# Patient Record
Sex: MALE | Race: BLACK OR AFRICAN AMERICAN | NOT HISPANIC OR LATINO | Employment: OTHER | URBAN - METROPOLITAN AREA
[De-identification: names, ages, dates, MRNs, and addresses within clinical notes are randomized per-mention and may not be internally consistent; named-entity substitution may affect disease eponyms.]

---

## 2017-01-03 ENCOUNTER — OFFICE VISIT (OUTPATIENT)
Dept: UROLOGY | Facility: CLINIC | Age: 57
End: 2017-01-03
Payer: MEDICARE

## 2017-01-03 VITALS
HEART RATE: 81 BPM | DIASTOLIC BLOOD PRESSURE: 86 MMHG | WEIGHT: 165.38 LBS | SYSTOLIC BLOOD PRESSURE: 129 MMHG | BODY MASS INDEX: 24.5 KG/M2 | HEIGHT: 69 IN

## 2017-01-03 DIAGNOSIS — N31.8 FREQUENCY-URGENCY SYNDROME: ICD-10-CM

## 2017-01-03 DIAGNOSIS — R97.20 ELEVATED PSA: ICD-10-CM

## 2017-01-03 DIAGNOSIS — C61 PROSTATE CANCER: Primary | ICD-10-CM

## 2017-01-03 DIAGNOSIS — N13.8 BPH (BENIGN PROSTATIC HYPERTROPHY) WITH URINARY OBSTRUCTION: ICD-10-CM

## 2017-01-03 DIAGNOSIS — R39.12 WEAK URINE STREAM: ICD-10-CM

## 2017-01-03 DIAGNOSIS — N40.1 BPH (BENIGN PROSTATIC HYPERTROPHY) WITH URINARY OBSTRUCTION: ICD-10-CM

## 2017-01-03 PROCEDURE — 99213 OFFICE O/P EST LOW 20 MIN: CPT | Mod: PBBFAC | Performed by: UROLOGY

## 2017-01-03 PROCEDURE — 99999 PR PBB SHADOW E&M-EST. PATIENT-LVL III: CPT | Mod: PBBFAC,,, | Performed by: UROLOGY

## 2017-01-03 PROCEDURE — 99215 OFFICE O/P EST HI 40 MIN: CPT | Mod: S$PBB,,, | Performed by: UROLOGY

## 2017-01-03 RX ORDER — TAMSULOSIN HYDROCHLORIDE 0.4 MG/1
0.4 CAPSULE ORAL NIGHTLY
Qty: 90 CAPSULE | Refills: 3 | Status: SHIPPED | OUTPATIENT
Start: 2017-01-03 | End: 2017-05-26 | Stop reason: SDUPTHER

## 2017-01-03 RX ORDER — FINASTERIDE 5 MG/1
5 TABLET, FILM COATED ORAL DAILY
Qty: 90 TABLET | Refills: 3 | Status: SHIPPED | OUTPATIENT
Start: 2017-01-03 | End: 2017-02-02

## 2017-01-03 RX ORDER — FINASTERIDE 5 MG/1
5 TABLET, FILM COATED ORAL DAILY
COMMUNITY
End: 2017-05-26 | Stop reason: SDUPTHER

## 2017-01-03 NOTE — PROGRESS NOTES
Jesus Christy is a 56 y.o. man who is here for the evaluation of Prostate Cancer (PSA 5.4)  hx of newly diagnosed prostate cancer back in 9/15/16 for elevated PSA.  Based on his low grade, low volume prostate cancer, we decided to follow up with active surveillance.  His overall voiding symptoms including weak urine flow, frequency and urgency got better after he was started on Flomax and Finasteride after his prostate bx.    Denies dysuria or hematuria.  No family hx of prostate cancer.  Had right knee surgery and had chronic pain from the right thigh to the knee ( on cymbalta).  Denies flank pain, dysuira, hematuria .     TRUS bx of prostate on 9/15/16 showed  Volume 58 grams in size.    FINAL PATHOLOGIC DIAGNOSIS  1. Prostate, left apex, biopsy:  Benign prostatic tissue.  2. Prostate, left middle, biopsy:  Benign prostatic tissue.  3. Prostate, left base, biopsy:  Benign prostatic tissue.  4. Prostate, right apex, biopsy:  Benign prostatic tissue.  5. Prostate, right middle, biopsy:  Benign prostatic tissue.  6. Prostate, right base, biopsy:  Prostate adenocarcinoma, shaun pattern (3+3), score 6, involving 1 of 2 biopsies, less than 5%. No evidence  of perineural invasion.     Past Medical History   Diagnosis Date    GERD (gastroesophageal reflux disease)     HTN (hypertension)      Past Surgical History   Procedure Laterality Date    Knee arthroscopy w/ acl reconstruction  2014     right     Social History   Substance Use Topics    Smoking status: Never Smoker    Smokeless tobacco: None    Alcohol use 0.5 oz/week     1 Standard drinks or equivalent per week     Family History   Problem Relation Age of Onset    Hypertension Mother     Diabetes Father      Allergy:  Review of patient's allergies indicates:   Allergen Reactions    Morphine Palpitations     Outpatient Encounter Prescriptions as of 1/3/2017   Medication Sig Dispense Refill    butalbital-acetaminophen-caffeine -40 mg (FIORICET,  ESGIC) -40 mg per tablet TAKE ONE TABLET BY MOUTH EVERY 6 HOURS AS NEEDED FOR HEADACHES 30 tablet 0    finasteride (PROSCAR) 5 mg tablet Take 5 mg by mouth once daily.      tamsulosin (FLOMAX) 0.4 mg Cp24 Take 1 capsule (0.4 mg total) by mouth every evening. 90 capsule 3    [DISCONTINUED] tamsulosin (FLOMAX) 0.4 mg Cp24 Take 1 capsule (0.4 mg total) by mouth every evening. 30 capsule 11    finasteride (PROSCAR) 5 mg tablet Take 1 tablet (5 mg total) by mouth once daily. 90 tablet 3    sumatriptan (IMITREX) 25 MG Tab Take 1 tablet (25 mg total) by mouth every 2 (two) hours as needed (can repeat once after 2 hours in a 24 hour period). 12 tablet 2    tapentadol 100 mg Tb12 Take 200 mg by mouth 2 (two) times daily. rx by Dr. Esteban  0    UNABLE TO FIND Take 1 packet by mouth 6 (six) times daily. medication name: Goodies Headache Powder      [DISCONTINUED] finasteride (PROSCAR) 5 mg tablet Take 1 tablet (5 mg total) by mouth once daily. 30 tablet 12     Facility-Administered Encounter Medications as of 1/3/2017   Medication Dose Route Frequency Provider Last Rate Last Dose    lidocaine HCL 10 mg/ml (1%) injection 10 mL  10 mL Infiltration Once Varun H. MD Martín   10 mL at 09/15/16 1030    lidocaine HCl 2% urojet   Rectal Once Varun H. MD Martín         Review of Systems   General ROS: GENERAL:  No weight gain or loss  SKIN:  No rashes or lacerations  HEAD:  No headaches  EYES:  No exophthalmos, jaundice or blindness  EARS:  No dizziness, tinnitus or hearing loss  NOSE:  No changes in smell  MOUTH & THROAT:  No dyskinetic movements or obvious goiter  CHEST:  No shortness of breath, hyperventilation or cough  CARDIOVASCULAR:  No tachycardia or chest pain  ABDOMEN:  No nausea, vomiting, pain, constipation or diarrhea  URINARY:  No frequency, dysuria or sexual dysfunction  ENDOCRINE:  No polydipsia, polyuria  MUSCULOSKELETAL:  No pain or stiffness of the joints  NEUROLOGIC:  No weakness, sensory changes,  seizures, confusion, memory loss, tremor or other abnormal movements  Physical Exam     Vitals:    01/03/17 1009   BP: 129/86   Pulse: 81     General Appearance:  Alert, cooperative, no distress, appears stated age   Head:  Normocephalic, without obvious abnormality, atraumatic   Eyes:  PERRL, conjunctiva/corneas clear, EOM's intact, fundi benign, both eyes   Ears:  Normal TM's and external ear canals, both ears   Nose: Nares normal, septum midline, mucosa normal, no drainage or sinus tenderness   Throat: Lips, mucosa, and tongue normal; teeth and gums normal   Neck: Supple, symmetrical, trachea midline, no adenopathy, thyroid: not enlarged, symmetric, no tenderness/mass/nodules, no carotid bruit or JVD   Back:   Symmetric, no curvature, ROM normal, no CVA tenderness   Lungs:   Clear to auscultation bilaterally, respirations unlabored   Chest Wall:  No tenderness or deformity   Heart:  Regular rate and rhythm, S1, S2 normal, no murmur, rub or gallop   Abdomen:   Soft, non-tender, bowel sounds active all four quadrants,  no masses, no organomegaly of liver and spleen  No hernia noted   Genitalia:  Scrotum: no rash or lesion  Normal symmetric epididymis without masses  Normal vas palpated  Normal size, symmetric testicles with no masses   Normal urethral meatus with no discharge  Normal circumcised penis with no lesion   Rectal:  Normal perineum and anus upon inspection.  Normal tone, no masses or tenderness;   Extremities: Extremities normal, atraumatic, no cyanosis or edema   Pulses: 2+ and symmetric   Skin: Skin color, texture, turgor normal, no rashes or lesions   Lymph nodes: Cervical, supraclavicular, and axillary nodes normal   Neurologic: Normal     Prostate 50 grams smooth with no nodule or tenderness.    LABS:  Lab Results   Component Value Date    PSA 6.9 (H) 09/01/2016    PSA 6.2 (H) 08/22/2016    PSA 3.1 03/22/2012    PSADIAG 5.4 (H) 12/29/2016     Results for orders placed or performed in visit on  12/29/16   Prostate Specific Antigen, Diagnostic   Result Value Ref Range    PSA DIAGNOSTIC 5.4 (H) 0.00 - 4.00 ng/mL     Lab Results   Component Value Date    CREATININE 1.1 08/22/2016    CREATININE 1.3 02/04/2013    CREATININE 1.0 03/13/2012     No results found for this or any previous visit.  Urine Culture, Routine   Date Value Ref Range Status   03/19/2012 NO GROWTH AFTER 48 HOURS.  Final     UA clear    Assessment and Plan:  Jesus was seen today for prostate cancer.    Diagnoses and all orders for this visit:    Prostate cancer  -     Prostate Specific Antigen, Diagnostic; Future    Elevated PSA  -     Prostate Specific Antigen, Diagnostic; Future    Weak urine stream  -     tamsulosin (FLOMAX) 0.4 mg Cp24; Take 1 capsule (0.4 mg total) by mouth every evening.    Frequency-urgency syndrome  -     tamsulosin (FLOMAX) 0.4 mg Cp24; Take 1 capsule (0.4 mg total) by mouth every evening.    BPH (benign prostatic hypertrophy) with urinary obstruction  -     finasteride (PROSCAR) 5 mg tablet; Take 1 tablet (5 mg total) by mouth once daily.    I reviewed and explained his pathology again in detail.  Options of surgery or radiation therapy briefly discussed.  He agrees with active surveillance for his prostate cancer.  Will continue to monitor his PSA.  90 days refills given.  I spent 40 minutes with the patient of which more than half was spent in direct consultation with the patient in regards to our treatment and plan.      Follow-up:  Return in about 3 months (around 4/3/2017) for PSA.

## 2017-01-03 NOTE — MR AVS SNAPSHOT
[facility] - Urology 4th Floor  [address] [address]  Phone: [phone]                  Jesus Christy   1/3/2017 10:30 AM   Office Visit    Description:  Male : 1960   Provider:  Varun Resendiz MD   Department:  [facility] - Urology 4th Floor           Reason for Visit     Prostate Cancer           Diagnoses this Visit        Comments    Prostate cancer    -  Primary     Elevated PSA         Weak urine stream         Frequency-urgency syndrome         BPH (benign prostatic hypertrophy) with urinary obstruction                To Do List           Goals (5 Years of Data)     None      Follow-Up and Disposition     Return in about 3 months (around 4/3/2017) for PSA.       These Medications        Disp Refills Start End    tamsulosin (FLOMAX) 0.4 mg Cp24 90 capsule 3 1/3/2017     Take 1 capsule (0.4 mg total) by mouth every evening. - Oral    Ph[facility] [address] [facility] [address] Ph #: [phone]       finasteride (PROSCAR) 5 mg tablet 90 tablet 3 1/3/2017 2017    Take 1 tablet (5 mg total) by mouth once daily. - Oral    [facility] [address] [facility] Ph #: [phone]         [facility] On Call     [facility] On Call Nurse Care Line -  Assistance  Registered nurses in the [facility] provide clinical advisement, health education, appointment booking, and other advisory services.  Call for this free service at [phone].             Medications           Message regarding Medications     Verify the changes and/or additions to your medication regime listed below are the same as discussed with your clinician today.  If any of these changes or additions are incorrect, please notify your healthcare provider.             Verify that the below list of medications is an accurate representation of the medications you are currently taking.  If none reported, the list may be blank. If  "incorrect, please contact your healthcare provider. Carry this list with you in case of emergency.           Current Medications     butalbital-acetaminophen-caffeine -40 mg (FIORICET, ESGIC) -40 mg per tablet TAKE ONE TABLET BY MOUTH EVERY 6 HOURS AS NEEDED FOR HEADACHES    finasteride (PROSCAR) 5 mg tablet Take 5 mg by mouth once daily.    tamsulosin (FLOMAX) 0.4 mg Cp24 Take 1 capsule (0.4 mg total) by mouth every evening.    finasteride (PROSCAR) 5 mg tablet Take 1 tablet (5 mg total) by mouth once daily.    sumatriptan (IMITREX) 25 MG Tab Take 1 tablet (25 mg total) by mouth every 2 (two) hours as needed (can repeat once after 2 hours in a 24 hour period).    tapentadol 100 mg Tb12 Take 200 mg by mouth 2 (two) times daily. rx by Dr. Esteban    UNABLE TO FIND Take 1 packet by mouth 6 (six) times daily. medication name: Goodies Headache Powder           Clinical Reference Information           Vital Signs - Last Recorded  Most recent update: 1/3/2017 10:11 AM by Rose Loredo RN    BP Pulse Ht Wt BMI    129/86 81 5' 9" (1.753 m) 75 kg (165 lb 5.5 oz) 24.42 kg/m2      Blood Pressure          Most Recent Value    BP  129/86      Allergies as of 1/3/2017     Morphine      Immunizations Administered on Date of Encounter - 1/3/2017     None      Orders Placed During Today's Visit     Future Labs/Procedures Expected by Expires    Prostate Specific Antigen, Diagnostic  1/3/2017 1/3/2018      [facility] Sign-Up     Activating your [facility] account is as easy as 1-2-3!     1) Visit [email or web address], select Sign Up Now, enter this activation code and your date of birth, then select Next.  [email or web address]  Expires: 1/5/2017  9:49 AM      2) Create a username and password to use when you visit [facility] in the future and select a security question in case you lose your password and select Next.    3) Enter your e-mail address and click Sign Up!    Additional Information  If you have questions, please e-mail " [email or web address] or call [phone] to talk to our [facility] staff. Remember, [facility] is NOT to be used for urgent needs. For medical emergencies, dial 911.         Instructions    Lab Results   Component Value Date    PSA 6.9 (H) 09/01/2016    PSA 6.2 (H) 08/22/2016    PSA 3.1 03/22/2012    PSADIAG 5.4 (H) 12/29/2016

## 2017-01-03 NOTE — PATIENT INSTRUCTIONS
Lab Results   Component Value Date    PSA 6.9 (H) 09/01/2016    PSA 6.2 (H) 08/22/2016    PSA 3.1 03/22/2012    PSADIAG 5.4 (H) 12/29/2016

## 2017-05-19 ENCOUNTER — LAB VISIT (OUTPATIENT)
Dept: LAB | Facility: HOSPITAL | Age: 57
End: 2017-05-19
Attending: UROLOGY
Payer: MEDICARE

## 2017-05-19 DIAGNOSIS — C61 PROSTATE CANCER: ICD-10-CM

## 2017-05-19 DIAGNOSIS — R97.20 ELEVATED PSA: ICD-10-CM

## 2017-05-19 LAB — COMPLEXED PSA SERPL-MCNC: 5.8 NG/ML

## 2017-05-19 PROCEDURE — 36415 COLL VENOUS BLD VENIPUNCTURE: CPT

## 2017-05-19 PROCEDURE — 84153 ASSAY OF PSA TOTAL: CPT

## 2017-05-22 ENCOUNTER — OFFICE VISIT (OUTPATIENT)
Dept: INTERNAL MEDICINE | Facility: CLINIC | Age: 57
End: 2017-05-22
Payer: MEDICARE

## 2017-05-22 VITALS
BODY MASS INDEX: 24.54 KG/M2 | SYSTOLIC BLOOD PRESSURE: 120 MMHG | HEART RATE: 78 BPM | DIASTOLIC BLOOD PRESSURE: 88 MMHG | HEIGHT: 69 IN | WEIGHT: 165.69 LBS

## 2017-05-22 DIAGNOSIS — G43.709 CHRONIC MIGRAINE WITHOUT AURA WITHOUT STATUS MIGRAINOSUS, NOT INTRACTABLE: ICD-10-CM

## 2017-05-22 DIAGNOSIS — G90.521 COMPLEX REGIONAL PAIN SYNDROME TYPE 1 OF RIGHT LOWER EXTREMITY: Primary | ICD-10-CM

## 2017-05-22 DIAGNOSIS — F32.A DEPRESSION, UNSPECIFIED DEPRESSION TYPE: ICD-10-CM

## 2017-05-22 DIAGNOSIS — R09.89 CHEST CONGESTION: ICD-10-CM

## 2017-05-22 DIAGNOSIS — F41.9 ANXIETY: ICD-10-CM

## 2017-05-22 PROCEDURE — 99214 OFFICE O/P EST MOD 30 MIN: CPT | Mod: S$PBB,,, | Performed by: INTERNAL MEDICINE

## 2017-05-22 PROCEDURE — 99999 PR PBB SHADOW E&M-EST. PATIENT-LVL III: CPT | Mod: PBBFAC,,, | Performed by: INTERNAL MEDICINE

## 2017-05-22 PROCEDURE — 99213 OFFICE O/P EST LOW 20 MIN: CPT | Mod: PBBFAC | Performed by: INTERNAL MEDICINE

## 2017-05-22 RX ORDER — BUTALBITAL, ACETAMINOPHEN AND CAFFEINE 50; 325; 40 MG/1; MG/1; MG/1
TABLET ORAL
Qty: 30 TABLET | Refills: 1 | Status: SHIPPED | OUTPATIENT
Start: 2017-05-22 | End: 2018-08-28

## 2017-05-22 RX ORDER — HYDROXYZINE HYDROCHLORIDE 50 MG/1
50 TABLET, FILM COATED ORAL 2 TIMES DAILY PRN
COMMUNITY
Start: 2017-05-22 | End: 2018-08-28

## 2017-05-22 RX ORDER — AMITRIPTYLINE HYDROCHLORIDE 100 MG/1
100 TABLET ORAL NIGHTLY
Qty: 30 TABLET | Refills: 11 | COMMUNITY
Start: 2017-05-22 | End: 2018-03-01 | Stop reason: SDUPTHER

## 2017-05-22 NOTE — PROGRESS NOTES
"Subjective:       Patient ID: Jesus Christy is a 56 y.o. male.    Chief Complaint: Follow-up    HPI   55 yo M here for 6 mo follow up of headaches and HTN. Saw neurology on 9/26. They suspected CRPS in RLE and migraines. Migraine journal, magnesium, gabapentin qhs. Abortive sumatriptan, stop fioricet due to rebound.     On cymbalta, nucynta also.   Followed by Dr. Resendiz for BPH, last 1/317.    Reports headaches are relatively rare.   He reports the fioricet were more affordable. The imitrex was too expensive.     Some numbness in arms if holding above head. Returns to normal quickly with repositioning.     He continues to see Dr. Esteban for CRPS in RLE.     He is seeing psychiatrist Dr. Holder (sp?)  for depression. On amitriptyline 100mg and hydroxyzine prn anxiety.     Recently chest congestion and fullness. Occasional wheezing at night. Going on for a few weeks. Taking dayquil which seems to be helping.     Review of Systems   Constitutional: Negative for fever.   HENT: Positive for congestion.    Respiratory: Negative for shortness of breath.    Cardiovascular: Negative for chest pain.   Musculoskeletal: Negative.    Skin: Negative.    Neurological: Positive for headaches.       Objective:   /88   Pulse 78   Ht 5' 9" (1.753 m)   Wt 75.2 kg (165 lb 11.2 oz)   BMI 24.47 kg/m²      Physical Exam   Constitutional: He is oriented to person, place, and time. He appears well-developed and well-nourished. No distress.   HENT:   Head: Normocephalic and atraumatic.   Cardiovascular: Normal rate and regular rhythm.    No murmur heard.  Pulmonary/Chest: Effort normal. No respiratory distress. He has no wheezes. He has no rales.   Neurological: He is alert and oriented to person, place, and time.   Skin: Skin is warm and dry. He is not diaphoretic.   Psychiatric: He has a normal mood and affect. His behavior is normal.       Assessment:       1. Complex regional pain syndrome type 1 of right lower extremity    2. " Chronic migraine without aura without status migrainosus, not intractable    3. Anxiety    4. Depression, unspecified depression type    5. Chest congestion        Plan:       Jesus was seen today for follow-up.    Diagnoses and all orders for this visit:    Complex regional pain syndrome type 1 of right lower extremity  Managed by Dr. Esteban    Chronic migraine without aura without status migrainosus, not intractable  -     butalbital-acetaminophen-caffeine -40 mg (FIORICET, ESGIC) -40 mg per tablet; TAKE ONE TABLET BY MOUTH EVERY 12 hours as needed for headaches. Attempt to minimize to no more than 2/month.    Anxiety  Depression, unspecified depression type  Seeing psychiatrist, on tca and hydroxyzine prn    Chest congestion  Continue dayquil, benign exam and sx improving  If fails to improve completely or develop fever, purulent sputum contact clinic          Patient given written rx to receive tdap vaccine at pharmacy    Discussed risk of rebound and will use fioricet very sparingly.

## 2017-05-26 ENCOUNTER — OFFICE VISIT (OUTPATIENT)
Dept: UROLOGY | Facility: CLINIC | Age: 57
End: 2017-05-26
Payer: MEDICARE

## 2017-05-26 VITALS
HEART RATE: 106 BPM | SYSTOLIC BLOOD PRESSURE: 139 MMHG | DIASTOLIC BLOOD PRESSURE: 85 MMHG | BODY MASS INDEX: 24.72 KG/M2 | WEIGHT: 166.88 LBS | HEIGHT: 69 IN

## 2017-05-26 DIAGNOSIS — C61 PROSTATE CANCER: Primary | ICD-10-CM

## 2017-05-26 DIAGNOSIS — R39.12 WEAK URINE STREAM: ICD-10-CM

## 2017-05-26 DIAGNOSIS — N31.8 FREQUENCY-URGENCY SYNDROME: ICD-10-CM

## 2017-05-26 DIAGNOSIS — R97.20 ELEVATED PSA: ICD-10-CM

## 2017-05-26 PROCEDURE — 99999 PR PBB SHADOW E&M-EST. PATIENT-LVL III: CPT | Mod: PBBFAC,,, | Performed by: UROLOGY

## 2017-05-26 PROCEDURE — 99213 OFFICE O/P EST LOW 20 MIN: CPT | Mod: PBBFAC | Performed by: UROLOGY

## 2017-05-26 PROCEDURE — 99215 OFFICE O/P EST HI 40 MIN: CPT | Mod: S$PBB,,, | Performed by: UROLOGY

## 2017-05-26 RX ORDER — FINASTERIDE 5 MG/1
5 TABLET, FILM COATED ORAL DAILY
Qty: 90 TABLET | Refills: 3 | Status: SHIPPED | OUTPATIENT
Start: 2017-05-26 | End: 2017-08-29 | Stop reason: SDUPTHER

## 2017-05-26 RX ORDER — LORAZEPAM 1 MG/1
2 TABLET ORAL
Qty: 2 TABLET | Refills: 0 | Status: SHIPPED | OUTPATIENT
Start: 2017-05-26 | End: 2018-08-28

## 2017-05-26 RX ORDER — TAMSULOSIN HYDROCHLORIDE 0.4 MG/1
0.4 CAPSULE ORAL NIGHTLY
Qty: 90 CAPSULE | Refills: 3 | Status: SHIPPED | OUTPATIENT
Start: 2017-05-26 | End: 2017-08-29 | Stop reason: SDUPTHER

## 2017-05-26 NOTE — PROGRESS NOTES
Jesus Christy is a 56 y.o. man who is here for the evaluation of No chief complaint on file.  hx of newly diagnosed prostate cancer back in 9/15/16 for elevated PSA.  Based on his low grade, low volume prostate cancer, we decided to follow up with active surveillance.  His overall voiding symptoms including weak urine flow, frequency and urgency got better after he was started on Flomax and Finasteride after his prostate bx.    Denies dysuria or hematuria.  No family hx of prostate cancer.  Had right knee surgery and had chronic pain from the right thigh to the knee ( on cymbalta).  Denies flank pain, dysuira, hematuria .     TRUS bx of prostate on 9/15/16 showed  Volume 58 grams in size.    FINAL PATHOLOGIC DIAGNOSIS  1. Prostate, left apex, biopsy:  Benign prostatic tissue.  2. Prostate, left middle, biopsy:  Benign prostatic tissue.  3. Prostate, left base, biopsy:  Benign prostatic tissue.  4. Prostate, right apex, biopsy:  Benign prostatic tissue.  5. Prostate, right middle, biopsy:  Benign prostatic tissue.  6. Prostate, right base, biopsy:  Prostate adenocarcinoma, shaun pattern (3+3), score 6, involving 1 of 2 biopsies, less than 5%. No evidence  of perineural invasion.     Past Medical History:   Diagnosis Date    GERD (gastroesophageal reflux disease)     HTN (hypertension)      Past Surgical History:   Procedure Laterality Date    KNEE ARTHROSCOPY W/ ACL RECONSTRUCTION  2014    right     Social History   Substance Use Topics    Smoking status: Never Smoker    Smokeless tobacco: Not on file    Alcohol use 0.5 oz/week     1 Standard drinks or equivalent per week     Family History   Problem Relation Age of Onset    Hypertension Mother     Diabetes Father      Allergy:  Review of patient's allergies indicates:   Allergen Reactions    Morphine Palpitations     Outpatient Encounter Prescriptions as of 5/26/2017   Medication Sig Dispense Refill    amitriptyline (ELAVIL) 100 MG tablet Take 1  tablet (100 mg total) by mouth every evening. 30 tablet 11    butalbital-acetaminophen-caffeine -40 mg (FIORICET, ESGIC) -40 mg per tablet TAKE ONE TABLET BY MOUTH EVERY 12 hours as needed for headaches. Attempt to minimize to no more than 2/month. 30 tablet 1    finasteride (PROSCAR) 5 mg tablet Take 1 tablet (5 mg total) by mouth once daily. 90 tablet 3    hydrOXYzine (ATARAX) 50 MG tablet Take 1 tablet (50 mg total) by mouth 2 (two) times daily as needed for Anxiety.      tamsulosin (FLOMAX) 0.4 mg Cp24 Take 1 capsule (0.4 mg total) by mouth every evening. 90 capsule 3    tapentadol 100 mg Tb12 Take 100 mg by mouth 2 (two) times daily. rx by Dr. Esteban  0    UNABLE TO FIND Take 1 packet by mouth 6 (six) times daily. medication name: Goodies Headache Powder      [DISCONTINUED] finasteride (PROSCAR) 5 mg tablet Take 5 mg by mouth once daily.      [DISCONTINUED] tamsulosin (FLOMAX) 0.4 mg Cp24 Take 1 capsule (0.4 mg total) by mouth every evening. 90 capsule 3    lorazepam (ATIVAN) 1 MG tablet Take 2 tablets (2 mg total) by mouth On call Procedure for Anxiety. 2 tablet 0     Facility-Administered Encounter Medications as of 5/26/2017   Medication Dose Route Frequency Provider Last Rate Last Dose    lidocaine HCL 10 mg/ml (1%) injection 10 mL  10 mL Infiltration Once Varun H. MD Martín        lidocaine HCl 2% urojet   Rectal Once Varun H. MD Martín         Review of Systems   General ROS: GENERAL:  No weight gain or loss  SKIN:  No rashes or lacerations  HEAD:  No headaches  EYES:  No exophthalmos, jaundice or blindness  EARS:  No dizziness, tinnitus or hearing loss  NOSE:  No changes in smell  MOUTH & THROAT:  No dyskinetic movements or obvious goiter  CHEST:  No shortness of breath, hyperventilation or cough  CARDIOVASCULAR:  No tachycardia or chest pain  ABDOMEN:  No nausea, vomiting, pain, constipation or diarrhea  URINARY:  No frequency, dysuria or sexual dysfunction  ENDOCRINE:  No  polydipsia, polyuria  MUSCULOSKELETAL:  No pain or stiffness of the joints  NEUROLOGIC:  No weakness, sensory changes, seizures, confusion, memory loss, tremor or other abnormal movements  Physical Exam     Vitals:    05/26/17 0815   BP: 139/85   Pulse: 106     General Appearance:  Alert, cooperative, no distress, appears stated age   Head:  Normocephalic, without obvious abnormality, atraumatic   Eyes:  PERRL, conjunctiva/corneas clear, EOM's intact, fundi benign, both eyes   Ears:  Normal TM's and external ear canals, both ears   Nose: Nares normal, septum midline, mucosa normal, no drainage or sinus tenderness   Throat: Lips, mucosa, and tongue normal; teeth and gums normal   Neck: Supple, symmetrical, trachea midline, no adenopathy, thyroid: not enlarged, symmetric, no tenderness/mass/nodules, no carotid bruit or JVD   Back:   Symmetric, no curvature, ROM normal, no CVA tenderness   Lungs:   Clear to auscultation bilaterally, respirations unlabored   Chest Wall:  No tenderness or deformity   Heart:  Regular rate and rhythm, S1, S2 normal, no murmur, rub or gallop   Abdomen:   Soft, non-tender, bowel sounds active all four quadrants,  no masses, no organomegaly of liver and spleen  No hernia noted   Genitalia:  Scrotum: no rash or lesion  Normal symmetric epididymis without masses  Normal vas palpated  Normal size, symmetric testicles with no masses   Normal urethral meatus with no discharge  Normal circumcised penis with no lesion   Rectal:  Normal perineum and anus upon inspection.  Normal tone, no masses or tenderness;   Extremities: Extremities normal, atraumatic, no cyanosis or edema   Pulses: 2+ and symmetric   Skin: Skin color, texture, turgor normal, no rashes or lesions   Lymph nodes: Cervical, supraclavicular, and axillary nodes normal   Neurologic: Normal     Prostate 40 grams smooth with no nodule or tenderness.    LABS:  Lab Results   Component Value Date    PSA 6.9 (H) 09/01/2016    PSA 6.2 (H)  08/22/2016    PSA 3.1 03/22/2012    PSADIAG 5.8 (H) 05/19/2017    PSADIAG 5.4 (H) 12/29/2016     Results for orders placed or performed in visit on 05/19/17   Prostate Specific Antigen, Diagnostic   Result Value Ref Range    PSA DIAGNOSTIC 5.8 (H) 0.00 - 4.00 ng/mL   Results for orders placed or performed in visit on 12/29/16   Prostate Specific Antigen, Diagnostic   Result Value Ref Range    PSA DIAGNOSTIC 5.4 (H) 0.00 - 4.00 ng/mL     Lab Results   Component Value Date    CREATININE 1.1 08/22/2016    CREATININE 1.3 02/04/2013    CREATININE 1.0 03/13/2012     No results found for this or any previous visit.  Urine Culture, Routine   Date Value Ref Range Status   03/19/2012 NO GROWTH AFTER 48 HOURS.  Final       Assessment and Plan:  Diagnoses and all orders for this visit:    Prostate cancer  -     Prostate Specific Antigen, Diagnostic; Future  -     MRI Pelvis W WO Contrast; Future  -     lorazepam (ATIVAN) 1 MG tablet; Take 2 tablets (2 mg total) by mouth On call Procedure for Anxiety.  -     Cancel: MRI Pelvis W WO Contrast; Future    Elevated PSA  -     Prostate Specific Antigen, Diagnostic; Future  -     MRI Pelvis W WO Contrast; Future    Weak urine stream  -     finasteride (PROSCAR) 5 mg tablet; Take 1 tablet (5 mg total) by mouth once daily.  -     tamsulosin (FLOMAX) 0.4 mg Cp24; Take 1 capsule (0.4 mg total) by mouth every evening.    Frequency-urgency syndrome  -     tamsulosin (FLOMAX) 0.4 mg Cp24; Take 1 capsule (0.4 mg total) by mouth every evening.    I reviewed and explained his pathology again in detail.  Options of surgery or radiation therapy discussed.  He agrees with active surveillance for his prostate cancer for now.  Will re-evaluate him with MRI of pelvis.  Plan TRUS bx of prostate on the next visit.    Will continue to monitor his PSA.  90 days refills given.  I spent 40 minutes with the patient of which more than half was spent in direct consultation with the patient in regards to our  treatment and plan.      Follow-up:  Return in about 3 months (around 8/26/2017) for MRI of pelvis, PSA.

## 2017-05-26 NOTE — PATIENT INSTRUCTIONS
Lab Results   Component Value Date    PSA 6.9 (H) 09/01/2016    PSA 6.2 (H) 08/22/2016    PSA 3.1 03/22/2012    PSADIAG 5.8 (H) 05/19/2017    PSADIAG 5.4 (H) 12/29/2016

## 2017-08-22 ENCOUNTER — LAB VISIT (OUTPATIENT)
Dept: LAB | Facility: HOSPITAL | Age: 57
End: 2017-08-22
Attending: UROLOGY
Payer: MEDICARE

## 2017-08-22 DIAGNOSIS — R97.20 ELEVATED PSA: ICD-10-CM

## 2017-08-22 LAB — COMPLEXED PSA SERPL-MCNC: 5.2 NG/ML

## 2017-08-22 PROCEDURE — 84153 ASSAY OF PSA TOTAL: CPT

## 2017-08-22 PROCEDURE — 36415 COLL VENOUS BLD VENIPUNCTURE: CPT

## 2017-08-24 ENCOUNTER — LAB VISIT (OUTPATIENT)
Dept: LAB | Facility: HOSPITAL | Age: 57
End: 2017-08-24
Attending: INTERNAL MEDICINE
Payer: MEDICARE

## 2017-08-24 ENCOUNTER — TELEPHONE (OUTPATIENT)
Dept: INTERNAL MEDICINE | Facility: CLINIC | Age: 57
End: 2017-08-24

## 2017-08-24 ENCOUNTER — OFFICE VISIT (OUTPATIENT)
Dept: INTERNAL MEDICINE | Facility: CLINIC | Age: 57
End: 2017-08-24
Payer: MEDICARE

## 2017-08-24 VITALS
HEIGHT: 69 IN | DIASTOLIC BLOOD PRESSURE: 80 MMHG | HEART RATE: 88 BPM | OXYGEN SATURATION: 97 % | BODY MASS INDEX: 24.75 KG/M2 | SYSTOLIC BLOOD PRESSURE: 110 MMHG | WEIGHT: 167.13 LBS

## 2017-08-24 DIAGNOSIS — R09.81 NASAL CONGESTION: ICD-10-CM

## 2017-08-24 DIAGNOSIS — F41.9 ANXIETY: ICD-10-CM

## 2017-08-24 DIAGNOSIS — I10 ESSENTIAL HYPERTENSION: ICD-10-CM

## 2017-08-24 DIAGNOSIS — G90.521 COMPLEX REGIONAL PAIN SYNDROME TYPE 1 OF RIGHT LOWER EXTREMITY: Primary | ICD-10-CM

## 2017-08-24 DIAGNOSIS — C61 PROSTATE CANCER: ICD-10-CM

## 2017-08-24 DIAGNOSIS — F32.A DEPRESSION, UNSPECIFIED DEPRESSION TYPE: ICD-10-CM

## 2017-08-24 DIAGNOSIS — H61.23 BILATERAL IMPACTED CERUMEN: ICD-10-CM

## 2017-08-24 LAB
ALBUMIN SERPL BCP-MCNC: 4 G/DL
ALP SERPL-CCNC: 74 U/L
ALT SERPL W/O P-5'-P-CCNC: 30 U/L
ANION GAP SERPL CALC-SCNC: 9 MMOL/L
AST SERPL-CCNC: 18 U/L
BASOPHILS # BLD AUTO: 0.03 K/UL
BASOPHILS NFR BLD: 0.6 %
BILIRUB SERPL-MCNC: 0.6 MG/DL
BUN SERPL-MCNC: 12 MG/DL
CALCIUM SERPL-MCNC: 9.9 MG/DL
CHLORIDE SERPL-SCNC: 107 MMOL/L
CO2 SERPL-SCNC: 26 MMOL/L
CREAT SERPL-MCNC: 1 MG/DL
DIFFERENTIAL METHOD: ABNORMAL
EOSINOPHIL # BLD AUTO: 0.3 K/UL
EOSINOPHIL NFR BLD: 6 %
ERYTHROCYTE [DISTWIDTH] IN BLOOD BY AUTOMATED COUNT: 12.6 %
EST. GFR  (AFRICAN AMERICAN): >60 ML/MIN/1.73 M^2
EST. GFR  (NON AFRICAN AMERICAN): >60 ML/MIN/1.73 M^2
GLUCOSE SERPL-MCNC: 97 MG/DL
HCT VFR BLD AUTO: 41.8 %
HGB BLD-MCNC: 14.1 G/DL
LYMPHOCYTES # BLD AUTO: 1.8 K/UL
LYMPHOCYTES NFR BLD: 35.2 %
MCH RBC QN AUTO: 31 PG
MCHC RBC AUTO-ENTMCNC: 33.7 G/DL
MCV RBC AUTO: 92 FL
MONOCYTES # BLD AUTO: 0.5 K/UL
MONOCYTES NFR BLD: 9.7 %
NEUTROPHILS # BLD AUTO: 2.5 K/UL
NEUTROPHILS NFR BLD: 48.3 %
PLATELET # BLD AUTO: 316 K/UL
PMV BLD AUTO: 8.7 FL
POTASSIUM SERPL-SCNC: 4 MMOL/L
PROT SERPL-MCNC: 7.5 G/DL
RBC # BLD AUTO: 4.55 M/UL
SODIUM SERPL-SCNC: 142 MMOL/L
WBC # BLD AUTO: 5.17 K/UL

## 2017-08-24 PROCEDURE — 80053 COMPREHEN METABOLIC PANEL: CPT

## 2017-08-24 PROCEDURE — 3074F SYST BP LT 130 MM HG: CPT | Mod: ,,, | Performed by: INTERNAL MEDICINE

## 2017-08-24 PROCEDURE — 85025 COMPLETE CBC W/AUTO DIFF WBC: CPT

## 2017-08-24 PROCEDURE — 36415 COLL VENOUS BLD VENIPUNCTURE: CPT

## 2017-08-24 PROCEDURE — 99999 PR PBB SHADOW E&M-EST. PATIENT-LVL IV: CPT | Mod: PBBFAC,,, | Performed by: INTERNAL MEDICINE

## 2017-08-24 PROCEDURE — 99214 OFFICE O/P EST MOD 30 MIN: CPT | Mod: S$PBB,,, | Performed by: INTERNAL MEDICINE

## 2017-08-24 PROCEDURE — 3079F DIAST BP 80-89 MM HG: CPT | Mod: ,,, | Performed by: INTERNAL MEDICINE

## 2017-08-24 RX ORDER — FLUTICASONE PROPIONATE 50 MCG
2 SPRAY, SUSPENSION (ML) NASAL DAILY
Qty: 16 G | Refills: 12 | Status: SHIPPED | OUTPATIENT
Start: 2017-08-24 | End: 2017-09-23

## 2017-08-24 NOTE — PROGRESS NOTES
"Subjective:       Patient ID: Jesus Christy is a 57 y.o. male.    Chief Complaint: Follow-up (pt complains of leg and hip pain. pt states he has not taking any medications to relieve pain.)    HPI   56 yo M here for follow up of leg and hip pain. Previously suspected to be complex regional pain syndrome for which he has previously seen Dr. Esteban. Was rx tapentadol 100mg bid but made him very nauseated so not taking.   Previously on:   Clonidine 0.1mg - 1-2 tabs q6hprn pan: for nerve pain rx by Dr. Esteban.   Doxazosin 2mg nightly  Not taking duloxetine 60mg bid regularly.     He is seeing psychiatrist Dr. Holder (sp?)  for depression. On amitriptyline 100mg and hydroxyzine prn anxiety.    Prostate cancer diagnosed 9/15/16 followed by Dr. Resendiz. Plan is survellience with MRI and TRUS bx in future.     HTN not on meds    Lots of congestion. Using sinunex nasal spray a few times per day. Going on for a month or two. Not prone to allergies. Also ear bothering him. Mucus is white. No fever.   Review of Systems   Constitutional: Negative for fever.   HENT: Negative.    Eyes: Negative.    Respiratory: Negative for shortness of breath.    Cardiovascular: Negative for chest pain and leg swelling.   Gastrointestinal: Negative for abdominal pain, diarrhea, nausea and vomiting.   Genitourinary: Negative.    Musculoskeletal: Negative for arthralgias.   Skin: Negative for rash.   Psychiatric/Behavioral: Negative.        Objective:   /80   Pulse 88   Ht 5' 9" (1.753 m)   Wt 75.8 kg (167 lb 1.7 oz)   SpO2 97% Comment: ra  BMI 24.68 kg/m²      Physical Exam   Constitutional: He is oriented to person, place, and time. He appears well-developed and well-nourished. No distress.   HENT:   Head: Normocephalic and atraumatic.   Cardiovascular: Normal rate and regular rhythm.    No murmur heard.  Pulmonary/Chest: Effort normal. No respiratory distress. He has no wheezes. He has no rales.   Neurological: He is alert and " oriented to person, place, and time.   Skin: Skin is warm and dry. He is not diaphoretic.   Psychiatric: He has a normal mood and affect. His behavior is normal.       Assessment:       1. Complex regional pain syndrome type 1 of right lower extremity    2. Prostate cancer    3. Essential hypertension    4. Depression, unspecified depression type    5. Anxiety    6. Bilateral impacted cerumen    7. Nasal congestion        Plan:       Jesus was seen today for follow-up.    Diagnoses and all orders for this visit:    Complex regional pain syndrome type 1 of right lower extremity  Followed by Dr. Esteban  Not currently on medications as he was unable to tolerate    Prostate cancer  Followed by Dr. Resendiz  Scheduled for MRI, plan for TRUS future    Essential hypertension - looks good on recheck, monitor  -     CBC auto differential; Future  -     Comprehensive metabolic panel; Future    Depression, unspecified depression type  Anxiety  seeing psychiatrist Dr. Holder (sp?)  for depression. On amitriptyline 100mg and hydroxyzine prn anxiety.    Bilateral impacted cerumen  -     Ambulatory consult to ENT    Nasal congestion  -     fluticasone (FLONASE) 50 mcg/actuation nasal spray; 2 sprays by Each Nare route once daily.   Stop OTC spray   Can use nasal saline prn

## 2017-08-24 NOTE — TELEPHONE ENCOUNTER
Left v/m for a call back , Spoke to pt wife advised on pt results states she will let pt know and was advised to have pt call with questions or concerns

## 2017-08-24 NOTE — PATIENT INSTRUCTIONS
Flonase nasal spray daily in the morning. You can use nasal saline as often as you need throughout the day for nasal symptoms.

## 2017-08-28 ENCOUNTER — HOSPITAL ENCOUNTER (OUTPATIENT)
Dept: RADIOLOGY | Facility: HOSPITAL | Age: 57
Discharge: HOME | End: 2017-08-28
Attending: UROLOGY
Payer: MEDICARE

## 2017-08-28 DIAGNOSIS — C61 PROSTATE CANCER: ICD-10-CM

## 2017-08-28 DIAGNOSIS — R97.20 ELEVATED PSA: ICD-10-CM

## 2017-08-28 PROCEDURE — 25500020 PHARM REV CODE 255: Performed by: UROLOGY

## 2017-08-28 PROCEDURE — 72197 MRI PELVIS W/O & W/DYE: CPT | Mod: TC

## 2017-08-28 PROCEDURE — A9585 GADOBUTROL INJECTION: HCPCS | Performed by: UROLOGY

## 2017-08-28 PROCEDURE — 72197 MRI PELVIS W/O & W/DYE: CPT | Mod: 26,GC,, | Performed by: RADIOLOGY

## 2017-08-28 RX ORDER — GADOBUTROL 604.72 MG/ML
10 INJECTION INTRAVENOUS
Status: COMPLETED | OUTPATIENT
Start: 2017-08-28 | End: 2017-08-28

## 2017-08-28 RX ADMIN — GADOBUTROL 10 ML: 604.72 INJECTION INTRAVENOUS at 11:08

## 2017-08-29 ENCOUNTER — OFFICE VISIT (OUTPATIENT)
Dept: UROLOGY | Facility: CLINIC | Age: 57
End: 2017-08-29
Payer: MEDICARE

## 2017-08-29 VITALS
BODY MASS INDEX: 24.62 KG/M2 | DIASTOLIC BLOOD PRESSURE: 76 MMHG | WEIGHT: 166.25 LBS | SYSTOLIC BLOOD PRESSURE: 126 MMHG | HEART RATE: 86 BPM | HEIGHT: 69 IN

## 2017-08-29 DIAGNOSIS — K62.9 ABNORMALITY OF RECTUM: ICD-10-CM

## 2017-08-29 DIAGNOSIS — N31.8 FREQUENCY-URGENCY SYNDROME: ICD-10-CM

## 2017-08-29 DIAGNOSIS — R39.12 WEAK URINE STREAM: ICD-10-CM

## 2017-08-29 DIAGNOSIS — R97.20 ELEVATED PSA: Primary | ICD-10-CM

## 2017-08-29 DIAGNOSIS — C61 PROSTATE CANCER: ICD-10-CM

## 2017-08-29 PROCEDURE — 3078F DIAST BP <80 MM HG: CPT | Mod: ,,, | Performed by: UROLOGY

## 2017-08-29 PROCEDURE — 99999 PR PBB SHADOW E&M-EST. PATIENT-LVL IV: CPT | Mod: PBBFAC,,, | Performed by: UROLOGY

## 2017-08-29 PROCEDURE — 3074F SYST BP LT 130 MM HG: CPT | Mod: ,,, | Performed by: UROLOGY

## 2017-08-29 PROCEDURE — 99214 OFFICE O/P EST MOD 30 MIN: CPT | Mod: PBBFAC | Performed by: UROLOGY

## 2017-08-29 PROCEDURE — 99214 OFFICE O/P EST MOD 30 MIN: CPT | Mod: S$PBB,,, | Performed by: UROLOGY

## 2017-08-29 RX ORDER — FINASTERIDE 5 MG/1
5 TABLET, FILM COATED ORAL DAILY
Qty: 90 TABLET | Refills: 3 | Status: SHIPPED | OUTPATIENT
Start: 2017-08-29 | End: 2018-10-05 | Stop reason: SDUPTHER

## 2017-08-29 RX ORDER — TAMSULOSIN HYDROCHLORIDE 0.4 MG/1
0.4 CAPSULE ORAL NIGHTLY
Qty: 90 CAPSULE | Refills: 3 | Status: SHIPPED | OUTPATIENT
Start: 2017-08-29 | End: 2018-10-12 | Stop reason: SDUPTHER

## 2017-08-29 NOTE — PATIENT INSTRUCTIONS
Lab Results   Component Value Date    PSA 6.9 (H) 09/01/2016    PSA 6.2 (H) 08/22/2016    PSA 3.1 03/22/2012    PSADIAG 5.2 (H) 08/22/2017    PSADIAG 5.8 (H) 05/19/2017    PSADIAG 5.4 (H) 12/29/2016

## 2017-08-29 NOTE — PROGRESS NOTES
Jesus Christy is a 57 y.o. man who is here for the evaluation of prostate cancer.  hx of newly diagnosed prostate cancer back in 9/15/16 for elevated PSA.  Based on his low grade, low volume prostate cancer, we decided to follow up with active surveillance.  His overall voiding symptoms including weak urine flow, frequency and urgency got better after he was started on Flomax and Finasteride after his prostate bx.    Denies dysuria or hematuria.  No family hx of prostate cancer.  Had right knee surgery and had chronic pain from the right thigh to the knee ( on cymbalta).  Denies flank pain, dysuira, hematuria .     TRUS bx of prostate on 9/15/16 showed  Volume 58 grams in size.    FINAL PATHOLOGIC DIAGNOSIS  1. Prostate, left apex, biopsy:  Benign prostatic tissue.  2. Prostate, left middle, biopsy:  Benign prostatic tissue.  3. Prostate, left base, biopsy:  Benign prostatic tissue.  4. Prostate, right apex, biopsy:  Benign prostatic tissue.  5. Prostate, right middle, biopsy:  Benign prostatic tissue.  6. Prostate, right base, biopsy:  Prostate adenocarcinoma, shaun pattern (3+3), score 6, involving 1 of 2 biopsies, less than 5%. No evidence  of perineural invasion.     Past Medical History:   Diagnosis Date    GERD (gastroesophageal reflux disease)     HTN (hypertension)      Past Surgical History:   Procedure Laterality Date    KNEE ARTHROSCOPY W/ ACL RECONSTRUCTION  2014    right     Social History   Substance Use Topics    Smoking status: Never Smoker    Smokeless tobacco: Never Used    Alcohol use 0.5 oz/week     1 Standard drinks or equivalent per week     Family History   Problem Relation Age of Onset    Hypertension Mother     Diabetes Father      Allergy:  Review of patient's allergies indicates:   Allergen Reactions    Morphine Palpitations     Outpatient Encounter Prescriptions as of 8/29/2017   Medication Sig Dispense Refill    amitriptyline (ELAVIL) 100 MG tablet Take 1 tablet (100 mg  total) by mouth every evening. 30 tablet 11    butalbital-acetaminophen-caffeine -40 mg (FIORICET, ESGIC) -40 mg per tablet TAKE ONE TABLET BY MOUTH EVERY 12 hours as needed for headaches. Attempt to minimize to no more than 2/month. 30 tablet 1    finasteride (PROSCAR) 5 mg tablet Take 1 tablet (5 mg total) by mouth once daily. 90 tablet 3    fluticasone (FLONASE) 50 mcg/actuation nasal spray 2 sprays by Each Nare route once daily. 16 g 12    hydrOXYzine (ATARAX) 50 MG tablet Take 1 tablet (50 mg total) by mouth 2 (two) times daily as needed for Anxiety.      lorazepam (ATIVAN) 1 MG tablet Take 2 tablets (2 mg total) by mouth On call Procedure for Anxiety. 2 tablet 0    tamsulosin (FLOMAX) 0.4 mg Cp24 Take 1 capsule (0.4 mg total) by mouth every evening. 90 capsule 3    tapentadol 100 mg Tb12 Take 100 mg by mouth 2 (two) times daily. rx by Dr. Esteban  0    UNABLE TO FIND Take 1 packet by mouth 6 (six) times daily. medication name: Goodies Headache Powder      [DISCONTINUED] finasteride (PROSCAR) 5 mg tablet Take 1 tablet (5 mg total) by mouth once daily. 90 tablet 3    [DISCONTINUED] tamsulosin (FLOMAX) 0.4 mg Cp24 Take 1 capsule (0.4 mg total) by mouth every evening. 90 capsule 3     Facility-Administered Encounter Medications as of 8/29/2017   Medication Dose Route Frequency Provider Last Rate Last Dose    [COMPLETED] gadobutrol 10 mL  10 mL Intravenous ONCE PRN Varun Resendiz MD   10 mL at 08/28/17 1140    lidocaine HCL 10 mg/ml (1%) injection 10 mL  10 mL Infiltration Once Varun Resendiz MD        lidocaine HCl 2% urojet   Rectal Once Varun Resendiz MD         Review of Systems   General ROS: GENERAL:  No weight gain or loss  SKIN:  No rashes or lacerations  HEAD:  No headaches  EYES:  No exophthalmos, jaundice or blindness  EARS:  No dizziness, tinnitus or hearing loss  NOSE:  No changes in smell  MOUTH & THROAT:  No dyskinetic movements or obvious goiter  CHEST:  No shortness of  breath, hyperventilation or cough  CARDIOVASCULAR:  No tachycardia or chest pain  ABDOMEN:  No nausea, vomiting, pain, constipation or diarrhea  URINARY:  No frequency, dysuria or sexual dysfunction  ENDOCRINE:  No polydipsia, polyuria  MUSCULOSKELETAL:  No pain or stiffness of the joints  NEUROLOGIC:  No weakness, sensory changes, seizures, confusion, memory loss, tremor or other abnormal movements  Physical Exam     Vitals:    08/29/17 0805   BP: 126/76   Pulse: 86     General Appearance:  Alert, cooperative, no distress, appears stated age   Head:  Normocephalic, without obvious abnormality, atraumatic   Eyes:  PERRL, conjunctiva/corneas clear, EOM's intact, fundi benign, both eyes   Ears:  Normal TM's and external ear canals, both ears   Nose: Nares normal, septum midline, mucosa normal, no drainage or sinus tenderness   Throat: Lips, mucosa, and tongue normal; teeth and gums normal   Neck: Supple, symmetrical, trachea midline, no adenopathy, thyroid: not enlarged, symmetric, no tenderness/mass/nodules, no carotid bruit or JVD   Back:   Symmetric, no curvature, ROM normal, no CVA tenderness   Lungs:   Clear to auscultation bilaterally, respirations unlabored   Chest Wall:  No tenderness or deformity   Heart:  Regular rate and rhythm, S1, S2 normal, no murmur, rub or gallop   Abdomen:   Soft, non-tender, bowel sounds active all four quadrants,  no masses, no organomegaly of liver and spleen  No hernia noted   Genitalia:  Scrotum: no rash or lesion  Normal symmetric epididymis without masses  Normal vas palpated  Normal size, symmetric testicles with no masses   Normal urethral meatus with no discharge  Normal circumcised penis with no lesion   Rectal:  Normal perineum and anus upon inspection.  Normal tone, no masses or tenderness;   Extremities: Extremities normal, atraumatic, no cyanosis or edema   Pulses: 2+ and symmetric   Skin: Skin color, texture, turgor normal, no rashes or lesions   Lymph nodes: Cervical,  supraclavicular, and axillary nodes normal   Neurologic: Normal     Prostate 40 grams smooth with no nodule or tenderness.    LABS:  Lab Results   Component Value Date    PSA 6.9 (H) 09/01/2016    PSA 6.2 (H) 08/22/2016    PSA 3.1 03/22/2012    PSADIAG 5.2 (H) 08/22/2017    PSADIAG 5.8 (H) 05/19/2017    PSADIAG 5.4 (H) 12/29/2016     Results for orders placed or performed in visit on 08/22/17   Prostate Specific Antigen, Diagnostic   Result Value Ref Range    PSA DIAGNOSTIC 5.2 (H) 0.00 - 4.00 ng/mL   Results for orders placed or performed in visit on 05/19/17   Prostate Specific Antigen, Diagnostic   Result Value Ref Range    PSA DIAGNOSTIC 5.8 (H) 0.00 - 4.00 ng/mL   Results for orders placed or performed in visit on 12/29/16   Prostate Specific Antigen, Diagnostic   Result Value Ref Range    PSA DIAGNOSTIC 5.4 (H) 0.00 - 4.00 ng/mL     Lab Results   Component Value Date    CREATININE 1.0 08/24/2017    CREATININE 1.1 08/22/2016    CREATININE 1.3 02/04/2013     No results found for this or any previous visit.  Urine Culture, Routine   Date Value Ref Range Status   03/19/2012 NO GROWTH AFTER 48 HOURS.  Final     MRI of pelvis 8/28/17  1.  No focal concerning prostate abnormality.  PIRADS 1.  BPH findings noted.  2.  Circumferential lower rectal wall thickening accentuated by nondistention.  Correlate with colonoscopy.    Assessment and Plan:  Jesus was seen today for prostate cancer.    Diagnoses and all orders for this visit:    Elevated PSA    Prostate cancer  -     Prostate Specific Antigen, Diagnostic; Future    Abnormality of rectum  -     Ambulatory referral to Colorectal Surgery    Weak urine stream  -     finasteride (PROSCAR) 5 mg tablet; Take 1 tablet (5 mg total) by mouth once daily.  -     tamsulosin (FLOMAX) 0.4 mg Cp24; Take 1 capsule (0.4 mg total) by mouth every evening.    Frequency-urgency syndrome  -     tamsulosin (FLOMAX) 0.4 mg Cp24; Take 1 capsule (0.4 mg total) by mouth every evening.    I  reviewed and explained his pathology again in detail.  His urinary symptoms improved since he has been on flomax and finasteride.  His PSA got better on finasteride but not remarkably.    MRI showed BPH findings.  So will continue active surveillance for his prostate cancer for now.  Will consider a repeat TRUS bx of prostate based on his PSA response in the future.    Will continue to monitor his PSA.  90 days refills given.  I spent 40 minutes with the patient of which more than half was spent in direct consultation with the patient in regards to our treatment and plan.      Follow-up:  Return in about 6 months (around 2/28/2018) for PSA.

## 2017-09-01 ENCOUNTER — OFFICE VISIT (OUTPATIENT)
Dept: OTOLARYNGOLOGY | Facility: CLINIC | Age: 57
End: 2017-09-01
Payer: MEDICARE

## 2017-09-01 VITALS
DIASTOLIC BLOOD PRESSURE: 80 MMHG | WEIGHT: 165.38 LBS | BODY MASS INDEX: 24.42 KG/M2 | SYSTOLIC BLOOD PRESSURE: 134 MMHG

## 2017-09-01 DIAGNOSIS — H93.8X3 SENSATION OF FULLNESS IN BOTH EARS: Primary | ICD-10-CM

## 2017-09-01 DIAGNOSIS — H61.23 BILATERAL IMPACTED CERUMEN: ICD-10-CM

## 2017-09-01 PROCEDURE — 69210 REMOVE IMPACTED EAR WAX UNI: CPT | Mod: S$PBB,,, | Performed by: NURSE PRACTITIONER

## 2017-09-01 PROCEDURE — 3075F SYST BP GE 130 - 139MM HG: CPT | Mod: ,,, | Performed by: NURSE PRACTITIONER

## 2017-09-01 PROCEDURE — 99213 OFFICE O/P EST LOW 20 MIN: CPT | Mod: PBBFAC,25 | Performed by: NURSE PRACTITIONER

## 2017-09-01 PROCEDURE — 99999 PR PBB SHADOW E&M-EST. PATIENT-LVL III: CPT | Mod: PBBFAC,,, | Performed by: NURSE PRACTITIONER

## 2017-09-01 PROCEDURE — 69210 REMOVE IMPACTED EAR WAX UNI: CPT | Mod: 50,PBBFAC | Performed by: NURSE PRACTITIONER

## 2017-09-01 PROCEDURE — 99203 OFFICE O/P NEW LOW 30 MIN: CPT | Mod: 25,S$PBB,, | Performed by: NURSE PRACTITIONER

## 2017-09-01 PROCEDURE — 3079F DIAST BP 80-89 MM HG: CPT | Mod: ,,, | Performed by: NURSE PRACTITIONER

## 2017-09-01 NOTE — LETTER
September 1, 2017      Tara Jolly MD  [address]  [place] [address]           [facility] - Otorhinolaryngology  [address]  [place] [address]  Phone: [phone]  Fax: [phone]          Patient: Jesus Christy   MR Number: [id number]   YOB: 1960   Date of Visit: 9/1/2017       Dear Dr. Tara Jolly:    Thank you for referring Jesus Christy to me for evaluation. Attached you will find relevant portions of my assessment and plan of care.    If you have questions, please do not hesitate to call me. I look forward to following Jesus Christy along with you.    Sincerely,    Emiliano Carlisle, NP    Enclosure  CC:  No Recipients    If you would like to receive this communication electronically, please contact [email or web address] or [phone] to request more information on [employer] access.    For providers and/or their staff who would like to refer a patient to [facility], please contact us through our one-stop-shop provider referral line, [facility], at [phone].    If you feel you have received this communication in error or would no longer like to receive these types of communications, please e-mail [email or web address]

## 2017-09-01 NOTE — PROGRESS NOTES
Subjective:       Patient ID: Jesus Christy is a 57 y.o. male.    Chief Complaint: Cerumen Impaction and Ear Fullness    Ear Fullness    There is pain in both ears. This is a chronic problem. The current episode started more than 1 month ago. The problem occurs constantly. The problem has been unchanged. There has been no fever. The patient is experiencing no pain. Associated symptoms include hearing loss. Pertinent negatives include no abdominal pain, coughing, diarrhea, ear discharge, headaches, neck pain, rash, rhinorrhea, sore throat or vomiting. He has tried nothing for the symptoms. The treatment provided no relief. There is no history of a chronic ear infection, hearing loss or a tympanostomy tube.      Past Medical History: Patient has a past medical history of GERD (gastroesophageal reflux disease) and HTN (hypertension).    Past Surgical History: Patient has a past surgical history that includes Knee arthroscopy w/ ACL reconstruction (2014).    Social History: Patient reports that he has never smoked. He has never used smokeless tobacco. He reports that he drinks about 0.5 oz of alcohol per week . He reports that he does not use drugs.    Family History: family history includes Diabetes in his father; Hypertension in his mother.    Medications:   Current Outpatient Prescriptions   Medication Sig    amitriptyline (ELAVIL) 100 MG tablet Take 1 tablet (100 mg total) by mouth every evening.    butalbital-acetaminophen-caffeine -40 mg (FIORICET, ESGIC) -40 mg per tablet TAKE ONE TABLET BY MOUTH EVERY 12 hours as needed for headaches. Attempt to minimize to no more than 2/month.    finasteride (PROSCAR) 5 mg tablet Take 1 tablet (5 mg total) by mouth once daily.    fluticasone (FLONASE) 50 mcg/actuation nasal spray 2 sprays by Each Nare route once daily.    hydrOXYzine (ATARAX) 50 MG tablet Take 1 tablet (50 mg total) by mouth 2 (two) times daily as needed for Anxiety.    lorazepam (ATIVAN)  1 MG tablet Take 2 tablets (2 mg total) by mouth On call Procedure for Anxiety.    tamsulosin (FLOMAX) 0.4 mg Cp24 Take 1 capsule (0.4 mg total) by mouth every evening.    tapentadol 100 mg Tb12 Take 100 mg by mouth 2 (two) times daily. rx by Dr. Esteban    UNABLE TO FIND Take 1 packet by mouth 6 (six) times daily. medication name: Goodies Headache Powder     Current Facility-Administered Medications   Medication    lidocaine HCL 10 mg/ml (1%) injection 10 mL    lidocaine HCl 2% urojet       Allergies: Patient is allergic to morphine.    Review of Systems   Constitutional: Negative for activity change, appetite change, chills, diaphoresis, fatigue, fever and unexpected weight change.   HENT: Positive for hearing loss. Negative for congestion, dental problem, ear discharge, ear pain, facial swelling, mouth sores, nosebleeds, postnasal drip, rhinorrhea, sinus pressure, sneezing, sore throat, tinnitus, trouble swallowing and voice change.    Eyes: Negative for pain and visual disturbance.   Respiratory: Negative for cough, choking, chest tightness, shortness of breath, wheezing and stridor.    Cardiovascular: Negative for chest pain.   Gastrointestinal: Negative for abdominal pain, diarrhea, nausea and vomiting.   Musculoskeletal: Negative for gait problem, neck pain and neck stiffness.   Skin: Negative for color change, rash and wound.   Allergic/Immunologic: Negative for environmental allergies.   Neurological: Negative for dizziness, seizures, syncope, facial asymmetry, speech difficulty, weakness, light-headedness, numbness and headaches.   Psychiatric/Behavioral: Negative for agitation, confusion and decreased concentration. The patient is not nervous/anxious.        Objective:       /80 (BP Location: Right arm, Patient Position: Sitting, BP Method: Medium (Automatic))   Wt 75 kg (165 lb 5.5 oz)   BMI 24.42 kg/m²     Physical Exam   Constitutional: He is oriented to person, place, and time. He appears  well-developed and well-nourished.   HENT:   Head: Normocephalic and atraumatic. Not macrocephalic and not microcephalic. Head is without raccoon's eyes, without Azar's sign, without abrasion, without contusion, without laceration, without right periorbital erythema and without left periorbital erythema. Hair is normal.   Right Ear: Tympanic membrane, external ear and ear canal normal. No lacerations. No drainage, swelling or tenderness. No foreign bodies. No mastoid tenderness. Tympanic membrane is not injected, not scarred, not perforated, not erythematous, not retracted and not bulging. Tympanic membrane mobility is normal. No middle ear effusion. No hemotympanum. Decreased hearing is noted.   Left Ear: Tympanic membrane, external ear and ear canal normal. No lacerations. No drainage, swelling or tenderness. No foreign bodies. No mastoid tenderness. Tympanic membrane is not injected, not scarred, not perforated, not erythematous, not retracted and not bulging. Tympanic membrane mobility is normal.  No middle ear effusion. No hemotympanum. Decreased hearing is noted.   Nose: Nose normal. No mucosal edema, rhinorrhea, nose lacerations, sinus tenderness or nasal deformity.   Mouth/Throat: Uvula is midline.   PROCEDURE NOTE:  Ceruminosis is noted in both EACs.  Wax was removed by manual debridement and suctioning utilizing the assistance of binocular microscopy revealing EACs and TMs WNL. Moderate amount of wax remaining posteriorly and adhering to R TM. The patient tolerated this procedure without difficulty. The subjective decrease noted in hearing pre-cleaning was resolved post-cleaning.       Eyes: Conjunctivae, EOM and lids are normal. Pupils are equal, round, and reactive to light.   Neck: Trachea normal and normal range of motion. Neck supple. No spinous process tenderness and no muscular tenderness present. No neck rigidity. No edema, no erythema and normal range of motion present. No thyroid mass and no  thyromegaly present.   Pulmonary/Chest: Effort normal.   Abdominal: Soft.   Musculoskeletal: Normal range of motion.   Lymphadenopathy:        Head (right side): No submental, no submandibular, no tonsillar, no preauricular and no posterior auricular adenopathy present.        Head (left side): No submental, no submandibular, no tonsillar, no preauricular, no posterior auricular and no occipital adenopathy present.     He has no cervical adenopathy.   Neurological: He is alert and oriented to person, place, and time. No cranial nerve deficit or sensory deficit.   Skin: Skin is warm and dry.   Psychiatric: He has a normal mood and affect. His behavior is normal. Judgment and thought content normal.   Nursing note and vitals reviewed.      Assessment:       1. Sensation of fullness in both ears    2. Bilateral impacted cerumen        Plan:       Suggested 4-5 drops of baby oil to R ear twice per day to prevent cerumen build-up and alleviate itching(dropper provided).  Encouraged yearly ear cleanings.  RTC in 1 week.

## 2017-09-01 NOTE — PATIENT INSTRUCTIONS
Suggested 4-5 drops of baby oil to R ear twice per day to prevent cerumen build-up and alleviate itching(dropper provided).  Encouraged yearly ear cleanings.  RTC in 1 week.

## 2017-09-08 ENCOUNTER — TELEPHONE (OUTPATIENT)
Dept: ENDOSCOPY | Facility: HOSPITAL | Age: 57
End: 2017-09-08

## 2017-09-08 ENCOUNTER — OFFICE VISIT (OUTPATIENT)
Dept: SURGERY | Facility: CLINIC | Age: 57
End: 2017-09-08
Payer: MEDICARE

## 2017-09-08 VITALS
DIASTOLIC BLOOD PRESSURE: 83 MMHG | BODY MASS INDEX: 24.48 KG/M2 | HEART RATE: 80 BPM | WEIGHT: 165.81 LBS | SYSTOLIC BLOOD PRESSURE: 137 MMHG

## 2017-09-08 DIAGNOSIS — Z12.11 SPECIAL SCREENING FOR MALIGNANT NEOPLASMS, COLON: Primary | ICD-10-CM

## 2017-09-08 DIAGNOSIS — C61 PROSTATE CANCER: Primary | ICD-10-CM

## 2017-09-08 DIAGNOSIS — K62.9 RECTAL ABNORMALITY: ICD-10-CM

## 2017-09-08 PROCEDURE — 3079F DIAST BP 80-89 MM HG: CPT | Mod: ,,, | Performed by: NURSE PRACTITIONER

## 2017-09-08 PROCEDURE — 99999 PR PBB SHADOW E&M-EST. PATIENT-LVL III: CPT | Mod: PBBFAC,,, | Performed by: NURSE PRACTITIONER

## 2017-09-08 PROCEDURE — 99213 OFFICE O/P EST LOW 20 MIN: CPT | Mod: PBBFAC | Performed by: NURSE PRACTITIONER

## 2017-09-08 PROCEDURE — 3075F SYST BP GE 130 - 139MM HG: CPT | Mod: ,,, | Performed by: NURSE PRACTITIONER

## 2017-09-08 PROCEDURE — 99213 OFFICE O/P EST LOW 20 MIN: CPT | Mod: S$PBB,,, | Performed by: NURSE PRACTITIONER

## 2017-09-08 RX ORDER — POLYETHYLENE GLYCOL 3350, SODIUM SULFATE ANHYDROUS, SODIUM BICARBONATE, SODIUM CHLORIDE, POTASSIUM CHLORIDE 236; 22.74; 6.74; 5.86; 2.97 G/4L; G/4L; G/4L; G/4L; G/4L
4 POWDER, FOR SOLUTION ORAL ONCE
Qty: 4000 ML | Refills: 0 | Status: SHIPPED | OUTPATIENT
Start: 2017-09-08 | End: 2017-09-08

## 2017-09-08 NOTE — PROGRESS NOTES
Subjective:       Patient ID: Jesus Christy is a 57 y.o. male.    Chief Complaint: No chief complaint on file.    HPI   57 M currently being seen by Dr. Resendiz for prostate CA. MRI completed 8/28 for prostate CA surveillance which revealed circumferential lower rectal wall thickening accentuated by non distention, recommended correlation with a colonoscopy. Patient denies any rectal pain, blood in stool, change in bowel habits, abdominal pain, family history of CRC, Last colonoscopy 2013, three tubular adenomas removed.     Review of Systems   Constitutional: Negative for appetite change, chills, fatigue, fever and unexpected weight change.   Respiratory: Negative for cough and shortness of breath.    Cardiovascular: Negative for chest pain and leg swelling.   Gastrointestinal: Negative for abdominal distention, abdominal pain, anal bleeding, blood in stool, constipation, diarrhea, nausea, rectal pain and vomiting.       Objective:      Physical Exam   Constitutional: He is oriented to person, place, and time. He appears well-developed and well-nourished. No distress.   Eyes: Conjunctivae and EOM are normal.   Pulmonary/Chest: Effort normal. No respiratory distress.   Abdominal: Soft. He exhibits no distension. There is no tenderness.   Musculoskeletal: Normal range of motion.   Neurological: He is alert and oriented to person, place, and time.   Skin: Skin is warm and dry.   Psychiatric: He has a normal mood and affect. His behavior is normal.       Assessment:       1. Prostate cancer        Plan:       Schedule Cscope   RTC pending results

## 2017-09-08 NOTE — LETTER
September 8, 2017      Varun Resendiz MD  [address]  [facility] [address]           [facility]  [address]  [facility] [address]  Phone: [phone]          Patient: Jesus Christy   MR Number: [id number]   YOB: 1960   Date of Visit: 9/8/2017       Dear Dr. Varun Resendiz:    Thank you for referring Jesus Christy to me for evaluation. Attached you will find relevant portions of my assessment and plan of care.    If you have questions, please do not hesitate to call me. I look forward to following Jesus Christy along with you.    Sincerely,    Sonja Stanley, NP    Enclosure  CC:  No Recipients    If you would like to receive this communication electronically, please contact [email or web address] or [phone] to request more information on [employer] access.    For providers and/or their staff who would like to refer a patient to [facility], please contact us through our one-stop-shop provider referral line, [facility], at [phone].    If you feel you have received this communication in error or would no longer like to receive these types of communications, please e-mail [email or web address]

## 2017-09-25 DIAGNOSIS — Z12.11 SPECIAL SCREENING FOR MALIGNANT NEOPLASMS, COLON: Primary | ICD-10-CM

## 2017-09-25 RX ORDER — POLYETHYLENE GLYCOL 3350, SODIUM SULFATE ANHYDROUS, SODIUM BICARBONATE, SODIUM CHLORIDE, POTASSIUM CHLORIDE 236; 22.74; 6.74; 5.86; 2.97 G/4L; G/4L; G/4L; G/4L; G/4L
4 POWDER, FOR SOLUTION ORAL ONCE
Qty: 4000 ML | Refills: 0 | Status: SHIPPED | OUTPATIENT
Start: 2017-09-25 | End: 2017-09-25

## 2017-09-29 ENCOUNTER — ANESTHESIA (OUTPATIENT)
Dept: ENDOSCOPY | Facility: HOSPITAL | Age: 57
End: 2017-09-29
Payer: MEDICARE

## 2017-09-29 ENCOUNTER — HOSPITAL ENCOUNTER (OUTPATIENT)
Facility: HOSPITAL | Age: 57
Discharge: HOME | End: 2017-09-29
Attending: COLON & RECTAL SURGERY | Admitting: COLON & RECTAL SURGERY
Payer: MEDICARE

## 2017-09-29 ENCOUNTER — SURGERY (OUTPATIENT)
Age: 57
End: 2017-09-29

## 2017-09-29 ENCOUNTER — ANESTHESIA EVENT (OUTPATIENT)
Dept: ENDOSCOPY | Facility: HOSPITAL | Age: 57
End: 2017-09-29
Payer: MEDICARE

## 2017-09-29 VITALS
OXYGEN SATURATION: 97 % | WEIGHT: 165 LBS | RESPIRATION RATE: 18 BRPM | BODY MASS INDEX: 24.44 KG/M2 | TEMPERATURE: 98 F | SYSTOLIC BLOOD PRESSURE: 124 MMHG | HEIGHT: 69 IN | HEART RATE: 73 BPM | DIASTOLIC BLOOD PRESSURE: 78 MMHG

## 2017-09-29 DIAGNOSIS — Z12.11 SCREENING FOR COLON CANCER: ICD-10-CM

## 2017-09-29 PROCEDURE — 25000003 PHARM REV CODE 250: Performed by: NURSE PRACTITIONER

## 2017-09-29 PROCEDURE — 27201012 HC FORCEPS, HOT/COLD, DISP: Performed by: COLON & RECTAL SURGERY

## 2017-09-29 PROCEDURE — 63600175 PHARM REV CODE 636 W HCPCS: Performed by: NURSE ANESTHETIST, CERTIFIED REGISTERED

## 2017-09-29 PROCEDURE — 37000009 HC ANESTHESIA EA ADD 15 MINS: Performed by: COLON & RECTAL SURGERY

## 2017-09-29 PROCEDURE — 45380 COLONOSCOPY AND BIOPSY: CPT | Performed by: COLON & RECTAL SURGERY

## 2017-09-29 PROCEDURE — D9220A PRA ANESTHESIA: Mod: ANES,,, | Performed by: ANESTHESIOLOGY

## 2017-09-29 PROCEDURE — 88305 TISSUE EXAM BY PATHOLOGIST: CPT | Mod: 26,,, | Performed by: PATHOLOGY

## 2017-09-29 PROCEDURE — 37000008 HC ANESTHESIA 1ST 15 MINUTES: Performed by: COLON & RECTAL SURGERY

## 2017-09-29 PROCEDURE — D9220A PRA ANESTHESIA: Mod: CRNA,,, | Performed by: NURSE ANESTHETIST, CERTIFIED REGISTERED

## 2017-09-29 PROCEDURE — 45380 COLONOSCOPY AND BIOPSY: CPT | Mod: ,,, | Performed by: COLON & RECTAL SURGERY

## 2017-09-29 PROCEDURE — 88305 TISSUE EXAM BY PATHOLOGIST: CPT | Performed by: PATHOLOGY

## 2017-09-29 RX ORDER — PROPOFOL 10 MG/ML
VIAL (ML) INTRAVENOUS
Status: DISCONTINUED | OUTPATIENT
Start: 2017-09-29 | End: 2017-09-29

## 2017-09-29 RX ORDER — SODIUM CHLORIDE 9 MG/ML
INJECTION, SOLUTION INTRAVENOUS CONTINUOUS
Status: DISCONTINUED | OUTPATIENT
Start: 2017-09-29 | End: 2017-09-29 | Stop reason: HOSPADM

## 2017-09-29 RX ORDER — PROPOFOL 10 MG/ML
VIAL (ML) INTRAVENOUS CONTINUOUS PRN
Status: DISCONTINUED | OUTPATIENT
Start: 2017-09-29 | End: 2017-09-29

## 2017-09-29 RX ADMIN — SODIUM CHLORIDE: 900 INJECTION, SOLUTION INTRAVENOUS at 10:09

## 2017-09-29 RX ADMIN — PROPOFOL 50 MG: 10 INJECTION, EMULSION INTRAVENOUS at 10:09

## 2017-09-29 RX ADMIN — SODIUM CHLORIDE: 900 INJECTION, SOLUTION INTRAVENOUS at 09:09

## 2017-09-29 RX ADMIN — PROPOFOL 125 MCG/KG/MIN: 10 INJECTION, EMULSION INTRAVENOUS at 10:09

## 2017-09-29 NOTE — ANESTHESIA RELEASE NOTE
"Anesthesia Release from PACU Note    Patient: Jesus Christy    Procedure(s) Performed: Procedure(s) (LRB):  COLONOSCOPY (N/A)    Anesthesia type: general    Post pain: Adequate analgesia    Post assessment: no apparent anesthetic complications    Last Vitals:   Visit Vitals  /78 (BP Location: Left arm, Patient Position: Lying)   Pulse 73   Temp 36.8 °C (98.2 °F) (Temporal)   Resp 18   Ht 5' 9" (1.753 m)   Wt 74.8 kg (165 lb)   SpO2 97%   BMI 24.37 kg/m²       Post vital signs: stable    Level of consciousness: awake    Nausea/Vomiting: no nausea/no vomiting    Complications: none    Airway Patency: patent    Respiratory: unassisted    Cardiovascular: stable and blood pressure at baseline    Hydration: euvolemic  "

## 2017-09-29 NOTE — H&P
Procedure : Colonoscopy    Indication(s):  circumferential lower rectal wall thickening accentuated by nondistention on MRI,  personal history of colon polyps and most recent endoscopic exam 4 years ago      Review of patient's allergies indicates:   Allergen Reactions    Morphine Palpitations       Past Medical History:   Diagnosis Date    GERD (gastroesophageal reflux disease)     HTN (hypertension)        Prior to Admission medications    Medication Sig Start Date End Date Taking? Authorizing Provider   amitriptyline (ELAVIL) 100 MG tablet Take 1 tablet (100 mg total) by mouth every evening. 5/22/17  Yes Tara Jolly MD   hydrOXYzine (ATARAX) 50 MG tablet Take 1 tablet (50 mg total) by mouth 2 (two) times daily as needed for Anxiety. 5/22/17  Yes Tara Jolly MD   lorazepam (ATIVAN) 1 MG tablet Take 2 tablets (2 mg total) by mouth On call Procedure for Anxiety. 5/26/17  Yes Varun Resendiz MD   tamsulosin (FLOMAX) 0.4 mg Cp24 Take 1 capsule (0.4 mg total) by mouth every evening. 8/29/17  Yes Varun Resendiz MD   UNABLE TO FIND Take 1 packet by mouth 6 (six) times daily. medication name: Goodies Headache Powder   Yes Historical Provider, MD   butalbital-acetaminophen-caffeine -40 mg (FIORICET, ESGIC) -40 mg per tablet TAKE ONE TABLET BY MOUTH EVERY 12 hours as needed for headaches. Attempt to minimize to no more than 2/month. 5/22/17   Tara Jolly MD   finasteride (PROSCAR) 5 mg tablet Take 1 tablet (5 mg total) by mouth once daily. 8/29/17 8/29/18  Varun Resendiz MD   tapentadol 100 mg Tb12 Take 100 mg by mouth 2 (two) times daily. rx by Dr. Esteban 11/21/16   Tara Jolly MD       Sedation Problems: NO    Family History   Problem Relation Age of Onset    Hypertension Mother     Diabetes Father        Fam Hx of Sedation Problems: NO    Social History     Social History    Marital status:      Spouse name: N/A    Number of children: N/A    Years of  education: N/A     Occupational History    Not on file.     Social History Main Topics    Smoking status: Never Smoker    Smokeless tobacco: Never Used    Alcohol use 0.5 oz/week     1 Standard drinks or equivalent per week    Drug use: No    Sexual activity: Not on file     Other Topics Concern    Not on file     Social History Narrative    No narrative on file       Review of Systems -     Respiratory ROS: no cough, shortness of breath, or wheezing  Cardiovascular ROS: no chest pain or dyspnea on exertion  Gastrointestinal ROS: no abdominal pain, change in bowel habits, or black or bloody stools  Musculoskeletal ROS: negative  Neurological ROS: negative      Physical Exam:  General: well developed, no distress  Head: normocephalic, atraumatic  Airway:  normal oropharynx, airway normal  Neck: supple, symmetrical, trachea midline  Lungs:  normal respiratory effort  Heart: regular rate  Abdomen: soft, non-tender non-distented; bowel sounds normal; no masses,  no organomegaly  Extremities: no cyanosis or edema, or clubbing       Deep Sedation: Mallampati Score per anesthesia     SedationPlan :general     ASA : II    Patient is medically cleared for anesthesia.    Anesthesia/Surgery risks, benefits and alternative options discussed and understood by patient/family.    Myles Meyer MD  Colorectal Surgery, PGY-1  [facility]

## 2017-09-29 NOTE — DISCHARGE INSTRUCTIONS

## 2017-09-29 NOTE — PATIENT INSTRUCTIONS
Discharge Summary/Instructions after an Endoscopic Procedure  Patient Name: Jesus Christy  Patient MRN: [id number]  Patient YOB: 1960 Friday, September 29, 2017  Jamar Edwards MD  RESTRICTIONS:  During your procedure today, you received medications for sedation.  These   medications may affect your judgment, balance and coordination.  Therefore,   for 24 hours, you have the following restrictions:   - DO NOT drive a car, operate machinery, make legal/financial decisions,   sign important papers or drink alcohol.    ACTIVITY:  The following day: return to full activity including work, except no heavy   lifting, straining or running for 3 days if polyps were removed.  DIET:  Eat and drink normally unless instructed otherwise.  TREATMENT FOR COMMON SIDE EFFECTS:  - Mild abdominal pain, belching, bloating or excessive gas: rest, eat   lightly and use a heating pad.  - Sore Throat: treat with throat lozenges and/or gargle with warm salt   water.  SYMPTOMS TO WATCH FOR AND REPORT TO YOUR PHYSICIAN:  1. Abdominal pain or bloating, other than gas cramps.  2. Chest pain.  3. Back pain.  4. Chills or fever occurring within 24 hours after the procedure.  5. Rectal bleeding, which would show as bright red, maroon, or black stools.   (A tablespoon of blood from the rectum is not serious, especially if   hemorrhoids are present.)  6. Vomiting.  7. Weakness or dizziness.  8. Because air was used during the procedure, expelling large amounts of air   from your rectum or belching is normal.  9. If a bowel prep was taken, you may not have a bowel movement for 1-3   days.  This is normal.  GO DIRECTLY TO THE EMERGENCY ROOM IF YOU HAVE ANY OF THE FOLLOWING:   Difficulty breathing   Chills and/or fever over 101 F   Persistent vomiting and/or vomiting blood   Severe abdominal pain   Severe chest pain   Black, tarry stools   Bleeding- more than one tablespoon  Your doctor recommends these additional instructions:  If any  biopsies were taken, your doctors clinic will call you in 1 to 2   weeks with any results.  You are being discharged to home.   Eat a high fiber diet.   Continue your present medications.   We are waiting for your pathology results.   Your physician has recommended a repeat colonoscopy (date to be determined   after pending pathology results are reviewed) for surveillance based on   pathology results.   You have a contact number available for emergencies.  The signs and symptoms   of potential delayed complications were discussed with you.  You may return   to normal activities tomorrow.  Written discharge instructions were   provided to you.  For questions, problems or results please call your physician - Jamar Edwards MD at Work:  [phone], Work:  [phone].  [facility], EMERGENCY ROOM PHONE NUMBER: [phone]  IF A COMPLICATION OR EMERGENCY SITUATION ARISES AND YOU ARE UNABLE TO REACH   YOUR PHYSICIAN - GO DIRECTLY TO THE EMERGENCY ROOM.  Jamar Edwards MD  9/29/2017 10:53:52 AM  This report has been verified and signed electronically.

## 2017-09-29 NOTE — ANESTHESIA PREPROCEDURE EVALUATION
09/29/2017  Jesus Christy is a 57 y.o., male.    Anesthesia Evaluation    I have reviewed the Patient Summary Reports.    I have reviewed the Nursing Notes.   I have reviewed the Medications.     Review of Systems  Anesthesia Hx:  No problems with previous Anesthesia    Pulmonary:   Denies Sleep Apnea.    Hepatic/GI:   GERD Denies Liver Disease.    Neurological:   Neuromuscular Disease, Right foot neuropathy   Endocrine:   Denies Diabetes. Denies Hypothyroidism. Denies Hyperthyroidism.    Psych:   depression          Physical Exam  General:  Well nourished    Airway/Jaw/Neck:  Airway Findings: Mouth Opening: Normal General Airway Assessment: Adult  Mallampati: I  TM Distance: Normal, at least 6 cm  Jaw/Neck Findings:  Neck ROM: Normal ROM      Dental:  Dental Findings: In tact        Mental Status:  Mental Status Findings:  Cooperative         Anesthesia Plan  Type of Anesthesia, risks & benefits discussed:  Anesthesia Type:  general  Patient's Preference: GA  Intra-op Monitoring Plan:   Intra-op Monitoring Plan Comments:   Post Op Pain Control Plan:   Post Op Pain Control Plan Comments:   Induction:   IV  Beta Blocker:  Patient is not currently on a Beta-Blocker (No further documentation required).       Informed Consent: Patient understands risks and agrees with Anesthesia plan.  Questions answered. Anesthesia consent signed with patient.  ASA Score: 2     Day of Surgery Review of History & Physical:    H&P update referred to the surgeon.         Ready For Surgery From Anesthesia Perspective.

## 2017-09-29 NOTE — PLAN OF CARE
Discharge instructions given to pt and pts wife. Both verbalize understanding and have no questions at this time

## 2017-09-29 NOTE — TRANSFER OF CARE
"Anesthesia Transfer of Care Note    Patient: Jesus Christy    Procedure(s) Performed: Procedure(s) (LRB):  COLONOSCOPY (N/A)    Patient location: PACU    Anesthesia Type: general    Transport from OR: Transported from OR on room air with adequate spontaneous ventilation    Post pain: adequate analgesia    Post assessment: no apparent anesthetic complications and tolerated procedure well    Post vital signs: stable    Level of consciousness: awake and alert    Nausea/Vomiting: no nausea/vomiting    Complications: none    Transfer of care protocol was followed      Last vitals:   Visit Vitals  /66 (BP Location: Left arm, Patient Position: Lying)   Pulse 65   Temp 36.8 °C (98.2 °F) (Temporal)   Resp 18   Ht 5' 9" (1.753 m)   Wt 74.8 kg (165 lb)   SpO2 95%   BMI 24.37 kg/m²     "

## 2017-09-29 NOTE — ANESTHESIA POSTPROCEDURE EVALUATION
"Anesthesia Post Evaluation    Patient: Jesus Christy    Procedure(s) Performed: Procedure(s) (LRB):  COLONOSCOPY (N/A)    Final Anesthesia Type: general  Patient location during evaluation: GI PACU  Patient participation: Yes- Able to Participate  Level of consciousness: awake and alert, awake and oriented  Post-procedure vital signs: reviewed and stable  Pain management: adequate  Airway patency: patent  PONV status at discharge: No PONV  Anesthetic complications: no      Cardiovascular status: stable  Respiratory status: unassisted, spontaneous ventilation and room air  Hydration status: euvolemic  Follow-up not needed.        Visit Vitals  /78 (BP Location: Left arm, Patient Position: Lying)   Pulse 73   Temp 36.8 °C (98.2 °F) (Temporal)   Resp 18   Ht 5' 9" (1.753 m)   Wt 74.8 kg (165 lb)   SpO2 97%   BMI 24.37 kg/m²       Pain/Anna Score: Pain Assessment Performed: Yes (9/29/2017 11:11 AM)  Presence of Pain: denies (9/29/2017 11:11 AM)  Pain Rating Prior to Med Admin: 7 (9/29/2017  9:37 AM)  Anna Score: 10 (9/29/2017 11:10 AM)      "

## 2017-10-06 ENCOUNTER — TELEPHONE (OUTPATIENT)
Dept: ENDOSCOPY | Facility: HOSPITAL | Age: 57
End: 2017-10-06

## 2017-10-10 ENCOUNTER — IMMUNIZATION (OUTPATIENT)
Dept: INTERNAL MEDICINE | Facility: CLINIC | Age: 57
End: 2017-10-10
Payer: MEDICARE

## 2017-10-10 PROCEDURE — G0008 ADMIN INFLUENZA VIRUS VAC: HCPCS | Mod: PBBFAC

## 2017-12-24 ENCOUNTER — HOSPITAL ENCOUNTER (EMERGENCY)
Facility: HOSPITAL | Age: 57
Discharge: HOME | End: 2017-12-25
Attending: EMERGENCY MEDICINE
Payer: MEDICARE

## 2017-12-24 DIAGNOSIS — R07.9 CHEST PAIN: Primary | ICD-10-CM

## 2017-12-24 LAB
BASOPHILS # BLD AUTO: 0.06 K/UL
BASOPHILS NFR BLD: 0.9 %
BNP SERPL-MCNC: <10 PG/ML
DIFFERENTIAL METHOD: ABNORMAL
EOSINOPHIL # BLD AUTO: 0.3 K/UL
EOSINOPHIL NFR BLD: 4.3 %
ERYTHROCYTE [DISTWIDTH] IN BLOOD BY AUTOMATED COUNT: 12.9 %
HCT VFR BLD AUTO: 41.1 %
HGB BLD-MCNC: 14.1 G/DL
IMM GRANULOCYTES # BLD AUTO: 0.01 K/UL
IMM GRANULOCYTES NFR BLD AUTO: 0.1 %
INR PPP: 1
LYMPHOCYTES # BLD AUTO: 2.6 K/UL
LYMPHOCYTES NFR BLD: 38.3 %
MCH RBC QN AUTO: 31.8 PG
MCHC RBC AUTO-ENTMCNC: 34.3 G/DL
MCV RBC AUTO: 93 FL
MONOCYTES # BLD AUTO: 0.5 K/UL
MONOCYTES NFR BLD: 7.3 %
NEUTROPHILS # BLD AUTO: 3.3 K/UL
NEUTROPHILS NFR BLD: 49.1 %
NRBC BLD-RTO: 0 /100 WBC
PLATELET # BLD AUTO: 342 K/UL
PMV BLD AUTO: 8.8 FL
PROTHROMBIN TIME: 10.5 SEC
RBC # BLD AUTO: 4.44 M/UL
TROPONIN I SERPL DL<=0.01 NG/ML-MCNC: <0.006 NG/ML
WBC # BLD AUTO: 6.68 K/UL

## 2017-12-24 PROCEDURE — 25000003 PHARM REV CODE 250: Performed by: PHYSICIAN ASSISTANT

## 2017-12-24 PROCEDURE — 80053 COMPREHEN METABOLIC PANEL: CPT

## 2017-12-24 PROCEDURE — 99284 EMERGENCY DEPT VISIT MOD MDM: CPT | Mod: 25

## 2017-12-24 PROCEDURE — 93010 ELECTROCARDIOGRAM REPORT: CPT | Mod: ,,, | Performed by: INTERNAL MEDICINE

## 2017-12-24 PROCEDURE — 85610 PROTHROMBIN TIME: CPT

## 2017-12-24 PROCEDURE — 96374 THER/PROPH/DIAG INJ IV PUSH: CPT

## 2017-12-24 PROCEDURE — 93005 ELECTROCARDIOGRAM TRACING: CPT

## 2017-12-24 PROCEDURE — 84484 ASSAY OF TROPONIN QUANT: CPT

## 2017-12-24 PROCEDURE — 83880 ASSAY OF NATRIURETIC PEPTIDE: CPT

## 2017-12-24 PROCEDURE — 99285 EMERGENCY DEPT VISIT HI MDM: CPT | Mod: ,,, | Performed by: EMERGENCY MEDICINE

## 2017-12-24 PROCEDURE — 85025 COMPLETE CBC W/AUTO DIFF WBC: CPT

## 2017-12-24 RX ORDER — ASPIRIN 325 MG
325 TABLET ORAL
Status: COMPLETED | OUTPATIENT
Start: 2017-12-24 | End: 2017-12-24

## 2017-12-24 RX ADMIN — ASPIRIN 325 MG ORAL TABLET 325 MG: 325 PILL ORAL at 11:12

## 2017-12-25 VITALS
BODY MASS INDEX: 23.7 KG/M2 | TEMPERATURE: 97 F | HEIGHT: 69 IN | RESPIRATION RATE: 20 BRPM | HEART RATE: 68 BPM | SYSTOLIC BLOOD PRESSURE: 123 MMHG | WEIGHT: 160 LBS | OXYGEN SATURATION: 97 % | DIASTOLIC BLOOD PRESSURE: 83 MMHG

## 2017-12-25 LAB
ALBUMIN SERPL BCP-MCNC: 3.9 G/DL
ALP SERPL-CCNC: 83 U/L
ALT SERPL W/O P-5'-P-CCNC: 37 U/L
ANION GAP SERPL CALC-SCNC: 11 MMOL/L
AST SERPL-CCNC: 43 U/L
BILIRUB SERPL-MCNC: 0.4 MG/DL
BUN SERPL-MCNC: 13 MG/DL
CALCIUM SERPL-MCNC: 10.1 MG/DL
CHLORIDE SERPL-SCNC: 107 MMOL/L
CO2 SERPL-SCNC: 21 MMOL/L
CREAT SERPL-MCNC: 1 MG/DL
EST. GFR  (AFRICAN AMERICAN): >60 ML/MIN/1.73 M^2
EST. GFR  (NON AFRICAN AMERICAN): >60 ML/MIN/1.73 M^2
GLUCOSE SERPL-MCNC: 86 MG/DL
POTASSIUM SERPL-SCNC: 4.8 MMOL/L
PROT SERPL-MCNC: 8.3 G/DL
SODIUM SERPL-SCNC: 139 MMOL/L
TROPONIN I SERPL DL<=0.01 NG/ML-MCNC: <0.006 NG/ML

## 2017-12-25 PROCEDURE — 84484 ASSAY OF TROPONIN QUANT: CPT

## 2017-12-25 PROCEDURE — 25000003 PHARM REV CODE 250: Performed by: EMERGENCY MEDICINE

## 2017-12-25 PROCEDURE — 25500020 PHARM REV CODE 255: Performed by: EMERGENCY MEDICINE

## 2017-12-25 PROCEDURE — 93010 ELECTROCARDIOGRAM REPORT: CPT | Mod: ,,, | Performed by: INTERNAL MEDICINE

## 2017-12-25 PROCEDURE — 63600175 PHARM REV CODE 636 W HCPCS: Performed by: EMERGENCY MEDICINE

## 2017-12-25 RX ORDER — KETOROLAC TROMETHAMINE 30 MG/ML
15 INJECTION, SOLUTION INTRAMUSCULAR; INTRAVENOUS
Status: COMPLETED | OUTPATIENT
Start: 2017-12-25 | End: 2017-12-25

## 2017-12-25 RX ORDER — DICLOFENAC SODIUM 75 MG/1
75 TABLET, DELAYED RELEASE ORAL 2 TIMES DAILY PRN
Qty: 30 TABLET | Refills: 0 | Status: SHIPPED | OUTPATIENT
Start: 2017-12-25 | End: 2018-08-28

## 2017-12-25 RX ORDER — NITROGLYCERIN 0.4 MG/1
0.4 TABLET SUBLINGUAL
Status: COMPLETED | OUTPATIENT
Start: 2017-12-25 | End: 2017-12-25

## 2017-12-25 RX ORDER — IBUPROFEN 600 MG/1
600 TABLET ORAL
Status: COMPLETED | OUTPATIENT
Start: 2017-12-25 | End: 2017-12-25

## 2017-12-25 RX ADMIN — IOHEXOL 75 ML: 350 INJECTION, SOLUTION INTRAVENOUS at 12:12

## 2017-12-25 RX ADMIN — IBUPROFEN 600 MG: 600 TABLET, FILM COATED ORAL at 03:12

## 2017-12-25 RX ADMIN — NITROGLYCERIN 0.4 MG: 0.4 TABLET SUBLINGUAL at 12:12

## 2017-12-25 RX ADMIN — KETOROLAC TROMETHAMINE 15 MG: 30 INJECTION, SOLUTION INTRAMUSCULAR at 12:12

## 2017-12-25 NOTE — ED NOTES
Jesus Christy, an 57 y.o. male presents to the ED left sided cp  With associated left arm numbness and tingling  And sob. Reports cp began 1 hour ago and describes it as tightness and squeezing - - cardiac hx.       Chief Complaint   Patient presents with    Chest Pain     Pt states that he has been having chest pain, SOB and left arm pain for the past hour. Pt describes chest pain as tightness.      Review of patient's allergies indicates:   Allergen Reactions    Morphine Palpitations     Past Medical History:   Diagnosis Date    GERD (gastroesophageal reflux disease)     HTN (hypertension)

## 2017-12-25 NOTE — ED NOTES
Assumed care of patient. Pt remains on cardiac monitor, continuous pulse ox, and cycling blood pressures. Bed placed in low locked position, side rails up x2, call bell is within reach. Will continue to monitor.

## 2017-12-25 NOTE — ED PROVIDER NOTES
"Encounter Date: 12/24/2017    SCRIBE #1 NOTE: I, Hillary Short, am scribing for, and in the presence of,  Dr. Sosa. I have scribed the following portions of the note - the APC attestation and the EKG reading.       History     Chief Complaint   Patient presents with    Chest Pain     Pt states that he has been having chest pain, SOB and left arm pain for the past hour. Pt describes chest pain as tightness.      Patient is a 57-year-old past medical history of HTN and prostate cancer who presents the ED with chest pain.  Patient states that his chest pain started at 8 PM, 4 hours ago.  He states that his pain was acute onset while he was resting.  His pain is located to his left chest and is "sharp/stabbing" in nature and is 8/10.  He also endorses onset of left upper extremity numbness but denies any weakness.  He endorses SOB and states that it is hard for him to breath.  He denies any fever, chills, cough, tobacco use, recent travel, recent surgery, blood thinner use, lower extremity pain different from his chronic pain.  Patient states that he had this chest pain a couple of days ago that went away within a few hours.          Review of patient's allergies indicates:   Allergen Reactions    Morphine Palpitations     Past Medical History:   Diagnosis Date    GERD (gastroesophageal reflux disease)     HTN (hypertension)      Past Surgical History:   Procedure Laterality Date    COLONOSCOPY N/A 9/29/2017    Procedure: COLONOSCOPY;  Surgeon: Jamar Edwards MD;  Location: [address];  Service: Endoscopy;  Laterality: N/A;    JOINT REPLACEMENT Right     knee    KNEE ARTHROSCOPY W/ ACL RECONSTRUCTION  2014    right     Family History   Problem Relation Age of Onset    Hypertension Mother     Diabetes Father      Social History   Substance Use Topics    Smoking status: Never Smoker    Smokeless tobacco: Never Used    Alcohol use 0.5 oz/week     1 Standard drinks or equivalent per week "     Review of Systems   Constitutional: Negative for chills and fever.   HENT: Negative for sinus pain and sore throat.    Eyes: Negative for visual disturbance.   Respiratory: Positive for shortness of breath. Negative for cough.    Cardiovascular: Positive for chest pain. Negative for leg swelling.   Gastrointestinal: Negative for abdominal pain and nausea.   Genitourinary: Negative for dysuria.   Musculoskeletal: Negative for back pain.   Skin: Negative for rash.   Neurological: Positive for numbness. Negative for dizziness, weakness and headaches.   Hematological: Does not bruise/bleed easily.       Physical Exam     Initial Vitals [12/24/17 2213]   BP Pulse Resp Temp SpO2   137/79 81 20 97.4 °F (36.3 °C) 100 %      MAP       98.33         Physical Exam    Nursing note and vitals reviewed.  Constitutional: He appears well-developed and well-nourished. He is not diaphoretic. No distress.   HENT:   Head: Normocephalic and atraumatic.   Nose: Nose normal.   Eyes: Conjunctivae and EOM are normal.   Neck: Normal range of motion.   Cardiovascular: Normal rate, regular rhythm and normal heart sounds. Exam reveals no friction rub.    No murmur heard.  Pulmonary/Chest: Breath sounds normal. No respiratory distress. He has no wheezes. He has no rales.   Abdominal: Soft. Bowel sounds are normal. He exhibits no distension. There is no tenderness.   Musculoskeletal: Normal range of motion.   Neurological: He is alert and oriented to person, place, and time. He has normal strength. No sensory deficit.   Skin: Skin is warm and dry. No erythema.   Psychiatric: Thought content normal. His mood appears anxious.         ED Course   Procedures  Labs Reviewed   CBC W/ AUTO DIFFERENTIAL - Abnormal; Notable for the following:        Result Value    RBC 4.44 (*)     MCH 31.8 (*)     MPV 8.8 (*)     All other components within normal limits   COMPREHENSIVE METABOLIC PANEL - Abnormal; Notable for the following:     CO2 21 (*)     AST 43  (*)     All other components within normal limits   B-TYPE NATRIURETIC PEPTIDE   TROPONIN I   PROTIME-INR   TROPONIN I     EKG Readings: (Independently Interpreted)   NSR at 78 bpm with no sign of ischemia          Medical Decision Making:   History:   Old Medical Records: I decided to obtain old medical records.  Independently Interpreted Test(s):   I have ordered and independently interpreted EKG Reading(s) - see prior notes  Clinical Tests:   Lab Tests: Ordered and Reviewed  Radiological Study: Ordered and Reviewed  Medical Tests: Ordered and Reviewed    Imaging Results          CTA Chest Non-Coronary (PE Study) (Final result)     Abnormal  Result time 12/25/17 01:31:00    Final result by Juan Perea MD (12/25/17 01:31:00)                 Impression:        1. No evidence of pulmonary thromboembolism.    2. 2.0 cm oval-shaped well-defined hypodense right upper lobe lesion, partially seen on prior thoracic radiograph of 3/2012, however may have slightly increased in size. This lesion is favored to represent a bronchogenic cyst and malignant etiology is felt less likely. Further evaluation with nonemergent MRI chest is recommended for further characterization.    3. 1.0 cm hypodense right thyroid lobe nodule. Nonemergent ultrasound thyroid is recommended for further evaluation.    Report has been flagged in the EPIC medical record system.  ______________________________________     Electronically signed by resident: RYAN GARCIA MD  Date:     12/25/17  Time:    01:26            As the supervising and teaching physician, I personally reviewed the images and resident's interpretation and I agree with the findings.          Electronically signed by: JUAN PEREA MD  Date:     12/25/17  Time:    01:31              Narrative:    Time of Procedure: 12/25/17 00:57:39  Accession # [id number]    Technique: The chest was surveyed from the costophrenic angles through the lung apices at 3-mm increments after the  administration of 75 cc of Omnipaque 350 intravenous contrast material according to the PE protocol which is optimized for vascular contrast resolution.  Axial, sagittal and coronal maximum intensity projection images were reviewed.    Comparison: Chest radiograph same date. Thoracic spine radiograph 3/2012.    Findings:    There is a 1.0 cm hypodense right thyroid lobe nodule. Examination of the soft tissue and vascular structures at the base of the neck is otherwise unremarkable.    There is a left-sided aortic arch with 3 branch vessels.  The thoracic aorta maintains normal caliber, contour, and course without significant atherosclerotic calcification.  There is no evidence of aneurysmal dilation or dissection.    The pulmonary arteries distribute normally without evidence of filling defect to indicate pulmonary thromboembolism.  There are four pulmonary veins.  Systemic and pulmonary venoatrial connections are concordant.    The heart is not enlarged and there is no evidence of pericardial effusion.    There is no axillary, mediastinal, or hilar lymph node enlargement.      The esophagus maintains a normal course and caliber.    Limited images of the upper abdomen obtained during the course of this dedicated thoracic CT demonstrate nothing unusual.    The osseous structures are unremarkable.    The trachea and proximal airways are patent.    The lungs are symmetrically expanded and without evidence of consolidation, pneumothorax, or significant pleural thickening. There are dependent atelectatic changes in the lung bases. There is a 2.0 cm oval well-defined hypodense lesion in the posterior segment of the right upper lobe demonstrating fluid density by Hounsfield units. This is also partially seen on the prior thoracic spine radiograph of 3/2012 and may have slightly increased in size since. This lesion is favored to represent a bronchogenic cyst and malignant etiology is felt less likely. Further evaluation  with nonemergent MRI chest is recommended. There is no evidence of pleural fluid.                             X-Ray Chest PA And Lateral (Final result)     Abnormal  Result time 12/25/17 00:05:02    Final result by Juan Perea MD (12/25/17 00:05:02)                 Impression:         No acute findings in the chest.      2 cm solitary nodule right upper lobe.  Recommend followup CT chest examination for further evaluation.      Report has been flagged in the EPIC medical record system.        Electronically signed by: JUAN PEREA MD  Date:     12/25/17  Time:    00:05              Narrative:    Chest PA and Lateral    Indication:Chest Pain.    Comparison:None.    Findings:     The cardiomediastinal silhouette is within normal limits.  There is no pleural effusion.  The trachea is midline.  The lungs are symmetrically expanded bilaterally without evidence of acute parenchymal process.  2 cm solitary nodule right upper lobe. There is no pneumothorax.  The osseous structures are unremarkable.                                 APC / Resident Notes:   Patient is a 57-year-old male that presents the ED with chest pain.      On exam, vital signs stable.  Regular rate and rhythm without murmurs.  Lungs clear to auscultations bilaterally without wheezes.  No peripheral edema or calf tenderness.  Will order cardiac workup, give aspirin, and continue to monitor.    ECG with NSR at 61 bpm.  First troponin negative.  BNP negative.  Chest pain with no acute findings in the chest.  2 cm solitary nodule in right upper lobe.  Will obtain CTA since patient is having SOB and history of prostate cancer    CTA with no evidence of pulmonary urinary thromboembolism.  2 cm oval shape likely represents a bronchiogenic cyst and malignant etiology is felt less likely.    Second troponin negative.    Patient updated with results.  Negative cardiac workup today.  ACS and PE less likely.  His symptoms are most likely due to  musculoskeletal pain versus anxiety.  Will prescribe all tearing.  He is to follow-up with cardiology and PCP.  Return to ED precaution given.  All questions answered.  Patient is comfortable with plan and stable for discharge.  I reviewed patient's chart and discussed this case with my supervising TOR Ayoub Attestation:   Collinibe #1: I performed the above scribed service and the documentation accurately describes the services I performed. I attest to the accuracy of the note.    Attending Attestation:     Physician Attestation Statement for NP/PA:   I have conducted a face to face encounter with this patient in addition to the NP/PA, due to Medical Complexity    Other NP/PA Attestation Additions:    History of Present Illness: 57 year old male with hx of HTN presents with sudden onset chest tightness. No known hx of CAD and initial EKG with no ischemia. ACS work up is negative. CTA done, given pleuritic nature and is also unremarkable. Possible muscular vs anxiety source. Stable for discharge with outpatient stress test.                    ED Course      Clinical Impression:   The encounter diagnosis was Chest pain.    Disposition:   Disposition: Discharged  Condition: Stable                        Laine Kenney PA-C  12/26/17 [id number]

## 2017-12-25 NOTE — DISCHARGE INSTRUCTIONS
Future Appointments  Date Time Provider Department Center   1/2/2018 3:00 PM Kaykay Aguayo NP [facility] UROLOG [address]   2/20/2018 8:00 AM LAB, APPOINTMENT [facility] LAB [facility]   2/27/2018 1:20 PM Varun [facility]endiz MD [facility]       Our goal in the emergency department is to always give you outstanding care and exceptional service. You may receive a survey by mail or e-mail in the next week regarding your experience in our ED. We would greatly appreciate your completing and returning the survey. Your feedback provides us with a way to recognize our staff who give very good care and it helps us learn how to improve when your experience was below our aspiration of excellence.

## 2017-12-25 NOTE — ED NOTES
Pt c/o sharp L side chest pain 10/10 that came on suddenly. /81, HR 71, O2 100% RA. After couple minutes pain improved but still 8/10. Notified KRISTEN Salvador

## 2017-12-25 NOTE — PROVIDER PROGRESS NOTES - EMERGENCY DEPT.
Encounter Date: 12/24/2017    ED Physician Progress Notes         EKG - STEMI Decision  Initial Reading: No STEMI present.  Response: No Action Needed.

## 2018-01-09 ENCOUNTER — HOSPITAL ENCOUNTER (OUTPATIENT)
Dept: CARDIOLOGY | Facility: CLINIC | Age: 58
Discharge: HOME | End: 2018-01-09
Payer: MEDICARE

## 2018-01-09 ENCOUNTER — OFFICE VISIT (OUTPATIENT)
Dept: CARDIOLOGY | Facility: CLINIC | Age: 58
End: 2018-01-09
Payer: MEDICARE

## 2018-01-09 ENCOUNTER — TELEPHONE (OUTPATIENT)
Dept: CARDIOLOGY | Facility: CLINIC | Age: 58
End: 2018-01-09

## 2018-01-09 VITALS
WEIGHT: 161.38 LBS | BODY MASS INDEX: 23.1 KG/M2 | HEART RATE: 74 BPM | HEIGHT: 70 IN | DIASTOLIC BLOOD PRESSURE: 78 MMHG | SYSTOLIC BLOOD PRESSURE: 118 MMHG

## 2018-01-09 DIAGNOSIS — R00.2 PALPITATION: Primary | ICD-10-CM

## 2018-01-09 DIAGNOSIS — R91.1 INCIDENTAL LUNG NODULE: ICD-10-CM

## 2018-01-09 DIAGNOSIS — R00.2 PALPITATION: ICD-10-CM

## 2018-01-09 DIAGNOSIS — M54.12 CERVICAL RADICULOPATHY AT C6: ICD-10-CM

## 2018-01-09 DIAGNOSIS — R07.89 CHEST PAIN, NON-CARDIAC: Primary | ICD-10-CM

## 2018-01-09 PROCEDURE — 93000 ELECTROCARDIOGRAM COMPLETE: CPT | Mod: S$GLB,,, | Performed by: INTERNAL MEDICINE

## 2018-01-09 PROCEDURE — 99204 OFFICE O/P NEW MOD 45 MIN: CPT | Mod: S$GLB,,, | Performed by: INTERNAL MEDICINE

## 2018-01-09 PROCEDURE — 99999 PR PBB SHADOW E&M-EST. PATIENT-LVL IV: CPT | Mod: PBBFAC,,, | Performed by: INTERNAL MEDICINE

## 2018-01-09 PROCEDURE — 93005 ELECTROCARDIOGRAM TRACING: CPT | Mod: PBBFAC | Performed by: INTERNAL MEDICINE

## 2018-01-09 PROCEDURE — 99214 OFFICE O/P EST MOD 30 MIN: CPT | Mod: PBBFAC | Performed by: INTERNAL MEDICINE

## 2018-01-09 NOTE — PROGRESS NOTES
"Subjective:    Patient ID:  Jesus Christy is a 57 y.o. male who presents for evaluation of Chest Pain      HPI     Patient is a 57-year-old past medical history of HTN and prostate cancer who presented to  the ED 12/14/17 with chest pain.  Patient states that his chest pain started 4 hours prior to presentation.  He states that his pain was acute onset while he was resting.  His pain is located to his left chest and is "sharp/stabbing" in nature and is 8/10. The EKG was normal and Troponin 0.006 X 2. EKG today is normal and unchanged from 12/24.  CTA revealed no coronary calcifications . A rigth upper lobe lesion was noted but was present in 2012. It is to be further evaluated by his Primary.   He is a non smoker, and has no history of CAD, hypertension, diabetes and family history.  He continues with occaional non exertional sharp chest associated with numbness in his left arm. He has a work related right leg injury and uses a cane and does not walk very much.  Lab Results   Component Value Date     12/24/2017    K 4.8 12/24/2017     12/24/2017    CO2 21 (L) 12/24/2017    BUN 13 12/24/2017    CREATININE 1.0 12/24/2017    GLU 86 12/24/2017    AST 43 (H) 12/24/2017    ALT 37 12/24/2017    ALBUMIN 3.9 12/24/2017    PROT 8.3 12/24/2017    BILITOT 0.4 12/24/2017    WBC 6.68 12/24/2017    HGB 14.1 12/24/2017    HCT 41.1 12/24/2017    MCV 93 12/24/2017     12/24/2017    INR 1.0 12/24/2017    PSA 6.9 (H) 09/01/2016         Lab Results   Component Value Date    CHOL 148 08/22/2016    HDL 37 (L) 08/22/2016    TRIG 157 (H) 08/22/2016       Lab Results   Component Value Date    LDLCALC 79.6 08/22/2016       Past Medical History:   Diagnosis Date    GERD (gastroesophageal reflux disease)     HTN (hypertension)        Current Outpatient Prescriptions:     amitriptyline (ELAVIL) 100 MG tablet, Take 1 tablet (100 mg total) by mouth every evening., Disp: 30 tablet, Rfl: 11    " butalbital-acetaminophen-caffeine -40 mg (FIORICET, ESGIC) -40 mg per tablet, TAKE ONE TABLET BY MOUTH EVERY 12 hours as needed for headaches. Attempt to minimize to no more than 2/month., Disp: 30 tablet, Rfl: 1    diclofenac (VOLTAREN) 75 MG EC tablet, Take 1 tablet (75 mg total) by mouth 2 (two) times daily as needed., Disp: 30 tablet, Rfl: 0    finasteride (PROSCAR) 5 mg tablet, Take 1 tablet (5 mg total) by mouth once daily., Disp: 90 tablet, Rfl: 3    hydrOXYzine (ATARAX) 50 MG tablet, Take 1 tablet (50 mg total) by mouth 2 (two) times daily as needed for Anxiety., Disp: , Rfl:     lorazepam (ATIVAN) 1 MG tablet, Take 2 tablets (2 mg total) by mouth On call Procedure for Anxiety., Disp: 2 tablet, Rfl: 0    tamsulosin (FLOMAX) 0.4 mg Cp24, Take 1 capsule (0.4 mg total) by mouth every evening., Disp: 90 capsule, Rfl: 3    tapentadol 100 mg Tb12, Take 100 mg by mouth 2 (two) times daily. rx by Dr. Esteban, Disp: , Rfl: 0    UNABLE TO FIND, Take 1 packet by mouth 6 (six) times daily. medication name: Goodies Headache Powder, Disp: , Rfl:     Current Facility-Administered Medications:     lidocaine HCL 10 mg/ml (1%) injection 10 mL, 10 mL, Infiltration, Once, Varun Resendiz MD    lidocaine HCl 2% urojet, , Rectal, Once, Varun Resendiz MD        Review of Systems   Constitution: Negative for decreased appetite, diaphoresis, fever, weakness, malaise/fatigue, weight gain and weight loss.   HENT: Negative for congestion, ear discharge, ear pain and nosebleeds.    Eyes: Negative for blurred vision, double vision and visual disturbance.   Cardiovascular: Positive for chest pain. Negative for claudication, cyanosis, dyspnea on exertion, irregular heartbeat, leg swelling, near-syncope, orthopnea, palpitations, paroxysmal nocturnal dyspnea and syncope.   Respiratory: Negative for cough, hemoptysis, shortness of breath, sleep disturbances due to breathing, snoring, sputum production and wheezing.   "  Endocrine: Negative for polydipsia, polyphagia and polyuria.   Hematologic/Lymphatic: Negative for adenopathy and bleeding problem. Does not bruise/bleed easily.   Skin: Negative for color change, nail changes, poor wound healing and rash.   Musculoskeletal: Negative for muscle cramps and muscle weakness.        Right leg injury   Gastrointestinal: Negative for abdominal pain, anorexia, change in bowel habit, hematochezia, nausea and vomiting.   Genitourinary: Negative for dysuria, frequency and hematuria.   Neurological: Positive for paresthesias (left arm). Negative for brief paralysis, difficulty with concentration, excessive daytime sleepiness, dizziness, focal weakness, headaches, light-headedness, seizures and vertigo.   Psychiatric/Behavioral: Negative for altered mental status and depression.   Allergic/Immunologic: Negative for persistent infections.        Objective:/78   Pulse 74   Ht 5' 10" (1.778 m)   Wt 73.2 kg (161 lb 6 oz)   BMI 23.16 kg/m²           Physical Exam   Constitutional: He is oriented to person, place, and time. He appears well-developed and well-nourished.   HENT:   Head: Normocephalic.   Right Ear: External ear normal.   Left Ear: External ear normal.   Nose: Nose normal.   Inspection of lips, teeth and gums normal   Eyes: EOM are normal. Pupils are equal, round, and reactive to light. No scleral icterus.   Neck: Normal range of motion. Neck supple. No JVD present. No tracheal deviation present. No thyromegaly present.   Cardiovascular: Normal rate, regular rhythm and intact distal pulses.  Exam reveals no gallop and no friction rub.    No murmur heard.  Pulses:       Dorsalis pedis pulses are 2+ on the right side, and 2+ on the left side.        Posterior tibial pulses are 2+ on the right side, and 2+ on the left side.   Pulmonary/Chest: Effort normal and breath sounds normal.   Abdominal: Bowel sounds are normal. He exhibits no distension. There is no hepatosplenomegaly. " There is no tenderness. There is no guarding.   Musculoskeletal: Normal range of motion. He exhibits no edema or tenderness.   Lymphadenopathy:   Palpation of neck and groin lymph nodes normal   Neurological: He is alert and oriented to person, place, and time. No cranial nerve deficit. He exhibits normal muscle tone. Coordination normal.   Skin: Skin is dry.   Palpation of skin normal   Psychiatric: His behavior is normal. Judgment and thought content normal.         Assessment:       1. Chest pain, non-cardiac    2. Cervical radiculopathy at C6    3. Incidental lung nodule         Plan:       Jesus was seen today for chest pain.    Diagnoses and all orders for this visit:    Chest pain, non-cardiac    Cervical radiculopathy at C6    Incidental lung nodule

## 2018-01-09 NOTE — TELEPHONE ENCOUNTER
----- Message from Pauline Astudillo sent at 1/9/2018 10:07 AM CST -----  Regarding: EKG ORDER  Please put in EKG order, thanks. Pauline [phone]

## 2018-02-01 ENCOUNTER — HOSPITAL ENCOUNTER (OUTPATIENT)
Dept: RADIOLOGY | Facility: HOSPITAL | Age: 58
Discharge: HOME | End: 2018-02-01
Attending: INTERNAL MEDICINE
Payer: MEDICARE

## 2018-02-01 ENCOUNTER — OFFICE VISIT (OUTPATIENT)
Dept: INTERNAL MEDICINE | Facility: CLINIC | Age: 58
End: 2018-02-01
Payer: MEDICARE

## 2018-02-01 VITALS
BODY MASS INDEX: 23.85 KG/M2 | SYSTOLIC BLOOD PRESSURE: 108 MMHG | OXYGEN SATURATION: 96 % | HEIGHT: 69 IN | DIASTOLIC BLOOD PRESSURE: 79 MMHG | HEART RATE: 83 BPM | WEIGHT: 161 LBS

## 2018-02-01 DIAGNOSIS — R91.1 LUNG NODULE: ICD-10-CM

## 2018-02-01 DIAGNOSIS — C61 ADENOCARCINOMA OF PROSTATE: ICD-10-CM

## 2018-02-01 DIAGNOSIS — M79.2 NEURITIS OF UPPER EXTREMITY: ICD-10-CM

## 2018-02-01 DIAGNOSIS — G90.521 COMPLEX REGIONAL PAIN SYNDROME TYPE 1 OF RIGHT LOWER EXTREMITY: ICD-10-CM

## 2018-02-01 DIAGNOSIS — R93.89 ABNORMAL CT OF THE CHEST: Primary | ICD-10-CM

## 2018-02-01 DIAGNOSIS — R29.898 DECREASED STRENGTH OF UPPER EXTREMITY: ICD-10-CM

## 2018-02-01 PROCEDURE — 99214 OFFICE O/P EST MOD 30 MIN: CPT | Mod: S$PBB,,, | Performed by: INTERNAL MEDICINE

## 2018-02-01 PROCEDURE — 72050 X-RAY EXAM NECK SPINE 4/5VWS: CPT | Mod: TC

## 2018-02-01 PROCEDURE — 72050 X-RAY EXAM NECK SPINE 4/5VWS: CPT | Mod: 26,,, | Performed by: RADIOLOGY

## 2018-02-01 PROCEDURE — 99215 OFFICE O/P EST HI 40 MIN: CPT | Mod: PBBFAC,25 | Performed by: INTERNAL MEDICINE

## 2018-02-01 PROCEDURE — 99999 PR PBB SHADOW E&M-EST. PATIENT-LVL V: CPT | Mod: PBBFAC,,, | Performed by: INTERNAL MEDICINE

## 2018-02-01 RX ORDER — GABAPENTIN 100 MG/1
100 CAPSULE ORAL NIGHTLY
Qty: 30 CAPSULE | Refills: 3 | Status: SHIPPED | OUTPATIENT
Start: 2018-02-01 | End: 2018-03-01

## 2018-02-01 NOTE — PROGRESS NOTES
Subjective:       Patient ID: Jesus Christy is a 57 y.o. male.    Chief Complaint: Hospital Follow Up (pt was seen in emergency room for chest pains. pt still complains of having them yet no heart palpatations.)    HPI   56 yo M here for ED follow up. Seen 12/24 for chest pains. Ekg, troponin unremarkable. CTA negative for PE but showed right upper lobe lesion previously seen in 2012. Nonsmoker.   Saw Dr. Zamora on 1/9.     CT:  1. No evidence of pulmonary thromboembolism.    2. 2.0 cm oval-shaped well-defined hypodense right upper lobe lesion, partially seen on prior thoracic radiograph of 3/2012, however may have slightly increased in size. This lesion is favored to represent a bronchogenic cyst and malignant etiology is felt less likely. Further evaluation with nonemergent MRI chest is recommended for further characterization.    3. 1.0 cm hypodense right thyroid lobe nodule. Nonemergent ultrasound thyroid is recommended for further evaluation.    Prostate cancer diagnosed in 9/2016 - low grade, low volume - active surveillance.      Xray cspine 2012 showed disc narrowing C4-5 and C6-7 with spurring.     C/o worsening left arm numbness and pain. Pain in left chest with radiation of numbness and pain in left arm. Neck not generally painful. No neck injury.     He had side effects with gabapentin previously when tried for RSD.   Review of Systems   Constitutional: Negative for fever.   HENT: Negative.    Eyes: Negative.    Respiratory: Negative for shortness of breath.    Cardiovascular: Negative for chest pain and leg swelling.   Gastrointestinal: Negative for abdominal pain, diarrhea, nausea and vomiting.   Genitourinary: Negative.    Musculoskeletal: Negative for arthralgias.        Left upper ext weak   Skin: Negative for rash.   Neurological:        Pain in left chest and radiation down left arm with numbness   Psychiatric/Behavioral: Negative.        Objective:   /79 (BP Location: Right arm, Patient  "Position: Sitting, BP Method: Medium (Manual))   Pulse 83   Ht 5' 9" (1.753 m)   Wt 73 kg (161 lb)   SpO2 96%   BMI 23.78 kg/m²      Physical Exam   Constitutional: He is oriented to person, place, and time. He appears well-developed and well-nourished. No distress.   HENT:   Head: Normocephalic and atraumatic.   Neck: No thyromegaly present.   Cardiovascular: Normal rate and regular rhythm.    No murmur heard.  Pulmonary/Chest: Effort normal. No respiratory distress. He has no wheezes. He has no rales.   Musculoskeletal:   4+/5 left upper ext strength, 5/5 right upper extremity  No ttp over cspine  Tight paraspinal musculature  Full ROM left upper ext   Neurological: He is alert and oriented to person, place, and time.   Skin: Skin is warm and dry. He is not diaphoretic.   Psychiatric: He has a normal mood and affect. His behavior is normal.       Assessment:       1. Abnormal CT of the chest    2. Lung nodule    3. Adenocarcinoma of prostate    4. Neuritis of upper extremity    5. Complex regional pain syndrome type 1 of right lower extremity    6. Decreased strength of upper extremity        Plan:       Jesus was seen today for hospital follow up.    Diagnoses and all orders for this visit:    Abnormal CT of the chest  -     MRI Chest W WO Contrast; Future  -     US Soft Tissue Head Neck Thyroid; Future    Lung nodule  -     MRI Chest W WO Contrast; Future    Adenocarcinoma of prostate  Active surveillance per urology    Neuritis of upper extremity  -     X-Ray Cervical Spine Complete 5 view; Future  -     Ambulatory referral to Neurology  -     gabapentin (NEURONTIN) 100 MG capsule; Take 1 capsule (100 mg total) by mouth every evening. May increase to 200mg after 4 days then to 300mg after additional 4 days as tolerated.    Complex regional pain syndrome type 1 of right lower extremity  -     Ambulatory referral to Neurology    Decreased strength of upper extremity  -     Ambulatory referral to " Neurology    Other orders  -     Case Request Operating Room: IMAGING-(MRI)

## 2018-02-02 ENCOUNTER — TELEPHONE (OUTPATIENT)
Dept: INTERNAL MEDICINE | Facility: CLINIC | Age: 58
End: 2018-02-02

## 2018-02-02 NOTE — TELEPHONE ENCOUNTER
Spoke with pt. Pt is informed about x ray of the neck & advised to continue Gabapentin medication. Pt expresses understanding of results. Pt was advised to give our office a call back if symptoms worsen.

## 2018-02-02 NOTE — TELEPHONE ENCOUNTER
Please call pt. His xray showed arthritis in the neck but does not show an obvious area where the nerve is being compressed. Please continue gabapentin as discussed. Let us know if pain is not improving on this.

## 2018-02-05 ENCOUNTER — HOSPITAL ENCOUNTER (OUTPATIENT)
Dept: RADIOLOGY | Facility: HOSPITAL | Age: 58
Discharge: HOME | End: 2018-02-05
Attending: INTERNAL MEDICINE
Payer: MEDICARE

## 2018-02-05 DIAGNOSIS — R93.89 ABNORMAL CT OF THE CHEST: ICD-10-CM

## 2018-02-05 PROCEDURE — 76536 US EXAM OF HEAD AND NECK: CPT | Mod: TC

## 2018-02-05 PROCEDURE — 76536 US EXAM OF HEAD AND NECK: CPT | Mod: 26,,, | Performed by: RADIOLOGY

## 2018-02-06 ENCOUNTER — TELEPHONE (OUTPATIENT)
Dept: INTERNAL MEDICINE | Facility: CLINIC | Age: 58
End: 2018-02-06

## 2018-02-06 NOTE — TELEPHONE ENCOUNTER
Please call patient.  His thyroid ultrasound shows 2 very small nodules that do not look worrisome and do not require biopsy.  Nothing further needs to be done about these nodules.

## 2018-02-07 NOTE — TELEPHONE ENCOUNTER
Spoke with pt. Informed pt about thyroid ultrasound & nodules that do not look worrisome. Pt expresses understanding of results.

## 2018-02-20 ENCOUNTER — LAB VISIT (OUTPATIENT)
Dept: LAB | Facility: HOSPITAL | Age: 58
End: 2018-02-20
Attending: UROLOGY
Payer: MEDICARE

## 2018-02-20 DIAGNOSIS — C61 PROSTATE CANCER: ICD-10-CM

## 2018-02-20 LAB — COMPLEXED PSA SERPL-MCNC: 5.7 NG/ML

## 2018-02-20 PROCEDURE — 84153 ASSAY OF PSA TOTAL: CPT

## 2018-02-20 PROCEDURE — 36415 COLL VENOUS BLD VENIPUNCTURE: CPT

## 2018-02-28 ENCOUNTER — TELEPHONE (OUTPATIENT)
Dept: UROLOGY | Facility: CLINIC | Age: 58
End: 2018-02-28

## 2018-02-28 DIAGNOSIS — C61 ADENOCARCINOMA OF PROSTATE: Primary | ICD-10-CM

## 2018-02-28 DIAGNOSIS — R97.20 ELEVATED PSA: ICD-10-CM

## 2018-02-28 RX ORDER — CIPROFLOXACIN 500 MG/1
500 TABLET ORAL 2 TIMES DAILY
Qty: 6 TABLET | Refills: 0 | Status: SHIPPED | OUTPATIENT
Start: 2018-02-28 | End: 2018-03-03

## 2018-02-28 NOTE — TELEPHONE ENCOUNTER
----- Message from Shama Rachel LPN sent at 2/28/2018 10:41 AM CST -----  Contact: Pt  Patient missed his genie't yesterday, he thought it was today. pca with psa stable at 5.7. Do you want me to work him in now, follow up in 6m or repeat bx?  ----- Message -----  From: Joe Arevalo MA  Sent: 2/28/2018   9:55 AM  To: Martín VALLADARES Staff    Pt called and would like a call back regarding  his test results.    Pt can be reached at [phone].    Thanks

## 2018-02-28 NOTE — TELEPHONE ENCOUNTER
Lab Results   Component Value Date    PSA 6.9 (H) 09/01/2016    PSA 6.2 (H) 08/22/2016    PSA 3.1 03/22/2012    PSADIAG 5.7 (H) 02/20/2018    PSADIAG 5.2 (H) 08/22/2017    PSADIAG 5.8 (H) 05/19/2017       Last prostate biopsy on 10/25/16    FINAL PATHOLOGIC DIAGNOSIS  1. Prostate, left apex, biopsy:  Benign prostatic tissue.  2. Prostate, left middle, biopsy:  Benign prostatic tissue.  3. Prostate, left base, biopsy:  Benign prostatic tissue.  4. Prostate, right apex, biopsy:  Benign prostatic tissue.  5. Prostate, right middle, biopsy:  Benign prostatic tissue.  6. Prostate, right base, biopsy:  Prostate adenocarcinoma, shaun pattern (3+3), score 6, involving 1 of 2 biopsies, less than 5%. No evidenceof perineural invasion.    Diagnoses and all orders for this visit:    Adenocarcinoma of prostate  -     ciprofloxacin HCl (CIPRO) 500 MG tablet; Take 1 tablet (500 mg total) by mouth 2 (two) times daily.  -     sodium phosphates (FLEET ENEMA) 19-7 gram/118 mL Enem; Place 1 enema rectally once.  -     Tissue Specimen To Pathology, Urology; Future  -     Transrectal Ultrasound w/ Biopsy    Elevated PSA  -     Transrectal Ultrasound w/ Biopsy    please schedule him for another TRUS bx of prostate.

## 2018-03-01 ENCOUNTER — OFFICE VISIT (OUTPATIENT)
Dept: INTERNAL MEDICINE | Facility: CLINIC | Age: 58
End: 2018-03-01
Payer: MEDICARE

## 2018-03-01 ENCOUNTER — TELEPHONE (OUTPATIENT)
Dept: INTERNAL MEDICINE | Facility: CLINIC | Age: 58
End: 2018-03-01

## 2018-03-01 VITALS
HEIGHT: 69 IN | BODY MASS INDEX: 23.99 KG/M2 | DIASTOLIC BLOOD PRESSURE: 78 MMHG | HEART RATE: 78 BPM | OXYGEN SATURATION: 93 % | WEIGHT: 162 LBS | SYSTOLIC BLOOD PRESSURE: 121 MMHG

## 2018-03-01 DIAGNOSIS — R93.89 ABNORMAL CHEST CT: ICD-10-CM

## 2018-03-01 DIAGNOSIS — G90.521 COMPLEX REGIONAL PAIN SYNDROME TYPE 1 OF RIGHT LOWER EXTREMITY: ICD-10-CM

## 2018-03-01 DIAGNOSIS — M54.12 CERVICAL NEURITIS: Primary | ICD-10-CM

## 2018-03-01 PROCEDURE — 99213 OFFICE O/P EST LOW 20 MIN: CPT | Mod: PBBFAC | Performed by: INTERNAL MEDICINE

## 2018-03-01 PROCEDURE — 99214 OFFICE O/P EST MOD 30 MIN: CPT | Mod: S$PBB,,, | Performed by: INTERNAL MEDICINE

## 2018-03-01 PROCEDURE — 99999 PR PBB SHADOW E&M-EST. PATIENT-LVL III: CPT | Mod: PBBFAC,,, | Performed by: INTERNAL MEDICINE

## 2018-03-01 RX ORDER — AMITRIPTYLINE HYDROCHLORIDE 150 MG/1
150 TABLET ORAL NIGHTLY
Qty: 90 TABLET | Refills: 3 | Status: SHIPPED | OUTPATIENT
Start: 2018-03-01 | End: 2018-08-28

## 2018-03-01 NOTE — TELEPHONE ENCOUNTER
I previously ordered MRI chest with anesthesia. This still needs to be set up. Yuliya had set up for another patient - it required booking the patient on the anesthesia schedule by calling a coordinator. Can you investigate how to get this scheduled? Orders are already in.

## 2018-03-01 NOTE — PROGRESS NOTES
"Subjective:       Patient ID: Jesus Christy is a 57 y.o. male.    Chief Complaint: Leg Pain (pt complains of having constant pain in the R leg. even harder to walk at times.); Hip Pain (pt complains of having constant pain in the R hip.); and Follow-up (4 wk follow up.)    HPI   Neuritis of upper extremity evaluated at last visit 2/1/18.  Xray, neurology, gabapentin 100mg - 200mg qhs.   Still with pain down left shoulder.     MRI chest ordered due to right upper lobe lesion seen on CT. Not done yet.     US thyroid ordered due to thyroid nodule on CT. Us with subcentimeter nodules. Do not meet criteria for FNA.     -----------  CT:  1. No evidence of pulmonary thromboembolism.    2. 2.0 cm oval-shaped well-defined hypodense right upper lobe lesion, partially seen on prior thoracic radiograph of 3/2012, however may have slightly increased in size. This lesion is favored to represent a bronchogenic cyst and malignant etiology is felt less likely. Further evaluation with nonemergent MRI chest is recommended for further characterization.    3. 1.0 cm hypodense right thyroid lobe nodule. Nonemergent ultrasound thyroid is recommended for further evaluation.     Prostate cancer diagnosed in 9/2016 - low grade, low volume - active surveillance.       Xray cspine 2012 showed disc narrowing C4-5 and C6-7 with spurring.       Review of Systems   Constitutional: Negative for fever.   Respiratory: Negative for shortness of breath.    Cardiovascular: Negative for chest pain.   Musculoskeletal: Positive for arthralgias.   Skin: Negative.    Neurological:        Chronic left shoulder pain       Objective:   /78 (BP Location: Left arm, Patient Position: Sitting, BP Method: Medium (Manual))   Pulse 78   Ht 5' 9" (1.753 m)   Wt 73.5 kg (162 lb)   SpO2 (!) 93%   BMI 23.92 kg/m²      Physical Exam   Constitutional: He is oriented to person, place, and time. He appears well-developed and well-nourished. No distress.   Walks " with cane   HENT:   Head: Normocephalic and atraumatic.   Cardiovascular: Normal rate and regular rhythm.    No murmur heard.  Pulmonary/Chest: Effort normal. No respiratory distress. He has no wheezes. He has no rales.   Neurological: He is alert and oriented to person, place, and time.   Skin: Skin is warm and dry. He is not diaphoretic.   Psychiatric: He has a normal mood and affect. His behavior is normal.       Assessment:       1. Cervical neuritis    2. Abnormal chest CT    3. Complex regional pain syndrome type 1 of right lower extremity        Plan:       Jesus was seen today for leg pain, hip pain and follow-up.    Diagnoses and all orders for this visit:    Cervical neuritis  Complex regional pain syndrome type 1 of right lower extremity  -     amitriptyline (ELAVIL) 150 MG Tab; Take 1 tablet (150 mg total) by mouth every evening.      Abnormal chest CT    previously ordered MRI, needs to be done with anethesia. Will work on setting this up.     tdap was too expensive - over $90

## 2018-03-14 ENCOUNTER — TELEPHONE (OUTPATIENT)
Dept: INTERNAL MEDICINE | Facility: CLINIC | Age: 58
End: 2018-03-14

## 2018-03-14 DIAGNOSIS — R91.8 ABNORMAL FINDINGS ON DIAGNOSTIC IMAGING OF LUNG: ICD-10-CM

## 2018-03-14 DIAGNOSIS — R91.8 ABNORMAL CT SCAN, LUNG: Primary | ICD-10-CM

## 2018-03-14 NOTE — TELEPHONE ENCOUNTER
Spoke with Dr Moses at [facility] in Radiology Dept regarding pt. Pt states that she would like to speak with you concerning MRI for pt. She can be reached at ext [phone]. Please advise.

## 2018-03-14 NOTE — TELEPHONE ENCOUNTER
Spoke with Radiologist, Dr Moses. Informed her that it is okay to cancel the MRI. She verbalized that she recommends the CT without contrast. Informed her also that the CTA was done at the end of Dec last year which was with contrast, she says.

## 2018-03-14 NOTE — TELEPHONE ENCOUNTER
Please call radiologist. Ok to cancel MRI. Which alternative imaging do they recommend? The CTA was done on 12/25/17.

## 2018-03-14 NOTE — TELEPHONE ENCOUNTER
Spoke with pt. Pt is scheduled to have CT scan chest without contrast on Thurs 03/15 at 8:45 am at imaging center. Verbalized understanding.

## 2018-03-15 ENCOUNTER — HOSPITAL ENCOUNTER (OUTPATIENT)
Dept: RADIOLOGY | Facility: HOSPITAL | Age: 58
Discharge: HOME | End: 2018-03-15
Attending: INTERNAL MEDICINE
Payer: MEDICARE

## 2018-03-15 DIAGNOSIS — R91.8 ABNORMAL FINDINGS ON DIAGNOSTIC IMAGING OF LUNG: ICD-10-CM

## 2018-03-15 DIAGNOSIS — R91.8 ABNORMAL CT SCAN, LUNG: ICD-10-CM

## 2018-03-15 PROCEDURE — 71250 CT THORAX DX C-: CPT | Mod: TC

## 2018-03-15 PROCEDURE — 71250 CT THORAX DX C-: CPT | Mod: 26,,, | Performed by: RADIOLOGY

## 2018-03-19 ENCOUNTER — TELEPHONE (OUTPATIENT)
Dept: INTERNAL MEDICINE | Facility: CLINIC | Age: 58
End: 2018-03-19

## 2018-03-19 DIAGNOSIS — R91.1 LUNG NODULE: ICD-10-CM

## 2018-03-19 DIAGNOSIS — R91.1 INCIDENTAL LUNG NODULE: Primary | ICD-10-CM

## 2018-03-19 NOTE — TELEPHONE ENCOUNTER
Please call patient.  His CT shows a small nodule in his lung.  It does not look changed from previous checks and does not look worrisome. I would like to repeat CT scan in 6 months.

## 2018-03-20 NOTE — TELEPHONE ENCOUNTER
Spoke with pt. Pt is informed of CT scan of chest, pt expresses understanding of results. Pt is scheduled to have CT in 6 months, Tues 09/04 at 8:30 am. Sent out appointment letter in the mail for reminder.

## 2018-03-27 ENCOUNTER — HOSPITAL ENCOUNTER (OUTPATIENT)
Dept: UROLOGY | Facility: HOSPITAL | Age: 58
Discharge: HOME | End: 2018-03-27
Attending: UROLOGY
Payer: MEDICARE

## 2018-03-27 VITALS
WEIGHT: 161.63 LBS | BODY MASS INDEX: 23.94 KG/M2 | DIASTOLIC BLOOD PRESSURE: 72 MMHG | SYSTOLIC BLOOD PRESSURE: 135 MMHG | HEART RATE: 72 BPM | TEMPERATURE: 99 F | HEIGHT: 69 IN

## 2018-03-27 DIAGNOSIS — C61 ADENOCARCINOMA OF PROSTATE: ICD-10-CM

## 2018-03-27 DIAGNOSIS — C61 PROSTATE CANCER: Primary | ICD-10-CM

## 2018-03-27 DIAGNOSIS — R97.20 ELEVATED PSA: ICD-10-CM

## 2018-03-27 PROCEDURE — 55700 PR BIOPSY OF PROSTATE,NEEDLE/PUNCH: CPT | Mod: ,,, | Performed by: UROLOGY

## 2018-03-27 PROCEDURE — 55700 HC BIOPSY OF PROSTATE - NEEDLE OR PUNCH: CPT

## 2018-03-27 PROCEDURE — 76942 ECHO GUIDE FOR BIOPSY: CPT | Mod: 26,59,, | Performed by: UROLOGY

## 2018-03-27 PROCEDURE — 76872 US TRANSRECTAL: CPT | Mod: 26,,, | Performed by: UROLOGY

## 2018-03-27 PROCEDURE — 88305 TISSUE EXAM BY PATHOLOGIST: CPT | Performed by: PATHOLOGY

## 2018-03-27 PROCEDURE — 76942 ECHO GUIDE FOR BIOPSY: CPT

## 2018-03-27 PROCEDURE — 88305 TISSUE EXAM BY PATHOLOGIST: CPT | Mod: 26,,, | Performed by: PATHOLOGY

## 2018-03-27 PROCEDURE — 76872 US TRANSRECTAL: CPT

## 2018-03-27 RX ORDER — LIDOCAINE HYDROCHLORIDE 10 MG/ML
1 INJECTION INFILTRATION; PERINEURAL
Status: COMPLETED | OUTPATIENT
Start: 2018-03-27 | End: 2018-03-27

## 2018-03-27 RX ORDER — LIDOCAINE HYDROCHLORIDE 20 MG/ML
JELLY TOPICAL
Status: COMPLETED | OUTPATIENT
Start: 2018-03-27 | End: 2018-03-27

## 2018-03-27 RX ADMIN — LIDOCAINE HYDROCHLORIDE 20 ML: 20 JELLY TOPICAL at 08:03

## 2018-03-27 RX ADMIN — LIDOCAINE HYDROCHLORIDE 1 ML: 10 INJECTION INFILTRATION; PERINEURAL at 08:03

## 2018-03-27 NOTE — PATIENT INSTRUCTIONS

## 2018-03-27 NOTE — H&P
CC:  Active surveillance for prostate cancer    Jesus Christy is a 57 y.o. man who is here for the evaluation of prostate cancer.  hx of newly diagnosed prostate cancer back in 9/15/16 for elevated PSA.  Based on his low grade, low volume prostate cancer, we decided to follow up with active surveillance.  His overall voiding symptoms including weak urine flow, frequency and urgency got better after he was started on Flomax and Finasteride after his prostate bx.     Denies dysuria or hematuria.  No family hx of prostate cancer.  Had right knee surgery and had chronic pain from the right thigh to the knee ( on cymbalta).  Denies flank pain, dysuira, hematuria .      TRUS bx of prostate on 9/15/16 showed  Volume 58 grams in size.     FINAL PATHOLOGIC DIAGNOSIS  1. Prostate, left apex, biopsy:  Benign prostatic tissue.  2. Prostate, left middle, biopsy:  Benign prostatic tissue.  3. Prostate, left base, biopsy:  Benign prostatic tissue.  4. Prostate, right apex, biopsy:  Benign prostatic tissue.  5. Prostate, right middle, biopsy:  Benign prostatic tissue.  6. Prostate, right base, biopsy:  Prostate adenocarcinoma, shaun pattern (3+3), score 6, involving 1 of 2 biopsies, less than 5%. No evidence of perineural invasion.     Lab Results   Component Value Date    PSA 6.9 (H) 09/01/2016    PSA 6.2 (H) 08/22/2016    PSA 3.1 03/22/2012    PSADIAG 5.7 (H) 02/20/2018    PSADIAG 5.2 (H) 08/22/2017    PSADIAG 5.8 (H) 05/19/2017             Past Medical History:   Diagnosis Date    GERD (gastroesophageal reflux disease)      HTN (hypertension)              Past Surgical History:   Procedure Laterality Date    KNEE ARTHROSCOPY W/ ACL RECONSTRUCTION   2014     right             Social History    Substance Use Topics     Smoking status: Never Smoker     Smokeless tobacco: Never Used     Alcohol use 0.5 oz/week       1 Standard drinks or equivalent per week            Family History   Problem Relation Age of Onset     Hypertension Mother      Diabetes Father        Allergy:       Review of patient's allergies indicates:   Allergen Reactions    Morphine Palpitations      Encounter Medications   Outpatient Encounter Prescriptions as of 8/29/2017   Medication Sig Dispense Refill    amitriptyline (ELAVIL) 100 MG tablet Take 1 tablet (100 mg total) by mouth every evening. 30 tablet 11    butalbital-acetaminophen-caffeine -40 mg (FIORICET, ESGIC) -40 mg per tablet TAKE ONE TABLET BY MOUTH EVERY 12 hours as needed for headaches. Attempt to minimize to no more than 2/month. 30 tablet 1    finasteride (PROSCAR) 5 mg tablet Take 1 tablet (5 mg total) by mouth once daily. 90 tablet 3    fluticasone (FLONASE) 50 mcg/actuation nasal spray 2 sprays by Each Nare route once daily. 16 g 12    hydrOXYzine (ATARAX) 50 MG tablet Take 1 tablet (50 mg total) by mouth 2 (two) times daily as needed for Anxiety.        lorazepam (ATIVAN) 1 MG tablet Take 2 tablets (2 mg total) by mouth On call Procedure for Anxiety. 2 tablet 0    tamsulosin (FLOMAX) 0.4 mg Cp24 Take 1 capsule (0.4 mg total) by mouth every evening. 90 capsule 3    tapentadol 100 mg Tb12 Take 100 mg by mouth 2 (two) times daily. rx by Dr. Esteban   0    UNABLE TO FIND Take 1 packet by mouth 6 (six) times daily. medication name: Goodies Headache Powder        [DISCONTINUED] finasteride (PROSCAR) 5 mg tablet Take 1 tablet (5 mg total) by mouth once daily. 90 tablet 3    [DISCONTINUED] tamsulosin (FLOMAX) 0.4 mg Cp24 Take 1 capsule (0.4 mg total) by mouth every evening. 90 capsule 3                Facility-Administered Encounter Medications as of 8/29/2017   Medication Dose Route Frequency Provider Last Rate Last Dose    [COMPLETED] gadobutrol 10 mL  10 mL Intravenous ONCE PRN Varun Resendiz MD   10 mL at 08/28/17 1140    lidocaine HCL 10 mg/ml (1%) injection 10 mL  10 mL Infiltration Once Varun Resendiz MD        lidocaine HCl 2% urojet   Rectal Once Varun BARRIOS  MD Martín             Review of Systems   General ROS: GENERAL:  No weight gain or loss  SKIN:  No rashes or lacerations  HEAD:  No headaches  EYES:  No exophthalmos, jaundice or blindness  EARS:  No dizziness, tinnitus or hearing loss  NOSE:  No changes in smell  MOUTH & THROAT:  No dyskinetic movements or obvious goiter  CHEST:  No shortness of breath, hyperventilation or cough  CARDIOVASCULAR:  No tachycardia or chest pain  ABDOMEN:  No nausea, vomiting, pain, constipation or diarrhea  URINARY:  No frequency, dysuria or sexual dysfunction  ENDOCRINE:  No polydipsia, polyuria  MUSCULOSKELETAL:  No pain or stiffness of the joints  NEUROLOGIC:  No weakness, sensory changes, seizures, confusion, memory loss, tremor or other abnormal movements  Physical Exam         Vitals:     08/29/17 0805   BP: 126/76   Pulse: 86      General Appearance:  Alert, cooperative, no distress, appears stated age   Head:  Normocephalic, without obvious abnormality, atraumatic   Eyes:  PERRL, conjunctiva/corneas clear, EOM's intact, fundi benign, both eyes   Ears:  Normal TM's and external ear canals, both ears   Nose: Nares normal, septum midline, mucosa normal, no drainage or sinus tenderness   Throat: Lips, mucosa, and tongue normal; teeth and gums normal   Neck: Supple, symmetrical, trachea midline, no adenopathy, thyroid: not enlarged, symmetric, no tenderness/mass/nodules, no carotid bruit or JVD   Back:   Symmetric, no curvature, ROM normal, no CVA tenderness   Lungs:   Clear to auscultation bilaterally, respirations unlabored   Chest Wall:  No tenderness or deformity   Heart:  Regular rate and rhythm, S1, S2 normal, no murmur, rub or gallop   Abdomen:   Soft, non-tender, bowel sounds active all four quadrants,  no masses, no organomegaly of liver and spleen  No hernia noted   Genitalia:  Scrotum: no rash or lesion  Normal symmetric epididymis without masses  Normal vas palpated  Normal size, symmetric testicles with no masses    Normal urethral meatus with no discharge  Normal circumcised penis with no lesion   Rectal:  Normal perineum and anus upon inspection.  Normal tone, no masses or tenderness;   Extremities: Extremities normal, atraumatic, no cyanosis or edema   Pulses: 2+ and symmetric   Skin: Skin color, texture, turgor normal, no rashes or lesions   Lymph nodes: Cervical, supraclavicular, and axillary nodes normal   Neurologic: Normal      Prostate 40 grams smooth with no nodule or tenderness.    Lab Results   Component Value Date     CREATININE 1.0 08/24/2017     CREATININE 1.1 08/22/2016     CREATININE 1.3 02/04/2013      No results found for this or any previous visit.        Urine Culture, Routine   Date Value Ref Range Status   03/19/2012 NO GROWTH AFTER 48 HOURS.   Final      MRI of pelvis 8/28/17  1.  No focal concerning prostate abnormality.  PIRADS 1.  BPH findings noted.  2.  Circumferential lower rectal wall thickening accentuated by nondistention.  Correlate with colonoscopy.     Assessment and Plan:  Jesus was seen today for prostate cancer.     Diagnoses and all orders for this visit:     Elevated PSA     Prostate cancer  -     Prostate Specific Antigen, Diagnostic; Future     Abnormality of rectum  -     Ambulatory referral to Colorectal Surgery     Weak urine stream  -     finasteride (PROSCAR) 5 mg tablet; Take 1 tablet (5 mg total) by mouth once daily.  -     tamsulosin (FLOMAX) 0.4 mg Cp24; Take 1 capsule (0.4 mg total) by mouth every evening.     Frequency-urgency syndrome  -     tamsulosin (FLOMAX) 0.4 mg Cp24; Take 1 capsule (0.4 mg total) by mouth every evening.     I reviewed and explained his pathology again in detail.  His urinary symptoms improved since he has been on flomax and finasteride.  His PSA got better on finasteride but not remarkably.     MRI showed BPH findings.  So will continue active surveillance for his prostate cancer for now.  Will consider a repeat TRUS bx of prostate based on his  PSA response in the future.     Will continue to monitor his PSA.  90 days refills given.    Plan:   TRUS bx of prostate

## 2018-03-27 NOTE — PROCEDURES
Procedure Date:  03/27/2018    Procedure:                                                                        Transrectal Ultrasound of the Prostate                                       Transrectal Ultrasound Guided Prostate Biopsy                                - With Pudendal Nerve Block                                                                                      Pre-OP Diagnosis:                                                                 Elevated PSA, prostate cancer on active surveillance                                             Post-OP Diagnosis:                                                                Awaiting final pathology                                                Anesthesia:                                                                       Anesthesia Administered:                                                     Intrarectal instillation of 10 mL 2% lidocaine (Xylocaine) jelly.       Findings:                                                                         --- Transrectal Ultrasound of the Prostate ---                               Prostate measurements.                                                                                               PSA:   Lab Results   Component Value Date    PSA 6.9 (H) 09/01/2016    PSADIAG 5.7 (H) 02/20/2018    on finasteride         - Volume:37 gm.           PSA Density: 0.15                                                         yes calcification in the prostate   Some noted on the left side.                       Description of Procedure:                                                         Informed Consent:                                                            - Risks, benefits and alternatives of procedure discussed with               patient and informed consent obtained.                                       Patient Position:                                                            - Left lateral.                                                               --- Transrectal Ultrasound of the Prostate ---                               Instruments:                                                                 - Vincent.                                                           --- Transrectal U/S Biopsy ---                                               Instruments:                                                                 - Spring-loaded gun used for biopsy.                                         Procedure Details:                                                           Biopsy Core Details:                                                         - Twelve core(s) in total.                                                   - Cores Taken From: Right apex x 4, right mid prostate x 3, right            base x 2, left apex x 2, left mid prostate x 2 and left base x 2.            Estimated Blood Loss:                                                        - Minimal.                                                                   Pathology Specimen:                                                          - The specimen sent.                                                    Complications:                                                                    No immediate complications.                                             Post-OP Plan:                                                                                            Patient was discharged home in a stable condition.         Medications: finish off cipro given.         Will call him with the bx result.          If fever or unable to void, RTC or emergency room immediately.                                           RTC 6 months with PSA.

## 2018-03-29 ENCOUNTER — TELEPHONE (OUTPATIENT)
Dept: UROLOGY | Facility: CLINIC | Age: 58
End: 2018-03-29

## 2018-03-29 ENCOUNTER — LAB VISIT (OUTPATIENT)
Dept: LAB | Facility: HOSPITAL | Age: 58
End: 2018-03-29
Attending: UROLOGY
Payer: MEDICARE

## 2018-03-29 DIAGNOSIS — N30.00 ACUTE CYSTITIS WITHOUT HEMATURIA: ICD-10-CM

## 2018-03-29 DIAGNOSIS — C61 PROSTATE CANCER: Primary | ICD-10-CM

## 2018-03-29 DIAGNOSIS — R30.0 DYSURIA: ICD-10-CM

## 2018-03-29 PROCEDURE — 87086 URINE CULTURE/COLONY COUNT: CPT

## 2018-03-29 RX ORDER — AMOXICILLIN AND CLAVULANATE POTASSIUM 875; 125 MG/1; MG/1
1 TABLET, FILM COATED ORAL EVERY 12 HOURS
Qty: 14 TABLET | Refills: 0 | Status: SHIPPED | OUTPATIENT
Start: 2018-03-29 | End: 2018-08-28

## 2018-03-29 NOTE — TELEPHONE ENCOUNTER
Diagnoses and all orders for this visit:    Dysuria  -     Urine culture; Future    Acute cystitis without hematuria  -     Urine culture; Future    Other orders  -     amoxicillin-clavulanate 875-125mg (AUGMENTIN) 875-125 mg per tablet; Take 1 tablet by mouth every 12 (twelve) hours.    please ask him to drop off urine for culture today.  He can take augmentin 2 x a day.  If not better, please call us or go to ER.    OK to take Azo for dysuria.

## 2018-03-29 NOTE — TELEPHONE ENCOUNTER
----- Message from Neha Aguayo sent at 3/29/2018  9:57 AM CDT -----  Contact: pt [phone]  Pt states he had a biopsy on 03/27/18 and would like more cipro sent to his rx. Pt is asking to speak with the nurse.     [email or web address], [facility] - [place], [place] -   [id number]  [address]   [phone] (Phone)  [phone] (Fax)

## 2018-03-29 NOTE — TELEPHONE ENCOUNTER
Requesting more cipro after prostate biopsy. C/o dysuria. No other symptoms. No fever, some hematuria, but not gross

## 2018-03-29 NOTE — TELEPHONE ENCOUNTER
TRUS bx of prostate on 3/27/18    Lab Results   Component Value Date    PSA 6.9 (H) 09/01/2016    PSA 6.2 (H) 08/22/2016    PSA 3.1 03/22/2012    PSADIAG 5.7 (H) 02/20/2018    PSADIAG 5.2 (H) 08/22/2017    PSADIAG 5.8 (H) 05/19/2017       FINAL PATHOLOGIC DIAGNOSIS  1. Prostate, left apex, biopsy:  Benign prostatic tissue.  2. Prostate, left middle, biopsy:  Benign prostatic tissue.  3. Prostate, left base, biopsy:  Benign prostatic tissue.  4. Prostate, right apex, biopsy:  Benign prostatic tissue.  5. Prostate, right middle, biopsy:  Benign prostatic tissue.  6. Prostate, right base, biopsy:  Benign prostatic tissue.    Diagnoses and all orders for this visit:    Prostate cancer  -     Prostate Specific Antigen, Diagnostic; Future      I informed him of the result.  Will see him in 6 months with PSA.  Will continue active surveillance.

## 2018-03-30 LAB — BACTERIA UR CULT: NO GROWTH

## 2018-04-01 ENCOUNTER — EXTERNAL CHRONIC CARE MANAGEMENT (OUTPATIENT)
Dept: PRIMARY CARE CLINIC | Facility: CLINIC | Age: 58
End: 2018-04-01
Payer: MEDICARE

## 2018-04-30 PROCEDURE — 99490 CHRNC CARE MGMT STAFF 1ST 20: CPT | Mod: S$PBB,,, | Performed by: INTERNAL MEDICINE

## 2018-04-30 PROCEDURE — 99490 CHRNC CARE MGMT STAFF 1ST 20: CPT | Mod: PBBFAC | Performed by: INTERNAL MEDICINE

## 2018-05-01 ENCOUNTER — EXTERNAL CHRONIC CARE MANAGEMENT (OUTPATIENT)
Dept: PRIMARY CARE CLINIC | Facility: CLINIC | Age: 58
End: 2018-05-01
Payer: MEDICARE

## 2018-05-31 PROCEDURE — 99490 CHRNC CARE MGMT STAFF 1ST 20: CPT | Mod: PBBFAC | Performed by: INTERNAL MEDICINE

## 2018-05-31 PROCEDURE — 99490 CHRNC CARE MGMT STAFF 1ST 20: CPT | Mod: S$PBB,,, | Performed by: INTERNAL MEDICINE

## 2018-06-30 ENCOUNTER — EXTERNAL CHRONIC CARE MANAGEMENT (OUTPATIENT)
Dept: PRIMARY CARE CLINIC | Facility: CLINIC | Age: 58
End: 2018-06-30
Payer: MEDICARE

## 2018-06-30 PROCEDURE — 99490 CHRNC CARE MGMT STAFF 1ST 20: CPT | Mod: S$PBB,,, | Performed by: INTERNAL MEDICINE

## 2018-06-30 PROCEDURE — 99490 CHRNC CARE MGMT STAFF 1ST 20: CPT | Mod: PBBFAC | Performed by: INTERNAL MEDICINE

## 2018-08-28 ENCOUNTER — OFFICE VISIT (OUTPATIENT)
Dept: INTERNAL MEDICINE | Facility: CLINIC | Age: 58
End: 2018-08-28
Payer: MEDICARE

## 2018-08-28 VITALS
BODY MASS INDEX: 24.29 KG/M2 | HEART RATE: 87 BPM | HEIGHT: 69 IN | SYSTOLIC BLOOD PRESSURE: 128 MMHG | DIASTOLIC BLOOD PRESSURE: 76 MMHG | OXYGEN SATURATION: 94 % | WEIGHT: 164 LBS

## 2018-08-28 DIAGNOSIS — F33.1 MODERATE EPISODE OF RECURRENT MAJOR DEPRESSIVE DISORDER: ICD-10-CM

## 2018-08-28 DIAGNOSIS — G90.521 COMPLEX REGIONAL PAIN SYNDROME TYPE 1 OF RIGHT LOWER EXTREMITY: ICD-10-CM

## 2018-08-28 DIAGNOSIS — H61.23 IMPACTED CERUMEN, BILATERAL: ICD-10-CM

## 2018-08-28 DIAGNOSIS — I10 ESSENTIAL HYPERTENSION: Primary | ICD-10-CM

## 2018-08-28 PROCEDURE — 99214 OFFICE O/P EST MOD 30 MIN: CPT | Mod: PBBFAC | Performed by: INTERNAL MEDICINE

## 2018-08-28 PROCEDURE — 99999 PR PBB SHADOW E&M-EST. PATIENT-LVL IV: CPT | Mod: PBBFAC,,, | Performed by: INTERNAL MEDICINE

## 2018-08-28 PROCEDURE — 99214 OFFICE O/P EST MOD 30 MIN: CPT | Mod: S$PBB,,, | Performed by: INTERNAL MEDICINE

## 2018-08-28 RX ORDER — SERTRALINE HYDROCHLORIDE 100 MG/1
100 TABLET, FILM COATED ORAL DAILY
Qty: 30 TABLET | Refills: 11 | COMMUNITY
Start: 2018-08-28 | End: 2022-12-13

## 2018-08-28 RX ORDER — DIAZEPAM 10 MG/1
10 TABLET ORAL 2 TIMES DAILY PRN
COMMUNITY
Start: 2018-08-28 | End: 2019-10-11 | Stop reason: SDUPTHER

## 2018-08-28 RX ORDER — MIRTAZAPINE 30 MG/1
45 TABLET, FILM COATED ORAL NIGHTLY
Qty: 30 TABLET | Refills: 11 | Status: ON HOLD | COMMUNITY
Start: 2018-08-28 | End: 2024-01-27 | Stop reason: HOSPADM

## 2018-08-28 NOTE — PROGRESS NOTES
"Subjective:       Patient ID: Jesus Christy is a 58 y.o. male.    Chief Complaint: Follow-up (6 month.) and Leg Pain (pt complains of chronic pain in the R leg. pt currently takes Goodies to help relieve.)    HPI   59 yo M here for follow up of pulmonary nodule, depression, CRPS.     CRPS of right leg pain managed by Dr. Esteban.   3-4 falls per month.  Right upper lobe pulmonary nodule 2cm seen on ct 3/2018.   CT surveillance.   Embeda ER 30-1.2mg last rx 7/5/18. Not taking anything currently.     adenoca of prostate dx 9/2016.     Depression  New regimen  Sertraline  mirtazepine  rexulti  Diazepam prn    Review of Systems   Constitutional: Negative for fever.   Respiratory: Negative for shortness of breath.    Cardiovascular: Negative for chest pain.   Musculoskeletal: Positive for arthralgias and myalgias.   Skin: Negative.    Psychiatric/Behavioral: Positive for dysphoric mood.       Objective:   /76 (BP Location: Left arm, Patient Position: Sitting, BP Method: Large (Manual))   Pulse 87   Ht 5' 9" (1.753 m)   Wt 74.4 kg (164 lb)   SpO2 (!) 94%   BMI 24.22 kg/m²      Physical Exam   Constitutional: He is oriented to person, place, and time. He appears well-developed and well-nourished. No distress.   HENT:   Head: Normocephalic and atraumatic.   Bilateral cerumen impaction   Cardiovascular: Normal rate and regular rhythm.   No murmur heard.  Pulmonary/Chest: Effort normal. No respiratory distress. He has no wheezes. He has no rales.   Neurological: He is alert and oriented to person, place, and time.   Skin: Skin is warm and dry. He is not diaphoretic.   Psychiatric: He has a normal mood and affect. His behavior is normal.       Assessment:       1. Essential hypertension    2. Complex regional pain syndrome type 1 of right lower extremity    3. Moderate episode of recurrent major depressive disorder    4. Impacted cerumen, bilateral        Plan:       Jesus was seen today for follow-up and leg " pain.    Diagnoses and all orders for this visit:    Essential hypertension  -   bp at goal    CBC auto differential; Future  -     Comprehensive metabolic panel; Future  -     Lipid panel; Future  -     TSH; Future    Complex regional pain syndrome type 1 of right lower extremity  -    Managed by Dr. Esteban    Moderate episode of recurrent major depressive disorder  -     CBC auto differential; Future  -     Comprehensive metabolic panel; Future  -     Lipid panel; Future  -     TSH; Future  Continue current meds per psychiatrist    Impacted cerumen, bilateral  -     Ambulatory consult to ENT

## 2018-08-30 ENCOUNTER — TELEPHONE (OUTPATIENT)
Dept: INTERNAL MEDICINE | Facility: CLINIC | Age: 58
End: 2018-08-30

## 2018-08-30 ENCOUNTER — LAB VISIT (OUTPATIENT)
Dept: LAB | Facility: HOSPITAL | Age: 58
End: 2018-08-30
Attending: INTERNAL MEDICINE
Payer: MEDICARE

## 2018-08-30 DIAGNOSIS — F33.1 MODERATE EPISODE OF RECURRENT MAJOR DEPRESSIVE DISORDER: ICD-10-CM

## 2018-08-30 DIAGNOSIS — G90.521 COMPLEX REGIONAL PAIN SYNDROME TYPE 1 OF RIGHT LOWER EXTREMITY: ICD-10-CM

## 2018-08-30 DIAGNOSIS — I10 ESSENTIAL HYPERTENSION: ICD-10-CM

## 2018-08-30 LAB
ALBUMIN SERPL BCP-MCNC: 4.2 G/DL
ALP SERPL-CCNC: 82 U/L
ALT SERPL W/O P-5'-P-CCNC: 33 U/L
ANION GAP SERPL CALC-SCNC: 6 MMOL/L
AST SERPL-CCNC: 19 U/L
BASOPHILS # BLD AUTO: 0.03 K/UL
BASOPHILS NFR BLD: 0.5 %
BILIRUB SERPL-MCNC: 0.6 MG/DL
BUN SERPL-MCNC: 13 MG/DL
CALCIUM SERPL-MCNC: 10.4 MG/DL
CHLORIDE SERPL-SCNC: 107 MMOL/L
CHOLEST SERPL-MCNC: 141 MG/DL
CHOLEST/HDLC SERPL: 3.9 {RATIO}
CO2 SERPL-SCNC: 27 MMOL/L
CREAT SERPL-MCNC: 1.1 MG/DL
DIFFERENTIAL METHOD: ABNORMAL
EOSINOPHIL # BLD AUTO: 0.4 K/UL
EOSINOPHIL NFR BLD: 6.4 %
ERYTHROCYTE [DISTWIDTH] IN BLOOD BY AUTOMATED COUNT: 12.8 %
EST. GFR  (AFRICAN AMERICAN): >60 ML/MIN/1.73 M^2
EST. GFR  (NON AFRICAN AMERICAN): >60 ML/MIN/1.73 M^2
GLUCOSE SERPL-MCNC: 94 MG/DL
HCT VFR BLD AUTO: 42.9 %
HDLC SERPL-MCNC: 36 MG/DL
HDLC SERPL: 25.5 %
HGB BLD-MCNC: 14.4 G/DL
LDLC SERPL CALC-MCNC: 76.4 MG/DL
LYMPHOCYTES # BLD AUTO: 2 K/UL
LYMPHOCYTES NFR BLD: 34.1 %
MCH RBC QN AUTO: 31.5 PG
MCHC RBC AUTO-ENTMCNC: 33.6 G/DL
MCV RBC AUTO: 94 FL
MONOCYTES # BLD AUTO: 0.6 K/UL
MONOCYTES NFR BLD: 9.5 %
NEUTROPHILS # BLD AUTO: 2.9 K/UL
NEUTROPHILS NFR BLD: 49.5 %
NONHDLC SERPL-MCNC: 105 MG/DL
PLATELET # BLD AUTO: 326 K/UL
PMV BLD AUTO: 8.6 FL
POTASSIUM SERPL-SCNC: 4.3 MMOL/L
PROT SERPL-MCNC: 7.6 G/DL
RBC # BLD AUTO: 4.57 M/UL
SODIUM SERPL-SCNC: 140 MMOL/L
TRIGL SERPL-MCNC: 143 MG/DL
TSH SERPL DL<=0.005 MIU/L-ACNC: 0.87 UIU/ML
WBC # BLD AUTO: 5.77 K/UL

## 2018-08-30 PROCEDURE — 84443 ASSAY THYROID STIM HORMONE: CPT

## 2018-08-30 PROCEDURE — 80061 LIPID PANEL: CPT

## 2018-08-30 PROCEDURE — 85025 COMPLETE CBC W/AUTO DIFF WBC: CPT

## 2018-08-30 PROCEDURE — 36415 COLL VENOUS BLD VENIPUNCTURE: CPT

## 2018-08-30 PROCEDURE — 80053 COMPREHEN METABOLIC PANEL: CPT

## 2018-08-30 NOTE — TELEPHONE ENCOUNTER
Please call pt. Your liver and kidney function, blood counts, thyroid function,  and cholesterol were normal.

## 2018-09-04 ENCOUNTER — TELEPHONE (OUTPATIENT)
Dept: INTERNAL MEDICINE | Facility: CLINIC | Age: 58
End: 2018-09-04

## 2018-09-04 ENCOUNTER — HOSPITAL ENCOUNTER (OUTPATIENT)
Dept: RADIOLOGY | Facility: HOSPITAL | Age: 58
Discharge: HOME | End: 2018-09-04
Attending: INTERNAL MEDICINE
Payer: MEDICARE

## 2018-09-04 DIAGNOSIS — R91.1 LUNG NODULE: ICD-10-CM

## 2018-09-04 PROCEDURE — 71250 CT THORAX DX C-: CPT | Mod: 26,,, | Performed by: RADIOLOGY

## 2018-09-04 PROCEDURE — 71250 CT THORAX DX C-: CPT | Mod: TC

## 2018-09-25 ENCOUNTER — OFFICE VISIT (OUTPATIENT)
Dept: OTOLARYNGOLOGY | Facility: CLINIC | Age: 58
End: 2018-09-25
Payer: MEDICARE

## 2018-09-25 VITALS — HEART RATE: 62 BPM | SYSTOLIC BLOOD PRESSURE: 128 MMHG | DIASTOLIC BLOOD PRESSURE: 61 MMHG

## 2018-09-25 DIAGNOSIS — H61.23 IMPACTED CERUMEN, BILATERAL: Primary | ICD-10-CM

## 2018-09-25 PROCEDURE — 69210 REMOVE IMPACTED EAR WAX UNI: CPT | Mod: 50,PBBFAC | Performed by: OTOLARYNGOLOGY

## 2018-09-25 PROCEDURE — 99212 OFFICE O/P EST SF 10 MIN: CPT | Mod: PBBFAC | Performed by: OTOLARYNGOLOGY

## 2018-09-25 PROCEDURE — 99499 UNLISTED E&M SERVICE: CPT | Mod: S$PBB,,, | Performed by: OTOLARYNGOLOGY

## 2018-09-25 PROCEDURE — 99999 PR PBB SHADOW E&M-EST. PATIENT-LVL II: CPT | Mod: PBBFAC,,, | Performed by: OTOLARYNGOLOGY

## 2018-09-25 NOTE — LETTER
September 25, 2018      Tara Jolly MD  [address]  [place] [facility] [address]           [facility] - Otorhinolaryngology  [address]  [facility] [address]  Phone: [phone]  Fax: [phone]          Patient: Jesus Christy   MR Number: [id number]   YOB: 1960   Date of Visit: 9/25/2018       Dear Dr. Tara Jolly:    Thank you for referring Jesus Christy to me for evaluation. Attached you will find relevant portions of my assessment and plan of care.    If you have questions, please do not hesitate to call me. I look forward to following Jesus Christy along with you.    Sincerely,    Clay Schultz MD    Enclosure  CC:  No Recipients    If you would like to receive this communication electronically, please contact [email or web address] or [phone] to request more information on [employer] access.    For providers and/or their staff who would like to refer a patient to [facility], please contact us through our one-stop-shop provider referral line, [facility] [place], at [phone].    If you feel you have received this communication in error or would no longer like to receive these types of communications, please e-mail [email or web address]

## 2018-09-25 NOTE — PROCEDURES
"Ear Cerumen Removal  Date/Time: 9/25/2018 3:03 PM  Performed by: Clay Schultz MD  Authorized by: Clay Schultz MD     Time out: Immediately prior to procedure a "time out" was called to verify the correct patient, procedure, equipment, support staff and site/side marked as required.    Consent Done?:  Yes (Verbal)    Local anesthetic:  None  Location details:  Both ears  Procedure type: curette    Cerumen  Removal Results:  Cerumen completely removed  Patient tolerance:  Patient tolerated the procedure well with no immediate complications     Binocular microscopy used to examine ear canal and tympanic membrane.  There was a cerumen impaction on the right side and an impaction of cerumen on the left.  This was removed using a curette and other microinstrumentation.  After removal TM and EAC were normal AU.      "

## 2018-10-05 ENCOUNTER — LAB VISIT (OUTPATIENT)
Dept: LAB | Facility: HOSPITAL | Age: 58
End: 2018-10-05
Attending: UROLOGY
Payer: MEDICARE

## 2018-10-05 DIAGNOSIS — R39.12 WEAK URINE STREAM: ICD-10-CM

## 2018-10-05 DIAGNOSIS — C61 PROSTATE CANCER: ICD-10-CM

## 2018-10-05 LAB — COMPLEXED PSA SERPL-MCNC: 3.8 NG/ML

## 2018-10-05 PROCEDURE — 84153 ASSAY OF PSA TOTAL: CPT

## 2018-10-05 PROCEDURE — 36415 COLL VENOUS BLD VENIPUNCTURE: CPT

## 2018-10-05 RX ORDER — FINASTERIDE 5 MG/1
TABLET, FILM COATED ORAL
Qty: 90 TABLET | Refills: 0 | Status: SHIPPED | OUTPATIENT
Start: 2018-10-05 | End: 2018-10-12 | Stop reason: SDUPTHER

## 2018-10-12 ENCOUNTER — OFFICE VISIT (OUTPATIENT)
Dept: UROLOGY | Facility: CLINIC | Age: 58
End: 2018-10-12
Payer: MEDICARE

## 2018-10-12 VITALS
HEIGHT: 69 IN | SYSTOLIC BLOOD PRESSURE: 125 MMHG | WEIGHT: 163.38 LBS | BODY MASS INDEX: 24.2 KG/M2 | DIASTOLIC BLOOD PRESSURE: 79 MMHG | HEART RATE: 75 BPM

## 2018-10-12 DIAGNOSIS — N31.8 FREQUENCY-URGENCY SYNDROME: ICD-10-CM

## 2018-10-12 DIAGNOSIS — R39.12 WEAK URINE STREAM: ICD-10-CM

## 2018-10-12 DIAGNOSIS — R97.20 ELEVATED PSA: Primary | ICD-10-CM

## 2018-10-12 DIAGNOSIS — C61 ADENOCARCINOMA OF PROSTATE: ICD-10-CM

## 2018-10-12 PROCEDURE — 99214 OFFICE O/P EST MOD 30 MIN: CPT | Mod: S$PBB,,, | Performed by: UROLOGY

## 2018-10-12 PROCEDURE — 99999 PR PBB SHADOW E&M-EST. PATIENT-LVL III: CPT | Mod: PBBFAC,,, | Performed by: UROLOGY

## 2018-10-12 PROCEDURE — 99213 OFFICE O/P EST LOW 20 MIN: CPT | Mod: PBBFAC | Performed by: UROLOGY

## 2018-10-12 RX ORDER — TAMSULOSIN HYDROCHLORIDE 0.4 MG/1
0.4 CAPSULE ORAL NIGHTLY
Qty: 90 CAPSULE | Refills: 3 | Status: SHIPPED | OUTPATIENT
Start: 2018-10-12 | End: 2019-04-16 | Stop reason: SDUPTHER

## 2018-10-12 RX ORDER — FINASTERIDE 5 MG/1
5 TABLET, FILM COATED ORAL DAILY
Qty: 90 TABLET | Refills: 3 | Status: SHIPPED | OUTPATIENT
Start: 2018-10-12 | End: 2019-04-16 | Stop reason: SDUPTHER

## 2018-10-12 NOTE — PROGRESS NOTES
CC: active surveillance for prostate cancer, LUTS    Jesus Christy is a 58 y.o. man who is here for the evaluation of prostate cancer.  hx of newly diagnosed prostate cancer back in 9/15/16 for elevated PSA.  Based on his low grade, low volume prostate cancer, we decided to follow up with active surveillance.  His overall voiding symptoms including weak urine flow, frequency and urgency got better after he was started on Flomax and Finasteride after his prostate bx.  Reports that he is voiding well as long as he is taking flomax and finasteride.  No side effects reported.    Denies dysuria or hematuria.  No family hx of prostate cancer.  Had right knee surgery and had chronic pain from the right thigh to the knee ( on cymbalta).  He walks with a cane.  Denies flank pain, dysuira, hematuria .     TRUS bx of prostate on 9/15/16 showed  Volume 58 grams in size.    FINAL PATHOLOGIC DIAGNOSIS  1. Prostate, left apex, biopsy:  Benign prostatic tissue.  2. Prostate, left middle, biopsy:  Benign prostatic tissue.  3. Prostate, left base, biopsy:  Benign prostatic tissue.  4. Prostate, right apex, biopsy:  Benign prostatic tissue.  5. Prostate, right middle, biopsy:  Benign prostatic tissue.  6. Prostate, right base, biopsy:  Prostate adenocarcinoma, shaun pattern (3+3), score 6, involving 1 of 2 biopsies, less than 5%. No evidence  of perineural invasion.     Past Medical History:   Diagnosis Date    GERD (gastroesophageal reflux disease)     HTN (hypertension)      Past Surgical History:   Procedure Laterality Date    COLONOSCOPY N/A 9/29/2017    Procedure: COLONOSCOPY;  Surgeon: Jamar Edwards MD;  Location: [facility] ([address] [place] [address]);  Service: Endoscopy;  Laterality: N/A;    COLONOSCOPY N/A 9/29/2017    Performed by Jamar Edwards MD at [facility] ([facility]    COLONOSCOPY N/A 3/15/2013    Performed by Martha Martin MD at [facility] ENDO    JOINT REPLACEMENT Right     knee    KNEE ARTHROSCOPY W/ ACL RECONSTRUCTION   2014    right     Social History     Tobacco Use    Smoking status: Never Smoker    Smokeless tobacco: Never Used   Substance Use Topics    Alcohol use: Yes     Alcohol/week: 0.5 oz     Types: 1 Standard drinks or equivalent per week    Drug use: No     Family History   Problem Relation Age of Onset    Hypertension Mother     Diabetes Father      Allergy:  Review of patient's allergies indicates:   Allergen Reactions    Morphine Palpitations     Outpatient Encounter Medications as of 10/12/2018   Medication Sig Dispense Refill    brexpiprazole (REXULTI) 2 mg Tab Take 2 mg by mouth every evening.      diazePAM (VALIUM) 10 MG Tab Take 1 tablet (10 mg total) by mouth 2 (two) times daily as needed.      finasteride (PROSCAR) 5 mg tablet Take 1 tablet (5 mg total) by mouth once daily. 90 tablet 3    mirtazapine (REMERON) 30 MG tablet Take 1 tablet (30 mg total) by mouth every evening. 30 tablet 11    sertraline (ZOLOFT) 100 MG tablet Take 1 tablet (100 mg total) by mouth once daily. 30 tablet 11    tamsulosin (FLOMAX) 0.4 mg Cap Take 1 capsule (0.4 mg total) by mouth every evening. 90 capsule 3    UNABLE TO FIND Take 1 packet by mouth 6 (six) times daily. medication name: Goodies Headache Powder      [DISCONTINUED] finasteride (PROSCAR) 5 mg tablet TAKE ONE TABLET BY MOUTH EVERY DAY 90 tablet 0    [DISCONTINUED] tamsulosin (FLOMAX) 0.4 mg Cp24 Take 1 capsule (0.4 mg total) by mouth every evening. 90 capsule 3     Facility-Administered Encounter Medications as of 10/12/2018   Medication Dose Route Frequency Provider Last Rate Last Dose    [DISCONTINUED] lidocaine HCL 10 mg/ml (1%) injection 10 mL  10 mL Infiltration Once Varun Resendiz MD        [DISCONTINUED] lidocaine HCl 2% urojet   Rectal Once Varun Resendiz MD         Review of Systems   General ROS: GENERAL:  No weight gain or loss  SKIN:  No rashes or lacerations  HEAD:  No headaches  EYES:  No exophthalmos, jaundice or blindness  EARS:  No  dizziness, tinnitus or hearing loss  NOSE:  No changes in smell  MOUTH & THROAT:  No dyskinetic movements or obvious goiter  CHEST:  No shortness of breath, hyperventilation or cough  CARDIOVASCULAR:  No tachycardia or chest pain  ABDOMEN:  No nausea, vomiting, pain, constipation or diarrhea  URINARY:  No frequency, dysuria or sexual dysfunction  ENDOCRINE:  No polydipsia, polyuria  MUSCULOSKELETAL:  No pain or stiffness of the joints  NEUROLOGIC:  No weakness, sensory changes, seizures, confusion, memory loss, tremor or other abnormal movements  Physical Exam     Vitals:    10/12/18 0934   BP: 125/79   Pulse: 75     General Appearance:  Alert, cooperative, no distress, appears stated age   Head:  Normocephalic, without obvious abnormality, atraumatic   Eyes:  PERRL, conjunctiva/corneas clear, EOM's intact, fundi benign, both eyes   Ears:  Normal TM's and external ear canals, both ears   Nose: Nares normal, septum midline, mucosa normal, no drainage or sinus tenderness   Throat: Lips, mucosa, and tongue normal; teeth and gums normal   Neck: Supple, symmetrical, trachea midline, no adenopathy, thyroid: not enlarged, symmetric, no tenderness/mass/nodules, no carotid bruit or JVD   Back:   Symmetric, no curvature, ROM normal, no CVA tenderness   Lungs:   Clear to auscultation bilaterally, respirations unlabored   Chest Wall:  No tenderness or deformity   Heart:  Regular rate and rhythm, S1, S2 normal, no murmur, rub or gallop   Abdomen:   Soft, non-tender, bowel sounds active all four quadrants,  no masses, no organomegaly of liver and spleen  No hernia noted   Genitalia:  Scrotum: no rash or lesion  Normal symmetric epididymis without masses  Normal vas palpated  Normal size, symmetric testicles with no masses   Normal urethral meatus with no discharge  Normal circumcised penis with no lesion   Rectal:  Normal perineum and anus upon inspection.  Normal tone, no masses or tenderness;   Extremities: Extremities normal,  atraumatic, no cyanosis or edema   Pulses: 2+ and symmetric   Skin: Skin color, texture, turgor normal, no rashes or lesions   Lymph nodes: Cervical, supraclavicular, and axillary nodes normal   Neurologic: Normal     Prostate 40 grams smooth with no nodule or tenderness.    LABS:  Lab Results   Component Value Date    PSA 6.9 (H) 09/01/2016    PSA 6.2 (H) 08/22/2016    PSA 3.1 03/22/2012    PSADIAG 3.8 10/05/2018    PSADIAG 5.7 (H) 02/20/2018    PSADIAG 5.2 (H) 08/22/2017    PSADIAG 5.8 (H) 05/19/2017    PSADIAG 5.4 (H) 12/29/2016     Results for orders placed or performed in visit on 10/05/18   Prostate Specific Antigen, Diagnostic   Result Value Ref Range    PSA DIAGNOSTIC 3.8 0.00 - 4.00 ng/mL   Results for orders placed or performed in visit on 02/20/18   Prostate Specific Antigen, Diagnostic   Result Value Ref Range    PSA DIAGNOSTIC 5.7 (H) 0.00 - 4.00 ng/mL   Results for orders placed or performed in visit on 08/22/17   Prostate Specific Antigen, Diagnostic   Result Value Ref Range    PSA DIAGNOSTIC 5.2 (H) 0.00 - 4.00 ng/mL     Lab Results   Component Value Date    CREATININE 1.1 08/30/2018    CREATININE 1.0 12/24/2017    CREATININE 1.0 08/24/2017     No results found for this or any previous visit.  Urine Culture, Routine   Date Value Ref Range Status   03/29/2018 No growth  Final     MRI of pelvis 8/28/17  1.  No focal concerning prostate abnormality.  PIRADS 1.  BPH findings noted.  2.  Circumferential lower rectal wall thickening accentuated by nondistention.  Correlate with colonoscopy.    Assessment and Plan:  Jesus was seen today for elevated psa.    Diagnoses and all orders for this visit:    Elevated PSA    Adenocarcinoma of prostate    Weak urine stream  -     finasteride (PROSCAR) 5 mg tablet; Take 1 tablet (5 mg total) by mouth once daily.  -     tamsulosin (FLOMAX) 0.4 mg Cap; Take 1 capsule (0.4 mg total) by mouth every evening.    Frequency-urgency syndrome  -     tamsulosin (FLOMAX) 0.4 mg  Cap; Take 1 capsule (0.4 mg total) by mouth every evening.    I reviewed and explained his pathology again in detail.  His urinary symptoms improved since he has been on flomax and finasteride.  His PSA got better on finasteride.  Will continue to follow him up with serial PSA.    MRI showed BPH findings.  So will continue active surveillance for his prostate cancer for now.  Will consider a repeat TRUS bx of prostate based on his PSA response in the future.    Continue flomax and finasteride.  90 days refills given.  I spent 25 minutes with the patient of which more than half was spent in direct consultation with the patient in regards to our treatment and plan.      Follow-up:  Follow-up in about 6 months (around 4/12/2019) for PSA.

## 2018-10-12 NOTE — PATIENT INSTRUCTIONS
Lab Results   Component Value Date    PSA 6.9 (H) 09/01/2016    PSA 6.2 (H) 08/22/2016    PSA 3.1 03/22/2012    PSADIAG 3.8 10/05/2018    PSADIAG 5.7 (H) 02/20/2018    PSADIAG 5.2 (H) 08/22/2017

## 2018-10-30 ENCOUNTER — IMMUNIZATION (OUTPATIENT)
Dept: INTERNAL MEDICINE | Facility: CLINIC | Age: 58
End: 2018-10-30
Payer: MEDICARE

## 2018-10-30 PROCEDURE — 90686 IIV4 VACC NO PRSV 0.5 ML IM: CPT | Mod: PBBFAC

## 2019-02-18 DIAGNOSIS — R97.20 ELEVATED PSA: Primary | ICD-10-CM

## 2019-03-27 ENCOUNTER — OFFICE VISIT (OUTPATIENT)
Dept: INTERNAL MEDICINE | Facility: CLINIC | Age: 59
End: 2019-03-27
Payer: MEDICARE

## 2019-03-27 VITALS
SYSTOLIC BLOOD PRESSURE: 122 MMHG | DIASTOLIC BLOOD PRESSURE: 68 MMHG | HEIGHT: 69 IN | OXYGEN SATURATION: 97 % | BODY MASS INDEX: 26.07 KG/M2 | WEIGHT: 176 LBS

## 2019-03-27 DIAGNOSIS — B35.3 TINEA PEDIS OF BOTH FEET: ICD-10-CM

## 2019-03-27 DIAGNOSIS — M21.611 BILATERAL BUNIONS: ICD-10-CM

## 2019-03-27 DIAGNOSIS — C61 ADENOCARCINOMA OF PROSTATE: ICD-10-CM

## 2019-03-27 DIAGNOSIS — F32.A DEPRESSION, UNSPECIFIED DEPRESSION TYPE: ICD-10-CM

## 2019-03-27 DIAGNOSIS — G90.521 COMPLEX REGIONAL PAIN SYNDROME TYPE 1 OF RIGHT LOWER EXTREMITY: Primary | ICD-10-CM

## 2019-03-27 DIAGNOSIS — M21.612 BILATERAL BUNIONS: ICD-10-CM

## 2019-03-27 DIAGNOSIS — I10 ESSENTIAL HYPERTENSION: ICD-10-CM

## 2019-03-27 DIAGNOSIS — R91.1 LUNG NODULE: ICD-10-CM

## 2019-03-27 DIAGNOSIS — F41.9 ANXIETY: ICD-10-CM

## 2019-03-27 PROCEDURE — 99999 PR PBB SHADOW E&M-EST. PATIENT-LVL IV: CPT | Mod: PBBFAC,,, | Performed by: INTERNAL MEDICINE

## 2019-03-27 PROCEDURE — 99214 PR OFFICE/OUTPT VISIT, EST, LEVL IV, 30-39 MIN: ICD-10-PCS | Mod: S$PBB,,, | Performed by: INTERNAL MEDICINE

## 2019-03-27 PROCEDURE — 99214 OFFICE O/P EST MOD 30 MIN: CPT | Mod: S$PBB,,, | Performed by: INTERNAL MEDICINE

## 2019-03-27 PROCEDURE — 99214 OFFICE O/P EST MOD 30 MIN: CPT | Mod: PBBFAC | Performed by: INTERNAL MEDICINE

## 2019-03-27 PROCEDURE — 99999 PR PBB SHADOW E&M-EST. PATIENT-LVL IV: ICD-10-PCS | Mod: PBBFAC,,, | Performed by: INTERNAL MEDICINE

## 2019-03-27 RX ORDER — CLOTRIMAZOLE 1 %
CREAM (GRAM) TOPICAL 2 TIMES DAILY
Qty: 45 G | Refills: 1 | Status: SHIPPED | OUTPATIENT
Start: 2019-03-27 | End: 2024-01-23 | Stop reason: ALTCHOICE

## 2019-03-27 NOTE — PROGRESS NOTES
"Subjective:       Patient ID: Jesus Christy is a 58 y.o. male.    Chief Complaint: Follow-up (routine follow up.) and Foot Pain (pain in ANUPAM feet, persists for a few weeks. patient currently takes Goodys to help relieve.)    HPI     Jesus Christy is a 58 y.o. male here for routine follow up of the following chronic issues:      CRPS of right leg pain managed by Dr. Esteban.     Right upper lobe pulmonary nodule 2cm seen on ct 3/2018. Repeat done in 9/2018, repeat in 1 year.   CT surveillance.   Embeda ER 30-1.2mg last rx 2017. Not taking anything currently except otc.     Bilateral feet hurting. No injury. Concerned about callus and athlete's foot.     adenoca of prostate dx 9/2016.    active surveillance followed by Dr. Resendiz.   Prostate bx of right base: Prostate adenocarcinoma, shaun pattern (3+3), score 6, involving 1 of 2 biopsies, less than 5%. No evidence of perineural invasion.  flomax and finasteride    Depression  Sertraline  mirtazepine  rexulti  Diazepam prn   Dr. Chan, sees tomorrow.  Depression has been doing poorly.     Review of Systems   Constitutional: Negative for fever.   Respiratory: Negative for shortness of breath.    Cardiovascular: Negative for chest pain.   Musculoskeletal: Positive for arthralgias.   Skin: Negative.         Athlete's foot       Objective:   /68 (BP Location: Right arm, Patient Position: Sitting, BP Method: Large (Manual))   Ht 5' 9" (1.753 m)   Wt 79.8 kg (176 lb)   SpO2 97%   BMI 25.99 kg/m²      Physical Exam   Constitutional: He is oriented to person, place, and time. He appears well-developed and well-nourished. No distress.   HENT:   Head: Normocephalic and atraumatic.   Cardiovascular: Normal rate and regular rhythm.   No murmur heard.  Pulmonary/Chest: Effort normal. No respiratory distress. He has no wheezes. He has no rales.   Musculoskeletal:   Walks with cane  Bunions at bilateral great toe bases     Neurological: He is alert and oriented to " person, place, and time.   Skin: Skin is warm and dry. He is not diaphoretic.   Peeling skin on soles of feet c/w athlete's foot   Psychiatric: He has a normal mood and affect. His behavior is normal.       Assessment:       1. Complex regional pain syndrome type 1 of right lower extremity    2. Anxiety    3. Depression, unspecified depression type    4. Essential hypertension    5. Adenocarcinoma of prostate    6. Lung nodule    7. Tinea pedis of both feet    8. Bilateral bunions        Plan:       Jesus was seen today for follow-up and foot pain.    Diagnoses and all orders for this visit:    Complex regional pain syndrome type 1 of right lower extremity  Managed by Dr. Esteban    Anxiety  Depression, unspecified depression type  Managed by Dr. Maida Hercules    Essential hypertension  At goal.    Adenocarcinoma of prostate  Managed by Dr. Resendiz    Lung nodule  -     CT Chest Without Contrast; Future in sept 2019      Tinea pedis of both feet  -     clotrimazole (LOTRIMIN) 1 % cream; Apply topically 2 (two) times daily.  -     Ambulatory referral to Podiatry    Bilateral bunions  -     Ambulatory referral to Podiatry          tdap too expensive

## 2019-04-04 ENCOUNTER — HOSPITAL ENCOUNTER (OUTPATIENT)
Dept: RADIOLOGY | Facility: HOSPITAL | Age: 59
Discharge: HOME | End: 2019-04-04
Attending: PODIATRIST
Payer: MEDICARE

## 2019-04-04 ENCOUNTER — OFFICE VISIT (OUTPATIENT)
Dept: PODIATRY | Facility: CLINIC | Age: 59
End: 2019-04-04
Payer: MEDICARE

## 2019-04-04 VITALS
BODY MASS INDEX: 24.64 KG/M2 | HEART RATE: 69 BPM | HEIGHT: 71 IN | SYSTOLIC BLOOD PRESSURE: 131 MMHG | DIASTOLIC BLOOD PRESSURE: 81 MMHG | WEIGHT: 176 LBS

## 2019-04-04 DIAGNOSIS — M21.619 BUNION: ICD-10-CM

## 2019-04-04 DIAGNOSIS — M20.42 HAMMER TOES OF BOTH FEET: ICD-10-CM

## 2019-04-04 DIAGNOSIS — M20.41 HAMMER TOES OF BOTH FEET: ICD-10-CM

## 2019-04-04 DIAGNOSIS — G90.521 COMPLEX REGIONAL PAIN SYNDROME TYPE 1 OF RIGHT LOWER EXTREMITY: ICD-10-CM

## 2019-04-04 DIAGNOSIS — M21.619 BUNION: Primary | ICD-10-CM

## 2019-04-04 PROCEDURE — 73630 X-RAY EXAM OF FOOT: CPT | Mod: 26,59,LT, | Performed by: RADIOLOGY

## 2019-04-04 PROCEDURE — 73630 X-RAY EXAM OF FOOT: CPT | Mod: 50,TC

## 2019-04-04 PROCEDURE — 99213 OFFICE O/P EST LOW 20 MIN: CPT | Mod: PBBFAC,25 | Performed by: PODIATRIST

## 2019-04-04 PROCEDURE — 73630 X-RAY EXAM OF FOOT: CPT | Mod: 26,RT,, | Performed by: RADIOLOGY

## 2019-04-04 PROCEDURE — 99203 OFFICE O/P NEW LOW 30 MIN: CPT | Mod: S$PBB,,, | Performed by: PODIATRIST

## 2019-04-04 PROCEDURE — 99999 PR PBB SHADOW E&M-EST. PATIENT-LVL III: ICD-10-PCS | Mod: PBBFAC,,, | Performed by: PODIATRIST

## 2019-04-04 PROCEDURE — 99203 PR OFFICE/OUTPT VISIT, NEW, LEVL III, 30-44 MIN: ICD-10-PCS | Mod: S$PBB,,, | Performed by: PODIATRIST

## 2019-04-04 PROCEDURE — 73630 XR FOOT COMPLETE 3 VIEW BILATERAL: ICD-10-PCS | Mod: 26,RT,, | Performed by: RADIOLOGY

## 2019-04-04 PROCEDURE — 99999 PR PBB SHADOW E&M-EST. PATIENT-LVL III: CPT | Mod: PBBFAC,,, | Performed by: PODIATRIST

## 2019-04-04 NOTE — LETTER
April 9, 2019      Tara Jolly MD  [address]  [place] [address]           [facility]  [address]  [facility] [address]  Phone: [phone]          Patient: Jesus Christy   MR Number: [id number]   YOB: 1960   Date of Visit: 4/4/2019       Dear Dr. Tara Jolly:    Thank you for referring Jesus Christy to me for evaluation. Attached you will find relevant portions of my assessment and plan of care.    If you have questions, please do not hesitate to call me. I look forward to following Jesus Christy along with you.    Sincerely,    Joan Lester, JOSHUA    Enclosure  CC:  No Recipients    If you would like to receive this communication electronically, please contact [email or web address] or [phone] to request more information on [facility] [employer] access.    For providers and/or their staff who would like to refer a patient to [facility], please contact us through our one-stop-shop provider referral line, [facility], at [phone].    If you feel you have received this communication in error or would no longer like to receive these types of communications, please e-mail [email or web address]

## 2019-04-09 ENCOUNTER — LAB VISIT (OUTPATIENT)
Dept: LAB | Facility: HOSPITAL | Age: 59
End: 2019-04-09
Attending: UROLOGY
Payer: MEDICARE

## 2019-04-09 DIAGNOSIS — R97.20 ELEVATED PSA: ICD-10-CM

## 2019-04-09 LAB — COMPLEXED PSA SERPL-MCNC: 4.2 NG/ML (ref 0–4)

## 2019-04-09 PROCEDURE — 36415 COLL VENOUS BLD VENIPUNCTURE: CPT

## 2019-04-09 PROCEDURE — 84153 ASSAY OF PSA TOTAL: CPT

## 2019-04-09 NOTE — PROGRESS NOTES
Subjective:      Patient ID: Jesus Christy is a 58 y.o. male.    Chief Complaint: Bunions    Pt presents today c/o pain in both of his bunions. Pt has CRPS in the right leg, and states that he is in extreme pain all of the time. Pt states he want surgery to fix both bunions, but having surgery could put him in more pain on the right side that has CRPS. Pt states the bunions are especially painful when he wears shoes.     Review of Systems   Constitution: Negative for chills, fever and malaise/fatigue.   HENT: Negative for hearing loss.    Cardiovascular: Negative for claudication.   Respiratory: Negative for shortness of breath.    Skin: Negative for flushing and rash.   Musculoskeletal: Positive for arthritis and joint pain. Negative for myalgias.   Neurological: Positive for sensory change. Negative for loss of balance, numbness and paresthesias.   Psychiatric/Behavioral: Negative for altered mental status.           Objective:      Physical Exam   Constitutional: He is oriented to person, place, and time. He appears well-developed and well-nourished.   Cardiovascular:   Pulses:       Dorsalis pedis pulses are 2+ on the right side, and 2+ on the left side.        Posterior tibial pulses are 2+ on the right side, and 2+ on the left side.   no edema noted to b/L LEs   Musculoskeletal:        Right knee: He exhibits no swelling and no ecchymosis.        Left knee: He exhibits no swelling and no ecchymosis.        Right ankle: He exhibits normal range of motion, no swelling, no ecchymosis and normal pulse. No lateral malleolus, no medial malleolus and no head of 5th metatarsal tenderness found. Achilles tendon exhibits no pain, no defect and normal Saab's test results.        Left ankle: He exhibits normal range of motion, no swelling, no ecchymosis and normal pulse. No lateral malleolus, no medial malleolus and no head of 5th metatarsal tenderness found. Achilles tendon exhibits no pain and normal Saab's  test results.        Right lower leg: He exhibits no tenderness, no bony tenderness, no swelling, no edema and no deformity.        Left lower leg: He exhibits no tenderness, no swelling and no edema.        Right foot: There is normal range of motion and no deformity.        Left foot: There is normal range of motion and no deformity.   Adequate joint ROM noted to all lower extremity muscle groups with no pain or crepitation noted. Muscle strength is 5/5 in all groups bilaterally.  Hammertoes noted, digits 2-5 b/L    with adductovarus rotation of b/L fifth digit.    Hallux valgus deformity noted to both feet, R>L with dorsal medial eminence. Moderate pain with ROM of 1st MPJ     Feet:   Right Foot:   Protective Sensation: 5 sites tested. 5 sites sensed.   Left Foot:   Protective Sensation: 5 sites tested. 5 sites sensed.   Neurological: He is alert and oriented to person, place, and time.   Gross sensation intact to b/L lower extremities   Skin: Skin is warm. Capillary refill takes more than 3 seconds. No abrasion, no bruising, no burn and no ecchymosis noted.   No open lesions noted to b/L lower extremities.      Psychiatric: He has a normal mood and affect. His speech is normal and behavior is normal. He is attentive.             Assessment:       Encounter Diagnoses   Name Primary?    Bunion Yes    Hammer toes of both feet     Complex regional pain syndrome type 1 of right lower extremity          Plan:       Jesus was seen today for bunions.    Diagnoses and all orders for this visit:    Bunion  -     X-Ray Foot Complete 3 view Bilateral; Future    Hammer toes of both feet  -     X-Ray Foot Complete 3 view Bilateral; Future    Complex regional pain syndrome type 1 of right lower extremity      I counseled the patient on his conditions, their implications and medical management.      Pt advised that Dr Aiden Esteban III will be contacted about any surgical options for his right foot.   X-rays ordered for pt,  to begin surgical planning for his left foot.   Shoe modification advised for the pt. Pt was advised to obtain shoes will accommodate foot deformities.     Pt advised on RICE and OTC NSAIDs for associated pain.   Call or return to clinic prn if these symptoms worsen or fail to improve as anticipated.    .

## 2019-04-16 ENCOUNTER — OFFICE VISIT (OUTPATIENT)
Dept: UROLOGY | Facility: CLINIC | Age: 59
End: 2019-04-16
Payer: MEDICARE

## 2019-04-16 VITALS
DIASTOLIC BLOOD PRESSURE: 69 MMHG | HEART RATE: 79 BPM | WEIGHT: 178.38 LBS | SYSTOLIC BLOOD PRESSURE: 120 MMHG | BODY MASS INDEX: 26.42 KG/M2 | HEIGHT: 69 IN

## 2019-04-16 DIAGNOSIS — C61 ADENOCARCINOMA OF PROSTATE: Primary | ICD-10-CM

## 2019-04-16 DIAGNOSIS — R39.12 WEAK URINE STREAM: ICD-10-CM

## 2019-04-16 DIAGNOSIS — N31.8 FREQUENCY-URGENCY SYNDROME: ICD-10-CM

## 2019-04-16 DIAGNOSIS — R97.20 ELEVATED PSA: ICD-10-CM

## 2019-04-16 PROCEDURE — 99213 OFFICE O/P EST LOW 20 MIN: CPT | Mod: PBBFAC | Performed by: UROLOGY

## 2019-04-16 PROCEDURE — 99999 PR PBB SHADOW E&M-EST. PATIENT-LVL III: CPT | Mod: PBBFAC,,, | Performed by: UROLOGY

## 2019-04-16 PROCEDURE — 99999 PR PBB SHADOW E&M-EST. PATIENT-LVL III: ICD-10-PCS | Mod: PBBFAC,,, | Performed by: UROLOGY

## 2019-04-16 PROCEDURE — 99214 OFFICE O/P EST MOD 30 MIN: CPT | Mod: S$PBB,,, | Performed by: UROLOGY

## 2019-04-16 PROCEDURE — 99214 PR OFFICE/OUTPT VISIT, EST, LEVL IV, 30-39 MIN: ICD-10-PCS | Mod: S$PBB,,, | Performed by: UROLOGY

## 2019-04-16 RX ORDER — FINASTERIDE 5 MG/1
5 TABLET, FILM COATED ORAL DAILY
Qty: 90 TABLET | Refills: 3 | Status: SHIPPED | OUTPATIENT
Start: 2019-04-16 | End: 2019-10-11 | Stop reason: SDUPTHER

## 2019-04-16 RX ORDER — TAMSULOSIN HYDROCHLORIDE 0.4 MG/1
0.4 CAPSULE ORAL NIGHTLY
Qty: 90 CAPSULE | Refills: 3 | Status: SHIPPED | OUTPATIENT
Start: 2019-04-16 | End: 2019-10-11 | Stop reason: SDUPTHER

## 2019-04-16 NOTE — PROGRESS NOTES
CC: active surveillance for prostate cancer, LUTS due to enlarged prostate    Jesus Christy is a 58 y.o. man who is here for the evaluation of prostate cancer.  hx of newly diagnosed prostate cancer back in 9/15/16 for elevated PSA.  Based on his low grade, low volume prostate cancer, we decided to follow up with active surveillance.  His overall voiding symptoms including weak urine flow, frequency and urgency got better after he was started on Flomax and Finasteride after his prostate bx.  Reports that he is voiding well as long as he is taking flomax and finasteride.  No side effects reported.    Denies dysuria or hematuria.  No family hx of prostate cancer.  Had right knee surgery and had chronic pain from the right thigh to the knee ( on cymbalta).  He walks with a cane.  Denies flank pain, dysuira, hematuria .  No bone pain.      TRUS bx of prostate on 9/15/16 showed  Volume 58 grams in size.    FINAL PATHOLOGIC DIAGNOSIS  1. Prostate, left apex, biopsy:  Benign prostatic tissue.  2. Prostate, left middle, biopsy:  Benign prostatic tissue.  3. Prostate, left base, biopsy:  Benign prostatic tissue.  4. Prostate, right apex, biopsy:  Benign prostatic tissue.  5. Prostate, right middle, biopsy:  Benign prostatic tissue.  6. Prostate, right base, biopsy:  Prostate adenocarcinoma, shaun pattern (3+3), score 6, involving 1 of 2 biopsies, less than 5%. No evidence  of perineural invasion.     Past Medical History:   Diagnosis Date    GERD (gastroesophageal reflux disease)     HTN (hypertension)      Past Surgical History:   Procedure Laterality Date    COLONOSCOPY N/A 9/29/2017    Performed by [facility]ar [facility] at [facility] (4TH F[facility])    COLONOSCOPY N/A 3/15/2013    Performed [facility] at [facility]    JOINT REPLACEMENT Right     knee    KNEE ARTHROSCOPY W/ ACL RECONSTRUCTION  2014    right     Social History     Tobacco Use    Smoking status: Never Smoker    Smokeless tobacco: Never Used    Substance Use Topics    Alcohol use: Yes     Alcohol/week: 0.5 oz     Types: 1 Standard drinks or equivalent per week    Drug use: No     Family History   Problem Relation Age of Onset    Hypertension Mother     Diabetes Father      Allergy:  Review of patient's allergies indicates:   Allergen Reactions    Morphine Palpitations     Outpatient Encounter Medications as of 4/16/2019   Medication Sig Dispense Refill    brexpiprazole (REXULTI) 2 mg Tab Take 2 mg by mouth every evening.      clotrimazole (LOTRIMIN) 1 % cream Apply topically 2 (two) times daily. 45 g 1    diazePAM (VALIUM) 10 MG Tab Take 1 tablet (10 mg total) by mouth 2 (two) times daily as needed.      finasteride (PROSCAR) 5 mg tablet Take 1 tablet (5 mg total) by mouth once daily. 90 tablet 3    mirtazapine (REMERON) 30 MG tablet Take 1 tablet (30 mg total) by mouth every evening. 30 tablet 11    sertraline (ZOLOFT) 100 MG tablet Take 1 tablet (100 mg total) by mouth once daily. 30 tablet 11    tamsulosin (FLOMAX) 0.4 mg Cap Take 1 capsule (0.4 mg total) by mouth every evening. 90 capsule 3    UNABLE TO FIND Take 1 packet by mouth 6 (six) times daily. medication name: Goodies Headache Powder      [DISCONTINUED] finasteride (PROSCAR) 5 mg tablet Take 1 tablet (5 mg total) by mouth once daily. 90 tablet 3    [DISCONTINUED] tamsulosin (FLOMAX) 0.4 mg Cap Take 1 capsule (0.4 mg total) by mouth every evening. 90 capsule 3     No facility-administered encounter medications on file as of 4/16/2019.      Review of Systems   General ROS: GENERAL:  No weight gain or loss  SKIN:  No rashes or lacerations  HEAD:  No headaches  EYES:  No exophthalmos, jaundice or blindness  EARS:  No dizziness, tinnitus or hearing loss  NOSE:  No changes in smell  MOUTH & THROAT:  No dyskinetic movements or obvious goiter  CHEST:  No shortness of breath, hyperventilation or cough  CARDIOVASCULAR:  No tachycardia or chest pain  ABDOMEN:  No nausea, vomiting, pain,  constipation or diarrhea  URINARY:  No frequency, dysuria or sexual dysfunction  ENDOCRINE:  No polydipsia, polyuria  MUSCULOSKELETAL:  No pain or stiffness of the joints  NEUROLOGIC:  No weakness, sensory changes, seizures, confusion, memory loss, tremor or other abnormal movements  Physical Exam     Vitals:    04/16/19 0905   BP: 120/69   Pulse: 79     General Appearance:  Alert, cooperative, no distress, appears stated age   Head:  Normocephalic, without obvious abnormality, atraumatic   Eyes:  PERRL, conjunctiva/corneas clear, EOM's intact, fundi benign, both eyes   Ears:  Normal TM's and external ear canals, both ears   Nose: Nares normal, septum midline, mucosa normal, no drainage or sinus tenderness   Throat: Lips, mucosa, and tongue normal; teeth and gums normal   Neck: Supple, symmetrical, trachea midline, no adenopathy, thyroid: not enlarged, symmetric, no tenderness/mass/nodules, no carotid bruit or JVD   Back:   Symmetric, no curvature, ROM normal, no CVA tenderness   Lungs:   Clear to auscultation bilaterally, respirations unlabored   Chest Wall:  No tenderness or deformity   Heart:  Regular rate and rhythm, S1, S2 normal, no murmur, rub or gallop   Abdomen:   Soft, non-tender, bowel sounds active all four quadrants,  no masses, no organomegaly of liver and spleen  No hernia noted   Genitalia:  Scrotum: no rash or lesion  Normal symmetric epididymis without masses  Normal vas palpated  Normal size, symmetric testicles with no masses   Normal urethral meatus with no discharge  Normal circumcised penis with no lesion   Rectal:  Normal perineum and anus upon inspection.  Normal tone, no masses or tenderness;   Extremities: Extremities normal, atraumatic, no cyanosis or edema   Pulses: 2+ and symmetric   Skin: Skin color, texture, turgor normal, no rashes or lesions   Lymph nodes: Cervical, supraclavicular, and axillary nodes normal   Neurologic: Normal     Prostate 40 grams smooth with no nodule or  tenderness.    LABS:  Lab Results   Component Value Date    PSA 6.9 (H) 09/01/2016    PSA 6.2 (H) 08/22/2016    PSA 3.1 03/22/2012    PSADIAG 4.2 (H) 04/09/2019    PSADIAG 3.8 10/05/2018    PSADIAG 5.7 (H) 02/20/2018    PSADIAG 5.2 (H) 08/22/2017    PSADIAG 5.8 (H) 05/19/2017    PSADIAG 5.4 (H) 12/29/2016     Results for orders placed or performed in visit on 04/09/19   PROSTATE SPECIFIC ANTIGEN, DIAGNOSTIC   Result Value Ref Range    PSA DIAGNOSTIC 4.2 (H) 0.00 - 4.00 ng/mL   Results for orders placed or performed in visit on 10/05/18   Prostate Specific Antigen, Diagnostic   Result Value Ref Range    PSA DIAGNOSTIC 3.8 0.00 - 4.00 ng/mL   Results for orders placed or performed in visit on 02/20/18   Prostate Specific Antigen, Diagnostic   Result Value Ref Range    PSA DIAGNOSTIC 5.7 (H) 0.00 - 4.00 ng/mL     Lab Results   Component Value Date    CREATININE 1.1 08/30/2018    CREATININE 1.0 12/24/2017    CREATININE 1.0 08/24/2017     No results found for this or any previous visit.  Urine Culture, Routine   Date Value Ref Range Status   03/29/2018 No growth  Final     MRI of pelvis 8/28/17  1.  No focal concerning prostate abnormality.  PIRADS 1.  BPH findings noted.  2.  Circumferential lower rectal wall thickening accentuated by nondistention.  Correlate with colonoscopy.    Assessment and Plan:  Jesus was seen today for prostate cancer.    Diagnoses and all orders for this visit:    Adenocarcinoma of prostate    Elevated PSA    Weak urine stream  -     finasteride (PROSCAR) 5 mg tablet; Take 1 tablet (5 mg total) by mouth once daily.  -     tamsulosin (FLOMAX) 0.4 mg Cap; Take 1 capsule (0.4 mg total) by mouth every evening.    Frequency-urgency syndrome  -     tamsulosin (FLOMAX) 0.4 mg Cap; Take 1 capsule (0.4 mg total) by mouth every evening.    I reviewed and explained his pathology again in detail.  His urinary symptoms improved since he has been on flomax and finasteride.  His PSA got better on  finasteride.  Will continue to follow him up with serial PSA.    MRI in 2017 showed BPH findings.  So will continue active surveillance for his prostate cancer for now.  Will consider a repeat TRUS bx of prostate based on his PSA response in the future.    Continue flomax and finasteride.  90 days refills given.  I spent 25 minutes with the patient of which more than half was spent in direct consultation with the patient in regards to our treatment and plan.      Follow-up:  Follow up in about 5 months (around 9/16/2019) for PSA.

## 2019-04-16 NOTE — PATIENT INSTRUCTIONS
Lab Results   Component Value Date    PSA 6.9 (H) 09/01/2016    PSA 6.2 (H) 08/22/2016    PSA 3.1 03/22/2012    PSADIAG 4.2 (H) 04/09/2019    PSADIAG 3.8 10/05/2018    PSADIAG 5.7 (H) 02/20/2018

## 2019-04-25 ENCOUNTER — PES CALL (OUTPATIENT)
Dept: ADMINISTRATIVE | Facility: CLINIC | Age: 59
End: 2019-04-25

## 2019-07-17 ENCOUNTER — TELEPHONE (OUTPATIENT)
Dept: INTERNAL MEDICINE | Facility: CLINIC | Age: 59
End: 2019-07-17

## 2019-08-08 DIAGNOSIS — C61 ADENOCARCINOMA OF PROSTATE: Primary | ICD-10-CM

## 2019-09-10 ENCOUNTER — TELEPHONE (OUTPATIENT)
Dept: INTERNAL MEDICINE | Facility: CLINIC | Age: 59
End: 2019-09-10

## 2019-09-10 ENCOUNTER — HOSPITAL ENCOUNTER (OUTPATIENT)
Dept: RADIOLOGY | Facility: HOSPITAL | Age: 59
Discharge: HOME | End: 2019-09-10
Attending: INTERNAL MEDICINE
Payer: MEDICARE

## 2019-09-10 DIAGNOSIS — R91.1 LUNG NODULE: ICD-10-CM

## 2019-09-10 PROCEDURE — 71250 CT CHEST WITHOUT CONTRAST: ICD-10-PCS | Mod: 26,,, | Performed by: RADIOLOGY

## 2019-09-10 PROCEDURE — 71250 CT THORAX DX C-: CPT | Mod: TC

## 2019-09-10 PROCEDURE — 71250 CT THORAX DX C-: CPT | Mod: 26,,, | Performed by: RADIOLOGY

## 2019-09-10 NOTE — TELEPHONE ENCOUNTER
Please call patient.  His chest CT shows a stable lung nodule.  It has not grown in size.  Will repeat CT December 2020.

## 2019-09-11 NOTE — TELEPHONE ENCOUNTER
Spoke to patient. Patient was informed about chest CT, and advised that you will do a repeat CT on Dec of next year. Verbalized understanding.

## 2019-09-11 NOTE — TELEPHONE ENCOUNTER
----- Message from Rosita Garcia sent at 9/11/2019 11:00 AM CDT -----  Contact: patient [phone]  Patient is returning a phone call.  Who left a message for the patient: nurse  Does patient know what this is regarding:  Test results  Comments:

## 2019-10-01 PROBLEM — R07.89 CHEST PAIN, NON-CARDIAC: Status: RESOLVED | Noted: 2018-01-09 | Resolved: 2019-10-01

## 2019-10-01 PROBLEM — N30.00 ACUTE CYSTITIS: Status: RESOLVED | Noted: 2018-03-29 | Resolved: 2019-10-01

## 2019-10-01 PROBLEM — Z12.11 SCREENING FOR COLON CANCER: Status: RESOLVED | Noted: 2017-09-29 | Resolved: 2019-10-01

## 2019-10-01 PROBLEM — R30.0 DYSURIA: Status: RESOLVED | Noted: 2018-03-29 | Resolved: 2019-10-01

## 2019-10-04 ENCOUNTER — LAB VISIT (OUTPATIENT)
Dept: LAB | Facility: HOSPITAL | Age: 59
End: 2019-10-04
Attending: UROLOGY
Payer: MEDICARE

## 2019-10-04 ENCOUNTER — IMMUNIZATION (OUTPATIENT)
Dept: INTERNAL MEDICINE | Facility: CLINIC | Age: 59
End: 2019-10-04
Payer: MEDICARE

## 2019-10-04 DIAGNOSIS — C61 ADENOCARCINOMA OF PROSTATE: ICD-10-CM

## 2019-10-04 LAB — COMPLEXED PSA SERPL-MCNC: 5.2 NG/ML (ref 0–4)

## 2019-10-04 PROCEDURE — 36415 COLL VENOUS BLD VENIPUNCTURE: CPT

## 2019-10-04 PROCEDURE — 84153 ASSAY OF PSA TOTAL: CPT

## 2019-10-04 PROCEDURE — 90686 IIV4 VACC NO PRSV 0.5 ML IM: CPT | Mod: PBBFAC

## 2019-10-11 ENCOUNTER — OFFICE VISIT (OUTPATIENT)
Dept: UROLOGY | Facility: CLINIC | Age: 59
End: 2019-10-11
Payer: MEDICARE

## 2019-10-11 VITALS
SYSTOLIC BLOOD PRESSURE: 122 MMHG | HEIGHT: 70 IN | DIASTOLIC BLOOD PRESSURE: 79 MMHG | BODY MASS INDEX: 25.19 KG/M2 | HEART RATE: 76 BPM | WEIGHT: 175.94 LBS

## 2019-10-11 DIAGNOSIS — C61 ADENOCARCINOMA OF PROSTATE: ICD-10-CM

## 2019-10-11 DIAGNOSIS — R97.20 ELEVATED PSA: Primary | ICD-10-CM

## 2019-10-11 DIAGNOSIS — F33.1 MODERATE EPISODE OF RECURRENT MAJOR DEPRESSIVE DISORDER: ICD-10-CM

## 2019-10-11 DIAGNOSIS — R39.12 WEAK URINE STREAM: ICD-10-CM

## 2019-10-11 DIAGNOSIS — D49.59 NEOPLASM OF PROSTATE: ICD-10-CM

## 2019-10-11 DIAGNOSIS — N31.8 FREQUENCY-URGENCY SYNDROME: ICD-10-CM

## 2019-10-11 PROCEDURE — 99213 OFFICE O/P EST LOW 20 MIN: CPT | Mod: PBBFAC | Performed by: UROLOGY

## 2019-10-11 PROCEDURE — 99999 PR PBB SHADOW E&M-EST. PATIENT-LVL III: ICD-10-PCS | Mod: PBBFAC,,, | Performed by: UROLOGY

## 2019-10-11 PROCEDURE — 99215 PR OFFICE/OUTPT VISIT, EST, LEVL V, 40-54 MIN: ICD-10-PCS | Mod: S$PBB,,, | Performed by: UROLOGY

## 2019-10-11 PROCEDURE — 99215 OFFICE O/P EST HI 40 MIN: CPT | Mod: S$PBB,,, | Performed by: UROLOGY

## 2019-10-11 PROCEDURE — 99999 PR PBB SHADOW E&M-EST. PATIENT-LVL III: CPT | Mod: PBBFAC,,, | Performed by: UROLOGY

## 2019-10-11 RX ORDER — LIDOCAINE HYDROCHLORIDE 10 MG/ML
10 INJECTION INFILTRATION; PERINEURAL ONCE
Status: CANCELLED | OUTPATIENT
Start: 2019-10-11 | End: 2019-10-11

## 2019-10-11 RX ORDER — DIAZEPAM 10 MG/1
10 TABLET ORAL ONCE
Qty: 1 TABLET | Refills: 0 | Status: SHIPPED | OUTPATIENT
Start: 2019-10-11 | End: 2023-06-26

## 2019-10-11 RX ORDER — TAMSULOSIN HYDROCHLORIDE 0.4 MG/1
0.4 CAPSULE ORAL NIGHTLY
Qty: 90 CAPSULE | Refills: 3 | Status: SHIPPED | OUTPATIENT
Start: 2019-10-11 | End: 2020-07-07 | Stop reason: SDUPTHER

## 2019-10-11 RX ORDER — CIPROFLOXACIN 500 MG/1
500 TABLET ORAL 2 TIMES DAILY
Qty: 6 TABLET | Refills: 0 | Status: SHIPPED | OUTPATIENT
Start: 2019-10-11 | End: 2019-10-14

## 2019-10-11 RX ORDER — LIDOCAINE HYDROCHLORIDE 20 MG/ML
JELLY TOPICAL ONCE
Status: CANCELLED | OUTPATIENT
Start: 2019-10-11 | End: 2019-10-11

## 2019-10-11 RX ORDER — FINASTERIDE 5 MG/1
5 TABLET, FILM COATED ORAL DAILY
Qty: 90 TABLET | Refills: 3 | Status: SHIPPED | OUTPATIENT
Start: 2019-10-11 | End: 2020-07-07 | Stop reason: SDUPTHER

## 2019-10-11 NOTE — PROGRESS NOTES
CC: active surveillance for prostate cancer initially diagnosed on 9/15/16 with PSA 6.9, LUTS due to enlarged prostate    Jesus Christy is a 59 y.o. man who is here for the evaluation of prostate cancer.  hx of newly diagnosed prostate cancer back in 9/15/16 for elevated PSA.  Based on his low grade, low volume prostate cancer, we decided to follow up with active surveillance.  He is on flomax and finasteride for his LUTS.  His overall voiding symptoms including weak urine flow, frequency and urgency got better after he was started on Flomax and Finasteride after his prostate bx.  Reports that he is voiding well as long as he is taking flomax and finasteride.  No side effects reported.    Denies dysuria or hematuria.  No family hx of prostate cancer.  Had right knee surgery and had chronic pain from the right thigh to the knee ( on cymbalta).  He walks with a cane.  Denies flank pain, dysuira, hematuria .  No bone pain.      TRUS bx of prostate on 9/15/16 showed  Volume 58 grams in size.    FINAL PATHOLOGIC DIAGNOSIS  1. Prostate, left apex, biopsy:  Benign prostatic tissue.  2. Prostate, left middle, biopsy:  Benign prostatic tissue.  3. Prostate, left base, biopsy:  Benign prostatic tissue.  4. Prostate, right apex, biopsy:  Benign prostatic tissue.  5. Prostate, right middle, biopsy:  Benign prostatic tissue.  6. Prostate, right base, biopsy:  Prostate adenocarcinoma, shaun pattern (3+3), score 6, involving 1 of 2 biopsies, less than 5%. No evidence  of perineural invasion.     Past Medical History:   Diagnosis Date    GERD (gastroesophageal reflux disease)     HTN (hypertension)      Past Surgical History:   Procedure Laterality Date    COLONOSCOPY N/A 9/29/2017    Procedure: COLONOSCOPY;  Surgeon: Jamar Edwards MD;  Location: [facility] ([address] [place] [address]);  Service: Endoscopy;  Laterality: N/A;    JOINT REPLACEMENT Right     knee    KNEE ARTHROSCOPY W/ ACL RECONSTRUCTION  2014    right     Social History      Tobacco Use    Smoking status: Never Smoker    Smokeless tobacco: Never Used   Substance Use Topics    Alcohol use: Yes     Alcohol/week: 0.8 standard drinks     Types: 1 Standard drinks or equivalent per week    Drug use: No     Family History   Problem Relation Age of Onset    Hypertension Mother     Diabetes Father      Allergy:  Review of patient's allergies indicates:   Allergen Reactions    Morphine Palpitations     Outpatient Encounter Medications as of 10/11/2019   Medication Sig Dispense Refill    brexpiprazole (REXULTI) 2 mg Tab Take 2 mg by mouth every evening.      clotrimazole (LOTRIMIN) 1 % cream Apply topically 2 (two) times daily. 45 g 1    diazePAM (VALIUM) 10 MG Tab Take 1 tablet (10 mg total) by mouth once. Take 2 tablets by mouth once prior to MRI for 1 dose 1 tablet 0    finasteride (PROSCAR) 5 mg tablet Take 1 tablet (5 mg total) by mouth once daily. 90 tablet 3    mirtazapine (REMERON) 30 MG tablet Take 1 tablet (30 mg total) by mouth every evening. 30 tablet 11    sertraline (ZOLOFT) 100 MG tablet Take 1 tablet (100 mg total) by mouth once daily. 30 tablet 11    tamsulosin (FLOMAX) 0.4 mg Cap Take 1 capsule (0.4 mg total) by mouth every evening. 90 capsule 3    UNABLE TO FIND Take 1 packet by mouth 6 (six) times daily. medication name: Goodies Headache Powder      [DISCONTINUED] diazePAM (VALIUM) 10 MG Tab Take 1 tablet (10 mg total) by mouth 2 (two) times daily as needed.      [DISCONTINUED] finasteride (PROSCAR) 5 mg tablet Take 1 tablet (5 mg total) by mouth once daily. 90 tablet 3    [DISCONTINUED] tamsulosin (FLOMAX) 0.4 mg Cap Take 1 capsule (0.4 mg total) by mouth every evening. 90 capsule 3    ciprofloxacin HCl (CIPRO) 500 MG tablet Take 1 tablet (500 mg total) by mouth 2 (two) times daily. for 6 doses 6 tablet 0    sodium phosphates (FLEET ENEMA) 19-7 gram/118 mL Enem Place 1 enema rectally once. for 1 dose 1 enema 1     No facility-administered encounter  medications on file as of 10/11/2019.      Review of Systems   General ROS: GENERAL:  No weight gain or loss  SKIN:  No rashes or lacerations  HEAD:  No headaches  EYES:  No exophthalmos, jaundice or blindness  EARS:  No dizziness, tinnitus or hearing loss  NOSE:  No changes in smell  MOUTH & THROAT:  No dyskinetic movements or obvious goiter  CHEST:  No shortness of breath, hyperventilation or cough  CARDIOVASCULAR:  No tachycardia or chest pain  ABDOMEN:  No nausea, vomiting, pain, constipation or diarrhea  URINARY:  No frequency, dysuria or sexual dysfunction  ENDOCRINE:  No polydipsia, polyuria  MUSCULOSKELETAL:  No pain or stiffness of the joints  NEUROLOGIC:  No weakness, sensory changes, seizures, confusion, memory loss, tremor or other abnormal movements  Physical Exam     Vitals:    10/11/19 0813   BP: 122/79   Pulse: 76     General Appearance:  Alert, cooperative, no distress, appears stated age   Head:  Normocephalic, without obvious abnormality, atraumatic   Eyes:  PERRL, conjunctiva/corneas clear, EOM's intact, fundi benign, both eyes   Ears:  Normal TM's and external ear canals, both ears   Nose: Nares normal, septum midline, mucosa normal, no drainage or sinus tenderness   Throat: Lips, mucosa, and tongue normal; teeth and gums normal   Neck: Supple, symmetrical, trachea midline, no adenopathy, thyroid: not enlarged, symmetric, no tenderness/mass/nodules, no carotid bruit or JVD   Back:   Symmetric, no curvature, ROM normal, no CVA tenderness   Lungs:   Clear to auscultation bilaterally, respirations unlabored   Chest Wall:  No tenderness or deformity   Heart:  Regular rate and rhythm, S1, S2 normal, no murmur, rub or gallop   Abdomen:   Soft, non-tender, bowel sounds active all four quadrants,  no masses, no organomegaly of liver and spleen  No hernia noted   Genitalia:  Scrotum: no rash or lesion  Normal symmetric epididymis without masses  Normal vas palpated  Normal size, symmetric testicles with  no masses   Normal urethral meatus with no discharge  Normal circumcised penis with no lesion   Rectal:  Normal perineum and anus upon inspection.  Normal tone, no masses or tenderness;   Extremities: Extremities normal, atraumatic, no cyanosis or edema   Pulses: 2+ and symmetric   Skin: Skin color, texture, turgor normal, no rashes or lesions   Lymph nodes: Cervical, supraclavicular, and axillary nodes normal   Neurologic: Normal     Prostate 40 grams smooth with no nodule or tenderness.    LABS:  Lab Results   Component Value Date    PSA 6.9 (H) 09/01/2016    PSA 6.2 (H) 08/22/2016    PSA 3.1 03/22/2012    PSADIAG 5.2 (H) 10/04/2019    PSADIAG 4.2 (H) 04/09/2019    PSADIAG 3.8 10/05/2018    PSADIAG 5.7 (H) 02/20/2018    PSADIAG 5.2 (H) 08/22/2017    PSADIAG 5.8 (H) 05/19/2017    PSADIAG 5.4 (H) 12/29/2016     Results for orders placed or performed in visit on 10/04/19   PROSTATE SPECIFIC ANTIGEN, DIAGNOSTIC   Result Value Ref Range    PSA DIAGNOSTIC 5.2 (H) 0.00 - 4.00 ng/mL   Results for orders placed or performed in visit on 04/09/19   PROSTATE SPECIFIC ANTIGEN, DIAGNOSTIC   Result Value Ref Range    PSA DIAGNOSTIC 4.2 (H) 0.00 - 4.00 ng/mL   Results for orders placed or performed in visit on 10/05/18   Prostate Specific Antigen, Diagnostic   Result Value Ref Range    PSA DIAGNOSTIC 3.8 0.00 - 4.00 ng/mL     Lab Results   Component Value Date    CREATININE 1.1 08/30/2018    CREATININE 1.0 12/24/2017    CREATININE 1.0 08/24/2017     No results found for this or any previous visit.  Urine Culture, Routine   Date Value Ref Range Status   03/29/2018 No growth  Final     MRI of pelvis 8/28/17  1.  No focal concerning prostate abnormality.  PIRADS 1.  BPH findings noted.  2.  Circumferential lower rectal wall thickening accentuated by nondistention.  Correlate with colonoscopy.    Assessment and Plan:  Jesus was seen today for prostate cancer.    Diagnoses and all orders for this visit:    Elevated PSA  -      ciprofloxacin HCl (CIPRO) 500 MG tablet; Take 1 tablet (500 mg total) by mouth 2 (two) times daily. for 6 doses  -     sodium phosphates (FLEET ENEMA) 19-7 gram/118 mL Enem; Place 1 enema rectally once. for 1 dose  -     Transrectal Ultrasound w/ Biopsy; Future  -     Tissue Specimen To Pathology, Urology; Standing    Frequency-urgency syndrome  -     tamsulosin (FLOMAX) 0.4 mg Cap; Take 1 capsule (0.4 mg total) by mouth every evening.    Weak urine stream  -     tamsulosin (FLOMAX) 0.4 mg Cap; Take 1 capsule (0.4 mg total) by mouth every evening.  -     finasteride (PROSCAR) 5 mg tablet; Take 1 tablet (5 mg total) by mouth once daily.    Adenocarcinoma of prostate  -     MRI Prostate W W/O Contrast; Future  -     ciprofloxacin HCl (CIPRO) 500 MG tablet; Take 1 tablet (500 mg total) by mouth 2 (two) times daily. for 6 doses  -     sodium phosphates (FLEET ENEMA) 19-7 gram/118 mL Enem; Place 1 enema rectally once. for 1 dose  -     Transrectal Ultrasound w/ Biopsy; Future  -     Tissue Specimen To Pathology, Urology; Standing    Neoplasm of prostate  -     MRI Prostate W W/O Contrast; Future    Moderate episode of recurrent major depressive disorder  -     diazePAM (VALIUM) 10 MG Tab; Take 1 tablet (10 mg total) by mouth once. Take 2 tablets by mouth once prior to MRI for 1 dose    Other orders  -     lidocaine HCL 2% jelly  -     lidocaine HCL 10 mg/ml (1%) injection 10 mL    I reviewed and explained his pathology again in detail.  His urinary symptoms improved since he has been on flomax and finasteride.  MRI in 2017 showed BPH findings.  So far we has been doing active surveillance for his prostate cancer for now.  His PSA is rising steadily.  Thus we decided to do another MRI of prostate, followed by UroNav fusion bx of prostate.  Will consider a repeat TRUS bx of prostate based on his PSA response even if MRI does not show any target lesions.    Continue flomax and finasteride.  90 days refills given.  I spent  40 minutes with the patient of which more than half was spent in direct consultation with the patient in regards to our treatment and plan.      Follow-up:  Follow up MRI and , for UroNav bx of prostate.

## 2019-10-11 NOTE — LETTER
October 11, 2019      Tara Jolly MD  [address]  [facility] [place] [address]           [facility] - Urology 4th Floor  [address]  [facility] [address]  Phone: [phone]          Patient: Jesus Christy   MR Number: [id number]   YOB: 1960   Date of Visit: 10/11/2019       Dear Dr. Tara Jolly:    Thank you for referring Jesus Christy to me for evaluation. Attached you will find relevant portions of my assessment and plan of care.    If you have questions, please do not hesitate to call me. I look forward to following Jesus Christy along with you.    Sincerely,    Varun Resendiz MD    Enclosure  CC:  No Recipients    If you would like to receive this communication electronically, please contact [email or web address] or [phone] to request more information on [employer] access.    For providers and/or their staff who would like to refer a patient to [facility], please contact us through our one-stop-shop provider referral line, [facility] [place], at [phone].    If you feel you have received this communication in error or would no longer like to receive these types of communications, please e-mail [email or web address]

## 2019-11-06 ENCOUNTER — HOSPITAL ENCOUNTER (OUTPATIENT)
Dept: RADIOLOGY | Facility: HOSPITAL | Age: 59
Discharge: HOME | End: 2019-11-06
Attending: UROLOGY
Payer: MEDICARE

## 2019-11-06 DIAGNOSIS — D49.59 NEOPLASM OF PROSTATE: ICD-10-CM

## 2019-11-06 DIAGNOSIS — C61 ADENOCARCINOMA OF PROSTATE: ICD-10-CM

## 2019-11-06 PROCEDURE — 72197 MRI PELVIS W/O & W/DYE: CPT | Mod: 26,,, | Performed by: RADIOLOGY

## 2019-11-06 PROCEDURE — 72197 MRI PROSTATE W W/O CONTRAST: ICD-10-PCS | Mod: 26,,, | Performed by: RADIOLOGY

## 2019-11-06 PROCEDURE — A9585 GADOBUTROL INJECTION: HCPCS | Performed by: UROLOGY

## 2019-11-06 PROCEDURE — 72197 MRI PELVIS W/O & W/DYE: CPT | Mod: TC

## 2019-11-06 PROCEDURE — 25500020 PHARM REV CODE 255: Performed by: UROLOGY

## 2019-11-06 RX ORDER — GADOBUTROL 604.72 MG/ML
10 INJECTION INTRAVENOUS
Status: COMPLETED | OUTPATIENT
Start: 2019-11-06 | End: 2019-11-06

## 2019-11-06 RX ADMIN — GADOBUTROL 10 ML: 604.72 INJECTION INTRAVENOUS at 11:11

## 2019-11-07 ENCOUNTER — TELEPHONE (OUTPATIENT)
Dept: UROLOGY | Facility: CLINIC | Age: 59
End: 2019-11-07

## 2019-11-07 NOTE — TELEPHONE ENCOUNTER
Lab Results   Component Value Date    PSA 6.9 (H) 09/01/2016    PSA 6.2 (H) 08/22/2016    PSA 3.1 03/22/2012    PSADIAG 5.2 (H) 10/04/2019    PSADIAG 4.2 (H) 04/09/2019    PSADIAG 3.8 10/05/2018     MRI of prostate    FINDINGS:  Previous biopsy: Nelson 6 adenocarcinoma of the right Base (09/15/2016).    PSA: 5.2 (10/04/2019), 4.2 (04/09/2019), 3.8 (10/05/2018).    Prior therapy: None.    Prostate: The prostate measures 4.8 x 4.0 x 4.5cm corresponding to a computed volume of 42.4cc.    Peripheral zone:    Lesion (SHIVA) #1.  Location: Side: left Region: apex Zone: posterior peripheral zone laterally  Greatest dimension: 0.7cm  T2-WI: Lenticular or non-circumscribed, homogeneous, moderately hypointense - score 4  DWI/ADC: Focal markedly hypointense on ADC and markedly hyperintense on high b-value DWI - score 4  DCE: Positive  Extraprostatic extension: Absent  PI-RADS assessment category: 4  This lesion is unchanged from prior.    Transition zone: No focal abnormalities with imaging features concerning for prostate cancer  Neurovascular bundle: Unremarkable.  Seminal vesicles:  SV invasion:Unremarkable  Adjacent Organ Involvement: There is no focal bladder wall thickening. There is no rectal involvement.  Lymphadenopathy: none    Other Findings: There is colonic diverticulosis.  There is a fat containing left inguinal hernia.  Visualized osseous structures demonstrate heterogeneous marrow signal intensity without definite focal lesions.  Mild degenerative changes of the lower lumbar spine noted.    I informed him of the findings.  Will proceed with UroNav bx of prostate as scheduled.

## 2019-11-21 ENCOUNTER — PROCEDURE VISIT (OUTPATIENT)
Dept: UROLOGY | Facility: CLINIC | Age: 59
End: 2019-11-21
Payer: MEDICARE

## 2019-11-21 VITALS
BODY MASS INDEX: 25.66 KG/M2 | TEMPERATURE: 98 F | RESPIRATION RATE: 17 BRPM | DIASTOLIC BLOOD PRESSURE: 71 MMHG | WEIGHT: 179.25 LBS | SYSTOLIC BLOOD PRESSURE: 124 MMHG | HEIGHT: 70 IN | HEART RATE: 76 BPM

## 2019-11-21 DIAGNOSIS — R97.20 ELEVATED PSA: ICD-10-CM

## 2019-11-21 DIAGNOSIS — C61 ADENOCARCINOMA OF PROSTATE: Primary | ICD-10-CM

## 2019-11-21 PROCEDURE — 76872 TRANSRECTAL ULTRASOUND W/ BIOPSY: ICD-10-PCS | Mod: 26,S$PBB,, | Performed by: UROLOGY

## 2019-11-21 PROCEDURE — 88305 TISSUE EXAM BY PATHOLOGIST: CPT | Performed by: PATHOLOGY

## 2019-11-21 PROCEDURE — 88305 TISSUE EXAM BY PATHOLOGIST: CPT | Mod: 26,,, | Performed by: PATHOLOGY

## 2019-11-21 PROCEDURE — 88305 TISSUE EXAM BY PATHOLOGIST: ICD-10-PCS | Mod: 26,,, | Performed by: PATHOLOGY

## 2019-11-21 PROCEDURE — 55700 TRANSRECTAL ULTRASOUND W/ BIOPSY: ICD-10-PCS | Mod: S$PBB,,, | Performed by: UROLOGY

## 2019-11-21 PROCEDURE — 55700 TRANSRECTAL ULTRASOUND W/ BIOPSY: CPT | Mod: PBBFAC | Performed by: UROLOGY

## 2019-11-21 PROCEDURE — 76942 TRANSRECTAL ULTRASOUND W/ BIOPSY: ICD-10-PCS | Mod: 26,59,S$PBB, | Performed by: UROLOGY

## 2019-11-21 PROCEDURE — 55700 PR BIOPSY OF PROSTATE,NEEDLE/PUNCH: CPT | Mod: PBBFAC | Performed by: UROLOGY

## 2019-11-21 PROCEDURE — 76942 ECHO GUIDE FOR BIOPSY: CPT | Mod: PBBFAC,59 | Performed by: UROLOGY

## 2019-11-21 PROCEDURE — 96372 THER/PROPH/DIAG INJ SC/IM: CPT | Mod: PBBFAC,59

## 2019-11-21 PROCEDURE — 76942 ECHO GUIDE FOR BIOPSY: CPT | Mod: 26,59,S$PBB, | Performed by: UROLOGY

## 2019-11-21 PROCEDURE — 76872 US TRANSRECTAL: CPT | Mod: 26,S$PBB,, | Performed by: UROLOGY

## 2019-11-21 PROCEDURE — 76872 US TRANSRECTAL: CPT | Mod: PBBFAC,59 | Performed by: UROLOGY

## 2019-11-21 RX ORDER — GENTAMICIN SULFATE 40 MG/ML
160 INJECTION, SOLUTION INTRAMUSCULAR; INTRAVENOUS ONCE
Status: COMPLETED | OUTPATIENT
Start: 2019-11-21 | End: 2019-11-21

## 2019-11-21 RX ORDER — LIDOCAINE HYDROCHLORIDE 10 MG/ML
10 INJECTION INFILTRATION; PERINEURAL ONCE
Status: COMPLETED | OUTPATIENT
Start: 2019-11-21 | End: 2019-11-21

## 2019-11-21 RX ORDER — LIDOCAINE HYDROCHLORIDE 20 MG/ML
JELLY TOPICAL ONCE
Status: COMPLETED | OUTPATIENT
Start: 2019-11-21 | End: 2019-11-21

## 2019-11-21 RX ADMIN — LIDOCAINE HYDROCHLORIDE 10 ML: 10 INJECTION, SOLUTION INFILTRATION; PERINEURAL at 10:11

## 2019-11-21 RX ADMIN — GENTAMICIN SULFATE 160 MG: 40 INJECTION, SOLUTION INTRAMUSCULAR; INTRAVENOUS at 10:11

## 2019-11-21 RX ADMIN — LIDOCAINE HYDROCHLORIDE: 20 JELLY TOPICAL at 10:11

## 2019-11-21 NOTE — PROCEDURES
Transrectal Ultrasound w/ Biopsy  Date/Time: 11/21/2019 10:15 AM  Performed by: Varun Resendiz MD  Authorized by: Varun Resendiz MD     Total Biopsies:  0     Procedure Date:  11/21/2019    Procedure:   UroNav MRI/US fusion biopsy of the prostate                                                                     Transrectal Ultrasound of the Prostate                                       Transrectal Ultrasound Guided Prostate Biopsy                                - With Pudendal Nerve Block                                                                                      Pre-OP Diagnosis:                                                                 Elevated PSA, prostate lesions                                              Post-OP Diagnosis:                                                                Awaiting final pathology                                                Anesthesia:                                                                       Anesthesia Administered:                                                     Intrarectal instillation of 10 mL 2% lidocaine (Xylocaine) jelly.       Findings:                                                                         --- Transrectal Ultrasound of the Prostate ---                               Prostate measurements.                                                                                               PSA: Lab Results       Component                Value               Date                       PSA                      6.9 (H)             09/01/2016                 PSADIAG                  5.2 (H)             10/04/2019                   - Volume:44 gm.           PSA Density: 0.12                                                         no calcification in the prostate    MRI of prostate     FINDINGS:  Previous biopsy: [place] 6 adenocarcinoma of the right Base (09/15/2016).  PSA: 5.2 (10/04/2019), 4.2 (04/09/2019), 3.8 (10/05/2018).  Prior  therapy: None.  Prostate: The prostate measures 4.8 x 4.0 x 4.5cm corresponding to a computed volume of 42.4cc.  Peripheral zone:  Lesion (SHIVA) #1.  Location: Side: left Region: apex Zone: posterior peripheral zone laterally  Greatest dimension: 0.7cm  T2-WI: Lenticular or non-circumscribed, homogeneous, moderately hypointense - score 4  DWI/ADC: Focal markedly hypointense on ADC and markedly hyperintense on high b-value DWI - score 4  DCE: Positive  Extraprostatic extension: Absent  PI-RADS assessment category: 4  This lesion is unchanged from prior.    Transition zone: No focal abnormalities with imaging features concerning for prostate cancer  Neurovascular bundle: Unremarkable.  Seminal vesicles:  SV invasion:Unremarkable  Adjacent Organ Involvement: There is no focal bladder wall thickening. There is no rectal involvement.  Lymphadenopathy: none    Other Findings: There is colonic diverticulosis.  There is a fat containing left inguinal hernia.  Visualized osseous structures demonstrate heterogeneous marrow signal intensity without definite focal lesions.  Mild degenerative changes of the lower lumbar spine noted.                          Description of Procedure:                                                         Informed Consent:                                                            - Risks, benefits and alternatives of procedure discussed with               patient and informed consent obtained.                                       Patient Position:                                                            - Left lateral.                                                              --- Transrectal Ultrasound of the Prostate ---                               Instruments:                                                                 - Bruel and Doc.                                                           --- Transrectal U/S Biopsy ---                                               Instruments:                                                                  - Spring-loaded gun used for biopsy.                                         Procedure Details:                                                           MRI imaging of the prostate was preloaded to the UroNav machine.         US of prostate was performed and its image was super-imposed with that of MRI.  The target lesions identified and the biopsies were obtained using US guided UroNav guidance.    Biopsy Core Details:                                                         - Twelve core(s) in total.                                                   - Cores Taken From: Right apex x 2, right mid prostate x 2, right            base x 2, left apex x 2, left mid prostate x 2 and left base x 2.  Lesion; 4 cores obtained from each lesion             Estimated Blood Loss:                                                        - Minimal.                                                                   Pathology Specimen:                                                          - The specimen sent.                                                    Complications:                                                                    No immediate complications.                                             Post-OP Plan:                                                                                            Patient was discharged home in a stable condition.         Medications: finish off cipro given.         Will call him with the bx result.          If fever or unable to void, RTC or emergency room immediately.                                           RTC 6 months with PSA.

## 2019-11-21 NOTE — PATIENT INSTRUCTIONS

## 2019-12-05 ENCOUNTER — TELEPHONE (OUTPATIENT)
Dept: UROLOGY | Facility: CLINIC | Age: 59
End: 2019-12-05

## 2019-12-05 DIAGNOSIS — R97.20 ELEVATED PSA: Primary | ICD-10-CM

## 2019-12-05 LAB
COMMENT: NORMAL
FINAL PATHOLOGIC DIAGNOSIS: NORMAL
GROSS: NORMAL

## 2019-12-05 NOTE — TELEPHONE ENCOUNTER
Procedure Date:  11/21/2019     Procedure:   UroNav MRI/US fusion biopsy of the prostate                                                                     Transrectal Ultrasound of the Prostate                                       Transrectal Ultrasound Guided Prostate Biopsy                                - With Pudendal Nerve Block                                                                                      Pre-OP Diagnosis:                                                                 Elevated PSA, prostate lesions                                              Post-OP Diagnosis:                                                                Awaiting final pathology                                                Anesthesia:                                                                       Anesthesia Administered:                                                     Intrarectal instillation of 10 mL 2% lidocaine (Xylocaine) jelly.       Findings:                                                                         --- Transrectal Ultrasound of the Prostate ---                               Prostate measurements.                                                                                                          PSA: Lab Results       Component                Value               Date                       PSA                      6.9 (H)             09/01/2016                 PSADIAG                  5.2 (H)             10/04/2019                   - Volume:44 gm.           PSA Density: 0.12                                                         no calcification in the prostate     MRI of prostate     FINDINGS:  Previous biopsy: [place] 6 adenocarcinoma of the right Base (09/15/2016).  PSA: 5.2 (10/04/2019), 4.2 (04/09/2019), 3.8 (10/05/2018).  Prior therapy: None.  Prostate: The prostate measures 4.8 x 4.0 x 4.5cm corresponding to a computed volume of 42.4cc.  Peripheral zone:  Lesion (SHIVA)  #1.  Location: Side: left Region: apex Zone: posterior peripheral zone laterally  Greatest dimension: 0.7cm  T2-WI: Lenticular or non-circumscribed, homogeneous, moderately hypointense - score 4  DWI/ADC: Focal markedly hypointense on ADC and markedly hyperintense on high b-value DWI - score 4  DCE: Positive  Extraprostatic extension: Absent  PI-RADS assessment category: 4  This lesion is unchanged from prior.     Transition zone: No focal abnormalities with imaging features concerning for prostate cancer  Neurovascular bundle: Unremarkable.  Seminal vesicles:  SV invasion:Unremarkable  Adjacent Organ Involvement: There is no focal bladder wall thickening. There is no rectal involvement.  Lymphadenopathy: none     Other Findings: There is colonic diverticulosis.  There is a fat containing left inguinal hernia.  Visualized osseous structures demonstrate heterogeneous marrow signal intensity without definite focal lesions.  Mild degenerative changes of the lower lumbar spine noted.    Pathology  No cancer found    Elevated PSA  -     Prostate Specific Antigen, Diagnostic; Future; Expected date: 12/05/2019    I informed him of the negative prostate biopsy.  Will continue to follow up with serial PSA in 6 months.

## 2020-02-03 ENCOUNTER — TELEPHONE (OUTPATIENT)
Dept: INTERNAL MEDICINE | Facility: CLINIC | Age: 60
End: 2020-02-03

## 2020-02-03 DIAGNOSIS — F32.A DEPRESSION, UNSPECIFIED DEPRESSION TYPE: ICD-10-CM

## 2020-02-03 DIAGNOSIS — I10 HYPERTENSION, UNSPECIFIED TYPE: Primary | ICD-10-CM

## 2020-02-03 DIAGNOSIS — I10 ESSENTIAL HYPERTENSION: ICD-10-CM

## 2020-02-03 NOTE — TELEPHONE ENCOUNTER
----- Message from Reginaldo Gerber sent at 2/3/2020  1:26 PM CST -----  Contact: self   Doctor appointment and lab have been scheduled.  Please link lab orders to the lab appointment.  Date of doctor appointment:  04/03/20  Date of lab appointment:  03/27/20  Physical or EP: physical  Comments:

## 2020-02-03 NOTE — TELEPHONE ENCOUNTER
Spoke to patient. Patient stated that someone from ph staff had scheduled him for a lab appointment but no lab orders were placed. Patient has a scheduled appointment some time in April to see you.   I can link them.     Please advise.

## 2020-03-30 ENCOUNTER — TELEPHONE (OUTPATIENT)
Dept: INTERNAL MEDICINE | Facility: CLINIC | Age: 60
End: 2020-03-30

## 2020-03-30 DIAGNOSIS — F32.A DEPRESSION, UNSPECIFIED DEPRESSION TYPE: ICD-10-CM

## 2020-03-30 DIAGNOSIS — I10 ESSENTIAL HYPERTENSION: Primary | ICD-10-CM

## 2020-03-30 DIAGNOSIS — R97.20 ELEVATED PSA: ICD-10-CM

## 2020-03-30 NOTE — TELEPHONE ENCOUNTER
Spoke to pt on early this am. Pt stated that he had missed his lab appointment initially scheduled for Fri 03/27. I tried rescheduling pt for labs but I had a hard time. Are you able to re put in lab orders so that I can reschedule pt on some time this week?     Please advise.

## 2020-03-30 NOTE — TELEPHONE ENCOUNTER
Called and spoke to pt. Pt is scheduled for labs-fasting on Wed 06/03 here in primary care at 830 am.

## 2020-06-03 ENCOUNTER — LAB VISIT (OUTPATIENT)
Dept: LAB | Facility: HOSPITAL | Age: 60
End: 2020-06-03
Attending: INTERNAL MEDICINE
Payer: MEDICARE

## 2020-06-03 ENCOUNTER — TELEPHONE (OUTPATIENT)
Dept: INTERNAL MEDICINE | Facility: CLINIC | Age: 60
End: 2020-06-03

## 2020-06-03 DIAGNOSIS — F32.A DEPRESSION, UNSPECIFIED DEPRESSION TYPE: ICD-10-CM

## 2020-06-03 DIAGNOSIS — I10 ESSENTIAL HYPERTENSION: ICD-10-CM

## 2020-06-03 DIAGNOSIS — R97.20 ELEVATED PSA: ICD-10-CM

## 2020-06-03 LAB
ALBUMIN SERPL BCP-MCNC: 4 G/DL (ref 3.5–5.2)
ALP SERPL-CCNC: 80 U/L (ref 55–135)
ALT SERPL W/O P-5'-P-CCNC: 39 U/L (ref 10–44)
ANION GAP SERPL CALC-SCNC: 9 MMOL/L (ref 8–16)
AST SERPL-CCNC: 29 U/L (ref 10–40)
BASOPHILS # BLD AUTO: 0.04 K/UL (ref 0–0.2)
BASOPHILS NFR BLD: 0.7 % (ref 0–1.9)
BILIRUB SERPL-MCNC: 0.6 MG/DL (ref 0.1–1)
BUN SERPL-MCNC: 14 MG/DL (ref 6–20)
CALCIUM SERPL-MCNC: 10 MG/DL (ref 8.7–10.5)
CHLORIDE SERPL-SCNC: 109 MMOL/L (ref 95–110)
CHOLEST SERPL-MCNC: 140 MG/DL (ref 120–199)
CHOLEST/HDLC SERPL: 4.4 {RATIO} (ref 2–5)
CO2 SERPL-SCNC: 23 MMOL/L (ref 23–29)
COMPLEXED PSA SERPL-MCNC: 5 NG/ML (ref 0–4)
CREAT SERPL-MCNC: 1.2 MG/DL (ref 0.5–1.4)
DIFFERENTIAL METHOD: ABNORMAL
EOSINOPHIL # BLD AUTO: 0.4 K/UL (ref 0–0.5)
EOSINOPHIL NFR BLD: 7.6 % (ref 0–8)
ERYTHROCYTE [DISTWIDTH] IN BLOOD BY AUTOMATED COUNT: 13 % (ref 11.5–14.5)
EST. GFR  (AFRICAN AMERICAN): >60 ML/MIN/1.73 M^2
EST. GFR  (NON AFRICAN AMERICAN): >60 ML/MIN/1.73 M^2
GLUCOSE SERPL-MCNC: 87 MG/DL (ref 70–110)
HCT VFR BLD AUTO: 43.9 % (ref 40–54)
HDLC SERPL-MCNC: 32 MG/DL (ref 40–75)
HDLC SERPL: 22.9 % (ref 20–50)
HGB BLD-MCNC: 13.9 G/DL (ref 14–18)
IMM GRANULOCYTES # BLD AUTO: 0.02 K/UL (ref 0–0.04)
IMM GRANULOCYTES NFR BLD AUTO: 0.4 % (ref 0–0.5)
LDLC SERPL CALC-MCNC: 60.4 MG/DL (ref 63–159)
LYMPHOCYTES # BLD AUTO: 2.3 K/UL (ref 1–4.8)
LYMPHOCYTES NFR BLD: 41.3 % (ref 18–48)
MCH RBC QN AUTO: 31.3 PG (ref 27–31)
MCHC RBC AUTO-ENTMCNC: 31.7 G/DL (ref 32–36)
MCV RBC AUTO: 99 FL (ref 82–98)
MONOCYTES # BLD AUTO: 0.5 K/UL (ref 0.3–1)
MONOCYTES NFR BLD: 9.4 % (ref 4–15)
NEUTROPHILS # BLD AUTO: 2.3 K/UL (ref 1.8–7.7)
NEUTROPHILS NFR BLD: 40.6 % (ref 38–73)
NONHDLC SERPL-MCNC: 108 MG/DL
NRBC BLD-RTO: 0 /100 WBC
PLATELET # BLD AUTO: 327 K/UL (ref 150–350)
PMV BLD AUTO: 9.1 FL (ref 9.2–12.9)
POTASSIUM SERPL-SCNC: 4.1 MMOL/L (ref 3.5–5.1)
PROT SERPL-MCNC: 7.3 G/DL (ref 6–8.4)
RBC # BLD AUTO: 4.44 M/UL (ref 4.6–6.2)
SODIUM SERPL-SCNC: 141 MMOL/L (ref 136–145)
TRIGL SERPL-MCNC: 238 MG/DL (ref 30–150)
TSH SERPL DL<=0.005 MIU/L-ACNC: 1.63 UIU/ML (ref 0.4–4)
WBC # BLD AUTO: 5.55 K/UL (ref 3.9–12.7)

## 2020-06-03 PROCEDURE — 85025 COMPLETE CBC W/AUTO DIFF WBC: CPT

## 2020-06-03 PROCEDURE — 84153 ASSAY OF PSA TOTAL: CPT

## 2020-06-03 PROCEDURE — 84443 ASSAY THYROID STIM HORMONE: CPT

## 2020-06-03 PROCEDURE — 80053 COMPREHEN METABOLIC PANEL: CPT

## 2020-06-03 PROCEDURE — 36415 COLL VENOUS BLD VENIPUNCTURE: CPT

## 2020-06-03 PROCEDURE — 80061 LIPID PANEL: CPT

## 2020-06-03 NOTE — TELEPHONE ENCOUNTER
Please call.  His PSA remains elevated 5 0 which is stable from 5.2 last check.  Please keep your upcoming appointment with urology.  The rest of your labs were all acceptable range

## 2020-06-10 ENCOUNTER — OFFICE VISIT (OUTPATIENT)
Dept: INTERNAL MEDICINE | Facility: CLINIC | Age: 60
End: 2020-06-10
Payer: MEDICARE

## 2020-06-10 DIAGNOSIS — G90.521 COMPLEX REGIONAL PAIN SYNDROME TYPE 1 OF RIGHT LOWER EXTREMITY: Primary | ICD-10-CM

## 2020-06-10 DIAGNOSIS — I10 ESSENTIAL HYPERTENSION: ICD-10-CM

## 2020-06-10 DIAGNOSIS — R91.1 INCIDENTAL LUNG NODULE: ICD-10-CM

## 2020-06-10 DIAGNOSIS — C61 ADENOCARCINOMA OF PROSTATE: ICD-10-CM

## 2020-06-10 PROCEDURE — 99499 UNLISTED E&M SERVICE: CPT | Mod: 95,,, | Performed by: INTERNAL MEDICINE

## 2020-06-10 PROCEDURE — 99499 NO LOS: ICD-10-PCS | Mod: 95,,, | Performed by: INTERNAL MEDICINE

## 2020-06-10 NOTE — PROGRESS NOTES
Attempted to call pt at appointment time, no answer, left VM with callback information.     -----------------     CRPS of right leg - pain managed by Dr. Esteban.      Right upper lobe pulmonary nodule 2cm seen on ct 3/2018. Repeat done in 9/2018, 9/2019.  CT surveillance. repeat due 12/2020.       adenoca of prostate dx 9/2016.    active surveillance followed by Dr. Resendiz.   Prostate bx of right base: Prostate adenocarcinoma, shaun pattern (3+3), score 6, involving 1 of 2 biopsies, less than 5%. No evidence of perineural invasion.  flomax and finasteride     Depression  Sertraline  mirtazepine  rexulti  Diazepam prn   Dr. Chan    HTN  Not on medications                         This service was not originating from a related E/M service provided within the previous 7 days nor will  to an E/M service or procedure within the next 24 hours or my soonest available appointment.  Prevailing standard of care was able to be met in this audio-only visit.

## 2020-07-06 ENCOUNTER — PATIENT OUTREACH (OUTPATIENT)
Dept: ADMINISTRATIVE | Facility: OTHER | Age: 60
End: 2020-07-06

## 2020-07-07 ENCOUNTER — OFFICE VISIT (OUTPATIENT)
Dept: UROLOGY | Facility: CLINIC | Age: 60
End: 2020-07-07
Payer: MEDICARE

## 2020-07-07 VITALS
WEIGHT: 174.81 LBS | DIASTOLIC BLOOD PRESSURE: 80 MMHG | HEIGHT: 69 IN | BODY MASS INDEX: 25.89 KG/M2 | HEART RATE: 83 BPM | SYSTOLIC BLOOD PRESSURE: 131 MMHG

## 2020-07-07 DIAGNOSIS — N31.8 FREQUENCY-URGENCY SYNDROME: ICD-10-CM

## 2020-07-07 DIAGNOSIS — C61 ADENOCARCINOMA OF PROSTATE: ICD-10-CM

## 2020-07-07 DIAGNOSIS — R39.12 WEAK URINE STREAM: Primary | ICD-10-CM

## 2020-07-07 PROCEDURE — 99999 PR PBB SHADOW E&M-EST. PATIENT-LVL III: CPT | Mod: PBBFAC,,, | Performed by: UROLOGY

## 2020-07-07 PROCEDURE — 99213 OFFICE O/P EST LOW 20 MIN: CPT | Mod: PBBFAC | Performed by: UROLOGY

## 2020-07-07 PROCEDURE — 99214 OFFICE O/P EST MOD 30 MIN: CPT | Mod: S$PBB,,, | Performed by: UROLOGY

## 2020-07-07 PROCEDURE — 99214 PR OFFICE/OUTPT VISIT, EST, LEVL IV, 30-39 MIN: ICD-10-PCS | Mod: S$PBB,,, | Performed by: UROLOGY

## 2020-07-07 PROCEDURE — 99999 PR PBB SHADOW E&M-EST. PATIENT-LVL III: ICD-10-PCS | Mod: PBBFAC,,, | Performed by: UROLOGY

## 2020-07-07 RX ORDER — DULOXETIN HYDROCHLORIDE 60 MG/1
CAPSULE, DELAYED RELEASE ORAL
Status: ON HOLD | COMMUNITY
Start: 2020-06-19 | End: 2024-01-03 | Stop reason: HOSPADM

## 2020-07-07 RX ORDER — TAMSULOSIN HYDROCHLORIDE 0.4 MG/1
0.4 CAPSULE ORAL NIGHTLY
Qty: 90 CAPSULE | Refills: 3 | Status: SHIPPED | OUTPATIENT
Start: 2020-07-07 | End: 2020-12-07 | Stop reason: SDUPTHER

## 2020-07-07 RX ORDER — FINASTERIDE 5 MG/1
5 TABLET, FILM COATED ORAL DAILY
Qty: 90 TABLET | Refills: 3 | Status: SHIPPED | OUTPATIENT
Start: 2020-07-07 | End: 2020-12-07 | Stop reason: SDUPTHER

## 2020-07-07 NOTE — PATIENT INSTRUCTIONS
Lab Results   Component Value Date    PSA 6.9 (H) 09/01/2016    PSA 6.2 (H) 08/22/2016    PSA 3.1 03/22/2012    PSADIAG 5.0 (H) 06/03/2020    PSADIAG 5.2 (H) 10/04/2019    PSADIAG 4.2 (H) 04/09/2019

## 2020-07-07 NOTE — PROGRESS NOTES
CC: active surveillance for prostate cancer initially diagnosed on 9/15/16 with PSA 6.9, LUTS due to enlarged prostate    Jesus Christy is a 59 y.o. man who is here for the evaluation of prostate cancer.  hx of newly diagnosed prostate cancer back in 9/15/16 for elevated PSA.  He had a negative UroNav bx of prostate on 11/21/19.  He is here for a follow up.    Based on his low grade, low volume prostate cancer, we decided to follow up with active surveillance.  He is on flomax and finasteride for his LUTS.  His overall voiding symptoms including weak urine flow, frequency and urgency got better after he was started on Flomax and Finasteride after his prostate bx.  Reports that he is voiding well as long as he is taking flomax and finasteride.  No side effects reported.    Denies dysuria or hematuria.  No family hx of prostate cancer.  Had right knee surgery and had chronic pain from the right thigh to the knee ( on cymbalta).  He walks with a cane.  Denies flank pain, dysuira, hematuria .  No bone pain.    Transrectal Ultrasound w/ Biopsy  11/21/19  Date/Time: 11/21/2019 10:15 AM  Performed by: Varun Resendiz MD   Procedure Date:  11/21/2019  Procedure:   UroNav MRI/US fusion biopsy of the prostate                                                                     Transrectal Ultrasound of the Prostate                                       Transrectal Ultrasound Guided Prostate Biopsy                                - With Pudendal Nerve Block                                                                                      Pre-OP Diagnosis:                                                                 Elevated PSA, prostate lesions                                              Post-OP Diagnosis:                                                                Awaiting final pathology                                                Anesthesia:                                                                        Anesthesia Administered:                                                     Intrarectal instillation of 10 mL 2% lidocaine (Xylocaine) jelly.       Findings:                                                                         --- Transrectal Ultrasound of the Prostate ---                               Prostate measurements.                                                                                                          PSA: Lab Results       Component                Value               Date                       PSA                      6.9 (H)             09/01/2016                 PSADIAG                  5.2 (H)             10/04/2019                   - Volume:44 gm.           PSA Density: 0.12                                                         no calcification in the prostate     MRI of prostate     FINDINGS:  Previous biopsy: [place] 6 adenocarcinoma of the right Base (09/15/2016).  PSA: 5.2 (10/04/2019), 4.2 (04/09/2019), 3.8 (10/05/2018).  Prior therapy: None.  Prostate: The prostate measures 4.8 x 4.0 x 4.5cm corresponding to a computed volume of 42.4cc.  Peripheral zone:  Lesion (SHIVA) #1.  Location: Side: left Region: apex Zone: posterior peripheral zone laterally  Greatest dimension: 0.7cm  T2-WI: Lenticular or non-circumscribed, homogeneous, moderately hypointense - score 4  DWI/ADC: Focal markedly hypointense on ADC and markedly hyperintense on high b-value DWI - score 4  DCE: Positive  Extraprostatic extension: Absent  PI-RADS assessment category: 4  This lesion is unchanged from prior.    Pathology 11/21/19  7. BIOPSY OF TARGET LESION:   NO CARCINOMA IDENTIFIED     PROSTATE BIOPSIES 1, 2, 3, 4, 5, AND 6:   NO CARCINOMA IDENTIFIED     Previous TRUS bx of prostate on 9/15/16 showed  Volume 58 grams in size.    FINAL PATHOLOGIC DIAGNOSIS  1. Prostate, left apex, biopsy:  Benign prostatic tissue.  2. Prostate, left middle, biopsy:  Benign prostatic tissue.  3. Prostate, left base,  biopsy:  Benign prostatic tissue.  4. Prostate, right apex, biopsy:  Benign prostatic tissue.  5. Prostate, right middle, biopsy:  Benign prostatic tissue.  6. Prostate, right base, biopsy:  Prostate adenocarcinoma, shaun pattern (3+3), score 6, involving 1 of 2 biopsies, less than 5%.   No evidence of perineural invasion.     Past Medical History:   Diagnosis Date    Complex regional pain syndrome i of right lower limb     GERD (gastroesophageal reflux disease)     HTN (hypertension)      Past Surgical History:   Procedure Laterality Date    COLONOSCOPY N/A 9/29/2017    Procedure: COLONOSCOPY;  Surgeon: Jamar Edwards MD;  Location: [facility] ([address] [place] [address]);  Service: Endoscopy;  Laterality: N/A;    JOINT REPLACEMENT Right     knee    KNEE ARTHROSCOPY W/ ACL RECONSTRUCTION  2014    right     Social History     Tobacco Use    Smoking status: Never Smoker    Smokeless tobacco: Never Used   Substance Use Topics    Alcohol use: Yes     Alcohol/week: 0.8 standard drinks     Types: 1 Standard drinks or equivalent per week    Drug use: No     Family History   Problem Relation Age of Onset    Hypertension Mother     Diabetes Father      Allergy:  Review of patient's allergies indicates:   Allergen Reactions    Morphine Palpitations     Outpatient Encounter Medications as of 7/7/2020   Medication Sig Dispense Refill    brexpiprazole (REXULTI) 2 mg Tab Take 2 mg by mouth every evening.      DULoxetine (CYMBALTA) 60 MG capsule       finasteride (PROSCAR) 5 mg tablet Take 1 tablet (5 mg total) by mouth once daily. 90 tablet 3    mirtazapine (REMERON) 30 MG tablet Take 1 tablet (30 mg total) by mouth every evening. 30 tablet 11    tamsulosin (FLOMAX) 0.4 mg Cap Take 1 capsule (0.4 mg total) by mouth every evening. 90 capsule 3    UNABLE TO FIND Take 1 packet by mouth 6 (six) times daily. medication name: Goodies Headache Powder      [DISCONTINUED] finasteride (PROSCAR) 5 mg tablet Take 1 tablet (5 mg total) by  mouth once daily. 90 tablet 3    [DISCONTINUED] tamsulosin (FLOMAX) 0.4 mg Cap Take 1 capsule (0.4 mg total) by mouth every evening. 90 capsule 3    clotrimazole (LOTRIMIN) 1 % cream Apply topically 2 (two) times daily. (Patient not taking: Reported on 7/7/2020) 45 g 1    diazePAM (VALIUM) 10 MG Tab Take 1 tablet (10 mg total) by mouth once. Take 2 tablets by mouth once prior to MRI for 1 dose 1 tablet 0    sertraline (ZOLOFT) 100 MG tablet Take 1 tablet (100 mg total) by mouth once daily. 30 tablet 11     No facility-administered encounter medications on file as of 7/7/2020.      Review of Systems   General ROS: GENERAL:  No weight gain or loss  SKIN:  No rashes or lacerations  HEAD:  No headaches  EYES:  No exophthalmos, jaundice or blindness  EARS:  No dizziness, tinnitus or hearing loss  NOSE:  No changes in smell  MOUTH & THROAT:  No dyskinetic movements or obvious goiter  CHEST:  No shortness of breath, hyperventilation or cough  CARDIOVASCULAR:  No tachycardia or chest pain  ABDOMEN:  No nausea, vomiting, pain, constipation or diarrhea  URINARY:  No frequency, dysuria or sexual dysfunction  ENDOCRINE:  No polydipsia, polyuria  MUSCULOSKELETAL:  No pain or stiffness of the joints  NEUROLOGIC:  No weakness, sensory changes, seizures, confusion, memory loss, tremor or other abnormal movements  Physical Exam     Vitals:    07/07/20 0826   BP: 131/80   Pulse: 83     General Appearance:  Alert, cooperative, no distress, appears stated age   Head:  Normocephalic, without obvious abnormality, atraumatic   Eyes:  PERRL, conjunctiva/corneas clear, EOM's intact, fundi benign, both eyes   Ears:  Normal TM's and external ear canals, both ears   Nose: Nares normal, septum midline, mucosa normal, no drainage or sinus tenderness   Throat: Lips, mucosa, and tongue normal; teeth and gums normal   Neck: Supple, symmetrical, trachea midline, no adenopathy, thyroid: not enlarged, symmetric, no tenderness/mass/nodules, no  carotid bruit or JVD   Back:   Symmetric, no curvature, ROM normal, no CVA tenderness   Lungs:   Clear to auscultation bilaterally, respirations unlabored   Chest Wall:  No tenderness or deformity   Heart:  Regular rate and rhythm, S1, S2 normal, no murmur, rub or gallop   Abdomen:   Soft, non-tender, bowel sounds active all four quadrants,  no masses, no organomegaly of liver and spleen  No hernia noted   Genitalia:  Scrotum: no rash or lesion  Normal symmetric epididymis without masses  Normal vas palpated  Normal size, symmetric testicles with no masses   Normal urethral meatus with no discharge  Normal circumcised penis with no lesion   Rectal:  Normal perineum and anus upon inspection.  Normal tone, no masses or tenderness;   Extremities: Extremities normal, atraumatic, no cyanosis or edema   Pulses: 2+ and symmetric   Skin: Skin color, texture, turgor normal, no rashes or lesions   Lymph nodes: Cervical, supraclavicular, and axillary nodes normal   Neurologic: Normal     Prostate 40 grams smooth with no nodule or tenderness.    LABS:  Lab Results   Component Value Date    PSA 6.9 (H) 09/01/2016    PSA 6.2 (H) 08/22/2016    PSA 3.1 03/22/2012    PSADIAG 5.0 (H) 06/03/2020    PSADIAG 5.2 (H) 10/04/2019    PSADIAG 4.2 (H) 04/09/2019    PSADIAG 3.8 10/05/2018    PSADIAG 5.7 (H) 02/20/2018    PSADIAG 5.2 (H) 08/22/2017    PSADIAG 5.8 (H) 05/19/2017    PSADIAG 5.4 (H) 12/29/2016     Results for orders placed or performed in visit on 06/03/20   PROSTATE SPECIFIC ANTIGEN, DIAGNOSTIC   Result Value Ref Range    PSA DIAGNOSTIC 5.0 (H) 0.00 - 4.00 ng/mL   Results for orders placed or performed in visit on 10/04/19   PROSTATE SPECIFIC ANTIGEN, DIAGNOSTIC   Result Value Ref Range    PSA DIAGNOSTIC 5.2 (H) 0.00 - 4.00 ng/mL   Results for orders placed or performed in visit on 04/09/19   PROSTATE SPECIFIC ANTIGEN, DIAGNOSTIC   Result Value Ref Range    PSA DIAGNOSTIC 4.2 (H) 0.00 - 4.00 ng/mL     Lab Results   Component  Value Date    CREATININE 1.2 06/03/2020    CREATININE 1.1 08/30/2018    CREATININE 1.0 12/24/2017     No results found for this or any previous visit.  Urine Culture, Routine   Date Value Ref Range Status   03/29/2018 No growth  Final     MRI of pelvis 8/28/17  1.  No focal concerning prostate abnormality.  PIRADS 1.  BPH findings noted.  2.  Circumferential lower rectal wall thickening accentuated by nondistention.  Correlate with colonoscopy.    Assessment and Plan:  Diagnoses and all orders for this visit:    Weak urine stream  -     finasteride (PROSCAR) 5 mg tablet; Take 1 tablet (5 mg total) by mouth once daily.  -     tamsulosin (FLOMAX) 0.4 mg Cap; Take 1 capsule (0.4 mg total) by mouth every evening.    Frequency-urgency syndrome  -     tamsulosin (FLOMAX) 0.4 mg Cap; Take 1 capsule (0.4 mg total) by mouth every evening.    Adenocarcinoma of prostate  -     Prostate Specific Antigen, Diagnostic; Future    I reviewed and explained his pathology again in detail.  His urinary symptoms improved since he has been on flomax and finasteride.  MRI in 2017 and 2019 showed BPH findings.  His recent UroNav bx of prostate showed no prostate cancer from the target lesion as well as the rest of the prostte.  Reassurance given.  So far we has been doing active surveillance for his prostate cancer for now.  His PSA is risen but is stable.  Will continue to follow up with serial PSA.    Continue flomax and finasteride.  90 days refills given.  I spent 25 minutes with the patient of which more than half was spent in direct consultation with the patient in regards to our treatment and plan.      Follow-up:  Follow up in about 6 months (around 1/7/2021) for PSA.

## 2020-07-17 DIAGNOSIS — Z71.89 COMPLEX CARE COORDINATION: ICD-10-CM

## 2020-12-07 DIAGNOSIS — R39.12 WEAK URINE STREAM: ICD-10-CM

## 2020-12-07 DIAGNOSIS — N31.8 FREQUENCY-URGENCY SYNDROME: ICD-10-CM

## 2020-12-07 RX ORDER — FINASTERIDE 5 MG/1
5 TABLET, FILM COATED ORAL DAILY
Qty: 90 TABLET | Refills: 3 | Status: SHIPPED | OUTPATIENT
Start: 2020-12-07 | End: 2022-01-27

## 2020-12-07 RX ORDER — TAMSULOSIN HYDROCHLORIDE 0.4 MG/1
0.4 CAPSULE ORAL NIGHTLY
Qty: 90 CAPSULE | Refills: 3 | Status: SHIPPED | OUTPATIENT
Start: 2020-12-07 | End: 2022-03-09

## 2020-12-07 NOTE — TELEPHONE ENCOUNTER
----- Message from Chicho Ayala sent at 12/7/2020 11:55 AM CST -----  Contact: pt @ [phone]  Pt is calling in regards to refilling prescriptions     tamsulosin (FLOMAX) 0.4 mg Cap   finasteride (PROSCAR) 5 mg tablet    [facility], [facility] - [place], [place] -   [address]    Pt @ [phone]

## 2022-06-14 ENCOUNTER — TELEPHONE (OUTPATIENT)
Dept: UROLOGY | Facility: CLINIC | Age: 62
End: 2022-06-14
Payer: MEDICARE

## 2022-06-14 NOTE — TELEPHONE ENCOUNTER
Left message and placed on recall. Pt needs a follow up visit for further refills. Last seen 7/2020

## 2022-06-14 NOTE — TELEPHONE ENCOUNTER
----- Message from Shama Rachel LPN sent at 6/10/2022 11:38 AM CDT -----  Contact: [phone]    ----- Message -----  From: Emiliano Durham  Sent: 6/10/2022  11:21 AM CDT  To: Martín VALLADARES Staff     Requesting an RX refill or new RX.  Is this a refill or new RX: refill    RX name and strength (copy/paste from chart):  tamsulosin (FLOMAX) 0.4 mg Cap    Is this a 30 day or 90 day RX: 90    Pharmacy name and phone # (copy/paste from chart):    [facility], [facility] - [place], [place] - [address] [address]  [address] [address] [facility] [address]  Phone: [phone] Fax: [phone]     The doctors have asked that we provide their patients with the following 2 reminders -- prescription refills can take up to 72 hours, and a friendly reminder that in the future you can use your [facility] account to request refills:

## 2022-08-01 DIAGNOSIS — R97.20 ELEVATED PSA: Primary | ICD-10-CM

## 2022-08-01 RX ORDER — FINASTERIDE 5 MG/1
TABLET, FILM COATED ORAL
Qty: 30 TABLET | Refills: 0 | Status: SHIPPED | OUTPATIENT
Start: 2022-08-01 | End: 2022-08-02

## 2022-08-01 NOTE — TELEPHONE ENCOUNTER
Lab Results   Component Value Date    PSA 6.9 (H) 09/01/2016    PSA 6.2 (H) 08/22/2016    PSA 3.1 03/22/2012    PSADIAG 5.0 (H) 06/03/2020    PSADIAG 5.2 (H) 10/04/2019    PSADIAG 4.2 (H) 04/09/2019     Elevated PSA  -     finasteride (PROSCAR) 5 mg tablet; TAKE ONE TABLET BY MOUTH EVERY DAY  Dispense: 30 tablet; Refill: 0  -     Prostate Specific Antigen, Diagnostic; Future; Expected date: 08/01/2022    he needs to see me with PSA within 1 month.  Has not seen by me since 7/7/20.

## 2022-08-24 DIAGNOSIS — Z00.00 ANNUAL PHYSICAL EXAM: Primary | ICD-10-CM

## 2022-08-24 DIAGNOSIS — I10 HYPERTENSION, ESSENTIAL: ICD-10-CM

## 2022-08-30 ENCOUNTER — TELEPHONE (OUTPATIENT)
Dept: UROLOGY | Facility: CLINIC | Age: 62
End: 2022-08-30
Payer: MEDICARE

## 2022-08-30 NOTE — TELEPHONE ENCOUNTER
----- Message from Shama Rachel LPN sent at 8/30/2022 11:50 AM CDT -----  Contact: [phone]  Pt has not seen  in 2years,  I have left him messages and recall sent. He needs office visit with dr. Resendiz or [facility]DI before any more refill are given. Thanks   ----- Message -----  From: Dolly Resendiz  Sent: 8/30/2022  11:03 AM CDT  To: Martín VALLADARES Staff    Type:  RX Refill Request    New or Refill: new    Who Called: pt    RX Name and Strength: finasteride (PROSCAR) 5 mg tablet (Discontinued) 90 tablet   tamsulosin (FLOMAX) 0.4 mg Cap (Discontinued) 90 capsule     Preferred Pharmacy with phone number:   [facility] [address] [address]  [address] [address] [facility] [address]  Phone: [phone] Fax: [phone]    [facility] cb after refilled Best Call Back Number: [phone]        Additional Information: Pt would also like a cb to get scheduled in to see Dr. Resendiz for a checkup.

## 2022-08-31 ENCOUNTER — OFFICE VISIT (OUTPATIENT)
Dept: UROLOGY | Facility: CLINIC | Age: 62
End: 2022-08-31
Payer: MEDICARE

## 2022-08-31 ENCOUNTER — LAB VISIT (OUTPATIENT)
Dept: LAB | Facility: HOSPITAL | Age: 62
End: 2022-08-31
Attending: NURSE PRACTITIONER
Payer: MEDICARE

## 2022-08-31 VITALS
HEART RATE: 74 BPM | SYSTOLIC BLOOD PRESSURE: 128 MMHG | BODY MASS INDEX: 27.57 KG/M2 | HEIGHT: 69 IN | WEIGHT: 186.13 LBS | DIASTOLIC BLOOD PRESSURE: 85 MMHG

## 2022-08-31 DIAGNOSIS — N40.1 BPH WITH OBSTRUCTION/LOWER URINARY TRACT SYMPTOMS: ICD-10-CM

## 2022-08-31 DIAGNOSIS — R97.20 ELEVATED PSA: ICD-10-CM

## 2022-08-31 DIAGNOSIS — N52.9 ERECTILE DYSFUNCTION, UNSPECIFIED ERECTILE DYSFUNCTION TYPE: Primary | ICD-10-CM

## 2022-08-31 DIAGNOSIS — N13.8 BPH WITH OBSTRUCTION/LOWER URINARY TRACT SYMPTOMS: ICD-10-CM

## 2022-08-31 LAB — COMPLEXED PSA SERPL-MCNC: 5.7 NG/ML (ref 0–4)

## 2022-08-31 PROCEDURE — 84153 ASSAY OF PSA TOTAL: CPT | Performed by: NURSE PRACTITIONER

## 2022-08-31 PROCEDURE — 99214 PR OFFICE/OUTPT VISIT, EST, LEVL IV, 30-39 MIN: ICD-10-PCS | Mod: S$PBB,,, | Performed by: NURSE PRACTITIONER

## 2022-08-31 PROCEDURE — 99999 PR PBB SHADOW E&M-EST. PATIENT-LVL III: CPT | Mod: PBBFAC,,, | Performed by: NURSE PRACTITIONER

## 2022-08-31 PROCEDURE — 36415 COLL VENOUS BLD VENIPUNCTURE: CPT | Performed by: NURSE PRACTITIONER

## 2022-08-31 PROCEDURE — 99999 PR PBB SHADOW E&M-EST. PATIENT-LVL III: ICD-10-PCS | Mod: PBBFAC,,, | Performed by: NURSE PRACTITIONER

## 2022-08-31 PROCEDURE — 99213 OFFICE O/P EST LOW 20 MIN: CPT | Mod: PBBFAC | Performed by: NURSE PRACTITIONER

## 2022-08-31 PROCEDURE — 99214 OFFICE O/P EST MOD 30 MIN: CPT | Mod: S$PBB,,, | Performed by: NURSE PRACTITIONER

## 2022-08-31 RX ORDER — ARIPIPRAZOLE 5 MG/1
5 TABLET ORAL DAILY
Status: ON HOLD | COMMUNITY
End: 2024-01-27 | Stop reason: HOSPADM

## 2022-08-31 RX ORDER — GABAPENTIN 300 MG/1
CAPSULE ORAL
COMMUNITY
Start: 2022-08-15 | End: 2024-01-22

## 2022-08-31 RX ORDER — FINASTERIDE 5 MG/1
5 TABLET, FILM COATED ORAL DAILY
COMMUNITY
Start: 2022-08-02 | End: 2022-08-31 | Stop reason: SDUPTHER

## 2022-08-31 RX ORDER — TAMSULOSIN HYDROCHLORIDE 0.4 MG/1
CAPSULE ORAL DAILY
COMMUNITY
End: 2022-08-31 | Stop reason: SDUPTHER

## 2022-08-31 RX ORDER — TAMSULOSIN HYDROCHLORIDE 0.4 MG/1
0.4 CAPSULE ORAL DAILY
Qty: 30 CAPSULE | Refills: 11 | Status: SHIPPED | OUTPATIENT
Start: 2022-08-31 | End: 2023-08-08

## 2022-08-31 RX ORDER — DULOXETIN HYDROCHLORIDE 30 MG/1
30 CAPSULE, DELAYED RELEASE ORAL DAILY
Status: ON HOLD | COMMUNITY
Start: 2022-08-15 | End: 2024-01-27 | Stop reason: HOSPADM

## 2022-08-31 RX ORDER — FINASTERIDE 5 MG/1
5 TABLET, FILM COATED ORAL DAILY
Qty: 30 TABLET | Refills: 11 | Status: SHIPPED | OUTPATIENT
Start: 2022-08-31 | End: 2023-08-09

## 2022-08-31 RX ORDER — TADALAFIL 20 MG/1
20 TABLET ORAL DAILY PRN
Qty: 30 TABLET | Refills: 11 | Status: SHIPPED | OUTPATIENT
Start: 2022-08-31 | End: 2023-10-20

## 2022-08-31 NOTE — PROGRESS NOTES
CHIEF COMPLAINT:    Mr. Christy is a 62 y.o. male presenting for   Chief Complaint   Patient presents with    Medication Refill       PRESENTING ILLNESS:    Jesus Christy is a 62 y.o. male with a PMH of migraine, anxiety/depression, htn, elevated PSA who presents for annual visit.    Established patient of urology department. Presents today for annual visit.  He reports a good urinary stream and complete bladder emptying.  Denies nocturia, dysuria, or hematuria.  Reports taking both finasteride and tamsulosin as prescribed.  Has no urinary complaints today.    No family history of prostate cancer.  He had a negative UroNav bx of prostate on 11/21/19.      Reports having erectile dysfunction for several years.  Has not taken any medication in the past. Interested in starting a medication.      REVIEW OF SYSTEMS:    Review of Systems   Constitutional:  Negative for chills and fever.   Respiratory:  Negative for shortness of breath.    Cardiovascular:  Negative for chest pain.   Gastrointestinal:  Negative for constipation and diarrhea.   Genitourinary:  Negative for dysuria, flank pain, frequency, hematuria and urgency.   Neurological:  Negative for dizziness and weakness.     PATIENT HISTORY:    Past Medical History:   Diagnosis Date    Complex regional pain syndrome i of right lower limb     GERD (gastroesophageal reflux disease)     HTN (hypertension)        Family History   Problem Relation Age of Onset    Hypertension Mother     Diabetes Father        Allergies:  Morphine    Medications:    Current Outpatient Medications:     ARIPiprazole (ABILIFY) 5 MG Tab, Take 5 mg by mouth once daily., Disp: , Rfl:     diazePAM (VALIUM) 10 MG Tab, Take 1 tablet (10 mg total) by mouth once. Take 2 tablets by mouth once prior to MRI for 1 dose, Disp: 1 tablet, Rfl: 0    DULoxetine (CYMBALTA) 30 MG capsule, Take 30 mg by mouth once daily., Disp: , Rfl:     DULoxetine (CYMBALTA) 60 MG capsule, , Disp: , Rfl:     gabapentin  (NEURONTIN) 300 MG capsule, Take by mouth., Disp: , Rfl:     mirtazapine (REMERON) 30 MG tablet, Take 45 mg by mouth every evening., Disp: 30 tablet, Rfl: 11    brexpiprazole (REXULTI) 2 mg Tab, Take 2 mg by mouth every evening., Disp: , Rfl:     clotrimazole (LOTRIMIN) 1 % cream, Apply topically 2 (two) times daily. (Patient not taking: No sig reported), Disp: 45 g, Rfl: 1    finasteride (PROSCAR) 5 mg tablet, Take 1 tablet (5 mg total) by mouth once daily., Disp: 30 tablet, Rfl: 11    sertraline (ZOLOFT) 100 MG tablet, Take 1 tablet (100 mg total) by mouth once daily., Disp: 30 tablet, Rfl: 11    tadalafiL (CIALIS) 20 MG Tab, Take 1 tablet (20 mg total) by mouth daily as needed (erectile dysfunction)., Disp: 30 tablet, Rfl: 11    tamsulosin (FLOMAX) 0.4 mg Cap, Take 1 capsule (0.4 mg total) by mouth once daily., Disp: 30 capsule, Rfl: 11    UNABLE TO FIND, Take 1 packet by mouth 6 (six) times daily. medication name: Goodies Headache Powder, Disp: , Rfl:     PHYSICAL EXAMINATION:    Physical Exam  Constitutional:       Appearance: Normal appearance.   HENT:      Head: Normocephalic and atraumatic.   Eyes:      Pupils: Pupils are equal, round, and reactive to light.   Pulmonary:      Effort: Pulmonary effort is normal.   Genitourinary:     Penis: Normal.       Testes: Normal.      Prostate: Enlarged. Not tender and no nodules present.      Rectum: Normal.   Musculoskeletal:      Cervical back: Normal range of motion.   Neurological:      Mental Status: He is alert.             LABS:    U/a performed in office today: yellow, ph 6, 1.020, otherwise negative  Bladder scan performed by Nurse Evelyn.  PVR 0ml      Lab Results   Component Value Date    PSA 6.9 (H) 09/01/2016    PSA 6.2 (H) 08/22/2016    PSA 3.1 03/22/2012    PSADIAG 5.7 (H) 08/31/2022    PSADIAG 5.0 (H) 06/03/2020    PSADIAG 5.2 (H) 10/04/2019    PSADIAG 4.2 (H) 04/09/2019    PSADIAG 3.8 10/05/2018    PSADIAG 5.7 (H) 02/20/2018    PSADIAG 5.2 (H) 08/22/2017     PSADIAG 5.8 (H) 05/19/2017    PSADIAG 5.4 (H) 12/29/2016       IMPRESSION:  Encounter Diagnoses   Name Primary?    Erectile dysfunction, unspecified erectile dysfunction type Yes    Elevated PSA     BPH with obstruction/lower urinary tract symptoms          PLAN:  Problem List Items Addressed This Visit       Elevated PSA    Relevant Orders    PROSTATE SPECIFIC ANTIGEN, DIAGNOSTIC (Completed)     Other Visit Diagnoses       Erectile dysfunction, unspecified erectile dysfunction type    -  Primary    BPH with obstruction/lower urinary tract symptoms                1. Bph with obstruction   - continue finasteride and tamsulosin   - obtain PSA today   - HONG complete  2. Elevated PSA   - obtain PSA today   - negative biopsy 2018 & 2019  3. Erectile dysfunction   - Trial of cialis.  Discussed side effects.   Script sent to pharmacy. He voiced understanding.   4. Rtc in 6 weeks    I spent 30 minutes with the patient. Over 50% of the visit was spent in counseling.        Bella Rubio NP

## 2022-10-05 ENCOUNTER — OFFICE VISIT (OUTPATIENT)
Dept: UROLOGY | Facility: CLINIC | Age: 62
End: 2022-10-05
Payer: MEDICARE

## 2022-10-05 DIAGNOSIS — N52.9 ERECTILE DYSFUNCTION, UNSPECIFIED ERECTILE DYSFUNCTION TYPE: ICD-10-CM

## 2022-10-05 DIAGNOSIS — R97.20 ELEVATED PSA: Primary | ICD-10-CM

## 2022-10-05 DIAGNOSIS — C61 ADENOCARCINOMA OF PROSTATE: ICD-10-CM

## 2022-10-05 DIAGNOSIS — N40.1 BPH WITH OBSTRUCTION/LOWER URINARY TRACT SYMPTOMS: ICD-10-CM

## 2022-10-05 DIAGNOSIS — N13.8 BPH WITH OBSTRUCTION/LOWER URINARY TRACT SYMPTOMS: ICD-10-CM

## 2022-10-05 PROCEDURE — 99999 PR PBB SHADOW E&M-EST. PATIENT-LVL III: CPT | Mod: PBBFAC,,, | Performed by: NURSE PRACTITIONER

## 2022-10-05 PROCEDURE — 99214 PR OFFICE/OUTPT VISIT, EST, LEVL IV, 30-39 MIN: ICD-10-PCS | Mod: S$PBB,,, | Performed by: NURSE PRACTITIONER

## 2022-10-05 PROCEDURE — 99214 OFFICE O/P EST MOD 30 MIN: CPT | Mod: S$PBB,,, | Performed by: NURSE PRACTITIONER

## 2022-10-05 PROCEDURE — 99999 PR PBB SHADOW E&M-EST. PATIENT-LVL III: ICD-10-PCS | Mod: PBBFAC,,, | Performed by: NURSE PRACTITIONER

## 2022-10-05 PROCEDURE — 99213 OFFICE O/P EST LOW 20 MIN: CPT | Mod: PBBFAC | Performed by: NURSE PRACTITIONER

## 2022-10-05 NOTE — PROGRESS NOTES
CHIEF COMPLAINT:    Mr. Christy is a 62 y.o. male presenting for   Chief Complaint   Patient presents with    Follow-up     Lab results       PRESENTING ILLNESS:    Jesus Christy is a 62 y.o. male with a PMH of htn, weak stream, elevated PSA who presents for follow up.    Established patient of urology department.  Presents today as follow up to discuss cialis and PSA results.  No family history of prostate cancer.  He had a negative UroNav bx of prostate on 11/21/19.  Of note patient with biopsy 10/25/16 with one care positive for shaun 3+3, and currently on active surveillance.  PSA remains elevated, but stable.  Will continue to monitor with every 6 month PSA.    He reports a good urinary stream and complete bladder emptying.  Has no urinary complaints today.  Taking both finasteride and tamsulosin as prescribed.    Last visit discussed erectile dysfunction.  Prescribed cialis which he reports working well for him.    REVIEW OF SYSTEMS:    Review of Systems   Constitutional:  Negative for chills and fever.   Respiratory:  Negative for shortness of breath.    Cardiovascular:  Negative for chest pain.   Gastrointestinal:  Negative for constipation and diarrhea.   Genitourinary:  Negative for dysuria, flank pain, frequency, hematuria and urgency.   Neurological:  Negative for dizziness and weakness.     PATIENT HISTORY:    Past Medical History:   Diagnosis Date    Complex regional pain syndrome i of right lower limb     GERD (gastroesophageal reflux disease)     HTN (hypertension)        Family History   Problem Relation Age of Onset    Hypertension Mother     Diabetes Father        Allergies:  Morphine    Medications:    Current Outpatient Medications:     ARIPiprazole (ABILIFY) 5 MG Tab, Take 5 mg by mouth once daily., Disp: , Rfl:     brexpiprazole (REXULTI) 2 mg Tab, Take 2 mg by mouth every evening., Disp: , Rfl:     clotrimazole (LOTRIMIN) 1 % cream, Apply topically 2 (two) times daily., Disp: 45 g, Rfl:  1    DULoxetine (CYMBALTA) 30 MG capsule, Take 30 mg by mouth once daily., Disp: , Rfl:     DULoxetine (CYMBALTA) 60 MG capsule, , Disp: , Rfl:     finasteride (PROSCAR) 5 mg tablet, Take 1 tablet (5 mg total) by mouth once daily., Disp: 30 tablet, Rfl: 11    gabapentin (NEURONTIN) 300 MG capsule, Take by mouth., Disp: , Rfl:     mirtazapine (REMERON) 30 MG tablet, Take 45 mg by mouth every evening., Disp: 30 tablet, Rfl: 11    sertraline (ZOLOFT) 100 MG tablet, Take 1 tablet (100 mg total) by mouth once daily., Disp: 30 tablet, Rfl: 11    tadalafiL (CIALIS) 20 MG Tab, Take 1 tablet (20 mg total) by mouth daily as needed (erectile dysfunction)., Disp: 30 tablet, Rfl: 11    UNABLE TO FIND, Take 1 packet by mouth 6 (six) times daily. medication name: Goodies Headache Powder, Disp: , Rfl:     diazePAM (VALIUM) 10 MG Tab, Take 1 tablet (10 mg total) by mouth once. Take 2 tablets by mouth once prior to MRI for 1 dose, Disp: 1 tablet, Rfl: 0    tamsulosin (FLOMAX) 0.4 mg Cap, Take 1 capsule (0.4 mg total) by mouth once daily., Disp: 30 capsule, Rfl: 11    PHYSICAL EXAMINATION:    Physical Exam  Vitals and nursing note reviewed.   Constitutional:       Appearance: Normal appearance. He is well-developed.   HENT:      Head: Normocephalic and atraumatic.   Eyes:      Pupils: Pupils are equal, round, and reactive to light.   Pulmonary:      Effort: Pulmonary effort is normal.   Musculoskeletal:         General: Normal range of motion.      Cervical back: Normal range of motion.   Skin:     General: Skin is warm and dry.   Neurological:      Mental Status: He is alert and oriented to person, place, and time.   Psychiatric:         Behavior: Behavior normal.         LABS:    U/a performed in office today: did not void  Lab Results   Component Value Date    PSA 6.9 (H) 09/01/2016    PSA 6.2 (H) 08/22/2016    PSA 3.1 03/22/2012    PSADIAG 5.7 (H) 08/31/2022    PSADIAG 5.0 (H) 06/03/2020    PSADIAG 5.2 (H) 10/04/2019    RUPALI  4.2 (H) 04/09/2019    PSADIAG 3.8 10/05/2018    PSADIAG 5.7 (H) 02/20/2018    PSADIAG 5.2 (H) 08/22/2017    PSADIAG 5.8 (H) 05/19/2017    PSADIAG 5.4 (H) 12/29/2016       IMPRESSION:  Encounter Diagnoses   Name Primary?    Elevated PSA Yes    BPH with obstruction/lower urinary tract symptoms     Erectile dysfunction, unspecified erectile dysfunction type          PLAN:  Problem List Items Addressed This Visit       Elevated PSA - Primary    Relevant Orders    PROSTATE SPECIFIC ANTIGEN, DIAGNOSTIC     Other Visit Diagnoses       BPH with obstruction/lower urinary tract symptoms        Erectile dysfunction, unspecified erectile dysfunction type                1. Elevated psa, prostate cancer   - continue active surveillance   - reviewed last PSA 5.7, repeat in 6 months  2. Bph with luts   Continue both tamsulosin and finasteride  3. Erectile dysfunction   Cialis working well, continue  4.  Have PSA drawn in 6 mths, rtc in 1 yr or sooner prn    I spent 30 minutes with the patient. Over 50% of the visit was spent in counseling.      Bella Rubio NP

## 2022-12-13 ENCOUNTER — OFFICE VISIT (OUTPATIENT)
Dept: INTERNAL MEDICINE | Facility: CLINIC | Age: 62
End: 2022-12-13
Payer: MEDICARE

## 2022-12-13 ENCOUNTER — TELEPHONE (OUTPATIENT)
Dept: INTERNAL MEDICINE | Facility: CLINIC | Age: 62
End: 2022-12-13

## 2022-12-13 ENCOUNTER — LAB VISIT (OUTPATIENT)
Dept: LAB | Facility: HOSPITAL | Age: 62
End: 2022-12-13
Attending: INTERNAL MEDICINE
Payer: MEDICARE

## 2022-12-13 ENCOUNTER — IMMUNIZATION (OUTPATIENT)
Dept: INTERNAL MEDICINE | Facility: CLINIC | Age: 62
End: 2022-12-13
Payer: MEDICARE

## 2022-12-13 VITALS
BODY MASS INDEX: 27.11 KG/M2 | OXYGEN SATURATION: 99 % | HEIGHT: 69 IN | SYSTOLIC BLOOD PRESSURE: 128 MMHG | DIASTOLIC BLOOD PRESSURE: 84 MMHG | HEART RATE: 97 BPM | WEIGHT: 183 LBS

## 2022-12-13 DIAGNOSIS — Z11.4 ENCOUNTER FOR SCREENING FOR HIV: ICD-10-CM

## 2022-12-13 DIAGNOSIS — H61.23 BILATERAL IMPACTED CERUMEN: ICD-10-CM

## 2022-12-13 DIAGNOSIS — J06.9 VIRAL URI WITH COUGH: ICD-10-CM

## 2022-12-13 DIAGNOSIS — G90.521 COMPLEX REGIONAL PAIN SYNDROME TYPE 1 OF RIGHT LOWER EXTREMITY: ICD-10-CM

## 2022-12-13 DIAGNOSIS — Z23 NEED FOR VACCINATION: Primary | ICD-10-CM

## 2022-12-13 DIAGNOSIS — E78.1 HYPERTRIGLYCERIDEMIA: ICD-10-CM

## 2022-12-13 DIAGNOSIS — F32.5 MAJOR DEPRESSIVE DISORDER IN FULL REMISSION, UNSPECIFIED WHETHER RECURRENT: Primary | ICD-10-CM

## 2022-12-13 DIAGNOSIS — Z00.00 ANNUAL PHYSICAL EXAM: ICD-10-CM

## 2022-12-13 LAB
ALBUMIN SERPL BCP-MCNC: 4.2 G/DL (ref 3.5–5.2)
ALP SERPL-CCNC: 95 U/L (ref 55–135)
ALT SERPL W/O P-5'-P-CCNC: 37 U/L (ref 10–44)
ANION GAP SERPL CALC-SCNC: 9 MMOL/L (ref 8–16)
AST SERPL-CCNC: 26 U/L (ref 10–40)
BILIRUB SERPL-MCNC: 0.7 MG/DL (ref 0.1–1)
BUN SERPL-MCNC: 11 MG/DL (ref 8–23)
CALCIUM SERPL-MCNC: 10.3 MG/DL (ref 8.7–10.5)
CHLORIDE SERPL-SCNC: 107 MMOL/L (ref 95–110)
CHOLEST SERPL-MCNC: 154 MG/DL (ref 120–199)
CHOLEST/HDLC SERPL: 4.3 {RATIO} (ref 2–5)
CO2 SERPL-SCNC: 25 MMOL/L (ref 23–29)
CREAT SERPL-MCNC: 1 MG/DL (ref 0.5–1.4)
ERYTHROCYTE [DISTWIDTH] IN BLOOD BY AUTOMATED COUNT: 12.7 % (ref 11.5–14.5)
EST. GFR  (NO RACE VARIABLE): >60 ML/MIN/1.73 M^2
GLUCOSE SERPL-MCNC: 88 MG/DL (ref 70–110)
HCT VFR BLD AUTO: 45.4 % (ref 40–54)
HDLC SERPL-MCNC: 36 MG/DL (ref 40–75)
HDLC SERPL: 23.4 % (ref 20–50)
HGB BLD-MCNC: 14.6 G/DL (ref 14–18)
HIV 1+2 AB+HIV1 P24 AG SERPL QL IA: NORMAL
LDLC SERPL CALC-MCNC: 91 MG/DL (ref 63–159)
MCH RBC QN AUTO: 31.5 PG (ref 27–31)
MCHC RBC AUTO-ENTMCNC: 32.2 G/DL (ref 32–36)
MCV RBC AUTO: 98 FL (ref 82–98)
NONHDLC SERPL-MCNC: 118 MG/DL
PLATELET # BLD AUTO: 346 K/UL (ref 150–450)
PMV BLD AUTO: 9.6 FL (ref 9.2–12.9)
POTASSIUM SERPL-SCNC: 4 MMOL/L (ref 3.5–5.1)
PROT SERPL-MCNC: 7.6 G/DL (ref 6–8.4)
RBC # BLD AUTO: 4.64 M/UL (ref 4.6–6.2)
SODIUM SERPL-SCNC: 141 MMOL/L (ref 136–145)
TRIGL SERPL-MCNC: 135 MG/DL (ref 30–150)
WBC # BLD AUTO: 5.92 K/UL (ref 3.9–12.7)

## 2022-12-13 PROCEDURE — 99214 OFFICE O/P EST MOD 30 MIN: CPT | Mod: PBBFAC,25 | Performed by: INTERNAL MEDICINE

## 2022-12-13 PROCEDURE — 99396 PREV VISIT EST AGE 40-64: CPT | Mod: S$PBB,GZ,, | Performed by: INTERNAL MEDICINE

## 2022-12-13 PROCEDURE — G0008 ADMIN INFLUENZA VIRUS VAC: HCPCS | Mod: PBBFAC

## 2022-12-13 PROCEDURE — 85027 COMPLETE CBC AUTOMATED: CPT | Performed by: INTERNAL MEDICINE

## 2022-12-13 PROCEDURE — 99396 PR PREVENTIVE VISIT,EST,40-64: ICD-10-PCS | Mod: S$PBB,GZ,, | Performed by: INTERNAL MEDICINE

## 2022-12-13 PROCEDURE — 80053 COMPREHEN METABOLIC PANEL: CPT | Performed by: INTERNAL MEDICINE

## 2022-12-13 PROCEDURE — 0124A COVID-19, MRNA, LNP-S, BIVALENT BOOSTER, PF, 30 MCG/0.3 ML DOSE: CPT | Mod: PBBFAC,CV19

## 2022-12-13 PROCEDURE — 91312 COVID-19, MRNA, LNP-S, BIVALENT BOOSTER, PF, 30 MCG/0.3 ML DOSE: CPT | Mod: PBBFAC

## 2022-12-13 PROCEDURE — 99999 PR PBB SHADOW E&M-EST. PATIENT-LVL IV: ICD-10-PCS | Mod: PBBFAC,,, | Performed by: INTERNAL MEDICINE

## 2022-12-13 PROCEDURE — 80061 LIPID PANEL: CPT | Performed by: INTERNAL MEDICINE

## 2022-12-13 PROCEDURE — 99999 PR PBB SHADOW E&M-EST. PATIENT-LVL IV: CPT | Mod: PBBFAC,,, | Performed by: INTERNAL MEDICINE

## 2022-12-13 PROCEDURE — 87389 HIV-1 AG W/HIV-1&-2 AB AG IA: CPT | Performed by: INTERNAL MEDICINE

## 2022-12-13 PROCEDURE — 36415 COLL VENOUS BLD VENIPUNCTURE: CPT | Performed by: INTERNAL MEDICINE

## 2022-12-13 RX ORDER — PROMETHAZINE HYDROCHLORIDE AND DEXTROMETHORPHAN HYDROBROMIDE 6.25; 15 MG/5ML; MG/5ML
5 SYRUP ORAL EVERY 4 HOURS PRN
Qty: 118 ML | Refills: 0 | Status: SHIPPED | OUTPATIENT
Start: 2022-12-13 | End: 2022-12-23

## 2022-12-13 NOTE — PROGRESS NOTES
Subjective:       Patient ID: Jesus Christy is a 62 y.o. male.    Chief Complaint: Annual Exam    HPI  Pt of Dr Jolly.    C/o L ear congestion.  Difficult to hear.    C/o cough assoc with minor URI.  Non productive.  Not sob.  Wheezes at night.  Clear nasal drainage.   Doesn't smoke.    CRPS R leg, managed by Carlito tx with gabapentin.    H/o prostate cancer, tx with medication only.  Chronic mildly elevated PSA.  Saw URology in October.    Depression is controlled with abilify, mirtazapine, cymbalta..  Sees an outside psychiatrist.     No longer taking sertraline or Rexulti.    Htn in past, off meds currently.    Gerd controlled.   Bmi 27    Review of Systems   Constitutional:  Negative for activity change and unexpected weight change.   HENT:  Negative for postnasal drip, rhinorrhea and sinus pressure/congestion.    Respiratory:  Negative for chest tightness and shortness of breath.    Cardiovascular:  Negative for chest pain and leg swelling.   Gastrointestinal:  Negative for abdominal pain, nausea and vomiting.   Genitourinary:  Negative for difficulty urinating, dysuria, hematuria and urgency.   Integumentary:  Negative for rash.   Neurological:  Negative for headaches.   Psychiatric/Behavioral:  Positive for sleep disturbance (cough keeps him up.). Negative for dysphoric mood. The patient is not nervous/anxious.        Objective:      Physical Exam  Constitutional:       General: He is not in acute distress.     Appearance: He is well-developed. He is not ill-appearing, toxic-appearing or diaphoretic.   HENT:      Head: Normocephalic and atraumatic.      Right Ear: There is impacted cerumen.      Left Ear: There is impacted cerumen.   Eyes:      General: No scleral icterus.        Left eye: No discharge.      Conjunctiva/sclera: Conjunctivae normal.   Neck:      Thyroid: No thyromegaly.      Vascular: No JVD.   Cardiovascular:      Rate and Rhythm: Normal rate and regular rhythm.      Heart sounds:  Normal heart sounds. No murmur heard.  Pulmonary:      Effort: Pulmonary effort is normal. No respiratory distress.      Breath sounds: Normal breath sounds. No stridor. No wheezing, rhonchi or rales.   Abdominal:      General: There is no distension.      Palpations: Abdomen is soft. There is no mass.      Tenderness: There is no abdominal tenderness. There is no guarding.   Musculoskeletal:      Cervical back: Normal range of motion. No rigidity.      Right lower leg: No edema.      Left lower leg: No edema.   Lymphadenopathy:      Cervical: No cervical adenopathy.   Skin:     General: Skin is dry.      Findings: No rash.   Neurological:      Mental Status: He is alert and oriented to person, place, and time.   Psychiatric:         Behavior: Behavior normal.         Thought Content: Thought content normal.         Judgment: Judgment normal.       Assessment:       Problem List Items Addressed This Visit       Depression - Primary    Complex regional pain syndrome type 1 of right lower extremity    Relevant Orders    Comprehensive Metabolic Panel    CBC Without Differential     Other Visit Diagnoses       Annual physical exam        Hypertriglyceridemia        Relevant Orders    Lipid Panel    Encounter for screening for HIV        Relevant Orders    HIV 1/2 Ag/Ab (4th Gen)    Viral URI with cough        Relevant Medications    promethazine-dextromethorphan (PROMETHAZINE-DM) 6.25-15 mg/5 mL Syrp    Bilateral impacted cerumen        Relevant Orders    Ambulatory referral/consult to ENT            Plan:       Jesus was seen today for annual exam.    Diagnoses and all orders for this visit:    Major depressive disorder in full remission, unspecified whether recurrent    Annual physical exam    Complex regional pain syndrome type 1 of right lower extremity  -     Comprehensive Metabolic Panel; Future  -     CBC Without Differential; Future    Hypertriglyceridemia  -     Lipid Panel; Future    Encounter for screening  for HIV  -     HIV 1/2 Ag/Ab (4th Gen); Future    Viral URI with cough  -     promethazine-dextromethorphan (PROMETHAZINE-DM) 6.25-15 mg/5 mL Syrp; Take 5 mLs by mouth every 4 (four) hours as needed (cough).    Bilateral impacted cerumen  -     Ambulatory referral/consult to ENT; Future     Dr Jolly 1 year      Wt loss.   Get more active.

## 2022-12-15 ENCOUNTER — TELEPHONE (OUTPATIENT)
Dept: INTERNAL MEDICINE | Facility: CLINIC | Age: 62
End: 2022-12-15
Payer: MEDICARE

## 2022-12-15 NOTE — TELEPHONE ENCOUNTER
----- Message from Elidia Baker MD sent at 12/14/2022  5:57 PM CST -----  Pls lena - labs all normal  NE

## 2022-12-15 NOTE — TELEPHONE ENCOUNTER
Called and discussed test results and recommendations with the pt. The pt expressed understanding.

## 2022-12-20 ENCOUNTER — OFFICE VISIT (OUTPATIENT)
Dept: OTOLARYNGOLOGY | Facility: CLINIC | Age: 62
End: 2022-12-20
Payer: MEDICARE

## 2022-12-20 VITALS — DIASTOLIC BLOOD PRESSURE: 88 MMHG | HEART RATE: 89 BPM | SYSTOLIC BLOOD PRESSURE: 137 MMHG

## 2022-12-20 DIAGNOSIS — H61.23 BILATERAL IMPACTED CERUMEN: ICD-10-CM

## 2022-12-20 PROCEDURE — 69210 EAR CERUMEN REMOVAL: ICD-10-PCS | Mod: S$PBB,,, | Performed by: NURSE PRACTITIONER

## 2022-12-20 PROCEDURE — 99499 UNLISTED E&M SERVICE: CPT | Mod: S$PBB,,, | Performed by: NURSE PRACTITIONER

## 2022-12-20 PROCEDURE — 99499 NO LOS: ICD-10-PCS | Mod: S$PBB,,, | Performed by: NURSE PRACTITIONER

## 2022-12-20 PROCEDURE — 99999 PR PBB SHADOW E&M-EST. PATIENT-LVL III: ICD-10-PCS | Mod: PBBFAC,,, | Performed by: NURSE PRACTITIONER

## 2022-12-20 PROCEDURE — 69210 REMOVE IMPACTED EAR WAX UNI: CPT | Mod: PBBFAC | Performed by: NURSE PRACTITIONER

## 2022-12-20 PROCEDURE — 99213 OFFICE O/P EST LOW 20 MIN: CPT | Mod: PBBFAC | Performed by: NURSE PRACTITIONER

## 2022-12-20 PROCEDURE — 69210 REMOVE IMPACTED EAR WAX UNI: CPT | Mod: S$PBB,,, | Performed by: NURSE PRACTITIONER

## 2022-12-20 PROCEDURE — 99999 PR PBB SHADOW E&M-EST. PATIENT-LVL III: CPT | Mod: PBBFAC,,, | Performed by: NURSE PRACTITIONER

## 2022-12-20 NOTE — PROCEDURES
Ear Cerumen Removal    Date/Time: 12/20/2022 3:00 PM  Performed by: Sari Jackson NP  Authorized by: Sari Jackson NP       Local anesthetic:  None  Location details:  Both ears  Procedure type: curette    Cerumen  Removal Results:  Cerumen completely removed  Patient tolerance:  Patient tolerated the procedure well with no immediate complications     Procedure Note:    The patient was brought to the minor procedure room and placed under the operating microscope of the right ear canal which was cleaned of ceruminous debris. Using a combination of suction, curettes and cup forceps the patient's cerumen impaction was removed. The patient tolerated the procedure well. There were no complications.    Procedure Note:    The patient was brought to the minor procedure room and placed under the operating microscope of the left ear canal which was cleaned of ceruminous debris. Using a combination of suction, curettes and cup forceps the patient's cerumen impaction was removed.  The patient tolerated the procedure well. There were no complications.      Last ear cleaning with Dr. Schultz in 2018.  Patient presents with bilateral blocked feeling and decreased hearing that has worsened over the past 2 weeks.  Otherwise denies any ear symptoms.     B CI removed under micro, patient reported immediate improvement in hearing and fullness after cleaning.     RTC in 1 year for cleaning and audio.  He has a history of loud noise exposure.

## 2023-04-05 ENCOUNTER — LAB VISIT (OUTPATIENT)
Dept: LAB | Facility: HOSPITAL | Age: 63
End: 2023-04-05
Payer: MEDICARE

## 2023-04-05 DIAGNOSIS — R97.20 ELEVATED PSA: ICD-10-CM

## 2023-04-05 LAB — COMPLEXED PSA SERPL-MCNC: 5.7 NG/ML (ref 0–4)

## 2023-04-05 PROCEDURE — 84153 ASSAY OF PSA TOTAL: CPT | Performed by: NURSE PRACTITIONER

## 2023-04-05 PROCEDURE — 36415 COLL VENOUS BLD VENIPUNCTURE: CPT | Performed by: NURSE PRACTITIONER

## 2023-04-14 ENCOUNTER — TELEPHONE (OUTPATIENT)
Dept: UROLOGY | Facility: CLINIC | Age: 63
End: 2023-04-14
Payer: MEDICARE

## 2023-04-14 NOTE — TELEPHONE ENCOUNTER
Left message for Mr. Christy to inform him of PSA level. PSA 5.7 which is table for him. Recommend monitoring PSA level yearly.

## 2023-06-22 ENCOUNTER — TELEPHONE (OUTPATIENT)
Dept: INTERNAL MEDICINE | Facility: CLINIC | Age: 63
End: 2023-06-22
Payer: MEDICARE

## 2023-06-22 NOTE — TELEPHONE ENCOUNTER
----- Message from Maritza Burton sent at 6/22/2023 12:11 PM CDT -----  Contact: [phone] Patient  Patient would like to get a referral.  Referral to what specialty:  Psychiatry  Does the patient want the referral with a specific physician:  Dr Hercules   e-mail                  [email or web address]  Is the [facility]:  [facility]  Reason (be specific):  to keep receiving his meds  Does the patient already have the specialty clinic appointment scheduled:  yes  If yes, what date is the appointment scheduled:   06/22/2023  Is the insurance listed in Epic correct? (this is important for a referral):  yes  Advised patient that once provider approves this either a nurse or  will return their call?: yes  Would the patient like a call back, or a response through their [facility] portal?:   Call Back Patient please. Thank you  Comments:

## 2023-06-22 NOTE — TELEPHONE ENCOUNTER
----- Message from Kaila Fabian sent at 6/22/2023  2:56 PM CDT -----  Contact: [phone]  Tenisha calling from Dr Maida Hercules's office calling to check status of referral sent over by fax on 6/16       Please reach out to office at [phone]  Email: [email or web address]

## 2023-06-26 ENCOUNTER — TELEPHONE (OUTPATIENT)
Dept: INTERNAL MEDICINE | Facility: CLINIC | Age: 63
End: 2023-06-26
Payer: MEDICARE

## 2023-06-26 DIAGNOSIS — F33.1 MODERATE EPISODE OF RECURRENT MAJOR DEPRESSIVE DISORDER: ICD-10-CM

## 2023-06-26 RX ORDER — DIAZEPAM 10 MG/1
10 TABLET ORAL 3 TIMES DAILY PRN
Qty: 1 TABLET | Refills: 0
Start: 2023-06-26 | End: 2024-01-23 | Stop reason: ALTCHOICE

## 2023-07-06 ENCOUNTER — TELEPHONE (OUTPATIENT)
Dept: INTERNAL MEDICINE | Facility: CLINIC | Age: 63
End: 2023-07-06
Payer: MEDICARE

## 2023-07-06 NOTE — TELEPHONE ENCOUNTER
----- Message from Terri Booker sent at 7/5/2023 12:53 PM CDT -----  Contact: [phone] Patient  Patient would like to get a referral.  Referral to what specialty:  Psychology  Does the patient want the referral with a specific physician:  Dr Maida Hercules  Is the specialist an [facility]:  non [facility]   Reason (be specific):  pt states the provider needs an authorization from Dr Jolly to fill the pt diazePAM (VALIUM) 10 MG Tab  Does the patient already have the specialty clinic appointment scheduled:  N/A  If yes, what date is the appointment scheduled:     Is the insurance listed in Epic correct? (this is important for a referral):  yes  Advised patient that once provider approves this either a nurse or  will return their call?:   Would the patient like a call back, or a response through their [facility] portal?:   call back  Comments:

## 2023-07-10 ENCOUNTER — TELEPHONE (OUTPATIENT)
Dept: INTERNAL MEDICINE | Facility: CLINIC | Age: 63
End: 2023-07-10
Payer: MEDICARE

## 2023-07-10 NOTE — TELEPHONE ENCOUNTER
----- Message from Suha Tyler sent at 7/10/2023  2:20 PM CDT -----  Contact: AVIS EASLEY [[id number]]@ [phone]  Patient would like to get a referral.  Referral to what specialty:  Psychological Specialist  Does the patient want the referral with a specific physician: Dr Maida Hercules   [facility] the [facility]    Reason (be specific):   Mental Health Treatment   Does the patient already have the specialty clinic appointment scheduled:  Yes  If yes, what date is the appointment scheduled:   06/11/2023  Is the insurance listed in Epic correct? (this is important for a referral):  Yes  Advised patient that once provider approves this either a nurse or  will return their call?:     Would the patient like a call back, or a response through their [facility] portal?: Call Back    Comments:       Send to : [facility]    [address] [place] [address]  [address] [place] [address]    Phone :  [phone]      He can not get his medications until you enter the referral to cover this date and medication. He will be out of his diazePAM (VALIUM) 10 MG Tab tomorrow.

## 2023-08-08 RX ORDER — TAMSULOSIN HYDROCHLORIDE 0.4 MG/1
1 CAPSULE ORAL NIGHTLY
Qty: 30 CAPSULE | Refills: 11 | Status: SHIPPED | OUTPATIENT
Start: 2023-08-08 | End: 2023-10-20

## 2023-08-09 RX ORDER — FINASTERIDE 5 MG/1
TABLET, FILM COATED ORAL
Qty: 30 TABLET | Refills: 11 | Status: SHIPPED | OUTPATIENT
Start: 2023-08-09 | End: 2023-10-20

## 2023-10-20 ENCOUNTER — OFFICE VISIT (OUTPATIENT)
Dept: UROLOGY | Facility: CLINIC | Age: 63
End: 2023-10-20
Payer: MEDICARE

## 2023-10-20 ENCOUNTER — LAB VISIT (OUTPATIENT)
Dept: LAB | Facility: HOSPITAL | Age: 63
End: 2023-10-20
Attending: UROLOGY
Payer: MEDICARE

## 2023-10-20 VITALS
HEIGHT: 69 IN | SYSTOLIC BLOOD PRESSURE: 121 MMHG | HEART RATE: 75 BPM | WEIGHT: 165 LBS | BODY MASS INDEX: 24.44 KG/M2 | DIASTOLIC BLOOD PRESSURE: 75 MMHG

## 2023-10-20 DIAGNOSIS — N13.8 ENLARGED PROSTATE WITH URINARY OBSTRUCTION: ICD-10-CM

## 2023-10-20 DIAGNOSIS — C61 ADENOCARCINOMA OF PROSTATE: Primary | ICD-10-CM

## 2023-10-20 DIAGNOSIS — C61 ADENOCARCINOMA OF PROSTATE: ICD-10-CM

## 2023-10-20 DIAGNOSIS — N52.9 ED (ERECTILE DYSFUNCTION) OF ORGANIC ORIGIN: ICD-10-CM

## 2023-10-20 DIAGNOSIS — N40.1 ENLARGED PROSTATE WITH URINARY OBSTRUCTION: ICD-10-CM

## 2023-10-20 LAB — COMPLEXED PSA SERPL-MCNC: 6.6 NG/ML (ref 0–4)

## 2023-10-20 PROCEDURE — 99215 OFFICE O/P EST HI 40 MIN: CPT | Mod: S$PBB,,, | Performed by: UROLOGY

## 2023-10-20 PROCEDURE — 99999 PR PBB SHADOW E&M-EST. PATIENT-LVL III: ICD-10-PCS | Mod: PBBFAC,,, | Performed by: UROLOGY

## 2023-10-20 PROCEDURE — 99215 PR OFFICE/OUTPT VISIT, EST, LEVL V, 40-54 MIN: ICD-10-PCS | Mod: S$PBB,,, | Performed by: UROLOGY

## 2023-10-20 PROCEDURE — 99999 PR PBB SHADOW E&M-EST. PATIENT-LVL III: CPT | Mod: PBBFAC,,, | Performed by: UROLOGY

## 2023-10-20 PROCEDURE — 99213 OFFICE O/P EST LOW 20 MIN: CPT | Mod: PBBFAC | Performed by: UROLOGY

## 2023-10-20 PROCEDURE — 36415 COLL VENOUS BLD VENIPUNCTURE: CPT | Performed by: UROLOGY

## 2023-10-20 PROCEDURE — 84153 ASSAY OF PSA TOTAL: CPT | Performed by: UROLOGY

## 2023-10-20 RX ORDER — TADALAFIL 20 MG/1
20 TABLET ORAL DAILY PRN
Qty: 30 TABLET | Refills: 5 | Status: SHIPPED | OUTPATIENT
Start: 2023-10-20 | End: 2024-03-21 | Stop reason: SDUPTHER

## 2023-10-20 RX ORDER — TAMSULOSIN HYDROCHLORIDE 0.4 MG/1
1 CAPSULE ORAL NIGHTLY
Qty: 90 CAPSULE | Refills: 3 | Status: SHIPPED | OUTPATIENT
Start: 2023-10-20 | End: 2024-01-22

## 2023-10-20 RX ORDER — FINASTERIDE 5 MG/1
5 TABLET, FILM COATED ORAL DAILY
Qty: 90 TABLET | Refills: 3 | Status: ON HOLD | OUTPATIENT
Start: 2023-10-20 | End: 2024-01-03 | Stop reason: HOSPADM

## 2023-10-20 NOTE — PATIENT INSTRUCTIONS
Lab Results   Component Value Date    PSA 6.9 (H) 09/01/2016    PSA 6.2 (H) 08/22/2016    PSA 3.1 03/22/2012    PSADIAG 5.7 (H) 04/05/2023    PSADIAG 5.7 (H) 08/31/2022    PSADIAG 5.0 (H) 06/03/2020

## 2023-10-20 NOTE — PROGRESS NOTES
CHIEF COMPLAINT:    Mr. Christy is a 63 y.o. male presenting for   ED, LUTS, and active surveillance on his prostate cancer.    PRESENTING ILLNESS:    Jesus Christy is a 63 y.o. male with a PMH of htn, weak stream, elevated PSA who presents for follow up.    Established patient of urology department.  Presents today as follow up to discuss cialis and PSA results.  No family history of prostate cancer.  He had a negative UroNav bx of prostate on 11/21/19.  Of note patient with biopsy 10/25/16 with one care positive for shaun 3+3, and currently on active surveillance.  PSA remains elevated, but stable.  Will continue to monitor with every 6 month PSA.    He reports a good urinary stream and complete bladder emptying.  Has no urinary complaints today.  Taking both finasteride and tamsulosin as prescribed.    Last visit discussed erectile dysfunction.  Prescribed cialis which he reports working well for him.    REVIEW OF SYSTEMS:    Review of Systems   Constitutional:  Negative for chills and fever.   Respiratory:  Negative for shortness of breath.    Cardiovascular:  Negative for chest pain.   Gastrointestinal:  Negative for constipation and diarrhea.   Genitourinary:  Negative for dysuria, flank pain, frequency, hematuria and urgency.   Neurological:  Negative for dizziness and weakness.       PATIENT HISTORY:    Past Medical History:   Diagnosis Date    Complex regional pain syndrome i of right lower limb     GERD (gastroesophageal reflux disease)     HTN (hypertension)        Family History   Problem Relation Age of Onset    Hypertension Mother     Heart disease Father     Diabetes Father     Cancer Brother         unknown type    No Known Problems Daughter     No Known Problems Daughter     No Known Problems Daughter     No Known Problems Daughter     No Known Problems Son     No Known Problems Son        Allergies:  Morphine    Medications:    Current Outpatient Medications:     ARIPiprazole (ABILIFY) 5 MG  Tab, Take 5 mg by mouth once daily., Disp: , Rfl:     clotrimazole (LOTRIMIN) 1 % cream, Apply topically 2 (two) times daily., Disp: 45 g, Rfl: 1    diazePAM (VALIUM) 10 MG Tab, Take 1 tablet (10 mg total) by mouth 3 (three) times daily as needed (anxiety)., Disp: 1 tablet, Rfl: 0    DULoxetine (CYMBALTA) 30 MG capsule, Take 30 mg by mouth once daily., Disp: , Rfl:     DULoxetine (CYMBALTA) 60 MG capsule, , Disp: , Rfl:     finasteride (PROSCAR) 5 mg tablet, Take 1 tablet (5 mg total) by mouth once daily., Disp: 90 tablet, Rfl: 3    gabapentin (NEURONTIN) 300 MG capsule, Take by mouth., Disp: , Rfl:     mirtazapine (REMERON) 30 MG tablet, Take 45 mg by mouth every evening., Disp: 30 tablet, Rfl: 11    tadalafiL (CIALIS) 20 MG Tab, Take 1 tablet (20 mg total) by mouth daily as needed (erectile dysfunction)., Disp: 30 tablet, Rfl: 5    tamsulosin (FLOMAX) 0.4 mg Cap, Take 1 capsule (0.4 mg total) by mouth every evening., Disp: 90 capsule, Rfl: 3    UNABLE TO FIND, Take 1 packet by mouth 6 (six) times daily. medication name: Sherice Headache Powder, Disp: , Rfl:     PHYSICAL EXAMINATION:    Physical Exam  Vitals and nursing note reviewed.   Constitutional:       Appearance: Normal appearance. He is well-developed.   HENT:      Head: Normocephalic and atraumatic.   Eyes:      Pupils: Pupils are equal, round, and reactive to light.   Pulmonary:      Effort: Pulmonary effort is normal.   Musculoskeletal:         General: Normal range of motion.      Cervical back: Normal range of motion.   Skin:     General: Skin is warm and dry.   Neurological:      Mental Status: He is alert and oriented to person, place, and time.   Psychiatric:         Behavior: Behavior normal.           LABS:    U/a performed in office today: did not void  Lab Results   Component Value Date    PSA 6.9 (H) 09/01/2016    PSA 6.2 (H) 08/22/2016    PSA 3.1 03/22/2012    PSADIAG 5.7 (H) 04/05/2023    PSADIAG 5.7 (H) 08/31/2022    PSADIAG 5.0 (H)  06/03/2020    PSADIAG 5.2 (H) 10/04/2019    PSADIAG 4.2 (H) 04/09/2019    PSADIAG 3.8 10/05/2018    PSADIAG 5.7 (H) 02/20/2018    PSADIAG 5.2 (H) 08/22/2017    PSADIAG 5.8 (H) 05/19/2017    PSADIAG 5.4 (H) 12/29/2016     MRI of prostate 11/6/19  Previous biopsy: Nelson 6 adenocarcinoma of the right Base (09/15/2016).   PSA: 5.2 (10/04/2019), 4.2 (04/09/2019), 3.8 (10/05/2018).  Prior therapy: None.  Prostate: The prostate measures 4.8 x 4.0 x 4.5cm corresponding to a computed volume of 42.4cc.  Peripheral zone:  Lesion (SHIVA) #1.  Location: Side: left Region: apex Zone: posterior peripheral zone laterally  Greatest dimension: 0.7cm  T2-WI: Lenticular or non-circumscribed, homogeneous, moderately hypointense - score 4  DWI/ADC: Focal markedly hypointense on ADC and markedly hyperintense on high b-value DWI - score 4  DCE: Positive  Extraprostatic extension: Absent  PI-RADS assessment category: 4  This lesion is unchanged from prior.  Transition zone: No focal abnormalities with imaging features concerning for prostate cancer  Neurovascular bundle: Unremarkable.  Seminal vesicles:  SV invasion:Unremarkable  Adjacent Organ Involvement: There is no focal bladder wall thickening. There is no rectal involvement.  Lymphadenopathy: none  Other Findings: There is colonic diverticulosis.  There is a fat containing left inguinal hernia.  Visualized osseous structures demonstrate heterogeneous marrow signal intensity without definite focal lesions.  Mild degenerative changes of the lower lumbar spine noted.  Impression:  PIRADS 4 lesion within the left apex posterior peripheral zone laterally, unchanged in appearance from prior.  Overall Assessment:  PIRADS 4 (clinically significant cancer is likely to be present)  Number of targets created for potential MR/US fusion biopsy  Peripheral zone: 0.  Transition zone: 0.    IMPRESSION:  Encounter Diagnoses   Name Primary?    Adenocarcinoma of prostate Yes    Enlarged prostate with  urinary obstruction     ED (erectile dysfunction) of organic origin          PLAN:  Problem List Items Addressed This Visit       Adenocarcinoma of prostate - Primary    Relevant Orders    Prostate Specific Antigen, Diagnostic    MRI Prostate W W/O Contrast    Prostate Specific Antigen, Diagnostic     Other Visit Diagnoses       Enlarged prostate with urinary obstruction        Relevant Medications    finasteride (PROSCAR) 5 mg tablet    tamsulosin (FLOMAX) 0.4 mg Cap    ED (erectile dysfunction) of organic origin        Relevant Medications    tadalafiL (CIALIS) 20 MG Tab            1. Nelson 3 + 3 prostate cancer was diagnosed in 2016.  Has been on active surveillance.  Has been on flomax and finasteride due to LUTS.  His repeat PSA in 2019 did not show any cancer including the target bx.  Will continue to follow up with serial PSA.  PSA today.  Will have him follow up in 3 months with PSA and MRI of prostate.    2. Enlarged prostate with luts   Continue both tamsulosin and finasteride  3. Erectile dysfunction   Cialis working well, continue  4.  Have PSA drawn in 6 mths, rtc in 1 yr or sooner prn    I spent 40 minutes with the patient. Over 50% of the visit was spent in counseling.    Follow up in about 3 months (around 1/20/2024) for PSA, MRI of prostate.     Varun Resendiz MD

## 2024-01-01 ENCOUNTER — HOSPITAL ENCOUNTER (INPATIENT)
Facility: HOSPITAL | Age: 64
LOS: 2 days | Discharge: HOME | DRG: 442 | End: 2024-01-03
Attending: EMERGENCY MEDICINE | Admitting: FAMILY MEDICINE
Payer: MEDICARE

## 2024-01-01 DIAGNOSIS — R07.9 CHEST PAIN: ICD-10-CM

## 2024-01-01 DIAGNOSIS — E80.6 HYPERBILIRUBINEMIA: ICD-10-CM

## 2024-01-01 DIAGNOSIS — I81 PORTAL VEIN THROMBOSIS: Primary | ICD-10-CM

## 2024-01-01 PROBLEM — Z79.899 CHRONIC PRESCRIPTION BENZODIAZEPINE USE: Chronic | Status: ACTIVE | Noted: 2024-01-01

## 2024-01-01 PROBLEM — R65.10 SIRS (SYSTEMIC INFLAMMATORY RESPONSE SYNDROME): Status: ACTIVE | Noted: 2024-01-01

## 2024-01-01 LAB
ALBUMIN SERPL BCP-MCNC: 3.1 G/DL (ref 3.5–5.2)
ALBUMIN SERPL BCP-MCNC: 3.1 G/DL (ref 3.5–5.2)
ALP SERPL-CCNC: 251 U/L (ref 55–135)
ALP SERPL-CCNC: 251 U/L (ref 55–135)
ALT SERPL W/O P-5'-P-CCNC: 149 U/L (ref 10–44)
ALT SERPL W/O P-5'-P-CCNC: 149 U/L (ref 10–44)
ANION GAP SERPL CALC-SCNC: 13 MMOL/L (ref 8–16)
APAP SERPL-MCNC: <3 UG/ML (ref 10–20)
AST SERPL-CCNC: 61 U/L (ref 10–40)
AST SERPL-CCNC: 61 U/L (ref 10–40)
BACTERIA #/AREA URNS AUTO: ABNORMAL /HPF
BASOPHILS # BLD AUTO: 0.03 K/UL (ref 0–0.2)
BASOPHILS NFR BLD: 0.3 % (ref 0–1.9)
BILIRUB DIRECT SERPL-MCNC: 3.6 MG/DL (ref 0.1–0.3)
BILIRUB SERPL-MCNC: 5.1 MG/DL (ref 0.1–1)
BILIRUB SERPL-MCNC: 5.1 MG/DL (ref 0.1–1)
BILIRUB UR QL STRIP: ABNORMAL
BUN SERPL-MCNC: 14 MG/DL (ref 8–23)
BUN SERPL-MCNC: 15 MG/DL (ref 6–30)
CALCIUM SERPL-MCNC: 11.1 MG/DL (ref 8.7–10.5)
CHLORIDE SERPL-SCNC: 102 MMOL/L (ref 95–110)
CHLORIDE SERPL-SCNC: 103 MMOL/L (ref 95–110)
CLARITY UR REFRACT.AUTO: CLEAR
CO2 SERPL-SCNC: 24 MMOL/L (ref 23–29)
COLOR UR AUTO: ABNORMAL
CREAT SERPL-MCNC: 1.1 MG/DL (ref 0.5–1.4)
CREAT SERPL-MCNC: 1.2 MG/DL (ref 0.5–1.4)
DIFFERENTIAL METHOD BLD: ABNORMAL
EOSINOPHIL # BLD AUTO: 0 K/UL (ref 0–0.5)
EOSINOPHIL NFR BLD: 0.1 % (ref 0–8)
ERYTHROCYTE [DISTWIDTH] IN BLOOD BY AUTOMATED COUNT: 12.8 % (ref 11.5–14.5)
EST. GFR  (NO RACE VARIABLE): >60 ML/MIN/1.73 M^2
GLUCOSE SERPL-MCNC: 103 MG/DL (ref 70–110)
GLUCOSE SERPL-MCNC: 96 MG/DL (ref 70–110)
GLUCOSE UR QL STRIP: NEGATIVE
HCT VFR BLD AUTO: 42.1 % (ref 40–54)
HCT VFR BLD CALC: 42 %PCV (ref 36–54)
HGB BLD-MCNC: 13.6 G/DL (ref 14–18)
HGB UR QL STRIP: NEGATIVE
HYALINE CASTS UR QL AUTO: 0 /LPF
IMM GRANULOCYTES # BLD AUTO: 0.04 K/UL (ref 0–0.04)
IMM GRANULOCYTES NFR BLD AUTO: 0.3 % (ref 0–0.5)
INFLUENZA A, MOLECULAR: NEGATIVE
INFLUENZA B, MOLECULAR: NEGATIVE
KETONES UR QL STRIP: NEGATIVE
LACTATE SERPL-SCNC: 0.9 MMOL/L (ref 0.5–2.2)
LEUKOCYTE ESTERASE UR QL STRIP: NEGATIVE
LIPASE SERPL-CCNC: 27 U/L (ref 4–60)
LYMPHOCYTES # BLD AUTO: 1.1 K/UL (ref 1–4.8)
LYMPHOCYTES NFR BLD: 9.7 % (ref 18–48)
MCH RBC QN AUTO: 30.7 PG (ref 27–31)
MCHC RBC AUTO-ENTMCNC: 32.3 G/DL (ref 32–36)
MCV RBC AUTO: 95 FL (ref 82–98)
MICROSCOPIC COMMENT: ABNORMAL
MONOCYTES # BLD AUTO: 1 K/UL (ref 0.3–1)
MONOCYTES NFR BLD: 8.4 % (ref 4–15)
NEUTROPHILS # BLD AUTO: 9.4 K/UL (ref 1.8–7.7)
NEUTROPHILS NFR BLD: 81.2 % (ref 38–73)
NITRITE UR QL STRIP: NEGATIVE
NRBC BLD-RTO: 0 /100 WBC
PH UR STRIP: 5 [PH] (ref 5–8)
PLATELET # BLD AUTO: 314 K/UL (ref 150–450)
PMV BLD AUTO: 9.3 FL (ref 9.2–12.9)
POC IONIZED CALCIUM: 1.15 MMOL/L (ref 1.06–1.42)
POC TCO2 (MEASURED): 26 MMOL/L (ref 23–29)
POTASSIUM BLD-SCNC: 3.7 MMOL/L (ref 3.5–5.1)
POTASSIUM SERPL-SCNC: 3.8 MMOL/L (ref 3.5–5.1)
PROT SERPL-MCNC: 8.1 G/DL (ref 6–8.4)
PROT SERPL-MCNC: 8.1 G/DL (ref 6–8.4)
PROT UR QL STRIP: ABNORMAL
RBC # BLD AUTO: 4.43 M/UL (ref 4.6–6.2)
RBC #/AREA URNS AUTO: 5 /HPF (ref 0–4)
SAMPLE: NORMAL
SARS-COV-2 RDRP RESP QL NAA+PROBE: NEGATIVE
SODIUM BLD-SCNC: 139 MMOL/L (ref 136–145)
SODIUM SERPL-SCNC: 139 MMOL/L (ref 136–145)
SP GR UR STRIP: >=1.03 (ref 1–1.03)
SPECIMEN SOURCE: NORMAL
SQUAMOUS #/AREA URNS AUTO: 0 /HPF
URN SPEC COLLECT METH UR: ABNORMAL
WBC # BLD AUTO: 11.61 K/UL (ref 3.9–12.7)
WBC #/AREA URNS AUTO: 4 /HPF (ref 0–5)

## 2024-01-01 PROCEDURE — 83605 ASSAY OF LACTIC ACID: CPT | Performed by: EMERGENCY MEDICINE

## 2024-01-01 PROCEDURE — 87502 INFLUENZA DNA AMP PROBE: CPT | Performed by: EMERGENCY MEDICINE

## 2024-01-01 PROCEDURE — 99285 EMERGENCY DEPT VISIT HI MDM: CPT | Mod: 25

## 2024-01-01 PROCEDURE — 81001 URINALYSIS AUTO W/SCOPE: CPT | Performed by: PHYSICIAN ASSISTANT

## 2024-01-01 PROCEDURE — 96374 THER/PROPH/DIAG INJ IV PUSH: CPT | Mod: 59

## 2024-01-01 PROCEDURE — 63600175 PHARM REV CODE 636 W HCPCS: Performed by: EMERGENCY MEDICINE

## 2024-01-01 PROCEDURE — 25000003 PHARM REV CODE 250: Performed by: STUDENT IN AN ORGANIZED HEALTH CARE EDUCATION/TRAINING PROGRAM

## 2024-01-01 PROCEDURE — 80143 DRUG ASSAY ACETAMINOPHEN: CPT | Performed by: EMERGENCY MEDICINE

## 2024-01-01 PROCEDURE — 96361 HYDRATE IV INFUSION ADD-ON: CPT

## 2024-01-01 PROCEDURE — 85025 COMPLETE CBC W/AUTO DIFF WBC: CPT | Performed by: PHYSICIAN ASSISTANT

## 2024-01-01 PROCEDURE — 25500020 PHARM REV CODE 255: Performed by: EMERGENCY MEDICINE

## 2024-01-01 PROCEDURE — 12000002 HC ACUTE/MED SURGE SEMI-PRIVATE ROOM

## 2024-01-01 PROCEDURE — 80053 COMPREHEN METABOLIC PANEL: CPT | Performed by: EMERGENCY MEDICINE

## 2024-01-01 PROCEDURE — 80047 BASIC METABLC PNL IONIZED CA: CPT

## 2024-01-01 PROCEDURE — U0002 COVID-19 LAB TEST NON-CDC: HCPCS | Performed by: EMERGENCY MEDICINE

## 2024-01-01 PROCEDURE — 83690 ASSAY OF LIPASE: CPT | Performed by: EMERGENCY MEDICINE

## 2024-01-01 PROCEDURE — 25000003 PHARM REV CODE 250: Performed by: EMERGENCY MEDICINE

## 2024-01-01 RX ORDER — SODIUM CHLORIDE 0.9 % (FLUSH) 0.9 %
1-10 SYRINGE (ML) INJECTION EVERY 12 HOURS PRN
Status: DISCONTINUED | OUTPATIENT
Start: 2024-01-01 | End: 2024-01-03 | Stop reason: HOSPADM

## 2024-01-01 RX ORDER — POLYETHYLENE GLYCOL 3350 17 G/17G
17 POWDER, FOR SOLUTION ORAL DAILY PRN
Status: DISCONTINUED | OUTPATIENT
Start: 2024-01-01 | End: 2024-01-03 | Stop reason: HOSPADM

## 2024-01-01 RX ORDER — FENTANYL CITRATE 50 UG/ML
75 INJECTION, SOLUTION INTRAMUSCULAR; INTRAVENOUS
Status: DISCONTINUED | OUTPATIENT
Start: 2024-01-01 | End: 2024-01-01

## 2024-01-01 RX ORDER — OXYCODONE HYDROCHLORIDE 5 MG/1
5 TABLET ORAL EVERY 6 HOURS PRN
Status: DISCONTINUED | OUTPATIENT
Start: 2024-01-02 | End: 2024-01-03 | Stop reason: HOSPADM

## 2024-01-01 RX ORDER — MAG HYDROX/ALUMINUM HYD/SIMETH 200-200-20
30 SUSPENSION, ORAL (FINAL DOSE FORM) ORAL 4 TIMES DAILY PRN
Status: DISCONTINUED | OUTPATIENT
Start: 2024-01-01 | End: 2024-01-03 | Stop reason: HOSPADM

## 2024-01-01 RX ORDER — SIMETHICONE 80 MG
1 TABLET,CHEWABLE ORAL 4 TIMES DAILY PRN
Status: DISCONTINUED | OUTPATIENT
Start: 2024-01-01 | End: 2024-01-03 | Stop reason: HOSPADM

## 2024-01-01 RX ORDER — HYDROMORPHONE HYDROCHLORIDE 1 MG/ML
0.5 INJECTION, SOLUTION INTRAMUSCULAR; INTRAVENOUS; SUBCUTANEOUS
Status: COMPLETED | OUTPATIENT
Start: 2024-01-01 | End: 2024-01-01

## 2024-01-01 RX ORDER — DULOXETIN HYDROCHLORIDE 30 MG/1
30 CAPSULE, DELAYED RELEASE ORAL DAILY
Status: DISCONTINUED | OUTPATIENT
Start: 2024-01-02 | End: 2024-01-03 | Stop reason: HOSPADM

## 2024-01-01 RX ORDER — FINASTERIDE 5 MG/1
5 TABLET, FILM COATED ORAL DAILY
Status: DISCONTINUED | OUTPATIENT
Start: 2024-01-02 | End: 2024-01-03 | Stop reason: HOSPADM

## 2024-01-01 RX ORDER — TAMSULOSIN HYDROCHLORIDE 0.4 MG/1
1 CAPSULE ORAL NIGHTLY
Status: DISCONTINUED | OUTPATIENT
Start: 2024-01-01 | End: 2024-01-03 | Stop reason: HOSPADM

## 2024-01-01 RX ORDER — ARIPIPRAZOLE 5 MG/1
5 TABLET ORAL DAILY
Status: DISCONTINUED | OUTPATIENT
Start: 2024-01-02 | End: 2024-01-03 | Stop reason: HOSPADM

## 2024-01-01 RX ORDER — ACETAMINOPHEN 325 MG/1
650 TABLET ORAL EVERY 4 HOURS PRN
Status: DISCONTINUED | OUTPATIENT
Start: 2024-01-01 | End: 2024-01-03 | Stop reason: HOSPADM

## 2024-01-01 RX ORDER — ACETAMINOPHEN 325 MG/1
650 TABLET ORAL EVERY 8 HOURS PRN
Status: DISCONTINUED | OUTPATIENT
Start: 2024-01-01 | End: 2024-01-03 | Stop reason: HOSPADM

## 2024-01-01 RX ORDER — ONDANSETRON 8 MG/1
8 TABLET, ORALLY DISINTEGRATING ORAL EVERY 8 HOURS PRN
Status: DISCONTINUED | OUTPATIENT
Start: 2024-01-01 | End: 2024-01-03 | Stop reason: HOSPADM

## 2024-01-01 RX ORDER — ENOXAPARIN SODIUM 100 MG/ML
40 INJECTION SUBCUTANEOUS EVERY 24 HOURS
Status: DISCONTINUED | OUTPATIENT
Start: 2024-01-02 | End: 2024-01-02

## 2024-01-01 RX ORDER — NALOXONE HCL 0.4 MG/ML
0.02 VIAL (ML) INJECTION
Status: DISCONTINUED | OUTPATIENT
Start: 2024-01-01 | End: 2024-01-03 | Stop reason: HOSPADM

## 2024-01-01 RX ORDER — KETOROLAC TROMETHAMINE 30 MG/ML
15 INJECTION, SOLUTION INTRAMUSCULAR; INTRAVENOUS
Status: COMPLETED | OUTPATIENT
Start: 2024-01-01 | End: 2024-01-01

## 2024-01-01 RX ORDER — TALC
6 POWDER (GRAM) TOPICAL NIGHTLY PRN
Status: DISCONTINUED | OUTPATIENT
Start: 2024-01-01 | End: 2024-01-03 | Stop reason: HOSPADM

## 2024-01-01 RX ORDER — DIAZEPAM 5 MG/1
10 TABLET ORAL 3 TIMES DAILY PRN
Status: DISCONTINUED | OUTPATIENT
Start: 2024-01-01 | End: 2024-01-03 | Stop reason: HOSPADM

## 2024-01-01 RX ADMIN — IOHEXOL 75 ML: 350 INJECTION, SOLUTION INTRAVENOUS at 07:01

## 2024-01-01 RX ADMIN — MIRTAZAPINE 45 MG: 30 TABLET, FILM COATED ORAL at 10:01

## 2024-01-01 RX ADMIN — SODIUM CHLORIDE, POTASSIUM CHLORIDE, SODIUM LACTATE AND CALCIUM CHLORIDE 2244 ML: 600; 310; 30; 20 INJECTION, SOLUTION INTRAVENOUS at 06:01

## 2024-01-01 RX ADMIN — TAMSULOSIN HYDROCHLORIDE 0.4 MG: 0.4 CAPSULE ORAL at 10:01

## 2024-01-01 RX ADMIN — KETOROLAC TROMETHAMINE 15 MG: 30 INJECTION, SOLUTION INTRAMUSCULAR; INTRAVENOUS at 05:01

## 2024-01-01 RX ADMIN — PIPERACILLIN SODIUM AND TAZOBACTAM SODIUM 4.5 G: 4; .5 INJECTION, POWDER, FOR SOLUTION INTRAVENOUS at 08:01

## 2024-01-01 RX ADMIN — HYDROMORPHONE HYDROCHLORIDE 0.5 MG: 1 INJECTION, SOLUTION INTRAMUSCULAR; INTRAVENOUS; SUBCUTANEOUS at 08:01

## 2024-01-01 NOTE — ED PROVIDER NOTES
Encounter Date: 1/1/2024       History     Chief Complaint   Patient presents with    Hematuria     Lower back pain, not on blood thinners     63-year-old past medical history complex regional pain syndrome of right lower limb, chronic back pain, GERD hypertension presenting with worsening back pain hematuria.  Patient mentions having back pain for months evaluated for pain initially plan for nerve ablation however patient deferred procedure.  Patient mentions for past 3 days having new onset hematuria.  Denies any flank pain, dysuria, polyuria, fevers chills nausea vomiting or diarrhea.  Patient denies any radiation back pain currently worsening to legs new onset numbness, tingling, weakness denies any additional further changes to bladder or bowel habits.      Review of patient's allergies indicates:   Allergen Reactions    Morphine Palpitations     Past Medical History:   Diagnosis Date    Complex regional pain syndrome i of right lower limb     GERD (gastroesophageal reflux disease)     HTN (hypertension)      Past Surgical History:   Procedure Laterality Date    COLONOSCOPY N/A 9/29/2017    Procedure: COLONOSCOPY;  Surgeon: Jamar Edwards MD;  Location: [address]);  Service: Endoscopy;  Laterality: N/A;    JOINT REPLACEMENT Right     knee    KNEE ARTHROSCOPY W/ ACL RECONSTRUCTION  2014    right     Family History   Problem Relation Age of Onset    Hypertension Mother     Heart disease Father     Diabetes Father     Cancer Brother         unknown type    No Known Problems Daughter     No Known Problems Daughter     No Known Problems Daughter     No Known Problems Daughter     No Known Problems Son     No Known Problems Son      Social History     Tobacco Use    Smoking status: Never    Smokeless tobacco: Never   Substance Use Topics    Alcohol use: Yes     Alcohol/week: 0.8 standard drinks of alcohol     Types: 1 Standard drinks or equivalent per week     Comment: once every few months    Drug use: No      Review of Systems    Physical Exam     Initial Vitals [01/01/24 1210]   BP Pulse Resp Temp SpO2   121/78 108 18 98.3 °F (36.8 °C) 97 %      MAP       --         Physical Exam    Nursing note and vitals reviewed.  Constitutional: He appears well-developed and well-nourished. He is not diaphoretic. No distress.   HENT:   Head: Normocephalic and atraumatic.   Nose: Nose normal.   Eyes: Conjunctivae and EOM are normal. Pupils are equal, round, and reactive to light. No scleral icterus.   Neck: Neck supple.   Normal range of motion.  Cardiovascular:  Normal rate and regular rhythm.     Exam reveals no gallop and no friction rub.       No murmur heard.  Pulmonary/Chest: Breath sounds normal. No respiratory distress. He has no wheezes. He has no rhonchi. He has no rales.   Abdominal: Abdomen is soft. Bowel sounds are normal. There is no abdominal tenderness. There is no rebound and no guarding.   Genitourinary:    Genitourinary Comments: No flank pain no midline spinal tenderness to palpation.     Musculoskeletal:         General: No tenderness or edema. Normal range of motion.      Cervical back: Normal range of motion and neck supple.     Neurological: He is alert and oriented to person, place, and time. He has normal strength. No sensory deficit. GCS score is 15. GCS eye subscore is 4. GCS verbal subscore is 5. GCS motor subscore is 6.   Bilateral lower extremity strength 5/5 sensation grossly intact to light touch.   Skin: Skin is warm and dry. No rash noted. No erythema. No pallor.   Psychiatric: He has a normal mood and affect. His behavior is normal. Judgment and thought content normal.         ED Course   Procedures  Labs Reviewed   CBC W/ AUTO DIFFERENTIAL - Abnormal; Notable for the following components:       Result Value    RBC 4.43 (*)     Hemoglobin 13.6 (*)     Gran # (ANC) 9.4 (*)     Gran % 81.2 (*)     Lymph % 9.7 (*)     All other components within normal limits   URINALYSIS, REFLEX TO URINE CULTURE  - Abnormal; Notable for the following components:    Specific Gravity, UA >=1.030 (*)     Protein, UA 2+ (*)     Bilirubin (UA) 2+ (*)     All other components within normal limits    Narrative:     Specimen Source->Urine   URINALYSIS MICROSCOPIC - Abnormal; Notable for the following components:    RBC, UA 5 (*)     All other components within normal limits    Narrative:     Specimen Source->Urine   COMPREHENSIVE METABOLIC PANEL - Abnormal; Notable for the following components:    Calcium 11.1 (*)     Albumin 3.1 (*)     Total Bilirubin 5.1 (*)     Alkaline Phosphatase 251 (*)     AST 61 (*)      (*)     All other components within normal limits    Narrative:     add on hepatic panel per Ellen Gonzales RN/Juliette Kimball Jr, MD   order# [id number] 01/01/2024 @  16:54    HEPATIC FUNCTION PANEL - Abnormal; Notable for the following components:    Albumin 3.1 (*)     Total Bilirubin 5.1 (*)     Bilirubin, Direct 3.6 (*)     AST 61 (*)      (*)     Alkaline Phosphatase 251 (*)     All other components within normal limits    Narrative:     add on hepatic panel per Ellen Gonzales RN/Juliette Kimball Jr, MD   order# [id number] 01/01/2024 @  16:54    ACETAMINOPHEN LEVEL - Abnormal; Notable for the following components:    Acetaminophen (Tylenol), Serum <3.0 (*)     All other components within normal limits    Narrative:     add on ACETM per Sam Connell MD to order number [id number] on    01/01/2024  18:51         add on hepatic panel per Ellen Gonzales RN/Juliette Kimball Jr, MD   order# [id number] 01/01/2024 @  16:54    INFLUENZA A & B BY MOLECULAR   HEPATIC FUNCTION PANEL   LACTIC ACID, PLASMA   LIPASE   SARS-COV-2 RNA AMPLIFICATION, QUAL   ACETAMINOPHEN LEVEL   HEPATITIS PANEL, ACUTE   BASIC METABOLIC PANEL   HEPATIC FUNCTION PANEL   PROTIME-INR   CBC W/ AUTO DIFFERENTIAL   ISTAT PROCEDURE   ISTAT CHEM8          Imaging Results               US Liver with Doppler (xpd) (Final result)  Result time 01/01/24  22:09:58   Procedure changed from US Abdomen Limited with Doppler (xpd)     Final result by Juan Perea MD (01/01/24 22:09:58)                   Impression:      Thrombosis of the posterior branch of the right portal vein.  Nonocclusive thrombus in the main portal vein.    Hepatomegaly.    Cholelithiasis.    This report was flagged in Epic as abnormal.    Electronically signed by resident: Zoe Andujar  Date:    01/01/2024  Time:    21:40    Electronically signed by: Juan Perea MD  Date:    01/01/2024  Time:    22:09               Narrative:    EXAMINATION:  US LIVER WITH DOPPLER    CLINICAL HISTORY:  RUQ abd pain, LFT abnormality;    TECHNIQUE:  Duplex scan of the liver was performed using B-mode/gray scale imaging and Doppler spectral analysis and color flow.    COMPARISON:  CT abdomen pelvis 01/01/2024    FINDINGS:  The liver measures 18.1 cm and demonstrates homogeneous echotexture. No focal hepatic parenchymal abnormality. No intra or extrahepatic bile duct dilatation. The common duct measures 4 mm.    The middle, right, and left hepatic veins are patent and unremarkable. The main portal vein is mildly dilated measuring up to 16 mm and contains a nonocclusive thrombus.  The posterior branch of the right portal vein is thrombosed.  The right anterior branch and left branch of the portal vein demonstrate hepatopetal flow and are unremarkable.    The pancreas and visualized aorta are unremarkable. The gallbladder contains sludge and small stones.  No gallbladder wall thickening.  The spleen measures 11.2 x 4.5 cm and is unremarkable.    The hepatic arterial system is unremarkable.    The IVC is unremarkable.  There is no ascites.                                        CT Abdomen Pelvis With IV Contrast NO Oral Contrast (Final result)  Result time 01/01/24 19:36:59      Final result by Jerson Lau MD (01/01/24 19:36:59)                   Impression:      This report was flagged in Epic as  abnormal.    1. Findings suggesting hepatic steatosis, correlation with LFTs recommended.  There is a somewhat heterogeneous appearance to the posterior aspect of the right hepatic lobe inferiorly.  This may be on the basis of contrast phase however this could be further evaluated with nonemergent MRI as warranted.  2. Multiple scattered colonic diverticula.  There is questionable slight indistinctness about a few diverticula at the level of the distal descending colon versus motion artifact.  Correlation is advised, early sequela of diverticulitis not excluded.  3. Prostatomegaly.  4. Pulmonary nodules as described.  5. Please see above for several additional findings.      Electronically signed by: Jerson Lau MD  Date:    01/01/2024  Time:    19:36               Narrative:    EXAMINATION:  CT ABDOMEN PELVIS WITH IV CONTRAST    CLINICAL HISTORY:  Flank pain, kidney stone suspected;hematuria with lower back pain;    TECHNIQUE:  Low dose axial images, sagittal and coronal reformations were obtained from the lung bases to the pubic symphysis following the IV administration of 75 mL of Omnipaque 350 .  Oral contrast was not given.    COMPARISON:  CT 02/08/2013    FINDINGS:  Images of the lower thorax are remarkable for a 3 mm pulmonary nodule within the anterior aspect of the right middle lobe, stable since 2019.  There is bilateral basilar dependent atelectasis/scarring.  There is a 2 mm pulmonary nodule along the periphery of the lingula laterally, also stable.    The liver is hypoattenuating suggesting steatosis, correlation with LFTs recommended.  The liver is enlarged.  There is a punctate focus of low attenuation within the left hepatic lobe, too small for characterization.  There is somewhat heterogeneous appearance of the inferior aspect of the right hepatic lobe.  Possibly related to contrast phase.  The spleen, pancreas and adrenal glands are grossly unremarkable.  The gallbladder is decompressed, no  secondary findings to suggest acute cholecystitis.  The portal vein, splenic vein, SMV, celiac axis and SMA all are patent.  There are a few scattered prominent leisa hepatic lymph nodes and a few scattered upper limit of normal caliber abdominal lymph nodes.    There is excretion of contrast by the kidneys, limiting evaluation for nephrolithiasis.  No hydronephrosis.  There is a subcentimeter low attenuating lesion within the interpolar region of the right kidney, too small for characterization.  The bilateral ureters are unremarkable noting contrast within the ureteral lumen is limits evaluation for calculi.  The urinary bladder is unremarkable.  There is contrast within the urinary bladder.  The prostate is enlarged.    There are multiple scattered colonic diverticula noting questionable slight indistinctness about a few diverticula involving the distal descending colon versus artifact.  The terminal ileum and appendix are unremarkable.  The small bowel is grossly unremarkable.  There are a few scattered shotty periaortic, pericaval, and mesenteric lymph nodes.  No focal organized pelvic fluid collection.  There is a fat containing left inguinal hernia.    There are degenerative changes of the spine.  No significant inguinal lymphadenopathy.                                       X-Ray Chest AP Portable (Final result)  Result time 01/01/24 18:07:36      Final result by Nahid Asher DO (01/01/24 18:07:36)                   Impression:      No acute abnormality.    Stable pulmonary nodule.      Electronically signed by: Nahid Asher  Date:    01/01/2024  Time:    18:07               Narrative:    EXAMINATION:  XR CHEST AP PORTABLE    CLINICAL HISTORY:  fever;    TECHNIQUE:  Single frontal view of the chest was performed.    COMPARISON:  12/24/2017.    FINDINGS:  Again seen is a 2.5 cm nodular opacity overlying the right mid to upper lung, stable.  The lungs are otherwise clear.  No new focal consolidation.   The pleural spaces are clear.  The cardiac silhouette is unremarkable.  Osseous structures are intact.                                       Medications   ARIPiprazole tablet 5 mg (has no administration in time range)   diazePAM tablet 10 mg (has no administration in time range)   DULoxetine DR capsule 30 mg (has no administration in time range)   finasteride tablet 5 mg (has no administration in time range)   mirtazapine tablet 45 mg (45 mg Oral Given 1/1/24 2250)   tamsulosin 24 hr capsule 0.4 mg (0.4 mg Oral Given 1/1/24 2250)   sodium chloride 0.9% flush 1-10 mL (has no administration in time range)   melatonin tablet 6 mg (has no administration in time range)   ondansetron disintegrating tablet 8 mg (has no administration in time range)   polyethylene glycol packet 17 g (has no administration in time range)   acetaminophen tablet 650 mg (650 mg Oral Given 1/2/24 0116)   simethicone chewable tablet 80 mg (has no administration in time range)   aluminum-magnesium hydroxide-simethicone 200-200-20 mg/5 mL suspension 30 mL (has no administration in time range)   acetaminophen tablet 650 mg (has no administration in time range)   naloxone 0.4 mg/mL injection 0.02 mg (has no administration in time range)   enoxaparin injection 40 mg (has no administration in time range)   piperacillin-tazobactam (ZOSYN) 4.5 g in dextrose 5 % in water (D5W) 100 mL IVPB (MB+) (0 g Intravenous Stopped 1/2/24 1021)   oxyCODONE immediate release tablet 5 mg (has no administration in time range)   ketorolac injection 15 mg (15 mg Intravenous Given 1/1/24 1738)   lactated ringers bolus 2,244 mL (0 mLs Intravenous Stopped 1/1/24 2048)   iohexoL (OMNIPAQUE 350) injection 75 mL (75 mLs Intravenous Given 1/1/24 1919)   HYDROmorphone injection 0.5 mg (0.5 mg Intravenous Given 1/1/24 2041)   piperacillin-tazobactam (ZOSYN) 4.5 g in dextrose 5 % in water (D5W) 100 mL IVPB (MB+) (0 g Intravenous Stopped 1/1/24 2211)     Medical Decision  Making  63-year-old presenting with back pain hematuria    Ddx includes but is not limited to:   Lumbar sacral strain, sprain, disc herniation, radiculopathy,sciatica, UTI, kidney stone, abdominal aortic pathology-  dissection, AAA Considered cauda equina, conus medullaris and epidural abscess/hematoma however lower likelihood at this time considering pt's history, PE and overall  presentation.     Plan:  Will obtain CBC, CMP, UA, CT scan reassess.        Amount and/or Complexity of Data Reviewed  Labs: ordered.  Radiology: ordered. Decision-making details documented in ED Course.    Risk  Prescription drug management.  Decision regarding hospitalization.               ED Course as of 01/02/24 1337   Mon Jan 01, 2024   1733 Pt signed out to Dr Vargas pendinglabs and CT    [DC]   1752 CT Abdomen Pelvis With IV Contrast NO Oral Contrast [AA]   1844 CT Abdomen Pelvis With IV Contrast NO Oral Contrast [AA]   1845 CT Abdomen Pelvis With IV Contrast NO Oral Contrast [AA]      ED Course User Index  [AA] Becki Vargas MD  [DC] Juliette Kimball Jr., MD                Patient signed out to Dr. Vargas pending CT scan.           Clinical Impression:  Final diagnoses:  [E80.6] Hyperbilirubinemia          ED Disposition Condition    Admit                 Juliette Kimball Jr., MD  01/02/24 0736

## 2024-01-01 NOTE — FIRST PROVIDER EVALUATION
Emergency Department TeleTriage Encounter Note      CHIEF COMPLAINT    Chief Complaint   Patient presents with    Hematuria     Lower back pain, not on blood thinners       VITAL SIGNS   Initial Vitals [01/01/24 1210]   BP Pulse Resp Temp SpO2   121/78 108 18 98.3 °F (36.8 °C) 97 %      MAP       --            ALLERGIES    Review of patient's allergies indicates:   Allergen Reactions    Morphine Palpitations       PROVIDER TRIAGE NOTE  Patient presents with low back pain with pain radiating to the RLE (history of same). Also reports hematuria. No dysuria.       ORDERS  Labs Reviewed - No data to display    ED Orders (720h ago, onward)      None              Virtual Visit Note: The provider triage portion of this emergency department evaluation and documentation was performed via [employer], a HIPAA-compliant telemedicine application, in concert with a tele-presenter in the room. A face to face patient evaluation with one of my colleagues will occur once the patient is placed in an emergency department room.      DISCLAIMER: This note was prepared with [email or web address] voice recognition transcription software. Garbled syntax, mangled pronouns, and other bizarre constructions may be attributed to that software system.

## 2024-01-01 NOTE — ED NOTES
I-STAT Chem-8+ Results:   Value Reference Range   Sodium 139 136-145 mmol/L   Potassium  3.7 3.5-5.1 mmol/L   Chloride 103  mmol/L   Ionized Calcium 1.15 1.06-1.42 mmol/L   CO2 (measured) 26 23-29 mmol/L   Glucose 103  mg/dL   BUN 15 6-30 mg/dL   Creatinine 1.2 0.5-1.4 mg/dL   Hematocrit 42 36-54%

## 2024-01-02 PROBLEM — I81 PORTAL VEIN THROMBOSIS: Status: ACTIVE | Noted: 2024-01-02

## 2024-01-02 LAB
APTT PPP: 31 SEC (ref 21–32)
APTT PPP: 37.3 SEC (ref 21–32)

## 2024-01-02 PROCEDURE — 63600175 PHARM REV CODE 636 W HCPCS: Performed by: HOSPITALIST

## 2024-01-02 PROCEDURE — 25000003 PHARM REV CODE 250: Performed by: STUDENT IN AN ORGANIZED HEALTH CARE EDUCATION/TRAINING PROGRAM

## 2024-01-02 PROCEDURE — 85730 THROMBOPLASTIN TIME PARTIAL: CPT | Mod: 91 | Performed by: HOSPITALIST

## 2024-01-02 PROCEDURE — 99232 SBSQ HOSP IP/OBS MODERATE 35: CPT | Mod: GC,,, | Performed by: INTERNAL MEDICINE

## 2024-01-02 PROCEDURE — 63600175 PHARM REV CODE 636 W HCPCS: Performed by: STUDENT IN AN ORGANIZED HEALTH CARE EDUCATION/TRAINING PROGRAM

## 2024-01-02 PROCEDURE — 20600001 HC STEP DOWN PRIVATE ROOM

## 2024-01-02 PROCEDURE — 99222 1ST HOSP IP/OBS MODERATE 55: CPT | Mod: GC,,, | Performed by: INTERNAL MEDICINE

## 2024-01-02 PROCEDURE — 36415 COLL VENOUS BLD VENIPUNCTURE: CPT | Mod: XB | Performed by: HOSPITALIST

## 2024-01-02 RX ORDER — HEPARIN SODIUM,PORCINE/D5W 25000/250
0-40 INTRAVENOUS SOLUTION INTRAVENOUS CONTINUOUS
Status: DISCONTINUED | OUTPATIENT
Start: 2024-01-02 | End: 2024-01-03

## 2024-01-02 RX ADMIN — HEPARIN SODIUM 18 UNITS/KG/HR: 10000 INJECTION, SOLUTION INTRAVENOUS at 04:01

## 2024-01-02 RX ADMIN — PIPERACILLIN SODIUM AND TAZOBACTAM SODIUM 4.5 G: 4; .5 INJECTION, POWDER, FOR SOLUTION INTRAVENOUS at 02:01

## 2024-01-02 RX ADMIN — ACETAMINOPHEN 650 MG: 325 TABLET ORAL at 01:01

## 2024-01-02 RX ADMIN — PIPERACILLIN SODIUM AND TAZOBACTAM SODIUM 4.5 G: 4; .5 INJECTION, POWDER, FOR SOLUTION INTRAVENOUS at 06:01

## 2024-01-02 RX ADMIN — TAMSULOSIN HYDROCHLORIDE 0.4 MG: 0.4 CAPSULE ORAL at 08:01

## 2024-01-02 RX ADMIN — FINASTERIDE 5 MG: 5 TABLET, FILM COATED ORAL at 02:01

## 2024-01-02 RX ADMIN — DULOXETINE HYDROCHLORIDE 30 MG: 30 CAPSULE, DELAYED RELEASE ORAL at 02:01

## 2024-01-02 RX ADMIN — ARIPIPRAZOLE 5 MG: 5 TABLET ORAL at 02:01

## 2024-01-02 RX ADMIN — MIRTAZAPINE 45 MG: 30 TABLET, FILM COATED ORAL at 08:01

## 2024-01-02 RX ADMIN — OXYCODONE HYDROCHLORIDE 5 MG: 5 TABLET ORAL at 02:01

## 2024-01-02 NOTE — H&P
[facility] - Emergency Dept  [facility] Medicine  History & Physical    Patient Name: Jesus Christy  MRN: [id number]  Patient Class: IP- Inpatient  Admission Date: 1/1/2024  Attending Physician: Jamar Reilly MD   Primary Care Provider: Tara Jolly MD         Patient information was obtained from patient, past medical records, and ER records.     Subjective:     Principal Problem:Direct hyperbilirubinemia    Chief Complaint:   Chief Complaint   Patient presents with    Hematuria     Lower back pain, not on blood thinners        HPI: Jesus Christy is a 63 y.o. male with history of prostate CA, complex regional pain syndrome, anxiety on chronic benzos, HTN who presents today for abdominal pain and dark urine.     He reports that 1-2 days ago, he noted dark urine which he felt was consistent with blood.  He then developed abdominal pain, described as a diffuse bubbling sensation without focal symptoms.  Yesterday, he had 2-3 loose bowel movements and 2-3 loose bowel movements today.  Denies any fever, nausea, or vomiting, however has had decreased appetite.  He has been taking Goody powder 3-4 times per day for the pain.  He also notes chronic lower extremity pain which is progressively increasing, along with headache this morning.      Past Medical History:   Diagnosis Date    Complex regional pain syndrome i of right lower limb     GERD (gastroesophageal reflux disease)     HTN (hypertension)        Past Surgical History:   Procedure Laterality Date    COLONOSCOPY N/A 9/29/2017    Procedure: COLONOSCOPY;  Surgeon: Jamar Edwards MD;  Location: [address];  Service: Endoscopy;  Laterality: N/A;    JOINT REPLACEMENT Right     knee    KNEE ARTHROSCOPY W/ ACL RECONSTRUCTION  2014    right       Review of patient's allergies indicates:   Allergen Reactions    Morphine Palpitations       No current facility-administered medications on file prior to encounter.     Current Outpatient Medications  on File Prior to Encounter   Medication Sig    finasteride (PROSCAR) 5 mg tablet Take 1 tablet (5 mg total) by mouth once daily.    tamsulosin (FLOMAX) 0.4 mg Cap Take 1 capsule (0.4 mg total) by mouth every evening.    ARIPiprazole (ABILIFY) 5 MG Tab Take 5 mg by mouth once daily.    clotrimazole (LOTRIMIN) 1 % cream Apply topically 2 (two) times daily.    diazePAM (VALIUM) 10 MG Tab Take 1 tablet (10 mg total) by mouth 3 (three) times daily as needed (anxiety).    DULoxetine (CYMBALTA) 30 MG capsule Take 30 mg by mouth once daily.    DULoxetine (CYMBALTA) 60 MG capsule     gabapentin (NEURONTIN) 300 MG capsule Take by mouth.    mirtazapine (REMERON) 30 MG tablet Take 45 mg by mouth every evening.    tadalafiL (CIALIS) 20 MG Tab Take 1 tablet (20 mg total) by mouth daily as needed (erectile dysfunction).    UNABLE TO FIND Take 1 packet by mouth 6 (six) times daily. medication name: Goodies Headache Powder     Family History       Problem Relation (Age of Onset)    Cancer Brother    Diabetes Father    Heart disease Father    Hypertension Mother    No Known Problems Daughter, Daughter, Daughter, Daughter, Son, Son          Tobacco Use    Smoking status: Never    Smokeless tobacco: Never   Substance and Sexual Activity    Alcohol use: Yes     Alcohol/week: 0.8 standard drinks of alcohol     Types: 1 Standard drinks or equivalent per week     Comment: once every few months    Drug use: No    Sexual activity: Not on file     Review of Systems   Constitutional:         All pertinent other ROS noted in HPI   All other systems reviewed and are negative.    Objective:     Vital Signs (Most Recent):  Temp: 98.8 °F (37.1 °C) (01/01/24 2005)  Pulse: (!) 112 (01/01/24 2230)  Resp: 19 (01/01/24 2230)  BP: 138/69 (01/01/24 2230)  SpO2: (!) 94 % (01/01/24 2230) Vital Signs (24h Range):  Temp:  [98.3 °F (36.8 °C)-100.6 °F (38.1 °C)] 98.8 °F (37.1 °C)  Pulse:  [104-112] 112  Resp:  [17-20] 19  SpO2:  [94 %-97 %] 94 %  BP:  (121-138)/(69-79) 138/69     Weight: 74.8 kg (165 lb)  Body mass index is 24.37 kg/m².     Physical Exam  Vitals and nursing note reviewed.   Constitutional:       General: He is not in acute distress.     Appearance: He is well-developed. He is not ill-appearing or diaphoretic.   HENT:      Head: Normocephalic and atraumatic.   Eyes:      General: No scleral icterus.     Conjunctiva/sclera: Conjunctivae normal.   Neck:      Vascular: No JVD.   Cardiovascular:      Rate and Rhythm: Regular rhythm. Tachycardia present.   Pulmonary:      Effort: Pulmonary effort is normal. No respiratory distress.      Breath sounds: Normal breath sounds. No wheezing or rales.   Abdominal:      General: There is no distension.      Palpations: Abdomen is soft.      Tenderness: There is no abdominal tenderness. There is no guarding.   Musculoskeletal:      Right lower leg: No edema.      Left lower leg: No edema.   Skin:     General: Skin is warm and dry.   Neurological:      Mental Status: He is alert and oriented to person, place, and time.      Motor: No abnormal muscle tone.   Psychiatric:         Mood and Affect: Mood normal.         Behavior: Behavior normal.                Significant Labs: All pertinent labs within the past 24 hours have been reviewed.  CBC:   Recent Labs   Lab 01/01/24  1348 01/01/24  1637   WBC 11.61  --    HGB 13.6*  --    HCT 42.1 42     --      CMP:   Recent Labs   Lab 01/01/24  1639      K 3.8      CO2 24   GLU 96   BUN 14   CREATININE 1.1   CALCIUM 11.1*   PROT 8.1  8.1   ALBUMIN 3.1*  3.1*   BILITOT 5.1*  5.1*   ALKPHOS 251*  251*   AST 61*  61*   *  149*   ANIONGAP 13       Significant Imaging: I have reviewed all pertinent imaging results/findings within the past 24 hours.  Assessment/Plan:     * Direct hyperbilirubinemia  Presents with abdominal pain and dark urine, and primarily direct hyperbilirubinemia noted on labs along with elevation of AST/ALT. Ct abdomen shows  no obvious pathology. Also meets SIRS criteria with fever and tachycardia. US pending. Will consult GI for further recommendations. Continue Zosyn for possible intra-abdominal infection.       Chronic prescription benzodiazepine use  Verified by PDMP. Will cautiously continue home dosing as to avoid withdrawal.       SIRS (systemic inflammatory response syndrome)  Meets SIRS criteria with tachycardia and fever, however without obvious signs of infection, leukocytosis, or lactic acidosis. UA not consistent with infection, and CXR without acute pathology. Given acutely elevated bili and LFTs, concern for hepatic/biliary pathology. CT abdomen shows some findings for questionable diverticulitis, however not obvious, and this would not correlate with his symptoms. Will continue Zosyn for now while monitoring cultures and pursuing further workup of hyperbili.       Complex regional pain syndrome type 1 of right lower extremity  Continue home Cymbalta.     Adenocarcinoma of prostate  Now on surveillance. Continue Flomax and Proscar.      Essential hypertension  Not on home antihypertensives. Monitor.       VTE Risk Mitigation (From admission, onward)           Ordered     enoxaparin injection 40 mg  Every 24 hours         01/01/24 2224     IP VTE HIGH RISK PATIENT  Once         01/01/24 2224     Place sequential compression device  Until discontinued         01/01/24 2224                   Advance Care Planning   Patient is Full Code on discussion with him.  Patient's surrogate decision maker is his daughters.                    Elijah Joy MD  Department of Hospital Medicine  [facility] - Emergency Dept

## 2024-01-02 NOTE — ASSESSMENT & PLAN NOTE
Presents with abdominal pain and dark urine, and primarily direct hyperbilirubinemia noted on labs along with elevation of AST/ALT. Ct abdomen shows no obvious pathology. Also meets SIRS criteria with fever and tachycardia. US pending. Will consult GI for further recommendations. Continue Zosyn for possible intra-abdominal infection.

## 2024-01-02 NOTE — SUBJECTIVE & OBJECTIVE
Past Medical History:   Diagnosis Date    Complex regional pain syndrome i of right lower limb     GERD (gastroesophageal reflux disease)     HTN (hypertension)        Past Surgical History:   Procedure Laterality Date    COLONOSCOPY N/A 9/29/2017    Procedure: COLONOSCOPY;  Surgeon: Jamar Edwards MD;  Location: [address]);  Service: Endoscopy;  Laterality: N/A;    JOINT REPLACEMENT Right     knee    KNEE ARTHROSCOPY W/ ACL RECONSTRUCTION  2014    right       Review of patient's allergies indicates:   Allergen Reactions    Morphine Palpitations       No current facility-administered medications on file prior to encounter.     Current Outpatient Medications on File Prior to Encounter   Medication Sig    finasteride (PROSCAR) 5 mg tablet Take 1 tablet (5 mg total) by mouth once daily.    tamsulosin (FLOMAX) 0.4 mg Cap Take 1 capsule (0.4 mg total) by mouth every evening.    ARIPiprazole (ABILIFY) 5 MG Tab Take 5 mg by mouth once daily.    clotrimazole (LOTRIMIN) 1 % cream Apply topically 2 (two) times daily.    diazePAM (VALIUM) 10 MG Tab Take 1 tablet (10 mg total) by mouth 3 (three) times daily as needed (anxiety).    DULoxetine (CYMBALTA) 30 MG capsule Take 30 mg by mouth once daily.    DULoxetine (CYMBALTA) 60 MG capsule     gabapentin (NEURONTIN) 300 MG capsule Take by mouth.    mirtazapine (REMERON) 30 MG tablet Take 45 mg by mouth every evening.    tadalafiL (CIALIS) 20 MG Tab Take 1 tablet (20 mg total) by mouth daily as needed (erectile dysfunction).    UNABLE TO FIND Take 1 packet by mouth 6 (six) times daily. medication name: Goodies Headache Powder     Family History       Problem Relation (Age of Onset)    Cancer Brother    Diabetes Father    Heart disease Father    Hypertension Mother    No Known Problems Daughter, Daughter, Daughter, Daughter, Son, Son          Tobacco Use    Smoking status: Never    Smokeless tobacco: Never   Substance and Sexual Activity    Alcohol use: Yes     Alcohol/week:  0.8 standard drinks of alcohol     Types: 1 Standard drinks or equivalent per week     Comment: once every few months    Drug use: No    Sexual activity: Not on file     Review of Systems   Constitutional:         All pertinent other ROS noted in HPI   All other systems reviewed and are negative.    Objective:     Vital Signs (Most Recent):  Temp: 98.8 °F (37.1 °C) (01/01/24 2005)  Pulse: (!) 112 (01/01/24 2230)  Resp: 19 (01/01/24 2230)  BP: 138/69 (01/01/24 2230)  SpO2: (!) 94 % (01/01/24 2230) Vital Signs (24h Range):  Temp:  [98.3 °F (36.8 °C)-100.6 °F (38.1 °C)] 98.8 °F (37.1 °C)  Pulse:  [104-112] 112  Resp:  [17-20] 19  SpO2:  [94 %-97 %] 94 %  BP: (121-138)/(69-79) 138/69     Weight: 74.8 kg (165 lb)  Body mass index is 24.37 kg/m².     Physical Exam  Vitals and nursing note reviewed.   Constitutional:       General: He is not in acute distress.     Appearance: He is well-developed. He is not ill-appearing or diaphoretic.   HENT:      Head: Normocephalic and atraumatic.   Eyes:      General: No scleral icterus.     Conjunctiva/sclera: Conjunctivae normal.   Neck:      Vascular: No JVD.   Cardiovascular:      Rate and Rhythm: Regular rhythm. Tachycardia present.   Pulmonary:      Effort: Pulmonary effort is normal. No respiratory distress.      Breath sounds: Normal breath sounds. No wheezing or rales.   Abdominal:      General: There is no distension.      Palpations: Abdomen is soft.      Tenderness: There is no abdominal tenderness. There is no guarding.   Musculoskeletal:      Right lower leg: No edema.      Left lower leg: No edema.   Skin:     General: Skin is warm and dry.   Neurological:      Mental Status: He is alert and oriented to person, place, and time.      Motor: No abnormal muscle tone.   Psychiatric:         Mood and Affect: Mood normal.         Behavior: Behavior normal.                Significant Labs: All pertinent labs within the past 24 hours have been reviewed.  CBC:   Recent Labs    Lab 01/01/24  1348 01/01/24  1637   WBC 11.61  --    HGB 13.6*  --    HCT 42.1 42     --      CMP:   Recent Labs   Lab 01/01/24  1639      K 3.8      CO2 24   GLU 96   BUN 14   CREATININE 1.1   CALCIUM 11.1*   PROT 8.1  8.1   ALBUMIN 3.1*  3.1*   BILITOT 5.1*  5.1*   ALKPHOS 251*  251*   AST 61*  61*   *  149*   ANIONGAP 13       Significant Imaging: I have reviewed all pertinent imaging results/findings within the past 24 hours.

## 2024-01-02 NOTE — ASSESSMENT & PLAN NOTE
Meets SIRS criteria with tachycardia and fever, however without obvious signs of infection, leukocytosis, or lactic acidosis. UA not consistent with infection, and CXR without acute pathology. Given acutely elevated bili and LFTs, concern for hepatic/biliary pathology. CT abdomen shows some findings for questionable diverticulitis, however not obvious, and this would not correlate with his symptoms. Will continue Zosyn for now while monitoring cultures and pursuing further workup of hyperbili.

## 2024-01-02 NOTE — PROVIDER PROGRESS NOTES - EMERGENCY DEPT.
Encounter Date: 1/1/2024    ED Physician Progress Notes          ED staff SIGN OUT NOTE    Patient s/o to me by Dr. Kimball pending CT a/p    Patient with evidence of new transaminitis, hyperbilirubinemia with possible diverticulitis on CT    Given lab abnormalities and persistent mild abdominal pain, right upper quadrant ultrasound was ordered and patient received antibiotics will plan for observation admission

## 2024-01-02 NOTE — ASSESSMENT & PLAN NOTE
Meets SIRS criteria with tachycardia and fever, however without obvious signs of infection, leukocytosis, or lactic acidosis. UA not consistent with infection, and CXR without acute pathology. Given acutely elevated bili and LFTs, concern for hepatic/biliary pathology. CT abdomen shows some findings for questionable diverticulitis, however not obvious, and this would not correlate with his symptoms.     Holding abx on 1/2 to monitor further.

## 2024-01-02 NOTE — HPI
Jesus Christy is a 63 y.o. male with history of prostate CA, complex regional pain syndrome, anxiety on chronic benzos, HTN who presents today for abdominal pain and dark urine.     He reports that 1-2 days ago, he noted dark urine which he felt was consistent with blood.  He then developed abdominal pain, described as a diffuse bubbling sensation without focal symptoms.  Yesterday, he had 2-3 loose bowel movements and 2-3 loose bowel movements today.  Denies any fever, nausea, or vomiting, however has had decreased appetite.  He has been taking Goody powder 3-4 times per day for the pain.  He also notes chronic lower extremity pain which is progressively increasing, along with headache this morning.

## 2024-01-02 NOTE — SUBJECTIVE & OBJECTIVE
Oncology Treatment Plan:   [No matching plan found]    Medications:  Continuous Infusions:   heparin (porcine) in D5W 18 Units/kg/hr (01/02/24 1613)     Scheduled Meds:   ARIPiprazole  5 mg Oral Daily    DULoxetine  30 mg Oral Daily    finasteride  5 mg Oral Daily    mirtazapine  45 mg Oral QHS    tamsulosin  1 capsule Oral QHS     PRN Meds:acetaminophen, acetaminophen, aluminum-magnesium hydroxide-simethicone, diazePAM, heparin (PORCINE), heparin (PORCINE), melatonin, naloxone, ondansetron, oxyCODONE, polyethylene glycol, simethicone, sodium chloride 0.9%     Review of patient's allergies indicates:   Allergen Reactions    Morphine Palpitations        Past Medical History:   Diagnosis Date    Complex regional pain syndrome i of right lower limb     GERD (gastroesophageal reflux disease)     HTN (hypertension)      Past Surgical History:   Procedure Laterality Date    COLONOSCOPY N/A 9/29/2017    Procedure: COLONOSCOPY;  Surgeon: Jamar Edwards MD;  Location: [address]);  Service: Endoscopy;  Laterality: N/A;    JOINT REPLACEMENT Right     knee    KNEE ARTHROSCOPY W/ ACL RECONSTRUCTION  2014    right     Family History       Problem Relation (Age of Onset)    Cancer Brother    Diabetes Father    Heart disease Father    Hypertension Mother    No Known Problems Daughter, Daughter, Daughter, Daughter, Son, Son          Tobacco Use    Smoking status: Never    Smokeless tobacco: Never   Substance and Sexual Activity    Alcohol use: Yes     Alcohol/week: 0.8 standard drinks of alcohol     Types: 1 Standard drinks or equivalent per week     Comment: once every few months    Drug use: No    Sexual activity: Not on file       Review of Systems   Constitutional:  Positive for activity change and fever. Negative for appetite change.   Respiratory:  Negative for cough, chest tightness, shortness of breath and wheezing.    Cardiovascular:  Negative for chest pain, palpitations and leg swelling.   Gastrointestinal:   Positive for abdominal pain and diarrhea. Negative for constipation, nausea and vomiting.   Genitourinary:  Negative for dysuria.   Musculoskeletal:  Negative for arthralgias, back pain and myalgias.   Skin:  Negative for color change and pallor.   Neurological:  Negative for dizziness, weakness and headaches.     Objective:     Vital Signs (Most Recent):  Temp: (!) 100.8 °F (38.2 °C) (01/02/24 1531)  Pulse: 106 (01/02/24 1531)  Resp: 19 (01/02/24 1531)  BP: 123/76 (01/02/24 1531)  SpO2: (!) 92 % (01/02/24 1531) Vital Signs (24h Range):  Temp:  [97.7 °F (36.5 °C)-101.1 °F (38.4 °C)] 100.8 °F (38.2 °C)  Pulse:  [] 106  Resp:  [16-20] 19  SpO2:  [92 %-96 %] 92 %  BP: (116-138)/(58-79) 123/76     Weight: 74.8 kg (165 lb)  Body mass index is 24.37 kg/m².  Body surface area is 1.91 meters squared.    No intake or output data in the 24 hours ending 01/02/24 1646     Physical Exam  Constitutional:       General: He is not in acute distress.     Appearance: He is well-developed.   HENT:      Head: Normocephalic and atraumatic.      Right Ear: External ear normal.      Left Ear: External ear normal.      Nose: Nose normal.      Mouth/Throat:      Mouth: Mucous membranes are moist.      Pharynx: Oropharynx is clear.   Eyes:      General: No scleral icterus.     Conjunctiva/sclera: Conjunctivae normal.   Cardiovascular:      Rate and Rhythm: Regular rhythm. Tachycardia present.   Pulmonary:      Effort: Pulmonary effort is normal.      Breath sounds: Normal breath sounds. No wheezing or rales.   Abdominal:      General: Bowel sounds are normal.      Palpations: Abdomen is soft.      Tenderness: There is abdominal tenderness.   Musculoskeletal:         General: Normal range of motion.      Cervical back: Normal range of motion and neck supple.      Right lower leg: No edema.      Left lower leg: No edema.   Lymphadenopathy:      Cervical: Cervical adenopathy (chronic) present.   Skin:     General: Skin is warm and dry.    Neurological:      Mental Status: He is alert and oriented to person, place, and time.   Psychiatric:         Behavior: Behavior normal.          Significant Labs:   All pertinent labs from the last 24 hours have been reviewed.    Diagnostic Results:  I have reviewed all pertinent imaging results/findings within the past 24 hours.

## 2024-01-02 NOTE — SUBJECTIVE & OBJECTIVE
Interval History:   1/2: lower back pain present. Patient with thrombosis of right portal vein, nonocclusive thrombosis of Main PV - unclear acuity.     Review of Systems   Constitutional:  Positive for fatigue. Negative for activity change, appetite change and chills.        All pertinent other ROS noted in HPI   HENT:  Negative for congestion and trouble swallowing.    Respiratory:  Negative for cough, chest tightness and shortness of breath.    Cardiovascular:  Negative for chest pain and leg swelling.   Gastrointestinal:  Positive for abdominal pain. Negative for abdominal distention, blood in stool and nausea.   Genitourinary:  Negative for decreased urine volume and urgency.   Musculoskeletal:  Positive for back pain. Negative for arthralgias.   Skin:  Positive for color change.     Objective:     Vital Signs (Most Recent):  Temp: 100.2 °F (37.9 °C) (01/02/24 1116)  Pulse: 102 (01/02/24 1116)  Resp: 16 (01/02/24 1413)  BP: 116/74 (01/02/24 1116)  SpO2: (!) 93 % (01/02/24 1116) Vital Signs (24h Range):  Temp:  [97.7 °F (36.5 °C)-101.1 °F (38.4 °C)] 100.2 °F (37.9 °C)  Pulse:  [] 102  Resp:  [16-20] 16  SpO2:  [92 %-96 %] 93 %  BP: (116-138)/(58-79) 116/74     Weight: 74.8 kg (165 lb)  Body mass index is 24.37 kg/m².  No intake or output data in the 24 hours ending 01/02/24 1445      Physical Exam  Vitals and nursing note reviewed.   Constitutional:       General: He is not in acute distress.     Appearance: He is well-developed. He is not ill-appearing or diaphoretic.   HENT:      Head: Normocephalic and atraumatic.   Eyes:      General: No scleral icterus.     Conjunctiva/sclera: Conjunctivae normal.   Neck:      Vascular: No JVD.   Cardiovascular:      Rate and Rhythm: Regular rhythm. Tachycardia present.   Pulmonary:      Effort: Pulmonary effort is normal. No respiratory distress.      Breath sounds: Normal breath sounds. No wheezing or rales.   Abdominal:      General: There is no distension.       Palpations: Abdomen is soft.      Tenderness: There is no abdominal tenderness. There is no guarding.   Musculoskeletal:      Right lower leg: No edema.      Left lower leg: No edema.   Skin:     General: Skin is warm and dry.   Neurological:      Mental Status: He is alert and oriented to person, place, and time.      Motor: No abnormal muscle tone.   Psychiatric:         Mood and Affect: Mood normal.         Behavior: Behavior normal.             Significant Labs: All pertinent labs within the past 24 hours have been reviewed.    Significant Imaging: I have reviewed all pertinent imaging results/findings within the past 24 hours.

## 2024-01-02 NOTE — ASSESSMENT & PLAN NOTE
Presents with abdominal pain and dark urine, and primarily direct hyperbilirubinemia noted on labs along with elevation of AST/ALT. Ct abdomen shows no obvious pathology.     MRCP ordered  PVT - right and nonocclusive main? Unclear acuity. Will discuss with hepatology and hematology for anticoagulation recs.   Prostate ca hx - perhaps hypercoagulable state?    [[email or web address]] : As per HPI

## 2024-01-02 NOTE — HPI
Patient is a 63 year old male with history of low grade prostate cancer on active surveillance who presents to hospital with symptoms of abdominal pain and dark urine of 1-2 days duration. During his ED work up, he underwent ultrasound abdomen with findings of Thrombosis of the posterior branch of the right portal vein.  Nonocclusive thrombus in the main portal vein. Hematology consulted regarding hypercoagulability work up.

## 2024-01-02 NOTE — CONSULTS
[facility] - Tele[facility] Stepdown  Hematology/Oncology  Consult Note    Patient Name: Jesus Christy  MRN: [id number]  Admission Date: 1/1/2024  Hospital Length of Stay: 1 days  Code Status: Full Code   Attending Provider: Kiera Morrow MD  Consulting Provider: Eber Philip MD  Primary Care Physician: Tara Jolly MD  Principal Problem:Direct hyperbilirubinemia    Inpatient consult to Hematology  Consult performed by: Eber Philip MD  Consult ordered by: Kiera Morrow MD        Subjective:     HPI:  Patient is a 63 year old male with history of low grade prostate cancer on active surveillance who presents to hospital with symptoms of abdominal pain and dark urine of 1-2 days duration. During his ED work up, he underwent ultrasound abdomen with findings of Thrombosis of the posterior branch of the right portal vein.  Nonocclusive thrombus in the main portal vein. Hematology consulted regarding hypercoagulability work up.       Oncology Treatment Plan:   [No matching plan found]    Medications:  Continuous Infusions:   heparin (porcine) in D5W 18 Units/kg/hr (01/02/24 1613)     Scheduled Meds:   ARIPiprazole  5 mg Oral Daily    DULoxetine  30 mg Oral Daily    finasteride  5 mg Oral Daily    mirtazapine  45 mg Oral QHS    tamsulosin  1 capsule Oral QHS     PRN Meds:acetaminophen, acetaminophen, aluminum-magnesium hydroxide-simethicone, diazePAM, heparin (PORCINE), heparin (PORCINE), melatonin, naloxone, ondansetron, oxyCODONE, polyethylene glycol, simethicone, sodium chloride 0.9%     Review of patient's allergies indicates:   Allergen Reactions    Morphine Palpitations        Past Medical History:   Diagnosis Date    Complex regional pain syndrome i of right lower limb     GERD (gastroesophageal reflux disease)     HTN (hypertension)      Past Surgical History:   Procedure Laterality Date    COLONOSCOPY N/A 9/29/2017    Procedure: COLONOSCOPY;  Surgeon: Jamar Edwards MD;  Location: [facility]  (4TH FLR);  Service: Endoscopy;  Laterality: N/A;    JOINT REPLACEMENT Right     knee    KNEE ARTHROSCOPY W/ ACL RECONSTRUCTION  2014    right     Family History       Problem Relation (Age of Onset)    Cancer Brother    Diabetes Father    Heart disease Father    Hypertension Mother    No Known Problems Daughter, Daughter, Daughter, Daughter, Son, Son          Tobacco Use    Smoking status: Never    Smokeless tobacco: Never   Substance and Sexual Activity    Alcohol use: Yes     Alcohol/week: 0.8 standard drinks of alcohol     Types: 1 Standard drinks or equivalent per week     Comment: once every few months    Drug use: No    Sexual activity: Not on file       Review of Systems   Constitutional:  Positive for activity change and fever. Negative for appetite change.   Respiratory:  Negative for cough, chest tightness, shortness of breath and wheezing.    Cardiovascular:  Negative for chest pain, palpitations and leg swelling.   Gastrointestinal:  Positive for abdominal pain and diarrhea. Negative for constipation, nausea and vomiting.   Genitourinary:  Negative for dysuria.   Musculoskeletal:  Negative for arthralgias, back pain and myalgias.   Skin:  Negative for color change and pallor.   Neurological:  Negative for dizziness, weakness and headaches.     Objective:     Vital Signs (Most Recent):  Temp: (!) 100.8 °F (38.2 °C) (01/02/24 1531)  Pulse: 106 (01/02/24 1531)  Resp: 19 (01/02/24 1531)  BP: 123/76 (01/02/24 1531)  SpO2: (!) 92 % (01/02/24 1531) Vital Signs (24h Range):  Temp:  [97.7 °F (36.5 °C)-101.1 °F (38.4 °C)] 100.8 °F (38.2 °C)  Pulse:  [] 106  Resp:  [16-20] 19  SpO2:  [92 %-96 %] 92 %  BP: (116-138)/(58-79) 123/76     Weight: 74.8 kg (165 lb)  Body mass index is 24.37 kg/m².  Body surface area is 1.91 meters squared.    No intake or output data in the 24 hours ending 01/02/24 1646     Physical Exam  Constitutional:       General: He is not in acute distress.     Appearance: He is  well-developed.   HENT:      Head: Normocephalic and atraumatic.      Right Ear: External ear normal.      Left Ear: External ear normal.      Nose: Nose normal.      Mouth/Throat:      Mouth: Mucous membranes are moist.      Pharynx: Oropharynx is clear.   Eyes:      General: No scleral icterus.     Conjunctiva/sclera: Conjunctivae normal.   Cardiovascular:      Rate and Rhythm: Regular rhythm. Tachycardia present.   Pulmonary:      Effort: Pulmonary effort is normal.      Breath sounds: Normal breath sounds. No wheezing or rales.   Abdominal:      General: Bowel sounds are normal.      Palpations: Abdomen is soft.      Tenderness: There is abdominal tenderness.   Musculoskeletal:         General: Normal range of motion.      Cervical back: Normal range of motion and neck supple.      Right lower leg: No edema.      Left lower leg: No edema.   Lymphadenopathy:      Cervical: Cervical adenopathy (chronic) present.   Skin:     General: Skin is warm and dry.   Neurological:      Mental Status: He is alert and oriented to person, place, and time.   Psychiatric:         Behavior: Behavior normal.          Significant Labs:   All pertinent labs from the last 24 hours have been reviewed.    Diagnostic Results:  I have reviewed all pertinent imaging results/findings within the past 24 hours.  Assessment/Plan:     Portal vein thrombosis  63 year old male with low grade prostate cancer on surveillance who presents to hospital with abdominal pain and dark urine found to have portal vein thrombosis. Patient is currently on heparin and with acute thrombosis. He denies any other personal history of VTE at young age or family history of clotting disorders. He denies smoking history. His last colonoscopy was in 2017 with findings of benign polyps and recommendation for repeat colonoscopy in 3 years. He had CT chest in 2019 with findings of stable 2.0 cm soft tissue nodule in right upper lobe, likely benign but with recommendation  for follow up in Dec of 2020.     - Given no known personal history or family history of VTE, defer inherited thrombophilia testing. This can be considered in outpatient setting.  - Recommend cardiolipin antibody and beta 2 glycoprotein antibodies. Can not accurately test lupus anticoagulant as he is current on heparin. He will need these tests repeated in 3 months as this can have treatment implications regarding need for warfarin.   - As he has thrombosis in unusual vascular bed, recommend mpn panel and pnh panel  - Once no more procedures are planned, can transition to oral DOAC for minimum of 3 months  - Place referral at discharge for outpatient hematology follow up  - Patient will need up to date age appropriate cancer screening, which can also be done in the outpatient setting         Thank you for your consult.  Hematology will continue to follow peripherally, please contact us with any further questions or concerns.     Eber Philip MD  Hematology/Oncology  [facility] - Tele[facility] Stepdown

## 2024-01-02 NOTE — ASSESSMENT & PLAN NOTE
63 year old male with low grade prostate cancer on surveillance who presents to hospital with abdominal pain and dark urine found to have portal vein thrombosis. Patient is currently on heparin and with acute thrombosis. He denies any other personal history of VTE at young age or family history of clotting disorders. He denies smoking history. His last colonoscopy was in 2017 with findings of benign polyps and recommendation for repeat colonoscopy in 3 years. He had CT chest in 2019 with findings of stable 2.0 cm soft tissue nodule in right upper lobe, likely benign but with recommendation for follow up in Dec of 2020.     - Given no known personal history or family history of VTE, defer inherited thrombophilia testing. This can be considered in outpatient setting.  - Recommend cardiolipin antibody and beta 2 glycoprotein antibodies. Can not accurately test lupus anticoagulant as he is current on heparin. He will need these tests repeated in 3 months as this can have treatment implications regarding need for warfarin.   - As he has thrombosis in unusual vascular bed, recommend mpn panel and pnh panel  - Once no more procedures are planned, can transition to oral DOAC for minimum of 3 months  - Place referral at discharge for outpatient hematology follow up  - Patient will need up to date age appropriate cancer screening, which can also be done in the outpatient setting

## 2024-01-02 NOTE — CONSULTS
[facility]  Gastroenterology  Consult Note    Patient Name: Jesus Christy  MRN: [id number]  Admission Date: 1/1/2024  Hospital Length of Stay: 1 days  Code Status: Full Code   Attending Provider: Kiera Morrow MD   Consulting Provider: Bradley Marinelli MD  Primary Care Physician: Tara Jolly MD  Principal Problem:Direct hyperbilirubinemia    Inpatient consult to [facility] ([facility])  Consult performed by: Bradley Marinelli MD  Consult ordered by: Kiera Morrow MD        Subjective:     HPI:   Mr Christy is a 64yo PMHx prostate cancer, complex regional pain syndrome, PITO on benzodiazepines presents to [facility] 01/01 with complaints of back pain and abdominal pain.    AES consulted 01/02 for possible biliary obstruction    Reports abdominal pain 3-4 days ago, generalized diffuse mid abdominal area. Has been taking Goody's powder 3-4x/ day for back pain/ HA. No hematemesis, melena. Denies fevers, chills.    VS since arrival 101.1 fever, tachycardia 110s, normotensive,  CBC w/o leukocytosis, Hgb 13.6, plts wnl  BMP w/ Cr 1.1  LFTs w/ BR 5.1, , AST/ ALT 61/ 149  Lipase wnl    CTAP w/ IV contrast    1. Findings suggesting hepatic steatosis, correlation with LFTs recommended.  There is a somewhat heterogeneous appearance to the posterior aspect of the right hepatic lobe inferiorly.  This may be on the basis of contrast phase however this could be further evaluated with nonemergent MRI as warranted.  2. Multiple scattered colonic diverticula.  There is questionable slight indistinctness about a few diverticula at the level of the distal descending colon versus motion artifact.  Correlation is advised, early sequela of diverticulitis not excluded.    US w/o biliary dilation but notable for cholelithiasis        Objective:     Vitals:    01/02/24 0731   BP: 130/69   Pulse: 93   Resp: 18   Temp: 97.7 °F (36.5 °C)         Constitutional:  not in acute distress and well  developed  HENT: Head: Normal, normocephalic, atraumatic.  Eyes: conjunctiva clear and sclera nonicteric  GI: soft, non-tender, without masses or organomegaly  Musculoskeletal: no muscular tenderness noted  Skin: normal color  Neurological: alert        Assessment/Plan:     62yo PMHx prostate cancer, complex regional pain syndrome, PITO on benzodiazepines presents to [facility] 01/01 with complaints of back pain and abdominal pain.    Found to have diverticulitis with fevers and also elevated LFTs    US demonstrates R portal vein thrombus, non occlusive thrombus in main portal vein but no ductal dilation    AES consulted 01/02 for possible biliary obstruction    Problem List:  Elevated LFTs    Recommendations:  Continue ABX  Consider hepatology consult for recommendations regarding PVT  Obtain MRCP    Thank you for involving us in the care of Jesusyosef Christy. Please call with any additional questions, concerns or changes in the patient's clinical status. We will continue to follow.     Bradley Marinelli MD  Gastroenterology Fellow PGY VI  [facility]

## 2024-01-02 NOTE — NURSING
Patient stable. AOX4.VSS. patient on zosyn antibiotic. On room air. Kept NPO from 5AM 01/2/24. Safety measures ensured.call light within reach. Bed in low position

## 2024-01-02 NOTE — PLAN OF CARE
[facility] - Tele[facility] Stepdown  Initial Discharge Assessment       Primary Care Provider: Tara Jolly MD    Admission Diagnosis: Hyperbilirubinemia [E80.6]  Chest pain [R07.9]    Admission Date: 1/1/2024  Expected Discharge Date:     Transition of Care Barriers: None    [facility] [employer] [facility] / Plan: [employer] / Product Type: Medicare Advantage /     Extended Emergency Contact Information  Primary Emergency Contact: Ramila Christy   [facility]  Mobile Phone: [phone]  Relation: Daughter    Discharge Plan A: Home with family  Discharge Plan B: Home with family      [facility] - [place], [place] - [address]  [address]  [place] [facility] [place] [address]  Phone: [phone] Fax: [phone]    [facility] - [place], [place] - [address]  [address]  [place] [facility] [place] [address]  Phone: [phone] Fax: [phone]      Initial Assessment (most recent)       Adult Discharge Assessment - 01/02/24 1623          Discharge Assessment    Assessment Type Discharge Planning Assessment     Confirmed/corrected address, phone number and insurance Yes     Confirmed Demographics Correct on Facesheet     Source of Information patient     Communicated ANICETO with patient/caregiver Date not available/Unable to determine     People in Home child(howard), adult     Do you expect to return to your current living situation? Yes     Current cognitive status: Alert/Oriented     Walking or Climbing Stairs Difficulty no     Dressing/Bathing Difficulty no     Home Layout Able to live on 1st floor     Equipment Currently Used at Home cane, straight     Readmission within 30 days? No     Patient currently being followed by outpatient case management? No     Do you currently have service(s) that help you manage your care at home? No     Do you take prescription medications? Yes     Do you have prescription coverage? Yes     Do you have  any problems affording any of your prescribed medications? Yes     If yes, what medications? Patient is unable to recall     Is the patient taking medications as prescribed? yes     Who is going to help you get home at discharge? Daughter     How do you get to doctors appointments? family or friend will provide;car, drives self     Are you on dialysis? No     Do you take coumadin? No     Discharge Plan A Home with family     Discharge Plan B Home with family     DME Needed Upon Discharge  none     Discharge Plan discussed with: Patient     Transition of Care Barriers None        Financial Resource Strain    How hard is it for you to pay for the very basics like food, housing, medical care, and heating? Somewhat hard        Housing Stability    In the last 12 months, was there a time when you were not able to pay the mortgage or rent on time? No     In the last 12 months, was there a time when you did not have a steady place to sleep or slept in a shelter (including now)? No        Transportation Needs    In the past 12 months, has lack of transportation kept you from medical appointments or from getting medications? No     In the past 12 months, has lack of transportation kept you from meetings, work, or from getting things needed for daily living? No        Food Insecurity    Within the past 12 months, you worried that your food would run out before you got the money to buy more. Never true     Within the past 12 months, the food you bought just didn't last and you didn't have money to get more. Never true        Stress    Do you feel stress - tense, restless, nervous, or anxious, or unable to sleep at night because your mind is troubled all the time - these days? Not at all        Social Connections    In a typical week, how many times do you talk on the phone with family, friends, or neighbors? More than three times a week     How often do you get together with friends or relatives? --   Holidays    How often do  you attend Cheondoism or Buddhist services? More than 4 times per year     Do you belong to any clubs or organizations such as Cheondoism groups, unions, fraternal or athletic groups, or school groups? No     How often do you attend meetings of the clubs or organizations you belong to? Never     Are you , , , , never , or living with a partner?         Alcohol Use    Q1: How often do you have a drink containing alcohol? Never     Q2: How many drinks containing alcohol do you have on a typical day when you are drinking? Patient does not drink     Q3: How often do you have six or more drinks on one occasion? Never                 Discharge Plan A and Plan B have been determined by review of patient's clinical status, future medical and therapeutic needs, and coverage/benefits for post-acute care in coordination with multidisciplinary team members.    Shirley Reynoso, DEBBIEW [facility]  Ext. [phone]

## 2024-01-02 NOTE — NURSING
Nurses Note -- 4 Eyes      1/2/2024   6:36 AM      Skin assessed during: Admit      [x] No Altered Skin Integrity Present    []Prevention Measures Documented      [] Yes- Altered Skin Integrity Present or Discovered   [] LDA Added if Not in Epic (Describe Wound)   [] New Altered Skin Integrity was Present on Admit and Documented in LDA   [] Wound Image Taken    Wound Care Consulted? No    Attending Nurse:  Tiffanie ARCHULETA    Second RN/Staff Member:   Juan. KATHE

## 2024-01-02 NOTE — CLINICAL REVIEW
Sepsis Screen (most recent)       Sepsis Screen (IP) - 01/02/24 1126       Is the patient's history or complaint suggestive of a possible infection? Yes  -    Are there at least two of the following signs and symptoms present? Yes   TMax- 101.1-recieved Tylenol -    Sepsis signs/symptoms - Hyper or Hypothermia Hyperthermia >100.4 or Hypothermia < 96.8  -    Sepsis signs/symptoms - Tachycardia Tachycardia     >90  -    Are any of the following organ dysfunction criteria present and not considered to be due to a chronic condition? Yes  -    Organ Dysfunction Criteria Total Bilirubin > 2.0 Platelet count < 100,000  -    Organ Dysfunction Criteria - O2 O2 Saturation < 95% on room air  -    Initiate Sepsis Protocol No  -    Reason sepsis not considered Pt. receiving appropriate management  -              User Key  (r) = Recorded By, (t) = Taken By, (c) = Cosigned By      Initials Name    Verónica Walker RN

## 2024-01-03 ENCOUNTER — PATIENT MESSAGE (OUTPATIENT)
Dept: ENDOSCOPY | Facility: HOSPITAL | Age: 64
End: 2024-01-03
Payer: MEDICARE

## 2024-01-03 VITALS
TEMPERATURE: 99 F | HEIGHT: 69 IN | HEART RATE: 97 BPM | RESPIRATION RATE: 19 BRPM | OXYGEN SATURATION: 90 % | WEIGHT: 165 LBS | SYSTOLIC BLOOD PRESSURE: 141 MMHG | BODY MASS INDEX: 24.44 KG/M2 | DIASTOLIC BLOOD PRESSURE: 71 MMHG

## 2024-01-03 LAB
ALBUMIN SERPL BCP-MCNC: 2.4 G/DL (ref 3.5–5.2)
ALP SERPL-CCNC: 224 U/L (ref 55–135)
ALT SERPL W/O P-5'-P-CCNC: 92 U/L (ref 10–44)
ANION GAP SERPL CALC-SCNC: 10 MMOL/L (ref 8–16)
ANISOCYTOSIS BLD QL SMEAR: SLIGHT
APTT PPP: 31.9 SEC (ref 21–32)
APTT PPP: 33.8 SEC (ref 21–32)
AST SERPL-CCNC: 43 U/L (ref 10–40)
BASOPHILS # BLD AUTO: 0.04 K/UL (ref 0–0.2)
BASOPHILS NFR BLD: 0.3 % (ref 0–1.9)
BILIRUB SERPL-MCNC: 3.1 MG/DL (ref 0.1–1)
BUN SERPL-MCNC: 14 MG/DL (ref 8–23)
CALCIUM SERPL-MCNC: 10.1 MG/DL (ref 8.7–10.5)
CHLORIDE SERPL-SCNC: 104 MMOL/L (ref 95–110)
CO2 SERPL-SCNC: 23 MMOL/L (ref 23–29)
CREAT SERPL-MCNC: 1 MG/DL (ref 0.5–1.4)
DIFFERENTIAL METHOD BLD: ABNORMAL
EOSINOPHIL # BLD AUTO: 0.1 K/UL (ref 0–0.5)
EOSINOPHIL NFR BLD: 0.6 % (ref 0–8)
ERYTHROCYTE [DISTWIDTH] IN BLOOD BY AUTOMATED COUNT: 13.2 % (ref 11.5–14.5)
EST. GFR  (NO RACE VARIABLE): >60 ML/MIN/1.73 M^2
GIANT PLATELETS BLD QL SMEAR: PRESENT
GLUCOSE SERPL-MCNC: 117 MG/DL (ref 70–110)
HCT VFR BLD AUTO: 33.1 % (ref 40–54)
HGB BLD-MCNC: 10.8 G/DL (ref 14–18)
IMM GRANULOCYTES # BLD AUTO: 0.11 K/UL (ref 0–0.04)
IMM GRANULOCYTES NFR BLD AUTO: 0.8 % (ref 0–0.5)
LYMPHOCYTES # BLD AUTO: 1.8 K/UL (ref 1–4.8)
LYMPHOCYTES NFR BLD: 12.4 % (ref 18–48)
MCH RBC QN AUTO: 30.9 PG (ref 27–31)
MCHC RBC AUTO-ENTMCNC: 32.6 G/DL (ref 32–36)
MCV RBC AUTO: 95 FL (ref 82–98)
MONOCYTES # BLD AUTO: 1.7 K/UL (ref 0.3–1)
MONOCYTES NFR BLD: 11.6 % (ref 4–15)
NEUTROPHILS # BLD AUTO: 10.6 K/UL (ref 1.8–7.7)
NEUTROPHILS NFR BLD: 74.3 % (ref 38–73)
NRBC BLD-RTO: 0 /100 WBC
PLATELET # BLD AUTO: 328 K/UL (ref 150–450)
PLATELET BLD QL SMEAR: ABNORMAL
PMV BLD AUTO: 9.2 FL (ref 9.2–12.9)
POLYCHROMASIA BLD QL SMEAR: ABNORMAL
POTASSIUM SERPL-SCNC: 3.5 MMOL/L (ref 3.5–5.1)
PROT SERPL-MCNC: 6.9 G/DL (ref 6–8.4)
RBC # BLD AUTO: 3.5 M/UL (ref 4.6–6.2)
SODIUM SERPL-SCNC: 137 MMOL/L (ref 136–145)
WBC # BLD AUTO: 14.32 K/UL (ref 3.9–12.7)

## 2024-01-03 PROCEDURE — 99232 SBSQ HOSP IP/OBS MODERATE 35: CPT | Mod: ,,,

## 2024-01-03 PROCEDURE — 25000003 PHARM REV CODE 250: Performed by: STUDENT IN AN ORGANIZED HEALTH CARE EDUCATION/TRAINING PROGRAM

## 2024-01-03 PROCEDURE — 36415 COLL VENOUS BLD VENIPUNCTURE: CPT | Mod: XB

## 2024-01-03 PROCEDURE — 80053 COMPREHEN METABOLIC PANEL: CPT | Performed by: HOSPITALIST

## 2024-01-03 PROCEDURE — 87040 BLOOD CULTURE FOR BACTERIA: CPT | Performed by: HOSPITALIST

## 2024-01-03 PROCEDURE — 88184 FLOWCYTOMETRY/ TC 1 MARKER: CPT | Mod: 91 | Performed by: STUDENT IN AN ORGANIZED HEALTH CARE EDUCATION/TRAINING PROGRAM

## 2024-01-03 PROCEDURE — 86147 CARDIOLIPIN ANTIBODY EA IG: CPT | Performed by: STUDENT IN AN ORGANIZED HEALTH CARE EDUCATION/TRAINING PROGRAM

## 2024-01-03 PROCEDURE — 86146 BETA-2 GLYCOPROTEIN ANTIBODY: CPT | Mod: 59 | Performed by: STUDENT IN AN ORGANIZED HEALTH CARE EDUCATION/TRAINING PROGRAM

## 2024-01-03 PROCEDURE — 25000003 PHARM REV CODE 250: Performed by: HOSPITALIST

## 2024-01-03 PROCEDURE — 80321 ALCOHOLS BIOMARKERS 1OR 2: CPT

## 2024-01-03 PROCEDURE — 81270 JAK2 GENE: CPT | Performed by: STUDENT IN AN ORGANIZED HEALTH CARE EDUCATION/TRAINING PROGRAM

## 2024-01-03 PROCEDURE — 81339 MPL GENE SEQ ALYS EXON 10: CPT | Performed by: STUDENT IN AN ORGANIZED HEALTH CARE EDUCATION/TRAINING PROGRAM

## 2024-01-03 PROCEDURE — 63600175 PHARM REV CODE 636 W HCPCS: Performed by: HOSPITALIST

## 2024-01-03 PROCEDURE — 88185 FLOWCYTOMETRY/TC ADD-ON: CPT | Performed by: STUDENT IN AN ORGANIZED HEALTH CARE EDUCATION/TRAINING PROGRAM

## 2024-01-03 PROCEDURE — 86038 ANTINUCLEAR ANTIBODIES: CPT

## 2024-01-03 PROCEDURE — 85025 COMPLETE CBC W/AUTO DIFF WBC: CPT | Performed by: HOSPITALIST

## 2024-01-03 PROCEDURE — 85730 THROMBOPLASTIN TIME PARTIAL: CPT | Performed by: HOSPITALIST

## 2024-01-03 PROCEDURE — 36415 COLL VENOUS BLD VENIPUNCTURE: CPT | Mod: XB | Performed by: HOSPITALIST

## 2024-01-03 PROCEDURE — 86015 ACTIN ANTIBODY EACH: CPT

## 2024-01-03 PROCEDURE — 81219 CALR GENE COM VARIANTS: CPT | Performed by: STUDENT IN AN ORGANIZED HEALTH CARE EDUCATION/TRAINING PROGRAM

## 2024-01-03 RX ADMIN — FINASTERIDE 5 MG: 5 TABLET, FILM COATED ORAL at 09:01

## 2024-01-03 RX ADMIN — APIXABAN 10 MG: 5 TABLET, FILM COATED ORAL at 09:01

## 2024-01-03 RX ADMIN — ARIPIPRAZOLE 5 MG: 5 TABLET ORAL at 09:01

## 2024-01-03 RX ADMIN — DULOXETINE HYDROCHLORIDE 30 MG: 30 CAPSULE, DELAYED RELEASE ORAL at 09:01

## 2024-01-03 RX ADMIN — HEPARIN SODIUM 20 UNITS/KG/HR: 10000 INJECTION, SOLUTION INTRAVENOUS at 07:01

## 2024-01-03 NOTE — PLAN OF CARE
[facility] - Tele[facility] Stepdown  Discharge Final Note    Primary Care Provider: Tara Jolly MD    Expected Discharge Date: 1/3/2024    Final Discharge Note (most recent)       Final Note - 01/03/24 1337          Final Note    Assessment Type Final Discharge Note     Anticipated Discharge Disposition Home or Self Care     Hospital Resources/Appts/Education Provided Appointments scheduled and added to AVS        Post-Acute Status    Post-Acute Authorization Other     Other Status No Post-Acute Service Needs     Discharge Delays None known at this time                   Future Appointments   Date Time Provider Department Center   1/4/2024 10:30 AM Tara Jolly MD [facility]   1/19/2024  8:50 AM LAB, APPOINTMENT [facility] INTMED [facility] LAB  [facility]   1/19/2024  9:30 AM [facility] OI-MRI1 [facility]   1/26/2024  9:20 AM Varun Resendiz MD [facility] UROLOGY [facility]   2/1/2024  2:30 PM Eildia Baker MD [facility]   3/20/2024  8:00 AM Debi Lloyd MD [facility] Reynoso, LCSW [facility]  Ext. [phone]

## 2024-01-03 NOTE — SUBJECTIVE & OBJECTIVE
Review of Systems   Constitutional:  Positive for fever.   HENT:  Negative for rhinorrhea, sore throat and trouble swallowing.    Eyes:  Negative for visual disturbance.   Respiratory:  Positive for cough. Negative for choking and shortness of breath.    Cardiovascular:  Negative for chest pain.   Gastrointestinal:  Positive for abdominal pain and diarrhea. Negative for abdominal distention, blood in stool and vomiting.   Genitourinary:  Negative for dysuria.   Musculoskeletal:  Positive for back pain.   Skin:  Negative for color change.   Neurological:  Negative for weakness and headaches.   Psychiatric/Behavioral:  Negative for confusion.        Past Medical History:   Diagnosis Date    Complex regional pain syndrome i of right lower limb     GERD (gastroesophageal reflux disease)     HTN (hypertension)        Past Surgical History:   Procedure Laterality Date    COLONOSCOPY N/A 9/29/2017    Procedure: COLONOSCOPY;  Surgeon: Jamar Edwards MD;  Location: [address];  Service: Endoscopy;  Laterality: N/A;    JOINT REPLACEMENT Right     knee    KNEE ARTHROSCOPY W/ ACL RECONSTRUCTION  2014    right       Family history of liver disease: No    Review of patient's allergies indicates:   Allergen Reactions    Morphine Palpitations         Tobacco Use    Smoking status: Never    Smokeless tobacco: Never   Substance and Sexual Activity    Alcohol use: Yes     Alcohol/week: 0.8 standard drinks of alcohol     Types: 1 Standard drinks or equivalent per week     Comment: once every few months    Drug use: No    Sexual activity: Not on file       Medications Prior to Admission   Medication Sig Dispense Refill Last Dose    finasteride (PROSCAR) 5 mg tablet Take 1 tablet (5 mg total) by mouth once daily. 90 tablet 3 12/31/2023    tamsulosin (FLOMAX) 0.4 mg Cap Take 1 capsule (0.4 mg total) by mouth every evening. 90 capsule 3 12/31/2023    ARIPiprazole (ABILIFY) 5 MG Tab Take 5 mg by mouth once daily.   Unknown     clotrimazole (LOTRIMIN) 1 % cream Apply topically 2 (two) times daily. 45 g 1     diazePAM (VALIUM) 10 MG Tab Take 1 tablet (10 mg total) by mouth 3 (three) times daily as needed (anxiety). 1 tablet 0 Unknown    DULoxetine (CYMBALTA) 30 MG capsule Take 30 mg by mouth once daily.   Unknown    DULoxetine (CYMBALTA) 60 MG capsule        gabapentin (NEURONTIN) 300 MG capsule Take by mouth.   Unknown    mirtazapine (REMERON) 30 MG tablet Take 45 mg by mouth every evening. 30 tablet 11     tadalafiL (CIALIS) 20 MG Tab Take 1 tablet (20 mg total) by mouth daily as needed (erectile dysfunction). 30 tablet 5     UNABLE TO FIND Take 1 packet by mouth 6 (six) times daily. medication name: Goodies Headache Powder          Objective:     Vital Signs (Most Recent):  Temp: 99.9 °F (37.7 °C) (01/03/24 0733)  Pulse: 96 (01/03/24 0733)  Resp: 19 (01/03/24 0733)  BP: 132/70 (01/03/24 0733)  SpO2: (!) 93 % (01/03/24 0733) Vital Signs (24h Range):  Temp:  [98.5 °F (36.9 °C)-100.8 °F (38.2 °C)] 99.9 °F (37.7 °C)  Pulse:  [] 96  Resp:  [16-24] 19  SpO2:  [89 %-94 %] 93 %  BP: (114-132)/(63-76) 132/70     Weight: 74.8 kg (165 lb) (01/02/24 0635)  Body mass index is 24.37 kg/m².       Physical Exam  Constitutional:       General: He is not in acute distress.     Appearance: He is well-developed.   HENT:      Head: Normocephalic and atraumatic.      Right Ear: External ear normal.      Left Ear: External ear normal.      Nose: Nose normal.      Mouth/Throat:      Mouth: Mucous membranes are moist.      Pharynx: Oropharynx is clear.   Eyes:      General: No scleral icterus.     Conjunctiva/sclera: Conjunctivae normal.   Cardiovascular:      Rate and Rhythm: Normal rate and regular rhythm.   Pulmonary:      Effort: Pulmonary effort is normal.      Breath sounds: Normal breath sounds. No wheezing or rales.   Abdominal:      General: Bowel sounds are normal. There is no distension.      Palpations: Abdomen is soft.      Tenderness: There  is abdominal tenderness. There is no guarding.   Musculoskeletal:         General: Normal range of motion.      Cervical back: Normal range of motion and neck supple.      Right lower leg: No edema.      Left lower leg: No edema.   Lymphadenopathy:      Cervical: Cervical adenopathy (chronic) present.   Skin:     General: Skin is warm and dry.   Neurological:      Mental Status: He is alert and oriented to person, place, and time.   Psychiatric:         Behavior: Behavior normal.            Computed MELD 3.0 unavailable. Necessary lab results were not found in the last year.  Computed MELD-Na unavailable. Necessary lab results were not found in the last year.      Significant Labs:  CBC:   Recent Labs   Lab 01/01/24  1348 01/01/24  1637   WBC 11.61  --    RBC 4.43*  --    HGB 13.6*  --    HCT 42.1 42     --      CMP:   Recent Labs   Lab 01/01/24  1639   GLU 96   CALCIUM 11.1*   ALBUMIN 3.1*  3.1*   PROT 8.1  8.1      K 3.8   CO2 24      BUN 14   CREATININE 1.1   ALKPHOS 251*  251*   *  149*   AST 61*  61*   BILITOT 5.1*  5.1*     Coagulation:   Recent Labs   Lab 01/03/24  0318   APTT 31.9       Significant Imaging:  MRCP:     No definite intrahepatic or extrahepatic ductal dilatation.  No obvious filling defects or evidence for stricture of the visualized common bile duct.     Cholelithiasis with mild gallbladder wall thickening and trace pericholecystic fluid.  Findings are nonspecific, and can be seen in the setting of hepatic dysfunction.  Correlation is advised.     Hepatosplenomegaly with mild hepatic steatosis.  No focal hepatic lesions noting limitations from noncontrast examination.     Mild prominence of the pancreatic duct.  Additional T2 hyperintense focus about the pancreatic tail measuring 9 mm, with potential connection to the pancreatic duct.  Findings are nonspecific, and may relate to a pancreatic cyst versus side branch IPMN.  Repeat examination is recommended with  MRI abdomen with MRCP in 1 year to ensure stability of this finding.     Few prominent peripancreatic and periportal lymph nodes as above.     Diffusion signal abnormality about the right branches of the portal vein and proximal main portal vein, likely representing sequela of portal vein thrombosis.  Findings are better evaluated on ultrasound liver with Doppler 01/01/2024.

## 2024-01-03 NOTE — PROGRESS NOTES
Patient discharged home via ambulation off unit. Ride waiting and independently got into car. PIV removed and pt tolerated procedure well. Dressings applied. Patient left hospital with all belongings and discharge information with belongings.

## 2024-01-03 NOTE — PLAN OF CARE
Problem: Adult Inpatient Plan of Care  Goal: Plan of Care Review  Outcome: Met  Goal: Patient-Specific Goal (Individualized)  Outcome: Met  Goal: Absence of Hospital-Acquired Illness or Injury  Outcome: Met  Goal: Optimal Comfort and Wellbeing  Outcome: Met  Goal: Readiness for Transition of Care  Outcome: Met   Patient discharged home per order. Ambulated off unit per request and declined wheelchair. PIV removed and dressing placed when hemostasis achieved. Discharge instructions provided and pt verbalized understanding. Patient tolerated procedures well.

## 2024-01-03 NOTE — HPI
"This is a 63-year-old man with history of prostate adenocarcinoma (low-grade under active surveillance), complex regional pain syndrome, anxiety on chronic benzodiazepines, and HTN who presents today for abdominal pain and dark urine. He reports that 1-2 days prior to admission he started having dark urine that he says was a dark orange color and he was worried that it might contain blood.  He then developed abdominal pain, described as a diffuse bubbling sensation without focal symptoms. He tried taking Peptobismol which only mildly improved his abdominal pain. He has been taking Goody powder 3-4 times per day for an acute worsening of his chronic back pain. He says when he came to the hospital his abdominal pain was a 9-10/10 and has now improved to a 3/10.      Since admission he was noted to have 101.1 F fever, tachycardia 110, an unremarkable CBC, BR 5.1, , AST/ ALT 61/ 149, and Lipase WNL. CTAP with IV contrast was obtained and showed "Findings suggesting hepatic steatosis, correlation with LFTs recommended.  There is a somewhat heterogeneous appearance to the posterior aspect of the right hepatic lobe inferiorly.  This may be on the basis of contrast phase however this could be further evaluated with nonemergent MRI as warranted."Liver ultrasound showed "Thrombosis of the posterior branch of the right portal vein.  Nonocclusive thrombus in the main portal vein." Hepatology was consulted for "portal vein thrombosis". Patient has been started on heparin drip.  "

## 2024-01-03 NOTE — SUBJECTIVE & OBJECTIVE
Subjective:     Interval History:   - MRCP completed   - Heparin gtt d/c'd and patient transitioned to apixaban  - Hepatology consulted     Review of Systems   Constitutional:  Positive for activity change and fatigue. Negative for appetite change, chills and fever.   HENT:  Negative for trouble swallowing.    Respiratory:  Negative for cough and shortness of breath.    Cardiovascular:  Negative for chest pain.   Gastrointestinal:  Positive for abdominal pain. Negative for abdominal distention, nausea and vomiting.     Objective:     Vital Signs (Most Recent):  Temp: 99.4 °F (37.4 °C) (01/03/24 1139)  Pulse: 97 (01/03/24 1139)  Resp: 19 (01/03/24 1139)  BP: (!) 141/71 (01/03/24 1139)  SpO2: (!) 90 % (01/03/24 1139) Vital Signs (24h Range):  Temp:  [98.5 °F (36.9 °C)-100.8 °F (38.2 °C)] 99.4 °F (37.4 °C)  Pulse:  [] 97  Resp:  [16-24] 19  SpO2:  [89 %-94 %] 90 %  BP: (114-141)/(63-76) 141/71     Weight: 74.8 kg (165 lb) (01/02/24 0635)  Body mass index is 24.37 kg/m².    No intake or output data in the 24 hours ending 01/03/24 1359    Lines/Drains/Airways       Peripheral Intravenous Line  Duration                  Peripheral IV - Single Lumen 01/01/24 1348 20 G Left;Posterior Hand 2 days         Peripheral IV - Single Lumen 01/01/24 1836 22 G Posterior;Right Hand 1 day                     Physical Exam  Vitals and nursing note reviewed.   Cardiovascular:      Rate and Rhythm: Normal rate and regular rhythm.      Pulses: Normal pulses.   Pulmonary:      Effort: Pulmonary effort is normal. No respiratory distress.   Abdominal:      General: Bowel sounds are normal. There is no distension.      Palpations: Abdomen is soft.      Tenderness: There is no abdominal tenderness.   Skin:     General: Skin is warm and dry.      Capillary Refill: Capillary refill takes 2 to 3 seconds.   Neurological:      Mental Status: He is alert and oriented to person, place, and time.          Significant Labs:  CBC:   Recent Labs    Lab 01/01/24  1637 01/03/24  0812   WBC  --  14.32*   HGB  --  10.8*   HCT 42 33.1*   PLT  --  328     CMP:   Recent Labs   Lab 01/03/24  0812   *   CALCIUM 10.1   ALBUMIN 2.4*   PROT 6.9      K 3.5   CO2 23      BUN 14   CREATININE 1.0   ALKPHOS 224*   ALT 92*   AST 43*   BILITOT 3.1*     All pertinent lab results from the last 24 hours have been reviewed.      Significant Imaging:  Imaging results within the past 24 hours have been reviewed.

## 2024-01-03 NOTE — CLINICAL REVIEW
IP Sepsis Screen (most recent)       Sepsis Screen (IP) - 01/03/24 1652       Is the patient's history or complaint suggestive of a possible infection? Yes  -CB    Are there at least two of the following signs and symptoms present? Yes  -CB    Sepsis signs/symptoms - Tachycardia Tachycardia     >90  -CB    Sepsis signs/symptoms - WBC WBC < 4,000 or WBC > 12,000  -CB    Are any of the following organ dysfunction criteria present and not considered to be due to a chronic condition? Yes  -CB    Organ Dysfunction Criteria Total Bilirubin > 2.0 Platelet count < 100,000  -CB    Organ Dysfunction Criteria - O2 O2 Saturation < 95% on room air  -CB    Initiate Sepsis Protocol No  -CB    Reason sepsis not considered Pt. receiving appropriate management  -CB              User Key  (r) = Recorded By, (t) = Taken By, (c) = Cosigned By      Initials Name    Cris Becerra, RN

## 2024-01-03 NOTE — ASSESSMENT & PLAN NOTE
64yo PMHx prostate cancer, complex regional pain syndrome, PITO on benzodiazepines presents to [facility] 01/01 with complaints of back pain and abdominal pain.     Found to have diverticulitis with fevers and also elevated LFTs     US demonstrates R portal vein thrombus, non occlusive thrombus in main portal vein but no ductal dilation     AES consulted 01/02 for possible biliary obstruction - MRCP reviewed today with Dr Lloyd. No biliary obstruction noted but 9mm pancreatic cyst incidentally seen on imaging. Hepatology to weigh in on elevated LFTs and Tbili.     Recommendations:  - Pt to f/u in pancreatic cyst clinic - [facility] to arrange  - Hepatology consulted - appreciate recs

## 2024-01-03 NOTE — PROGRESS NOTES
[facility] - Telemetry Stepdown  Gastroenterology  Progress Note    Patient Name: Jesus Christy  MRN: [id number]  Admission Date: 1/1/2024  Hospital Length of Stay: 2 days  Code Status: Full Code   Attending Provider: Kiera Morrow MD  Consulting Provider: ZAK LUI NP  Primary Care Physician: Tara Jolly MD  Principal Problem: Direct hyperbilirubinemia        Subjective:     Interval History:   - MRCP completed   - Heparin gtt d/c'd and patient transitioned to apixaban  - Hepatology consulted     Review of Systems   Constitutional:  Positive for activity change and fatigue. Negative for appetite change, chills and fever.   HENT:  Negative for trouble swallowing.    Respiratory:  Negative for cough and shortness of breath.    Cardiovascular:  Negative for chest pain.   Gastrointestinal:  Positive for abdominal pain. Negative for abdominal distention, nausea and vomiting.     Objective:     Vital Signs (Most Recent):  Temp: 99.4 °F (37.4 °C) (01/03/24 1139)  Pulse: 97 (01/03/24 1139)  Resp: 19 (01/03/24 1139)  BP: (!) 141/71 (01/03/24 1139)  SpO2: (!) 90 % (01/03/24 1139) Vital Signs (24h Range):  Temp:  [98.5 °F (36.9 °C)-100.8 °F (38.2 °C)] 99.4 °F (37.4 °C)  Pulse:  [] 97  Resp:  [16-24] 19  SpO2:  [89 %-94 %] 90 %  BP: (114-141)/(63-76) 141/71     Weight: 74.8 kg (165 lb) (01/02/24 0635)  Body mass index is 24.37 kg/m².    No intake or output data in the 24 hours ending 01/03/24 1359    Lines/Drains/Airways       Peripheral Intravenous Line  Duration                  Peripheral IV - Single Lumen 01/01/24 1348 20 G Left;Posterior Hand 2 days         Peripheral IV - Single Lumen 01/01/24 1836 22 G Posterior;Right Hand 1 day                     Physical Exam  Vitals and nursing note reviewed.   Cardiovascular:      Rate and Rhythm: Normal rate and regular rhythm.      Pulses: Normal pulses.   Pulmonary:      Effort: Pulmonary effort is normal. No respiratory distress.   Abdominal:       General: Bowel sounds are normal. There is no distension.      Palpations: Abdomen is soft.      Tenderness: There is no abdominal tenderness.   Skin:     General: Skin is warm and dry.      Capillary Refill: Capillary refill takes 2 to 3 seconds.   Neurological:      Mental Status: He is alert and oriented to person, place, and time.          Significant Labs:  CBC:   Recent Labs   Lab 01/01/24  1637 01/03/24  0812   WBC  --  14.32*   HGB  --  10.8*   HCT 42 33.1*   PLT  --  328     CMP:   Recent Labs   Lab 01/03/24  0812   *   CALCIUM 10.1   ALBUMIN 2.4*   PROT 6.9      K 3.5   CO2 23      BUN 14   CREATININE 1.0   ALKPHOS 224*   ALT 92*   AST 43*   BILITOT 3.1*     All pertinent lab results from the last 24 hours have been reviewed.      Significant Imaging:  Imaging results within the past 24 hours have been reviewed.  Assessment/Plan:     GI  * Direct hyperbilirubinemia  64yo PMHx prostate cancer, complex regional pain syndrome, PITO on benzodiazepines presents to [facility] 01/01 with complaints of back pain and abdominal pain.     Found to have diverticulitis with fevers and also elevated LFTs     US demonstrates R portal vein thrombus, non occlusive thrombus in main portal vein but no ductal dilation     AES consulted 01/02 for possible biliary obstruction - MRCP reviewed today with Dr Lloyd. No biliary obstruction noted but 9mm pancreatic cyst incidentally seen on imaging. Hepatology to weigh in on elevated LFTs and Tbili.     Recommendations:  - Pt to f/u in pancreatic cyst clinic - [facility] to arrange  - Hepatology consulted - appreciate recs           Thank you for your consult. I will follow-up with patient. Please contact us if you have any additional questions.    ZAK LUI NP  Gastroenterology  [facility] - Telemetry Stepdown

## 2024-01-03 NOTE — PLAN OF CARE
Problem: Adult Inpatient Plan of Care  Goal: Plan of Care Review  Outcome: Ongoing, Progressing  Goal: Patient-Specific Goal (Individualized)  Outcome: Ongoing, Progressing  Goal: Absence of Hospital-Acquired Illness or Injury  Outcome: Ongoing, Progressing  Goal: Optimal Comfort and Wellbeing  Outcome: Ongoing, Progressing  Goal: Readiness for Transition of Care  Outcome: Ongoing, Progressing   Pt rested well. VS WNL. No ss of pain or distress. MRCP completed overnight. Bed in low position. Call light within reach. Hep gtt infusing. Will continue POC

## 2024-01-03 NOTE — NURSING
Pt leaving floor for MRCP. Per MD Joy ok to turn off Ins gtt for approx 1 hour while pt having test. Will resume upon return to floor.

## 2024-01-03 NOTE — CONSULTS
"[facility] - Tele[facility] Stepdown  Hepatology  Consult Note    Patient Name: Jesus Christy  MRN: [id number]  Admission Date: 1/1/2024  Hospital Length of Stay: 2 days  Attending Provider: Kiera Morrow MD   Primary Care Physician: Tara Jolly MD  Principal Problem:Direct hyperbilirubinemia    Inpatient consult to Hepatology  Consult performed by: Yonas Sanz MD  Consult ordered by: Kiera Morrow MD        Subjective:     Transplant status: No    HPI:  This is a 63-year-old man with history of prostate adenocarcinoma (low-grade under active surveillance), complex regional pain syndrome, anxiety on chronic benzodiazepines, and HTN who presents today for abdominal pain and dark urine. He reports that 1-2 days prior to admission he started having dark urine that he says was a dark orange color and he was worried that it might contain blood.  He then developed abdominal pain, described as a diffuse bubbling sensation without focal symptoms. He tried taking Peptobismol which only mildly improved his abdominal pain. He has been taking Goody powder 3-4 times per day for an acute worsening of his chronic back pain. He says when he came to the hospital his abdominal pain was a 9-10/10 and has now improved to a 3/10.      Since admission he was noted to have 101.1 F fever, tachycardia 110, an unremarkable CBC, BR 5.1, , AST/ ALT 61/ 149, and Lipase WNL. CTAP with IV contrast was obtained and showed "Findings suggesting hepatic steatosis, correlation with LFTs recommended.  There is a somewhat heterogeneous appearance to the posterior aspect of the right hepatic lobe inferiorly.  This may be on the basis of contrast phase however this could be further evaluated with nonemergent MRI as warranted."Liver ultrasound showed "Thrombosis of the posterior branch of the right portal vein.  Nonocclusive thrombus in the main portal vein." Hepatology was consulted for "portal vein thrombosis". Patient has " been started on heparin drip.    Review of Systems   Constitutional:  Positive for fever.   HENT:  Negative for rhinorrhea, sore throat and trouble swallowing.    Eyes:  Negative for visual disturbance.   Respiratory:  Positive for cough. Negative for choking and shortness of breath.    Cardiovascular:  Negative for chest pain.   Gastrointestinal:  Positive for abdominal pain and diarrhea. Negative for abdominal distention, blood in stool and vomiting.   Genitourinary:  Negative for dysuria.   Musculoskeletal:  Positive for back pain.   Skin:  Negative for color change.   Neurological:  Negative for weakness and headaches.   Psychiatric/Behavioral:  Negative for confusion.        Past Medical History:   Diagnosis Date    Complex regional pain syndrome i of right lower limb     GERD (gastroesophageal reflux disease)     HTN (hypertension)        Past Surgical History:   Procedure Laterality Date    COLONOSCOPY N/A 9/29/2017    Procedure: COLONOSCOPY;  Surgeon: Jamar Edwards MD;  Location: [address];  Service: Endoscopy;  Laterality: N/A;    JOINT REPLACEMENT Right     knee    KNEE ARTHROSCOPY W/ ACL RECONSTRUCTION  2014    right       Family history of liver disease: No    Review of patient's allergies indicates:   Allergen Reactions    Morphine Palpitations         Tobacco Use    Smoking status: Never    Smokeless tobacco: Never   Substance and Sexual Activity    Alcohol use: Yes     Alcohol/week: 0.8 standard drinks of alcohol     Types: 1 Standard drinks or equivalent per week     Comment: once every few months    Drug use: No    Sexual activity: Not on file       Medications Prior to Admission   Medication Sig Dispense Refill Last Dose    finasteride (PROSCAR) 5 mg tablet Take 1 tablet (5 mg total) by mouth once daily. 90 tablet 3 12/31/2023    tamsulosin (FLOMAX) 0.4 mg Cap Take 1 capsule (0.4 mg total) by mouth every evening. 90 capsule 3 12/31/2023    ARIPiprazole (ABILIFY) 5 MG Tab Take 5 mg by  mouth once daily.   Unknown    clotrimazole (LOTRIMIN) 1 % cream Apply topically 2 (two) times daily. 45 g 1     diazePAM (VALIUM) 10 MG Tab Take 1 tablet (10 mg total) by mouth 3 (three) times daily as needed (anxiety). 1 tablet 0 Unknown    DULoxetine (CYMBALTA) 30 MG capsule Take 30 mg by mouth once daily.   Unknown    DULoxetine (CYMBALTA) 60 MG capsule        gabapentin (NEURONTIN) 300 MG capsule Take by mouth.   Unknown    mirtazapine (REMERON) 30 MG tablet Take 45 mg by mouth every evening. 30 tablet 11     tadalafiL (CIALIS) 20 MG Tab Take 1 tablet (20 mg total) by mouth daily as needed (erectile dysfunction). 30 tablet 5     UNABLE TO FIND Take 1 packet by mouth 6 (six) times daily. medication name: Goodies Headache Powder          Objective:     Vital Signs (Most Recent):  Temp: 99.9 °F (37.7 °C) (01/03/24 0733)  Pulse: 96 (01/03/24 0733)  Resp: 19 (01/03/24 0733)  BP: 132/70 (01/03/24 0733)  SpO2: (!) 93 % (01/03/24 0733) Vital Signs (24h Range):  Temp:  [98.5 °F (36.9 °C)-100.8 °F (38.2 °C)] 99.9 °F (37.7 °C)  Pulse:  [] 96  Resp:  [16-24] 19  SpO2:  [89 %-94 %] 93 %  BP: (114-132)/(63-76) 132/70     Weight: 74.8 kg (165 lb) (01/02/24 0635)  Body mass index is 24.37 kg/m².       Physical Exam  Constitutional:       General: He is not in acute distress.     Appearance: He is well-developed.   HENT:      Head: Normocephalic and atraumatic.      Right Ear: External ear normal.      Left Ear: External ear normal.      Nose: Nose normal.      Mouth/Throat:      Mouth: Mucous membranes are moist.      Pharynx: Oropharynx is clear.   Eyes:      General: No scleral icterus.     Conjunctiva/sclera: Conjunctivae normal.   Cardiovascular:      Rate and Rhythm: Normal rate and regular rhythm.   Pulmonary:      Effort: Pulmonary effort is normal.      Breath sounds: Normal breath sounds. No wheezing or rales.   Abdominal:      General: Bowel sounds are normal. There is no distension.      Palpations: Abdomen is  soft.      Tenderness: There is abdominal tenderness. There is no guarding.   Musculoskeletal:         General: Normal range of motion.      Cervical back: Normal range of motion and neck supple.      Right lower leg: No edema.      Left lower leg: No edema.   Lymphadenopathy:      Cervical: Cervical adenopathy (chronic) present.   Skin:     General: Skin is warm and dry.   Neurological:      Mental Status: He is alert and oriented to person, place, and time.   Psychiatric:         Behavior: Behavior normal.            Computed MELD 3.0 unavailable. Necessary lab results were not found in the last year.  Computed MELD-Na unavailable. Necessary lab results were not found in the last year.      Significant Labs:  CBC:   Recent Labs   Lab 01/01/24  1348 01/01/24  1637   WBC 11.61  --    RBC 4.43*  --    HGB 13.6*  --    HCT 42.1 42     --      CMP:   Recent Labs   Lab 01/01/24  1639   GLU 96   CALCIUM 11.1*   ALBUMIN 3.1*  3.1*   PROT 8.1  8.1      K 3.8   CO2 24      BUN 14   CREATININE 1.1   ALKPHOS 251*  251*   *  149*   AST 61*  61*   BILITOT 5.1*  5.1*     Coagulation:   Recent Labs   Lab 01/03/24  0318   APTT 31.9       Significant Imaging:  MRCP:     No definite intrahepatic or extrahepatic ductal dilatation.  No obvious filling defects or evidence for stricture of the visualized common bile duct.     Cholelithiasis with mild gallbladder wall thickening and trace pericholecystic fluid.  Findings are nonspecific, and can be seen in the setting of hepatic dysfunction.  Correlation is advised.     Hepatosplenomegaly with mild hepatic steatosis.  No focal hepatic lesions noting limitations from noncontrast examination.     Mild prominence of the pancreatic duct.  Additional T2 hyperintense focus about the pancreatic tail measuring 9 mm, with potential connection to the pancreatic duct.  Findings are nonspecific, and may relate to a pancreatic cyst versus side branch IPMN.  Repeat  examination is recommended with MRI abdomen with MRCP in 1 year to ensure stability of this finding.     Few prominent peripancreatic and periportal lymph nodes as above.     Diffusion signal abnormality about the right branches of the portal vein and proximal main portal vein, likely representing sequela of portal vein thrombosis.  Findings are better evaluated on ultrasound liver with Doppler 01/01/2024.  Assessment/Plan:     GI  Portal vein thrombosis  64 yo M with history of prostate adenocarcinoma (low-grade under active surveillance) who is admitted with hyperbilrubinemia and incidentally found to have a portal vein thrombosis. Patient denies any personal or family history or clotting disorders or liver disease. Since admission patient has had recurrent fevers and mild tachycardia. Considerations for possible underlying liver disease vs coagulopathy vs malignancy.     Pertinent Imaging:  CTAP with IV contrast: Findings suggesting hepatic steatosis, correlation with LFTs recommended.  There is a somewhat heterogeneous appearance to the posterior aspect of the right hepatic lobe inferiorly.  This may be on the basis of contrast phase however this could be further evaluated with nonemergent MRI as warranted.   US Liver with doppler: Thrombosis of the posterior branch of the right portal vein.  Nonocclusive thrombus in the main portal vein. Hepatomegaly. Cholelithiasis.  MRCP: Diffusion signal abnormality about the right branches of the portal vein and proximal main portal vein, likely representing sequela of portal vein thrombosis.  Findings are better evaluated on ultrasound liver with Doppler 01/01/2024.    Our recommendations are as follows:   Daily CBC, CMP  Given recurrent fevers in patient with portal vein thrombosis, consider pylephlebitis in differential. Recommend septic workup with blood cultures.   Agree with hematology recommendations for anticoagulation: heparin drip while inpatient and switching to  oral DOAC for 3 months at discharge.  Serologic workup for possible cirrhosis ordered given portal vein thrombosis without current explanation for coagulopathy. He will need outpatient follow up with hepatology  Recommend obtaining outpatient colonoscopy. Discussed with patient and he was advised to schedule.              Thank you for your consult. I will sign off. Please contact us if you have any additional questions.    Yonas Sanz MD  Hepatology  [facility] - Tele[facility]

## 2024-01-03 NOTE — ASSESSMENT & PLAN NOTE
62 yo M with history of prostate adenocarcinoma (low-grade under active surveillance) who is admitted with hyperbilrubinemia and incidentally found to have a portal vein thrombosis. Patient denies any personal or family history or clotting disorders or liver disease. Since admission patient has had recurrent fevers and mild tachycardia. Considerations for possible underlying liver disease vs coagulopathy vs malignancy.     Pertinent Imaging:  CTAP with IV contrast: Findings suggesting hepatic steatosis, correlation with LFTs recommended.  There is a somewhat heterogeneous appearance to the posterior aspect of the right hepatic lobe inferiorly.  This may be on the basis of contrast phase however this could be further evaluated with nonemergent MRI as warranted.   US Liver with doppler: Thrombosis of the posterior branch of the right portal vein.  Nonocclusive thrombus in the main portal vein. Hepatomegaly. Cholelithiasis.  MRCP: Diffusion signal abnormality about the right branches of the portal vein and proximal main portal vein, likely representing sequela of portal vein thrombosis.  Findings are better evaluated on ultrasound liver with Doppler 01/01/2024.    Our recommendations are as follows:   Daily CBC, CMP  Given recurrent fevers in patient with portal vein thrombosis, consider pylephlebitis in differential. Recommend septic workup with blood cultures.   Agree with hematology recommendations for anticoagulation: heparin drip while inpatient and switching to oral DOAC for 3 months at discharge.  Serologic workup for possible cirrhosis ordered given portal vein thrombosis without current explanation for coagulopathy. He will need outpatient follow up with hepatology  Recommend obtaining outpatient colonoscopy

## 2024-01-04 ENCOUNTER — OFFICE VISIT (OUTPATIENT)
Dept: INTERNAL MEDICINE | Facility: CLINIC | Age: 64
End: 2024-01-04
Payer: MEDICARE

## 2024-01-04 VITALS
DIASTOLIC BLOOD PRESSURE: 74 MMHG | SYSTOLIC BLOOD PRESSURE: 130 MMHG | HEIGHT: 69 IN | BODY MASS INDEX: 24.56 KG/M2 | WEIGHT: 165.81 LBS | HEART RATE: 100 BPM | OXYGEN SATURATION: 95 %

## 2024-01-04 DIAGNOSIS — Z87.19 HISTORY OF DIVERTICULITIS: ICD-10-CM

## 2024-01-04 DIAGNOSIS — I81 PORTAL VEIN THROMBOSIS: Primary | ICD-10-CM

## 2024-01-04 LAB
ANA SER QL IF: NORMAL
B2 GLYCOPROT1 IGG SERPL IA-ACNC: <9.4 SGU
B2 GLYCOPROT1 IGM SERPL IA-ACNC: <9.4 SMU

## 2024-01-04 PROCEDURE — 99999 PR PBB SHADOW E&M-EST. PATIENT-LVL IV: CPT | Mod: PBBFAC,,, | Performed by: INTERNAL MEDICINE

## 2024-01-04 PROCEDURE — 99214 OFFICE O/P EST MOD 30 MIN: CPT | Mod: S$GLB,,, | Performed by: INTERNAL MEDICINE

## 2024-01-04 RX ORDER — TRAMADOL HYDROCHLORIDE 50 MG/1
50 TABLET ORAL EVERY 4 HOURS PRN
Qty: 30 TABLET | Refills: 0 | Status: ON HOLD | OUTPATIENT
Start: 2024-01-04 | End: 2024-01-27 | Stop reason: HOSPADM

## 2024-01-04 NOTE — PROGRESS NOTES
"Subjective:       Patient ID: Jesus Christy is a 63 y.o. male.    Chief Complaint: Follow-up    HPI  63 y.o. male presents for a hospital follow up visit. I have reviewed notes (discharge summary not available) as well as all relevant laboratory and pathology results and imaging.     Patient was admitted 1/1/24 to 1/3/24 with diverticulitis with fever and elevated LFTs. Found to have R portal vein thrombus treated with heparin drip then apixaban (3 months.) MRCP done - no biliary obstruction but 9mm pancreatic tail cyst. Plans to follow up in pancreatic cyst clinic (scheduled 3/20/24.).   Outpatient colonoscopy recommended.     He reports he is having abdominal pain and low back and leg pain. Has not been eating much, no BM since home.   Review of Systems   Constitutional:  Negative for fever.   Respiratory:  Negative for shortness of breath.    Cardiovascular:  Negative for chest pain.   Musculoskeletal: Negative.    Skin: Negative.        Objective:   /74 (BP Location: Left arm, Patient Position: Sitting, BP Method: Medium (Manual))   Pulse 100   Ht 5' 9" (1.753 m)   Wt 75.2 kg (165 lb 12.6 oz)   SpO2 95%   BMI 24.48 kg/m²      Physical Exam  Constitutional:       General: He is not in acute distress.     Appearance: He is well-developed. He is not diaphoretic.   HENT:      Head: Normocephalic and atraumatic.   Cardiovascular:      Rate and Rhythm: Normal rate and regular rhythm.      Heart sounds: No murmur heard.  Pulmonary:      Effort: Pulmonary effort is normal. No respiratory distress.      Breath sounds: No wheezing or rales.   Skin:     General: Skin is warm and dry.   Neurological:      Mental Status: He is alert and oriented to person, place, and time.   Psychiatric:         Behavior: Behavior normal.         Assessment:       1. Portal vein thrombosis    2. History of diverticulitis        Plan:       1. Portal vein thrombosis  -     COMPREHENSIVE METABOLIC PANEL; Future; Expected date: " 01/04/2024  -     CBC Auto Differential; Future; Expected date: 01/04/2024    2. History of diverticulitis  -     COMPREHENSIVE METABOLIC PANEL; Future; Expected date: 01/04/2024  -     CBC Auto Differential; Future; Expected date: 01/04/2024    Other orders  -     traMADoL (ULTRAM) 50 mg tablet; Take 1 tablet (50 mg total) by mouth every 4 (four) hours as needed for Pain.  Dispense: 30 tablet; Refill: 0           You are up to date for your primary preventive health care, and there are no reminders at this time.     Labs on Monday

## 2024-01-05 ENCOUNTER — LAB VISIT (OUTPATIENT)
Dept: LAB | Facility: HOSPITAL | Age: 64
End: 2024-01-05
Attending: INTERNAL MEDICINE
Payer: MEDICARE

## 2024-01-05 ENCOUNTER — TELEPHONE (OUTPATIENT)
Dept: HEPATOLOGY | Facility: CLINIC | Age: 64
End: 2024-01-05

## 2024-01-05 ENCOUNTER — PROCEDURE VISIT (OUTPATIENT)
Dept: HEPATOLOGY | Facility: CLINIC | Age: 64
End: 2024-01-05
Payer: MEDICARE

## 2024-01-05 ENCOUNTER — OFFICE VISIT (OUTPATIENT)
Dept: HEPATOLOGY | Facility: CLINIC | Age: 64
End: 2024-01-05
Payer: MEDICARE

## 2024-01-05 VITALS
DIASTOLIC BLOOD PRESSURE: 60 MMHG | HEART RATE: 119 BPM | WEIGHT: 166.88 LBS | RESPIRATION RATE: 18 BRPM | OXYGEN SATURATION: 95 % | SYSTOLIC BLOOD PRESSURE: 130 MMHG | HEIGHT: 69 IN | BODY MASS INDEX: 24.72 KG/M2

## 2024-01-05 DIAGNOSIS — I81 PORTAL VEIN THROMBOSIS: ICD-10-CM

## 2024-01-05 DIAGNOSIS — C61 ADENOCARCINOMA OF PROSTATE: Primary | Chronic | ICD-10-CM

## 2024-01-05 LAB
AFP SERPL-MCNC: 12 NG/ML (ref 0–8.4)
ALBUMIN SERPL BCP-MCNC: 2.8 G/DL (ref 3.5–5.2)
ALP SERPL-CCNC: 234 U/L (ref 55–135)
ALT SERPL W/O P-5'-P-CCNC: 81 U/L (ref 10–44)
ANION GAP SERPL CALC-SCNC: 10 MMOL/L (ref 8–16)
ANISOCYTOSIS BLD QL SMEAR: SLIGHT
AST SERPL-CCNC: 30 U/L (ref 10–40)
BASOPHILS # BLD AUTO: 0.05 K/UL (ref 0–0.2)
BASOPHILS NFR BLD: 0.5 % (ref 0–1.9)
BILIRUB SERPL-MCNC: 1.3 MG/DL (ref 0.1–1)
BUN SERPL-MCNC: 14 MG/DL (ref 8–23)
CALCIUM SERPL-MCNC: 10.5 MG/DL (ref 8.7–10.5)
CANCER AG19-9 SERPL-ACNC: <2.1 U/ML (ref 0–40)
CARDIOLIPIN IGG SER IA-ACNC: <9.4 GPL (ref 0–14.99)
CARDIOLIPIN IGM SER IA-ACNC: 47 MPL (ref 0–12.49)
CHLORIDE SERPL-SCNC: 107 MMOL/L (ref 95–110)
CLINICAL BIOCHEMIST REVIEW: NORMAL
CO2 SERPL-SCNC: 26 MMOL/L (ref 23–29)
CREAT SERPL-MCNC: 1.1 MG/DL (ref 0.5–1.4)
DIFFERENTIAL METHOD BLD: ABNORMAL
EOSINOPHIL # BLD AUTO: 0.3 K/UL (ref 0–0.5)
EOSINOPHIL NFR BLD: 2.5 % (ref 0–8)
ERYTHROCYTE [DISTWIDTH] IN BLOOD BY AUTOMATED COUNT: 13.3 % (ref 11.5–14.5)
EST. GFR  (NO RACE VARIABLE): >60 ML/MIN/1.73 M^2
FERRITIN SERPL-MCNC: 1016 NG/ML (ref 20–300)
FLOW CYTOMETRY SPECIALIST REVIEW: NORMAL
GLUCOSE SERPL-MCNC: 102 MG/DL (ref 70–110)
HBV SURFACE AG SERPL QL IA: NORMAL
HCT VFR BLD AUTO: 37.3 % (ref 40–54)
HCV AB SERPL QL IA: NORMAL
HGB BLD-MCNC: 11.9 G/DL (ref 14–18)
IMM GRANULOCYTES # BLD AUTO: 0.19 K/UL (ref 0–0.04)
IMM GRANULOCYTES NFR BLD AUTO: 1.8 % (ref 0–0.5)
LYMPHOCYTES # BLD AUTO: 2.1 K/UL (ref 1–4.8)
LYMPHOCYTES NFR BLD: 20.1 % (ref 18–48)
MCH RBC QN AUTO: 31.2 PG (ref 27–31)
MCHC RBC AUTO-ENTMCNC: 31.9 G/DL (ref 32–36)
MCV RBC AUTO: 98 FL (ref 82–98)
MONOCYTES # BLD AUTO: 1.1 K/UL (ref 0.3–1)
MONOCYTES NFR BLD: 10.2 % (ref 4–15)
NEUTROPHILS # BLD AUTO: 6.8 K/UL (ref 1.8–7.7)
NEUTROPHILS NFR BLD: 64.9 % (ref 38–73)
NRBC BLD-RTO: 0 /100 WBC
PLATELET # BLD AUTO: 518 K/UL (ref 150–450)
PLATELET BLD QL SMEAR: ABNORMAL
PLPETH BLD-MCNC: <10 NG/ML
PMV BLD AUTO: 9.3 FL (ref 9.2–12.9)
PNH GRANULOCYTES: 0 % (ref 0–0.01)
PNH MONOCYTES: 0 % (ref 0–0.05)
PNH RBC-COMPLETE AG LOSS: 0 % (ref 0–0.01)
PNH RBC-PARTIAL AG LOSS: 0.01 % (ref 0–0.99)
POLYCHROMASIA BLD QL SMEAR: ABNORMAL
POPETH BLD-MCNC: <10 NG/ML
POTASSIUM SERPL-SCNC: 3.7 MMOL/L (ref 3.5–5.1)
PROT SERPL-MCNC: 8.1 G/DL (ref 6–8.4)
RBC # BLD AUTO: 3.82 M/UL (ref 4.6–6.2)
SMOOTH MUSCLE AB TITR SER IF: NORMAL {TITER}
SODIUM SERPL-SCNC: 143 MMOL/L (ref 136–145)
WBC # BLD AUTO: 10.53 K/UL (ref 3.9–12.7)

## 2024-01-05 PROCEDURE — 99999 PR PBB SHADOW E&M-EST. PATIENT-LVL IV: CPT | Mod: PBBFAC,,, | Performed by: INTERNAL MEDICINE

## 2024-01-05 PROCEDURE — 82105 ALPHA-FETOPROTEIN SERUM: CPT | Performed by: INTERNAL MEDICINE

## 2024-01-05 PROCEDURE — 99205 OFFICE O/P NEW HI 60 MIN: CPT | Mod: S$GLB,,, | Performed by: INTERNAL MEDICINE

## 2024-01-05 PROCEDURE — 86803 HEPATITIS C AB TEST: CPT | Performed by: INTERNAL MEDICINE

## 2024-01-05 PROCEDURE — 36415 COLL VENOUS BLD VENIPUNCTURE: CPT | Performed by: INTERNAL MEDICINE

## 2024-01-05 PROCEDURE — 80053 COMPREHEN METABOLIC PANEL: CPT | Performed by: INTERNAL MEDICINE

## 2024-01-05 PROCEDURE — 91200 LIVER ELASTOGRAPHY: CPT | Mod: S$GLB,,, | Performed by: INTERNAL MEDICINE

## 2024-01-05 PROCEDURE — 82728 ASSAY OF FERRITIN: CPT | Performed by: INTERNAL MEDICINE

## 2024-01-05 PROCEDURE — 86301 IMMUNOASSAY TUMOR CA 19-9: CPT | Performed by: INTERNAL MEDICINE

## 2024-01-05 PROCEDURE — 87340 HEPATITIS B SURFACE AG IA: CPT | Performed by: INTERNAL MEDICINE

## 2024-01-05 PROCEDURE — 85025 COMPLETE CBC W/AUTO DIFF WBC: CPT | Performed by: INTERNAL MEDICINE

## 2024-01-05 NOTE — TELEPHONE ENCOUNTER
"Patient: Jesus Christy       MRN: [id number]      : 1960     Age: 63 y.o.  [address] [facility] [address]      Providers: Sukhi Dee MD    Priority of review: Other    Patient Transplant Status: Hepatology    Reason for presentation: Indeterminate lesion    Clinical Summary: 63 yr old man with hx of prostate Ca since  who presented to hospital in early 2024 with abdo pain and elevated LFTs and a right PV thrombus.    ? Liver abnormalities  ? Ca    Last PSA 6.6    Imaging to be reviewed: CT and MRI abdo 2024    HCC Treatment History: none    Platelets:   Lab Results   Component Value Date/Time     2024 08:12 AM     Creatinine:   Lab Results   Component Value Date/Time    CREATININE 1.0 2024 08:12 AM     Bilirubin:   Lab Results   Component Value Date/Time    BILITOT 3.1 (H) 2024 08:12 AM     AFP Last 3 each if available: No results found for: "AFP", "EXTAFP"    MELD: Computed MELD 3.0 unavailable. Necessary lab results were not found in the last year.  Computed MELD-Na unavailable. Necessary lab results were not found in the last year.      Plan:     Follow-up Provider:   "

## 2024-01-05 NOTE — PROCEDURES
FibroScan [place]    Date/Time: 1/5/2024 4:15 PM    Performed by: Sukhi Dee MD  Authorized by: Sukhi Dee MD    Diagnosis:  Other    Probe:  M    Universal Protocol: Patient's identity, procedure and site were verified, confirmatory pause was performed.  Discussed procedure including risks and potential complications.  Questions answered.  Patient verbalizes understanding and wishes to proceed with VCTE.     Procedure: After providing explanations of the procedure, patient was placed in the supine position with right arm in maximum abduction to allow optimal exposure of right lateral abdomen.  Patient was briefly assessed, Testing was performed in the mid-axillary location, 50Hz Shear Wave pulses were applied and the resulting Shear Wave and Propagation Speed detected with a 3.5 MHz ultrasonic signal, using the FibroScan probe, Skin to liver capsule distance and liver parenchyma were accessed during the entire examination with the FibroScan probe, Patient was instructed to breathe normally and to abstain from sudden movements during the procedure, allowing for random measurements of liver stiffness. At least 10 Shear Waves were produced, Individual measurements of each Shear Wave were calculated.  Patient tolerated the procedure well with no complications.  Meets discharge criteria as was dismissed.  Rates pain 0 out of 10.  Patient will follow up with ordering provider to review results.    Findings  Median liver stiffness score:  4.2  CAP Reading: dB/m:  247    IQR/med %:  17  Interpretation  Fibrosis interpretation is based on medial liver stiffness - Kilopascal (kPa).    Fibrosis Stage:  F 0-1  Steatosis interpretation is based on controlled attenuation parameter - (dB/m).    Steatosis Grade:  <S1

## 2024-01-05 NOTE — PROGRESS NOTES
Subjective:       Patient ID: Jesus Christy is a 63 y.o. male.    Chief Complaint: No chief complaint on file.    HPI  I saw this 63 y.o.man in the liver clinic after his discharge from hospital.    Admitted to hospital on 1/1/2024 with abdo pain, fever and elevated LFTs- found to have a right PVT  Started on apixaban    No weight changes    Bilirubin initially 5  High Alk Phos  Normal platelets  HCV ab -ve    CT abdo: 1/1/24  1. Findings suggesting hepatic steatosis, correlation with LFTs recommended.  There is a somewhat heterogeneous appearance to the posterior aspect of the right hepatic lobe inferiorly.  This may be on the basis of contrast phase however this could be further evaluated with nonemergent MRI as warranted.  2. Multiple scattered colonic diverticula.  There is questionable slight indistinctness about a few diverticula at the level of the distal descending colon versus motion artifact.  Correlation is advised, early sequela of diverticulitis not excluded.  3. Prostatomegaly.  4. Pulmonary nodules as described.    MRI abdo: 1/3/2024  Cholelithiasis with mild gallbladder wall thickening and trace pericholecystic fluid.  Findings are nonspecific, and can be seen in the setting of hepatic dysfunction.  Correlation is advised.  Hepatosplenomegaly with mild hepatic steatosis.  No focal hepatic lesions noting limitations from noncontrast examination.  Mild prominence of the pancreatic duct.  Additional T2 hyperintense focus about the pancreatic tail measuring 9 mm, with potential connection to the pancreatic duct.  Findings are nonspecific, and may relate to a pancreatic cyst versus side branch IPMN.  Repeat examination is recommended with MRI abdomen with MRCP in 1 year to ensure stability of this finding.  Few prominent peripancreatic and periportal lymph nodes as above.  Diffusion signal abnormality about the right branches of the portal vein and proximal main portal vein, likely representing sequela  of portal vein thrombosis.  Findings are better evaluated on ultrasound liver with Doppler 01/01/2024.    PMH:  Prostate Ca on biopsy in 2016  - on medical treatment    Right leg extensive surgery- RSD  No abdo surgery      SH:  Alcohol- rarely  Non smoker    Disabled after accident  [place]     FH:  Nil liver  Prostate hx      Review of Systems   Constitutional:  Negative for activity change, appetite change, chills, fatigue, fever and unexpected weight change.   HENT:  Negative for hearing loss.    Eyes:  Negative for discharge and visual disturbance.   Respiratory:  Negative for cough, chest tightness, shortness of breath and wheezing.    Cardiovascular:  Negative for chest pain, palpitations and leg swelling.   Gastrointestinal:  Negative for abdominal distention, abdominal pain, constipation, diarrhea and nausea.   Genitourinary:  Negative for dysuria and frequency.   Musculoskeletal:  Negative for arthralgias and back pain.   Skin:  Negative for pallor and rash.   Neurological:  Negative for dizziness, tremors, speech difficulty and headaches.   Hematological:  Negative for adenopathy.   Psychiatric/Behavioral:  Negative for agitation and confusion.          Lab Results   Component Value Date    ALT 92 (H) 01/03/2024    AST 43 (H) 01/03/2024    ALKPHOS 224 (H) 01/03/2024    BILITOT 3.1 (H) 01/03/2024     Past Medical History:   Diagnosis Date    Complex regional pain syndrome i of right lower limb     GERD (gastroesophageal reflux disease)     HTN (hypertension)      Past Surgical History:   Procedure Laterality Date    COLONOSCOPY N/A 9/29/2017    Procedure: COLONOSCOPY;  Surgeon: Jamar Edwards MD;  Location: [address]);  Service: Endoscopy;  Laterality: N/A;    JOINT REPLACEMENT Right     knee    KNEE ARTHROSCOPY W/ ACL RECONSTRUCTION  2014    right     Current Outpatient Medications   Medication Sig    [START ON 1/10/2024] apixaban (ELIQUIS) 5 mg Tab Take 2 tablets (10 mg total)  by mouth 2 (two) times daily. for 7 days. THEN Take 1 tablet (5 mg total) by mouth 2 (two) times daily.    ARIPiprazole (ABILIFY) 5 MG Tab Take 5 mg by mouth once daily.    clotrimazole (LOTRIMIN) 1 % cream Apply topically 2 (two) times daily.    diazePAM (VALIUM) 10 MG Tab Take 1 tablet (10 mg total) by mouth 3 (three) times daily as needed (anxiety).    DULoxetine (CYMBALTA) 30 MG capsule Take 30 mg by mouth once daily.    mirtazapine (REMERON) 30 MG tablet Take 45 mg by mouth every evening.    tadalafiL (CIALIS) 20 MG Tab Take 1 tablet (20 mg total) by mouth daily as needed (erectile dysfunction).    traMADoL (ULTRAM) 50 mg tablet Take 1 tablet (50 mg total) by mouth every 4 (four) hours as needed for Pain.    gabapentin (NEURONTIN) 300 MG capsule Take by mouth.    tamsulosin (FLOMAX) 0.4 mg Cap Take 1 capsule (0.4 mg total) by mouth every evening. (Patient not taking: Reported on 1/5/2024)     No current facility-administered medications for this visit.       Objective:      Physical Exam  HENT:      Head: Normocephalic.   Eyes:      Pupils: Pupils are equal, round, and reactive to light.   Neck:      Thyroid: No thyromegaly.   Cardiovascular:      Rate and Rhythm: Normal rate and regular rhythm.      Heart sounds: Normal heart sounds.   Pulmonary:      Effort: Pulmonary effort is normal.      Breath sounds: Normal breath sounds. No wheezing.   Abdominal:      General: There is no distension.      Palpations: Abdomen is soft. There is no mass.      Tenderness: There is no abdominal tenderness.   Lymphadenopathy:      Cervical: No cervical adenopathy.   Skin:     General: Skin is warm.      Findings: No erythema or rash.   Neurological:      Mental Status: He is alert and oriented to person, place, and time.   Psychiatric:         Behavior: Behavior normal.           Assessment:       1. Adenocarcinoma of prostate    2. Portal vein thrombosis        Plan:   He does not appear to have an obvious cause for his  right PVT other than his prostate cancer which may cause a prothrombotic state.  His fibroscan today was normal showing no cirrhosis/fibrosis.    - labs to exclude viral hep and to check improvement in his LFTs  - hopefully seeing hematology soon  - will review his imaging in our IR conference to make sure that the liver abnormalities seen don't signify malignancy.    Clinic in 3 months.

## 2024-01-06 NOTE — PROGRESS NOTES
[facility] - Telemetry [facility] Medicine  Progress Note    Patient Name: Jesus Christy  MRN: [id number]  Patient Class: IP- Inpatient   Admission Date: 1/1/2024  Length of Stay: 2 days  Attending Physician: No att. providers found  Primary Care Provider: Tara Jolly MD        Subjective:     Principal Problem:Direct hyperbilirubinemia        HPI:  Jesus Christy is a 63 y.o. male with history of prostate CA, complex regional pain syndrome, anxiety on chronic benzos, HTN who presents today for abdominal pain and dark urine.     He reports that 1-2 days ago, he noted dark urine which he felt was consistent with blood.  He then developed abdominal pain, described as a diffuse bubbling sensation without focal symptoms.  Yesterday, he had 2-3 loose bowel movements and 2-3 loose bowel movements today.  Denies any fever, nausea, or vomiting, however has had decreased appetite.  He has been taking Goody powder 3-4 times per day for the pain.  He also notes chronic lower extremity pain which is progressively increasing, along with headache this morning.      Overview/Hospital Course:  No notes on file    Interval History:   1/2: lower back pain present. Patient with thrombosis of right portal vein, nonocclusive thrombosis of Main PV - unclear acuity.     Review of Systems   Constitutional:  Positive for fatigue. Negative for activity change, appetite change and chills.        All pertinent other ROS noted in HPI   HENT:  Negative for congestion and trouble swallowing.    Respiratory:  Negative for cough, chest tightness and shortness of breath.    Cardiovascular:  Negative for chest pain and leg swelling.   Gastrointestinal:  Positive for abdominal pain. Negative for abdominal distention, blood in stool and nausea.   Genitourinary:  Negative for decreased urine volume and urgency.   Musculoskeletal:  Positive for back pain. Negative for arthralgias.   Skin:  Positive for color change.     Objective:      Vital Signs (Most Recent):  Temp: 100.2 °F (37.9 °C) (01/02/24 1116)  Pulse: 102 (01/02/24 1116)  Resp: 16 (01/02/24 1413)  BP: 116/74 (01/02/24 1116)  SpO2: (!) 93 % (01/02/24 1116) Vital Signs (24h Range):  Temp:  [97.7 °F (36.5 °C)-101.1 °F (38.4 °C)] 100.2 °F (37.9 °C)  Pulse:  [] 102  Resp:  [16-20] 16  SpO2:  [92 %-96 %] 93 %  BP: (116-138)/(58-79) 116/74     Weight: 74.8 kg (165 lb)  Body mass index is 24.37 kg/m².  No intake or output data in the 24 hours ending 01/02/24 1445      Physical Exam  Vitals and nursing note reviewed.   Constitutional:       General: He is not in acute distress.     Appearance: He is well-developed. He is not ill-appearing or diaphoretic.   HENT:      Head: Normocephalic and atraumatic.   Eyes:      General: No scleral icterus.     Conjunctiva/sclera: Conjunctivae normal.   Neck:      Vascular: No JVD.   Cardiovascular:      Rate and Rhythm: Regular rhythm. Tachycardia present.   Pulmonary:      Effort: Pulmonary effort is normal. No respiratory distress.      Breath sounds: Normal breath sounds. No wheezing or rales.   Abdominal:      General: There is no distension.      Palpations: Abdomen is soft.      Tenderness: There is no abdominal tenderness. There is no guarding.   Musculoskeletal:      Right lower leg: No edema.      Left lower leg: No edema.   Skin:     General: Skin is warm and dry.   Neurological:      Mental Status: He is alert and oriented to person, place, and time.      Motor: No abnormal muscle tone.   Psychiatric:         Mood and Affect: Mood normal.         Behavior: Behavior normal.             Significant Labs: All pertinent labs within the past 24 hours have been reviewed.    Significant Imaging: I have reviewed all pertinent imaging results/findings within the past 24 hours.    Assessment/Plan:      * Direct hyperbilirubinemia  Presents with abdominal pain and dark urine, and primarily direct hyperbilirubinemia noted on labs along with elevation  of AST/ALT. Ct abdomen shows no obvious pathology.     MRCP ordered  PVT - right and nonocclusive main? Unclear acuity. Will discuss with hepatology and hematology for anticoagulation recs.   Prostate ca hx - perhaps hypercoagulable state?     Chronic prescription benzodiazepine use  Verified by PDMP. Will cautiously continue home dosing as to avoid withdrawal.       SIRS (systemic inflammatory response syndrome)  Meets SIRS criteria with tachycardia and fever, however without obvious signs of infection, leukocytosis, or lactic acidosis. UA not consistent with infection, and CXR without acute pathology. Given acutely elevated bili and LFTs, concern for hepatic/biliary pathology. CT abdomen shows some findings for questionable diverticulitis, however not obvious, and this would not correlate with his symptoms.     Holding abx on 1/2 to monitor further.     Complex regional pain syndrome type 1 of right lower extremity  Continue home Cymbalta.     Adenocarcinoma of prostate  Now on surveillance. Continue Flomax and Proscar.      Essential hypertension  Not on home antihypertensives. Monitor.       VTE Risk Mitigation (From admission, onward)           Ordered     IP VTE HIGH RISK PATIENT  Once         01/01/24 2224                    Discharge Planning   ANICETO: 1/3/2024     Code Status: Prior   Is the patient medically ready for discharge?:     Reason for patient still in hospital (select all that apply): Patient new problem  Discharge Plan A: Home with family   Discharge Delays: None known at this time              Kiera Morrow MD  Department of Hospital Medicine   [facility] - Telemetry Stepdown

## 2024-01-06 NOTE — DISCHARGE SUMMARY
DISCHARGE SUMMARY  Hospital Medicine    Team: [facility]    Patient Name: Jesus Christy  YOB: 1960    Admit Date: 1/1/2024    Discharge Date: 1/3/2023    Discharge Attending Physician: Kiera Morrow     Admitting Resident    Diagnoses:  Active Hospital Problems    Diagnosis  POA    *Direct hyperbilirubinemia [E80.6]  Yes    Portal vein thrombosis [I81]  Yes    SIRS (systemic inflammatory response syndrome) [R65.10]  Yes    Chronic prescription benzodiazepine use [Z79.899]  Not Applicable     Chronic    Complex regional pain syndrome type 1 of right lower extremity [G90.521]  Yes    Adenocarcinoma of prostate [C61]  Yes     Chronic     Dx 9/2016. Low grade, low volume. Active surveillance.       Essential hypertension [I10]  Yes     Chronic      Resolved Hospital Problems   No resolved problems to display.       Discharged Condition: admit problems have stabilized      HOSPITAL COURSE:      Initial Presentation:    This is a 63-year-old man with history of prostate adenocarcinoma (low-grade under active surveillance), complex regional pain syndrome, anxiety on chronic benzodiazepines, and HTN who presents today for abdominal pain and dark urine. He reports that 1-2 days prior to admission he started having dark urine that he says was a dark orange color and he was worried that it might contain blood.  He then developed abdominal pain, described as a diffuse bubbling sensation without focal symptoms. He tried taking Peptobismol which only mildly improved his abdominal pain. He has been taking Goody powder 3-4 times per day for an acute worsening of his chronic back pain. He says when he came to the hospital his abdominal pain was a 9-10/10 and has now improved to a 3/10.       Course of Principle Problem for Admission:    Portal vein thrombosis  64 yo M with history of prostate adenocarcinoma (low-grade under active surveillance) who is admitted with hyperbilrubinemia and incidentally found to  have a portal vein thrombosis. Patient denies any personal or family history or clotting disorders or liver disease. Since admission patient has had recurrent fevers and mild tachycardia. Considerations for possible underlying liver disease vs coagulopathy vs malignancy.      Pertinent Imaging:  CTAP with IV contrast: Findings suggesting hepatic steatosis, correlation with LFTs recommended.  There is a somewhat heterogeneous appearance to the posterior aspect of the right hepatic lobe inferiorly.  This may be on the basis of contrast phase however this could be further evaluated with nonemergent MRI as warranted.   US Liver with doppler: Thrombosis of the posterior branch of the right portal vein.  Nonocclusive thrombus in the main portal vein. Hepatomegaly. Cholelithiasis.  MRCP: Diffusion signal abnormality about the right branches of the portal vein and proximal main portal vein, likely representing sequela of portal vein thrombosis.  Findings are better evaluated on ultrasound liver with Doppler 01/01/2024.  Serologic workup for possible cirrhosis ordered given portal vein thrombosis without current explanation for coagulopathy. He will need outpatient follow up with hepatology  Recommend obtaining outpatient colonoscopy. Discussed with patient and he was advised to schedule.     Given no known personal history or family history of VTE, deferred inherited thrombophilia testing.   Hematology recs: Recommend cardiolipin antibody and beta 2 glycoprotein antibodies. Can not accurately test lupus anticoagulant as he is current on heparin. He will need these tests repeated in 3 months as this can have treatment implications regarding need for warfarin.   - As he has thrombosis in unusual vascular bed, recommend mpn panel and pnh panel  DOAC for minimum of 3 months        CONSULTS: hematology, hepatology    Last CBC/BMP/HgbA1c (if applicable):  Recent Results (from the past 336 hour(s))   CBC Auto Differential     Collection Time: 01/05/24  4:02 PM   Result Value Ref Range    WBC 10.53 3.90 - 12.70 K/uL    Hemoglobin 11.9 (L) 14.0 - 18.0 g/dL    Hematocrit 37.3 (L) 40.0 - 54.0 %    Platelets 518 (H) 150 - 450 K/uL   CBC auto differential    Collection Time: 01/03/24  8:12 AM   Result Value Ref Range    WBC 14.32 (H) 3.90 - 12.70 K/uL    Hemoglobin 10.8 (L) 14.0 - 18.0 g/dL    Hematocrit 33.1 (L) 40.0 - 54.0 %    Platelets 328 150 - 450 K/uL   CBC auto differential    Collection Time: 01/01/24  1:48 PM   Result Value Ref Range    WBC 11.61 3.90 - 12.70 K/uL    Hemoglobin 13.6 (L) 14.0 - 18.0 g/dL    Hematocrit 42.1 40.0 - 54.0 %    Platelets 314 150 - 450 K/uL     Disposition:  Home       Future Scheduled Appointments:  Future Appointments   Date Time Provider Department Center   1/8/2024  9:50 AM LAB, APPOINTMENT [facility] LAB [facility]   1/19/2024  8:50 AM LAB, APPOINTMENT [facility] LAB [facility]   1/19/2024  9:30 AM [facility] OIC-MRI1 [facility]   1/26/2024  9:20 AM Varun Resendiz MD [facility] UROLOGY [facility]   2/1/2024  2:00 PM Tara Jolly MD [facility]   3/20/2024  8:00 AM Debi Lloyd MD [facility] AENDGAS [facility]   4/8/2024  9:00 AM Sukhi Dee MD [facility] HEPAT [facility]       Follow-up Plans from This Hospitalization:  Hematology, hepatology     Discharge Medication List:         Medication List        START taking these medications      ELIQUIS 5 mg Tab  Generic drug: apixaban  Take 2 tablets (10 mg total) by mouth 2 (two) times daily. for 7 days. THEN Take 1 tablet (5 mg total) by mouth 2 (two) times daily.  Start taking on: January 10, 2024            CHANGE how you take these medications      DULoxetine 30 MG capsule  Commonly known as: CYMBALTA  What changed: Another medication with the same name was removed. Continue taking this medication, and follow the directions you see here.            CONTINUE taking these medications      ARIPiprazole 5 MG Tab  Commonly  known as: ABILIFY     clotrimazole 1 % cream  Commonly known as: LOTRIMIN  Apply topically 2 (two) times daily.     diazePAM 10 MG Tab  Commonly known as: VALIUM  Take 1 tablet (10 mg total) by mouth 3 (three) times daily as needed (anxiety).     gabapentin 300 MG capsule  Commonly known as: NEURONTIN     mirtazapine 30 MG tablet  Commonly known as: REMERON     tadalafiL 20 MG Tab  Commonly known as: CIALIS  Take 1 tablet (20 mg total) by mouth daily as needed (erectile dysfunction).     tamsulosin 0.4 mg Cap  Commonly known as: FLOMAX  Take 1 capsule (0.4 mg total) by mouth every evening.            STOP taking these medications      finasteride 5 mg tablet  Commonly known as: PROSCAR     UNABLE TO FIND               Where to Get Your Medications        These medications were sent to [facility] [address], [place] [address]      Hours: Mon-Fri, 8a-5:30p Phone: [phone]   ELIQUIS 5 mg Tab         Patient Instructions:  Discharge Procedure Orders   Alpha-1-antitrypsin   Standing Status: Future Standing Exp. Date: 03/03/25     Antimitochondrial antibody   Standing Status: Future Standing Exp. Date: 03/03/25     Ceruloplasmin   Standing Status: Future Standing Exp. Date: 03/03/25     Ferritin   Standing Status: Future Standing Exp. Date: 03/03/25     Iron and TIBC   Standing Status: Future Standing Exp. Date: 03/03/25     Hepatitis A antibody, IgM   Standing Status: Future Standing Exp. Date: 03/03/25     Hepatitis C antibody   Standing Status: Future Standing Exp. Date: 03/03/25     Order Specific Question Answer Comments   Release to patient Immediate      Hepatitis B core antibody, IgM   Standing Status: Future Standing Exp. Date: 03/03/25     Hepatitis B surface antibody   Standing Status: Future Standing Exp. Date: 03/03/25     Hepatitis B surface antigen   Standing Status: Future Standing Exp. Date: 03/03/25     HIV 1/2 Ag/Ab (4th Gen)   Standing Status: Future Standing  Exp. Date: 03/03/25     Order Specific Question Answer Comments   Release to patient Immediate      Ambulatory referral/consult to Hepatology   Standing Status: Future   Referral Priority: Routine Referral Type: Consultation   Referral Reason: Specialty Services Required   Requested Specialty: Hepatology   Number of Visits Requested: 1     Ambulatory referral/consult to Hematology / Oncology   Standing Status: Future   Referral Priority: Routine Referral Type: Consultation   Referral Reason: Specialty Services Required   Requested Specialty: Hematology and Oncology   Number of Visits Requested: 1       Signing Physician:  Kiera Morrow MD

## 2024-01-08 ENCOUNTER — LAB VISIT (OUTPATIENT)
Dept: LAB | Facility: HOSPITAL | Age: 64
End: 2024-01-08
Attending: INTERNAL MEDICINE
Payer: MEDICARE

## 2024-01-08 ENCOUNTER — TELEPHONE (OUTPATIENT)
Dept: INTERNAL MEDICINE | Facility: CLINIC | Age: 64
End: 2024-01-08
Payer: MEDICARE

## 2024-01-08 DIAGNOSIS — I81 PORTAL VEIN THROMBOSIS: ICD-10-CM

## 2024-01-08 DIAGNOSIS — Z87.19 HISTORY OF DIVERTICULITIS: ICD-10-CM

## 2024-01-08 LAB
ALBUMIN SERPL BCP-MCNC: 3 G/DL (ref 3.5–5.2)
ALP SERPL-CCNC: 278 U/L (ref 55–135)
ALT SERPL W/O P-5'-P-CCNC: 76 U/L (ref 10–44)
ANION GAP SERPL CALC-SCNC: 9 MMOL/L (ref 8–16)
AST SERPL-CCNC: 43 U/L (ref 10–40)
BACTERIA BLD CULT: NORMAL
BACTERIA BLD CULT: NORMAL
BASOPHILS # BLD AUTO: 0.05 K/UL (ref 0–0.2)
BASOPHILS NFR BLD: 0.2 % (ref 0–1.9)
BILIRUB SERPL-MCNC: 1.7 MG/DL (ref 0.1–1)
BUN SERPL-MCNC: 10 MG/DL (ref 8–23)
CALCIUM SERPL-MCNC: 10.7 MG/DL (ref 8.7–10.5)
CHLORIDE SERPL-SCNC: 103 MMOL/L (ref 95–110)
CO2 SERPL-SCNC: 25 MMOL/L (ref 23–29)
CREAT SERPL-MCNC: 1.1 MG/DL (ref 0.5–1.4)
DIFFERENTIAL METHOD BLD: ABNORMAL
EOSINOPHIL # BLD AUTO: 0.1 K/UL (ref 0–0.5)
EOSINOPHIL NFR BLD: 0.5 % (ref 0–8)
ERYTHROCYTE [DISTWIDTH] IN BLOOD BY AUTOMATED COUNT: 13.2 % (ref 11.5–14.5)
EST. GFR  (NO RACE VARIABLE): >60 ML/MIN/1.73 M^2
GLUCOSE SERPL-MCNC: 123 MG/DL (ref 70–110)
HCT VFR BLD AUTO: 38.9 % (ref 40–54)
HGB BLD-MCNC: 12.3 G/DL (ref 14–18)
IMM GRANULOCYTES # BLD AUTO: 0.2 K/UL (ref 0–0.04)
IMM GRANULOCYTES NFR BLD AUTO: 1 % (ref 0–0.5)
LYMPHOCYTES # BLD AUTO: 1.4 K/UL (ref 1–4.8)
LYMPHOCYTES NFR BLD: 6.6 % (ref 18–48)
MCH RBC QN AUTO: 31.4 PG (ref 27–31)
MCHC RBC AUTO-ENTMCNC: 31.6 G/DL (ref 32–36)
MCV RBC AUTO: 99 FL (ref 82–98)
MONOCYTES # BLD AUTO: 0.6 K/UL (ref 0.3–1)
MONOCYTES NFR BLD: 3.1 % (ref 4–15)
NEUTROPHILS # BLD AUTO: 18.3 K/UL (ref 1.8–7.7)
NEUTROPHILS NFR BLD: 88.6 % (ref 38–73)
NRBC BLD-RTO: 0 /100 WBC
PLATELET # BLD AUTO: 652 K/UL (ref 150–450)
PMV BLD AUTO: 9.3 FL (ref 9.2–12.9)
POTASSIUM SERPL-SCNC: 4.5 MMOL/L (ref 3.5–5.1)
PROT SERPL-MCNC: 8.7 G/DL (ref 6–8.4)
RBC # BLD AUTO: 3.92 M/UL (ref 4.6–6.2)
SODIUM SERPL-SCNC: 137 MMOL/L (ref 136–145)
WBC # BLD AUTO: 20.64 K/UL (ref 3.9–12.7)

## 2024-01-08 PROCEDURE — 85025 COMPLETE CBC W/AUTO DIFF WBC: CPT | Performed by: INTERNAL MEDICINE

## 2024-01-08 PROCEDURE — 36415 COLL VENOUS BLD VENIPUNCTURE: CPT | Performed by: INTERNAL MEDICINE

## 2024-01-08 PROCEDURE — 80053 COMPREHEN METABOLIC PANEL: CPT | Performed by: INTERNAL MEDICINE

## 2024-01-09 ENCOUNTER — CONFERENCE (OUTPATIENT)
Dept: TRANSPLANT | Facility: CLINIC | Age: 64
End: 2024-01-09
Payer: MEDICARE

## 2024-01-09 DIAGNOSIS — I81 PORTAL VEIN THROMBOSIS: Primary | ICD-10-CM

## 2024-01-09 NOTE — TELEPHONE ENCOUNTER
Pt informed and provider msg.   Repeat labs scheduled.     Patient: Jesus Christy       MRN: [id number]      : 1960     Age: 63 y.o.  [address] [facility] [address]      Providers: Sukhi Dee MD    Priority of review: Other    Patient Transplant Status: Hepatology    Reason for presentation: Indeterminate lesion    Clinical Summary: 63 yr old man with hx of prostate Ca since  who presented to hospital in early 2024 with abdo pain and elevated LFTs and a right PV thrombus.    ? Liver abnormalities  ? Ca    Last PSA 6.6    Imaging to be reviewed: CT and MRI abdo 2024    HCC Treatment History: none    Platelets:   Lab Results   Component Value Date/Time     (H) 2024 08:11 AM     Creatinine:   Lab Results   Component Value Date/Time    CREATININE 1.1 2024 08:11 AM     Bilirubin:   Lab Results   Component Value Date/Time    BILITOT 1.7 (H) 2024 08:11 AM     AFP Last 3 each if available:   Lab Results   Component Value Date/Time    AFP 12 (H) 2024 04:02 PM       MELD: Computed MELD 3.0 unavailable. Necessary lab results were not found in the last year.  Computed MELD-Na unavailable. Necessary lab results were not found in the last year.    Discussion/Plan from AUGUST Hager:  Thrombosis of posterior division of R PV.  No malignancy identified noting MR did not give contrast and CT is single phase.  Could consider 3 month follow up with contrasted MRI.       Committee Discussion:  Non contract MRI showed left PV is patterned. Perfusion pattern. No malignancy.    Plan:  TP CT scan to evaluate further.      Follow-up Provider: Dr Dee

## 2024-01-10 LAB
MPNR  FINAL DIAGNOSIS: NORMAL
MPNR  SPECIMEN TYPE: NORMAL
MPNR RESULT: NORMAL

## 2024-01-12 ENCOUNTER — TELEPHONE (OUTPATIENT)
Dept: TRANSPLANT | Facility: CLINIC | Age: 64
End: 2024-01-12
Payer: MEDICARE

## 2024-01-12 DIAGNOSIS — I81 PORTAL VEIN THROMBOSIS: Primary | ICD-10-CM

## 2024-01-12 NOTE — TELEPHONE ENCOUNTER
----- Message from Suzie Mathis sent at 1/5/2024  5:48 PM CST -----  Dr. Dee, I am still working on hepatology discharge patients from earlier in the week when I was covering for Elaina.  Demian , the fellow , told me he needed a colonoscopy for follow up.  Do you want to put a order in for that and I will call patient next week to have him schedule it ?

## 2024-01-18 ENCOUNTER — LAB VISIT (OUTPATIENT)
Dept: LAB | Facility: HOSPITAL | Age: 64
End: 2024-01-18
Attending: UROLOGY
Payer: MEDICARE

## 2024-01-18 ENCOUNTER — TELEPHONE (OUTPATIENT)
Dept: HEPATOLOGY | Facility: CLINIC | Age: 64
End: 2024-01-18
Payer: MEDICARE

## 2024-01-18 DIAGNOSIS — C61 ADENOCARCINOMA OF PROSTATE: ICD-10-CM

## 2024-01-18 LAB — COMPLEXED PSA SERPL-MCNC: 12.4 NG/ML (ref 0–4)

## 2024-01-18 PROCEDURE — 84153 ASSAY OF PSA TOTAL: CPT | Performed by: UROLOGY

## 2024-01-18 PROCEDURE — 36415 COLL VENOUS BLD VENIPUNCTURE: CPT | Performed by: UROLOGY

## 2024-01-19 ENCOUNTER — HOSPITAL ENCOUNTER (OUTPATIENT)
Dept: RADIOLOGY | Facility: HOSPITAL | Age: 64
Discharge: HOME | End: 2024-01-19
Attending: UROLOGY
Payer: MEDICARE

## 2024-01-19 DIAGNOSIS — C61 ADENOCARCINOMA OF PROSTATE: ICD-10-CM

## 2024-01-19 PROCEDURE — 25500020 PHARM REV CODE 255: Performed by: UROLOGY

## 2024-01-19 PROCEDURE — 72197 MRI PELVIS W/O & W/DYE: CPT | Mod: TC

## 2024-01-19 PROCEDURE — 72197 MRI PELVIS W/O & W/DYE: CPT | Mod: 26,,, | Performed by: RADIOLOGY

## 2024-01-19 PROCEDURE — A9585 GADOBUTROL INJECTION: HCPCS | Performed by: UROLOGY

## 2024-01-19 RX ORDER — GADOBUTROL 604.72 MG/ML
10 INJECTION INTRAVENOUS
Status: COMPLETED | OUTPATIENT
Start: 2024-01-19 | End: 2024-01-19

## 2024-01-19 RX ADMIN — GADOBUTROL 10 ML: 604.72 INJECTION INTRAVENOUS at 09:01

## 2024-01-22 ENCOUNTER — HOSPITAL ENCOUNTER (INPATIENT)
Facility: HOSPITAL | Age: 64
LOS: 4 days | Discharge: HOME HEALTH | DRG: 871 | End: 2024-01-27
Attending: STUDENT IN AN ORGANIZED HEALTH CARE EDUCATION/TRAINING PROGRAM | Admitting: STUDENT IN AN ORGANIZED HEALTH CARE EDUCATION/TRAINING PROGRAM
Payer: MEDICARE

## 2024-01-22 DIAGNOSIS — K85.90 ACUTE PANCREATITIS, UNSPECIFIED COMPLICATION STATUS, UNSPECIFIED PANCREATITIS TYPE: ICD-10-CM

## 2024-01-22 DIAGNOSIS — R78.81 BACTEREMIA: ICD-10-CM

## 2024-01-22 DIAGNOSIS — D64.9 ANEMIA, UNSPECIFIED TYPE: ICD-10-CM

## 2024-01-22 DIAGNOSIS — C61 ADENOCARCINOMA OF PROSTATE: Chronic | ICD-10-CM

## 2024-01-22 DIAGNOSIS — D72.829 LEUKOCYTOSIS: ICD-10-CM

## 2024-01-22 DIAGNOSIS — R07.9 CHEST PAIN: ICD-10-CM

## 2024-01-22 DIAGNOSIS — I81 PORTAL VEIN THROMBOSIS: Primary | ICD-10-CM

## 2024-01-22 LAB
ABO + RH BLD: NORMAL
ALBUMIN SERPL BCP-MCNC: 2.1 G/DL (ref 3.5–5.2)
ALP SERPL-CCNC: 211 U/L (ref 55–135)
ALT SERPL W/O P-5'-P-CCNC: 43 U/L (ref 10–44)
ANION GAP SERPL CALC-SCNC: 8 MMOL/L (ref 8–16)
AST SERPL-CCNC: 33 U/L (ref 10–40)
BACTERIA #/AREA URNS AUTO: ABNORMAL /HPF
BASOPHILS # BLD AUTO: 0.04 K/UL (ref 0–0.2)
BASOPHILS NFR BLD: 0.2 % (ref 0–1.9)
BILIRUB SERPL-MCNC: 3.1 MG/DL (ref 0.1–1)
BILIRUB UR QL STRIP: ABNORMAL
BLD GP AB SCN CELLS X3 SERPL QL: NORMAL
BUN SERPL-MCNC: 23 MG/DL (ref 8–23)
CALCIUM SERPL-MCNC: 9.6 MG/DL (ref 8.7–10.5)
CHLORIDE SERPL-SCNC: 105 MMOL/L (ref 95–110)
CK SERPL-CCNC: 63 U/L (ref 20–200)
CLARITY UR REFRACT.AUTO: CLEAR
CO2 SERPL-SCNC: 22 MMOL/L (ref 23–29)
COLOR UR AUTO: YELLOW
CREAT SERPL-MCNC: 1.2 MG/DL (ref 0.5–1.4)
DIFFERENTIAL METHOD BLD: ABNORMAL
EOSINOPHIL # BLD AUTO: 0 K/UL (ref 0–0.5)
EOSINOPHIL NFR BLD: 0.2 % (ref 0–8)
ERYTHROCYTE [DISTWIDTH] IN BLOOD BY AUTOMATED COUNT: 14.5 % (ref 11.5–14.5)
EST. GFR  (NO RACE VARIABLE): >60 ML/MIN/1.73 M^2
GLUCOSE SERPL-MCNC: 91 MG/DL (ref 70–110)
GLUCOSE UR QL STRIP: NEGATIVE
HCT VFR BLD AUTO: 27.8 % (ref 40–54)
HGB BLD-MCNC: 8.4 G/DL (ref 14–18)
HGB UR QL STRIP: NEGATIVE
HIV 1+2 AB+HIV1 P24 AG SERPL QL IA: NORMAL
IMM GRANULOCYTES # BLD AUTO: 0.25 K/UL (ref 0–0.04)
IMM GRANULOCYTES NFR BLD AUTO: 1.2 % (ref 0–0.5)
INFLUENZA A, MOLECULAR: NOT DETECTED
INFLUENZA B, MOLECULAR: NOT DETECTED
INR PPP: 1.2 (ref 0.8–1.2)
KETONES UR QL STRIP: NEGATIVE
LACTATE SERPL-SCNC: 1.8 MMOL/L (ref 0.5–2.2)
LEUKOCYTE ESTERASE UR QL STRIP: ABNORMAL
LIPASE SERPL-CCNC: 106 U/L (ref 4–60)
LYMPHOCYTES # BLD AUTO: 2.2 K/UL (ref 1–4.8)
LYMPHOCYTES NFR BLD: 10.5 % (ref 18–48)
MCH RBC QN AUTO: 29.7 PG (ref 27–31)
MCHC RBC AUTO-ENTMCNC: 30.2 G/DL (ref 32–36)
MCV RBC AUTO: 98 FL (ref 82–98)
MICROSCOPIC COMMENT: ABNORMAL
MONOCYTES # BLD AUTO: 1 K/UL (ref 0.3–1)
MONOCYTES NFR BLD: 4.8 % (ref 4–15)
NEUTROPHILS # BLD AUTO: 17.7 K/UL (ref 1.8–7.7)
NEUTROPHILS NFR BLD: 83.1 % (ref 38–73)
NITRITE UR QL STRIP: NEGATIVE
NRBC BLD-RTO: 0 /100 WBC
PH UR STRIP: 6 [PH] (ref 5–8)
PLATELET # BLD AUTO: 330 K/UL (ref 150–450)
PMV BLD AUTO: 10.8 FL (ref 9.2–12.9)
POTASSIUM SERPL-SCNC: 4.2 MMOL/L (ref 3.5–5.1)
PROT SERPL-MCNC: 7.6 G/DL (ref 6–8.4)
PROT UR QL STRIP: ABNORMAL
PROTHROMBIN TIME: 12.3 SEC (ref 9–12.5)
RBC # BLD AUTO: 2.83 M/UL (ref 4.6–6.2)
RBC #/AREA URNS AUTO: 3 /HPF (ref 0–4)
RSV AG BY MOLECULAR METHOD: NOT DETECTED
SARS-COV-2 RNA RESP QL NAA+PROBE: NOT DETECTED
SODIUM SERPL-SCNC: 135 MMOL/L (ref 136–145)
SP GR UR STRIP: 1.02 (ref 1–1.03)
SPECIMEN OUTDATE: NORMAL
SQUAMOUS #/AREA URNS AUTO: 1 /HPF
TROPONIN I SERPL DL<=0.01 NG/ML-MCNC: 0.01 NG/ML (ref 0–0.03)
URN SPEC COLLECT METH UR: ABNORMAL
WBC # BLD AUTO: 21.28 K/UL (ref 3.9–12.7)
WBC #/AREA URNS AUTO: 6 /HPF (ref 0–5)

## 2024-01-22 PROCEDURE — 25500020 PHARM REV CODE 255: Performed by: STUDENT IN AN ORGANIZED HEALTH CARE EDUCATION/TRAINING PROGRAM

## 2024-01-22 PROCEDURE — 63600175 PHARM REV CODE 636 W HCPCS: Performed by: EMERGENCY MEDICINE

## 2024-01-22 PROCEDURE — 93010 ELECTROCARDIOGRAM REPORT: CPT | Mod: ,,, | Performed by: INTERNAL MEDICINE

## 2024-01-22 PROCEDURE — 82550 ASSAY OF CK (CPK): CPT | Performed by: STUDENT IN AN ORGANIZED HEALTH CARE EDUCATION/TRAINING PROGRAM

## 2024-01-22 PROCEDURE — 87186 SC STD MICRODIL/AGAR DIL: CPT | Performed by: STUDENT IN AN ORGANIZED HEALTH CARE EDUCATION/TRAINING PROGRAM

## 2024-01-22 PROCEDURE — 93005 ELECTROCARDIOGRAM TRACING: CPT

## 2024-01-22 PROCEDURE — 86850 RBC ANTIBODY SCREEN: CPT | Performed by: EMERGENCY MEDICINE

## 2024-01-22 PROCEDURE — 96365 THER/PROPH/DIAG IV INF INIT: CPT | Mod: 59

## 2024-01-22 PROCEDURE — 81001 URINALYSIS AUTO W/SCOPE: CPT | Performed by: EMERGENCY MEDICINE

## 2024-01-22 PROCEDURE — 63600175 PHARM REV CODE 636 W HCPCS: Performed by: STUDENT IN AN ORGANIZED HEALTH CARE EDUCATION/TRAINING PROGRAM

## 2024-01-22 PROCEDURE — 96361 HYDRATE IV INFUSION ADD-ON: CPT

## 2024-01-22 PROCEDURE — 87389 HIV-1 AG W/HIV-1&-2 AB AG IA: CPT | Performed by: PHYSICIAN ASSISTANT

## 2024-01-22 PROCEDURE — 0241U SARS-COV2 (COVID) WITH FLU/RSV BY PCR: CPT | Performed by: STUDENT IN AN ORGANIZED HEALTH CARE EDUCATION/TRAINING PROGRAM

## 2024-01-22 PROCEDURE — 80053 COMPREHEN METABOLIC PANEL: CPT | Mod: 91 | Performed by: EMERGENCY MEDICINE

## 2024-01-22 PROCEDURE — 84484 ASSAY OF TROPONIN QUANT: CPT | Performed by: STUDENT IN AN ORGANIZED HEALTH CARE EDUCATION/TRAINING PROGRAM

## 2024-01-22 PROCEDURE — 96375 TX/PRO/DX INJ NEW DRUG ADDON: CPT | Mod: 59

## 2024-01-22 PROCEDURE — 87076 CULTURE ANAEROBE IDENT EACH: CPT | Mod: 59 | Performed by: STUDENT IN AN ORGANIZED HEALTH CARE EDUCATION/TRAINING PROGRAM

## 2024-01-22 PROCEDURE — 85610 PROTHROMBIN TIME: CPT | Performed by: STUDENT IN AN ORGANIZED HEALTH CARE EDUCATION/TRAINING PROGRAM

## 2024-01-22 PROCEDURE — 87040 BLOOD CULTURE FOR BACTERIA: CPT | Performed by: STUDENT IN AN ORGANIZED HEALTH CARE EDUCATION/TRAINING PROGRAM

## 2024-01-22 PROCEDURE — 83605 ASSAY OF LACTIC ACID: CPT | Performed by: STUDENT IN AN ORGANIZED HEALTH CARE EDUCATION/TRAINING PROGRAM

## 2024-01-22 PROCEDURE — 87154 CUL TYP ID BLD PTHGN 6+ TRGT: CPT | Performed by: STUDENT IN AN ORGANIZED HEALTH CARE EDUCATION/TRAINING PROGRAM

## 2024-01-22 PROCEDURE — 25000003 PHARM REV CODE 250: Performed by: STUDENT IN AN ORGANIZED HEALTH CARE EDUCATION/TRAINING PROGRAM

## 2024-01-22 PROCEDURE — 85025 COMPLETE CBC W/AUTO DIFF WBC: CPT | Mod: 91 | Performed by: EMERGENCY MEDICINE

## 2024-01-22 PROCEDURE — 83690 ASSAY OF LIPASE: CPT | Performed by: STUDENT IN AN ORGANIZED HEALTH CARE EDUCATION/TRAINING PROGRAM

## 2024-01-22 RX ORDER — ONDANSETRON HYDROCHLORIDE 2 MG/ML
4 INJECTION, SOLUTION INTRAVENOUS
Status: COMPLETED | OUTPATIENT
Start: 2024-01-22 | End: 2024-01-22

## 2024-01-22 RX ORDER — ACETAMINOPHEN 325 MG/1
650 TABLET ORAL
Status: COMPLETED | OUTPATIENT
Start: 2024-01-22 | End: 2024-01-22

## 2024-01-22 RX ORDER — KETOROLAC TROMETHAMINE 30 MG/ML
10 INJECTION, SOLUTION INTRAMUSCULAR; INTRAVENOUS
Status: COMPLETED | OUTPATIENT
Start: 2024-01-22 | End: 2024-01-22

## 2024-01-22 RX ADMIN — KETOROLAC TROMETHAMINE 10 MG: 30 INJECTION, SOLUTION INTRAMUSCULAR; INTRAVENOUS at 11:01

## 2024-01-22 RX ADMIN — VANCOMYCIN HYDROCHLORIDE 1750 MG: 500 INJECTION, POWDER, LYOPHILIZED, FOR SOLUTION INTRAVENOUS at 09:01

## 2024-01-22 RX ADMIN — IOHEXOL 75 ML: 350 INJECTION, SOLUTION INTRAVENOUS at 10:01

## 2024-01-22 RX ADMIN — PIPERACILLIN SODIUM AND TAZOBACTAM SODIUM 4.5 G: 4; .5 INJECTION, POWDER, FOR SOLUTION INTRAVENOUS at 08:01

## 2024-01-22 RX ADMIN — SODIUM CHLORIDE, POTASSIUM CHLORIDE, SODIUM LACTATE AND CALCIUM CHLORIDE 1000 ML: 600; 310; 30; 20 INJECTION, SOLUTION INTRAVENOUS at 11:01

## 2024-01-22 RX ADMIN — ACETAMINOPHEN 650 MG: 325 TABLET ORAL at 09:01

## 2024-01-22 RX ADMIN — ONDANSETRON 4 MG: 2 INJECTION INTRAMUSCULAR; INTRAVENOUS at 08:01

## 2024-01-22 RX ADMIN — SODIUM CHLORIDE, POTASSIUM CHLORIDE, SODIUM LACTATE AND CALCIUM CHLORIDE 1000 ML: 600; 310; 30; 20 INJECTION, SOLUTION INTRAVENOUS at 08:01

## 2024-01-22 NOTE — FIRST PROVIDER EVALUATION
Medical screening examination initiated.  I have conducted a focused provider triage encounter, findings are as follows:    Brief history of present illness:  Sent in for low hgb. Hgb 7.7 today at 9am from 12.3, two weeks ago.   Pt feels weak.  Has not seen any blood in stool, urine, no vomiting.     On eliquis, last does this AM    Hx of prostate CA    There were no vitals filed for this visit.    Pertinent physical exam:  NAD, tired appearing    Brief workup plan:  Repeat labs, T&S.     Preliminary workup initiated; this workup will be continued and followed by the physician or advanced practice provider that is assigned to the patient when roomed.

## 2024-01-22 NOTE — Clinical Note
Diagnosis: Anemia, unspecified type [[id number]]   Future Attending Provider: RONIT MEEK [[id number]]   Reason for IP Medical Treatment  (Clinical interventions that can only be accomplished in the IP setting? ) :: IV abx, anticoagulation   I certify that Inpatient services for greater than or equal to 2 midnights are medically necessary:: Yes   Plans for Post-Acute care--if anticipated (pick the single best option):: A. No post acute care anticipated at this time

## 2024-01-23 PROBLEM — A41.9 SEPTIC SHOCK: Status: ACTIVE | Noted: 2024-01-23

## 2024-01-23 PROBLEM — K57.92 DIVERTICULITIS: Status: ACTIVE | Noted: 2024-01-23

## 2024-01-23 PROBLEM — D64.9 ANEMIA: Status: ACTIVE | Noted: 2024-01-23

## 2024-01-23 PROBLEM — R65.21 SEPTIC SHOCK: Status: ACTIVE | Noted: 2024-01-23

## 2024-01-23 LAB
ACINETOBACTER CALCOACETICUS/BAUMANNII COMPLEX: NOT DETECTED
ALBUMIN SERPL BCP-MCNC: 1.6 G/DL (ref 3.5–5.2)
ALBUMIN SERPL BCP-MCNC: 1.8 G/DL (ref 3.5–5.2)
ALP SERPL-CCNC: 175 U/L (ref 55–135)
ALP SERPL-CCNC: 206 U/L (ref 55–135)
ALT SERPL W/O P-5'-P-CCNC: 32 U/L (ref 10–44)
ALT SERPL W/O P-5'-P-CCNC: 36 U/L (ref 10–44)
ANA SER QL IF: NORMAL
ANION GAP SERPL CALC-SCNC: 10 MMOL/L (ref 8–16)
APTT PPP: 26.6 SEC (ref 21–32)
APTT PPP: 29.9 SEC (ref 21–32)
APTT PPP: 44.8 SEC (ref 21–32)
AST SERPL-CCNC: 32 U/L (ref 10–40)
AST SERPL-CCNC: 33 U/L (ref 10–40)
BACTEROIDES FRAGILIS: DETECTED
BASOPHILS # BLD AUTO: 0.02 K/UL (ref 0–0.2)
BASOPHILS # BLD AUTO: 0.04 K/UL (ref 0–0.2)
BASOPHILS # BLD AUTO: 0.07 K/UL (ref 0–0.2)
BASOPHILS NFR BLD: 0.1 % (ref 0–1.9)
BASOPHILS NFR BLD: 0.1 % (ref 0–1.9)
BASOPHILS NFR BLD: 0.2 % (ref 0–1.9)
BILIRUB DIRECT SERPL-MCNC: 2 MG/DL (ref 0.1–0.3)
BILIRUB DIRECT SERPL-MCNC: 2.5 MG/DL (ref 0.1–0.3)
BILIRUB SERPL-MCNC: 2.6 MG/DL (ref 0.1–1)
BILIRUB SERPL-MCNC: 3.5 MG/DL (ref 0.1–1)
BILIRUB UR QL STRIP: NEGATIVE
BLD PROD TYP BPU: NORMAL
BLOOD UNIT EXPIRATION DATE: NORMAL
BLOOD UNIT TYPE CODE: 7300
BLOOD UNIT TYPE: NORMAL
BUN SERPL-MCNC: 23 MG/DL (ref 8–23)
BURR CELLS BLD QL SMEAR: ABNORMAL
BURR CELLS BLD QL SMEAR: ABNORMAL
CALCIUM SERPL-MCNC: 9 MG/DL (ref 8.7–10.5)
CANCER AG125 SERPL-ACNC: 60 U/ML (ref 0–30)
CANDIDA ALBICANS: NOT DETECTED
CANDIDA AURIS: NOT DETECTED
CANDIDA GLABRATA: NOT DETECTED
CANDIDA KRUSEI: NOT DETECTED
CANDIDA PARAPSILOSIS: NOT DETECTED
CANDIDA TROPICALIS: NOT DETECTED
CEA SERPL-MCNC: 3.6 NG/ML (ref 0–5)
CHLORIDE SERPL-SCNC: 107 MMOL/L (ref 95–110)
CLARITY UR REFRACT.AUTO: CLEAR
CO2 SERPL-SCNC: 17 MMOL/L (ref 23–29)
CODING SYSTEM: NORMAL
COLOR UR AUTO: YELLOW
CREAT SERPL-MCNC: 1.5 MG/DL (ref 0.5–1.4)
CROSSMATCH INTERPRETATION: NORMAL
CRP SERPL-MCNC: 141.1 MG/L (ref 0–8.2)
CRYPTOCOCCUS NEOFORMANS/GATTII: NOT DETECTED
CTX-M GENE (ESBL PRODUCER): ABNORMAL
DIFFERENTIAL METHOD BLD: ABNORMAL
DISPENSE STATUS: NORMAL
ENTEROBACTER CLOACAE COMPLEX: NOT DETECTED
ENTEROBACTERALES: NOT DETECTED
ENTEROCOCCUS FAECALIS: NOT DETECTED
ENTEROCOCCUS FAECIUM: NOT DETECTED
EOSINOPHIL # BLD AUTO: 0 K/UL (ref 0–0.5)
EOSINOPHIL NFR BLD: 0 % (ref 0–8)
EOSINOPHIL NFR BLD: 0 % (ref 0–8)
EOSINOPHIL NFR BLD: 0.1 % (ref 0–8)
ERYTHROCYTE [DISTWIDTH] IN BLOOD BY AUTOMATED COUNT: 14.9 % (ref 11.5–14.5)
ERYTHROCYTE [DISTWIDTH] IN BLOOD BY AUTOMATED COUNT: 15.4 % (ref 11.5–14.5)
ERYTHROCYTE [DISTWIDTH] IN BLOOD BY AUTOMATED COUNT: 15.6 % (ref 11.5–14.5)
ERYTHROCYTE [SEDIMENTATION RATE] IN BLOOD BY PHOTOMETRIC METHOD: >120 MM/HR (ref 0–23)
ESCHERICHIA COLI: NOT DETECTED
EST. GFR  (NO RACE VARIABLE): 52 ML/MIN/1.73 M^2
FERRITIN SERPL-MCNC: 1097 NG/ML (ref 20–300)
GLUCOSE SERPL-MCNC: 86 MG/DL (ref 70–110)
GLUCOSE UR QL STRIP: NEGATIVE
HAEMOPHILUS INFLUENZAE: NOT DETECTED
HAPTOGLOB SERPL-MCNC: 229 MG/DL (ref 30–250)
HCT VFR BLD AUTO: 20.3 % (ref 40–54)
HCT VFR BLD AUTO: 21 % (ref 40–54)
HCT VFR BLD AUTO: 23.7 % (ref 40–54)
HGB BLD-MCNC: 6.4 G/DL (ref 14–18)
HGB BLD-MCNC: 6.6 G/DL (ref 14–18)
HGB BLD-MCNC: 7.4 G/DL (ref 14–18)
HGB UR QL STRIP: ABNORMAL
IMM GRANULOCYTES # BLD AUTO: 0.09 K/UL (ref 0–0.04)
IMM GRANULOCYTES # BLD AUTO: 0.31 K/UL (ref 0–0.04)
IMM GRANULOCYTES # BLD AUTO: 0.42 K/UL (ref 0–0.04)
IMM GRANULOCYTES NFR BLD AUTO: 0.6 % (ref 0–0.5)
IMM GRANULOCYTES NFR BLD AUTO: 1.1 % (ref 0–0.5)
IMM GRANULOCYTES NFR BLD AUTO: 1.4 % (ref 0–0.5)
IMP GENE (CARBAPENEM RESISTANT): ABNORMAL
INR PPP: 1.2 (ref 0.8–1.2)
IRON SERPL-MCNC: 20 UG/DL (ref 45–160)
KETONES UR QL STRIP: NEGATIVE
KLEBSIELLA AEROGENES: NOT DETECTED
KLEBSIELLA OXYTOCA: NOT DETECTED
KLEBSIELLA PNEUMONIAE GROUP: NOT DETECTED
KPC RESISTANCE GENE (CARBAPENEM): ABNORMAL
LACTATE SERPL-SCNC: 1.2 MMOL/L (ref 0.5–2.2)
LACTATE SERPL-SCNC: 1.3 MMOL/L (ref 0.5–2.2)
LACTATE SERPL-SCNC: 1.7 MMOL/L (ref 0.5–2.2)
LDH SERPL L TO P-CCNC: 383 U/L (ref 110–260)
LEUKOCYTE ESTERASE UR QL STRIP: NEGATIVE
LISTERIA MONOCYTOGENES: NOT DETECTED
LYMPHOCYTES # BLD AUTO: 0.6 K/UL (ref 1–4.8)
LYMPHOCYTES # BLD AUTO: 0.9 K/UL (ref 1–4.8)
LYMPHOCYTES # BLD AUTO: 2.1 K/UL (ref 1–4.8)
LYMPHOCYTES NFR BLD: 3.1 % (ref 18–48)
LYMPHOCYTES NFR BLD: 3.8 % (ref 18–48)
LYMPHOCYTES NFR BLD: 6.7 % (ref 18–48)
MAGNESIUM SERPL-MCNC: 2 MG/DL (ref 1.6–2.6)
MCH RBC QN AUTO: 29.6 PG (ref 27–31)
MCH RBC QN AUTO: 29.8 PG (ref 27–31)
MCH RBC QN AUTO: 29.9 PG (ref 27–31)
MCHC RBC AUTO-ENTMCNC: 31.2 G/DL (ref 32–36)
MCHC RBC AUTO-ENTMCNC: 31.4 G/DL (ref 32–36)
MCHC RBC AUTO-ENTMCNC: 31.5 G/DL (ref 32–36)
MCR-1: ABNORMAL
MCV RBC AUTO: 94 FL (ref 82–98)
MCV RBC AUTO: 95 FL (ref 82–98)
MCV RBC AUTO: 96 FL (ref 82–98)
MEC A/C AND MREJ (MRSA): ABNORMAL
MEC A/C: ABNORMAL
MONOCYTES # BLD AUTO: 0.1 K/UL (ref 0.3–1)
MONOCYTES # BLD AUTO: 0.9 K/UL (ref 0.3–1)
MONOCYTES # BLD AUTO: 1 K/UL (ref 0.3–1)
MONOCYTES NFR BLD: 0.7 % (ref 4–15)
MONOCYTES NFR BLD: 3.1 % (ref 4–15)
MONOCYTES NFR BLD: 3.3 % (ref 4–15)
NDM GENE (CARBAPENEM RESISTANT): ABNORMAL
NEISSERIA MENINGITIDIS: NOT DETECTED
NEUTROPHILS # BLD AUTO: 15.2 K/UL (ref 1.8–7.7)
NEUTROPHILS # BLD AUTO: 25.7 K/UL (ref 1.8–7.7)
NEUTROPHILS # BLD AUTO: 27.1 K/UL (ref 1.8–7.7)
NEUTROPHILS NFR BLD: 88.3 % (ref 38–73)
NEUTROPHILS NFR BLD: 92.6 % (ref 38–73)
NEUTROPHILS NFR BLD: 94.8 % (ref 38–73)
NITRITE UR QL STRIP: NEGATIVE
NRBC BLD-RTO: 0 /100 WBC
NUM UNITS TRANS PACKED RBC: NORMAL
OVALOCYTES BLD QL SMEAR: ABNORMAL
OXA-48-LIKE (CARBAPENEM RESISTANT): ABNORMAL
PATH REV BLD -IMP: NORMAL
PH UR STRIP: 6 [PH] (ref 5–8)
PHOSPHATE SERPL-MCNC: 2.8 MG/DL (ref 2.7–4.5)
PLATELET # BLD AUTO: 289 K/UL (ref 150–450)
PLATELET # BLD AUTO: 295 K/UL (ref 150–450)
PLATELET # BLD AUTO: 300 K/UL (ref 150–450)
PLATELET BLD QL SMEAR: ABNORMAL
PMV BLD AUTO: 10.5 FL (ref 9.2–12.9)
PMV BLD AUTO: 10.8 FL (ref 9.2–12.9)
PMV BLD AUTO: 10.9 FL (ref 9.2–12.9)
POIKILOCYTOSIS BLD QL SMEAR: SLIGHT
POLYCHROMASIA BLD QL SMEAR: ABNORMAL
POTASSIUM SERPL-SCNC: 4.4 MMOL/L (ref 3.5–5.1)
PROT SERPL-MCNC: 6 G/DL (ref 6–8.4)
PROT SERPL-MCNC: 6.4 G/DL (ref 6–8.4)
PROT UR QL STRIP: ABNORMAL
PROTEUS SPECIES: NOT DETECTED
PROTHROMBIN TIME: 12.8 SEC (ref 9–12.5)
PSEUDOMONAS AERUGINOSA: NOT DETECTED
RBC # BLD AUTO: 2.16 M/UL (ref 4.6–6.2)
RBC # BLD AUTO: 2.21 M/UL (ref 4.6–6.2)
RBC # BLD AUTO: 2.48 M/UL (ref 4.6–6.2)
RETICS/RBC NFR AUTO: 3.2 % (ref 0.4–2)
RHEUMATOID FACT SERPL-ACNC: <13 IU/ML (ref 0–15)
SALMONELLA SP: NOT DETECTED
SATURATED IRON: 9 % (ref 20–50)
SERRATIA MARCESCENS: NOT DETECTED
SODIUM SERPL-SCNC: 134 MMOL/L (ref 136–145)
SP GR UR STRIP: >1.03 (ref 1–1.03)
STAPHYLOCOCCUS AUREUS: NOT DETECTED
STAPHYLOCOCCUS EPIDERMIDIS: NOT DETECTED
STAPHYLOCOCCUS LUGDUNESIS: NOT DETECTED
STAPHYLOCOCCUS SPECIES: NOT DETECTED
STENOTROPHOMONAS MALTOPHILIA: NOT DETECTED
STREPTOCOCCUS AGALACTIAE: NOT DETECTED
STREPTOCOCCUS PNEUMONIAE: NOT DETECTED
STREPTOCOCCUS PYOGENES: NOT DETECTED
STREPTOCOCCUS SPECIES: DETECTED
TOTAL IRON BINDING CAPACITY: 228 UG/DL (ref 250–450)
TOXIC GRANULES BLD QL SMEAR: PRESENT
TRANSFERRIN SERPL-MCNC: 154 MG/DL (ref 200–375)
URN SPEC COLLECT METH UR: ABNORMAL
VAN A/B (VRE GENE): ABNORMAL
VANCOMYCIN SERPL-MCNC: 12.7 UG/ML
VIM GENE (CARBAPENEM RESISTANT): ABNORMAL
WBC # BLD AUTO: 16.08 K/UL (ref 3.9–12.7)
WBC # BLD AUTO: 27.77 K/UL (ref 3.9–12.7)
WBC # BLD AUTO: 32 K/UL (ref 3.9–12.7)
WBC TOXIC VACUOLES BLD QL SMEAR: PRESENT

## 2024-01-23 PROCEDURE — 85025 COMPLETE CBC W/AUTO DIFF WBC: CPT

## 2024-01-23 PROCEDURE — 83615 LACTATE (LD) (LDH) ENZYME: CPT

## 2024-01-23 PROCEDURE — 85025 COMPLETE CBC W/AUTO DIFF WBC: CPT | Mod: 91

## 2024-01-23 PROCEDURE — 25000003 PHARM REV CODE 250

## 2024-01-23 PROCEDURE — P9016 RBC LEUKOCYTES REDUCED: HCPCS

## 2024-01-23 PROCEDURE — 83605 ASSAY OF LACTIC ACID: CPT | Mod: 91

## 2024-01-23 PROCEDURE — 85730 THROMBOPLASTIN TIME PARTIAL: CPT | Mod: 91 | Performed by: STUDENT IN AN ORGANIZED HEALTH CARE EDUCATION/TRAINING PROGRAM

## 2024-01-23 PROCEDURE — 63600175 PHARM REV CODE 636 W HCPCS

## 2024-01-23 PROCEDURE — 83605 ASSAY OF LACTIC ACID: CPT | Performed by: STUDENT IN AN ORGANIZED HEALTH CARE EDUCATION/TRAINING PROGRAM

## 2024-01-23 PROCEDURE — 82378 CARCINOEMBRYONIC ANTIGEN: CPT

## 2024-01-23 PROCEDURE — 86304 IMMUNOASSAY TUMOR CA 125: CPT

## 2024-01-23 PROCEDURE — 85045 AUTOMATED RETICULOCYTE COUNT: CPT

## 2024-01-23 PROCEDURE — 25000003 PHARM REV CODE 250: Performed by: INTERNAL MEDICINE

## 2024-01-23 PROCEDURE — 85652 RBC SED RATE AUTOMATED: CPT

## 2024-01-23 PROCEDURE — 86665 EPSTEIN-BARR CAPSID VCA: CPT | Mod: 59

## 2024-01-23 PROCEDURE — 83540 ASSAY OF IRON: CPT

## 2024-01-23 PROCEDURE — 82248 BILIRUBIN DIRECT: CPT

## 2024-01-23 PROCEDURE — 80076 HEPATIC FUNCTION PANEL: CPT

## 2024-01-23 PROCEDURE — 85730 THROMBOPLASTIN TIME PARTIAL: CPT

## 2024-01-23 PROCEDURE — 86920 COMPATIBILITY TEST SPIN: CPT

## 2024-01-23 PROCEDURE — 99291 CRITICAL CARE FIRST HOUR: CPT | Mod: ,,,

## 2024-01-23 PROCEDURE — 80053 COMPREHEN METABOLIC PANEL: CPT

## 2024-01-23 PROCEDURE — 80202 ASSAY OF VANCOMYCIN: CPT | Performed by: STUDENT IN AN ORGANIZED HEALTH CARE EDUCATION/TRAINING PROGRAM

## 2024-01-23 PROCEDURE — 12000002 HC ACUTE/MED SURGE SEMI-PRIVATE ROOM

## 2024-01-23 PROCEDURE — 99285 EMERGENCY DEPT VISIT HI MDM: CPT | Mod: 25

## 2024-01-23 PROCEDURE — 30233N1 TRANSFUSION OF NONAUTOLOGOUS RED BLOOD CELLS INTO PERIPHERAL VEIN, PERCUTANEOUS APPROACH: ICD-10-PCS | Performed by: STUDENT IN AN ORGANIZED HEALTH CARE EDUCATION/TRAINING PROGRAM

## 2024-01-23 PROCEDURE — 85610 PROTHROMBIN TIME: CPT

## 2024-01-23 PROCEDURE — 85730 THROMBOPLASTIN TIME PARTIAL: CPT | Mod: 91 | Performed by: INTERNAL MEDICINE

## 2024-01-23 PROCEDURE — 85060 BLOOD SMEAR INTERPRETATION: CPT | Mod: ,,, | Performed by: PATHOLOGY

## 2024-01-23 PROCEDURE — 86431 RHEUMATOID FACTOR QUANT: CPT

## 2024-01-23 PROCEDURE — 99223 1ST HOSP IP/OBS HIGH 75: CPT | Mod: ,,, | Performed by: INTERNAL MEDICINE

## 2024-01-23 PROCEDURE — 81003 URINALYSIS AUTO W/O SCOPE: CPT

## 2024-01-23 PROCEDURE — 36430 TRANSFUSION BLD/BLD COMPNT: CPT

## 2024-01-23 PROCEDURE — 82728 ASSAY OF FERRITIN: CPT

## 2024-01-23 PROCEDURE — 83010 ASSAY OF HAPTOGLOBIN QUANT: CPT

## 2024-01-23 PROCEDURE — 63600175 PHARM REV CODE 636 W HCPCS: Performed by: INTERNAL MEDICINE

## 2024-01-23 PROCEDURE — 99232 SBSQ HOSP IP/OBS MODERATE 35: CPT | Mod: ,,, | Performed by: INTERNAL MEDICINE

## 2024-01-23 PROCEDURE — 83735 ASSAY OF MAGNESIUM: CPT

## 2024-01-23 PROCEDURE — 86140 C-REACTIVE PROTEIN: CPT

## 2024-01-23 PROCEDURE — 84100 ASSAY OF PHOSPHORUS: CPT

## 2024-01-23 PROCEDURE — 86038 ANTINUCLEAR ANTIBODIES: CPT

## 2024-01-23 RX ORDER — IBUPROFEN 200 MG
200 TABLET ORAL EVERY 6 HOURS PRN
Status: DISCONTINUED | OUTPATIENT
Start: 2024-01-23 | End: 2024-01-23

## 2024-01-23 RX ORDER — NALOXONE HCL 0.4 MG/ML
0.02 VIAL (ML) INJECTION
Status: DISCONTINUED | OUTPATIENT
Start: 2024-01-23 | End: 2024-01-27 | Stop reason: HOSPADM

## 2024-01-23 RX ORDER — ACETAMINOPHEN 500 MG
500 TABLET ORAL ONCE
Status: DISCONTINUED | OUTPATIENT
Start: 2024-01-23 | End: 2024-01-23

## 2024-01-23 RX ORDER — HYDROCODONE BITARTRATE AND ACETAMINOPHEN 500; 5 MG/1; MG/1
TABLET ORAL
Status: DISCONTINUED | OUTPATIENT
Start: 2024-01-23 | End: 2024-01-26

## 2024-01-23 RX ORDER — GLUCAGON 1 MG
1 KIT INJECTION
Status: DISCONTINUED | OUTPATIENT
Start: 2024-01-23 | End: 2024-01-27 | Stop reason: HOSPADM

## 2024-01-23 RX ORDER — TALC
6 POWDER (GRAM) TOPICAL NIGHTLY PRN
Status: DISCONTINUED | OUTPATIENT
Start: 2024-01-23 | End: 2024-01-27 | Stop reason: HOSPADM

## 2024-01-23 RX ORDER — IBUPROFEN 200 MG
24 TABLET ORAL
Status: DISCONTINUED | OUTPATIENT
Start: 2024-01-23 | End: 2024-01-27 | Stop reason: HOSPADM

## 2024-01-23 RX ORDER — ACETAMINOPHEN 325 MG/1
650 TABLET ORAL EVERY 4 HOURS PRN
Status: DISCONTINUED | OUTPATIENT
Start: 2024-01-23 | End: 2024-01-23

## 2024-01-23 RX ORDER — DULOXETIN HYDROCHLORIDE 30 MG/1
30 CAPSULE, DELAYED RELEASE ORAL DAILY
Status: DISCONTINUED | OUTPATIENT
Start: 2024-01-23 | End: 2024-01-23

## 2024-01-23 RX ORDER — HEPARIN SODIUM,PORCINE/D5W 25000/250
0-40 INTRAVENOUS SOLUTION INTRAVENOUS CONTINUOUS
Status: DISCONTINUED | OUTPATIENT
Start: 2024-01-23 | End: 2024-01-23

## 2024-01-23 RX ORDER — ACETAMINOPHEN 325 MG/1
650 TABLET ORAL EVERY 4 HOURS PRN
Status: DISCONTINUED | OUTPATIENT
Start: 2024-01-23 | End: 2024-01-27 | Stop reason: HOSPADM

## 2024-01-23 RX ORDER — DIAZEPAM 5 MG/1
10 TABLET ORAL 3 TIMES DAILY PRN
Status: DISCONTINUED | OUTPATIENT
Start: 2024-01-23 | End: 2024-01-27 | Stop reason: HOSPADM

## 2024-01-23 RX ORDER — ONDANSETRON 8 MG/1
8 TABLET, ORALLY DISINTEGRATING ORAL EVERY 8 HOURS PRN
Status: DISCONTINUED | OUTPATIENT
Start: 2024-01-23 | End: 2024-01-27 | Stop reason: HOSPADM

## 2024-01-23 RX ORDER — IBUPROFEN 400 MG/1
400 TABLET ORAL ONCE
Status: DISCONTINUED | OUTPATIENT
Start: 2024-01-23 | End: 2024-01-23

## 2024-01-23 RX ORDER — NOREPINEPHRINE BITARTRATE/D5W 4MG/250ML
0-.2 PLASTIC BAG, INJECTION (ML) INTRAVENOUS CONTINUOUS
Status: DISCONTINUED | OUTPATIENT
Start: 2024-01-23 | End: 2024-01-24

## 2024-01-23 RX ORDER — ACETAMINOPHEN 500 MG
500 TABLET ORAL EVERY 6 HOURS PRN
Status: DISCONTINUED | OUTPATIENT
Start: 2024-01-23 | End: 2024-01-23

## 2024-01-23 RX ORDER — NOREPINEPHRINE BITARTRATE/D5W 4MG/250ML
0-.2 PLASTIC BAG, INJECTION (ML) INTRAVENOUS CONTINUOUS
Status: DISCONTINUED | OUTPATIENT
Start: 2024-01-23 | End: 2024-01-23

## 2024-01-23 RX ORDER — IBUPROFEN 200 MG
16 TABLET ORAL
Status: DISCONTINUED | OUTPATIENT
Start: 2024-01-23 | End: 2024-01-27 | Stop reason: HOSPADM

## 2024-01-23 RX ORDER — SODIUM CHLORIDE 0.9 % (FLUSH) 0.9 %
10 SYRINGE (ML) INJECTION EVERY 12 HOURS PRN
Status: DISCONTINUED | OUTPATIENT
Start: 2024-01-23 | End: 2024-01-27 | Stop reason: HOSPADM

## 2024-01-23 RX ORDER — DIAZEPAM 10 MG/1
10 TABLET ORAL 3 TIMES DAILY
COMMUNITY

## 2024-01-23 RX ORDER — NOREPINEPHRINE BITARTRATE/D5W 4MG/250ML
0-3 PLASTIC BAG, INJECTION (ML) INTRAVENOUS CONTINUOUS
Status: DISCONTINUED | OUTPATIENT
Start: 2024-01-23 | End: 2024-01-23

## 2024-01-23 RX ADMIN — HEPARIN SODIUM AND DEXTROSE 12 UNITS/KG/HR: 10000; 5 INJECTION INTRAVENOUS at 03:01

## 2024-01-23 RX ADMIN — PIPERACILLIN SODIUM AND TAZOBACTAM SODIUM 4.5 G: 4; .5 INJECTION, POWDER, FOR SOLUTION INTRAVENOUS at 08:01

## 2024-01-23 RX ADMIN — SODIUM CHLORIDE, POTASSIUM CHLORIDE, SODIUM LACTATE AND CALCIUM CHLORIDE 1000 ML: 600; 310; 30; 20 INJECTION, SOLUTION INTRAVENOUS at 06:01

## 2024-01-23 RX ADMIN — IBUPROFEN 200 MG: 200 TABLET, FILM COATED ORAL at 03:01

## 2024-01-23 RX ADMIN — NOREPINEPHRINE BITARTRATE 0.01 MCG/KG/MIN: 4 INJECTION, SOLUTION INTRAVENOUS at 07:01

## 2024-01-23 RX ADMIN — PIPERACILLIN SODIUM AND TAZOBACTAM SODIUM 4.5 G: 4; .5 INJECTION, POWDER, FOR SOLUTION INTRAVENOUS at 02:01

## 2024-01-23 RX ADMIN — PIPERACILLIN SODIUM AND TAZOBACTAM SODIUM 4.5 G: 4; .5 INJECTION, POWDER, FOR SOLUTION INTRAVENOUS at 04:01

## 2024-01-23 RX ADMIN — ACETAMINOPHEN 500 MG: 500 TABLET ORAL at 03:01

## 2024-01-23 RX ADMIN — VANCOMYCIN HYDROCHLORIDE 1250 MG: 1.25 INJECTION, POWDER, LYOPHILIZED, FOR SOLUTION INTRAVENOUS at 12:01

## 2024-01-23 RX ADMIN — NOREPINEPHRINE BITARTRATE 0.02 MCG/KG/MIN: 4 INJECTION, SOLUTION INTRAVENOUS at 09:01

## 2024-01-23 NOTE — HPI
Patient is a 64 yo man with a PMH of recently diagnosed portal vein thrombosis, prostate adenocarcinoma (low-grade under active surveillance), complex regional pain syndrome, anxiety on chronic benzodiazepines, and HTN who was sent to [facility] from PCP clinic for falling Hgb (7.7 from 12.3 two weeks ago).  He reports increasing weakness and fatigue since being diagnosed with his portal vein thrombosis earlier this month.  He has associated symptoms of fever, chills, and diarrhea for the past 1-2 weeks.  He states he also has noticed darker colored urine and lower blood pressure over this time.  He denies any shortness a breath, chest pain, vision changes, dizziness, nausea/vomiting, or dysuria. He has not noticed any blood in the stool or dark colored stools.     While in the ED:  Patient with fever up to 102°.  Blood pressure down to 87/51. Initial WBC >29. Hgb 7.7. Creatinine 1.7. Bilirubin 4.1. CT a/p with Interval increased prominence of leisa hepatis lymph nodes may be reactive. Nonspecific patchy hypoenhancement of the liver on arterial phase, possibly related to vascular shunting and portal vein thrombus. Mild stranding about sigmoid diverticula similar to prior. Clinical correlation suggestive for mild diverticulitis. Patient was started on zosyn and given fluids.

## 2024-01-23 NOTE — PROVIDER PROGRESS NOTES - EMERGENCY DEPT.
Encounter Date: 1/22/2024    ED Physician Progress Notes        Physician Note:   9:00 PM  Patient received in turned over from Dr. Fry.  Briefly, 63-year-old male sent from PCP's office for drop in hemoglobin as well as leukocytosis.    Febrile to 101.4, tachycardic to 106.  BP stable.  Patient received antibiotics, IV fluids    At time of turnover, patient pending CTA abdomen pelvis, chest x-ray, admission    Impression:     Allowing for limitation of exam technique there is interval worsening thrombosis of the portal venous system now including the left portal vein and right portal vein anterior branch.  Interval increased prominence of leisa hepatis lymph nodes may be reactive.     Nonspecific patchy hypoenhancement of the liver on arterial phase, possibly related to vascular shunting and portal vein thrombus.     Mild stranding about sigmoid diverticula similar to prior.  Clinical correlation suggestive for mild diverticulitis.     Stable pancreatic tail subcentimeter hypodensity.     This report was flagged in Epic as abnormal.     Electronically signed by resident: Parker Prince  Date:                                            01/22/2024  Time:                                           22:42     Electronically signed by: Kanu Styles  Date:                                            01/22/2024  Time:                                           23:34    Patient remains febrile.  UA negative.  CT concerning for worsening thrombosis of the portal venous system with reactive lymphadenopathy.  Sigmoid diverticulitis.    Patient has been treated with antibiotics, he is on Eliquis.  Discussed with hospital medicine, plan to admit for further treatment and evaluation.    CHENG Mir MD  Staff ED Physician  01/23/2024 12:03 AM

## 2024-01-23 NOTE — HPI
Mr. Jesus Christy is a 63 year old male with recent diagnosis of portal vein thrombosis, on apixaban, who also has prostate adenocarcinoma on active surveillance. He was admitted to the MICU for septic shock. Hematology was consulted for anemia.     Mr. Christy reports he had been feeling well, but was sent to the ED after outpatient labs revealed drop in his hemoglobin from 12 two weeks ago to 6. He has not noticed any bloody or tarry stools. His urine has been orange. No epistaxis or hematemesis.     He is now requiring low dose pressor support and is febrile. He has been started on antibiotics.

## 2024-01-23 NOTE — ASSESSMENT & PLAN NOTE
This patient does have evidence of infective focus  My overall impression is sepsis.  Source: Abdominal  Antibiotics given-   Antibiotics (72h ago, onward)      Start     Stop Route Frequency Ordered    01/23/24 0618  vancomycin - pharmacy to dose  (vancomycin IVPB (PEDS and ADULTS))        See Hyperspace for full Linked Orders Report.    -- IV pharmacy to manage frequency 01/23/24 0518    01/23/24 0347  piperacillin-tazobactam (ZOSYN) 4.5 g in dextrose 5 % in water (D5W) 100 mL IVPB (MB+)         -- IV Every 8 hours (non-standard times) 01/23/24 0348          Latest lactate reviewed-  Recent Labs   Lab 01/22/24 2008   LACTATE 1.8     Organ dysfunction indicated by Acute kidney injury and Acute liver injury

## 2024-01-23 NOTE — SUBJECTIVE & OBJECTIVE
Past Medical History:   Diagnosis Date    Complex regional pain syndrome i of right lower limb     GERD (gastroesophageal reflux disease)     HTN (hypertension)        Past Surgical History:   Procedure Laterality Date    COLONOSCOPY N/A 9/29/2017    Procedure: COLONOSCOPY;  Surgeon: Jamar Edwards MD;  Location: [address]);  Service: Endoscopy;  Laterality: N/A;    JOINT REPLACEMENT Right     knee    KNEE ARTHROSCOPY W/ ACL RECONSTRUCTION  2014    right       Review of patient's allergies indicates:   Allergen Reactions    Morphine Palpitations       No current facility-administered medications on file prior to encounter.     Current Outpatient Medications on File Prior to Encounter   Medication Sig    apixaban (ELIQUIS) 5 mg Tab Take 2 tablets (10 mg total) by mouth 2 (two) times daily. for 7 days. THEN Take 1 tablet (5 mg total) by mouth 2 (two) times daily.    ARIPiprazole (ABILIFY) 5 MG Tab Take 5 mg by mouth once daily.    clotrimazole (LOTRIMIN) 1 % cream Apply topically 2 (two) times daily.    diazePAM (VALIUM) 10 MG Tab Take 1 tablet (10 mg total) by mouth 3 (three) times daily as needed (anxiety).    DULoxetine (CYMBALTA) 30 MG capsule Take 30 mg by mouth once daily.    mirtazapine (REMERON) 30 MG tablet Take 45 mg by mouth every evening.    tadalafiL (CIALIS) 20 MG Tab Take 1 tablet (20 mg total) by mouth daily as needed (erectile dysfunction).    traMADoL (ULTRAM) 50 mg tablet Take 1 tablet (50 mg total) by mouth every 4 (four) hours as needed for Pain.     Family History       Problem Relation (Age of Onset)    Cancer Brother    Diabetes Father    Heart disease Father    Hypertension Mother    No Known Problems Daughter, Daughter, Daughter, Daughter, Son, Son          Tobacco Use    Smoking status: Never    Smokeless tobacco: Never   Substance and Sexual Activity    Alcohol use: Yes     Alcohol/week: 0.8 standard drinks of alcohol     Types: 1 Standard drinks or equivalent per week     Comment:  once every few months    Drug use: No    Sexual activity: Not on file     Review of Systems   Constitutional:  Positive for chills, diaphoresis, fatigue and fever.   Eyes:  Negative for visual disturbance.   Respiratory:  Negative for cough, chest tightness and shortness of breath.    Cardiovascular:  Negative for chest pain, palpitations and leg swelling.   Gastrointestinal:  Positive for diarrhea. Negative for abdominal pain, nausea and vomiting.   Genitourinary:  Negative for dysuria, frequency and urgency.   Neurological:  Positive for weakness. Negative for dizziness, light-headedness and headaches.     Objective:     Vital Signs (Most Recent):  Temp: (S) (!) 102.9 °F (39.4 °C) (01/23/24 0502)  Pulse: (S) (!) 125 (01/23/24 0502)  Resp: (!) 21 (01/23/24 0502)  BP: (!) 107/50 (01/23/24 0502)  SpO2: 95 % (01/23/24 0502) Vital Signs (24h Range):  Temp:  [97.9 °F (36.6 °C)-102.9 °F (39.4 °C)] 102.9 °F (39.4 °C)  Pulse:  [] 125  Resp:  [18-35] 21  SpO2:  [93 %-98 %] 95 %  BP: ()/(50-73) 107/50     Weight: 75.7 kg (166 lb 14.2 oz)  Body mass index is 24.65 kg/m².     Physical Exam  Constitutional:       General: He is not in acute distress.     Appearance: Normal appearance. He is not ill-appearing.   HENT:      Head: Normocephalic and atraumatic.      Nose: Nose normal.      Mouth/Throat:      Mouth: Mucous membranes are moist.   Eyes:      General: Scleral icterus present.      Extraocular Movements: Extraocular movements intact.      Conjunctiva/sclera: Conjunctivae normal.      Pupils: Pupils are equal, round, and reactive to light.   Cardiovascular:      Rate and Rhythm: Regular rhythm. Tachycardia present.      Pulses: Normal pulses.      Heart sounds: Normal heart sounds. No murmur heard.  Pulmonary:      Effort: Pulmonary effort is normal. No respiratory distress.      Breath sounds: Normal breath sounds. No wheezing, rhonchi or rales.   Abdominal:      General: Abdomen is flat. Bowel sounds are  normal. There is no distension.      Palpations: Abdomen is soft.      Tenderness: There is abdominal tenderness.   Musculoskeletal:      Right lower leg: No edema.      Left lower leg: No edema.   Skin:     General: Skin is warm and dry.      Capillary Refill: Capillary refill takes less than 2 seconds.   Neurological:      Mental Status: He is alert and oriented to person, place, and time.   Psychiatric:         Mood and Affect: Mood is anxious.         Behavior: Behavior normal.              CRANIAL NERVES     CN III, IV, VI   Pupils are equal, round, and reactive to light.       Significant Labs: All pertinent labs within the past 24 hours have been reviewed.  CBC:   Recent Labs   Lab 01/22/24  0830 01/22/24  1757   WBC 29.63* 21.28*   HGB 7.7* 8.4*   HCT 24.9* 27.8*    330     CMP:   Recent Labs   Lab 01/22/24  0830 01/22/24  1757   * 135*   K 4.6 4.2    105   CO2 21* 22*   * 91   BUN 22 23   CREATININE 1.7* 1.2   CALCIUM 9.7 9.6   PROT 7.3 7.6   ALBUMIN 2.0* 2.1*   BILITOT 4.1* 3.1*   ALKPHOS 205* 211*   AST 41* 33   ALT 44 43   ANIONGAP 8 8       Significant Imaging: I have reviewed all pertinent imaging results/findings within the past 24 hours.

## 2024-01-23 NOTE — ASSESSMENT & PLAN NOTE
Evidence of mild diverticulitis seen on CT in patient with diarrhea meeting sepsis criteria and WBC >29.    -Continuing vanc and zosyn for broad spectrum coverage  -Consider further stool testing if diarrhea continues

## 2024-01-23 NOTE — H&P
[facility] - Emergency Dept  Critical Care Medicine  History & Physical    Patient Name: Jesus Christy  MRN: [id number]  Admission Date: 1/22/2024  Hospital Length of Stay: 0 days  Code Status: Full Code  Attending Physician: Mack Larson*   Primary Care Provider: Tara Jolly MD   Principal Problem: Portal vein thrombosis    Subjective:     HPI:  Mr. Jesus Christy is a 63 year-old man with a PMHx of recently diagnosed portal vein thrombosis, prostate adenocarcinoma (low-grade under active surveillance), complex regional pain syndrome, anxiety on chronic benzodiazepines, and HTN who was sent to [facility] from PCP clinic for falling Hgb (7.7 from 12.3 two weeks ago).  He reports increasing weakness and fatigue since being diagnosed with his portal vein thrombosis earlier this month.  He has associated symptoms of fever, chills, and diarrhea for the past 1-2 weeks.  He states he also has noticed darker colored urine and lower blood pressure over this time.  He denies any shortness a breath, chest pain, vision changes, dizziness, nausea/vomiting, or dysuria. He has not noticed any blood in the stool or dark colored stools.      While in the ED:  Patient with fever up to 102°.  Blood pressure down to 87/51. Initial WBC >29. Hgb 7.7. Creatinine 1.7. Bilirubin 4.1. CT A/P with interval increased prominence of leisa hepatis lymph nodes may be reactive. Nonspecific patchy hypoenhancement of the liver on arterial phase, possibly related to vascular shunting and portal vein thrombus. Mild stranding about sigmoid diverticula similar to prior. Clinical correlation suggestive for mild diverticulitis. Patient was started on Zosyn and given fluids. He was initially admitted to Hospital Medicine however he remained hypotensive despite 3L LR (MAPs 55-65).    Critical Care Medicine consulted for persistent hypotension not responsive to fluid resuscitation.     Hospital/ICU Course:  No notes on file     Past Medical  History:   Diagnosis Date    Complex regional pain syndrome i of right lower limb     GERD (gastroesophageal reflux disease)     HTN (hypertension)        Past Surgical History:   Procedure Laterality Date    COLONOSCOPY N/A 9/29/2017    Procedure: COLONOSCOPY;  Surgeon: Jamar Edwards MD;  Location: [address]);  Service: Endoscopy;  Laterality: N/A;    JOINT REPLACEMENT Right     knee    KNEE ARTHROSCOPY W/ ACL RECONSTRUCTION  2014    right       Review of patient's allergies indicates:   Allergen Reactions    Morphine Palpitations       Family History       Problem Relation (Age of Onset)    Cancer Brother    Diabetes Father    Heart disease Father    Hypertension Mother    No Known Problems Daughter, Daughter, Daughter, Daughter, Son, Son          Tobacco Use    Smoking status: Never    Smokeless tobacco: Never   Substance and Sexual Activity    Alcohol use: Yes     Alcohol/week: 0.8 standard drinks of alcohol     Types: 1 Standard drinks or equivalent per week     Comment: once every few months    Drug use: No    Sexual activity: Not on file      Review of Systems   Constitutional:  Positive for appetite change, chills, diaphoresis, fatigue and fever.   HENT:  Negative for congestion and sore throat.    Respiratory:  Negative for cough, shortness of breath and wheezing.    Cardiovascular:  Negative for chest pain and leg swelling.   Gastrointestinal:  Negative for abdominal pain, diarrhea, nausea and vomiting.   Genitourinary:  Negative for dysuria, flank pain and hematuria.   Musculoskeletal:  Positive for back pain (chronic). Negative for arthralgias and myalgias.   Allergic/Immunologic: Negative for immunocompromised state.   Neurological:  Positive for weakness. Negative for dizziness, syncope, light-headedness, numbness and headaches.   Hematological:  Does not bruise/bleed easily.     Objective:     Vital Signs (Most Recent):  Temp: 100.3 °F (37.9 °C) (01/23/24 0610)  Pulse: (!) 111 (01/23/24  0611)  Resp: (!) 31 (01/23/24 0551)  BP: (!) 86/52 (01/23/24 0611)  SpO2: 96 % (01/23/24 0611) Vital Signs (24h Range):  Temp:  [97.9 °F (36.6 °C)-102.9 °F (39.4 °C)] 100.3 °F (37.9 °C)  Pulse:  [] 111  Resp:  [18-44] 31  SpO2:  [93 %-98 %] 96 %  BP: ()/(45-73) 86/52   Weight: 75.7 kg (166 lb 14.2 oz)  Body mass index is 24.65 kg/m².      Intake/Output Summary (Last 24 hours) at 1/23/2024 0623  Last data filed at 1/23/2024 0111  Gross per 24 hour   Intake 2600 ml   Output --   Net 2600 ml          Physical Exam  Vitals and nursing note reviewed.   Constitutional:       General: He is awake. He is not in acute distress.     Appearance: Normal appearance. He is normal weight. He is diaphoretic. He is not ill-appearing or toxic-appearing.   HENT:      Head: Normocephalic and atraumatic.      Nose: Nose normal.      Mouth/Throat:      Mouth: Mucous membranes are dry.   Eyes:      General: No scleral icterus.     Extraocular Movements: Extraocular movements intact.      Pupils: Pupils are equal, round, and reactive to light.   Neck:      Comments: L neck swelling reported as chronic  Cardiovascular:      Rate and Rhythm: Regular rhythm. Tachycardia present.      Pulses: Normal pulses.   Pulmonary:      Effort: Pulmonary effort is normal. No respiratory distress.      Breath sounds: No wheezing.      Comments: No increased work of breathing. No tachypnea. Lungs clear bilaterally.  Abdominal:      General: Bowel sounds are normal. There is no distension.      Palpations: Abdomen is soft.      Tenderness: There is no abdominal tenderness.      Comments: Soft, nondistended, nontender to palpation. No CVA.   Musculoskeletal:      Cervical back: Normal range of motion. No rigidity.      Right lower leg: No edema.      Left lower leg: No edema.      Comments: No lower extremity swelling.   Skin:     General: Skin is warm.      Findings: No rash.   Neurological:      General: No focal deficit present.      Mental  Status: He is alert and oriented to person, place, and time.      Comments: Cranial nerves intact. No dysarthria. No deficits to strength or sensation. GCS 15. AOx3     Psychiatric:         Mood and Affect: Mood normal.         Behavior: Behavior normal. Behavior is cooperative.         Thought Content: Thought content normal.            Vents:     Lines/Drains/Airways       Peripheral Intravenous Line  Duration                  Peripheral IV - Single Lumen 01/22/24 1758 20 G Anterior;Proximal;Right Forearm <1 day         Peripheral IV - Single Lumen 01/22/24 2011 20 G Anterior;Left Forearm <1 day         Peripheral IV - Single Lumen 01/23/24 0606 20 G Distal;Posterior;Right Forearm <1 day                  Significant Labs:    CBC/Anemia Profile:  Recent Labs   Lab 01/22/24  0830 01/22/24 1757 01/23/24  0446   WBC 29.63* 21.28* 16.08*   HGB 7.7* 8.4* 7.4*   HCT 24.9* 27.8* 23.7*    330 289   MCV 96 98 96   RDW 14.7* 14.5 14.9*   IRON  --   --  20*   FERRITIN  --   --  1,097*        Chemistries:  Recent Labs   Lab 01/22/24  0830 01/22/24 1757 01/23/24  0446   * 135* 134*   K 4.6 4.2 4.4    105 107   CO2 21* 22* 17*   BUN 22 23 23   CREATININE 1.7* 1.2 1.5*   CALCIUM 9.7 9.6 9.0   ALBUMIN 2.0* 2.1* 1.8*   PROT 7.3 7.6 6.4   BILITOT 4.1* 3.1* 3.5*   ALKPHOS 205* 211* 206*   ALT 44 43 36   AST 41* 33 32   MG  --   --  2.0   PHOS  --   --  2.8       All pertinent labs within the past 24 hours have been reviewed.    Significant Imaging: I have reviewed all pertinent imaging results/findings within the past 24 hours.  Assessment/Plan:     Neuro  Complex regional pain syndrome type 1 of right lower extremity  -Holding home cymbalta, continue once fevers controlled       Psychiatric  Depression  -Hold home antidepressants as could possibly contribute to ongoing fever       Cardiac/Vascular  Essential hypertension  Not currently on home antihypertensives.     ID  Septic shock  This patient does have  evidence of infective focus  My overall impression is septic shock.  Source: Abdominal  Antibiotics given-   Antibiotics (72h ago, onward)      Start     Stop Route Frequency Ordered    01/23/24 0618  vancomycin - pharmacy to dose  (vancomycin IVPB (PEDS and ADULTS))        See Hyperspace for full Linked Orders Report.    -- IV pharmacy to manage frequency 01/23/24 0518    01/23/24 0347  piperacillin-tazobactam (ZOSYN) 4.5 g in dextrose 5 % in water (D5W) 100 mL IVPB (MB+)         -- IV Every 8 hours (non-standard times) 01/23/24 0348          Latest lactate reviewed-  Recent Labs   Lab 01/23/24  0446   LACTATE 1.7     Organ dysfunction indicated by Acute kidney injury and Acute liver injury    Fluid challenge Ideal Body Weight- The patient's ideal body weight is Ideal body weight: 70.7 kg (155 lb 13.8 oz) which will be used to calculate fluid bolus of 30 ml/kg for treatment of septic shock.      Post- resuscitation assessment Yes Perfusion exam was performed within 6 hours of septic shock presentation after bolus shows Inadequate tissue perfusion assessed by non-invasive monitoring       Will Start Pressors- Levophed for MAP of 65  Source control achieved by: ABX    Oncology  Anemia          Lab Results   Component Value Date     HGB 7.4 (L) 01/23/2024     HCT 23.7 (L) 01/23/2024      Unclear cause. Possibly related to portal vein thrombosis. No signs of active bleeding. Heme occult blood test negative for blood in stool.     -F/u anemia labs  -Monitor serial CBC and transfuse if patient becomes hemodynamically unstable, symptomatic or H/H drops below 7/21  -Consider hematology consult  --Check haptoglobin, retic, ldh, cbc, inr    Adenocarcinoma of prostate  Now on surveillance. Continue Flomax and Proscar.     GI  * Portal vein thrombosis  Patient with history of prostate adenocarcinoma and portal vein thrombosis confirmed on imaging.  Patient has had recurrent fevers and tachycardia since his diagnosis. WBC >29.  Bilirubin 4.1. Thrombosis worsening on imaging despite being on DOAC.     No known cause of thrombosis. Differential includes infection versus coagulopathy versus malignancy. Possible component of phlebitis and diverticulitis seen on imaging     Plan:  -hepatology consulted, recs appreciated  -Heparin drip started inpatient (starting at low intensity, will move to high intensity if hgb remains stable)  -Broad spectrum antibiotics with vanc and zosyn for possible infection contributing  -F/u CRP, ESR, EBV labs, KATHRYN, and RF  -F/u blood cultures  -Consider heme consult for possible coagulopathy  -Tylenol and ibuprofen sparingly for help controlling fevers          Diverticulitis  Evidence of mild diverticulitis seen on CT in patient with diarrhea meeting sepsis criteria and WBC >29.     -Continuing vanc and zosyn for broad spectrum coverage  -Consider further stool testing if diarrhea continues    Palliative Care  Chronic prescription benzodiazepine use  -Continuing home dosing to avoid withdrawal. Patient states he does not take them daily.           Critical Care Daily Checklist:    A: Awake: RASS Goal/Actual Goal:    Actual:     B: Spontaneous Breathing Trial Performed?     C: SAT & SBT Coordinated?  na                      D: Delirium: CAM-ICU     E: Early Mobility Performed? No   F: Feeding Goal:    Status:     Current Diet Order   Procedures    Diet Adult Regular (IDDSI Level 7)      AS: Analgesia/Sedation PRN   T: Thromboembolic Prophylaxis Heparin gtt   H: HOB > 300 Yes   U: Stress Ulcer Prophylaxis (if needed)    G: Glucose Control Bg goal 140-180   B: Bowel Function     I: Indwelling Catheter (Lines & Pino) Necessity PIV   D: De-escalation of Antimicrobials/Pharmacotherapies Continue abx    Plan for the day/ETD Admit to MICU    Code Status:  Family/Goals of Care: Full Code       Critical Care Time: 45 minutes  Critical secondary to Patient has a condition that poses threat to life and bodily function:  Shock    Plan to be discussed with Dr. Larson.      Critical care was time spent personally by me on the following activities: development of treatment plan with patient or surrogate and bedside caregivers, discussions with consultants, evaluation of patient's response to treatment, examination of patient, ordering and performing treatments and interventions, ordering and review of laboratory studies, ordering and review of radiographic studies, pulse oximetry, re-evaluation of patient's condition. This critical care time did not overlap with that of any other provider or involve time for any procedures.     Lissa Seals NP  Critical Care Medicine  [facility] - Emergency Dept

## 2024-01-23 NOTE — ED NOTES
Patient identifiers for Jesus Christy 63 y.o. male checked and correct.  Chief Complaint   Patient presents with    Abnormal Lab     Sent to ED fro HBG of 7.7       Past Medical History:   Diagnosis Date    Complex regional pain syndrome i of right lower limb     GERD (gastroesophageal reflux disease)     HTN (hypertension)      Allergies reported:   Review of patient's allergies indicates:   Allergen Reactions    Morphine Palpitations         HEENT: Denies vision changes. Denies ear drainage or hearing loss. No c/o nasal drainage. Denies dysphagia or voice changes.   Appearance: Pt awake, alert & oriented to person, place & time. Pt in no acute distress at present time. Pt is clean and well groomed with clothes appropriately fastened.   Skin: Skin warm, dry & intact. Color consistent with ethnicity. Mucous membranes moist.    Musculoskeletal: Patient moving all extremities well, no obvious swelling or deformities noted.   Respiratory: Respirations spontaneous, even, and non-labored. Visible chest rise noted. Airway is open and patent. No accessory muscle use noted.   Neurologic: Sensation is intact. Speech is clear and appropriate. Eyes open spontaneously, behavior appropriate to situation, follows commands, facial expression symmetrical, bilateral hand grasp equal and even, purposeful motor response noted.  Cardiac: All peripheral pulses present. No Bilateral lower extremity edema. Cap refill is <3 seconds.  Abdomen: Abdomen soft, non distended, non tender to palpation.   : Pt voids independently, denies dysuria, hematuria, frequency.

## 2024-01-23 NOTE — ASSESSMENT & PLAN NOTE
Patient with history of prostate adenocarcinoma an portal vein thrombosis confirmed on imaging.  Patient has had recurrent fevers and tachycardia since his diagnosis. WBC >29. Bilirubin 4.1. Thrombosis worsening on imaging despite being on DOAC.    No known cause of thrombosis. Differential includes infection versus coagulopathy versus malignancy. Possible component of phlebitis and diverticulitis seen on imaging    Plan:  -hepatology consulted, recs appreciated  -Heparin drip started inpatient (starting at low intensity, will move to high intensity if hgb remains stable)  -Broad spectrum antibiotics with vanc and zosyn for possible infection contributing  -F/u CRP, ESR, EBV labs, KATHRYN, and RF  -F/u blood cultures  -Consider heme consult for possible coagulopathy  -Tylenol and ibuprofen sparingly for help controlling fevers

## 2024-01-23 NOTE — ED TRIAGE NOTES
Patient to ED with complaints of generalized fatigue, weakness, chills worsening x1 week. Patient was seen in clinic today and was noted with elevated white count and decline in H/H and was told to come to ER for emergent evaluation. +nausea without emesis. Patient states he has not returned to baseline since last discharged from hospital. Daughter at bedside. Plan of care discussed with patient - verbalized understanding. Pending ED workup at this time.

## 2024-01-23 NOTE — ED NOTES
Patient identifiers have been checked and are correct.  Patient in a hospital gown.     LOC: The patient is awake, alert, and aware of environment. The patient is oriented x 3 and speaking appropriately.   APPEARANCE: No acute distress noted.   PSYCHOSOCIAL: Patient is calm and cooperative.  SKIN: The skin is warm, dry, and intact. No bruising/discolorations noted.  RESPIRATORY: Airway is open and patent. Bilateral chest rise and fall. Respirations are spontaneous, even and unlabored. Normal effort and rate noted. No accessory muscle use noted.   CARDIAC: Patient has a normal rate and rhythm on continuous cardiac monitor.   ABDOMEN: Soft and non tender to palpation. No distention noted.   NEUROLOGIC: Eyes open spontaneously. Speech clear. Tolerating saliva secretions well. Able to follow commands, demonstrating ability to actively and appropriately communicate within context of current conversation. Symmetrical facial muscles. Moving all extremities. Movement is purposeful.   MUSCULOSKELETAL: No obvious deformities noted.     Side rails up x2. Call light within reach. Updated on POC. No current complaints voiced.

## 2024-01-23 NOTE — CONSULTS
Consult received. Patient to be admitted to the MICU. Full H&P to follow.    Lissa Seals NP  Critical Care Medicine  1/23/2024   6:36 AM

## 2024-01-23 NOTE — ASSESSMENT & PLAN NOTE
This patient does have evidence of infective focus  My overall impression is septic shock.  Source: Abdominal  Antibiotics given-   Antibiotics (72h ago, onward)      Start     Stop Route Frequency Ordered    01/23/24 0618  vancomycin - pharmacy to dose  (vancomycin IVPB (PEDS and ADULTS))        See Hyperspace for full Linked Orders Report.    -- IV pharmacy to manage frequency 01/23/24 0518    01/23/24 0347  piperacillin-tazobactam (ZOSYN) 4.5 g in dextrose 5 % in water (D5W) 100 mL IVPB (MB+)         -- IV Every 8 hours (non-standard times) 01/23/24 0348          Latest lactate reviewed-  Recent Labs   Lab 01/23/24  0446   LACTATE 1.7     Organ dysfunction indicated by Acute kidney injury and Acute liver injury    Fluid challenge Ideal Body Weight- The patient's ideal body weight is Ideal body weight: 70.7 kg (155 lb 13.8 oz) which will be used to calculate fluid bolus of 30 ml/kg for treatment of septic shock.      Post- resuscitation assessment Yes Perfusion exam was performed within 6 hours of septic shock presentation after bolus shows Inadequate tissue perfusion assessed by non-invasive monitoring       Will Start Pressors- Levophed for MAP of 65  Source control achieved by: ABX

## 2024-01-23 NOTE — CONSULTS
[facility] Emergency Dept  Hematology/Oncology  Consult Note    Patient Name: Jesus Christy  MRN: [id number]  Admission Date: 1/22/2024  Hospital Length of Stay: 0 days  Code Status: Full Code   Attending Provider: Mack Larson*  Consulting Provider: Sonja Breaux DO  Primary Care Physician: Tara Jolly MD  Principal Problem:Portal vein thrombosis    Inpatient consult to Hematology  Consult performed by: Sonja Breaux DO  Consult ordered by: Ena Strong DNP        Subjective:     HPI:  Mr. Jesus Christy is a 63 year old male with recent diagnosis of portal vein thrombosis, on apixaban, who also has prostate adenocarcinoma on active surveillance. He was admitted to the MICU for septic shock. Hematology was consulted for anemia.     Mr. Christy reports he had been feeling well, but was sent to the ED after outpatient labs revealed drop in his hemoglobin from 12 two weeks ago to 6. He has not noticed any bloody or tarry stools. His urine has been orange. No epistaxis or hematemesis.     He is now requiring low dose pressor support and is febrile. He has been started on antibiotics.        Oncology Treatment Plan:   [No matching plan found]    Medications:  Continuous Infusions:   NORepinephrine bitartrate-D5W 0.02 mcg/kg/min (01/23/24 0904)     Scheduled Meds:   piperacillin-tazobactam (Zosyn) IV (PEDS and ADULTS) (extended infusion is not appropriate)  4.5 g Intravenous Q8H    vancomycin (VANCOCIN) IV (PEDS and ADULTS)  1,250 mg Intravenous Q24H     PRN Meds:0.9%  NaCl infusion (for blood administration), acetaminophen, dextrose 10%, dextrose 10%, diazePAM, glucagon (human recombinant), glucose, glucose, melatonin, naloxone, ondansetron, sodium chloride 0.9%, Pharmacy to dose Vancomycin consult **AND** vancomycin - pharmacy to dose     Review of patient's allergies indicates:   Allergen Reactions    Morphine Palpitations        Past Medical History:   Diagnosis Date    Complex  regional pain syndrome i of right lower limb     GERD (gastroesophageal reflux disease)     HTN (hypertension)      Past Surgical History:   Procedure Laterality Date    COLONOSCOPY N/A 9/29/2017    Procedure: COLONOSCOPY;  Surgeon: Jamar Edwards MD;  Location: [facility] ([address] [place] [address];  Service: Endoscopy;  Laterality: N/A;    JOINT REPLACEMENT Right     knee    KNEE ARTHROSCOPY W/ ACL RECONSTRUCTION  2014    right     Family History       Problem Relation (Age of Onset)    Cancer Brother    Diabetes Father    Heart disease Father    Hypertension Mother    No Known Problems Daughter, Daughter, Daughter, Daughter, Son, Son          Tobacco Use    Smoking status: Never    Smokeless tobacco: Never   Substance and Sexual Activity    Alcohol use: Yes     Alcohol/week: 0.8 standard drinks of alcohol     Types: 1 Standard drinks or equivalent per week     Comment: once every few months    Drug use: No    Sexual activity: Not on file       Review of Systems   Constitutional:  Negative for chills, fatigue, fever and unexpected weight change.   HENT:  Negative for rhinorrhea and sore throat.    Eyes:  Negative for pain and redness.   Respiratory:  Negative for cough and shortness of breath.    Cardiovascular:  Negative for chest pain and palpitations.   Gastrointestinal:  Negative for abdominal pain, constipation, diarrhea, nausea and vomiting.   Endocrine: Negative for polydipsia and polyuria.   Genitourinary:  Negative for dysuria and hematuria.   Musculoskeletal:  Negative for back pain and neck pain.   Skin:  Negative for color change and rash.   Neurological:  Negative for light-headedness and headaches.   Hematological:  Negative for adenopathy. Does not bruise/bleed easily.   Psychiatric/Behavioral:  Negative for confusion. The patient is not nervous/anxious.      Objective:     Vital Signs (Most Recent):  Temp: 98.2 °F (36.8 °C) (01/23/24 1438)  Pulse: 87 (01/23/24 1438)  Resp: (!) 22 (01/23/24 1438)  BP: (!) 102/57  (01/23/24 1438)  SpO2: 99 % (01/23/24 1438) Vital Signs (24h Range):  Temp:  [97.9 °F (36.6 °C)-102.9 °F (39.4 °C)] 98.2 °F (36.8 °C)  Pulse:  [] 87  Resp:  [18-44] 22  SpO2:  [93 %-99 %] 99 %  BP: ()/(45-73) 102/57     Weight: 75.7 kg (166 lb 14.2 oz)  Body mass index is 24.65 kg/m².  Body surface area is 1.92 meters squared.      Intake/Output Summary (Last 24 hours) at 1/23/2024 1442  Last data filed at 1/23/2024 1416  Gross per 24 hour   Intake 3009.89 ml   Output 500 ml   Net 2509.89 ml        Physical Exam  Constitutional:       General: He is not in acute distress.     Appearance: He is well-developed. He is not diaphoretic.   HENT:      Head: Normocephalic and atraumatic.   Eyes:      General: No scleral icterus.     Conjunctiva/sclera: Conjunctivae normal.   Cardiovascular:      Rate and Rhythm: Normal rate and regular rhythm.      Heart sounds: Normal heart sounds. No murmur heard.     No friction rub. No gallop.   Pulmonary:      Effort: Pulmonary effort is normal. No respiratory distress.      Breath sounds: Normal breath sounds. No wheezing or rales.   Abdominal:      General: Bowel sounds are normal. There is no distension.      Palpations: Abdomen is soft.      Tenderness: There is no abdominal tenderness. There is no guarding.   Musculoskeletal:         General: Normal range of motion.      Cervical back: Normal range of motion and neck supple.   Lymphadenopathy:      Cervical: No cervical adenopathy.   Skin:     General: Skin is warm and dry.      Findings: No rash.   Neurological:      Mental Status: He is alert and oriented to person, place, and time.   Psychiatric:         Behavior: Behavior normal.          Significant Labs:   CBC:   Recent Labs   Lab 01/23/24  0446 01/23/24  0726 01/23/24  1253   WBC 16.08* 27.77* 32.00*   HGB 7.4* 6.4* 6.6*   HCT 23.7* 20.3* 21.0*    300 295    and CMP:   Recent Labs   Lab 01/22/24  0830 01/22/24  1757 01/23/24  0446 01/23/24  1237   *  135* 134*  --    K 4.6 4.2 4.4  --     105 107  --    CO2 21* 22* 17*  --    * 91 86  --    BUN 22 23 23  --    CREATININE 1.7* 1.2 1.5*  --    CALCIUM 9.7 9.6 9.0  --    PROT 7.3 7.6 6.4 6.0   ALBUMIN 2.0* 2.1* 1.8* 1.6*   BILITOT 4.1* 3.1* 3.5* 2.6*   ALKPHOS 205* 211* 206* 175*   AST 41* 33 32 33   ALT 44 43 36 32   ANIONGAP 8 8 10  --        Diagnostic Results:  I have reviewed all pertinent imaging results/findings within the past 24 hours.  Assessment/Plan:     Anemia  Mr. Christy was recently diagnosed with portal vein thrombus and started on apixaban. He presents today with septic shock, fever, and acute drop in hemoglobin from 12 to 6 over a two week span.     Rapid drop in hemoglobin is generally only caused by hemolysis or acute blood loss. He has no evidence of hemolysis with normal haptoglobin, therefore he should be evaluated for blood loss.     During last admission, hematology was consulted for portal vein thrombus. MPN and PNH testing was negative. He did have a low-level positive cardiolipin IgM antibody. However, this test alone does not meet criteria for diagnosis of antiphospholipid antibody syndrome.     - agree with workup for GI bleeding   - no evidence of hemolysis  - may resume apixaban for treatment of portal vein thrombus when acute blood loss anemia has been addressed  - he should follow up in hematology clinic as scheduled        Thank you for your consult. I will sign off. Please contact us if you have any additional questions.    Sonja Breaux, DO  Hematology/Oncology  [facility] - [facility] Dept

## 2024-01-23 NOTE — SUBJECTIVE & OBJECTIVE
Past Medical History:   Diagnosis Date    Complex regional pain syndrome i of right lower limb     GERD (gastroesophageal reflux disease)     HTN (hypertension)        Past Surgical History:   Procedure Laterality Date    COLONOSCOPY N/A 9/29/2017    Procedure: COLONOSCOPY;  Surgeon: Jamar Edwards MD;  Location: [address]);  Service: Endoscopy;  Laterality: N/A;    JOINT REPLACEMENT Right     knee    KNEE ARTHROSCOPY W/ ACL RECONSTRUCTION  2014    right       Review of patient's allergies indicates:   Allergen Reactions    Morphine Palpitations       Family History       Problem Relation (Age of Onset)    Cancer Brother    Diabetes Father    Heart disease Father    Hypertension Mother    No Known Problems Daughter, Daughter, Daughter, Daughter, Son, Son          Tobacco Use    Smoking status: Never    Smokeless tobacco: Never   Substance and Sexual Activity    Alcohol use: Yes     Alcohol/week: 0.8 standard drinks of alcohol     Types: 1 Standard drinks or equivalent per week     Comment: once every few months    Drug use: No    Sexual activity: Not on file      Review of Systems   Constitutional:  Positive for appetite change, chills, diaphoresis, fatigue and fever.   HENT:  Negative for congestion and sore throat.    Respiratory:  Negative for cough, shortness of breath and wheezing.    Cardiovascular:  Negative for chest pain and leg swelling.   Gastrointestinal:  Negative for abdominal pain, diarrhea, nausea and vomiting.   Genitourinary:  Negative for dysuria, flank pain and hematuria.   Musculoskeletal:  Positive for back pain (chronic). Negative for arthralgias and myalgias.   Allergic/Immunologic: Negative for immunocompromised state.   Neurological:  Positive for weakness. Negative for dizziness, syncope, light-headedness, numbness and headaches.   Hematological:  Does not bruise/bleed easily.     Objective:     Vital Signs (Most Recent):  Temp: 100.3 °F (37.9 °C) (01/23/24 0610)  Pulse: (!) 111  (01/23/24 0611)  Resp: (!) 31 (01/23/24 0551)  BP: (!) 86/52 (01/23/24 0611)  SpO2: 96 % (01/23/24 0611) Vital Signs (24h Range):  Temp:  [97.9 °F (36.6 °C)-102.9 °F (39.4 °C)] 100.3 °F (37.9 °C)  Pulse:  [] 111  Resp:  [18-44] 31  SpO2:  [93 %-98 %] 96 %  BP: ()/(45-73) 86/52   Weight: 75.7 kg (166 lb 14.2 oz)  Body mass index is 24.65 kg/m².      Intake/Output Summary (Last 24 hours) at 1/23/2024 0623  Last data filed at 1/23/2024 0111  Gross per 24 hour   Intake 2600 ml   Output --   Net 2600 ml          Physical Exam  Vitals and nursing note reviewed.   Constitutional:       General: He is awake. He is not in acute distress.     Appearance: Normal appearance. He is normal weight. He is diaphoretic. He is not ill-appearing or toxic-appearing.   HENT:      Head: Normocephalic and atraumatic.      Nose: Nose normal.      Mouth/Throat:      Mouth: Mucous membranes are dry.   Eyes:      General: No scleral icterus.     Extraocular Movements: Extraocular movements intact.      Pupils: Pupils are equal, round, and reactive to light.   Neck:      Comments: L neck swelling reported as chronic  Cardiovascular:      Rate and Rhythm: Regular rhythm. Tachycardia present.      Pulses: Normal pulses.   Pulmonary:      Effort: Pulmonary effort is normal. No respiratory distress.      Breath sounds: No wheezing.      Comments: No increased work of breathing. No tachypnea. Lungs clear bilaterally.  Abdominal:      General: Bowel sounds are normal. There is no distension.      Palpations: Abdomen is soft.      Tenderness: There is no abdominal tenderness.      Comments: Soft, nondistended, nontender to palpation. No CVA.   Musculoskeletal:      Cervical back: Normal range of motion. No rigidity.      Right lower leg: No edema.      Left lower leg: No edema.      Comments: No lower extremity swelling.   Skin:     General: Skin is warm.      Findings: No rash.   Neurological:      General: No focal deficit present.       Mental Status: He is alert and oriented to person, place, and time.      Comments: Cranial nerves intact. No dysarthria. No deficits to strength or sensation. GCS 15. AOx3     Psychiatric:         Mood and Affect: Mood normal.         Behavior: Behavior normal. Behavior is cooperative.         Thought Content: Thought content normal.            Vents:     Lines/Drains/Airways       Peripheral Intravenous Line  Duration                  Peripheral IV - Single Lumen 01/22/24 1758 20 G Anterior;Proximal;Right Forearm <1 day         Peripheral IV - Single Lumen 01/22/24 2011 20 G Anterior;Left Forearm <1 day         Peripheral IV - Single Lumen 01/23/24 0606 20 G Distal;Posterior;Right Forearm <1 day                  Significant Labs:    CBC/Anemia Profile:  Recent Labs   Lab 01/22/24  0830 01/22/24 1757 01/23/24  0446   WBC 29.63* 21.28* 16.08*   HGB 7.7* 8.4* 7.4*   HCT 24.9* 27.8* 23.7*    330 289   MCV 96 98 96   RDW 14.7* 14.5 14.9*   IRON  --   --  20*   FERRITIN  --   --  1,097*        Chemistries:  Recent Labs   Lab 01/22/24  0830 01/22/24 1757 01/23/24  0446   * 135* 134*   K 4.6 4.2 4.4    105 107   CO2 21* 22* 17*   BUN 22 23 23   CREATININE 1.7* 1.2 1.5*   CALCIUM 9.7 9.6 9.0   ALBUMIN 2.0* 2.1* 1.8*   PROT 7.3 7.6 6.4   BILITOT 4.1* 3.1* 3.5*   ALKPHOS 205* 211* 206*   ALT 44 43 36   AST 41* 33 32   MG  --   --  2.0   PHOS  --   --  2.8       All pertinent labs within the past 24 hours have been reviewed.    Significant Imaging: I have reviewed all pertinent imaging results/findings within the past 24 hours.

## 2024-01-23 NOTE — ASSESSMENT & PLAN NOTE
Lab Results   Component Value Date     HGB 7.4 (L) 01/23/2024     HCT 23.7 (L) 01/23/2024      Unclear cause. Possibly related to portal vein thrombosis. No signs of active bleeding. Heme occult blood test negative for blood in stool.     -F/u anemia labs  -Monitor serial CBC and transfuse if patient becomes hemodynamically unstable, symptomatic or H/H drops below 7/21  -Consider hematology consult  --Check haptoglobin, retic, ldh, cbc, inr

## 2024-01-23 NOTE — SUBJECTIVE & OBJECTIVE
Oncology Treatment Plan:   [No matching plan found]    Medications:  Continuous Infusions:   NORepinephrine bitartrate-D5W 0.02 mcg/kg/min (01/23/24 0904)     Scheduled Meds:   piperacillin-tazobactam (Zosyn) IV (PEDS and ADULTS) (extended infusion is not appropriate)  4.5 g Intravenous Q8H    vancomycin (VANCOCIN) IV (PEDS and ADULTS)  1,250 mg Intravenous Q24H     PRN Meds:0.9%  NaCl infusion (for blood administration), acetaminophen, dextrose 10%, dextrose 10%, diazePAM, glucagon (human recombinant), glucose, glucose, melatonin, naloxone, ondansetron, sodium chloride 0.9%, Pharmacy to dose Vancomycin consult **AND** vancomycin - pharmacy to dose     Review of patient's allergies indicates:   Allergen Reactions    Morphine Palpitations        Past Medical History:   Diagnosis Date    Complex regional pain syndrome i of right lower limb     GERD (gastroesophageal reflux disease)     HTN (hypertension)      Past Surgical History:   Procedure Laterality Date    COLONOSCOPY N/A 9/29/2017    Procedure: COLONOSCOPY;  Surgeon: Jamar Edwards MD;  Location: [address];  Service: Endoscopy;  Laterality: N/A;    JOINT REPLACEMENT Right     knee    KNEE ARTHROSCOPY W/ ACL RECONSTRUCTION  2014    right     Family History       Problem Relation (Age of Onset)    Cancer Brother    Diabetes Father    Heart disease Father    Hypertension Mother    No Known Problems Daughter, Daughter, Daughter, Daughter, Son, Son          Tobacco Use    Smoking status: Never    Smokeless tobacco: Never   Substance and Sexual Activity    Alcohol use: Yes     Alcohol/week: 0.8 standard drinks of alcohol     Types: 1 Standard drinks or equivalent per week     Comment: once every few months    Drug use: No    Sexual activity: Not on file       Review of Systems   Constitutional:  Negative for chills, fatigue, fever and unexpected weight change.   HENT:  Negative for rhinorrhea and sore throat.    Eyes:  Negative for pain and redness.    Respiratory:  Negative for cough and shortness of breath.    Cardiovascular:  Negative for chest pain and palpitations.   Gastrointestinal:  Negative for abdominal pain, constipation, diarrhea, nausea and vomiting.   Endocrine: Negative for polydipsia and polyuria.   Genitourinary:  Negative for dysuria and hematuria.   Musculoskeletal:  Negative for back pain and neck pain.   Skin:  Negative for color change and rash.   Neurological:  Negative for light-headedness and headaches.   Hematological:  Negative for adenopathy. Does not bruise/bleed easily.   Psychiatric/Behavioral:  Negative for confusion. The patient is not nervous/anxious.      Objective:     Vital Signs (Most Recent):  Temp: 98.2 °F (36.8 °C) (01/23/24 1438)  Pulse: 87 (01/23/24 1438)  Resp: (!) 22 (01/23/24 1438)  BP: (!) 102/57 (01/23/24 1438)  SpO2: 99 % (01/23/24 1438) Vital Signs (24h Range):  Temp:  [97.9 °F (36.6 °C)-102.9 °F (39.4 °C)] 98.2 °F (36.8 °C)  Pulse:  [] 87  Resp:  [18-44] 22  SpO2:  [93 %-99 %] 99 %  BP: ()/(45-73) 102/57     Weight: 75.7 kg (166 lb 14.2 oz)  Body mass index is 24.65 kg/m².  Body surface area is 1.92 meters squared.      Intake/Output Summary (Last 24 hours) at 1/23/2024 1442  Last data filed at 1/23/2024 1416  Gross per 24 hour   Intake 3009.89 ml   Output 500 ml   Net 2509.89 ml        Physical Exam  Constitutional:       General: He is not in acute distress.     Appearance: He is well-developed. He is not diaphoretic.   HENT:      Head: Normocephalic and atraumatic.   Eyes:      General: No scleral icterus.     Conjunctiva/sclera: Conjunctivae normal.   Cardiovascular:      Rate and Rhythm: Normal rate and regular rhythm.      Heart sounds: Normal heart sounds. No murmur heard.     No friction rub. No gallop.   Pulmonary:      Effort: Pulmonary effort is normal. No respiratory distress.      Breath sounds: Normal breath sounds. No wheezing or rales.   Abdominal:      General: Bowel sounds are  normal. There is no distension.      Palpations: Abdomen is soft.      Tenderness: There is no abdominal tenderness. There is no guarding.   Musculoskeletal:         General: Normal range of motion.      Cervical back: Normal range of motion and neck supple.   Lymphadenopathy:      Cervical: No cervical adenopathy.   Skin:     General: Skin is warm and dry.      Findings: No rash.   Neurological:      Mental Status: He is alert and oriented to person, place, and time.   Psychiatric:         Behavior: Behavior normal.          Significant Labs:   CBC:   Recent Labs   Lab 01/23/24  0446 01/23/24  0726 01/23/24  1253   WBC 16.08* 27.77* 32.00*   HGB 7.4* 6.4* 6.6*   HCT 23.7* 20.3* 21.0*    300 295    and CMP:   Recent Labs   Lab 01/22/24  0830 01/22/24  1757 01/23/24  0446 01/23/24  1237   * 135* 134*  --    K 4.6 4.2 4.4  --     105 107  --    CO2 21* 22* 17*  --    * 91 86  --    BUN 22 23 23  --    CREATININE 1.7* 1.2 1.5*  --    CALCIUM 9.7 9.6 9.0  --    PROT 7.3 7.6 6.4 6.0   ALBUMIN 2.0* 2.1* 1.8* 1.6*   BILITOT 4.1* 3.1* 3.5* 2.6*   ALKPHOS 205* 211* 206* 175*   AST 41* 33 32 33   ALT 44 43 36 32   ANIONGAP 8 8 10  --        Diagnostic Results:  I have reviewed all pertinent imaging results/findings within the past 24 hours.

## 2024-01-23 NOTE — HPI
Mr. Jesus Christy is a 63 year-old man with a PMHx of recently diagnosed portal vein thrombosis, prostate adenocarcinoma (low-grade under active surveillance), complex regional pain syndrome, anxiety on chronic benzodiazepines, and HTN who was sent to [facility] from PCP clinic for falling Hgb (7.7 from 12.3 two weeks ago).  He reports increasing weakness and fatigue since being diagnosed with his portal vein thrombosis earlier this month.  He has associated symptoms of fever, chills, and diarrhea for the past 1-2 weeks.  He states he also has noticed darker colored urine and lower blood pressure over this time.  He denies any shortness a breath, chest pain, vision changes, dizziness, nausea/vomiting, or dysuria. He has not noticed any blood in the stool or dark colored stools.      While in the ED:  Patient with fever up to 102°.  Blood pressure down to 87/51. Initial WBC >29. Hgb 7.7. Creatinine 1.7. Bilirubin 4.1. CT A/P with interval increased prominence of leisa hepatis lymph nodes may be reactive. Nonspecific patchy hypoenhancement of the liver on arterial phase, possibly related to vascular shunting and portal vein thrombus. Mild stranding about sigmoid diverticula similar to prior. Clinical correlation suggestive for mild diverticulitis. Patient was started on Zosyn and given fluids. He was initially admitted to Hospital Medicine however he remained hypotensive despite 3L LR (MAPs 55-65).    Critical Care Medicine consulted for persistent hypotension not responsive to fluid resuscitation.

## 2024-01-23 NOTE — ED NOTES
Patient more lethargic, fever continuing to rise and now more tachycardic with HR 130s. Charge RN notified on patient's worsening condition and need for ER bed.

## 2024-01-23 NOTE — ASSESSMENT & PLAN NOTE
Lab Results   Component Value Date    HGB 7.4 (L) 01/23/2024    HCT 23.7 (L) 01/23/2024     Unclear cause. Possibly related to portal vein thrombosis. No signs of active bleeding. Heme occult blood test negative for blood in stool.    -Monitor serial CBC and transfuse if patient becomes hemodynamically unstable, symptomatic or H/H drops below 7/21.

## 2024-01-23 NOTE — PROGRESS NOTES
Pharmacokinetic Assessment Follow Up: IV Vancomycin    Vancomycin serum concentration assessment(s):    Vancomycin random level resulted at 12.7 mcg/mL approximately 12 hours after the loading dose. Goal level is 10 to 20 mcg/mL.     Drug levels (last 3 results):  Recent Labs   Lab Result Units 01/23/24  1016   Vancomycin, Random ug/mL 12.7     Vancomycin Regimen Plan:    Schedule vancomycin 1250 mg IV every 24 hours with next serum trough concentration measured on 1/25 1100. If renal function improves, will empirically increase dosing.     Pharmacy will continue to follow and monitor vancomycin.    Please contact pharmacy at extension [phone] for questions regarding this assessment.    Thank you for the consult,   Kiana Barbour, PharmD, BCCCP                 Patient brief summary:  Jesus Christy is a 63 y.o. male initiated on antimicrobial therapy with IV vancomycin for treatment of sepsis    Drug Allergies:   Review of patient's allergies indicates:   Allergen Reactions    Morphine Palpitations       Actual Body Weight:   75.7 kg     Renal Function:   Estimated Creatinine Clearance: 50.4 mL/min (A) (based on SCr of 1.5 mg/dL (H)).    Dialysis Method (if applicable):  N/A    CBC (last 72 hours):  Recent Labs   Lab Result Units 01/22/24  0830 01/22/24  1757 01/23/24  0446 01/23/24  0726   WBC K/uL 29.63* 21.28* 16.08* 27.77*   Hemoglobin g/dL 7.7* 8.4* 7.4* 6.4*   Hematocrit % 24.9* 27.8* 23.7* 20.3*   Platelets K/uL 320 330 289 300   Gran % % 90.6* 83.1* 94.8* 92.6*   Lymph % % 4.5* 10.5* 3.8* 3.1*   Mono % % 3.7* 4.8 0.7* 3.1*   Eosinophil % % 0.1 0.2 0.0 0.0   Basophil % % 0.2 0.2 0.1 0.1   Differential Method  Automated Automated Automated Automated       Metabolic Panel (last 72 hours):  Recent Labs   Lab Result Units 01/22/24  0830 01/22/24  1757 01/22/24 2037 01/23/24  0446   Sodium mmol/L 133* 135*  --  134*   Potassium mmol/L 4.6 4.2  --  4.4   Chloride mmol/L 104 105  --  107   CO2 mmol/L 21* 22*  --   17*   Glucose mg/dL 172* 91  --  86   Glucose, UA   --   --  Negative  --    BUN mg/dL 22 23  --  23   Creatinine mg/dL 1.7* 1.2  --  1.5*   Albumin g/dL 2.0* 2.1*  --  1.8*   Total Bilirubin mg/dL 4.1* 3.1*  --  3.5*   Alkaline Phosphatase U/L 205* 211*  --  206*   AST U/L 41* 33  --  32   ALT U/L 44 43  --  36   Magnesium mg/dL  --   --   --  2.0   Phosphorus mg/dL  --   --   --  2.8       Vancomycin Administrations:  vancomycin given in the last 96 hours                     vancomycin 1.75 g in 5 % dextrose 500 mL IVPB (mg) 1,750 mg New Bag 01/22/24 2110                    Microbiologic Results:  Microbiology Results (last 7 days)       Procedure Component Value Units Date/Time    Blood Culture #2 **CANNOT BE ORDERED STAT** [[id number]] Collected: 01/22/24 2040    Order Status: Completed Specimen: Blood from Peripheral, Forearm, Left Updated: 01/23/24 0515     Blood Culture, Routine No Growth to date    Blood Culture #1 **CANNOT BE ORDERED STAT** [[id number]] Collected: 01/22/24 2010    Order Status: Completed Specimen: Blood from Peripheral, Forearm, Right Updated: 01/23/24 0515     Blood Culture, Routine No Growth to date

## 2024-01-23 NOTE — CARE UPDATE
"RAPID RESPONSE NURSE AI ALERT       AI alert received.    Chart Reviewed: 01/23/2024, 6:26 AM    MRN: [id number]  Bed: ED 17/17    Dx: Portal vein thrombosis    Jesus Christy has a past medical history of Complex regional pain syndrome i of right lower limb, GERD (gastroesophageal reflux disease), and HTN (hypertension).    Last VS: BP (!) 86/52   Pulse (!) 111   Temp 100.3 °F (37.9 °C) (Oral)   Resp (!) 31   Wt 75.7 kg (166 lb 14.2 oz)   SpO2 96%   BMI 24.65 kg/m²     24H Vital Sign Range:  Temp:  [97.9 °F (36.6 °C)-102.9 °F (39.4 °C)]   Pulse:  []   Resp:  [18-44]   BP: ()/(45-73)   SpO2:  [93 %-98 %]     Level of Consciousness (AVPU): alert    Recent Labs     01/22/24  0830 01/22/24  1757 01/23/24  0446   WBC 29.63* 21.28* 16.08*   HGB 7.7* 8.4* 7.4*   HCT 24.9* 27.8* 23.7*    330 289       Recent Labs     01/22/24  0830 01/22/24  1757 01/23/24  0446   * 135* 134*   K 4.6 4.2 4.4    105 107   CO2 21* 22* 17*   BUN 22 23 23   CREATININE 1.7* 1.2 1.5*   * 91 86   PHOS  --   --  2.8   MG  --   --  2.0        No results for input(s): "PH", "PCO2", "PO2", "HCO3", "POCSATURATED", "BE" in the last 72 hours.     OXYGEN:             MEWS score: 5    Bedside RNSarika  contacted. AI alert for tachycardia, . No concerns verbalized at this time. Instructed to call [phone] for further concerns or assistance.    Adry Posada RN        "

## 2024-01-23 NOTE — ASSESSMENT & PLAN NOTE
Mr. Christy was recently diagnosed with portal vein thrombus and started on apixaban. He presents today with septic shock, fever, and acute drop in hemoglobin from 12 to 6 over a two week span.     Rapid drop in hemoglobin is generally only caused by hemolysis or acute blood loss. He has no evidence of hemolysis with normal haptoglobin, therefore he should be evaluated for blood loss.     During last admission, hematology was consulted for portal vein thrombus. MPN and PNH testing was negative. He did have a low-level positive cardiolipin IgM antibody. However, this test alone does not meet criteria for diagnosis of antiphospholipid antibody syndrome.     - agree with workup for GI bleeding   - no evidence of hemolysis  - may resume apixaban for treatment of portal vein thrombus when acute blood loss anemia has been addressed  - he should follow up in hematology clinic as scheduled

## 2024-01-23 NOTE — ED PROVIDER NOTES
Source of History  patient, family, and EMR    Chief Complaint    Abnormal Lab (Sent to ED fro [facility] of 7.7/)      History of Present Illness    Jesus Christy is a 63 y.o. male presenting with concern for weakness and declining hemoglobin.    Outpatient labs show that he has a hemoglobin is 7.7 and leukocytosis.  His primary sent him to the emergency department for evaluation.    Concern for declining hemoglobin.  The patient reports general weakness, specifically over the last week or so.  He was recently diagnosed with portal vein thrombosis and is on apixaban.    He has a remote history of prostate cancer but he was not treated with anything.  He has pink/orange urine, which has been ongoing for weeks.    He presented to the emergency department in early January for abdominal pain and was found to have slightly elevated liver function and right portal vein thrombus for which he was started on an anticoagulant.    He has no history of known liver disease, no history of hepatic cancers.  He does have a lesion in the liver, and it seems that he was slated to be seen by the liver physicians and may need colonoscopy.    He has no abdominal pain, but occasional nausea.    Review of Systems    As per HPI and below:  Constitutional symptoms:  No fever, + chills, +generalized weakness  Skin symptoms:  No rash    Respiratory symptoms:  No shortness of breath  Cardiovascular symptoms:  No chest pain   Gastrointestinal symptoms:  No abdominal pain, + nausea, no vomiting, no diarrhea, no rectal bleeding or melena, no masses  Genitourinary symptoms:  No dysuria, + hematuria (pink / orange)  Musculoskeletal symptoms:  No back pain, No joint pains or aches    Neurologic symptoms:  No headache, no numbness/tingling  Endocrine symptoms:  None except as in HPI  Psychiatric symptoms:   None except as in HPI     Past History    As per HPI and below:  Past Medical History:   Diagnosis Date    Complex regional pain syndrome i of right  lower limb     GERD (gastroesophageal reflux disease)     HTN (hypertension)        Past Surgical History:   Procedure Laterality Date    COLONOSCOPY N/A 9/29/2017    Procedure: COLONOSCOPY;  Surgeon: Jamar Edwards MD;  Location: [address]);  Service: Endoscopy;  Laterality: N/A;    JOINT REPLACEMENT Right     knee    KNEE ARTHROSCOPY W/ ACL RECONSTRUCTION  2014    right       Social History     Socioeconomic History    Marital status: Single   Tobacco Use    Smoking status: Never    Smokeless tobacco: Never   Substance and Sexual Activity    Alcohol use: Yes     Alcohol/week: 0.8 standard drinks of alcohol     Types: 1 Standard drinks or equivalent per week     Comment: once every few months    Drug use: No   Social History Narrative    Prior maintenance work.    Disable due to leg pain and depression.        Lives alone.    No exercise, due to leg pain.     Social Determinants of Health     Financial Resource Strain: Medium Risk (1/3/2024)    Overall Financial Resource Strain (CARDIA)     Difficulty of Paying Living Expenses: Somewhat hard   Food Insecurity: Food Insecurity Present (1/3/2024)    Hunger Vital Sign     Worried About Running Out of Food in the Last Year: Sometimes true     Ran Out of Food in the Last Year: Never true   Transportation Needs: No Transportation Needs (1/3/2024)    PRAPARE - Transportation     Lack of Transportation (Medical): No     Lack of Transportation (Non-Medical): No   Physical Activity: Unknown (1/3/2024)    Exercise Vital Sign     Days of Exercise per Week: 2 days   Stress: No Stress Concern Present (1/3/2024)    [employer] - Occupational Stress Questionnaire     Feeling of Stress : Not at all   Social Connections: Moderately Isolated (1/3/2024)    Social Connection and Isolation Panel [NHANES]     Frequency of Communication with Friends and Family: Three times a week     Frequency of Social Gatherings with Friends and Family: Patient  declined     Attends Yazdanism Services: More than 4 times per year     Active Member of Clubs or Organizations: No     Attends Club or Organization Meetings: Never     Marital Status:    Housing Stability: Low Risk  (1/3/2024)    Housing Stability Vital Sign     Unable to Pay for Housing in the Last Year: No     Number of Places Lived in the Last Year: 1     Unstable Housing in the Last Year: No       Family History   Problem Relation Age of Onset    Hypertension Mother     Heart disease Father     Diabetes Father     Cancer Brother         unknown type    No Known Problems Daughter     No Known Problems Daughter     No Known Problems Daughter     No Known Problems Daughter     No Known Problems Son     No Known Problems Son        Review of patient's allergies indicates:   Allergen Reactions    Morphine Palpitations       No current facility-administered medications on file prior to encounter.     Current Outpatient Medications on File Prior to Encounter   Medication Sig Dispense Refill    apixaban (ELIQUIS) 5 mg Tab Take 2 tablets (10 mg total) by mouth 2 (two) times daily. for 7 days. THEN Take 1 tablet (5 mg total) by mouth 2 (two) times daily. 60 tablet 3    ARIPiprazole (ABILIFY) 5 MG Tab Take 5 mg by mouth once daily.      clotrimazole (LOTRIMIN) 1 % cream Apply topically 2 (two) times daily. 45 g 1    diazePAM (VALIUM) 10 MG Tab Take 1 tablet (10 mg total) by mouth 3 (three) times daily as needed (anxiety). 1 tablet 0    DULoxetine (CYMBALTA) 30 MG capsule Take 30 mg by mouth once daily.      mirtazapine (REMERON) 30 MG tablet Take 45 mg by mouth every evening. 30 tablet 11    tadalafiL (CIALIS) 20 MG Tab Take 1 tablet (20 mg total) by mouth daily as needed (erectile dysfunction). 30 tablet 5    traMADoL (ULTRAM) 50 mg tablet Take 1 tablet (50 mg total) by mouth every 4 (four) hours as needed for Pain. 30 tablet 0    [DISCONTINUED] gabapentin (NEURONTIN) 300 MG capsule Take by mouth.       [DISCONTINUED] tamsulosin (FLOMAX) 0.4 mg Cap Take 1 capsule (0.4 mg total) by mouth every evening. (Patient not taking: Reported on 1/5/2024) 90 capsule 3       Physical Exam    Reviewed nursing notes.  Vitals:    01/22/24 1658 01/22/24 2051   BP: 125/73    BP Location: Right arm    Patient Position: Sitting    Pulse: 106    Resp: 18    Temp: 97.9 °F (36.6 °C) (!) 101.4 °F (38.6 °C)   TempSrc: Oral Oral   SpO2: 97%    Weight: 75.7 kg (166 lb 14.2 oz)      General:  Alert, no acute distress.  Rigors.   Skin:  Warm, dry, intact.  No rash.  Head:  Normocephalic, atraumatic.    Neck:  Supple.   HEENT:  Pupils are equal and round, appropriate for room, extraocular movements are intact.  Normal phonation.  Tacky mucous membranes.  Pallor.  Cardiovascular:  slightly tachycardic rate and regular rhythm, Normal peripheral perfusion, No edema.    Respiratory:  Lungs are clear to auscultation, respirations are non-labored, breath sounds are equal.    Gastrointestinal:  Soft, Nontender, Non distended.   Rectal: normal stool color (brown) no blood  (Chaperone RN James at bedside)  Back:  Nontender.   Musculoskeletal:  Normal range of motion observed.  Neurological:  Alert and oriented to person, place, time, and situation.  No focal deficits observed. Grossly normal strength, but feels rather weak.  Psychiatric:  Cooperative, appropriate mood & affect.       Initial MDM and Data Review    63 y.o. male presenting for evaluation of concern for nausea and declining hemoglobin in the setting of recent portal vein thrombosis and being on an anticoagulant    Differential includes but is not limited to:  New or infected abscess/mass/growth in the hepatic region or biliary region, or other intra-abdominal pathology, viral infection, electrolyte disturbance, less likely acute coronary syndrome    I reviewed lab work from outpatient, leukocytosis to 29 with hemoglobin of 7.7 and elevated granulocyte count and lymphocyte  counts    Work-up includes:  CTA abdomen pelvis, chest x-ray, viral studies, CBC, CMP, coags, urinalysis, CPK, LA, UA, CPK    Interventions include:  IV fluids and broad-spectrum antibiotics    The patient has significant medical comorbidities that influence decision making for this acute process, such as:  Hypertension, portal vein thrombosis    I decided to obtain the patient's medical records and review relevant documentation from hospital records and clinic records.  Pertinent information is noted.  He was admitted early January for diverticulitis and found to have fever and elevated liver function tests.  Also found to have right portal vein thrombus at the time and was treated with apixaban.  Pancreatic cyst Clinic scheduled for follow-up    Medications   lactated ringers bolus 1,000 mL (1,000 mLs Intravenous New Bag 1/22/24 2008)   vancomycin 1.75 g in 5 % dextrose 500 mL IVPB (1,750 mg Intravenous New Bag 1/22/24 2110)   ondansetron injection 4 mg (4 mg Intravenous Given 1/22/24 2008)   piperacillin-tazobactam (ZOSYN) 4.5 g in dextrose 5 % in water (D5W) 100 mL IVPB (MB+) (0 g Intravenous Stopped 1/22/24 2101)   acetaminophen tablet 650 mg (650 mg Oral Given 1/22/24 2120)       Results and ED Course    Labs Reviewed   CBC W/ AUTO DIFFERENTIAL - Abnormal; Notable for the following components:       Result Value    WBC 21.28 (*)     RBC 2.83 (*)     Hemoglobin 8.4 (*)     Hematocrit 27.8 (*)     MCHC 30.2 (*)     Immature Granulocytes 1.2 (*)     Gran # (ANC) 17.7 (*)     Immature Grans (Abs) 0.25 (*)     Gran % 83.1 (*)     Lymph % 10.5 (*)     All other components within normal limits    Narrative:     Release to patient->Immediate   COMPREHENSIVE METABOLIC PANEL - Abnormal; Notable for the following components:    Sodium 135 (*)     CO2 22 (*)     Albumin 2.1 (*)     Total Bilirubin 3.1 (*)     Alkaline Phosphatase 211 (*)     All other components within normal limits    Narrative:     Release to  patient->Immediate   LIPASE - Abnormal; Notable for the following components:    Lipase 106 (*)     All other components within normal limits    Narrative:     add on trop per  /order#[id number] @ 01/22/2024  20:27    CULTURE, BLOOD   CULTURE, BLOOD   HIV 1 / 2 ANTIBODY    Narrative:     Release to patient->Immediate   LACTIC ACID, PLASMA    Narrative:     add on trop per  /order#[id number] @ 01/22/2024  20:27    PROTIME-INR    Narrative:     add on trop per  /order#[id number] @ 01/22/2024  20:27    CK    Narrative:     add on trop per  /order#[id number] @ 01/22/2024  20:27    TROPONIN I   TROPONIN I    Narrative:     add on trop per  /order#[id number] @ 01/22/2024  20:27    URINALYSIS, REFLEX TO URINE CULTURE   SARS-COV2 (COVID) WITH FLU/RSV BY PCR   TYPE & SCREEN       Imaging Results    None         ED Course as of 01/22/24 2131 Mon Jan 22, 2024 2053 Temp(!): 101.4 °F (38.6 °C)  Oral temperature obtained, patient is febrile.  I added acetaminophen. [AC]   2054 Will sign out to oncoming attending. [AC]      ED Course User Index  [AC] Sachin Fry, DO     This patient does have evidence of infective focus  My overall impression is sepsis.  Source: Unknown  Antibiotics given-   Antibiotics (72h ago, onward)      Start     Stop Route Frequency Ordered    01/22/24 2015  vancomycin 1.75 g in 5 % dextrose 500 mL IVPB         01/23/24 0814 IV ED 1 Time 01/22/24 2006          Latest lactate reviewed-  Recent Labs   Lab 01/22/24 2008   LACTATE 1.8     Organ dysfunction indicated by  none apparent    Fluid challenge Not needed - patient is not hypotensive      Post- resuscitation assessment No - Post resuscitation assessment not needed       Will Not start Pressors- Levophed for MAP of 65  Source control achieved by: broad spectrum abx        EKG interpreted by myself    EKG  Performed: 01/22/2024   Rate/Rhythm/Axis: 125 bpm, sinus rhythm, nml axis  QRS 82 ms  Qtc 421  ms  Impression:  Sinus tachycardia, normal intervals, no ischemic changes      Relevant imaging interpreted by myself  None available at chart completion    Impression and Plan    63 y.o. male with findings of SIRS of unclear origin with declining hgb and prior hepatic pathology based on the work up in the emergency department as above.    Important lab/imaging findings include: reviewed as above  Lipase elevated could be related to hepatic concerns    Signed out pending remaining labs, UA, CTA and admission    All tests, treatment options and disposition options were discussed with the patient.  The decision was made to observe the patient in the ED while awaiting further work up.    The patient was signed out to oncoming attending in stable condition and all further questions/concerns by patient and/or family were addressed.    Critical Care:  Date: 01/22/2024  Performed by: Dr. Sachin Fry  Authorized by: Dr. Sachin Fry   Total critical care time (exclusive of procedural time) : 35 minutes  Upon my evaluation, this patient had a high probability of imminent or life-threatening deterioration due to [ SIRS/Sepsis with unclear source] which required my direct attention, intervention and personal management.  Critical care was necessary to treat or prevent imminent or life-threatening deterioration.  This may include review of laboratory data, radiology results, discussion with consultants and family, and monitoring for potential decompensations. Interventions were performed as documented.           Final diagnoses:  [D72.829] Leukocytosis  [D64.9] Anemia, unspecified type (Primary)  [K85.90] Acute pancreatitis, unspecified complication status, unspecified pancreatitis type                 Sachin Fry, DO  01/22/24 1696

## 2024-01-23 NOTE — CONSULTS
"[facility]  Hepatology  Consult Note    Patient Name: Jesus Christy  MRN: [id number]  Admission Date: 1/22/2024  Hospital Length of Stay: 0 days  Code Status: Full Code   Attending Provider: Mack Larson*   Consulting Provider: Ofe Haynes DO  Primary Care Physician: Tara Jolly MD  Principal Problem:Portal vein thrombosis    Inpatient consult to Hepatology  Consult performed by: Ofe Haynes DO  Consult ordered by: Sukhi Hernandez DO        Subjective:     HPI: Jesus Christy is a 63 y.o. male with history of portal vein thrombosis recently diagnosed this month on apixaban, prostate adenocarcinoma (low-grade under active surveillance) who is refer to the ED for decreased Hemoglobin at 7.7 from 12.3 two weeks ago. He also reports weakness fatigue, fever, chills, diarrhea, nausea without vomiting and dark colored urine for the past 2 weeks. He denies having dark stool or blood in the stool.      Upon arrival to the ED he was hypotensive and febrile. CTA A/P showed with evidence of worsening thrombosis of the portal venous system now including the left portal vein and right portal vein anterior branch, prominence of leisa hepatic lymph nodes, nonspecific patchy hypoenhancement of the liver on arterial phase, mild diverticulitis and Stable pancreatic tail subcentimeter hypodensity. He was given 3L of IVF with no improvement. Pt was started on Levophed and admitted to the MICU.    He had a Fibroscan this month with evidence of Fibrosis stage of F 0 -1 and Steatosis Grade of S1. Last colonoscopy in 2017 with recommendation of repeating in 3 years which he did not follow up for. PSA from 1/18/24 at 12.4 increased from  6.6 on 10/20/23. AFP is elevated at 12 although he has no history of known liver disease, no history of hepatic cancers.     Hepatology consulted for "enlarging portal vein thrombosis on anticoagulation."    Past Medical History:   Diagnosis Date    Complex " regional pain syndrome i of right lower limb     GERD (gastroesophageal reflux disease)     HTN (hypertension)        Past Surgical History:   Procedure Laterality Date    COLONOSCOPY N/A 9/29/2017    Procedure: COLONOSCOPY;  Surgeon: Jamar Edwards MD;  Location: [address];  Service: Endoscopy;  Laterality: N/A;    JOINT REPLACEMENT Right     knee    KNEE ARTHROSCOPY W/ ACL RECONSTRUCTION  2014    right       Family History   Problem Relation Age of Onset    Hypertension Mother     Heart disease Father     Diabetes Father     Cancer Brother         unknown type    No Known Problems Daughter     No Known Problems Daughter     No Known Problems Daughter     No Known Problems Daughter     No Known Problems Son     No Known Problems Son        Social History     Socioeconomic History    Marital status: Single   Tobacco Use    Smoking status: Never    Smokeless tobacco: Never   Substance and Sexual Activity    Alcohol use: Yes     Alcohol/week: 0.8 standard drinks of alcohol     Types: 1 Standard drinks or equivalent per week     Comment: once every few months    Drug use: No   Social History Narrative    Prior maintenance work.    Disable due to leg pain and depression.        Lives alone.    No exercise, due to leg pain.     Social Determinants of Health     Financial Resource Strain: Medium Risk (1/3/2024)    Overall Financial Resource Strain (CARDIA)     Difficulty of Paying Living Expenses: Somewhat hard   Food Insecurity: Food Insecurity Present (1/3/2024)    Hunger Vital Sign     Worried About Running Out of Food in the Last Year: Sometimes true     Ran Out of Food in the Last Year: Never true   Transportation Needs: No Transportation Needs (1/3/2024)    PRAPARE - Transportation     Lack of Transportation (Medical): No     Lack of Transportation (Non-Medical): No   Physical Activity: Unknown (1/3/2024)    Exercise Vital Sign     Days of Exercise per Week: 2 days   Stress: No Stress Concern Present  (1/3/2024)    Citizen of [place] [employer] - Occupational Stress Questionnaire     Feeling of Stress : Not at all   Social Connections: Moderately Isolated (1/3/2024)    Social Connection and Isolation Panel [NHANES]     Frequency of Communication with Friends and Family: Three times a week     Frequency of Social Gatherings with Friends and Family: Patient declined     Attends [employer] Services: More than 4 times per year     Active Member of Clubs or Organizations: No     Attends Club or Organization Meetings: Never     Marital Status:    Housing Stability: Low Risk  (1/3/2024)    Housing Stability Vital Sign     Unable to Pay for Housing in the Last Year: No     Number of Places Lived in the Last Year: 1     Unstable Housing in the Last Year: No       No current facility-administered medications on file prior to encounter.     Current Outpatient Medications on File Prior to Encounter   Medication Sig Dispense Refill    apixaban (ELIQUIS) 5 mg Tab Take 2 tablets (10 mg total) by mouth 2 (two) times daily. for 7 days. THEN Take 1 tablet (5 mg total) by mouth 2 (two) times daily. 60 tablet 3    diazePAM (VALIUM) 10 MG Tab Take 10 mg by mouth 3 (three) times daily.      tadalafiL (CIALIS) 20 MG Tab Take 1 tablet (20 mg total) by mouth daily as needed (erectile dysfunction). 30 tablet 5    ARIPiprazole (ABILIFY) 5 MG Tab Take 5 mg by mouth once daily.      DULoxetine (CYMBALTA) 30 MG capsule Take 30 mg by mouth once daily.      mirtazapine (REMERON) 30 MG tablet Take 45 mg by mouth every evening. 30 tablet 11    traMADoL (ULTRAM) 50 mg tablet Take 1 tablet (50 mg total) by mouth every 4 (four) hours as needed for Pain. 30 tablet 0    [DISCONTINUED] clotrimazole (LOTRIMIN) 1 % cream Apply topically 2 (two) times daily. 45 g 1    [DISCONTINUED] diazePAM (VALIUM) 10 MG Tab Take 1 tablet (10 mg total) by mouth 3 (three) times daily as needed (anxiety). 1 tablet 0       Review of patient's allergies  indicates:   Allergen Reactions    Morphine Palpitations       Review of Systems   Constitutional:  Positive for chills, fever and malaise/fatigue.   Gastrointestinal:  Positive for nausea. Negative for abdominal pain, blood in stool, diarrhea, melena and vomiting.   All other systems reviewed and are negative.       Objective:     Vitals:    01/23/24 1130   BP: (!) 92/55   Pulse: 91   Resp:    Temp:        Physical Exam  Vitals and nursing note reviewed.   Constitutional:       Appearance: Normal appearance.   HENT:      Right Ear: External ear normal.      Left Ear: External ear normal.      Mouth/Throat:      Mouth: Mucous membranes are moist.      Pharynx: Oropharynx is clear.   Eyes:      General: Scleral icterus present.      Pupils: Pupils are equal, round, and reactive to light.   Cardiovascular:      Rate and Rhythm: Normal rate and regular rhythm.      Pulses: Normal pulses.      Heart sounds: Normal heart sounds.   Pulmonary:      Effort: Pulmonary effort is normal.      Breath sounds: Normal breath sounds.   Abdominal:      General: Abdomen is flat. Bowel sounds are normal. There is no distension.      Tenderness: There is no abdominal tenderness. There is no rebound.      Hernia: No hernia is present.   Musculoskeletal:         General: Normal range of motion.      Cervical back: Normal range of motion.   Skin:     General: Skin is warm and dry.      Coloration: Skin is jaundiced.   Neurological:      General: No focal deficit present.      Mental Status: He is alert and oriented to person, place, and time.   Psychiatric:         Mood and Affect: Mood normal.         Behavior: Behavior normal.          Significant Labs:  Recent Labs   Lab 01/22/24  1757 01/23/24  0446 01/23/24  0726   HGB 8.4* 7.4* 6.4*       Lab Results   Component Value Date    WBC 27.77 (H) 01/23/2024    HGB 6.4 (L) 01/23/2024    HCT 20.3 (L) 01/23/2024    MCV 94 01/23/2024     01/23/2024       Lab Results   Component Value  Date     (L) 01/23/2024    K 4.4 01/23/2024     01/23/2024    CO2 17 (L) 01/23/2024    BUN 23 01/23/2024    CREATININE 1.5 (H) 01/23/2024    CALCIUM 9.0 01/23/2024    ANIONGAP 10 01/23/2024    ESTGFRAFRICA >60.0 06/03/2020    EGFRNONAA >60.0 06/03/2020       Lab Results   Component Value Date    ALT 36 01/23/2024    AST 32 01/23/2024    ALKPHOS 206 (H) 01/23/2024    BILITOT 3.5 (H) 01/23/2024       Lab Results   Component Value Date    INR 1.2 01/23/2024    INR 1.2 01/22/2024    INR 1.0 12/24/2017       Significant Imaging:  Reviewed pertinent radiology findings.       Assessment/Plan:     Jesus Christy is a 63 y.o. male with history of history of portal vein thrombosis recently diagnosed this month on apixaban, prostate adenocarcinoma (low-grade under active surveillance) who is admitted for acute anemia and shock. Found to have worsening of the his portal vein thrombosis on CTA A/P. No sign of an acute bleed. Concern that patient may have recurrence of prostate CA as PSA now elevated from 1/18/24 at 12.4 increased from  6.6 on 10/20/23. AFP elevated at 12 with  elevated at 60. Outpatient workup for hypercoagulation significant for elevated Cardiolipin IgM at 47. Colonoscopy from 2017 significant for multiple polyps and patient was advised to follow up in 3 years which he had not done.    Problem List:  Portal Vein Thrombosis  Shock  Diverticulitis  Hx of adenocarcinoma of Prostate        Recommendations:    - continue heparin  - consult GI for colonoscopy; can be done inpatient versus outpatient depending of GI availability. Given concern for neoplastic process, it would be preferable to have it done inpatient  - would recommend getting CT triple phase  - recommend Hematology consult given elevated cardiolipin IgM marker    Thank you for involving us in the care of Jesus Christy. Please call with any additional questions, concerns or changes in the patient's clinical status. We will  continue to follow.     Ofe Haynes DO  Internal Medicine PGY-1  [facility]

## 2024-01-23 NOTE — H&P
[facility] - Emergency Dept  [facility] Medicine  History & Physical    Patient Name: Jesus Christy  MRN: [id number]  Patient Class: IP- Inpatient  Admission Date: 1/22/2024  Attending Physician: Humza Marcum MD   Primary Care Provider: Tara Jolly MD         Patient information was obtained from patient and ER records.     Subjective:     Principal Problem:Portal vein thrombosis    Chief Complaint:   Chief Complaint   Patient presents with    Abnormal Lab     Sent to ED fro HBG of 7.7          HPI: Patient is a 64 yo man with a PMH of recently diagnosed portal vein thrombosis, prostate adenocarcinoma (low-grade under active surveillance), complex regional pain syndrome, anxiety on chronic benzodiazepines, and HTN who was sent to [facility] from PCP clinic for falling Hgb (7.7 from 12.3 two weeks ago).  He reports increasing weakness and fatigue since being diagnosed with his portal vein thrombosis earlier this month.  He has associated symptoms of fever, chills, and diarrhea for the past 1-2 weeks.  He states he also has noticed darker colored urine and lower blood pressure over this time.  He denies any shortness a breath, chest pain, vision changes, dizziness, nausea/vomiting, or dysuria. He has not noticed any blood in the stool or dark colored stools.     While in the ED:  Patient with fever up to 102°.  Blood pressure down to 87/51. Initial WBC >29. Hgb 7.7. Creatinine 1.7. Bilirubin 4.1. CT a/p with Interval increased prominence of leisa hepatis lymph nodes may be reactive. Nonspecific patchy hypoenhancement of the liver on arterial phase, possibly related to vascular shunting and portal vein thrombus. Mild stranding about sigmoid diverticula similar to prior. Clinical correlation suggestive for mild diverticulitis. Patient was started on zosyn and given fluids.    Past Medical History:   Diagnosis Date    Complex regional pain syndrome i of right lower limb     GERD (gastroesophageal reflux disease)      HTN (hypertension)        Past Surgical History:   Procedure Laterality Date    COLONOSCOPY N/A 9/29/2017    Procedure: COLONOSCOPY;  Surgeon: Jamar Edwards MD;  Location: [facility] ([address] [place] [address]);  Service: Endoscopy;  Laterality: N/A;    JOINT REPLACEMENT Right     knee    KNEE ARTHROSCOPY W/ ACL RECONSTRUCTION  2014    right       Review of patient's allergies indicates:   Allergen Reactions    Morphine Palpitations       No current facility-administered medications on file prior to encounter.     Current Outpatient Medications on File Prior to Encounter   Medication Sig    apixaban (ELIQUIS) 5 mg Tab Take 2 tablets (10 mg total) by mouth 2 (two) times daily. for 7 days. THEN Take 1 tablet (5 mg total) by mouth 2 (two) times daily.    ARIPiprazole (ABILIFY) 5 MG Tab Take 5 mg by mouth once daily.    clotrimazole (LOTRIMIN) 1 % cream Apply topically 2 (two) times daily.    diazePAM (VALIUM) 10 MG Tab Take 1 tablet (10 mg total) by mouth 3 (three) times daily as needed (anxiety).    DULoxetine (CYMBALTA) 30 MG capsule Take 30 mg by mouth once daily.    mirtazapine (REMERON) 30 MG tablet Take 45 mg by mouth every evening.    tadalafiL (CIALIS) 20 MG Tab Take 1 tablet (20 mg total) by mouth daily as needed (erectile dysfunction).    traMADoL (ULTRAM) 50 mg tablet Take 1 tablet (50 mg total) by mouth every 4 (four) hours as needed for Pain.     Family History       Problem Relation (Age of Onset)    Cancer Brother    Diabetes Father    Heart disease Father    Hypertension Mother    No Known Problems Daughter, Daughter, Daughter, Daughter, Son, Son          Tobacco Use    Smoking status: Never    Smokeless tobacco: Never   Substance and Sexual Activity    Alcohol use: Yes     Alcohol/week: 0.8 standard drinks of alcohol     Types: 1 Standard drinks or equivalent per week     Comment: once every few months    Drug use: No    Sexual activity: Not on file     Review of Systems   Constitutional:  Positive for chills,  diaphoresis, fatigue and fever.   Eyes:  Negative for visual disturbance.   Respiratory:  Negative for cough, chest tightness and shortness of breath.    Cardiovascular:  Negative for chest pain, palpitations and leg swelling.   Gastrointestinal:  Positive for diarrhea. Negative for abdominal pain, nausea and vomiting.   Genitourinary:  Negative for dysuria, frequency and urgency.   Neurological:  Positive for weakness. Negative for dizziness, light-headedness and headaches.     Objective:     Vital Signs (Most Recent):  Temp: (S) (!) 102.9 °F (39.4 °C) (01/23/24 0502)  Pulse: (S) (!) 125 (01/23/24 0502)  Resp: (!) 21 (01/23/24 0502)  BP: (!) 107/50 (01/23/24 0502)  SpO2: 95 % (01/23/24 0502) Vital Signs (24h Range):  Temp:  [97.9 °F (36.6 °C)-102.9 °F (39.4 °C)] 102.9 °F (39.4 °C)  Pulse:  [] 125  Resp:  [18-35] 21  SpO2:  [93 %-98 %] 95 %  BP: ()/(50-73) 107/50     Weight: 75.7 kg (166 lb 14.2 oz)  Body mass index is 24.65 kg/m².     Physical Exam  Constitutional:       General: He is not in acute distress.     Appearance: Normal appearance. He is not ill-appearing.   HENT:      Head: Normocephalic and atraumatic.      Nose: Nose normal.      Mouth/Throat:      Mouth: Mucous membranes are moist.   Eyes:      General: Scleral icterus present.      Extraocular Movements: Extraocular movements intact.      Conjunctiva/sclera: Conjunctivae normal.      Pupils: Pupils are equal, round, and reactive to light.   Cardiovascular:      Rate and Rhythm: Regular rhythm. Tachycardia present.      Pulses: Normal pulses.      Heart sounds: Normal heart sounds. No murmur heard.  Pulmonary:      Effort: Pulmonary effort is normal. No respiratory distress.      Breath sounds: Normal breath sounds. No wheezing, rhonchi or rales.   Abdominal:      General: Abdomen is flat. Bowel sounds are normal. There is no distension.      Palpations: Abdomen is soft.      Tenderness: There is abdominal tenderness.   Musculoskeletal:       Right lower leg: No edema.      Left lower leg: No edema.   Skin:     General: Skin is warm and dry.      Capillary Refill: Capillary refill takes less than 2 seconds.   Neurological:      Mental Status: He is alert and oriented to person, place, and time.   Psychiatric:         Mood and Affect: Mood is anxious.         Behavior: Behavior normal.              CRANIAL NERVES     CN III, IV, VI   Pupils are equal, round, and reactive to light.       Significant Labs: All pertinent labs within the past 24 hours have been reviewed.  CBC:   Recent Labs   Lab 01/22/24  0830 01/22/24  1757   WBC 29.63* 21.28*   HGB 7.7* 8.4*   HCT 24.9* 27.8*    330     CMP:   Recent Labs   Lab 01/22/24  0830 01/22/24  1757   * 135*   K 4.6 4.2    105   CO2 21* 22*   * 91   BUN 22 23   CREATININE 1.7* 1.2   CALCIUM 9.7 9.6   PROT 7.3 7.6   ALBUMIN 2.0* 2.1*   BILITOT 4.1* 3.1*   ALKPHOS 205* 211*   AST 41* 33   ALT 44 43   ANIONGAP 8 8       Significant Imaging: I have reviewed all pertinent imaging results/findings within the past 24 hours.  Assessment/Plan:     * Portal vein thrombosis  Patient with history of prostate adenocarcinoma an portal vein thrombosis confirmed on imaging.  Patient has had recurrent fevers and tachycardia since his diagnosis. WBC >29. Bilirubin 4.1. Thrombosis worsening on imaging despite being on DOAC.    No known cause of thrombosis. Differential includes infection versus coagulopathy versus malignancy. Possible component of phlebitis and diverticulitis seen on imaging    Plan:  -hepatology consulted, recs appreciated  -Heparin drip started inpatient (starting at low intensity, will move to high intensity if hgb remains stable)  -Broad spectrum antibiotics with vanc and zosyn for possible infection contributing  -F/u CRP, ESR, EBV labs, KATHRYN, and RF  -F/u blood cultures  -Consider heme consult for possible coagulopathy  -Tylenol and ibuprofen sparingly for help controlling  fevers      Diverticulitis  Evidence of mild diverticulitis seen on CT in patient with diarrhea meeting sepsis criteria and WBC >29.    -Continuing vanc and zosyn for broad spectrum coverage  -Consider further stool testing if diarrhea continues        Sepsis  This patient does have evidence of infective focus  My overall impression is sepsis.  Source: Abdominal  Antibiotics given-   Antibiotics (72h ago, onward)      Start     Stop Route Frequency Ordered    01/23/24 0618  vancomycin - pharmacy to dose  (vancomycin IVPB (PEDS and ADULTS))        See Hyperspace for full Linked Orders Report.    -- IV pharmacy to manage frequency 01/23/24 0518    01/23/24 0347  piperacillin-tazobactam (ZOSYN) 4.5 g in dextrose 5 % in water (D5W) 100 mL IVPB (MB+)         -- IV Every 8 hours (non-standard times) 01/23/24 0348          Latest lactate reviewed-  Recent Labs   Lab 01/22/24 2008   LACTATE 1.8     Organ dysfunction indicated by Acute kidney injury and Acute liver injury          Adenocarcinoma of prostate  Dx 9/2016. Low grade, low volume. Active surveillance.          Anemia    Lab Results   Component Value Date    HGB 7.4 (L) 01/23/2024    HCT 23.7 (L) 01/23/2024     Unclear cause. Possibly related to portal vein thrombosis. No signs of active bleeding. Heme occult blood test negative for blood in stool.    -F/u anemia labs  -Monitor serial CBC and transfuse if patient becomes hemodynamically unstable, symptomatic or H/H drops below 7/21.        Chronic prescription benzodiazepine use  -Continuing home dosing to avoid withdrawal. Patient states he does not take them daily.      Complex regional pain syndrome type 1 of right lower extremity  -Holding home cymbalta, continue once fevers controlled      Depression  -Hold home antidepressants as could possibly contribute to ongoing fevers      Essential hypertension  Not currently on home antihypertensives.      VTE Risk Mitigation (From admission, onward)           Ordered      heparin 25,000 units in dextrose 5% (100 units/ml) IV bolus from bag - ADDITIONAL PRN BOLUS - 60 units/kg  As needed (PRN)        Question:  Heparin Infusion Adjustment (DO NOT MODIFY ANSWER)  Answer:  [email or web address]\epic\Images\Pharmacy\HeparinInfusions\heparin LOW INTENSITY nomogram for [facility] [email or web address]    01/23/24 0132     heparin 25,000 units in dextrose 5% (100 units/ml) IV bolus from bag - ADDITIONAL PRN BOLUS - 30 units/kg  As needed (PRN)        Question:  Heparin Infusion Adjustment (DO NOT MODIFY ANSWER)  Answer:  [email or web address]\epic\Images\Pharmacy\HeparinInfusions\heparin LOW INTENSITY nomogram for [facility] [id number]    01/23/24 0132     heparin 25,000 units in dextrose 5% 250 mL (100 units/mL) infusion LOW INTENSITY nomogram - [facility]  Continuous        Question:  Begin at (units/kg/hr)  Answer:  12    01/23/24 0132                                    Sukhi Hernandez DO  Department of Hospital Medicine  [facility] - Emergency Dept

## 2024-01-23 NOTE — CLINICAL REVIEW
IP Sepsis Screen (most recent)       Sepsis Screen (IP) - 01/23/24 6051       Is the patient's history or complaint suggestive of a possible infection? Yes  -CB    Are there at least two of the following signs and symptoms present? Yes  -CB    Sepsis signs/symptoms - Tachycardia Tachycardia     >90  -CB    Sepsis signs/symptoms - Tachypnea Tachypnea     >20  -CB    Sepsis signs/symptoms - WBC WBC < 4,000 or WBC > 12,000  -CB    Are any of the following organ dysfunction criteria present and not considered to be due to a chronic condition? Yes  -CB    Organ Dysfunction Criteria Total Bilirubin > 2.0 Platelet count < 100,000  -CB    Initiate Sepsis Protocol No  -CB    Reason sepsis not considered Pt. receiving appropriate management  -CB              User Key  (r) = Recorded By, (t) = Taken By, (c) = Cosigned By      Initials Name    Cris Becerra, RN

## 2024-01-23 NOTE — ASSESSMENT & PLAN NOTE
Patient with history of prostate adenocarcinoma and portal vein thrombosis confirmed on imaging.  Patient has had recurrent fevers and tachycardia since his diagnosis. WBC >29. Bilirubin 4.1. Thrombosis worsening on imaging despite being on DOAC.     No known cause of thrombosis. Differential includes infection versus coagulopathy versus malignancy. Possible component of phlebitis and diverticulitis seen on imaging     Plan:  -hepatology consulted, recs appreciated  -Heparin drip started inpatient (starting at low intensity, will move to high intensity if hgb remains stable)  -Broad spectrum antibiotics with vanc and zosyn for possible infection contributing  -F/u CRP, ESR, EBV labs, KATHRYN, and RF  -F/u blood cultures  -Consider heme consult for possible coagulopathy  -Tylenol and ibuprofen sparingly for help controlling fevers

## 2024-01-23 NOTE — CARE UPDATE
RAPID RESPONSE NURSE CHART REVIEW        Chart Reviewed: 01/23/2024, 7:44 AM    MRN: [id number]  Bed: ED 17/17    Dx: Portal vein thrombosis    Jesus Christy has a past medical history of Complex regional pain syndrome i of right lower limb, GERD (gastroesophageal reflux disease), and HTN (hypertension).    Last VS: BP (!) 88/51 (BP Location: Left arm, Patient Position: Lying)   Pulse 100   Temp 98.8 °F (37.1 °C) (Oral)   Resp (!) 24   Wt 75.7 kg (166 lb 14.2 oz)   SpO2 98%   BMI 24.65 kg/m²     24H Vital Sign Range:  Temp:  [97.9 °F (36.6 °C)-102.9 °F (39.4 °C)]   Pulse:  []   Resp:  [18-44]   BP: ()/(45-73)   SpO2:  [93 %-98 %]     Level of Consciousness (AVPU): alert    Recent Labs     01/22/24  0830 01/22/24  1757 01/23/24  0446   WBC 29.63* 21.28* 16.08*   HGB 7.7* 8.4* 7.4*   HCT 24.9* 27.8* 23.7*    330 289       Recent Labs     01/22/24  0830 01/22/24  1757 01/23/24  0446   * 135* 134*   K 4.6 4.2 4.4    105 107   CO2 21* 22* 17*   BUN 22 23 23   CREATININE 1.7* 1.2 1.5*   * 91 86   PHOS  --   --  2.8   MG  --   --  2.0        MEWS score: 5    Rounding completed with charge KATHE Garcia. Patient awaiting ICU bed. Instructed to call [phone] for further concerns or assistance.    Irene Oswald RN

## 2024-01-23 NOTE — MEDICAL/APP STUDENT
History     Chief Complaint   Patient presents with    Abnormal Lab     Sent to ED fro HBG of 7.7       Mrs Christy is a 63 years old man with history of portal vein thrombosis recently diagnosed this month and treated with apixaban, hypertension and prostate adenocarcinoma (low-grade under active surveillance)  who is refer to the ED for decreased Hemoglobin at 7.7 from 12.3 two weeks ago. He also reports weakness and fatigue since being diagnosed with portal vein thrombosis this month. Patients states that from two weeks ago he is experiencing fever, chills,  diarrhea, nausea without vomiting and dark colored urine. He denies having dark stool or blood in the stool.      Upon arrival to the ED he was hypotensive and febril. He had a CT abdomen with contrast with evidence of worsening thrombosis of the portal venous system now including the left portal vein and right portal vein anterior branch, prominence of leisa hepatic lymph nodes, nonspecific patchy hypoenhancement of the liver on arterial phase, mild diverticulitis and stable pancreatic tail subcentimeter hypodensity. He also had a Fibroscan early this month with evidence of Fibrosis stage of F 0 -1 and Steatosis Grade of S1. Last colonoscopy in 2017 with evidence of multiple polyps without evidence of malignancy, recommendation of repeating colonoscopy in 3 years but he didn't have another one. Labs showing an increase in PSA from 6.6 three months ago to 12. 4 5 days ago. AFP elevated at. He didn't report any previous liver disease or hepatic tumor. He was seen early this month by Hematology for suspected hypercoagulation disease, later showing APA Isotype IgM elevated.         Past Medical History:   Diagnosis Date    Complex regional pain syndrome i of right lower limb     GERD (gastroesophageal reflux disease)     HTN (hypertension)        Past Surgical History:   Procedure Laterality Date    COLONOSCOPY N/A 9/29/2017    Procedure: COLONOSCOPY;   Surgeon: Jamar Edwards MD;  Location: [facility] ([address] [place] [address]);  Service: Endoscopy;  Laterality: N/A;    JOINT REPLACEMENT Right     knee    KNEE ARTHROSCOPY W/ ACL RECONSTRUCTION  2014    right       Family History   Problem Relation Age of Onset    Hypertension Mother     Heart disease Father     Diabetes Father     Cancer Brother         unknown type    No Known Problems Daughter     No Known Problems Daughter     No Known Problems Daughter     No Known Problems Daughter     No Known Problems Son     No Known Problems Son        Social History     Tobacco Use    Smoking status: Never    Smokeless tobacco: Never   Substance Use Topics    Alcohol use: Yes     Alcohol/week: 0.8 standard drinks of alcohol     Types: 1 Standard drinks or equivalent per week     Comment: once every few months    Drug use: No       Review of Systems   Constitutional:  Positive for chills, fatigue and fever.   Gastrointestinal:  Positive for diarrhea and nausea. Negative for abdominal pain, anal bleeding and blood in stool.   Genitourinary:  Positive for hematuria.       Physical Exam   BP (!) 92/55   Pulse 91   Temp 98.8 °F (37.1 °C) (Oral)   Resp (!) 22   Wt 75.7 kg (166 lb 14.2 oz)   SpO2 98%   BMI 24.65 kg/m²     Physical Exam    Nursing note and vitals reviewed.  HENT:   Head: Normocephalic.   Eyes: EOM are normal.   Neck:   Normal range of motion.  Abdominal: There is abdominal tenderness.   RUQ tenderness   Musculoskeletal:      Cervical back: Normal range of motion.     Neurological: He is alert and oriented to person, place, and time.   Skin: Skin is warm.   Psychiatric: He has a normal mood and affect.       ED Course     Assessment Plan:      - Hematology consult   - Recommendation for colonoscopy Inpatient/Outpatient as availability of GI   - MRI or CT triple-phase   - Continuing anticoagulant

## 2024-01-23 NOTE — PROGRESS NOTES
Pharmacokinetic Initial Assessment: IV Vancomycin    Assessment/Plan:    Continue intravenous vancomycin after loading dose of 1750 mg given once before consultwith subsequent doses when random concentrations are less than 20 mcg/mL  Desired empiric serum trough concentration is 10 to 20 mcg/mL  Draw vancomycin random level on 1/23 at 0900.  Pharmacy will continue to follow and monitor vancomycin.      Please contact pharmacy at extension [phone] with any questions regarding this assessment.     Thank you for the consult,   Parker LOMBARDO Sada       Patient brief summary:  Jesus Christy is a 63 y.o. male initiated on antimicrobial therapy with IV Vancomycin for treatment of suspected intra-abdominal infection    Drug Allergies:   Review of patient's allergies indicates:   Allergen Reactions    Morphine Palpitations       Actual Body Weight:   75.7kg    Renal Function:   Estimated Creatinine Clearance: 63 mL/min (based on SCr of 1.2 mg/dL).,     Dialysis Method (if applicable):  N/A    CBC (last 72 hours):  Recent Labs   Lab Result Units 01/22/24  0830 01/22/24 1757 01/23/24  0446   WBC K/uL 29.63* 21.28* 16.08*   Hemoglobin g/dL 7.7* 8.4* 7.4*   Hematocrit % 24.9* 27.8* 23.7*   Platelets K/uL 320 330 289   Gran % % 90.6* 83.1*  --    Lymph % % 4.5* 10.5*  --    Mono % % 3.7* 4.8  --    Eosinophil % % 0.1 0.2  --    Basophil % % 0.2 0.2  --    Differential Method  Automated Automated  --        Metabolic Panel (last 72 hours):  Recent Labs   Lab Result Units 01/22/24  0830 01/22/24  1757 01/22/24  2037   Sodium mmol/L 133* 135*  --    Potassium mmol/L 4.6 4.2  --    Chloride mmol/L 104 105  --    CO2 mmol/L 21* 22*  --    Glucose mg/dL 172* 91  --    Glucose, UA   --   --  Negative   BUN mg/dL 22 23  --    Creatinine mg/dL 1.7* 1.2  --    Albumin g/dL 2.0* 2.1*  --    Total Bilirubin mg/dL 4.1* 3.1*  --    Alkaline Phosphatase U/L 205* 211*  --    AST U/L 41* 33  --    ALT U/L 44 43  --        Drug levels (last 3  "results):  No results for input(s): "VANCOMYCINRA", "VANCORANDOM", "VANCOMYCINPE", "VANCOPEAK", "VANCOMYCINTR", "VANCOTROUGH" in the last 72 hours.    Microbiologic Results:  Microbiology Results (last 7 days)       Procedure Component Value Units Date/Time    Blood Culture #2 **CANNOT BE ORDERED STAT** [[id number]] Collected: 01/22/24 2040    Order Status: Completed Specimen: Blood from Peripheral, Forearm, Left Updated: 01/23/24 0515     Blood Culture, Routine No Growth to date    Blood Culture #1 **CANNOT BE ORDERED STAT** [[id number]] Collected: 01/22/24 2010    Order Status: Completed Specimen: Blood from Peripheral, Forearm, Right Updated: 01/23/24 0515     Blood Culture, Routine No Growth to date            "

## 2024-01-23 NOTE — ED NOTES
Assumed care. Pt complains of chills at this time. Pt aware that he is febrile. No other complaints.     LOC: The patient is awake, alert and aware of environment with an appropriate affect, the patient is oriented x 3 and speaking appropriately.  APPEARANCE: Patient in no acute distress, patient is clean and well groomed, patient's clothing is properly fastened.  SKIN: The skin is hot and dry, color consistent with ethnicity, patient has normal skin turgor and moist mucus membranes, skin intact, no breakdown or bruising noted.  MUSCULOSKELETAL: Patient moving all extremities spontaneously, no obvious swelling or deformities noted.  RESPIRATORY: Airway is open and patent, respirations are spontaneous, patient has a normal effort and rate, no accessory muscle use noted.  CARDIAC: Patient sinus tach 130s on monitor, no periphreal edema noted, capillary refill < 3 seconds.  ABDOMEN: Soft and non tender to palpation, no distention noted, normoactive bowel sounds present in all four quadrants.  NEUROLOGIC:  facial expression is symmetrical, patient moving all extremities spontaneously, normal sensation in all extremities when touched with a finger.  Follows all commands appropriately.

## 2024-01-24 LAB
ALBUMIN SERPL BCP-MCNC: 1.7 G/DL (ref 3.5–5.2)
ALP SERPL-CCNC: 187 U/L (ref 55–135)
ALT SERPL W/O P-5'-P-CCNC: 30 U/L (ref 10–44)
ANION GAP SERPL CALC-SCNC: 8 MMOL/L (ref 8–16)
AST SERPL-CCNC: 36 U/L (ref 10–40)
BASOPHILS # BLD AUTO: 0.05 K/UL (ref 0–0.2)
BASOPHILS NFR BLD: 0.3 % (ref 0–1.9)
BILIRUB SERPL-MCNC: 2.7 MG/DL (ref 0.1–1)
BUN SERPL-MCNC: 17 MG/DL (ref 8–23)
BURR CELLS BLD QL SMEAR: ABNORMAL
CALCIUM SERPL-MCNC: 9.2 MG/DL (ref 8.7–10.5)
CHLORIDE SERPL-SCNC: 107 MMOL/L (ref 95–110)
CO2 SERPL-SCNC: 20 MMOL/L (ref 23–29)
CREAT SERPL-MCNC: 1.3 MG/DL (ref 0.5–1.4)
DIFFERENTIAL METHOD BLD: ABNORMAL
EOSINOPHIL # BLD AUTO: 0 K/UL (ref 0–0.5)
EOSINOPHIL NFR BLD: 0.2 % (ref 0–8)
ERYTHROCYTE [DISTWIDTH] IN BLOOD BY AUTOMATED COUNT: 16.8 % (ref 11.5–14.5)
EST. GFR  (NO RACE VARIABLE): >60 ML/MIN/1.73 M^2
GLUCOSE SERPL-MCNC: 96 MG/DL (ref 70–110)
HCT VFR BLD AUTO: 25.6 % (ref 40–54)
HGB BLD-MCNC: 8.1 G/DL (ref 14–18)
IMM GRANULOCYTES # BLD AUTO: 0.26 K/UL (ref 0–0.04)
IMM GRANULOCYTES NFR BLD AUTO: 1.4 % (ref 0–0.5)
LYMPHOCYTES # BLD AUTO: 1.5 K/UL (ref 1–4.8)
LYMPHOCYTES NFR BLD: 8 % (ref 18–48)
MAGNESIUM SERPL-MCNC: 2.3 MG/DL (ref 1.6–2.6)
MCH RBC QN AUTO: 29.8 PG (ref 27–31)
MCHC RBC AUTO-ENTMCNC: 31.6 G/DL (ref 32–36)
MCV RBC AUTO: 94 FL (ref 82–98)
MONOCYTES # BLD AUTO: 0.8 K/UL (ref 0.3–1)
MONOCYTES NFR BLD: 4.4 % (ref 4–15)
NEUTROPHILS # BLD AUTO: 15.8 K/UL (ref 1.8–7.7)
NEUTROPHILS NFR BLD: 85.7 % (ref 38–73)
NRBC BLD-RTO: 0 /100 WBC
PATH REV BLD -IMP: NORMAL
PHOSPHATE SERPL-MCNC: 2.6 MG/DL (ref 2.7–4.5)
PLATELET # BLD AUTO: 321 K/UL (ref 150–450)
PLATELET BLD QL SMEAR: ABNORMAL
PMV BLD AUTO: 9.8 FL (ref 9.2–12.9)
POIKILOCYTOSIS BLD QL SMEAR: SLIGHT
POTASSIUM SERPL-SCNC: 4.8 MMOL/L (ref 3.5–5.1)
PROT SERPL-MCNC: 6.4 G/DL (ref 6–8.4)
RBC # BLD AUTO: 2.72 M/UL (ref 4.6–6.2)
SODIUM SERPL-SCNC: 135 MMOL/L (ref 136–145)
TOXIC GRANULES BLD QL SMEAR: PRESENT
WBC # BLD AUTO: 18.46 K/UL (ref 3.9–12.7)

## 2024-01-24 PROCEDURE — 25000003 PHARM REV CODE 250

## 2024-01-24 PROCEDURE — 63600175 PHARM REV CODE 636 W HCPCS: Performed by: STUDENT IN AN ORGANIZED HEALTH CARE EDUCATION/TRAINING PROGRAM

## 2024-01-24 PROCEDURE — 84100 ASSAY OF PHOSPHORUS: CPT

## 2024-01-24 PROCEDURE — 99291 CRITICAL CARE FIRST HOUR: CPT | Mod: ,,,

## 2024-01-24 PROCEDURE — 80053 COMPREHEN METABOLIC PANEL: CPT

## 2024-01-24 PROCEDURE — 87040 BLOOD CULTURE FOR BACTERIA: CPT | Mod: 59

## 2024-01-24 PROCEDURE — 63600175 PHARM REV CODE 636 W HCPCS: Performed by: INTERNAL MEDICINE

## 2024-01-24 PROCEDURE — 63600175 PHARM REV CODE 636 W HCPCS

## 2024-01-24 PROCEDURE — 25000003 PHARM REV CODE 250: Performed by: INTERNAL MEDICINE

## 2024-01-24 PROCEDURE — 25500020 PHARM REV CODE 255: Performed by: INTERNAL MEDICINE

## 2024-01-24 PROCEDURE — C9113 INJ PANTOPRAZOLE SODIUM, VIA: HCPCS

## 2024-01-24 PROCEDURE — 20000000 HC ICU ROOM

## 2024-01-24 PROCEDURE — 83735 ASSAY OF MAGNESIUM: CPT

## 2024-01-24 PROCEDURE — 99900035 HC TECH TIME PER 15 MIN (STAT)

## 2024-01-24 PROCEDURE — 94761 N-INVAS EAR/PLS OXIMETRY MLT: CPT

## 2024-01-24 PROCEDURE — 85025 COMPLETE CBC W/AUTO DIFF WBC: CPT

## 2024-01-24 PROCEDURE — 27000221 HC OXYGEN, UP TO 24 HOURS

## 2024-01-24 PROCEDURE — A9585 GADOBUTROL INJECTION: HCPCS | Performed by: INTERNAL MEDICINE

## 2024-01-24 PROCEDURE — 99222 1ST HOSP IP/OBS MODERATE 55: CPT | Mod: GC,,, | Performed by: STUDENT IN AN ORGANIZED HEALTH CARE EDUCATION/TRAINING PROGRAM

## 2024-01-24 RX ORDER — LORAZEPAM 2 MG/ML
0.5 INJECTION INTRAMUSCULAR ONCE
Status: COMPLETED | OUTPATIENT
Start: 2024-01-24 | End: 2024-01-24

## 2024-01-24 RX ORDER — GADOBUTROL 604.72 MG/ML
10 INJECTION INTRAVENOUS
Status: COMPLETED | OUTPATIENT
Start: 2024-01-24 | End: 2024-01-24

## 2024-01-24 RX ORDER — PANTOPRAZOLE SODIUM 40 MG/10ML
40 INJECTION, POWDER, LYOPHILIZED, FOR SOLUTION INTRAVENOUS 2 TIMES DAILY
Status: DISCONTINUED | OUTPATIENT
Start: 2024-01-24 | End: 2024-01-26

## 2024-01-24 RX ADMIN — PANTOPRAZOLE SODIUM 40 MG: 40 INJECTION, POWDER, FOR SOLUTION INTRAVENOUS at 08:01

## 2024-01-24 RX ADMIN — DIAZEPAM 10 MG: 5 TABLET ORAL at 11:01

## 2024-01-24 RX ADMIN — GADOBUTROL 10 ML: 604.72 INJECTION INTRAVENOUS at 01:01

## 2024-01-24 RX ADMIN — PIPERACILLIN SODIUM AND TAZOBACTAM SODIUM 4.5 G: 4; .5 INJECTION, POWDER, FOR SOLUTION INTRAVENOUS at 04:01

## 2024-01-24 RX ADMIN — VANCOMYCIN HYDROCHLORIDE 1250 MG: 1.25 INJECTION, POWDER, LYOPHILIZED, FOR SOLUTION INTRAVENOUS at 12:01

## 2024-01-24 RX ADMIN — PIPERACILLIN SODIUM AND TAZOBACTAM SODIUM 4.5 G: 4; .5 INJECTION, POWDER, FOR SOLUTION INTRAVENOUS at 12:01

## 2024-01-24 RX ADMIN — LORAZEPAM 0.5 MG: 2 INJECTION INTRAMUSCULAR; INTRAVENOUS at 12:01

## 2024-01-24 RX ADMIN — PIPERACILLIN SODIUM AND TAZOBACTAM SODIUM 4.5 G: 4; .5 INJECTION, POWDER, FOR SOLUTION INTRAVENOUS at 08:01

## 2024-01-24 NOTE — PROGRESS NOTES
[facility] - Cardiac Medical ICU  Critical Care Medicine  Progress Note    Patient Name: Jesus Christy  MRN: [id number]  Admission Date: 1/22/2024  Hospital Length of Stay: 1 days  Code Status: Full Code  Attending Provider: Mack Larson*  Primary Care Provider: Tara Jolly MD   Principal Problem: Portal vein thrombosis    Subjective:     HPI:  Mr. Jesus Christy is a 63 year-old man with a PMHx of recently diagnosed portal vein thrombosis, prostate adenocarcinoma (low-grade under active surveillance), complex regional pain syndrome, anxiety on chronic benzodiazepines, and HTN who was sent to [facility] from PCP clinic for falling Hgb (7.7 from 12.3 two weeks ago).  He reports increasing weakness and fatigue since being diagnosed with his portal vein thrombosis earlier this month.  He has associated symptoms of fever, chills, and diarrhea for the past 1-2 weeks.  He states he also has noticed darker colored urine and lower blood pressure over this time.  He denies any shortness a breath, chest pain, vision changes, dizziness, nausea/vomiting, or dysuria. He has not noticed any blood in the stool or dark colored stools.      While in the ED:  Patient with fever up to 102°.  Blood pressure down to 87/51. Initial WBC >29. Hgb 7.7. Creatinine 1.7. Bilirubin 4.1. CT A/P with interval increased prominence of leisa hepatis lymph nodes may be reactive. Nonspecific patchy hypoenhancement of the liver on arterial phase, possibly related to vascular shunting and portal vein thrombus. Mild stranding about sigmoid diverticula similar to prior. Clinical correlation suggestive for mild diverticulitis. Patient was started on Zosyn and given fluids. He was initially admitted to Hospital Medicine however he remained hypotensive despite 3L LR (MAPs 55-65).    Critical Care Medicine consulted for persistent hypotension not responsive to fluid resuscitation.     Hospital/ICU Course:  Admitted to MICU on 1/23 with shock  requiring vasopressor support.  Patient initially admitted to hospital medicine.  He was given 3L IVF but remained hypotensive and started on peripheral vasopressors.  Hepatology consulted for extensive portal vein thrombus.  Large drop in Hgb, no bleeding identified.  Heme/onc and GI consulted.  Blood cx with GPC and GNR on broad spectrum abx.      Interval History/Significant Events: No acute events overnight.  Remains on low dose peripheral vasopressors.  GI, Hepatology, Heme/Onc consulted yesterday.  MRI liver completed overnight.      Review of Systems   HENT:  Negative for trouble swallowing.    Respiratory:  Negative for shortness of breath and wheezing.    Cardiovascular:  Negative for chest pain.   Gastrointestinal:  Negative for abdominal pain.     Objective:     Vital Signs (Most Recent):  Temp: 99.6 °F (37.6 °C) (01/24/24 0500)  Pulse: 102 (01/24/24 0500)  Resp: 18 (01/24/24 0500)  BP: 109/60 (01/24/24 0500)  SpO2: 97 % (01/24/24 0500) Vital Signs (24h Range):  Temp:  [98 °F (36.7 °C)-100.3 °F (37.9 °C)] 99.6 °F (37.6 °C)  Pulse:  [] 102  Resp:  [18-44] 18  SpO2:  [92 %-99 %] 97 %  BP: ()/(45-68) 109/60   Weight: 75.7 kg (166 lb 14.2 oz)  Body mass index is 24.65 kg/m².      Intake/Output Summary (Last 24 hours) at 1/24/2024 0523  Last data filed at 1/24/2024 0500  Gross per 24 hour   Intake 1264.79 ml   Output 900 ml   Net 364.79 ml          Physical Exam  Vitals and nursing note reviewed.   Constitutional:       General: He is not in acute distress.     Appearance: He is ill-appearing.   HENT:      Head: Normocephalic.      Mouth/Throat:      Mouth: Mucous membranes are dry.   Eyes:      Pupils: Pupils are equal, round, and reactive to light.   Cardiovascular:      Rate and Rhythm: Normal rate.      Pulses: Normal pulses.   Pulmonary:      Effort: No respiratory distress.      Breath sounds: Normal breath sounds. No wheezing or rales.   Abdominal:      General: Abdomen is flat.       "Palpations: Abdomen is soft.      Tenderness: There is no guarding.   Musculoskeletal:      Right lower leg: No edema.      Left lower leg: No edema.   Skin:     General: Skin is warm.      Capillary Refill: Capillary refill takes less than 2 seconds.   Neurological:      General: No focal deficit present.      Mental Status: He is alert. Mental status is at baseline.   Psychiatric:         Mood and Affect: Mood normal.         Behavior: Behavior normal.            Vents:     Lines/Drains/Airways       Peripheral Intravenous Line  Duration                  Peripheral IV - Single Lumen 01/22/24 1758 20 G Anterior;Proximal;Right Forearm 1 day         Peripheral IV - Single Lumen 01/22/24 2011 20 G Anterior;Left Forearm 1 day         Peripheral IV - Single Lumen 01/23/24 0714 20 G Distal;Left;Posterior Forearm <1 day                  Significant Labs:    CBC/Anemia Profile:  Recent Labs   Lab 01/23/24  0446 01/23/24  0726 01/23/24  1253   WBC 16.08* 27.77* 32.00*   HGB 7.4* 6.4* 6.6*   HCT 23.7* 20.3* 21.0*    300 295   MCV 96 94 95   RDW 14.9* 15.4* 15.6*   IRON 20*  --   --    FERRITIN 1,097*  --   --    RETIC  --  3.2*  --         Chemistries:  Recent Labs   Lab 01/22/24  0830 01/22/24  1757 01/23/24  0446 01/23/24  1237   * 135* 134*  --    K 4.6 4.2 4.4  --     105 107  --    CO2 21* 22* 17*  --    BUN 22 23 23  --    CREATININE 1.7* 1.2 1.5*  --    CALCIUM 9.7 9.6 9.0  --    ALBUMIN 2.0* 2.1* 1.8* 1.6*   PROT 7.3 7.6 6.4 6.0   BILITOT 4.1* 3.1* 3.5* 2.6*   ALKPHOS 205* 211* 206* 175*   ALT 44 43 36 32   AST 41* 33 32 33   MG  --   --  2.0  --    PHOS  --   --  2.8  --        All pertinent labs within the past 24 hours have been reviewed.    Significant Imaging:  I have reviewed all pertinent imaging results/findings within the past 24 hours.    ABG  No results for input(s): "PH", "PO2", "PCO2", "HCO3", "BE" in the last 168 hours.  Assessment/Plan:     Neuro  Complex regional pain syndrome type " 1 of right lower extremity  -Holding home cymbalta, resume when appropriate       Psychiatric  Depression  -Hold home antidepressants, resume when appropriate       Cardiac/Vascular  Essential hypertension  Not currently on home antihypertensives.     ID  Septic shock  This patient does have evidence of infective focus  My overall impression is septic shock.  Source: Abdominal  Antibiotics given-   Antibiotics (72h ago, onward)      Start     Stop Route Frequency Ordered    01/23/24 1200  vancomycin 1,250 mg in dextrose 5 % (D5W) 250 mL IVPB (Vial-Mate)         -- IV Every 24 hours (non-standard times) 01/23/24 1052    01/23/24 0618  vancomycin - pharmacy to dose  (vancomycin IVPB (PEDS and ADULTS))        See Hyperspace for full Linked Orders Report.    -- IV pharmacy to manage frequency 01/23/24 0518    01/23/24 0347  piperacillin-tazobactam (ZOSYN) 4.5 g in dextrose 5 % in water (D5W) 100 mL IVPB (MB+)         -- IV Every 8 hours (non-standard times) 01/23/24 0348          Latest lactate reviewed-  Recent Labs   Lab 01/23/24  0726   LACTATE 1.2       Organ dysfunction indicated by Acute kidney injury and Acute liver injury    Fluid challenge Ideal Body Weight- The patient's ideal body weight is Ideal body weight: 70.7 kg (155 lb 13.8 oz) which will be used to calculate fluid bolus of 30 ml/kg for treatment of septic shock.      Post- resuscitation assessment Yes Perfusion exam was performed within 6 hours of septic shock presentation after bolus shows Inadequate tissue perfusion assessed by non-invasive monitoring       Will Start Pressors- Levophed for MAP of 65  Source control achieved by: ABX    --Blood cultures with GPC and GNR, follow cx data  --Continue vanc/zosyn  --Titrate pressors for MAP goal >65    Oncology  Anemia          Lab Results   Component Value Date     HGB 7.4 (L) 01/23/2024     HCT 23.7 (L) 01/23/2024      Unclear cause. Possibly related to portal vein thrombosis. No signs of active  bleeding. Heme occult blood test negative for blood in stool.     -F/u anemia labs  -Monitor serial CBC and transfuse if patient becomes hemodynamically unstable, symptomatic or H/H drops below 7/21  -Heme/Onc consulted, appreciate assistance  -Consult GI for possible scope  -Holding anticoagulation with actively falling Hgb    Adenocarcinoma of prostate  Now on surveillance. Continue Flomax and Proscar.     --Heme/Onc consulted, appreciate assistance    GI  * Portal vein thrombosis  Patient with history of prostate adenocarcinoma and portal vein thrombosis confirmed on imaging.  Patient has had recurrent fevers and tachycardia since his diagnosis. WBC >29. Bilirubin 4.1. Thrombosis worsening on imaging despite being on DOAC.     No known cause of thrombosis. Differential includes infection versus coagulopathy versus malignancy. Possible component of phlebitis and diverticulitis seen on imaging     Plan:  -hepatology consulted, recs appreciated  -Heparin drip paused with continued anemia  -Broad spectrum antibiotics with vanc and zosyn   -F/u CRP, ESR, EBV labs, KATHRYN, and RF  -F/u blood cultures  -Heme/Onc consulted  - MRI liver w/wo contrast    Diverticulitis  Evidence of mild diverticulitis seen on CT in patient with diarrhea meeting sepsis criteria and WBC >29.     -Continuing vanc and zosyn for broad spectrum coverage  -Consider further stool testing if diarrhea continues    Palliative Care  Chronic prescription benzodiazepine use  --PRN benzos, takes chronically at home       Critical Care Daily Checklist:    A: Awake: RASS Goal/Actual Goal:    Actual:     B: Spontaneous Breathing Trial Performed?     C: SAT & SBT Coordinated?  na                      D: Delirium: CAM-ICU     E: Early Mobility Performed? Yes   F: Feeding Goal:    Status:     Current Diet Order   Procedures    Diet Adult Regular (IDDSI Level 7)      AS: Analgesia/Sedation PRN   T: Thromboembolic Prophylaxis Holding, GIB?   H: HOB > 300 Yes   U:  Stress Ulcer Prophylaxis (if needed) PPI   G: Glucose Control Bg goal 140-180   B: Bowel Function     I: Indwelling Catheter (Lines & Pino) Necessity PIV   D: De-escalation of Antimicrobials/Pharmacotherapies Continue abx    Plan for the day/ETD Wean pressors, MRI, GI    Code Status:  Family/Goals of Care: Full Code       Critical Care Time: 50 minutes  Critical secondary to Patient has a condition that poses threat to life and bodily function: Septic Shock    Plan to be discussed with Dr. Larson.        Critical care was time spent personally by me on the following activities: development of treatment plan with patient or surrogate and bedside caregivers, discussions with consultants, evaluation of patient's response to treatment, examination of patient, ordering and performing treatments and interventions, ordering and review of laboratory studies, ordering and review of radiographic studies, pulse oximetry, re-evaluation of patient's condition. This critical care time did not overlap with that of any other provider or involve time for any procedures.     Lissa Seals NP  Critical Care Medicine  [facility] - Cardiac Medical ICU

## 2024-01-24 NOTE — SUBJECTIVE & OBJECTIVE
Interval History/Significant Events: No acute events overnight.  Remains on low dose peripheral vasopressors.  GI, Hepatology, Heme/Onc consulted yesterday.  MRI liver completed overnight.      Review of Systems   HENT:  Negative for trouble swallowing.    Respiratory:  Negative for shortness of breath and wheezing.    Cardiovascular:  Negative for chest pain.   Gastrointestinal:  Negative for abdominal pain.     Objective:     Vital Signs (Most Recent):  Temp: 99.6 °F (37.6 °C) (01/24/24 0500)  Pulse: 102 (01/24/24 0500)  Resp: 18 (01/24/24 0500)  BP: 109/60 (01/24/24 0500)  SpO2: 97 % (01/24/24 0500) Vital Signs (24h Range):  Temp:  [98 °F (36.7 °C)-100.3 °F (37.9 °C)] 99.6 °F (37.6 °C)  Pulse:  [] 102  Resp:  [18-44] 18  SpO2:  [92 %-99 %] 97 %  BP: ()/(45-68) 109/60   Weight: 75.7 kg (166 lb 14.2 oz)  Body mass index is 24.65 kg/m².      Intake/Output Summary (Last 24 hours) at 1/24/2024 0523  Last data filed at 1/24/2024 0500  Gross per 24 hour   Intake 1264.79 ml   Output 900 ml   Net 364.79 ml          Physical Exam  Vitals and nursing note reviewed.   Constitutional:       General: He is not in acute distress.     Appearance: He is ill-appearing.   HENT:      Head: Normocephalic.      Mouth/Throat:      Mouth: Mucous membranes are dry.   Eyes:      Pupils: Pupils are equal, round, and reactive to light.   Cardiovascular:      Rate and Rhythm: Normal rate.      Pulses: Normal pulses.   Pulmonary:      Effort: No respiratory distress.      Breath sounds: Normal breath sounds. No wheezing or rales.   Abdominal:      General: Abdomen is flat.      Palpations: Abdomen is soft.      Tenderness: There is no guarding.   Musculoskeletal:      Right lower leg: No edema.      Left lower leg: No edema.   Skin:     General: Skin is warm.      Capillary Refill: Capillary refill takes less than 2 seconds.   Neurological:      General: No focal deficit present.      Mental Status: He is alert. Mental status is  at baseline.   Psychiatric:         Mood and Affect: Mood normal.         Behavior: Behavior normal.            Vents:     Lines/Drains/Airways       Peripheral Intravenous Line  Duration                  Peripheral IV - Single Lumen 01/22/24 1758 20 G Anterior;Proximal;Right Forearm 1 day         Peripheral IV - Single Lumen 01/22/24 2011 20 G Anterior;Left Forearm 1 day         Peripheral IV - Single Lumen 01/23/24 0714 20 G Distal;Left;Posterior Forearm <1 day                  Significant Labs:    CBC/Anemia Profile:  Recent Labs   Lab 01/23/24  0446 01/23/24  0726 01/23/24  1253   WBC 16.08* 27.77* 32.00*   HGB 7.4* 6.4* 6.6*   HCT 23.7* 20.3* 21.0*    300 295   MCV 96 94 95   RDW 14.9* 15.4* 15.6*   IRON 20*  --   --    FERRITIN 1,097*  --   --    RETIC  --  3.2*  --         Chemistries:  Recent Labs   Lab 01/22/24  0830 01/22/24  1757 01/23/24  0446 01/23/24  1237   * 135* 134*  --    K 4.6 4.2 4.4  --     105 107  --    CO2 21* 22* 17*  --    BUN 22 23 23  --    CREATININE 1.7* 1.2 1.5*  --    CALCIUM 9.7 9.6 9.0  --    ALBUMIN 2.0* 2.1* 1.8* 1.6*   PROT 7.3 7.6 6.4 6.0   BILITOT 4.1* 3.1* 3.5* 2.6*   ALKPHOS 205* 211* 206* 175*   ALT 44 43 36 32   AST 41* 33 32 33   MG  --   --  2.0  --    PHOS  --   --  2.8  --        All pertinent labs within the past 24 hours have been reviewed.    Significant Imaging:  I have reviewed all pertinent imaging results/findings within the past 24 hours.

## 2024-01-24 NOTE — NURSING
Pt arrived to unit at 0500. Pt connected to bedside monitor, cardiac monitoring and continuous pulse ox applied, call bell within reach, side rails raised x 3, bed locked and in lowest position. CC3 team notified of patient arrival. See vitals in flow sheet.

## 2024-01-24 NOTE — SUBJECTIVE & OBJECTIVE
Past Medical History:   Diagnosis Date    Complex regional pain syndrome i of right lower limb     GERD (gastroesophageal reflux disease)     HTN (hypertension)      Past Surgical History:   Procedure Laterality Date    COLONOSCOPY N/A 9/29/2017    Procedure: COLONOSCOPY;  Surgeon: Jamar Edwards MD;  Location: [address]);  Service: Endoscopy;  Laterality: N/A;    JOINT REPLACEMENT Right     knee    KNEE ARTHROSCOPY W/ ACL RECONSTRUCTION  2014    right     Review of patient's allergies indicates:   Allergen Reactions    Morphine Palpitations     Family History       Problem Relation (Age of Onset)    Cancer Brother    Diabetes Father    Heart disease Father    Hypertension Mother    No Known Problems Daughter, Daughter, Daughter, Daughter, Son, Son          Tobacco Use    Smoking status: Never    Smokeless tobacco: Never   Substance and Sexual Activity    Alcohol use: Yes     Alcohol/week: 0.8 standard drinks of alcohol     Types: 1 Standard drinks or equivalent per week     Comment: once every few months    Drug use: No    Sexual activity: Not on file     Review of Systems   Constitutional:  Positive for chills and fever.   HENT: Negative.     Respiratory:  Negative for cough, shortness of breath and wheezing.    Gastrointestinal:  Negative for abdominal pain, blood in stool, diarrhea, nausea and vomiting.   Genitourinary:  Negative for dysuria, frequency and hematuria.   Neurological:  Negative for dizziness, light-headedness and headaches.   Psychiatric/Behavioral:  Negative for agitation and confusion.      Objective:     Vital Signs (Most Recent):  Temp: 99.6 °F (37.6 °C) (01/24/24 0500)  Pulse: 93 (01/24/24 0600)  Resp: (!) 27 (01/24/24 0600)  BP: (!) 112/58 (01/24/24 0600)  SpO2: 97 % (01/24/24 0600) Vital Signs (24h Range):  Temp:  [98 °F (36.7 °C)-99.6 °F (37.6 °C)] 99.6 °F (37.6 °C)  Pulse:  [] 93  Resp:  [18-28] 27  SpO2:  [92 %-99 %] 97 %  BP: ()/(52-68) 112/58     Weight: 75.7 kg  (166 lb 14.2 oz) (01/22/24 1658)  Body mass index is 24.65 kg/m².      Intake/Output Summary (Last 24 hours) at 1/24/2024 0922  Last data filed at 1/24/2024 0600  Gross per 24 hour   Intake 1265.51 ml   Output 900 ml   Net 365.51 ml       Lines/Drains/Airways       Peripheral Intravenous Line  Duration                  Peripheral IV - Single Lumen 01/22/24 1758 20 G Anterior;Proximal;Right Forearm 1 day         Peripheral IV - Single Lumen 01/22/24 2011 20 G Anterior;Left Forearm 1 day         Peripheral IV - Single Lumen 01/23/24 0714 20 G Distal;Left;Posterior Forearm 1 day                     Physical Exam  Vitals and nursing note reviewed.   Constitutional:       General: He is not in acute distress.     Appearance: Normal appearance. He is not ill-appearing.   HENT:      Head: Normocephalic and atraumatic.      Mouth/Throat:      Mouth: Mucous membranes are moist.   Eyes:      Extraocular Movements: Extraocular movements intact.      Pupils: Pupils are equal, round, and reactive to light.   Pulmonary:      Effort: Pulmonary effort is normal. No respiratory distress.   Abdominal:      General: Bowel sounds are normal. There is no distension.      Palpations: Abdomen is soft.      Tenderness: There is no abdominal tenderness. There is no guarding.   Skin:     General: Skin is warm.      Capillary Refill: Capillary refill takes less than 2 seconds.   Neurological:      General: No focal deficit present.      Mental Status: He is alert and oriented to person, place, and time.   Psychiatric:         Mood and Affect: Mood normal.         Thought Content: Thought content normal.          Significant Labs:  CBC:   Recent Labs   Lab 01/23/24  0726 01/23/24  1253 01/24/24  0443   WBC 27.77* 32.00* 18.46*   HGB 6.4* 6.6* 8.1*   HCT 20.3* 21.0* 25.6*    295 321     CMP:   Recent Labs   Lab 01/24/24  0443   GLU 96   CALCIUM 9.2   ALBUMIN 1.7*   PROT 6.4   *   K 4.8   CO2 20*      BUN 17   CREATININE 1.3    ALKPHOS 187*   ALT 30   AST 36   BILITOT 2.7*     Coagulation:   Recent Labs   Lab 01/23/24  0726 01/23/24  1016   INR 1.2  --    APTT  --  29.9     All pertinent lab results from the last 24 hours have been reviewed.  Recent Lab Results  (Last 5 results in the past 24 hours)        01/24/24  0443   01/23/24  1253   01/23/24  1239   01/23/24  1237   01/23/24  1016        Albumin 1.7       1.6                175         ALT 30       32         Anion Gap 8               Appearance, UA     Clear           aPTT         29.9  Comment: Refer to local heparin nomogram for intensity/dose specific   therapeutic   range.         AST 36       33         Baso # 0.05   0.07             Basophil % 0.3   0.2             Bilirubin (UA)     Negative           Bilirubin Direct       2.0         BILIRUBIN TOTAL 2.7  Comment: For infants and newborns, interpretation of results should be based  on gestational age, weight and in agreement with clinical  observations.    Premature Infant recommended reference ranges:  Up to 24 hours.............<8.0 mg/dL  Up to 48 hours............<12.0 mg/dL  3-5 days..................<15.0 mg/dL  6-29 days.................<15.0 mg/dL         2.6  Comment: For infants and newborns, interpretation of results should be based  on gestational age, weight and in agreement with clinical  observations.    Premature Infant recommended reference ranges:  Up to 24 hours.............<8.0 mg/dL  Up to 48 hours............<12.0 mg/dL  3-5 days..................<15.0 mg/dL  6-29 days.................<15.0 mg/dL           BUN 17               Omayra/Echinocytes Occasional               Calcium 9.2               Chloride 107               CO2 20               Color, UA     Yellow           Creatinine 1.3               Differential Method Automated   Automated             eGFR >60.0               Eos # 0.0   0.0             Eosinophil % 0.2   0.1             Glucose 96               Glucose, UA     Negative            Gran # (ANC) 15.8   27.1             Gran % 85.7   88.3             Hematocrit 25.6   21.0             Hemoglobin 8.1   6.6             Immature Grans (Abs) 0.26  Comment: Mild elevation in immature granulocytes is non specific and   can be seen in a variety of conditions including stress response,   acute inflammation, trauma and pregnancy. Correlation with other   laboratory and clinical findings is essential.     0.42  Comment: Mild elevation in immature granulocytes is non specific and   can be seen in a variety of conditions including stress response,   acute inflammation, trauma and pregnancy. Correlation with other   laboratory and clinical findings is essential.               Immature Granulocytes 1.4   1.4             Ketones, UA     Negative           Leukocytes, UA     Negative           Lymph # 1.5   2.1             Lymph % 8.0   6.7             Magnesium  2.3               MCH 29.8   29.9             MCHC 31.6   31.4             MCV 94   95             Mono # 0.8   1.0             Mono % 4.4   3.3             MPV 9.8   10.9             NITRITE UA     Negative           nRBC 0   0             Occult Blood UA     Trace           Pathologist Review   Review completed             Pathologist Review Peripheral Smear   REVIEWED  Comment:   Electronically reviewed and signed by:  Janice Alston MD  Signed on 01/24/24 at 08:01  Pathologist interpretation of peripheral blood smear:   Leukocytosis shows absolute and relative neutrophilia with a mild   left shift, favor reactive.  Occasional 5 lobed neutrophils are seen,   suggestive of early hypersegmentation.  Normochromic normocytic anemia shows mild anisocytosis and mild   polychromasia.    Platelets are morphologically unremarkable on scanning.               pH, UA     6.0           Phosphorus Level 2.6               Platelet Estimate Appears normal   Appears normal             Platelet Count 321   295             Poikilocytosis Slight                Poly   Occasional             Potassium 4.8               PROTEIN TOTAL 6.4       6.0         Protein, UA     Trace  Comment: Recommend a 24 hour urine protein or a urine   protein/creatinine ratio if globulin induced proteinuria is  clinically suspected.             RBC 2.72   2.21             RDW 16.8   15.6             Sodium 135               Specific Gravity, UA     >1.030           Specimen UA     Urine, Clean Catch           Toxic Granulation Present               Vacuolated Granulocytes   Present             Vancomycin, Random         12.7       WBC 18.46   32.00                                  Significant Imaging:  Imaging results within the past 24 hours have been reviewed.

## 2024-01-24 NOTE — ASSESSMENT & PLAN NOTE
Lab Results   Component Value Date     HGB 7.4 (L) 01/23/2024     HCT 23.7 (L) 01/23/2024      Unclear cause. Possibly related to portal vein thrombosis. No signs of active bleeding. Heme occult blood test negative for blood in stool.     -F/u anemia labs  -Monitor serial CBC and transfuse if patient becomes hemodynamically unstable, symptomatic or H/H drops below 7/21  -Heme/Onc consulted, appreciate assistance  -Consult GI for possible scope  -Holding anticoagulation with actively falling Hgb

## 2024-01-24 NOTE — HOSPITAL COURSE
Admitted to MICU on 1/23 with shock requiring vasopressor support.  Patient initially admitted to hospital medicine.  He was given 3L IVF but remained hypotensive and started on peripheral vasopressors.  Hepatology consulted for extensive portal vein thrombus.  Large drop in Hgb, no bleeding identified.  Heme/onc and GI consulted.  Blood cx with Streptococcus constellatus and bacteroides fragilis (on PCR), continue antibiotics. Vasopressors weaned off 1/24.

## 2024-01-24 NOTE — PLAN OF CARE
[facility] - Cardiac Medical [facility]  Initial Discharge Assessment       Primary Care Provider: Tara Jolly MD    Admission Diagnosis: Leukocytosis [D72.829]  Chest pain [R07.9]  Anemia, unspecified type [D64.9]  Acute pancreatitis, unspecified complication status, unspecified pancreatitis type [K85.90]    Admission Date: 1/22/2024  Expected Discharge Date: 1/30/2024    Transition of Care Barriers: None    Payor: [employer] [facility] / Plan: [employer] / Product Type: Medicare Advantage /     Extended Emergency Contact Information  Primary Emergency Contact: Ramila Christy   [facility]  Mobile Phone: [phone]  Relation: Daughter    Discharge Plan A: Home Health  Discharge Plan B: Home with family      [facility] - [place], [place] - [address]  [address] [facility] [place] [address]  Phone: [phone] Fax: [phone]    [facility] - [place], [place] - [address]  [address]  [address] [facility] [place] [address]  Phone: [phone] Fax: [phone]    [facility] [address] [place] [address]  Phone: [phone] Fax: [phone]      Initial Assessment (most recent)       Adult Discharge Assessment - 01/24/24 1626          Discharge Assessment    Assessment Type Discharge Planning Assessment     Confirmed/corrected address, phone number and insurance Yes     Confirmed Demographics Correct on Facesheet     Source of Information patient     When was your last doctors appointment? 01/04/24     Does patient/caregiver understand observation status Yes     Communicated ANICETO with patient/caregiver Date not available/Unable to determine     Reason For Admission Portal vein thrombosis     People in Home child(howard), dependent     Do you expect to return to your current living situation? Yes     Do you have help at home or someone to help you manage your care at home? Yes     Who are  your caregiver(s) and their phone number(s)? Mikeniecy Christy, dtr/cp# [phone]   Mamadouemilie Jaelyn, dtr./cp# [phone]     Prior to hospitilization cognitive status: Alert/Oriented     Current cognitive status: Alert/Oriented     Walking or Climbing Stairs Difficulty no     Dressing/Bathing Difficulty no     Equipment Currently Used at Home cane, straight     Readmission within 30 days? Yes     Patient currently being followed by outpatient case management? No     Do you currently have service(s) that help you manage your care at home? No     Do you take prescription medications? Yes     Do you have prescription coverage? Yes     Coverage [facility] [employer] Secure SNP     Do you have any problems affording any of your prescribed medications? No     Is the patient taking medications as prescribed? yes     Who is going to help you get home at discharge? A family member     How do you get to doctors appointments? car, drives self;family or friend will provide     Are you on dialysis? No     Do you take coumadin? No     Discharge Plan A Home Health     Discharge Plan B Home with family     DME Needed Upon Discharge  other (see comments)   TBD    Discharge Plan discussed with: Adult children;Patient     Transition of Care Barriers None        Physical Activity    On average, how many days per week do you engage in moderate to strenuous exercise (like a brisk walk)? Patient declined     On average, how many minutes do you engage in exercise at this level? Patient declined        Financial Resource Strain    How hard is it for you to pay for the very basics like food, housing, medical care, and heating? Not very hard        Housing Stability    In the last 12 months, was there a time when you were not able to pay the mortgage or rent on time? No     In the last 12 months, how many places have you lived? 1     In the last 12 months, was there a time when you did not have a steady place to  sleep or slept in a shelter (including now)? No        Transportation Needs    In the past 12 months, has lack of transportation kept you from medical appointments or from getting medications? No     In the past 12 months, has lack of transportation kept you from meetings, work, or from getting things needed for daily living? No        Food Insecurity    Within the past 12 months, you worried that your food would run out before you got the money to buy more. Patient declined     Within the past 12 months, the food you bought just didn't last and you didn't have money to get more. Patient declined        Stress    Do you feel stress - tense, restless, nervous, or anxious, or unable to sleep at night because your mind is troubled all the time - these days? Only a little        Social Connections    In a typical week, how many times do you talk on the phone with family, friends, or neighbors? More than three times a week     How often do you get together with friends or relatives? More than three times a week     Do you belong to any clubs or organizations such as [employer] groups, unions, fraternal or athletic groups, or school groups? No     How often do you attend meetings of the clubs or organizations you belong to? Never     Are you , , , , never , or living with a partner?         Alcohol Use    Q1: How often do you have a drink containing alcohol? Monthly or less     Q2: How many drinks containing alcohol do you have on a typical day when you are drinking? 3 or 4     Q3: How often do you have six or more drinks on one occasion? Never        OTHER    Name(s) of People in Home Hermelinda Christy, 17 y/o dtr.                     Readmission Assessment (most recent)       Readmission Assessment - 01/24/24 1621          Readmission    Why were you hospitalized in the last 30 days? Direct Hyperbilirubinemia     Why were you readmitted? Alarmed about signs/symptoms     When you  "left the hospital how did you feel? okay     When you left the hospital where did you go? Home with Family     Did patient/caregiver refused recommended DC plan? No     Tell me about what happened between when you left the hospital and the day you returned. Did feel well " had a fever and difficulty breathing".     When did you start not feeling well? The day before admission     Did you try to manage your symptoms your self? Yes     Did you call anyone? Yes   My daughter to bring me to the ED    Who did you call? Other (comments)     Why? I felt bad     Did you try to see or did see a doctor or nurse before you came? No     Did you have  a follow-up appointment on discharge? Yes     Did you go? Yes     Was this a planned readmission? No                    Discharge Plan A and Plan B have been determined by review of patient's clinical status, future medical and therapeutic needs, and coverage/benefits for post-acute care in coordination with multidisciplinary team members.    Lev Landis, JAVON  [facility]  [phone]            "

## 2024-01-24 NOTE — ED NOTES
Informed by MRI Tech that pt needs to be NPO for 4 hours prior to scan. Informed pt of this stipulation for scan.

## 2024-01-24 NOTE — CONSULTS
[facility] - Cardiac Medical [facility]  Gastroenterology  Consult Note    Patient Name: Jesus Christy  MRN: [id number]  Admission Date: 1/22/2024  Hospital Length of Stay: 1 days  Code Status: Full Code   Attending Provider: Mack Larson*   Consulting Provider: Dyan Irvin MD  Primary Care Physician: Tara Jolly MD  Principal Problem:Portal vein thrombosis    Inpatient consult to Gastroenterology  Consult performed by: Dyan Irvin MD  Consult ordered by: Ena Strong DNP        Subjective:     HPI:  63 year old man with recently diagnosed portal vein thrombosis, prostate adenocarcinoma (low-grade under active surveillance), complex regional pain syndrome, anxiety on chronic benzodiazepines, and HTN admitted for anemia. Saw his PCP and found to have a drop in Hb from 12 to 7.7 over a 2 week period. Diagnosed with portal vein thrombus earlier this month and was started on apixaban. Has had fevers and chills at home. Denies abdominal pain, nausea, vomiting, or dark stools; no melena, hematochezia, or hematemesis. Has been taking goody's powder daily. Not on PPIs at home. No h/o gastric or duodenal ulcers. Febrile and hypotensive in ED with a leukocytosis > 29. CT abdomen/pelvis with lymphadenopathy of jessica hepatis lymph nodes with non-specific patchy hypoenhancement of the liver. Found to be bacteremic in 4/4 BCx bottles with GPC and GNR; started on broad antibiotics and received fluid resuscitation; now on low-dose vasopressors. Had a drop in Hb to < 7 on admission, received 1x PRBC. Seen by hematology who recommended hemolysis labs which were unremarkable. Last colonoscopy was in 2017 which showed 3x polyps in transverse colon, cecum, and descending colon which were adenomas on pathology; recommended 3 year surveillance however this wasn't completed. Denies prior EGD.      GI consulted for anemia of unknown origin/possible colonoscopy.     Past Medical History:   Diagnosis Date     Complex regional pain syndrome i of right lower limb     GERD (gastroesophageal reflux disease)     HTN (hypertension)      Past Surgical History:   Procedure Laterality Date    COLONOSCOPY N/A 9/29/2017    Procedure: COLONOSCOPY;  Surgeon: Jamar Edwards MD;  Location: [facility] ([address] [place] [address]);  Service: Endoscopy;  Laterality: N/A;    JOINT REPLACEMENT Right     knee    KNEE ARTHROSCOPY W/ ACL RECONSTRUCTION  2014    right     Review of patient's allergies indicates:   Allergen Reactions    Morphine Palpitations     Family History       Problem Relation (Age of Onset)    Cancer Brother    Diabetes Father    Heart disease Father    Hypertension Mother    No Known Problems Daughter, Daughter, Daughter, Daughter, Son, Son          Tobacco Use    Smoking status: Never    Smokeless tobacco: Never   Substance and Sexual Activity    Alcohol use: Yes     Alcohol/week: 0.8 standard drinks of alcohol     Types: 1 Standard drinks or equivalent per week     Comment: once every few months    Drug use: No    Sexual activity: Not on file     Review of Systems   Constitutional:  Positive for chills and fever.   HENT: Negative.     Respiratory:  Negative for cough, shortness of breath and wheezing.    Gastrointestinal:  Negative for abdominal pain, blood in stool, diarrhea, nausea and vomiting.   Genitourinary:  Negative for dysuria, frequency and hematuria.   Neurological:  Negative for dizziness, light-headedness and headaches.   Psychiatric/Behavioral:  Negative for agitation and confusion.      Objective:     Vital Signs (Most Recent):  Temp: 99.6 °F (37.6 °C) (01/24/24 0500)  Pulse: 93 (01/24/24 0600)  Resp: (!) 27 (01/24/24 0600)  BP: (!) 112/58 (01/24/24 0600)  SpO2: 97 % (01/24/24 0600) Vital Signs (24h Range):  Temp:  [98 °F (36.7 °C)-99.6 °F (37.6 °C)] 99.6 °F (37.6 °C)  Pulse:  [] 93  Resp:  [18-28] 27  SpO2:  [92 %-99 %] 97 %  BP: ()/(52-68) 112/58     Weight: 75.7 kg (166 lb 14.2 oz) (01/22/24 1658)  Body mass  index is 24.65 kg/m².      Intake/Output Summary (Last 24 hours) at 1/24/2024 0922  Last data filed at 1/24/2024 0600  Gross per 24 hour   Intake 1265.51 ml   Output 900 ml   Net 365.51 ml       Lines/Drains/Airways       Peripheral Intravenous Line  Duration                  Peripheral IV - Single Lumen 01/22/24 1758 20 G Anterior;Proximal;Right Forearm 1 day         Peripheral IV - Single Lumen 01/22/24 2011 20 G Anterior;Left Forearm 1 day         Peripheral IV - Single Lumen 01/23/24 0714 20 G Distal;Left;Posterior Forearm 1 day                     Physical Exam  Vitals and nursing note reviewed.   Constitutional:       General: He is not in acute distress.     Appearance: Normal appearance. He is not ill-appearing.   HENT:      Head: Normocephalic and atraumatic.      Mouth/Throat:      Mouth: Mucous membranes are moist.   Eyes:      Extraocular Movements: Extraocular movements intact.      Pupils: Pupils are equal, round, and reactive to light.   Pulmonary:      Effort: Pulmonary effort is normal. No respiratory distress.   Abdominal:      General: Bowel sounds are normal. There is no distension.      Palpations: Abdomen is soft.      Tenderness: There is no abdominal tenderness. There is no guarding.   Skin:     General: Skin is warm.      Capillary Refill: Capillary refill takes less than 2 seconds.   Neurological:      General: No focal deficit present.      Mental Status: He is alert and oriented to person, place, and time.   Psychiatric:         Mood and Affect: Mood normal.         Thought Content: Thought content normal.          Significant Labs:  CBC:   Recent Labs   Lab 01/23/24  0726 01/23/24  1253 01/24/24  0443   WBC 27.77* 32.00* 18.46*   HGB 6.4* 6.6* 8.1*   HCT 20.3* 21.0* 25.6*    295 321     CMP:   Recent Labs   Lab 01/24/24  0443   GLU 96   CALCIUM 9.2   ALBUMIN 1.7*   PROT 6.4   *   K 4.8   CO2 20*      BUN 17   CREATININE 1.3   ALKPHOS 187*   ALT 30   AST 36   BILITOT  2.7*     Coagulation:   Recent Labs   Lab 01/23/24  0726 01/23/24  1016   INR 1.2  --    APTT  --  29.9     All pertinent lab results from the last 24 hours have been reviewed.  Recent Lab Results  (Last 5 results in the past 24 hours)        01/24/24  0443   01/23/24  1253   01/23/24  1239   01/23/24  1237   01/23/24  1016        Albumin 1.7       1.6                175         ALT 30       32         Anion Gap 8               Appearance, UA     Clear           aPTT         29.9  Comment: Refer to local heparin nomogram for intensity/dose specific   therapeutic   range.         AST 36       33         Baso # 0.05   0.07             Basophil % 0.3   0.2             Bilirubin (UA)     Negative           Bilirubin Direct       2.0         BILIRUBIN TOTAL 2.7  Comment: For infants and newborns, interpretation of results should be based  on gestational age, weight and in agreement with clinical  observations.    Premature Infant recommended reference ranges:  Up to 24 hours.............<8.0 mg/dL  Up to 48 hours............<12.0 mg/dL  3-5 days..................<15.0 mg/dL  6-29 days.................<15.0 mg/dL         2.6  Comment: For infants and newborns, interpretation of results should be based  on gestational age, weight and in agreement with clinical  observations.    Premature Infant recommended reference ranges:  Up to 24 hours.............<8.0 mg/dL  Up to 48 hours............<12.0 mg/dL  3-5 days..................<15.0 mg/dL  6-29 days.................<15.0 mg/dL           BUN 17               Omayra/Echinocytes Occasional               Calcium 9.2               Chloride 107               CO2 20               Color, UA     Yellow           Creatinine 1.3               Differential Method Automated   Automated             eGFR >60.0               Eos # 0.0   0.0             Eosinophil % 0.2   0.1             Glucose 96               Glucose, UA     Negative           Gran # (ANC) 15.8   27.1              Gran % 85.7   88.3             Hematocrit 25.6   21.0             Hemoglobin 8.1   6.6             Immature Grans (Abs) 0.26  Comment: Mild elevation in immature granulocytes is non specific and   can be seen in a variety of conditions including stress response,   acute inflammation, trauma and pregnancy. Correlation with other   laboratory and clinical findings is essential.     0.42  Comment: Mild elevation in immature granulocytes is non specific and   can be seen in a variety of conditions including stress response,   acute inflammation, trauma and pregnancy. Correlation with other   laboratory and clinical findings is essential.               Immature Granulocytes 1.4   1.4             Ketones, UA     Negative           Leukocytes, UA     Negative           Lymph # 1.5   2.1             Lymph % 8.0   6.7             Magnesium  2.3               MCH 29.8   29.9             MCHC 31.6   31.4             MCV 94   95             Mono # 0.8   1.0             Mono % 4.4   3.3             MPV 9.8   10.9             NITRITE UA     Negative           nRBC 0   0             Occult Blood UA     Trace           Pathologist Review   Review completed             Pathologist Review Peripheral Smear   REVIEWED  Comment:   Electronically reviewed and signed by:  Janice Alston MD  Signed on 01/24/24 at 08:01  Pathologist interpretation of peripheral blood smear:   Leukocytosis shows absolute and relative neutrophilia with a mild   left shift, favor reactive.  Occasional 5 lobed neutrophils are seen,   suggestive of early hypersegmentation.  Normochromic normocytic anemia shows mild anisocytosis and mild   polychromasia.    Platelets are morphologically unremarkable on scanning.               pH, UA     6.0           Phosphorus Level 2.6               Platelet Estimate Appears normal   Appears normal             Platelet Count 321   295             Poikilocytosis Slight               Poly   Occasional             Potassium  4.8               PROTEIN TOTAL 6.4       6.0         Protein, UA     Trace  Comment: Recommend a 24 hour urine protein or a urine   protein/creatinine ratio if globulin induced proteinuria is  clinically suspected.             RBC 2.72   2.21             RDW 16.8   15.6             Sodium 135               Specific Gravity, UA     >1.030           Specimen UA     Urine, Clean Catch           Toxic Granulation Present               Vacuolated Granulocytes   Present             Vancomycin, Random         12.7       WBC 18.46   32.00                                  Significant Imaging:  Imaging results within the past 24 hours have been reviewed.  Assessment/Plan:     Oncology  Anemia  Admitted with anemia; noted to have a Hb drop from 12 to 7 over a period of two weeks  Denies obvious signs of blood loss - no melena, hematemesis, or BRBPR  Recently started on apixaban for portal vein thrombus; has also been taking goody's powder daily  No h/o gastric or duodenal ulcers; last colonoscopy in 2017 which showed 3x polyps, was meant to have a repeat in 3 years (so in 2020) which wasn't done  Was requiring low-dose peripheral vasopressors (recently titrated off); received 1x PRBC with appropriate increase in Hb    Recommend:  - can continue IV pantoprazole 40mg IV BID  - trend CBC and transfuse for Hb < 7  - will hold on emergent endoscopic evaluation in the setting of bacteremia and vasopressor requirement; will plan for outpatient double endoscopy (EGD and colonoscopy)  - please correct any coagulopathy with platelets and FFP for goal of platelets > 50K and INR < 2.0  - please notify GI team if there is significant change in patient's clinical status      Thank you for your consult. I will sign off. Please contact us if you have any additional questions.    Dyan Irvin MD  Gastroenterology  [facility] - Cardiac Medical ICU

## 2024-01-24 NOTE — TREATMENT PLAN
"[facility]  Hepatology  Treatment Plan    Patient Name: Jesus Christy  MRN: [id number]  Admission Date: 1/22/2024  Hospital Length of Stay: 1 days  Code Status: Full Code   Attending Provider: Mack Larson*   Consulting Provider: Ofe Haynes DO  Primary Care Physician: Tara Jolly MD  Principal Problem:Portal vein thrombosis    Subjective:     HPI: Jesus Christy is a 63 y.o. male with history of portal vein thrombosis recently diagnosed this month on apixaban, prostate adenocarcinoma (low-grade under active surveillance) who is refer to the ED for decreased Hemoglobin at 7.7 from 12.3 two weeks ago. He also reports weakness fatigue, fever, chills, diarrhea, nausea without vomiting and dark colored urine for the past 2 weeks. He denies having dark stool or blood in the stool.      Upon arrival to the ED he was hypotensive and febrile. CTA A/P showed with evidence of worsening thrombosis of the portal venous system now including the left portal vein and right portal vein anterior branch, prominence of leisa hepatic lymph nodes, nonspecific patchy hypoenhancement of the liver on arterial phase, mild diverticulitis and Stable pancreatic tail subcentimeter hypodensity. He was given 3L of IVF with no improvement. Pt was started on Levophed and admitted to the MICU.     He had a Fibroscan this month with evidence of Fibrosis stage of F 0 -1 and Steatosis Grade of S1. Last colonoscopy in 2017 with recommendation of repeating in 3 years which he did not follow up for. PSA from 1/18/24 at 12.4 increased from  6.6 on 10/20/23. AFP is elevated at 12 although he has no history of known liver disease, no history of hepatic cancers.      Hepatology consulted for "enlarging portal vein thrombosis on anticoagulation."    Interval Hx: NAEON. MRI liver done overnight. Heme/onc and GI consulted.     Past Medical History:   Diagnosis Date    Complex regional pain syndrome i of right lower " limb     GERD (gastroesophageal reflux disease)     HTN (hypertension)        Past Surgical History:   Procedure Laterality Date    COLONOSCOPY N/A 9/29/2017    Procedure: COLONOSCOPY;  Surgeon: Jamar Edwards MD;  Location: [address]);  Service: Endoscopy;  Laterality: N/A;    JOINT REPLACEMENT Right     knee    KNEE ARTHROSCOPY W/ ACL RECONSTRUCTION  2014    right       Family History   Problem Relation Age of Onset    Hypertension Mother     Heart disease Father     Diabetes Father     Cancer Brother         unknown type    No Known Problems Daughter     No Known Problems Daughter     No Known Problems Daughter     No Known Problems Daughter     No Known Problems Son     No Known Problems Son        Social History     Socioeconomic History    Marital status: Single   Tobacco Use    Smoking status: Never    Smokeless tobacco: Never   Substance and Sexual Activity    Alcohol use: Yes     Alcohol/week: 0.8 standard drinks of alcohol     Types: 1 Standard drinks or equivalent per week     Comment: once every few months    Drug use: No   Social History Narrative    Prior maintenance work.    Disable due to leg pain and depression.        Lives alone.    No exercise, due to leg pain.     Social Determinants of Health     Financial Resource Strain: Medium Risk (1/3/2024)    Overall Financial Resource Strain (CARDIA)     Difficulty of Paying Living Expenses: Somewhat hard   Food Insecurity: Food Insecurity Present (1/3/2024)    Hunger Vital Sign     Worried About Running Out of Food in the Last Year: Sometimes true     Ran Out of Food in the Last Year: Never true   Transportation Needs: No Transportation Needs (1/3/2024)    PRAPARE - Transportation     Lack of Transportation (Medical): No     Lack of Transportation (Non-Medical): No   Physical Activity: Unknown (1/3/2024)    Exercise Vital Sign     Days of Exercise per Week: 2 days   Stress: No Stress Concern Present (1/3/2024)    [facility]  Occupational Health - Occupational Stress Questionnaire     Feeling of Stress : Not at all   Social Connections: Moderately Isolated (1/3/2024)    Social Connection and Isolation Panel [NHANES]     Frequency of Communication with Friends and Family: Three times a week     Frequency of Social Gatherings with Friends and Family: Patient declined     Attends Zoroastrian Services: More than 4 times per year     Active Member of Clubs or Organizations: No     Attends Club or Organization Meetings: Never     Marital Status:    Housing Stability: Low Risk  (1/3/2024)    Housing Stability Vital Sign     Unable to Pay for Housing in the Last Year: No     Number of Places Lived in the Last Year: 1     Unstable Housing in the Last Year: No       No current facility-administered medications on file prior to encounter.     Current Outpatient Medications on File Prior to Encounter   Medication Sig Dispense Refill    apixaban (ELIQUIS) 5 mg Tab Take 2 tablets (10 mg total) by mouth 2 (two) times daily. for 7 days. THEN Take 1 tablet (5 mg total) by mouth 2 (two) times daily. 60 tablet 3    diazePAM (VALIUM) 10 MG Tab Take 10 mg by mouth 3 (three) times daily.      tadalafiL (CIALIS) 20 MG Tab Take 1 tablet (20 mg total) by mouth daily as needed (erectile dysfunction). 30 tablet 5    ARIPiprazole (ABILIFY) 5 MG Tab Take 5 mg by mouth once daily.      DULoxetine (CYMBALTA) 30 MG capsule Take 30 mg by mouth once daily.      mirtazapine (REMERON) 30 MG tablet Take 45 mg by mouth every evening. 30 tablet 11    traMADoL (ULTRAM) 50 mg tablet Take 1 tablet (50 mg total) by mouth every 4 (four) hours as needed for Pain. 30 tablet 0       Review of patient's allergies indicates:   Allergen Reactions    Morphine Palpitations     Review of Systems   Constitutional:  Positive for chills, fever and malaise/fatigue.   Gastrointestinal:  Positive for nausea. Negative for abdominal pain, blood in stool, diarrhea, melena and vomiting.   All  other systems reviewed and are negative.    Objective:     Vitals:    01/24/24 0600   BP: (!) 112/58   Pulse: 93   Resp: (!) 27   Temp:         Physical Exam  Vitals and nursing note reviewed.   Constitutional:       Appearance: Normal appearance.   HENT:      Right Ear: External ear normal.      Left Ear: External ear normal.      Mouth/Throat:      Mouth: Mucous membranes are moist.      Pharynx: Oropharynx is clear.   Eyes:      General: Scleral icterus present.      Pupils: Pupils are equal, round, and reactive to light.   Cardiovascular:      Rate and Rhythm: Normal rate and regular rhythm.      Pulses: Normal pulses.      Heart sounds: Normal heart sounds.   Pulmonary:      Effort: Pulmonary effort is normal.      Breath sounds: Normal breath sounds.   Abdominal:      General: Abdomen is flat. Bowel sounds are normal. There is no distension.      Tenderness: There is no abdominal tenderness. There is no rebound.      Hernia: No hernia is present.   Musculoskeletal:         General: Normal range of motion.      Cervical back: Normal range of motion.   Skin:     General: Skin is warm and dry.      Coloration: Skin is jaundiced.   Neurological:      General: No focal deficit present.      Mental Status: He is alert and oriented to person, place, and time.   Psychiatric:         Mood and Affect: Mood normal.         Behavior: Behavior normal.     Significant Labs:  Recent Labs   Lab 01/23/24  0726 01/23/24  1253 01/24/24  0443   HGB 6.4* 6.6* 8.1*       Lab Results   Component Value Date    WBC 18.46 (H) 01/24/2024    HGB 8.1 (L) 01/24/2024    HCT 25.6 (L) 01/24/2024    MCV 94 01/24/2024     01/24/2024       Lab Results   Component Value Date     (L) 01/24/2024    K 4.8 01/24/2024     01/24/2024    CO2 20 (L) 01/24/2024    BUN 17 01/24/2024    CREATININE 1.3 01/24/2024    CALCIUM 9.2 01/24/2024    ANIONGAP 8 01/24/2024    ESTGFRAFRICA >60.0 06/03/2020    EGFRNONAA >60.0 06/03/2020       Lab  Results   Component Value Date    ALT 30 01/24/2024    AST 36 01/24/2024    ALKPHOS 187 (H) 01/24/2024    BILITOT 2.7 (H) 01/24/2024       Lab Results   Component Value Date    INR 1.2 01/23/2024    INR 1.2 01/22/2024    INR 1.0 12/24/2017       Significant Imaging:  Reviewed pertinent radiology findings.       Assessment/Plan:     Jesus Christy is a 63 y.o. male with history of portal vein thrombosis recently diagnosed this month on apixaban, prostate adenocarcinoma (low-grade under active surveillance) who is admitted for acute anemia and shock. Found to have worsening of the his portal vein thrombosis on CTA A/P. No sign of an acute bleed. Concern that patient may have recurrence of prostate CA as PSA now elevated from 1/18/24 at 12.4 increased from  6.6 on 10/20/23. AFP elevated at 12 with  elevated at 60. Outpatient workup for hypercoagulation significant for elevated Cardiolipin IgM at 47. Colonoscopy from 2017 significant for multiple polyps and patient was advised to follow up in 3 years which he had not done. Hospital course complicated by bacteremia positive for Strep and Bacteroides. GI consulted who recommend outpatient EGD and colonoscopy in light of the bacteremia. Hematology consulted who believe that acute hemoglobin drop is secondary to blood loss and recommend GI evaluation. MRI liver showed Extensive portal venous system thrombus similar to prior, concern for small middle hepatic vein thrombus and pancreatic tail hyperintensity suggestive of cyst, pseudocyst, or side branch IPMN.     Problem List:  Portal Vein Thrombosis  Shock  Diverticulitis  Hx of adenocarcinoma of Prostate     Recommendations:     - Resume anticoagulation when Hgb is stable   - Recommend EGD/colonoscopy once patient is stable  - Appreciate hematology recs  - Treat bacteremia with abx per primary team  - Recommend outpatient urology follow-up for evaluation of renal cysts     Thank you for involving us in the care of  Jesus Christy. Please call with any additional questions, concerns or changes in the patient's clinical status. We will continue to follow.     fOe Haynes DO  Internal Medicine PGY-1  [facility]

## 2024-01-24 NOTE — PLAN OF CARE
MICU DAILY GOALS     Family/Goals of care/Code Status   Code Status: Full Code    24H Vital Sign Range  Temp:  [98 °F (36.7 °C)-99.6 °F (37.6 °C)]   Pulse:  []   Resp:  [18-36]   BP: ()/(52-69)   SpO2:  [91 %-100 %]      Shift Events (include procedures and significant events)   No acute events throughout shift    AWAKE RASS: Goal -    Actual -      Restraint necessity: Not necessary   BREATHE SBT: Not intubated    Coordinate A & B, analgesics/sedatives Pain: managed   SAT: Not intubated   Delirium CAM-ICU:     Early(intubated/ Progressive (non-intubated) Mobility MOVE Screen (INTUBATED ONLY): Not intubated    Activity: Activity Management: Rolling - L1   Feeding/Nutrition Diet order: Diet/Nutrition Received: regular,     Thrombus DVT prophylaxis: VTE Required Core Measure: (SCDs) Sequential compression device initiated/maintained   HOB Elevation Head of Bed (HOB) Positioning: HOB at 30-45 degrees   Ulcer Prophylaxis GI: yes   Glucose control managed     Skin Skin assessed during: Q Shift Change    Sacrum intact/not altered? Yes  Heels intact/not altered? Yes  Surgical wound? No    Check one (no altered skin or altered skin) and sub boxes:  [] No Altered Skin Integrity Present    []Prevention Measures Documented    [] Altered Skin Integrity Present or Discovered   [] LDA present in EPIC              [] LDA added in EPIC   [] Wound Image Taken (required on admit,                   transfer/discharge and every Tuesday)    Wound Care Consulted? No    Attending Nurse:     Second RN/Staff Member:    Bowel Function no issues    Indwelling Catheter Necessity         N/a   De-escalation Antibiotics Yes       VS and assessment per flow sheet, patient progressing towards goals as tolerated, plan of care reviewed with [unfilled] and family, all concerns addressed, will continue to monitor.

## 2024-01-24 NOTE — ASSESSMENT & PLAN NOTE
This patient does have evidence of infective focus  My overall impression is septic shock.  Source: Abdominal  Antibiotics given-   Antibiotics (72h ago, onward)      Start     Stop Route Frequency Ordered    01/23/24 1200  vancomycin 1,250 mg in dextrose 5 % (D5W) 250 mL IVPB (Vial-Mate)         -- IV Every 24 hours (non-standard times) 01/23/24 1052    01/23/24 0618  vancomycin - pharmacy to dose  (vancomycin IVPB (PEDS and ADULTS))        See Hyperspace for full Linked Orders Report.    -- IV pharmacy to manage frequency 01/23/24 0518    01/23/24 0347  piperacillin-tazobactam (ZOSYN) 4.5 g in dextrose 5 % in water (D5W) 100 mL IVPB (MB+)         -- IV Every 8 hours (non-standard times) 01/23/24 0348          Latest lactate reviewed-  Recent Labs   Lab 01/23/24  0726   LACTATE 1.2       Organ dysfunction indicated by Acute kidney injury and Acute liver injury    Fluid challenge Ideal Body Weight- The patient's ideal body weight is Ideal body weight: 70.7 kg (155 lb 13.8 oz) which will be used to calculate fluid bolus of 30 ml/kg for treatment of septic shock.      Post- resuscitation assessment Yes Perfusion exam was performed within 6 hours of septic shock presentation after bolus shows Inadequate tissue perfusion assessed by non-invasive monitoring       Will Start Pressors- Levophed for MAP of 65  Source control achieved by: ABX    --Blood cultures with GPC and GNR, follow cx data  --Continue vanc/zosyn  --Titrate pressors for MAP goal >65

## 2024-01-24 NOTE — ASSESSMENT & PLAN NOTE
Admitted with anemia; noted to have a Hb drop from 12 to 7 over a period of two weeks  Denies obvious signs of blood loss - no melena, hematemesis, or BRBPR  Recently started on apixaban for portal vein thrombus; has also been taking goody's powder daily  No h/o gastric or duodenal ulcers; last colonoscopy in 2017 which showed 3x polyps, was meant to have a repeat in 3 years (so in 2020) which wasn't done  Was requiring low-dose peripheral vasopressors (recently titrated off); received 1x PRBC with appropriate increase in Hb    Recommend:  - can continue IV pantoprazole 40mg IV BID  - trend CBC and transfuse for Hb < 7  - will hold on emergent endoscopic evaluation in the setting of bacteremia and vasopressor requirement; will plan for outpatient double endoscopy (EGD and colonoscopy)  - please correct any coagulopathy with platelets and FFP for goal of platelets > 50K and INR < 2.0  - please notify GI team if there is significant change in patient's clinical status

## 2024-01-24 NOTE — MEDICAL/APP STUDENT
Mrs Christy is a 63 years old man with history of portal vein thrombosis recently diagnosed this month and treated with apixaban who is refer to the ED for decreased Hemoglobin at 7.7 from 12.3 two weeks ago. CT abdomen with contrast showing evidence of worsening thrombosis of the portal venous system now including the left portal vein and right portal vein anterior branch.     No acute events overnight, MRI w/wo contrast without evidence of HCC, with evidence of subcentimeter pancreatic tail T2 hyperintensity which may be a cyst, pseudocyst, or side branch IPMN. PSA went to 12.5 from 6.6 in a period of three months, AFP and  elevated although AFP below 20 ng/ml. APA Isotype IgM elevated, not prior history of coagulopathy in family.     Hematology consult was placed yesterday, they consider his cute drop in hemoglobin is probably for blood loss although heme occult blood test was negative and there is no evidence of acute bleeding at the time. Also, low-level positive cardiolipin IgM antibody does not meet criteria for diagnosis of antiphospholipid antibody sindrome     He was also seen by GI given the past history of polyps in the 2017 colonoscopy. GI considers it is not appropriate to do an EGD and colonoscopy at the moment given the bacteremia.     Preliminary report states Streptococcus species and Bacteroides fragilis detected, we will follow up for the finals blood cultures.        Current Facility-Administered Medications   Medication    0.9%  NaCl infusion (for blood administration)    acetaminophen tablet 650 mg    dextrose 10% bolus 125 mL 125 mL    dextrose 10% bolus 250 mL 250 mL    diazePAM tablet 10 mg    glucagon (human recombinant) injection 1 mg    glucose chewable tablet 16 g    glucose chewable tablet 24 g    melatonin tablet 6 mg    naloxone 0.4 mg/mL injection 0.02 mg    NORepinephrine bitartrate-D5W 4 mg/250 mL (16 mcg/mL) PERIPHERAL access infusion    ondansetron disintegrating tablet 8  "mg    pantoprazole injection 40 mg    piperacillin-tazobactam (ZOSYN) 4.5 g in dextrose 5 % in water (D5W) 100 mL IVPB (MB+)    sodium chloride 0.9% flush 10 mL    vancomycin - pharmacy to dose    vancomycin 1,250 mg in dextrose 5 % (D5W) 250 mL IVPB (Vial-Mate)       Objective:     Vital Signs (Most Recent):  Temp: 99.1 °F (37.3 °C) (01/24/24 1153)  Pulse: 94 (01/24/24 1153)  Resp: (!) 25 (01/24/24 1153)  BP: (!) 97/56 (01/24/24 1153)  SpO2: 100 % (01/24/24 1153) Vital Signs (24h Range):  Temp:  [98 °F (36.7 °C)-99.6 °F (37.6 °C)] 99.1 °F (37.3 °C)  Pulse:  [] 94  Resp:  [18-28] 25  SpO2:  [92 %-100 %] 100 %  BP: ()/(52-68) 97/56     Weight: 75.7 kg (166 lb 14.2 oz) (01/22/24 1658)  Body mass index is 24.65 kg/m².       Physical Exam     RUQ tenderness, no distention of the abdomen  Normal cardiac and respiratory sounds  No edema     Significant Labs:  CBC:   Recent Labs   Lab 01/24/24 0443   WBC 18.46*   RBC 2.72*   HGB 8.1*   HCT 25.6*        BMP:   Recent Labs   Lab 01/24/24 0443   GLU 96   *   K 4.8      CO2 20*   BUN 17   CREATININE 1.3   CALCIUM 9.2     CMP:   Recent Labs   Lab 01/24/24  0443   GLU 96   CALCIUM 9.2   ALBUMIN 1.7*   PROT 6.4   *   K 4.8   CO2 20*      BUN 17   CREATININE 1.3   ALKPHOS 187*   ALT 30   AST 36   BILITOT 2.7*     Coagulation:   Recent Labs   Lab 01/23/24  0726 01/23/24  1016   INR 1.2  --    APTT  --  29.9     Cardiac Markers: No results for input(s): "CKMB", "TROPONINT", "MYOGLOBIN" in the last 168 hours.    Assessment Plan   - Follow up blood cultures   - Resume anticoagulation when Hgb is stable   - Recommend EGD/colonoscopy after bacteremia is resolved   - Treat bacteremia with broad spectrum antibiotics   - Recommend outpatient urology follow-up for surveillance of prostate adenocarcinoma and elevation of PSA     "

## 2024-01-24 NOTE — ASSESSMENT & PLAN NOTE
Patient with history of prostate adenocarcinoma and portal vein thrombosis confirmed on imaging.  Patient has had recurrent fevers and tachycardia since his diagnosis. WBC >29. Bilirubin 4.1. Thrombosis worsening on imaging despite being on DOAC.     No known cause of thrombosis. Differential includes infection versus coagulopathy versus malignancy. Possible component of phlebitis and diverticulitis seen on imaging     Plan:  -hepatology consulted, recs appreciated  -Heparin drip paused with continued anemia  -Broad spectrum antibiotics with vanc and zosyn   -F/u CRP, ESR, EBV labs, KATHRYN, and RF  -F/u blood cultures  -Heme/Onc consulted  - MRI liver w/wo contrast

## 2024-01-24 NOTE — HPI
63 year old man with recently diagnosed portal vein thrombosis, prostate adenocarcinoma (low-grade under active surveillance), complex regional pain syndrome, anxiety on chronic benzodiazepines, and HTN admitted for anemia. Saw his PCP and found to have a drop in Hb from 12 to 7.7 over a 2 week period. Diagnosed with portal vein thrombus earlier this month and was started on apixaban. Has had fevers and chills at home. Denies abdominal pain, nausea, vomiting, or dark stools; no melena, hematochezia, or hematemesis. Has been taking goody's powder daily. Not on PPIs at home. No h/o gastric or duodenal ulcers. Febrile and hypotensive in ED with a leukocytosis > 29. CT abdomen/pelvis with lymphadenopathy of jessica hepatis lymph nodes with non-specific patchy hypoenhancement of the liver. Found to be bacteremic in 4/4 BCx bottles with GPC and GNR; started on broad antibiotics and received fluid resuscitation; now on low-dose vasopressors. Had a drop in Hb to < 7 on admission, received 1x PRBC. Seen by hematology who recommended hemolysis labs which were unremarkable. Last colonoscopy was in 2017 which showed 3x polyps in transverse colon, cecum, and descending colon which were adenomas on pathology; recommended 3 year surveillance however this wasn't completed. Denies prior EGD.      GI consulted for anemia of unknown origin/possible colonoscopy.

## 2024-01-24 NOTE — ED NOTES
Received bedside report from KATHE Bonilla. Assumed care of pt at this time  Pt AAOx4, resting comfortably in bed, NAD, respirations E/UL, updated on POC, wheels locked and in low position, call bell with in reach, Comfort positioning and restroom needs were addressed. Necessary items were placed with in reach and was advised when a reassessment would take place.

## 2024-01-25 LAB
ALBUMIN SERPL BCP-MCNC: 1.6 G/DL (ref 3.5–5.2)
ALP SERPL-CCNC: 161 U/L (ref 55–135)
ALT SERPL W/O P-5'-P-CCNC: 27 U/L (ref 10–44)
ANION GAP SERPL CALC-SCNC: 3 MMOL/L (ref 8–16)
AST SERPL-CCNC: 24 U/L (ref 10–40)
BASOPHILS # BLD AUTO: 0.03 K/UL (ref 0–0.2)
BASOPHILS NFR BLD: 0.2 % (ref 0–1.9)
BILIRUB SERPL-MCNC: 2 MG/DL (ref 0.1–1)
BUN SERPL-MCNC: 12 MG/DL (ref 8–23)
CALCIUM SERPL-MCNC: 9.2 MG/DL (ref 8.7–10.5)
CHLORIDE SERPL-SCNC: 107 MMOL/L (ref 95–110)
CO2 SERPL-SCNC: 24 MMOL/L (ref 23–29)
CREAT SERPL-MCNC: 1.2 MG/DL (ref 0.5–1.4)
DIFFERENTIAL METHOD BLD: ABNORMAL
EBV EA IGG SER-ACNC: <5 U/ML
EBV NA IGG SER-ACNC: 342 U/ML
EBV VCA IGG SER-ACNC: 249 U/ML
EBV VCA IGM SER-ACNC: 15.8 U/ML
EOSINOPHIL # BLD AUTO: 0.1 K/UL (ref 0–0.5)
EOSINOPHIL NFR BLD: 0.6 % (ref 0–8)
ERYTHROCYTE [DISTWIDTH] IN BLOOD BY AUTOMATED COUNT: 16.2 % (ref 11.5–14.5)
EST. GFR  (NO RACE VARIABLE): >60 ML/MIN/1.73 M^2
GLUCOSE SERPL-MCNC: 85 MG/DL (ref 70–110)
HCT VFR BLD AUTO: 23.3 % (ref 40–54)
HGB BLD-MCNC: 7.3 G/DL (ref 14–18)
IMM GRANULOCYTES # BLD AUTO: 0.23 K/UL (ref 0–0.04)
IMM GRANULOCYTES NFR BLD AUTO: 1.4 % (ref 0–0.5)
LYMPHOCYTES # BLD AUTO: 1.6 K/UL (ref 1–4.8)
LYMPHOCYTES NFR BLD: 9.5 % (ref 18–48)
MAGNESIUM SERPL-MCNC: 2.2 MG/DL (ref 1.6–2.6)
MCH RBC QN AUTO: 29.6 PG (ref 27–31)
MCHC RBC AUTO-ENTMCNC: 31.3 G/DL (ref 32–36)
MCV RBC AUTO: 94 FL (ref 82–98)
MONOCYTES # BLD AUTO: 0.9 K/UL (ref 0.3–1)
MONOCYTES NFR BLD: 5.3 % (ref 4–15)
NEUTROPHILS # BLD AUTO: 13.9 K/UL (ref 1.8–7.7)
NEUTROPHILS NFR BLD: 83 % (ref 38–73)
NRBC BLD-RTO: 0 /100 WBC
PHOSPHATE SERPL-MCNC: 2.8 MG/DL (ref 2.7–4.5)
PLATELET # BLD AUTO: 342 K/UL (ref 150–450)
PMV BLD AUTO: 10.1 FL (ref 9.2–12.9)
POTASSIUM SERPL-SCNC: 4.3 MMOL/L (ref 3.5–5.1)
PROT SERPL-MCNC: 5.9 G/DL (ref 6–8.4)
RBC # BLD AUTO: 2.47 M/UL (ref 4.6–6.2)
SODIUM SERPL-SCNC: 134 MMOL/L (ref 136–145)
VANCOMYCIN TROUGH SERPL-MCNC: 6.4 UG/ML (ref 10–22)
WBC # BLD AUTO: 16.71 K/UL (ref 3.9–12.7)

## 2024-01-25 PROCEDURE — 25000003 PHARM REV CODE 250

## 2024-01-25 PROCEDURE — 80202 ASSAY OF VANCOMYCIN: CPT | Performed by: INTERNAL MEDICINE

## 2024-01-25 PROCEDURE — C9113 INJ PANTOPRAZOLE SODIUM, VIA: HCPCS

## 2024-01-25 PROCEDURE — 63600175 PHARM REV CODE 636 W HCPCS

## 2024-01-25 PROCEDURE — 11000001 HC ACUTE MED/SURG PRIVATE ROOM

## 2024-01-25 PROCEDURE — 99900035 HC TECH TIME PER 15 MIN (STAT)

## 2024-01-25 PROCEDURE — 85025 COMPLETE CBC W/AUTO DIFF WBC: CPT

## 2024-01-25 PROCEDURE — 21400001 HC TELEMETRY ROOM

## 2024-01-25 PROCEDURE — 99233 SBSQ HOSP IP/OBS HIGH 50: CPT | Mod: ,,,

## 2024-01-25 PROCEDURE — 27000221 HC OXYGEN, UP TO 24 HOURS

## 2024-01-25 PROCEDURE — 84100 ASSAY OF PHOSPHORUS: CPT

## 2024-01-25 PROCEDURE — 83735 ASSAY OF MAGNESIUM: CPT

## 2024-01-25 PROCEDURE — 80053 COMPREHEN METABOLIC PANEL: CPT

## 2024-01-25 RX ORDER — SODIUM,POTASSIUM PHOSPHATES 280-250MG
2 POWDER IN PACKET (EA) ORAL ONCE
Status: COMPLETED | OUTPATIENT
Start: 2024-01-25 | End: 2024-01-25

## 2024-01-25 RX ADMIN — PIPERACILLIN SODIUM AND TAZOBACTAM SODIUM 4.5 G: 4; .5 INJECTION, POWDER, FOR SOLUTION INTRAVENOUS at 05:01

## 2024-01-25 RX ADMIN — DIAZEPAM 10 MG: 5 TABLET ORAL at 08:01

## 2024-01-25 RX ADMIN — Medication 6 MG: at 08:01

## 2024-01-25 RX ADMIN — AMPICILLIN SODIUM, SULBACTAM SODIUM 3 G: 2; 1 INJECTION, POWDER, FOR SOLUTION INTRAMUSCULAR; INTRAVENOUS at 08:01

## 2024-01-25 RX ADMIN — AMPICILLIN SODIUM, SULBACTAM SODIUM 3 G: 2; 1 INJECTION, POWDER, FOR SOLUTION INTRAMUSCULAR; INTRAVENOUS at 02:01

## 2024-01-25 RX ADMIN — PANTOPRAZOLE SODIUM 40 MG: 40 INJECTION, POWDER, FOR SOLUTION INTRAVENOUS at 09:01

## 2024-01-25 RX ADMIN — POTASSIUM & SODIUM PHOSPHATES POWDER PACK 280-160-250 MG 2 PACKET: 280-160-250 PACK at 05:01

## 2024-01-25 RX ADMIN — PANTOPRAZOLE SODIUM 40 MG: 40 INJECTION, POWDER, FOR SOLUTION INTRAVENOUS at 08:01

## 2024-01-25 NOTE — CONSULTS
Therapy with vancomycin discontinued by provider. Pharmacy will sign off, please re-consult as needed.    Kiana Barbour, PharmD, BCCCP  [id number]

## 2024-01-25 NOTE — ASSESSMENT & PLAN NOTE
This patient does have evidence of infective focus  My overall impression is septic shock.  Source: Abdominal  Antibiotics given-   Antibiotics (72h ago, onward)      Start     Stop Route Frequency Ordered    01/25/24 1400  ampicillin-sulbactam (UNASYN) 3 g in sodium chloride 0.9 % 100 mL IVPB (MB+)         -- IV Every 6 hours (non-standard times) 01/25/24 1121          Latest lactate reviewed-  Recent Labs   Lab 01/23/24  0726   LACTATE 1.2       Organ dysfunction indicated by Acute kidney injury and Acute liver injury    Fluid challenge Ideal Body Weight- The patient's ideal body weight is Ideal body weight: 70.7 kg (155 lb 13.8 oz) which will be used to calculate fluid bolus of 30 ml/kg for treatment of septic shock.      Post- resuscitation assessment Yes Perfusion exam was performed within 6 hours of septic shock presentation after bolus shows Inadequate tissue perfusion assessed by non-invasive monitoring       Will Start Pressors- Levophed for MAP of 65  Source control achieved by: ABX    - Off vasopressors since 1/24  - BCx with streptococcus constellatus and bacteroides fragilis, repeat BCx pending  - Continue Unasyn  - Echo ordered

## 2024-01-25 NOTE — ASSESSMENT & PLAN NOTE
Lab Results   Component Value Date     HGB 7.4 (L) 01/23/2024     HCT 23.7 (L) 01/23/2024      Unclear cause. Possibly related to portal vein thrombosis. No signs of active bleeding. Heme occult blood test negative for blood in stool.     -Hemolysis labs negative  -Monitor serial CBC and transfuse if patient becomes hemodynamically unstable, symptomatic or H/H drops below 7/21  -Heme/Onc consulted, appreciate assistance  -Consult GI for possible scope  -Holding anticoagulation with actively falling Hgb

## 2024-01-25 NOTE — ASSESSMENT & PLAN NOTE
Evidence of mild diverticulitis seen on CT in patient with diarrhea meeting sepsis criteria and WBC >29.     - Diarrhea resolved

## 2024-01-25 NOTE — RESIDENT HANDOFF
ICU Transfer of Care Note  Critical Care Medicine    Admit Date: 1/22/2024  LOS: 2    CC: Portal vein thrombosis    Code Status: Full Code     Transfer to Hospital Medicine.    HPI and Hospital Course:     HPI:  Mr. Jesus Christy is a 63 year-old man with a PMHx of recently diagnosed portal vein thrombosis, prostate adenocarcinoma (low-grade under active surveillance), complex regional pain syndrome, anxiety on chronic benzodiazepines, and HTN who [place] [facility] [place] for falling Hgb (7.7 from 12.3 two weeks ago).  He reports increasing weakness and fatigue since being diagnosed with his portal vein thrombosis earlier this month.  He has associated symptoms of fever, chills, and diarrhea for the past 1-2 weeks.  He states he also has noticed darker colored urine and lower blood pressure over this time.  He denies any shortness a breath, chest pain, vision changes, dizziness, nausea/vomiting, or dysuria. He has not noticed any blood in the stool or dark colored stools.      While in the ED:  Patient with fever up to 102°.  Blood pressure down to 87/51. Initial WBC >29. Hgb 7.7. Creatinine 1.7. Bilirubin 4.1. CT A/P with interval increased prominence of leisa hepatis lymph nodes may be reactive. Nonspecific patchy hypoenhancement of the liver on arterial phase, possibly related to vascular shunting and portal vein thrombus. Mild stranding about sigmoid diverticula similar to prior. Clinical correlation suggestive for mild diverticulitis. Patient was started on Zosyn and given fluids. He was initially admitted to Hospital Medicine however he remained hypotensive despite 3L LR (MAPs 55-65).    Critical Care Medicine consulted for persistent hypotension not responsive to fluid resuscitation.     Hospital/ICU Course:  Admitted to MICU on 1/23 with shock requiring vasopressor support.  Patient initially admitted to hospital medicine.  He was given 3L IVF but remained hypotensive and started on peripheral  vasopressors.  Hepatology consulted for extensive portal vein thrombus.  Large drop in Hgb, no bleeding identified.  Heme/onc and GI consulted.  Blood cx with Streptococcus constellatus and bacteroides fragilis (on PCR), continue antibiotics. Vasopressors weaned off 1/24.       To Follow Up:     - Complete Abx for bacteremia  - Echo ordered      Discharge Plan:     - Discharge home  - Will need follow up with GI OP for scope    Call with questions.

## 2024-01-25 NOTE — SUBJECTIVE & OBJECTIVE
Interval History/Significant Events: Patient remains hemodynamically stable off vasopressors. Antibiotics deescalated.     Review of Systems   Constitutional:  Positive for fatigue and unexpected weight change. Negative for chills.   Respiratory:  Negative for cough and shortness of breath.    Cardiovascular:  Negative for chest pain.   Gastrointestinal:  Negative for abdominal distention, abdominal pain, blood in stool, constipation, diarrhea, nausea and vomiting.   Genitourinary:  Negative for difficulty urinating.   Musculoskeletal:  Negative for arthralgias and myalgias.   Skin:  Negative for rash and wound.   Neurological:  Negative for light-headedness and headaches.     Objective:     Vital Signs (Most Recent):  Temp: 98.6 °F (37 °C) (01/25/24 1105)  Pulse: 110 (01/25/24 1400)  Resp: (!) 25 (01/25/24 1400)  BP: 110/64 (01/25/24 1400)  SpO2: 95 % (01/25/24 1400) Vital Signs (24h Range):  Temp:  [98.4 °F (36.9 °C)-99.7 °F (37.6 °C)] 98.6 °F (37 °C)  Pulse:  [] 110  Resp:  [19-31] 25  SpO2:  [91 %-100 %] 95 %  BP: ()/(57-64) 110/64   Weight: 75.7 kg (166 lb 14.3 oz)  Body mass index is 24.65 kg/m².      Intake/Output Summary (Last 24 hours) at 1/25/2024 1520  Last data filed at 1/25/2024 1400  Gross per 24 hour   Intake 1073.75 ml   Output 2300 ml   Net -1226.25 ml          Physical Exam  Vitals and nursing note reviewed.   Constitutional:       Appearance: Normal appearance. He is well-developed.   HENT:      Head: Normocephalic.      Mouth/Throat:      Mouth: Mucous membranes are moist.   Eyes:      Extraocular Movements: Extraocular movements intact.      Pupils: Pupils are equal, round, and reactive to light.   Cardiovascular:      Rate and Rhythm: Regular rhythm. Tachycardia present.      Pulses: Normal pulses.      Heart sounds: Normal heart sounds.   Pulmonary:      Effort: Pulmonary effort is normal.      Breath sounds: Normal breath sounds.   Abdominal:      General: Abdomen is flat. Bowel  sounds are normal. There is no distension.      Palpations: Abdomen is soft.      Tenderness: There is no abdominal tenderness.   Musculoskeletal:         General: No swelling.   Skin:     General: Skin is warm and dry.   Neurological:      Mental Status: He is alert and oriented to person, place, and time.   Psychiatric:         Mood and Affect: Mood normal.         Thought Content: Thought content normal.            Vents:     Lines/Drains/Airways       Peripheral Intravenous Line  Duration                  Peripheral IV - Single Lumen 01/22/24 1758 20 G Anterior;Proximal;Right Forearm 2 days         Peripheral IV - Single Lumen 01/22/24 2011 20 G Anterior;Left Forearm 2 days         Peripheral IV - Single Lumen 01/23/24 0714 20 G Distal;Left;Posterior Forearm 2 days                  Significant Labs:    CBC/Anemia Profile:  Recent Labs   Lab 01/24/24  0443 01/25/24  0521   WBC 18.46* 16.71*   HGB 8.1* 7.3*   HCT 25.6* 23.3*    342   MCV 94 94   RDW 16.8* 16.2*        Chemistries:  Recent Labs   Lab 01/24/24  0443 01/25/24  0521   * 134*   K 4.8 4.3    107   CO2 20* 24   BUN 17 12   CREATININE 1.3 1.2   CALCIUM 9.2 9.2   ALBUMIN 1.7* 1.6*   PROT 6.4 5.9*   BILITOT 2.7* 2.0*   ALKPHOS 187* 161*   ALT 30 27   AST 36 24   MG 2.3 2.2   PHOS 2.6* 2.8       All pertinent labs within the past 24 hours have been reviewed.    Significant Imaging:  I have reviewed all pertinent imaging results/findings within the past 24 hours.

## 2024-01-25 NOTE — ASSESSMENT & PLAN NOTE
Patient with history of prostate adenocarcinoma and portal vein thrombosis confirmed on imaging.  Patient has had recurrent fevers and tachycardia since his diagnosis. WBC >29. Bilirubin 4.1. Thrombosis worsening on imaging despite being on DOAC.     No known cause of thrombosis. Differential includes infection versus coagulopathy versus malignancy. Possible component of phlebitis and diverticulitis seen on imaging     Plan:  -hepatology consulted, recs appreciated  -Heparin drip paused with continued anemia  -F/u CRP, ESR, EBV labs, KATHRYN, and RF  -2/2 BCx resulted with streptococcus constellatus and bacteroides fragilis (on PCR), repeat BCx NG  - Abx deescalated to Unasyn  -Heme/Onc consulted, not concerned for hemolysis  - MRI liver w/wo contrast with portal vein thrombosis and hepatic lymph node enlargement  - Patient will need to follow up outpatient with GI for endoscopy

## 2024-01-25 NOTE — HOSPITAL COURSE
Admitted to [facility] from abnormal hemoglobin lab from clinic on 1/24. Initially febrile, WBC 29, hypotensive, elevated bilirubin and unresponsives to fluids. Stepped up to MICU for septic shock, required pressors. CT a/p with worsened portal vein thrombosis and mild diverticulitis. Bcxs 1/22 positive for strep/bacteroides. Vanc/Zosyn transitioned to Unasyn. Short course of pressors stopped. Hepatology and GI recs continued treatment of bacteremia and EGD/colonoscopy outpatient. Stepped down to  1/25 for continued management. Restarted AC per hematology recs. ID consulted for infectious source, will continue unasyn with treatment end date of 2/5. ID will continue to follow patient outpatient. Appointments have been made for GI follow up, hepatology follow up, and hemaotlogy follow up. Patient hemoglobin remained stable with addition of eliquis. Patient medically stable for discharge home with home health and close follow up. Return precautions were given.

## 2024-01-25 NOTE — PLAN OF CARE
MICU DAILY GOALS     Family/Goals of care/Code Status   Code Status: Full Code    24H Vital Sign Range  Temp:  [98.4 °F (36.9 °C)-99.1 °F (37.3 °C)]   Pulse:  [83-97]   Resp:  [19-36]   BP: ()/(54-69)   SpO2:  [91 %-100 %]      Shift Events (include procedures and significant events)   No acute events throughout shift    AWAKE RASS: Goal -    Actual -      Restraint necessity: Not necessary   BREATHE SBT: Not intubated    Coordinate A & B, analgesics/sedatives Pain: managed   SAT: Not intubated   Delirium CAM-ICU:     Early(intubated/ Progressive (non-intubated) Mobility MOVE Screen (INTUBATED ONLY): Not intubated    Activity: Activity Management: Rolling - L1   Feeding/Nutrition Diet order: Diet/Nutrition Received: regular,     Thrombus DVT prophylaxis: VTE Required Core Measure: (SCDs) Sequential compression device initiated/maintained   HOB Elevation Head of Bed (HOB) Positioning: HOB elevated   Ulcer Prophylaxis GI: yes   Glucose control managed     Skin Skin assessed during: Daily Assessment    Sacrum intact/not altered? No  Heels intact/not altered? No  Surgical wound? No    Check one (no altered skin or altered skin) and sub boxes:  [x] No Altered Skin Integrity Present    [x]Prevention Measures Documented    [] Altered Skin Integrity Present or Discovered   [] LDA present in EPIC              [] LDA added in EPIC   [] Wound Image Taken (required on admit,                   transfer/discharge and every Tuesday)    Wound Care Consulted? No    Attending Nurse:     Second RN/Staff Member:    Bowel Function no issues    Indwelling Catheter Necessity            De-escalation Antibiotics No       VS and assessment per flow sheet, patient progressing towards goals as tolerated, plan of care reviewed with eJsus Christy, all concerns addressed, will continue to monitor.

## 2024-01-25 NOTE — TREATMENT PLAN
"[facility]  Hepatology  Treatment Plan    Patient Name: Jesus Christy  MRN: [id number]  Admission Date: 1/22/2024  Hospital Length of Stay: 2 days  Code Status: Full Code   Attending Provider: Mack Larson*   Consulting Provider: Ofe Haynes DO  Primary Care Physician: Tara Jolly MD  Principal Problem:Portal vein thrombosis    Subjective:     HPI: Jesus Christy is a 63 y.o. male with history of portal vein thrombosis recently diagnosed this month on apixaban, prostate adenocarcinoma (low-grade under active surveillance) who is refer to the ED for decreased Hemoglobin at 7.7 from 12.3 two weeks ago. He also reports weakness fatigue, fever, chills, diarrhea, nausea without vomiting and dark colored urine for the past 2 weeks. He denies having dark stool or blood in the stool.      Upon arrival to the ED he was hypotensive and febrile. CTA A/P showed with evidence of worsening thrombosis of the portal venous system now including the left portal vein and right portal vein anterior branch, prominence of leisa hepatic lymph nodes, nonspecific patchy hypoenhancement of the liver on arterial phase, mild diverticulitis and Stable pancreatic tail subcentimeter hypodensity. He was given 3L of IVF with no improvement. Pt was started on Levophed and admitted to the MICU.     He had a Fibroscan this month with evidence of Fibrosis stage of F 0 -1 and Steatosis Grade of S1. Last colonoscopy in 2017 with recommendation of repeating in 3 years which he did not follow up for. PSA from 1/18/24 at 12.4 increased from  6.6 on 10/20/23. AFP is elevated at 12 although he has no history of known liver disease, no history of hepatic cancers.      Hepatology consulted for "enlarging portal vein thrombosis on anticoagulation."    Interval Hx: NAEON.     Past Medical History:   Diagnosis Date    Complex regional pain syndrome i of right lower limb     GERD (gastroesophageal reflux disease)     HTN " (hypertension)        Past Surgical History:   Procedure Laterality Date    COLONOSCOPY N/A 9/29/2017    Procedure: COLONOSCOPY;  Surgeon: Jamar Edwards MD;  Location: [address];  Service: Endoscopy;  Laterality: N/A;    JOINT REPLACEMENT Right     knee    KNEE ARTHROSCOPY W/ ACL RECONSTRUCTION  2014    right       Family History   Problem Relation Age of Onset    Hypertension Mother     Heart disease Father     Diabetes Father     Cancer Brother         unknown type    No Known Problems Daughter     No Known Problems Daughter     No Known Problems Daughter     No Known Problems Daughter     No Known Problems Son     No Known Problems Son        Social History     Socioeconomic History    Marital status: Single   Tobacco Use    Smoking status: Never    Smokeless tobacco: Never   Substance and Sexual Activity    Alcohol use: Yes     Alcohol/week: 0.8 standard drinks of alcohol     Types: 1 Standard drinks or equivalent per week     Comment: once every few months    Drug use: No   Social History Narrative    Prior maintenance work.    Disable due to leg pain and depression.        Lives alone.    No exercise, due to leg pain.     Social Determinants of Health     Financial Resource Strain: Low Risk  (1/24/2024)    Overall Financial Resource Strain (CARDIA)     Difficulty of Paying Living Expenses: Not very hard   Recent Concern: Financial Resource Strain - Medium Risk (1/24/2024)    Overall Financial Resource Strain (CARDIA)     Difficulty of Paying Living Expenses: Somewhat hard   Food Insecurity: Patient Declined (1/24/2024)    Hunger Vital Sign     Worried About Running Out of Food in the Last Year: Patient declined     Ran Out of Food in the Last Year: Patient declined   Recent Concern: Food Insecurity - Food Insecurity Present (1/24/2024)    Hunger Vital Sign     Worried About Running Out of Food in the Last Year: Sometimes true     Ran Out of Food in the Last Year: Never true   Transportation Needs:  No Transportation Needs (1/24/2024)    PRAPARE - Transportation     Lack of Transportation (Medical): No     Lack of Transportation (Non-Medical): No   Physical Activity: Patient Declined (1/24/2024)    Exercise Vital Sign     Days of Exercise per Week: Patient declined     Minutes of Exercise per Session: Patient declined   Stress: No Stress Concern Present (1/24/2024)    [employer] - Occupational Stress Questionnaire     Feeling of Stress : Only a little   Social Connections: Moderately Isolated (1/24/2024)    Social Connection and Isolation Panel [NHANES]     Frequency of Communication with Friends and Family: More than three times a week     Frequency of Social Gatherings with Friends and Family: More than three times a week     Attends Oriental orthodox Services: More than 4 times per year     Active Member of Clubs or Organizations: No     Attends Club or Organization Meetings: Never     Marital Status:    Housing Stability: Low Risk  (1/24/2024)    Housing Stability Vital Sign     Unable to Pay for Housing in the Last Year: No     Number of Places Lived in the Last Year: 1     Unstable Housing in the Last Year: No       No current facility-administered medications on file prior to encounter.     Current Outpatient Medications on File Prior to Encounter   Medication Sig Dispense Refill    apixaban (ELIQUIS) 5 mg Tab Take 2 tablets (10 mg total) by mouth 2 (two) times daily. for 7 days. THEN Take 1 tablet (5 mg total) by mouth 2 (two) times daily. 60 tablet 3    diazePAM (VALIUM) 10 MG Tab Take 10 mg by mouth 3 (three) times daily.      tadalafiL (CIALIS) 20 MG Tab Take 1 tablet (20 mg total) by mouth daily as needed (erectile dysfunction). 30 tablet 5    ARIPiprazole (ABILIFY) 5 MG Tab Take 5 mg by mouth once daily.      DULoxetine (CYMBALTA) 30 MG capsule Take 30 mg by mouth once daily.      mirtazapine (REMERON) 30 MG tablet Take 45 mg by mouth every evening. 30 tablet 11     traMADoL (ULTRAM) 50 mg tablet Take 1 tablet (50 mg total) by mouth every 4 (four) hours as needed for Pain. 30 tablet 0       Review of patient's allergies indicates:   Allergen Reactions    Morphine Palpitations     Review of Systems   Constitutional:  Positive for chills, fever and malaise/fatigue.   Gastrointestinal:  Positive for nausea. Negative for abdominal pain, blood in stool, diarrhea, melena and vomiting.   All other systems reviewed and are negative.    Objective:     Vitals:    01/25/24 1105   BP: 114/63   Pulse: 101   Resp: (!) 26   Temp: 98.6 °F (37 °C)        Physical Exam  Vitals and nursing note reviewed.   Constitutional:       Appearance: Normal appearance.   HENT:      Right Ear: External ear normal.      Left Ear: External ear normal.      Mouth/Throat:      Mouth: Mucous membranes are moist.      Pharynx: Oropharynx is clear.   Eyes:      General: Scleral icterus present.      Pupils: Pupils are equal, round, and reactive to light.   Cardiovascular:      Rate and Rhythm: Normal rate and regular rhythm.      Pulses: Normal pulses.      Heart sounds: Normal heart sounds.   Pulmonary:      Effort: Pulmonary effort is normal.      Breath sounds: Normal breath sounds.   Abdominal:      General: Abdomen is flat. Bowel sounds are normal. There is no distension.      Tenderness: There is no abdominal tenderness. There is no rebound.      Hernia: No hernia is present.   Musculoskeletal:         General: Normal range of motion.      Cervical back: Normal range of motion.   Skin:     General: Skin is warm and dry.      Coloration: Skin is jaundiced.   Neurological:      General: No focal deficit present.      Mental Status: He is alert and oriented to person, place, and time.   Psychiatric:         Mood and Affect: Mood normal.         Behavior: Behavior normal.     Significant Labs:  Recent Labs   Lab 01/23/24  1253 01/24/24  0443 01/25/24  0521   HGB 6.6* 8.1* 7.3*         Lab Results   Component  Value Date    WBC 16.71 (H) 01/25/2024    HGB 7.3 (L) 01/25/2024    HCT 23.3 (L) 01/25/2024    MCV 94 01/25/2024     01/25/2024       Lab Results   Component Value Date     (L) 01/25/2024    K 4.3 01/25/2024     01/25/2024    CO2 24 01/25/2024    BUN 12 01/25/2024    CREATININE 1.2 01/25/2024    CALCIUM 9.2 01/25/2024    ANIONGAP 3 (L) 01/25/2024    ESTGFRAFRICA >60.0 06/03/2020    EGFRNONAA >60.0 06/03/2020       Lab Results   Component Value Date    ALT 27 01/25/2024    AST 24 01/25/2024    ALKPHOS 161 (H) 01/25/2024    BILITOT 2.0 (H) 01/25/2024       Lab Results   Component Value Date    INR 1.2 01/23/2024    INR 1.2 01/22/2024    INR 1.0 12/24/2017       Significant Imaging:  Reviewed pertinent radiology findings.       Assessment/Plan:     Jesus Christy is a 63 y.o. male with history of portal vein thrombosis recently diagnosed this month on apixaban, prostate adenocarcinoma (low-grade under active surveillance) who is admitted for acute anemia and shock. Found to have worsening of the his portal vein thrombosis on CTA A/P. No sign of an acute bleed. Concern that patient may have recurrence of prostate CA as PSA now elevated from 1/18/24 at 12.4 increased from  6.6 on 10/20/23. AFP elevated at 12 with  elevated at 60. Outpatient workup for hypercoagulation significant for elevated Cardiolipin IgM at 47. Colonoscopy from 2017 significant for multiple polyps and patient was advised to follow up in 3 years which he had not done. Hospital course complicated by bacteremia positive for Strep and Bacteroides. GI consulted who recommend outpatient EGD and colonoscopy in light of the bacteremia. Hematology consulted who believe that acute hemoglobin drop is secondary to blood loss and recommend GI evaluation. MRI liver showed Extensive portal venous system thrombus similar to prior, concern for small middle hepatic vein thrombus and pancreatic tail hyperintensity suggestive of cyst,  pseudocyst, or side branch IPMN.     Problem List:  Portal Vein Thrombosis  Shock  Diverticulitis  Hx of adenocarcinoma of Prostate     Recommendations:     - Resume anticoagulation when Hgb is stable   - Recommend EGD/colonoscopy once patient is stable  - Appreciate hematology recs  - Treat bacteremia with abx per primary team  - Recommend outpatient urology follow-up for evaluation of renal cysts     Thank you for involving us in the care of Jesus Christy. Please call with any additional questions, concerns or changes in the patient's clinical status. We will continue to follow.     Ofe Haynes DO  Internal Medicine PGY-1  [facility]

## 2024-01-25 NOTE — PROGRESS NOTES
[facility] - Cardiac Medical ICU  Critical Care Medicine  Progress Note    Patient Name: Jesus Christy  MRN: [id number]  Admission Date: 1/22/2024  Hospital Length of Stay: 2 days  Code Status: Full Code  Attending Provider: Mack Larson*  Primary Care Provider: Tara Jolly MD   Principal Problem: Portal vein thrombosis    Subjective:     HPI:  Mr. Jesus Christy is a 63 year-old man with a PMHx of recently diagnosed portal vein thrombosis, prostate adenocarcinoma (low-grade under active surveillance), complex regional pain syndrome, anxiety on chronic benzodiazepines, and HTN who was sent to [facility] [place] [place] from PCP clinic for falling Hgb (7.7 from 12.3 two weeks ago).  He reports increasing weakness and fatigue since being diagnosed with his portal vein thrombosis earlier this month.  He has associated symptoms of fever, chills, and diarrhea for the past 1-2 weeks.  He states he also has noticed darker colored urine and lower blood pressure over this time.  He denies any shortness a breath, chest pain, vision changes, dizziness, nausea/vomiting, or dysuria. He has not noticed any blood in the stool or dark colored stools.      While in the ED:  Patient with fever up to 102°.  Blood pressure down to 87/51. Initial WBC >29. Hgb 7.7. Creatinine 1.7. Bilirubin 4.1. CT A/P with interval increased prominence of leisa hepatis lymph nodes may be reactive. Nonspecific patchy hypoenhancement of the liver on arterial phase, possibly related to vascular shunting and portal vein thrombus. Mild stranding about sigmoid diverticula similar to prior. Clinical correlation suggestive for mild diverticulitis. Patient was started on Zosyn and given fluids. He was initially admitted to Hospital Medicine however he remained hypotensive despite 3L LR (MAPs 55-65).    Critical Care Medicine consulted for persistent hypotension not responsive to fluid resuscitation.     Hospital/ICU Course:  Admitted to MICU on 1/23 with shock  requiring vasopressor support.  Patient initially admitted to hospital medicine.  He was given 3L IVF but remained hypotensive and started on peripheral vasopressors.  Hepatology consulted for extensive portal vein thrombus.  Large drop in Hgb, no bleeding identified.  Heme/onc and GI consulted.  Blood cx with Streptococcus constellatus and bacteroides fragilis (on PCR), continue antibiotics. Vasopressors weaned off 1/24.     Interval History/Significant Events: Patient remains hemodynamically stable off vasopressors. Antibiotics deescalated.     Review of Systems   Constitutional:  Positive for fatigue and unexpected weight change. Negative for chills.   Respiratory:  Negative for cough and shortness of breath.    Cardiovascular:  Negative for chest pain.   Gastrointestinal:  Negative for abdominal distention, abdominal pain, blood in stool, constipation, diarrhea, nausea and vomiting.   Genitourinary:  Negative for difficulty urinating.   Musculoskeletal:  Negative for arthralgias and myalgias.   Skin:  Negative for rash and wound.   Neurological:  Negative for light-headedness and headaches.     Objective:     Vital Signs (Most Recent):  Temp: 98.6 °F (37 °C) (01/25/24 1105)  Pulse: 110 (01/25/24 1400)  Resp: (!) 25 (01/25/24 1400)  BP: 110/64 (01/25/24 1400)  SpO2: 95 % (01/25/24 1400) Vital Signs (24h Range):  Temp:  [98.4 °F (36.9 °C)-99.7 °F (37.6 °C)] 98.6 °F (37 °C)  Pulse:  [] 110  Resp:  [19-31] 25  SpO2:  [91 %-100 %] 95 %  BP: ()/(57-64) 110/64   Weight: 75.7 kg (166 lb 14.3 oz)  Body mass index is 24.65 kg/m².      Intake/Output Summary (Last 24 hours) at 1/25/2024 1520  Last data filed at 1/25/2024 1400  Gross per 24 hour   Intake 1073.75 ml   Output 2300 ml   Net -1226.25 ml          Physical Exam  Vitals and nursing note reviewed.   Constitutional:       Appearance: Normal appearance. He is well-developed.   HENT:      Head: Normocephalic.      Mouth/Throat:      Mouth: Mucous membranes  "are moist.   Eyes:      Extraocular Movements: Extraocular movements intact.      Pupils: Pupils are equal, round, and reactive to light.   Cardiovascular:      Rate and Rhythm: Regular rhythm. Tachycardia present.      Pulses: Normal pulses.      Heart sounds: Normal heart sounds.   Pulmonary:      Effort: Pulmonary effort is normal.      Breath sounds: Normal breath sounds.   Abdominal:      General: Abdomen is flat. Bowel sounds are normal. There is no distension.      Palpations: Abdomen is soft.      Tenderness: There is no abdominal tenderness.   Musculoskeletal:         General: No swelling.   Skin:     General: Skin is warm and dry.   Neurological:      Mental Status: He is alert and oriented to person, place, and time.   Psychiatric:         Mood and Affect: Mood normal.         Thought Content: Thought content normal.            Vents:     Lines/Drains/Airways       Peripheral Intravenous Line  Duration                  Peripheral IV - Single Lumen 01/22/24 1758 20 G Anterior;Proximal;Right Forearm 2 days         Peripheral IV - Single Lumen 01/22/24 2011 20 G Anterior;Left Forearm 2 days         Peripheral IV - Single Lumen 01/23/24 0714 20 G Distal;Left;Posterior Forearm 2 days                  Significant Labs:    CBC/Anemia Profile:  Recent Labs   Lab 01/24/24  0443 01/25/24  0521   WBC 18.46* 16.71*   HGB 8.1* 7.3*   HCT 25.6* 23.3*    342   MCV 94 94   RDW 16.8* 16.2*        Chemistries:  Recent Labs   Lab 01/24/24  0443 01/25/24  0521   * 134*   K 4.8 4.3    107   CO2 20* 24   BUN 17 12   CREATININE 1.3 1.2   CALCIUM 9.2 9.2   ALBUMIN 1.7* 1.6*   PROT 6.4 5.9*   BILITOT 2.7* 2.0*   ALKPHOS 187* 161*   ALT 30 27   AST 36 24   MG 2.3 2.2   PHOS 2.6* 2.8       All pertinent labs within the past 24 hours have been reviewed.    Significant Imaging:  I have reviewed all pertinent imaging results/findings within the past 24 hours.    ABG  No results for input(s): "PH", "PO2", "PCO2", " ""HCO3", "BE" in the last 168 hours.  Assessment/Plan:     Neuro  Complex regional pain syndrome type 1 of right lower extremity  -Holding home cymbalta, resume when appropriate       Psychiatric  Depression  -Hold home antidepressants, resume when appropriate       Cardiac/Vascular  Essential hypertension  - Not currently on home antihypertensives.   - remains normotensive inpatient    ID  Septic shock  This patient does have evidence of infective focus  My overall impression is septic shock.  Source: Abdominal  Antibiotics given-   Antibiotics (72h ago, onward)      Start     Stop Route Frequency Ordered    01/25/24 1400  ampicillin-sulbactam (UNASYN) 3 g in sodium chloride 0.9 % 100 mL IVPB (MB+)         -- IV Every 6 hours (non-standard times) 01/25/24 1121          Latest lactate reviewed-  Recent Labs   Lab 01/23/24  0726   LACTATE 1.2       Organ dysfunction indicated by Acute kidney injury and Acute liver injury    Fluid challenge Ideal Body Weight- The patient's ideal body weight is Ideal body weight: 70.7 kg (155 lb 13.8 oz) which will be used to calculate fluid bolus of 30 ml/kg for treatment of septic shock.      Post- resuscitation assessment Yes Perfusion exam was performed within 6 hours of septic shock presentation after bolus shows Inadequate tissue perfusion assessed by non-invasive monitoring       Will Start Pressors- Levophed for MAP of 65  Source control achieved by: ABX    - Off vasopressors since 1/24  - BCx with streptococcus constellatus and bacteroides fragilis, repeat BCx pending  - Continue Unasyn  - Echo ordered      Oncology  Anemia          Lab Results   Component Value Date     HGB 7.4 (L) 01/23/2024     HCT 23.7 (L) 01/23/2024      Unclear cause. Possibly related to portal vein thrombosis. No signs of active bleeding. Heme occult blood test negative for blood in stool.     -Hemolysis labs negative  -Monitor serial CBC and transfuse if patient becomes hemodynamically unstable, " symptomatic or H/H drops below 7/21  -Heme/Onc consulted, appreciate assistance  -Consult GI for possible scope  -Holding anticoagulation with actively falling Hgb    Adenocarcinoma of prostate  Now on surveillance. Continue Flomax and Proscar.     --Heme/Onc consulted, appreciate assistance    GI  * Portal vein thrombosis  Patient with history of prostate adenocarcinoma and portal vein thrombosis confirmed on imaging.  Patient has had recurrent fevers and tachycardia since his diagnosis. WBC >29. Bilirubin 4.1. Thrombosis worsening on imaging despite being on DOAC.     No known cause of thrombosis. Differential includes infection versus coagulopathy versus malignancy. Possible component of phlebitis and diverticulitis seen on imaging     Plan:  -hepatology consulted, recs appreciated  -Heparin drip paused with continued anemia  -F/u CRP, ESR, EBV labs, KATHRYN, and RF  -2/2 BCx resulted with streptococcus constellatus and bacteroides fragilis (on PCR), repeat BCx NG  - Abx deescalated to Unasyn  -Heme/Onc consulted, not concerned for hemolysis  - MRI liver w/wo contrast with portal vein thrombosis and hepatic lymph node enlargement  - Patient will need to follow up outpatient with GI for endoscopy    Diverticulitis  Evidence of mild diverticulitis seen on CT in patient with diarrhea meeting sepsis criteria and WBC >29.     - Diarrhea resolved    Palliative Care  Chronic prescription benzodiazepine use  -- Continue PRN benzos, takes chronically at home       Critical Care Daily Checklist:    A: Awake: RASS Goal/Actual Goal:  0  Actual:  0   B: Spontaneous Breathing Trial Performed?  N/A   C: SAT & SBT Coordinated?  N/A                      D: Delirium: CAM-ICU     E: Early Mobility Performed? Yes   F: Feeding Goal:    Status:     Current Diet Order   Procedures    Diet Adult Regular (IDDSI Level 7)      AS: Analgesia/Sedation    T: Thromboembolic Prophylaxis On hold due to anemia    H: HOB > 300 Yes   U: Stress Ulcer  Prophylaxis (if needed) Protonix   G: Glucose Control    B: Bowel Function Stool Occurrence: 0   I: Indwelling Catheter (Lines & Pino) Necessity PIV   D: De-escalation of Antimicrobials/Pharmacotherapies Yes, Pip/Tazo and Vanc d/c and started on Unasyn    Plan for the day/ETD Stepdown from ICU    Code Status:  Family/Goals of Care: Full Code  Daughter updated at bedside     I have spent 35 min with this patient, with over 50% of this time spent coordinating care and speaking with the family       Ena Strong DNP  Critical Care Medicine  [facility] - Card[facility]

## 2024-01-25 NOTE — MED STUDENT SUBJECTIVE & OBJECTIVE
Medical Student Subjective & Objective     Principal Problem: Portal vein thrombosis    Chief Complaint:   Chief Complaint   Patient presents with    Abnormal Lab     Sent to ED fro HBG of 7.7         HPI: 63 year old man with recently diagnosed portal vein thrombosis, prostate adenocarcinoma (low-grade under active surveillance), complex regional pain syndrome, anxiety on chronic benzodiazepines, and HTN admitted for anemia. Saw his PCP and found to have a drop in Hb from 12 to 7.7 over a 2 week period. Diagnosed with portal vein thrombus earlier this month and was started on apixaban. Has had fevers and chills at home. Denies abdominal pain, nausea, vomiting, or dark stools; no melena, hematochezia, or hematemesis. Has been taking goody's powder daily. Not on PPIs at home. No h/o gastric or duodenal ulcers. Febrile and hypotensive in ED with a leukocytosis > 29. CT abdomen/pelvis with lymphadenopathy of jessica hepatis lymph nodes with non-specific patchy hypoenhancement of the liver. Found to be bacteremic in 4/4 BCx bottles with GPC and GNR; started on broad antibiotics and received fluid resuscitation; now on low-dose vasopressors. Had a drop in Hb to < 7 on admission, received 1x PRBC. Seen by hematology who recommended hemolysis labs which were unremarkable. Last colonoscopy was in 2017 which showed 3x polyps in transverse colon, cecum, and descending colon which were adenomas on pathology; recommended 3 year surveillance however this wasn't completed. Denies prior EGD.      GI consulted for anemia of unknown origin/possible colonoscopy.     Hospital Course: Admitted to MICU on 1/23 with shock requiring vasopressor support.  Patient initially admitted to hospital medicine.  He was given 3L IVF but remained hypotensive and started on peripheral vasopressors.  Hepatology consulted for extensive portal vein thrombus.  Large drop in Hgb, no bleeding identified.  Heme/onc and GI consulted.  Blood cx with GPC and  GNR on broad spectrum abx.      Interval History: ***    Past Medical History:   Diagnosis Date    Complex regional pain syndrome i of right lower limb     GERD (gastroesophageal reflux disease)     HTN (hypertension)        Past Surgical History:   Procedure Laterality Date    COLONOSCOPY N/A 9/29/2017    Procedure: COLONOSCOPY;  Surgeon: Jamar Edwards MD;  Location: [facility] ([address] [place] [address]);  Service: Endoscopy;  Laterality: N/A;    JOINT REPLACEMENT Right     knee    KNEE ARTHROSCOPY W/ ACL RECONSTRUCTION  2014    right       Review of patient's allergies indicates:   Allergen Reactions    Morphine Palpitations       No current facility-administered medications on file prior to encounter.     Current Outpatient Medications on File Prior to Encounter   Medication Sig    apixaban (ELIQUIS) 5 mg Tab Take 2 tablets (10 mg total) by mouth 2 (two) times daily. for 7 days. THEN Take 1 tablet (5 mg total) by mouth 2 (two) times daily.    diazePAM (VALIUM) 10 MG Tab Take 10 mg by mouth 3 (three) times daily.    tadalafiL (CIALIS) 20 MG Tab Take 1 tablet (20 mg total) by mouth daily as needed (erectile dysfunction).    ARIPiprazole (ABILIFY) 5 MG Tab Take 5 mg by mouth once daily.    DULoxetine (CYMBALTA) 30 MG capsule Take 30 mg by mouth once daily.    mirtazapine (REMERON) 30 MG tablet Take 45 mg by mouth every evening.    traMADoL (ULTRAM) 50 mg tablet Take 1 tablet (50 mg total) by mouth every 4 (four) hours as needed for Pain.     Family History       Problem Relation (Age of Onset)    Cancer Brother    Diabetes Father    Heart disease Father    Hypertension Mother    No Known Problems Daughter, Daughter, Daughter, Daughter, Son, Son          Tobacco Use    Smoking status: Never    Smokeless tobacco: Never   Substance and Sexual Activity    Alcohol use: Yes     Alcohol/week: 0.8 standard drinks of alcohol     Types: 1 Standard drinks or equivalent per week     Comment: once every few months    Drug use:  "No    Sexual activity: Not on file     Review of Systems  Objective:     Vital Signs (Most Recent):  Temp: 98.6 °F (37 °C) (01/25/24 1105)  Pulse: 101 (01/25/24 1105)  Resp: (!) 26 (01/25/24 1105)  BP: 114/63 (01/25/24 1105)  SpO2: 96 % (01/25/24 1105) Vital Signs (24h Range):  Temp:  [98.4 °F (36.9 °C)-99.7 °F (37.6 °C)] 98.6 °F (37 °C)  Pulse:  [] 101  Resp:  [19-31] 26  SpO2:  [91 %-100 %] 96 %  BP: ()/(55-69) 114/63     Weight: 75.7 kg (166 lb 14.3 oz)  Body mass index is 24.65 kg/m².    Intake/Output Summary (Last 24 hours) at 1/25/2024 1115  Last data filed at 1/25/2024 1100  Gross per 24 hour   Intake 837.75 ml   Output 2200 ml   Net -1362.25 ml      Physical Exam    Significant Labs: {Results:09369::"All pertinent labs within the past 24 hours have been reviewed."}    Significant Imaging: {Imaging Review:04521::"I have reviewed all pertinent imaging results/findings within the past 24 hours."}    Medical Student Subjective & Objective   "

## 2024-01-25 NOTE — PLAN OF CARE
MICU DAILY GOALS     Family/Goals of care/Code Status   Code Status: Full Code    24H Vital Sign Range  Temp:  [98.5 °F (36.9 °C)-99.7 °F (37.6 °C)]   Pulse:  []   Resp:  [19-31]   BP: ()/(57-64)   SpO2:  [92 %-100 %]      Shift Events (include procedures and significant events)   No acute events throughout shift. Administered medications per orders, pt tolerated well. Strict I and O maintained.    AWAKE RASS: Goal -    Actual -      Restraint necessity: Not necessary   BREATHE SBT: Not intubated    Coordinate A & B, analgesics/sedatives Pain: managed   SAT: Not intubated   Delirium CAM-ICU:     Early(intubated/ Progressive (non-intubated) Mobility MOVE Screen (INTUBATED ONLY): Not intubated    Activity: Activity Management: Rolling - L1   Feeding/Nutrition Diet order: Diet/Nutrition Received: regular,     Thrombus DVT prophylaxis: VTE Required Core Measure: Pharmacological prophylaxis initiated/maintained   HOB Elevation Head of Bed (HOB) Positioning: HOB at 30 degrees   Ulcer Prophylaxis GI: yes   Glucose control managed     Skin Skin assessed during: Daily Assessment    Sacrum intact/not altered? Yes  Heels intact/not altered? Yes  Surgical wound? No    Check one (no altered skin or altered skin) and sub boxes:  [] No Altered Skin Integrity Present    []Prevention Measures Documented    [] Altered Skin Integrity Present or Discovered   [] LDA present in EPIC              [] LDA added in EPIC   [] Wound Image Taken (required on admit,                   transfer/discharge and every Tuesday)    Wound Care Consulted? No    Attending Nurse:     Second RN/Staff Member:    Bowel Function no issues    Indwelling Catheter Necessity              De-escalation Antibiotics Yes       VS and assessment per flow sheet, patient progressing towards goals as tolerated, plan of care reviewed with family, all concerns addressed, will continue to monitor.

## 2024-01-26 PROBLEM — R78.81 STREPTOCOCCAL BACTEREMIA: Status: ACTIVE | Noted: 2024-01-26

## 2024-01-26 PROBLEM — B95.5 STREPTOCOCCAL BACTEREMIA: Status: ACTIVE | Noted: 2024-01-26

## 2024-01-26 LAB
ALBUMIN SERPL BCP-MCNC: 1.7 G/DL (ref 3.5–5.2)
ALP SERPL-CCNC: 181 U/L (ref 55–135)
ALT SERPL W/O P-5'-P-CCNC: 26 U/L (ref 10–44)
ANION GAP SERPL CALC-SCNC: 6 MMOL/L (ref 8–16)
ASCENDING AORTA: 2.87 CM
AST SERPL-CCNC: 21 U/L (ref 10–40)
AV INDEX (PROSTH): 0.76
AV MEAN GRADIENT: 7 MMHG
AV PEAK GRADIENT: 16 MMHG
AV VALVE AREA BY VELOCITY RATIO: 3.3 CM²
AV VALVE AREA: 3.56 CM²
AV VELOCITY RATIO: 0.71
BASOPHILS # BLD AUTO: 0.04 K/UL (ref 0–0.2)
BASOPHILS NFR BLD: 0.2 % (ref 0–1.9)
BILIRUB SERPL-MCNC: 2 MG/DL (ref 0.1–1)
BSA FOR ECHO PROCEDURE: 1.91 M2
BUN SERPL-MCNC: 9 MG/DL (ref 8–23)
CALCIUM SERPL-MCNC: 9.4 MG/DL (ref 8.7–10.5)
CHLORIDE SERPL-SCNC: 107 MMOL/L (ref 95–110)
CO2 SERPL-SCNC: 22 MMOL/L (ref 23–29)
CREAT SERPL-MCNC: 1 MG/DL (ref 0.5–1.4)
CV ECHO LV RWT: 0.33 CM
DIFFERENTIAL METHOD BLD: ABNORMAL
DOP CALC AO PEAK VEL: 1.97 M/S
DOP CALC AO VTI: 28.85 CM
DOP CALC LVOT AREA: 4.7 CM2
DOP CALC LVOT DIAMETER: 2.44 CM
DOP CALC LVOT PEAK VEL: 1.39 M/S
DOP CALC LVOT STROKE VOLUME: 102.63 CM3
DOP CALCLVOT PEAK VEL VTI: 21.96 CM
E WAVE DECELERATION TIME: 201.74 MSEC
E/A RATIO: 1.11
E/E' RATIO: 4.44 M/S
ECHO LV POSTERIOR WALL: 0.82 CM (ref 0.6–1.1)
EJECTION FRACTION: 65 %
EOSINOPHIL # BLD AUTO: 0.1 K/UL (ref 0–0.5)
EOSINOPHIL NFR BLD: 0.7 % (ref 0–8)
ERYTHROCYTE [DISTWIDTH] IN BLOOD BY AUTOMATED COUNT: 16 % (ref 11.5–14.5)
EST. GFR  (NO RACE VARIABLE): >60 ML/MIN/1.73 M^2
FOLATE SERPL-MCNC: 12.9 NG/ML (ref 4–24)
FRACTIONAL SHORTENING: 33 % (ref 28–44)
GLUCOSE SERPL-MCNC: 96 MG/DL (ref 70–110)
HCT VFR BLD AUTO: 24.7 % (ref 40–54)
HGB BLD-MCNC: 7.5 G/DL (ref 14–18)
IMM GRANULOCYTES # BLD AUTO: 0.28 K/UL (ref 0–0.04)
IMM GRANULOCYTES NFR BLD AUTO: 1.7 % (ref 0–0.5)
INTERVENTRICULAR SEPTUM: 0.83 CM (ref 0.6–1.1)
LA MAJOR: 5.04 CM
LA MINOR: 5.01 CM
LA WIDTH: 4.52 CM
LEFT ATRIUM SIZE: 3.29 CM
LEFT ATRIUM VOLUME INDEX MOD: 34 ML/M2
LEFT ATRIUM VOLUME INDEX: 33.3 ML/M2
LEFT ATRIUM VOLUME MOD: 65 CM3
LEFT ATRIUM VOLUME: 63.52 CM3
LEFT INTERNAL DIMENSION IN SYSTOLE: 3.33 CM (ref 2.1–4)
LEFT VENTRICLE DIASTOLIC VOLUME INDEX: 60.54 ML/M2
LEFT VENTRICLE DIASTOLIC VOLUME: 115.64 ML
LEFT VENTRICLE MASS INDEX: 73 G/M2
LEFT VENTRICLE SYSTOLIC VOLUME INDEX: 23.7 ML/M2
LEFT VENTRICLE SYSTOLIC VOLUME: 45.27 ML
LEFT VENTRICULAR INTERNAL DIMENSION IN DIASTOLE: 4.95 CM (ref 3.5–6)
LEFT VENTRICULAR MASS: 138.89 G
LV LATERAL E/E' RATIO: 4 M/S
LV SEPTAL E/E' RATIO: 5 M/S
LYMPHOCYTES # BLD AUTO: 1.8 K/UL (ref 1–4.8)
LYMPHOCYTES NFR BLD: 11 % (ref 18–48)
MAGNESIUM SERPL-MCNC: 2.1 MG/DL (ref 1.6–2.6)
MCH RBC QN AUTO: 29 PG (ref 27–31)
MCHC RBC AUTO-ENTMCNC: 30.4 G/DL (ref 32–36)
MCV RBC AUTO: 95 FL (ref 82–98)
MONOCYTES # BLD AUTO: 1.1 K/UL (ref 0.3–1)
MONOCYTES NFR BLD: 6.8 % (ref 4–15)
MV PEAK A VEL: 0.54 M/S
MV PEAK E VEL: 0.6 M/S
MV STENOSIS PRESSURE HALF TIME: 58.5 MS
MV VALVE AREA P 1/2 METHOD: 3.76 CM2
NEUTROPHILS # BLD AUTO: 13 K/UL (ref 1.8–7.7)
NEUTROPHILS NFR BLD: 79.6 % (ref 38–73)
NRBC BLD-RTO: 0 /100 WBC
PHOSPHATE SERPL-MCNC: 2.5 MG/DL (ref 2.7–4.5)
PISA TR MAX VEL: 2.9 M/S
PLATELET # BLD AUTO: 379 K/UL (ref 150–450)
PMV BLD AUTO: 10 FL (ref 9.2–12.9)
POTASSIUM SERPL-SCNC: 4.4 MMOL/L (ref 3.5–5.1)
PROT SERPL-MCNC: 6.4 G/DL (ref 6–8.4)
RA MAJOR: 5.3 CM
RA PRESSURE ESTIMATED: 3 MMHG
RA WIDTH: 3.54 CM
RBC # BLD AUTO: 2.59 M/UL (ref 4.6–6.2)
RIGHT ATRIAL AREA: 16 CM2
RIGHT VENTRICULAR END-DIASTOLIC DIMENSION: 3.93 CM
RV TB RVSP: 6 MMHG
SINUS: 3.82 CM
SODIUM SERPL-SCNC: 135 MMOL/L (ref 136–145)
STJ: 3.09 CM
TDI LATERAL: 0.15 M/S
TDI SEPTAL: 0.12 M/S
TDI: 0.14 M/S
TR MAX PG: 34 MMHG
TRICUSPID ANNULAR PLANE SYSTOLIC EXCURSION: 1.54 CM
TV REST PULMONARY ARTERY PRESSURE: 37 MMHG
VIT B12 SERPL-MCNC: >2000 PG/ML (ref 210–950)
WBC # BLD AUTO: 16.28 K/UL (ref 3.9–12.7)
Z-SCORE OF LEFT VENTRICULAR DIMENSION IN END DIASTOLE: -0.82
Z-SCORE OF LEFT VENTRICULAR DIMENSION IN END SYSTOLE: 0.05

## 2024-01-26 PROCEDURE — 82607 VITAMIN B-12: CPT | Performed by: FAMILY MEDICINE

## 2024-01-26 PROCEDURE — 25000003 PHARM REV CODE 250

## 2024-01-26 PROCEDURE — 85025 COMPLETE CBC W/AUTO DIFF WBC: CPT

## 2024-01-26 PROCEDURE — 63600175 PHARM REV CODE 636 W HCPCS

## 2024-01-26 PROCEDURE — 82746 ASSAY OF FOLIC ACID SERUM: CPT | Performed by: FAMILY MEDICINE

## 2024-01-26 PROCEDURE — 80053 COMPREHEN METABOLIC PANEL: CPT

## 2024-01-26 PROCEDURE — 36415 COLL VENOUS BLD VENIPUNCTURE: CPT

## 2024-01-26 PROCEDURE — 84100 ASSAY OF PHOSPHORUS: CPT

## 2024-01-26 PROCEDURE — 21400001 HC TELEMETRY ROOM

## 2024-01-26 PROCEDURE — 99223 1ST HOSP IP/OBS HIGH 75: CPT | Mod: ,,, | Performed by: INTERNAL MEDICINE

## 2024-01-26 PROCEDURE — 83735 ASSAY OF MAGNESIUM: CPT

## 2024-01-26 PROCEDURE — C9113 INJ PANTOPRAZOLE SODIUM, VIA: HCPCS

## 2024-01-26 RX ORDER — AMOXICILLIN 250 MG
1 CAPSULE ORAL 2 TIMES DAILY
Status: CANCELLED | OUTPATIENT
Start: 2024-01-26

## 2024-01-26 RX ORDER — PANTOPRAZOLE SODIUM 40 MG/1
40 TABLET, DELAYED RELEASE ORAL
Status: DISCONTINUED | OUTPATIENT
Start: 2024-01-26 | End: 2024-01-27 | Stop reason: HOSPADM

## 2024-01-26 RX ORDER — AMOXICILLIN 250 MG
1 CAPSULE ORAL DAILY
Status: DISCONTINUED | OUTPATIENT
Start: 2024-01-26 | End: 2024-01-27 | Stop reason: HOSPADM

## 2024-01-26 RX ORDER — POLYETHYLENE GLYCOL 3350 17 G/17G
17 POWDER, FOR SOLUTION ORAL DAILY
Status: CANCELLED | OUTPATIENT
Start: 2024-01-26

## 2024-01-26 RX ADMIN — ACETAMINOPHEN 650 MG: 325 TABLET ORAL at 11:01

## 2024-01-26 RX ADMIN — DIAZEPAM 10 MG: 5 TABLET ORAL at 09:01

## 2024-01-26 RX ADMIN — APIXABAN 5 MG: 5 TABLET, FILM COATED ORAL at 10:01

## 2024-01-26 RX ADMIN — AMPICILLIN SODIUM, SULBACTAM SODIUM 3 G: 2; 1 INJECTION, POWDER, FOR SOLUTION INTRAMUSCULAR; INTRAVENOUS at 03:01

## 2024-01-26 RX ADMIN — Medication 6 MG: at 11:01

## 2024-01-26 RX ADMIN — SENNOSIDES AND DOCUSATE SODIUM 1 TABLET: 50; 8.6 TABLET ORAL at 10:01

## 2024-01-26 RX ADMIN — AMPICILLIN SODIUM, SULBACTAM SODIUM 3 G: 2; 1 INJECTION, POWDER, FOR SOLUTION INTRAMUSCULAR; INTRAVENOUS at 02:01

## 2024-01-26 RX ADMIN — PANTOPRAZOLE SODIUM 40 MG: 40 INJECTION, POWDER, FOR SOLUTION INTRAVENOUS at 08:01

## 2024-01-26 RX ADMIN — DIAZEPAM 10 MG: 5 TABLET ORAL at 02:01

## 2024-01-26 RX ADMIN — AMPICILLIN SODIUM, SULBACTAM SODIUM 3 G: 2; 1 INJECTION, POWDER, FOR SOLUTION INTRAMUSCULAR; INTRAVENOUS at 08:01

## 2024-01-26 RX ADMIN — PANTOPRAZOLE SODIUM 40 MG: 40 TABLET, DELAYED RELEASE ORAL at 03:01

## 2024-01-26 RX ADMIN — AMPICILLIN SODIUM, SULBACTAM SODIUM 3 G: 2; 1 INJECTION, POWDER, FOR SOLUTION INTRAMUSCULAR; INTRAVENOUS at 09:01

## 2024-01-26 RX ADMIN — APIXABAN 5 MG: 5 TABLET, FILM COATED ORAL at 09:01

## 2024-01-26 NOTE — ASSESSMENT & PLAN NOTE
"This patient does have evidence of infective focus  My overall impression is sepsis.  Source: Abdominal  Antibiotics given-   Antibiotics (72h ago, onward)      Start     Stop Route Frequency Ordered    01/25/24 1400  ampicillin-sulbactam (UNASYN) 3 g in sodium chloride 0.9 % 100 mL IVPB (MB+)         -- IV Every 6 hours (non-standard times) 01/25/24 1121          Latest lactate reviewed-  No results for input(s): "LACTATE", "POCLAC" in the last 72 hours.    -ID consulted 1/26  -Continue Unasyn, off pressor support  -Bacteremia 1/22 with strep c and bacteroides  -repeat Bcxs clean          "

## 2024-01-26 NOTE — ASSESSMENT & PLAN NOTE
"63M with h/o PVT, prostate ca, htn admitted 1/22 with anemia/fatigue, found to be febrile and hypotensive. Imaging revealed PVT. On vanc/zosyn --> unasyn. Fevers resolved. ID consulted for "Positive blood cultures, on unasyn, no clear source"    The + strep constellatus bcx is not a contaminant. Unclear source of bacteremia, but concern for GI source. It is commonly associated with abscesses; rarely endocarditis. Fortunately he's had recent MRI abdomen, CXR lung, MRI prostate without obvious infection. Generally very PCN susceptible, but anaerobe coverage reasonable given risk of co-anaerobe infection.    Recommendations:   - continue unasyn. Either unasyn or once a day cefriaxone/po flagyl is reasonable option on d/c  - consider updating colonoscopy- inpatient vs outpatient  - f/u repeat bcx to document clearance  - outpatient dental exam  - ongoing investigation to r/o abscess and primary site of infection    Outpatient Antibiotic Therapy Plan:    Please send referral to [facility].    1) Infection: strep bacteremia    2) Discharge Antibiotics:    Intravenous antibiotics:  Unasyn 12gm daily continuous infusion    3) Therapy Duration:  2 weeks (and continue until ID f/u appt)    Estimated end date of IV antibiotics: 2/5    4) Outpatient Weekly Labs:    Order the following labs to be drawn on Mondays:   CBC  CMP   CRP      5) Fax Lab Results to Infectious Diseases Provider: Dr Reis    [facility] Fax Number: [phone]    6) Outpatient Infectious Diseases Follow-up    Follow-up appointment will be arranged by the ID clinic and will be found in the patient's appointments tab.    Prior to discharge, please ensure the patient's follow-up has been scheduled.    If there is still no follow-up scheduled prior to discharge, please send an EPIC message to Dayanara Hughes in Infectious Diseases.          "

## 2024-01-26 NOTE — SUBJECTIVE & OBJECTIVE
Interval History: NAEON, resting comfortably in bed with continued fatigue and shortness of breath O2 sats wnl, AC restarted, ID consulted    Review of Systems  Objective:     Vital Signs (Most Recent):  Temp: 98.3 °F (36.8 °C) (01/26/24 1115)  Pulse: 101 (01/26/24 1118)  Resp: 18 (01/26/24 1115)  BP: 124/68 (01/26/24 1115)  SpO2: 100 % (01/26/24 1115) Vital Signs (24h Range):  Temp:  [98.3 °F (36.8 °C)-99.2 °F (37.3 °C)] 98.3 °F (36.8 °C)  Pulse:  [] 101  Resp:  [18-29] 18  SpO2:  [91 %-100 %] 100 %  BP: ()/(54-68) 124/68     Weight: 75.3 kg (166 lb)  Body mass index is 24.51 kg/m².    Intake/Output Summary (Last 24 hours) at 1/26/2024 1356  Last data filed at 1/26/2024 0603  Gross per 24 hour   Intake 752.51 ml   Output 1100 ml   Net -347.49 ml         Physical Exam  Vitals and nursing note reviewed.   Constitutional:       General: He is not in acute distress.     Appearance: Normal appearance. He is not ill-appearing.   HENT:      Head: Normocephalic and atraumatic.      Mouth/Throat:      Mouth: Mucous membranes are moist.   Eyes:      Extraocular Movements: Extraocular movements intact.      Pupils: Pupils are equal, round, and reactive to light.      Comments: ptosis right eyelid   Pulmonary:      Effort: Pulmonary effort is normal. No respiratory distress.   Abdominal:      General: Bowel sounds are normal. There is no distension.      Palpations: Abdomen is soft.      Tenderness: There is no abdominal tenderness. There is no guarding.   Skin:     General: Skin is warm.      Capillary Refill: Capillary refill takes less than 2 seconds.   Neurological:      General: No focal deficit present.      Mental Status: He is alert and oriented to person, place, and time.   Psychiatric:         Mood and Affect: Mood normal.         Thought Content: Thought content normal.             Significant Labs: All pertinent labs within the past 24 hours have been reviewed.  CBC:   Recent Labs   Lab 01/25/24  2305  01/26/24  0515   WBC 16.71* 16.28*   HGB 7.3* 7.5*   HCT 23.3* 24.7*    379     CMP:   Recent Labs   Lab 01/25/24  0521 01/26/24  0515   * 135*   K 4.3 4.4    107   CO2 24 22*   GLU 85 96   BUN 12 9   CREATININE 1.2 1.0   CALCIUM 9.2 9.4   PROT 5.9* 6.4   ALBUMIN 1.6* 1.7*   BILITOT 2.0* 2.0*   ALKPHOS 161* 181*   AST 24 21   ALT 27 26   ANIONGAP 3* 6*       Significant Imaging: I have reviewed all pertinent imaging results/findings within the past 24 hours.

## 2024-01-26 NOTE — NURSING
Pt transferred to room [address]147 via bed with staff assist. Report given to Jaguar ARCHULETA. Pt VSS with no distress/discomfort noted/reported. Pt belongings accompanied including pt glasses, cell phone and . Pt family notified of transfer @ 2238 pm via phone call. Pt oriented to room and new unit. FABIANA.

## 2024-01-26 NOTE — HPI
"63M with h/o PVT, prostate ca, htn admitted 1/22 with anemia. Reports generalized fatigue. Reports constipation, no diarrhea. Denies abdominal pain. Denies recent colonoscopy    Found to be febrile 102, hypotention with leukocytosis and anemia    CT A/P with interval increased prominence of leisa hepatis lymph nodes may be reactive. Nonspecific patchy hypoenhancement of the liver on arterial phase, possibly related to vascular shunting and portal vein thrombus. Mild stranding about sigmoid diverticula similar to prior. Clinical correlation suggestive for mild diverticulitis.       MRI abdomen:     Extensive portal venous system thrombus similar to prior.     Hepatic subcentimeter focus of abnormal signal concerning for small middle hepatic vein thrombus.     Stable prominent leisa hepatis lymph nodes which may be reactive.     Subcentimeter pancreatic tail T2 hyperintensity which may be a cyst, pseudocyst, or side branch IPMN.  Follow-up suggested in 1 year.      On vanc/zosyn --> unasyn.     Fevers resolved    ID consulted for "Positive blood cultures, on unasyn, no clear source   "

## 2024-01-26 NOTE — SUBJECTIVE & OBJECTIVE
Past Medical History:   Diagnosis Date    Complex regional pain syndrome i of right lower limb     GERD (gastroesophageal reflux disease)     HTN (hypertension)        Past Surgical History:   Procedure Laterality Date    COLONOSCOPY N/A 9/29/2017    Procedure: COLONOSCOPY;  Surgeon: Jamar Edwards MD;  Location: [address]);  Service: Endoscopy;  Laterality: N/A;    JOINT REPLACEMENT Right     knee    KNEE ARTHROSCOPY W/ ACL RECONSTRUCTION  2014    right       Review of patient's allergies indicates:   Allergen Reactions    Morphine Palpitations       No current facility-administered medications on file prior to encounter.     Current Outpatient Medications on File Prior to Encounter   Medication Sig    apixaban (ELIQUIS) 5 mg Tab Take 2 tablets (10 mg total) by mouth 2 (two) times daily. for 7 days. THEN Take 1 tablet (5 mg total) by mouth 2 (two) times daily.    diazePAM (VALIUM) 10 MG Tab Take 10 mg by mouth 3 (three) times daily.    tadalafiL (CIALIS) 20 MG Tab Take 1 tablet (20 mg total) by mouth daily as needed (erectile dysfunction).    ARIPiprazole (ABILIFY) 5 MG Tab Take 5 mg by mouth once daily.    DULoxetine (CYMBALTA) 30 MG capsule Take 30 mg by mouth once daily.    mirtazapine (REMERON) 30 MG tablet Take 45 mg by mouth every evening.    traMADoL (ULTRAM) 50 mg tablet Take 1 tablet (50 mg total) by mouth every 4 (four) hours as needed for Pain.     Family History       Problem Relation (Age of Onset)    Cancer Brother    Diabetes Father    Heart disease Father    Hypertension Mother    No Known Problems Daughter, Daughter, Daughter, Daughter, Son, Son          Tobacco Use    Smoking status: Never    Smokeless tobacco: Never   Substance and Sexual Activity    Alcohol use: Yes     Alcohol/week: 0.8 standard drinks of alcohol     Types: 1 Standard drinks or equivalent per week     Comment: once every few months    Drug use: No    Sexual activity: Not on file     Review of Systems   Constitutional:   Positive for chills, diaphoresis, fatigue and fever.   Eyes:  Negative for visual disturbance.   Respiratory:  Negative for cough, chest tightness and shortness of breath.    Cardiovascular:  Negative for chest pain, palpitations and leg swelling.   Gastrointestinal:  Positive for diarrhea. Negative for abdominal pain, nausea and vomiting.   Genitourinary:  Negative for dysuria, frequency and urgency.   Neurological:  Positive for weakness. Negative for dizziness, light-headedness and headaches.     Objective:     Vital Signs (Most Recent):  Temp: 99.3 °F (37.4 °C) (01/26/24 1609)  Pulse: 101 (01/26/24 1609)  Resp: 19 (01/26/24 1609)  BP: (!) 108/59 (01/26/24 1609)  SpO2: 100 % (01/26/24 1609) Vital Signs (24h Range):  Temp:  [98.3 °F (36.8 °C)-99.3 °F (37.4 °C)] 99.3 °F (37.4 °C)  Pulse:  [] 101  Resp:  [18-29] 19  SpO2:  [91 %-100 %] 100 %  BP: ()/(54-68) 108/59     Weight: 75.3 kg (166 lb)  Body mass index is 24.51 kg/m².     Physical Exam  Constitutional:       General: He is not in acute distress.     Appearance: Normal appearance. He is not ill-appearing.   HENT:      Head: Normocephalic and atraumatic.      Nose: Nose normal.      Mouth/Throat:      Mouth: Mucous membranes are moist.   Eyes:      General: Scleral icterus present.      Extraocular Movements: Extraocular movements intact.      Conjunctiva/sclera: Conjunctivae normal.      Pupils: Pupils are equal, round, and reactive to light.   Cardiovascular:      Rate and Rhythm: Regular rhythm. Tachycardia present.      Pulses: Normal pulses.      Heart sounds: Normal heart sounds. No murmur heard.  Pulmonary:      Effort: Pulmonary effort is normal. No respiratory distress.      Breath sounds: Normal breath sounds. No wheezing, rhonchi or rales.   Abdominal:      General: Abdomen is flat. Bowel sounds are normal. There is no distension.      Palpations: Abdomen is soft.      Tenderness: There is abdominal tenderness.   Musculoskeletal:       Right lower leg: No edema.      Left lower leg: No edema.   Skin:     General: Skin is warm and dry.      Capillary Refill: Capillary refill takes less than 2 seconds.   Neurological:      Mental Status: He is alert and oriented to person, place, and time.   Psychiatric:         Mood and Affect: Mood is anxious.         Behavior: Behavior normal.              CRANIAL NERVES     CN III, IV, VI   Pupils are equal, round, and reactive to light.       Significant Labs: All pertinent labs within the past 24 hours have been reviewed.  CBC:   Recent Labs   Lab 01/25/24  0521 01/26/24  0515   WBC 16.71* 16.28*   HGB 7.3* 7.5*   HCT 23.3* 24.7*    379       CMP:   Recent Labs   Lab 01/25/24  0521 01/26/24  0515   * 135*   K 4.3 4.4    107   CO2 24 22*   GLU 85 96   BUN 12 9   CREATININE 1.2 1.0   CALCIUM 9.2 9.4   PROT 5.9* 6.4   ALBUMIN 1.6* 1.7*   BILITOT 2.0* 2.0*   ALKPHOS 161* 181*   AST 24 21   ALT 27 26   ANIONGAP 3* 6*         Significant Imaging: I have reviewed all pertinent imaging results/findings within the past 24 hours.

## 2024-01-26 NOTE — NURSING
Nurses Note -- 4 Eyes      1/26/2024   1:26 AM      Skin assessed during: Transfer      [x] No Altered Skin Integrity Present    [x]Prevention Measures Documented      [] Yes- Altered Skin Integrity Present or Discovered   [] LDA Added if Not in Epic (Describe Wound)   [] New Altered Skin Integrity was Present on Admit and Documented in LDA   [] Wound Image Taken    Wound Care Consulted? No    Attending Nurse:  Jaguar Alfaro RN     Second RN/Staff Member:   Ricky Hinds RN         1.68

## 2024-01-26 NOTE — PLAN OF CARE
Transfer from MICU. AAOX4. Soft BPs. RA. IV ABX and PRN PO medication given for anxiety. No adverse events noted during this shift.    Problem: Adult Inpatient Plan of Care  Goal: Plan of Care Review  Outcome: Ongoing, Progressing  Flowsheets (Taken 1/26/2024 0553)  Plan of Care Reviewed With: patient  Goal: Patient-Specific Goal (Individualized)  Outcome: Ongoing, Progressing  Goal: Absence of Hospital-Acquired Illness or Injury  Outcome: Ongoing, Progressing  Intervention: Identify and Manage Fall Risk  Flowsheets (Taken 1/26/2024 0553)  Safety Promotion/Fall Prevention:   assistive device/personal item within reach   commode/urinal/bedpan at bedside   Fall Risk reviewed with patient/family   medications reviewed   room near unit station   side rails raised x 3   instructed to call staff for mobility  Goal: Optimal Comfort and Wellbeing  Outcome: Ongoing, Progressing  Intervention: Provide Person-Centered Care  Flowsheets (Taken 1/26/2024 0553)  Trust Relationship/Rapport:   care explained   choices provided   questions answered   questions encouraged  Goal: Readiness for Transition of Care  Outcome: Ongoing, Progressing     Problem: Adjustment to Illness (Sepsis/Septic Shock)  Goal: Optimal Coping  Outcome: Ongoing, Progressing  Intervention: Optimize Psychosocial Adjustment to Illness  Flowsheets (Taken 1/26/2024 0553)  Supportive Measures:   active listening utilized   relaxation techniques promoted   self-care encouraged   verbalization of feelings encouraged     Problem: Bleeding (Sepsis/Septic Shock)  Goal: Absence of Bleeding  Outcome: Ongoing, Progressing     Problem: Glycemic Control Impaired (Sepsis/Septic Shock)  Goal: Blood Glucose Level Within Desired Range  Outcome: Ongoing, Progressing     Problem: Nutrition Impaired (Sepsis/Septic Shock)  Goal: Optimal Nutrition Intake  Outcome: Ongoing, Progressing     Problem: Skin Injury Risk Increased  Goal: Skin Health and Integrity  Outcome: Ongoing,  Progressing  Intervention: Optimize Skin Protection  Flowsheets (Taken 1/26/2024 0553)  Pressure Reduction Techniques: frequent weight shift encouraged  Pressure Reduction Devices: positioning supports utilized  Head of Bed (HOB) Positioning: HOB at 20-30 degrees

## 2024-01-26 NOTE — CONSULTS
"[facility] - Med Surg ([facility]  Infectious Disease  Consult Note    Patient Name: Jesus Christy  MRN: [id number]  Admission Date: 1/22/2024  Hospital Length of Stay: 3 days  Attending Physician: Ramos Swanson III*  Primary Care Provider: Tara Jolly MD     Isolation Status: No active isolations    Patient information was obtained from patient and ER records.      Inpatient consult to Infectious Diseases  Consult performed by: Enedina Reis MD  Consult ordered by: Tomas Zuleta MD        Assessment/Plan:     ID  Streptococcal bacteremia  63M with h/o PVT, prostate ca, htn admitted 1/22 with anemia/fatigue, found to be febrile and hypotensive. Imaging revealed PVT. On vanc/zosyn --> unasyn. Fevers resolved. ID consulted for "Positive blood cultures, on unasyn, no clear source"    The + strep constellatus bcx is not a contaminant. Unclear source of bacteremia, but concern for GI source. It is commonly associated with abscesses; rarely endocarditis. Fortunately he's had recent MRI abdomen, CXR lung, MRI prostate without obvious infection. Generally very PCN susceptible, but anaerobe coverage reasonable given risk of co-anaerobe infection.    Recommendations:   - continue unasyn. Either unasyn or once a day cefriaxone/po flagyl is reasonable option on d/c  - consider updating colonoscopy- inpatient vs outpatient  - f/u repeat bcx to document clearance  - outpatient dental exam  - ongoing investigation to r/o abscess and primary site of infection    Outpatient Antibiotic Therapy Plan:    Please send referral to [facility].    1) Infection: strep bacteremia    2) Discharge Antibiotics:    Intravenous antibiotics:  Unasyn 12gm daily continuous infusion    3) Therapy Duration:  2 weeks (and continue until ID f/u appt)    Estimated end date of IV antibiotics: 2/5    4) Outpatient Weekly Labs:    Order the following labs to be drawn on Mondays:   CBC  CMP " "  CRP      5) Fax Lab Results to Infectious Diseases Provider: Dr Reis    [facility] Fax Number: [phone]    6) Outpatient Infectious Diseases Follow-up    Follow-up appointment will be arranged by the ID clinic and will be found in the patient's appointments tab.    Prior to discharge, please ensure the patient's follow-up has been scheduled.    If there is still no follow-up scheduled prior to discharge, please send an EPIC message to Dayanara Hughes in Infectious Diseases.                Thank you for your consult. I will follow-up with patient. Please contact us if you have any additional questions.    Enedina Reis MD  Infectious Disease  [facility] Surg ([facility])    Subjective:     Principal Problem: Portal vein thrombosis    HPI: 63M with h/o PVT, prostate ca, htn admitted 1/22 with anemia. Reports generalized fatigue. Reports constipation, no diarrhea. Denies abdominal pain. Denies recent colonoscopy    Found to be febrile 102, hypotention with leukocytosis and anemia    CT A/P with interval increased prominence of leisa hepatis lymph nodes may be reactive. Nonspecific patchy hypoenhancement of the liver on arterial phase, possibly related to vascular shunting and portal vein thrombus. Mild stranding about sigmoid diverticula similar to prior. Clinical correlation suggestive for mild diverticulitis.       MRI abdomen:     Extensive portal venous system thrombus similar to prior.     Hepatic subcentimeter focus of abnormal signal concerning for small middle hepatic vein thrombus.     Stable prominent leisa hepatis lymph nodes which may be reactive.     Subcentimeter pancreatic tail T2 hyperintensity which may be a cyst, pseudocyst, or side branch IPMN.  Follow-up suggested in 1 year.      On vanc/zosyn --> unasyn.     Fevers resolved    ID consulted for "Positive blood cultures, on unasyn, no clear source     Past Medical History:   Diagnosis Date    Complex regional pain syndrome i " of right lower limb     GERD (gastroesophageal reflux disease)     HTN (hypertension)        Past Surgical History:   Procedure Laterality Date    COLONOSCOPY N/A 9/29/2017    Procedure: COLONOSCOPY;  Surgeon: Jamar Edwards MD;  Location: [facility] ([address] [place] [address]);  Service: Endoscopy;  Laterality: N/A;    JOINT REPLACEMENT Right     knee    KNEE ARTHROSCOPY W/ ACL RECONSTRUCTION  2014    right       Review of patient's allergies indicates:   Allergen Reactions    Morphine Palpitations       No current facility-administered medications on file prior to encounter.     Current Outpatient Medications on File Prior to Encounter   Medication Sig    apixaban (ELIQUIS) 5 mg Tab Take 2 tablets (10 mg total) by mouth 2 (two) times daily. for 7 days. THEN Take 1 tablet (5 mg total) by mouth 2 (two) times daily.    diazePAM (VALIUM) 10 MG Tab Take 10 mg by mouth 3 (three) times daily.    tadalafiL (CIALIS) 20 MG Tab Take 1 tablet (20 mg total) by mouth daily as needed (erectile dysfunction).    ARIPiprazole (ABILIFY) 5 MG Tab Take 5 mg by mouth once daily.    DULoxetine (CYMBALTA) 30 MG capsule Take 30 mg by mouth once daily.    mirtazapine (REMERON) 30 MG tablet Take 45 mg by mouth every evening.    traMADoL (ULTRAM) 50 mg tablet Take 1 tablet (50 mg total) by mouth every 4 (four) hours as needed for Pain.     Family History       Problem Relation (Age of Onset)    Cancer Brother    Diabetes Father    Heart disease Father    Hypertension Mother    No Known Problems Daughter, Daughter, Daughter, Daughter, Son, Son          Tobacco Use    Smoking status: Never    Smokeless tobacco: Never   Substance and Sexual Activity    Alcohol use: Yes     Alcohol/week: 0.8 standard drinks of alcohol     Types: 1 Standard drinks or equivalent per week     Comment: once every few months    Drug use: No    Sexual activity: Not on file     Review of Systems   Constitutional:  Positive for chills, diaphoresis, fatigue and fever.   Eyes:  Negative for  visual disturbance.   Respiratory:  Negative for cough, chest tightness and shortness of breath.    Cardiovascular:  Negative for chest pain, palpitations and leg swelling.   Gastrointestinal:  Positive for diarrhea. Negative for abdominal pain, nausea and vomiting.   Genitourinary:  Negative for dysuria, frequency and urgency.   Neurological:  Positive for weakness. Negative for dizziness, light-headedness and headaches.     Objective:     Vital Signs (Most Recent):  Temp: 99.3 °F (37.4 °C) (01/26/24 1609)  Pulse: 101 (01/26/24 1609)  Resp: 19 (01/26/24 1609)  BP: (!) 108/59 (01/26/24 1609)  SpO2: 100 % (01/26/24 1609) Vital Signs (24h Range):  Temp:  [98.3 °F (36.8 °C)-99.3 °F (37.4 °C)] 99.3 °F (37.4 °C)  Pulse:  [] 101  Resp:  [18-29] 19  SpO2:  [91 %-100 %] 100 %  BP: ()/(54-68) 108/59     Weight: 75.3 kg (166 lb)  Body mass index is 24.51 kg/m².     Physical Exam  Constitutional:       General: He is not in acute distress.     Appearance: Normal appearance. He is not ill-appearing.   HENT:      Head: Normocephalic and atraumatic.      Nose: Nose normal.      Mouth/Throat:      Mouth: Mucous membranes are moist.   Eyes:      General: Scleral icterus present.      Extraocular Movements: Extraocular movements intact.      Conjunctiva/sclera: Conjunctivae normal.      Pupils: Pupils are equal, round, and reactive to light.   Cardiovascular:      Rate and Rhythm: Regular rhythm. Tachycardia present.      Pulses: Normal pulses.      Heart sounds: Normal heart sounds. No murmur heard.  Pulmonary:      Effort: Pulmonary effort is normal. No respiratory distress.      Breath sounds: Normal breath sounds. No wheezing, rhonchi or rales.   Abdominal:      General: Abdomen is flat. Bowel sounds are normal. There is no distension.      Palpations: Abdomen is soft.      Tenderness: There is abdominal tenderness.   Musculoskeletal:      Right lower leg: No edema.      Left lower leg: No edema.   Skin:     General:  Skin is warm and dry.      Capillary Refill: Capillary refill takes less than 2 seconds.   Neurological:      Mental Status: He is alert and oriented to person, place, and time.   Psychiatric:         Mood and Affect: Mood is anxious.         Behavior: Behavior normal.              CRANIAL NERVES     CN III, IV, VI   Pupils are equal, round, and reactive to light.       Significant Labs: All pertinent labs within the past 24 hours have been reviewed.  CBC:   Recent Labs   Lab 01/25/24  0521 01/26/24  0515   WBC 16.71* 16.28*   HGB 7.3* 7.5*   HCT 23.3* 24.7*    379       CMP:   Recent Labs   Lab 01/25/24  0521 01/26/24  0515   * 135*   K 4.3 4.4    107   CO2 24 22*   GLU 85 96   BUN 12 9   CREATININE 1.2 1.0   CALCIUM 9.2 9.4   PROT 5.9* 6.4   ALBUMIN 1.6* 1.7*   BILITOT 2.0* 2.0*   ALKPHOS 161* 181*   AST 24 21   ALT 27 26   ANIONGAP 3* 6*         Significant Imaging: I have reviewed all pertinent imaging results/findings within the past 24 hours.

## 2024-01-26 NOTE — ASSESSMENT & PLAN NOTE
Patient with history of prostate adenocarcinoma an portal vein thrombosis confirmed on imaging.  Patient has had recurrent fevers and tachycardia since his diagnosis. WBC >29. Bilirubin 4.1. Thrombosis worsening on imaging despite being on DOAC.    No known cause of thrombosis. Differential includes infection versus coagulopathy versus malignancy. Possible component of phlebitis and diverticulitis seen on imaging    Plan:  -hepatology consulted, recs appreciated  -Heparin drip started inpatient (starting at low intensity, will move to high intensity if hgb remains stable)  -On Unasyn, ID recs  -Hem recs continued AC when stable Hg  -Tylenol and ibuprofen sparingly for help controlling fevers

## 2024-01-26 NOTE — TREATMENT PLAN
"[facility]  Hepatology  Treatment Plan    Patient Name: Jesus Christy  MRN: [id number]  Admission Date: 1/22/2024  Hospital Length of Stay: 3 days  Code Status: Full Code   Attending Provider: Ramos Swanson III*   Consulting Provider: Ofe Haynes DO  Primary Care Physician: Tara Jolly MD  Principal Problem:Portal vein thrombosis    Subjective:     HPI: Jesus Christy is a 63 y.o. male with history of portal vein thrombosis recently diagnosed this month on apixaban, prostate adenocarcinoma (low-grade under active surveillance) who is refer to the ED for decreased Hemoglobin at 7.7 from 12.3 two weeks ago. He also reports weakness fatigue, fever, chills, diarrhea, nausea without vomiting and dark colored urine for the past 2 weeks. He denies having dark stool or blood in the stool.      Upon arrival to the ED he was hypotensive and febrile. CTA A/P showed with evidence of worsening thrombosis of the portal venous system now including the left portal vein and right portal vein anterior branch, prominence of leisa hepatic lymph nodes, nonspecific patchy hypoenhancement of the liver on arterial phase, mild diverticulitis and Stable pancreatic tail subcentimeter hypodensity. He was given 3L of IVF with no improvement. Pt was started on Levophed and admitted to the MICU.     He had a Fibroscan this month with evidence of Fibrosis stage of F 0 -1 and Steatosis Grade of S1. Last colonoscopy in 2017 with recommendation of repeating in 3 years which he did not follow up for. PSA from 1/18/24 at 12.4 increased from  6.6 on 10/20/23. AFP is elevated at 12 although he has no history of known liver disease, no history of hepatic cancers.      Hepatology consulted for "enlarging portal vein thrombosis on anticoagulation."    Interval Hx: NAEON.     Past Medical History:   Diagnosis Date    Complex regional pain syndrome i of right lower limb     GERD (gastroesophageal reflux disease)     HTN " (hypertension)        Past Surgical History:   Procedure Laterality Date    COLONOSCOPY N/A 9/29/2017    Procedure: COLONOSCOPY;  Surgeon: Jamar Edwards MD;  Location: [address];  Service: Endoscopy;  Laterality: N/A;    JOINT REPLACEMENT Right     knee    KNEE ARTHROSCOPY W/ ACL RECONSTRUCTION  2014    right       Family History   Problem Relation Age of Onset    Hypertension Mother     Heart disease Father     Diabetes Father     Cancer Brother         unknown type    No Known Problems Daughter     No Known Problems Daughter     No Known Problems Daughter     No Known Problems Daughter     No Known Problems Son     No Known Problems Son        Social History     Socioeconomic History    Marital status: Single   Tobacco Use    Smoking status: Never    Smokeless tobacco: Never   Substance and Sexual Activity    Alcohol use: Yes     Alcohol/week: 0.8 standard drinks of alcohol     Types: 1 Standard drinks or equivalent per week     Comment: once every few months    Drug use: No   Social History Narrative    Prior maintenance work.    Disable due to leg pain and depression.        Lives alone.    No exercise, due to leg pain.     Social Determinants of Health     Financial Resource Strain: Low Risk  (1/24/2024)    Overall Financial Resource Strain (CARDIA)     Difficulty of Paying Living Expenses: Not very hard   Recent Concern: Financial Resource Strain - Medium Risk (1/24/2024)    Overall Financial Resource Strain (CARDIA)     Difficulty of Paying Living Expenses: Somewhat hard   Food Insecurity: Patient Declined (1/24/2024)    Hunger Vital Sign     Worried About Running Out of Food in the Last Year: Patient declined     Ran Out of Food in the Last Year: Patient declined   Recent Concern: Food Insecurity - Food Insecurity Present (1/24/2024)    Hunger Vital Sign     Worried About Running Out of Food in the Last Year: Sometimes true     Ran Out of Food in the Last Year: Never true   Transportation Needs:  No Transportation Needs (1/24/2024)    PRAPARE - Transportation     Lack of Transportation (Medical): No     Lack of Transportation (Non-Medical): No   Physical Activity: Patient Declined (1/24/2024)    Exercise Vital Sign     Days of Exercise per Week: Patient declined     Minutes of Exercise per Session: Patient declined   Stress: No Stress Concern Present (1/24/2024)    Citizen of [place] of Occupational Health - Occupational Stress Questionnaire     Feeling of Stress : Only a little   Social Connections: Moderately Isolated (1/24/2024)    Social Connection and Isolation Panel [NHANES]     Frequency of Communication with Friends and Family: More than three times a week     Frequency of Social Gatherings with Friends and Family: More than three times a week     Attends Mu-ism Services: More than 4 times per year     Active Member of Clubs or Organizations: No     Attends Club or Organization Meetings: Never     Marital Status:    Housing Stability: Low Risk  (1/24/2024)    Housing Stability Vital Sign     Unable to Pay for Housing in the Last Year: No     Number of Places Lived in the Last Year: 1     Unstable Housing in the Last Year: No       No current facility-administered medications on file prior to encounter.     Current Outpatient Medications on File Prior to Encounter   Medication Sig Dispense Refill    apixaban (ELIQUIS) 5 mg Tab Take 2 tablets (10 mg total) by mouth 2 (two) times daily. for 7 days. THEN Take 1 tablet (5 mg total) by mouth 2 (two) times daily. 60 tablet 3    diazePAM (VALIUM) 10 MG Tab Take 10 mg by mouth 3 (three) times daily.      tadalafiL (CIALIS) 20 MG Tab Take 1 tablet (20 mg total) by mouth daily as needed (erectile dysfunction). 30 tablet 5    ARIPiprazole (ABILIFY) 5 MG Tab Take 5 mg by mouth once daily.      DULoxetine (CYMBALTA) 30 MG capsule Take 30 mg by mouth once daily.      mirtazapine (REMERON) 30 MG tablet Take 45 mg by mouth every evening. 30 tablet 11     traMADoL (ULTRAM) 50 mg tablet Take 1 tablet (50 mg total) by mouth every 4 (four) hours as needed for Pain. 30 tablet 0       Review of patient's allergies indicates:   Allergen Reactions    Morphine Palpitations     Review of Systems   Constitutional:  Positive for chills, fever and malaise/fatigue.   Gastrointestinal:  Positive for nausea. Negative for abdominal pain, blood in stool, diarrhea, melena and vomiting.   All other systems reviewed and are negative.    Objective:     Vitals:    01/26/24 0756   BP: 108/64   Pulse: 94   Resp: 18   Temp: 99 °F (37.2 °C)        Physical Exam  Vitals and nursing note reviewed.   Constitutional:       Appearance: Normal appearance.   HENT:      Right Ear: External ear normal.      Left Ear: External ear normal.      Mouth/Throat:      Mouth: Mucous membranes are moist.      Pharynx: Oropharynx is clear.   Eyes:      General: Scleral icterus present.      Pupils: Pupils are equal, round, and reactive to light.   Cardiovascular:      Rate and Rhythm: Normal rate and regular rhythm.      Pulses: Normal pulses.      Heart sounds: Normal heart sounds.   Pulmonary:      Effort: Pulmonary effort is normal.      Breath sounds: Normal breath sounds.   Abdominal:      General: Abdomen is flat. Bowel sounds are normal. There is no distension.      Tenderness: There is no abdominal tenderness. There is no rebound.      Hernia: No hernia is present.   Musculoskeletal:         General: Normal range of motion.      Cervical back: Normal range of motion.   Skin:     General: Skin is warm and dry.      Coloration: Skin is jaundiced.   Neurological:      General: No focal deficit present.      Mental Status: He is alert and oriented to person, place, and time.   Psychiatric:         Mood and Affect: Mood normal.         Behavior: Behavior normal.     Significant Labs:  Recent Labs   Lab 01/24/24  0443 01/25/24  0521 01/26/24  0515   HGB 8.1* 7.3* 7.5*         Lab Results   Component Value  Date    WBC 16.28 (H) 01/26/2024    HGB 7.5 (L) 01/26/2024    HCT 24.7 (L) 01/26/2024    MCV 95 01/26/2024     01/26/2024       Lab Results   Component Value Date     (L) 01/26/2024    K 4.4 01/26/2024     01/26/2024    CO2 22 (L) 01/26/2024    BUN 9 01/26/2024    CREATININE 1.0 01/26/2024    CALCIUM 9.4 01/26/2024    ANIONGAP 6 (L) 01/26/2024    ESTGFRAFRICA >60.0 06/03/2020    EGFRNONAA >60.0 06/03/2020       Lab Results   Component Value Date    ALT 26 01/26/2024    AST 21 01/26/2024    ALKPHOS 181 (H) 01/26/2024    BILITOT 2.0 (H) 01/26/2024       Lab Results   Component Value Date    INR 1.2 01/23/2024    INR 1.2 01/22/2024    INR 1.0 12/24/2017       Significant Imaging:  Reviewed pertinent radiology findings.       Assessment/Plan:     Jesus Christy is a 63 y.o. male with history of portal vein thrombosis recently diagnosed this month on apixaban, prostate adenocarcinoma (low-grade under active surveillance) who is admitted for acute anemia and shock. Found to have worsening of the his portal vein thrombosis on CTA A/P. No sign of an acute bleed. Concern that patient may have recurrence of prostate CA as PSA now elevated from 1/18/24 at 12.4 increased from  6.6 on 10/20/23. AFP elevated at 12 with  elevated at 60. Outpatient workup for hypercoagulation significant for elevated Cardiolipin IgM at 47. Colonoscopy from 2017 significant for multiple polyps and patient was advised to follow up in 3 years which he had not done. Hospital course complicated by bacteremia positive for Strep and Bacteroides. GI consulted who recommend outpatient EGD and colonoscopy in light of the bacteremia. Hematology consulted who believe that acute hemoglobin drop is secondary to blood loss and recommend GI evaluation. MRI liver showed Extensive portal venous system thrombus similar to prior, concern for small middle hepatic vein thrombus and pancreatic tail hyperintensity suggestive of cyst, pseudocyst,  or side branch IPMN.     Problem List:  Portal Vein Thrombosis  Shock  Diverticulitis  Hx of adenocarcinoma of Prostate     Recommendations:     - Resume anticoagulation when Hgb is stable   - EGD/colonoscopy outpatient upon discharge   - Appreciate hematology recs  - Treat bacteremia with abx per primary team  - Recommend outpatient urology follow-up for evaluation of renal cysts     Thank you for involving us in the care of Jesus Christy. Please call with any additional questions, concerns or changes in the patient's clinical status. We will continue to follow.     Ofe Haynes DO  Internal Medicine PGY-1  [facility]

## 2024-01-26 NOTE — ASSESSMENT & PLAN NOTE
Evidence of mild diverticulitis seen on CT in patient with diarrhea meeting sepsis criteria and WBC >29.    -On Unasyn  -Consider further stool testing if diarrhea continues

## 2024-01-26 NOTE — ASSESSMENT & PLAN NOTE
Dx 9/2016. Low grade, low volume. Active surveillance.    -complained of hematuria on admission  -urine with less blood/discoloration throughout admission  -will monitor for source of bleeds with restarting AC 1/26

## 2024-01-26 NOTE — PROGRESS NOTES
[facility] - Med Surg ([address] Medicine  Progress Note    Patient Name: Jesus Christy  MRN: [id number]  Patient Class: IP- Inpatient   Admission Date: 1/22/2024  Length of Stay: 3 days  Attending Physician: Ramos Swanson III*  Primary Care Provider: Tara Jolly MD        Subjective:     Principal Problem:Portal vein thrombosis        HPI:  Patient is a 64 yo man with a PMH of recently diagnosed portal vein thrombosis, prostate adenocarcinoma (low-grade under active surveillance), complex regional pain syndrome, anxiety on chronic benzodiazepines, and HTN who [place] [facility] [place] for falling Hgb (7.7 from 12.3 two weeks ago).  He reports increasing weakness and fatigue since being diagnosed with his portal vein thrombosis earlier this month.  He has associated symptoms of fever, chills, and diarrhea for the past 1-2 weeks.  He states he also has noticed darker colored urine and lower blood pressure over this time.  He denies any shortness a breath, chest pain, vision changes, dizziness, nausea/vomiting, or dysuria. He has not noticed any blood in the stool or dark colored stools.     While in the ED:  Patient with fever up to 102°.  Blood pressure down to 87/51. Initial WBC >29. Hgb 7.7. Creatinine 1.7. Bilirubin 4.1. CT a/p with Interval increased prominence of leisa hepatis lymph nodes may be reactive. Nonspecific patchy hypoenhancement of the liver on arterial phase, possibly related to vascular shunting and portal vein thrombus. Mild stranding about sigmoid diverticula similar to prior. Clinical correlation suggestive for mild diverticulitis. Patient was started on zosyn and given fluids.    Overview/Hospital Course:  [place] [facility] [place] lab from clinic on 1/24. Initially febrile, WBC 29, hypotensive, elevated bilirubin and unresponsives to fluids. Stepped up to MICU for septic shock, required pressors. CT a/p with worsened portal vein thrombosis and  mild diverticulitis. Bcxs 1/22 positive for strep/bacteroides. Vanc/Zosyn transitioned to Unasyn. Short course of pressors stopped. Hepatology and GI recs continued treatment of bacteremia and EGD/colonoscopy outpatient. Stepped down to  1/25 for continued management. Restarted AC per hematology recs. ID consulted for infectious source and abx assistance.     Interval History: NAEON, resting comfortably in bed with continued fatigue and shortness of breath O2 sats wnl, AC restarted, ID consulted    Review of Systems  Objective:     Vital Signs (Most Recent):  Temp: 98.3 °F (36.8 °C) (01/26/24 1115)  Pulse: 101 (01/26/24 1118)  Resp: 18 (01/26/24 1115)  BP: 124/68 (01/26/24 1115)  SpO2: 100 % (01/26/24 1115) Vital Signs (24h Range):  Temp:  [98.3 °F (36.8 °C)-99.2 °F (37.3 °C)] 98.3 °F (36.8 °C)  Pulse:  [] 101  Resp:  [18-29] 18  SpO2:  [91 %-100 %] 100 %  BP: ()/(54-68) 124/68     Weight: 75.3 kg (166 lb)  Body mass index is 24.51 kg/m².    Intake/Output Summary (Last 24 hours) at 1/26/2024 1356  Last data filed at 1/26/2024 0603  Gross per 24 hour   Intake 752.51 ml   Output 1100 ml   Net -347.49 ml         Physical Exam  Vitals and nursing note reviewed.   Constitutional:       General: He is not in acute distress.     Appearance: Normal appearance. He is not ill-appearing.   HENT:      Head: Normocephalic and atraumatic.      Mouth/Throat:      Mouth: Mucous membranes are moist.   Eyes:      Extraocular Movements: Extraocular movements intact.      Pupils: Pupils are equal, round, and reactive to light.      Comments: ptosis right eyelid   Pulmonary:      Effort: Pulmonary effort is normal. No respiratory distress.   Abdominal:      General: Bowel sounds are normal. There is no distension.      Palpations: Abdomen is soft.      Tenderness: There is no abdominal tenderness. There is no guarding.   Skin:     General: Skin is warm.      Capillary Refill: Capillary refill takes less than 2 seconds.    Neurological:      General: No focal deficit present.      Mental Status: He is alert and oriented to person, place, and time.   Psychiatric:         Mood and Affect: Mood normal.         Thought Content: Thought content normal.             Significant Labs: All pertinent labs within the past 24 hours have been reviewed.  CBC:   Recent Labs   Lab 01/25/24 0521 01/26/24 0515   WBC 16.71* 16.28*   HGB 7.3* 7.5*   HCT 23.3* 24.7*    379     CMP:   Recent Labs   Lab 01/25/24 0521 01/26/24  0515   * 135*   K 4.3 4.4    107   CO2 24 22*   GLU 85 96   BUN 12 9   CREATININE 1.2 1.0   CALCIUM 9.2 9.4   PROT 5.9* 6.4   ALBUMIN 1.6* 1.7*   BILITOT 2.0* 2.0*   ALKPHOS 161* 181*   AST 24 21   ALT 27 26   ANIONGAP 3* 6*       Significant Imaging: I have reviewed all pertinent imaging results/findings within the past 24 hours.    Assessment/Plan:      * Portal vein thrombosis  Patient with history of prostate adenocarcinoma an portal vein thrombosis confirmed on imaging.  Patient has had recurrent fevers and tachycardia since his diagnosis. WBC >29. Bilirubin 4.1. Thrombosis worsening on imaging despite being on DOAC.    No known cause of thrombosis. Differential includes infection versus coagulopathy versus malignancy. Possible component of phlebitis and diverticulitis seen on imaging    Plan:  -hepatology consulted, recs appreciated  -Heparin drip started inpatient (starting at low intensity, will move to high intensity if hgb remains stable)  -On Unasyn, ID recs  -Hem recs continued AC when stable Hg  -Tylenol and ibuprofen sparingly for help controlling fevers      Anemia    Lab Results   Component Value Date    HGB 7.4 (L) 01/23/2024    HCT 23.7 (L) 01/23/2024     Unclear cause. Possibly related to portal vein thrombosis. No signs of active bleeding. Heme occult blood test negative for blood in stool.    -Monitor serial CBC and transfuse if patient becomes hemodynamically unstable, symptomatic or H/H  "drops below 7/21.        Diverticulitis  Evidence of mild diverticulitis seen on CT in patient with diarrhea meeting sepsis criteria and WBC >29.    -On Unasyn  -Consider further stool testing if diarrhea continues        Septic shock  This patient does have evidence of infective focus  My overall impression is sepsis.  Source: Abdominal  Antibiotics given-   Antibiotics (72h ago, onward)      Start     Stop Route Frequency Ordered    01/25/24 1400  ampicillin-sulbactam (UNASYN) 3 g in sodium chloride 0.9 % 100 mL IVPB (MB+)         -- IV Every 6 hours (non-standard times) 01/25/24 1121          Latest lactate reviewed-  No results for input(s): "LACTATE", "POCLAC" in the last 72 hours.    -ID consulted 1/26  -Continue Unasyn, off pressor support  -Bacteremia 1/22 with strep c and bacteroides  -repeat Bcxs clean            Chronic prescription benzodiazepine use  -Continuing home dosing to avoid withdrawal. Patient states he does not take them daily.      Complex regional pain syndrome type 1 of right lower extremity  -Holding home cymbalta, continue once fevers controlled      Depression  -Hold home antidepressants as could possibly contribute to ongoing fevers      Adenocarcinoma of prostate  Dx 9/2016. Low grade, low volume. Active surveillance.    -complained of hematuria on admission  -urine with less blood/discoloration throughout admission  -will monitor for source of bleeds with restarting AC 1/26    Essential hypertension  Not currently on home antihypertensives.      VTE Risk Mitigation (From admission, onward)           Ordered     apixaban tablet 5 mg  2 times daily         01/26/24 0948                    Discharge Planning   ANICETO: 1/30/2024     Code Status: Full Code   Is the patient medically ready for discharge?:     Reason for patient still in hospital (select all that apply): Treatment  Discharge Plan A: Home Health                  Tomas Zuleta MD  Department of Hospital Medicine   [facility] " Surg ([facility])

## 2024-01-27 VITALS
RESPIRATION RATE: 18 BRPM | WEIGHT: 166 LBS | DIASTOLIC BLOOD PRESSURE: 62 MMHG | TEMPERATURE: 99 F | HEIGHT: 69 IN | HEART RATE: 95 BPM | SYSTOLIC BLOOD PRESSURE: 91 MMHG | OXYGEN SATURATION: 100 % | BODY MASS INDEX: 24.59 KG/M2

## 2024-01-27 LAB
ALBUMIN SERPL BCP-MCNC: 1.7 G/DL (ref 3.5–5.2)
ALP SERPL-CCNC: 186 U/L (ref 55–135)
ALT SERPL W/O P-5'-P-CCNC: 23 U/L (ref 10–44)
ANION GAP SERPL CALC-SCNC: 4 MMOL/L (ref 8–16)
AST SERPL-CCNC: 21 U/L (ref 10–40)
BASOPHILS # BLD AUTO: 0.03 K/UL (ref 0–0.2)
BASOPHILS NFR BLD: 0.2 % (ref 0–1.9)
BILIRUB SERPL-MCNC: 1.5 MG/DL (ref 0.1–1)
BUN SERPL-MCNC: 8 MG/DL (ref 8–23)
CALCIUM SERPL-MCNC: 9.6 MG/DL (ref 8.7–10.5)
CHLORIDE SERPL-SCNC: 107 MMOL/L (ref 95–110)
CO2 SERPL-SCNC: 26 MMOL/L (ref 23–29)
CREAT SERPL-MCNC: 1 MG/DL (ref 0.5–1.4)
DIFFERENTIAL METHOD BLD: ABNORMAL
EOSINOPHIL # BLD AUTO: 0.2 K/UL (ref 0–0.5)
EOSINOPHIL NFR BLD: 1.4 % (ref 0–8)
ERYTHROCYTE [DISTWIDTH] IN BLOOD BY AUTOMATED COUNT: 16 % (ref 11.5–14.5)
EST. GFR  (NO RACE VARIABLE): >60 ML/MIN/1.73 M^2
GLUCOSE SERPL-MCNC: 102 MG/DL (ref 70–110)
HCT VFR BLD AUTO: 24.8 % (ref 40–54)
HGB BLD-MCNC: 7.4 G/DL (ref 14–18)
IMM GRANULOCYTES # BLD AUTO: 0.24 K/UL (ref 0–0.04)
IMM GRANULOCYTES NFR BLD AUTO: 2 % (ref 0–0.5)
LYMPHOCYTES # BLD AUTO: 1.7 K/UL (ref 1–4.8)
LYMPHOCYTES NFR BLD: 14.1 % (ref 18–48)
MAGNESIUM SERPL-MCNC: 2.1 MG/DL (ref 1.6–2.6)
MCH RBC QN AUTO: 29.1 PG (ref 27–31)
MCHC RBC AUTO-ENTMCNC: 29.8 G/DL (ref 32–36)
MCV RBC AUTO: 98 FL (ref 82–98)
MONOCYTES # BLD AUTO: 0.8 K/UL (ref 0.3–1)
MONOCYTES NFR BLD: 6.7 % (ref 4–15)
NEUTROPHILS # BLD AUTO: 9.3 K/UL (ref 1.8–7.7)
NEUTROPHILS NFR BLD: 75.6 % (ref 38–73)
NRBC BLD-RTO: 0 /100 WBC
PHOSPHATE SERPL-MCNC: 2.9 MG/DL (ref 2.7–4.5)
PLATELET # BLD AUTO: 409 K/UL (ref 150–450)
PMV BLD AUTO: 9.6 FL (ref 9.2–12.9)
POTASSIUM SERPL-SCNC: 4.3 MMOL/L (ref 3.5–5.1)
PROT SERPL-MCNC: 6.3 G/DL (ref 6–8.4)
RBC # BLD AUTO: 2.54 M/UL (ref 4.6–6.2)
SODIUM SERPL-SCNC: 137 MMOL/L (ref 136–145)
WBC # BLD AUTO: 12.29 K/UL (ref 3.9–12.7)

## 2024-01-27 PROCEDURE — 36415 COLL VENOUS BLD VENIPUNCTURE: CPT

## 2024-01-27 PROCEDURE — 63600175 PHARM REV CODE 636 W HCPCS

## 2024-01-27 PROCEDURE — 63600175 PHARM REV CODE 636 W HCPCS: Performed by: INTERNAL MEDICINE

## 2024-01-27 PROCEDURE — 94761 N-INVAS EAR/PLS OXIMETRY MLT: CPT

## 2024-01-27 PROCEDURE — 83735 ASSAY OF MAGNESIUM: CPT

## 2024-01-27 PROCEDURE — 80053 COMPREHEN METABOLIC PANEL: CPT

## 2024-01-27 PROCEDURE — 36410 VNPNXR 3YR/> PHY/QHP DX/THER: CPT

## 2024-01-27 PROCEDURE — 25000003 PHARM REV CODE 250: Performed by: INTERNAL MEDICINE

## 2024-01-27 PROCEDURE — 99232 SBSQ HOSP IP/OBS MODERATE 35: CPT | Mod: ,,, | Performed by: INTERNAL MEDICINE

## 2024-01-27 PROCEDURE — 84100 ASSAY OF PHOSPHORUS: CPT

## 2024-01-27 PROCEDURE — 85025 COMPLETE CBC W/AUTO DIFF WBC: CPT

## 2024-01-27 PROCEDURE — 25000003 PHARM REV CODE 250

## 2024-01-27 PROCEDURE — C1751 CATH, INF, PER/CENT/MIDLINE: HCPCS

## 2024-01-27 PROCEDURE — 76937 US GUIDE VASCULAR ACCESS: CPT

## 2024-01-27 RX ORDER — SODIUM CHLORIDE 0.9 % (FLUSH) 0.9 %
10 SYRINGE (ML) INJECTION EVERY 6 HOURS
Status: DISCONTINUED | OUTPATIENT
Start: 2024-01-27 | End: 2024-01-27 | Stop reason: HOSPADM

## 2024-01-27 RX ORDER — SODIUM CHLORIDE 0.9 % (FLUSH) 0.9 %
10 SYRINGE (ML) INJECTION
Status: DISCONTINUED | OUTPATIENT
Start: 2024-01-27 | End: 2024-01-27 | Stop reason: HOSPADM

## 2024-01-27 RX ORDER — METRONIDAZOLE 500 MG/1
500 TABLET ORAL EVERY 12 HOURS
Status: DISCONTINUED | OUTPATIENT
Start: 2024-01-27 | End: 2024-01-27 | Stop reason: HOSPADM

## 2024-01-27 RX ORDER — METRONIDAZOLE 500 MG/1
500 TABLET ORAL EVERY 12 HOURS
Qty: 20 TABLET | Refills: 0 | Status: SHIPPED | OUTPATIENT
Start: 2024-01-27 | End: 2024-02-06

## 2024-01-27 RX ADMIN — AMPICILLIN SODIUM, SULBACTAM SODIUM 3 G: 2; 1 INJECTION, POWDER, FOR SOLUTION INTRAMUSCULAR; INTRAVENOUS at 02:01

## 2024-01-27 RX ADMIN — SENNOSIDES AND DOCUSATE SODIUM 1 TABLET: 50; 8.6 TABLET ORAL at 09:01

## 2024-01-27 RX ADMIN — AMPICILLIN SODIUM, SULBACTAM SODIUM 3 G: 2; 1 INJECTION, POWDER, FOR SOLUTION INTRAMUSCULAR; INTRAVENOUS at 09:01

## 2024-01-27 RX ADMIN — PANTOPRAZOLE SODIUM 40 MG: 40 TABLET, DELAYED RELEASE ORAL at 07:01

## 2024-01-27 RX ADMIN — AMPICILLIN SODIUM, SULBACTAM SODIUM 3 G: 2; 1 INJECTION, POWDER, FOR SOLUTION INTRAMUSCULAR; INTRAVENOUS at 01:01

## 2024-01-27 RX ADMIN — CEFTRIAXONE 2 G: 2 INJECTION, POWDER, FOR SOLUTION INTRAMUSCULAR; INTRAVENOUS at 04:01

## 2024-01-27 RX ADMIN — PANTOPRAZOLE SODIUM 40 MG: 40 TABLET, DELAYED RELEASE ORAL at 04:01

## 2024-01-27 RX ADMIN — APIXABAN 5 MG: 5 TABLET, FILM COATED ORAL at 09:01

## 2024-01-27 NOTE — CONSULTS
Single lumen 18G, 10CM midline placed in the right basilic vein. Needle advanced into the vessel under real time ultrasound guidance. Image recorded and saved.    Max dwell date 02/25/24.  Lot number: [id number]

## 2024-01-27 NOTE — DISCHARGE SUMMARY
[facility] - Med Surg ([place] [id number])  [facility] Medicine  Discharge Summary      Patient Name: Jesus Christy  MRN: [id number]  JAN: [id number]  Patient Class: IP- Inpatient  Admission Date: 1/22/2024  Hospital Length of Stay: 4 days  Discharge Date and Time:  01/27/2024 12:43 PM  Attending Physician: Ramos Swanson III*   Discharging Provider: Amor Gunter MD  Primary Care Provider: Tara Jolly MD  [facility] Medicine Team: [facility] HOSP [facility] 4 Amor Gunter MD  Primary Care Team: [facility] 4    HPI:   Patient is a 62 yo man with a PMH of recently diagnosed portal vein thrombosis, prostate adenocarcinoma (low-grade under active surveillance), complex regional pain syndrome, anxiety on chronic benzodiazepines, and HTN who was sent to [facility] from PCP clinic for falling Hgb (7.7 from 12.3 two weeks ago).  He reports increasing weakness and fatigue since being diagnosed with his portal vein thrombosis earlier this month.  He has associated symptoms of fever, chills, and diarrhea for the past 1-2 weeks.  He states he also has noticed darker colored urine and lower blood pressure over this time.  He denies any shortness a breath, chest pain, vision changes, dizziness, nausea/vomiting, or dysuria. He has not noticed any blood in the stool or dark colored stools.     While in the ED:  Patient with fever up to 102°.  Blood pressure down to 87/51. Initial WBC >29. Hgb 7.7. Creatinine 1.7. Bilirubin 4.1. CT a/p with Interval increased prominence of leisa hepatis lymph nodes may be reactive. Nonspecific patchy hypoenhancement of the liver on arterial phase, possibly related to vascular shunting and portal vein thrombus. Mild stranding about sigmoid diverticula similar to prior. Clinical correlation suggestive for mild diverticulitis. Patient was started on zosyn and given fluids.    * No surgery found *      Hospital Course:   Admitted to [facility] from abnormal hemoglobin lab from clinic on 1/24. Initially febrile, WBC 29,  hypotensive, elevated bilirubin and unresponsives to fluids. Stepped up to MICU for septic shock, required pressors. CT a/p with worsened portal vein thrombosis and mild diverticulitis. Bcxs 1/22 positive for strep/bacteroides. Vanc/Zosyn transitioned to Unasyn. Short course of pressors stopped. Hepatology and GI recs continued treatment of bacteremia and EGD/colonoscopy outpatient. Stepped down to  1/25 for continued management. Restarted AC per hematology recs. ID consulted for infectious source, will continue unasyn with treatment end date of 2/5. ID will continue to follow patient outpatient. Appointments have been made for GI follow up, hepatology follow up, and hemaotlogy follow up. Patient hemoglobin remained stable with addition of eliquis. Patient medically stable for discharge home with home health and close follow up. Return precautions were given.     Goals of Care Treatment Preferences:  Code Status: Full Code      Consults:   Consults (From admission, onward)          Status Ordering Provider     Inpatient consult to Midline team  Once        Provider:  (Not yet assigned)    Completed SCAR LAW     Inpatient consult to Infectious Diseases  Once        Provider:  (Not yet assigned)    Completed GILL CASH     Inpatient consult to Gastroenterology  Once        Provider:  (Not yet assigned)    Completed LEONORA ESPINO     Inpatient consult to Hematology  Once        Provider:  (Not yet assigned)    Completed LEONORA ESPINO     Inpatient consult to Critical Care Medicine  Once        Provider:  (Not yet assigned)    Completed LUCIA MONTGOMERY     Pharmacy to dose Vancomycin consult  Once        Provider:  (Not yet assigned)   See Hyperspace for full Linked Orders Report.    Completed LUCIA MONTGOMERY     Inpatient consult to Hepatology  Once        Provider:  (Not yet assigned)    Completed LUCIA MONTGOMERY            Review of Systems   Constitutional:  Positive for fatigue and unexpected  weight change. Negative for chills.   Respiratory:  Negative for cough and shortness of breath.    Cardiovascular:  Negative for chest pain.   Gastrointestinal:  Negative for abdominal distention, abdominal pain, blood in stool, constipation, diarrhea, nausea and vomiting.   Genitourinary:  Negative for difficulty urinating.   Musculoskeletal:  Negative for arthralgias and myalgias.   Skin:  Negative for rash and wound.   Neurological:  Negative for light-headedness and headaches.      Objective:      Vital Signs (Most Recent):  Temp: 98.8 °F (37.1 °C) (01/27/24 0826)  Pulse: 94 (01/27/24 1144)  Resp: 18 (01/27/24 0826)  BP: (!) 93/51 (01/27/24 0826)  SpO2: 96 % (01/27/24 0826) Vital Signs (24h Range):  Temp:  [98 °F (36.7 °C)-99.3 °F (37.4 °C)] 98.8 °F (37.1 °C)  Pulse:  [] 94  Resp:  [17-19] 18  SpO2:  [94 %-100 %] 96 %  BP: ()/(51-64) 93/51      Weight: 75.3 kg (166 lb)  Body mass index is 24.51 kg/m².     Intake/Output Summary (Last 24 hours) at 1/27/2024 1242  Last data filed at 1/27/2024 0543      Gross per 24 hour   Intake 858.62 ml   Output 1200 ml   Net -341.38 ml      Physical Exam  Vitals and nursing note reviewed.   Constitutional:       General: He is not in acute distress.     Appearance: Normal appearance. He is not ill-appearing.   HENT:      Head: Normocephalic and atraumatic.      Mouth/Throat:      Mouth: Mucous membranes are moist.   Eyes:      Extraocular Movements: Extraocular movements intact.      Pupils: Pupils are equal, round, and reactive to light.      Comments: ptosis right eyelid   Pulmonary:      Effort: Pulmonary effort is normal. No respiratory distress.   Abdominal:      General: Bowel sounds are normal. There is no distension.      Palpations: Abdomen is soft.      Tenderness: There is no abdominal tenderness. There is no guarding.   Skin:     General: Skin is warm.      Capillary Refill: Capillary refill takes less than 2 seconds.   Neurological:      General: No  focal deficit present.      Mental Status: He is alert and oriented to person, place, and time.   Psychiatric:         Mood and Affect: Mood normal.         Thought Content: Thought content normal.                Significant Labs: All pertinent labs within the past 24 hours have been reviewed.     Significant Imaging: I have reviewed all pertinent imaging results/findings within the past 24 hours.    Final Active Diagnoses:    Diagnosis Date Noted POA    PRINCIPAL PROBLEM:  Portal vein thrombosis [I81] 01/02/2024 Yes    Streptococcal bacteremia [R78.81, B95.5] 01/26/2024 Yes    Septic shock [A41.9, R65.21] 01/23/2024 Yes    Diverticulitis [K57.92] 01/23/2024 Yes    Anemia [D64.9] 01/23/2024 Yes    Chronic prescription benzodiazepine use [Z79.899] 01/01/2024 Not Applicable     Chronic    Depression [F32.A] 05/22/2017 Yes    Complex regional pain syndrome type 1 of right lower extremity [G90.521] 05/22/2017 Yes    Adenocarcinoma of prostate [C61] 10/26/2016 Yes     Chronic    Essential hypertension [I10]  Yes     Chronic      Problems Resolved During this Admission:       Discharged Condition: stable    Disposition:     Follow Up:    Patient Instructions:      Ambulatory referral/consult to Outpatient Infusion and Home Infusion   Standing Status: Future   Referral Priority: Routine Referral Type: Psychiatric   Referral Reason: Specialty Services Required   Requested Specialty: Behavioral Health   Number of Visits Requested: 1       Pending Diagnostic Studies:       None           Medications:  Reconciled Home Medications:      Medication List        START taking these medications      dextrose 5 % in water (D5W) PgBk 100 mL with cefTRIAXone 2 gram SolR 2 g  Inject 2 g into the vein every 12 (twelve) hours. for 10 days     metroNIDAZOLE 500 MG tablet  Commonly known as: FLAGYL  Take 1 tablet (500 mg total) by mouth every 12 (twelve) hours. for 10 days            CONTINUE taking these medications      diazePAM 10 MG  Tab  Commonly known as: VALIUM  Take 10 mg by mouth 3 (three) times daily.     ELIQUIS 5 mg Tab  Generic drug: apixaban  Take 2 tablets (10 mg total) by mouth 2 (two) times daily. for 7 days. THEN Take 1 tablet (5 mg total) by mouth 2 (two) times daily.     tadalafiL 20 MG Tab  Commonly known as: CIALIS  Take 1 tablet (20 mg total) by mouth daily as needed (erectile dysfunction).            STOP taking these medications      ARIPiprazole 5 MG Tab  Commonly known as: ABILIFY     DULoxetine 30 MG capsule  Commonly known as: CYMBALTA     mirtazapine 30 MG tablet  Commonly known as: REMERON     traMADoL 50 mg tablet  Commonly known as: ULTRAM              Indwelling Lines/Drains at time of discharge:   Lines/Drains/Airways       None                   Time spent on the discharge of patient: 30 minutes         Amor Gunter MD  Department of Hospital Medicine  [facility] Surg ([place]-[facility])

## 2024-01-27 NOTE — CONSULTS
"[facility] ([facility]  Infectious Disease  Consult Note    Patient Name: Jesus Christy  MRN: [id number]  Admission Date: 1/22/2024  Hospital Length of Stay: 4 days  Attending Physician: Ramos Swanson III*  Primary Care Provider: Tara Jolly MD     Isolation Status: No active isolations    Patient information was obtained from patient and ER records.      Consults  Assessment/Plan:     ID  Streptococcal bacteremia  63M with h/o PVT, prostate ca, htn admitted 1/22 with anemia/fatigue, found to be febrile and hypotensive. Imaging revealed PVT. On vanc/zosyn --> unasyn. Fevers resolved. ID consulted for "Positive blood cultures, on unasyn, no clear source"    The + strep constellatus bcx is not a contaminant. Unclear source of bacteremia, but concern for GI source. It is commonly associated with abscesses; rarely endocarditis. Fortunately he's had recent MRI abdomen, CXR lung, MRI prostate without obvious infection. Generally very PCN susceptible, but anaerobe coverage reasonable given risk of co-anaerobe infection. Spoke with infusion company, ceftriaxone easier and less expensive than unasyn    Recommendations:   - change to ceftrixone/flagyl  - needs updated colonoscopy; please place referral   - outpatient dental exam  - ongoing investigation to r/o abscess and primary site of infection    Outpatient Antibiotic Therapy Plan:    Please send referral to [facility].    1) Infection: strep bacteremia    2) Discharge Antibiotics:    Intravenous antibiotics:  Ceftriaxone 2gm iv daily    Flagyl 500mg po BID    3) Therapy Duration:  2 weeks (and continue until ID f/u appt)    Estimated end date of IV antibiotics: 2/5    4) Outpatient Weekly Labs:    Order the following labs to be drawn on Mondays:   CBC  CMP   CRP      5) Fax Lab Results to Infectious Diseases Provider: Dr Reis    [facility] Fax Number: [phone]    6) Outpatient Infectious " "Diseases Follow-up    Follow-up appointment will be arranged by the ID clinic and will be found in the patient's appointments tab.    Prior to discharge, please ensure the patient's follow-up has been scheduled.    If there is still no follow-up scheduled prior to discharge, please send an EPIC message to Dayanara Hughes in Infectious Diseases.                Thank you for your consult. I will sign off. Please contact us if you have any additional questions.    Enedina Reis MD  Infectious Disease  [facility] Surg ([facility]-)    Subjective:     Principal Problem: Portal vein thrombosis    HPI: 63M with h/o PVT, prostate ca, htn admitted 1/22 with anemia. Reports generalized fatigue. Reports constipation, no diarrhea. Denies abdominal pain. Denies recent colonoscopy    Found to be febrile 102, hypotention with leukocytosis and anemia    CT A/P with interval increased prominence of leisa hepatis lymph nodes may be reactive. Nonspecific patchy hypoenhancement of the liver on arterial phase, possibly related to vascular shunting and portal vein thrombus. Mild stranding about sigmoid diverticula similar to prior. Clinical correlation suggestive for mild diverticulitis.       MRI abdomen:     Extensive portal venous system thrombus similar to prior.     Hepatic subcentimeter focus of abnormal signal concerning for small middle hepatic vein thrombus.     Stable prominent leisa hepatis lymph nodes which may be reactive.     Subcentimeter pancreatic tail T2 hyperintensity which may be a cyst, pseudocyst, or side branch IPMN.  Follow-up suggested in 1 year.      On vanc/zosyn --> unasyn.     Fevers resolved    ID consulted for "Positive blood cultures, on unasyn, no clear source     Interval history: NAEO. Anticipating d/c today. Denies pain    Past Surgical History:   Procedure Laterality Date    COLONOSCOPY N/A 9/29/2017    Procedure: COLONOSCOPY;  Surgeon: Jamar Edwards MD;  Location: [facility] (4TH FLR);  " Service: Endoscopy;  Laterality: N/A;    JOINT REPLACEMENT Right     knee    KNEE ARTHROSCOPY W/ ACL RECONSTRUCTION  2014    right       Review of patient's allergies indicates:   Allergen Reactions    Morphine Palpitations       No current facility-administered medications on file prior to encounter.     Current Outpatient Medications on File Prior to Encounter   Medication Sig    apixaban (ELIQUIS) 5 mg Tab Take 2 tablets (10 mg total) by mouth 2 (two) times daily. for 7 days. THEN Take 1 tablet (5 mg total) by mouth 2 (two) times daily.    diazePAM (VALIUM) 10 MG Tab Take 10 mg by mouth 3 (three) times daily.    tadalafiL (CIALIS) 20 MG Tab Take 1 tablet (20 mg total) by mouth daily as needed (erectile dysfunction).    [DISCONTINUED] ARIPiprazole (ABILIFY) 5 MG Tab Take 5 mg by mouth once daily.    [DISCONTINUED] DULoxetine (CYMBALTA) 30 MG capsule Take 30 mg by mouth once daily.    [DISCONTINUED] mirtazapine (REMERON) 30 MG tablet Take 45 mg by mouth every evening.    [DISCONTINUED] traMADoL (ULTRAM) 50 mg tablet Take 1 tablet (50 mg total) by mouth every 4 (four) hours as needed for Pain.     Family History       Problem Relation (Age of Onset)    Cancer Brother    Diabetes Father    Heart disease Father    Hypertension Mother    No Known Problems Daughter, Daughter, Daughter, Daughter, Son, Son          Tobacco Use    Smoking status: Never    Smokeless tobacco: Never   Substance and Sexual Activity    Alcohol use: Yes     Alcohol/week: 0.8 standard drinks of alcohol     Types: 1 Standard drinks or equivalent per week     Comment: once every few months    Drug use: No    Sexual activity: Not on file     Review of Systems   Constitutional:  Positive for chills, diaphoresis, fatigue and fever.   Eyes:  Negative for visual disturbance.   Respiratory:  Negative for cough, chest tightness and shortness of breath.    Cardiovascular:  Negative for chest pain, palpitations and leg swelling.   Gastrointestinal:   Positive for diarrhea. Negative for abdominal pain, nausea and vomiting.   Genitourinary:  Negative for dysuria, frequency and urgency.   Neurological:  Positive for weakness. Negative for dizziness, light-headedness and headaches.     Objective:     Vital Signs (Most Recent):  Temp: 98.6 °F (37 °C) (01/27/24 1144)  Pulse: 94 (01/27/24 1144)  Resp: 18 (01/27/24 0826)  BP: (!) 98/58 (01/27/24 1144)  SpO2: 99 % (01/27/24 1144) Vital Signs (24h Range):  Temp:  [98 °F (36.7 °C)-99.3 °F (37.4 °C)] 98.6 °F (37 °C)  Pulse:  [] 94  Resp:  [17-19] 18  SpO2:  [94 %-100 %] 99 %  BP: ()/(51-64) 98/58     Weight: 75.3 kg (166 lb)  Body mass index is 24.51 kg/m².     Physical Exam  Constitutional:       General: He is not in acute distress.     Appearance: Normal appearance. He is not ill-appearing.   HENT:      Head: Normocephalic and atraumatic.      Nose: Nose normal.      Mouth/Throat:      Mouth: Mucous membranes are moist.   Eyes:      General: Scleral icterus present.      Extraocular Movements: Extraocular movements intact.      Conjunctiva/sclera: Conjunctivae normal.      Pupils: Pupils are equal, round, and reactive to light.   Cardiovascular:      Rate and Rhythm: Regular rhythm. Tachycardia present.      Pulses: Normal pulses.      Heart sounds: Normal heart sounds. No murmur heard.  Pulmonary:      Effort: Pulmonary effort is normal. No respiratory distress.      Breath sounds: Normal breath sounds. No wheezing, rhonchi or rales.   Abdominal:      General: Abdomen is flat. Bowel sounds are normal. There is no distension.      Palpations: Abdomen is soft.      Tenderness: There is abdominal tenderness.   Musculoskeletal:      Right lower leg: No edema.      Left lower leg: No edema.   Skin:     General: Skin is warm and dry.      Capillary Refill: Capillary refill takes less than 2 seconds.   Neurological:      Mental Status: He is alert and oriented to person, place, and time.   Psychiatric:         Mood  and Affect: Mood is anxious.         Behavior: Behavior normal.              CRANIAL NERVES     CN III, IV, VI   Pupils are equal, round, and reactive to light.       Significant Labs: All pertinent labs within the past 24 hours have been reviewed.  CBC:   Recent Labs   Lab 01/26/24  0515 01/27/24  0620   WBC 16.28* 12.29   HGB 7.5* 7.4*   HCT 24.7* 24.8*    409       CMP:   Recent Labs   Lab 01/26/24  0515 01/27/24  0620   * 137   K 4.4 4.3    107   CO2 22* 26   GLU 96 102   BUN 9 8   CREATININE 1.0 1.0   CALCIUM 9.4 9.6   PROT 6.4 6.3   ALBUMIN 1.7* 1.7*   BILITOT 2.0* 1.5*   ALKPHOS 181* 186*   AST 21 21   ALT 26 23   ANIONGAP 6* 4*         Significant Imaging: I have reviewed all pertinent imaging results/findings within the past 24 hours.

## 2024-01-27 NOTE — PLAN OF CARE
Discharge Plan A and Plan B have been determined by review of patient's clinical status, future medical and therapeutic needs, and coverage/benefits for post-acute care in coordination with multidisciplinary team members.    01/27/24 1231   Post-Acute Status   Post-Acute Authorization IV Infusion   Coverage [facility] SECURE SNP -   IV Infusion Status Referral(s) sent   Discharge Plan   Discharge Plan A Home Health   Discharge Plan B Home with family     KAREN received notification that patient ready for dc Today pending post-acute set-up (Home Infusion). SW sent referral to [facility] via careport and notified of patient's ANICETO of Today 1/27. Pending post-acute provider review. Patient requesting walker due to difficulty ambulating. KAREN requested that MD place DME order. SW sent DME referral to [facility] for review.     [facility] - accepts    SW received notification that [facility] accepts patient. Teaching completed at the bedside w/ patient/family.     SW met with patient/family to review discharge recommendation of Home Health and is agreeable to plan    Patient/family provided list of facilities in-network with patient's payor plan. Providers that are owned, operated, or affiliated with [facility] are included on the list.     Notified that referral sent to below listed facilities from in-network list based on proximity to home/family support:   [facility] - accepts  2. [facility]  3. [facility]  [facility]. [facility]   5. [facility]    Patient/family instructed to identify preference.    Preferred Facility: (if more than 1, listed in order of descending preference)  [facility]    If an additional preferred facility not listed above is identified, additional referral to be sent. If above facilities unable to accept, will send additional referrals to in-network providers.      3:40pm  KAREN received notification that [facility] accepts patient for HH services. Per Nathalia ([facility]), patient's labs are  scheduled for day of admit.    SW will continue to follow.                        RANDY Morrell, LMSW  [facility]  Case Management  Ext. [phone]

## 2024-01-27 NOTE — ASSESSMENT & PLAN NOTE
"63M with h/o PVT, prostate ca, htn admitted 1/22 with anemia/fatigue, found to be febrile and hypotensive. Imaging revealed PVT. On vanc/zosyn --> unasyn. Fevers resolved. ID consulted for "Positive blood cultures, on unasyn, no clear source"    The + strep constellatus bcx is not a contaminant. Unclear source of bacteremia, but concern for GI source. It is commonly associated with abscesses; rarely endocarditis. Fortunately he's had recent MRI abdomen, CXR lung, MRI prostate without obvious infection. Generally very PCN susceptible, but anaerobe coverage reasonable given risk of co-anaerobe infection. Spoke with infusion company, ceftriaxone easier and less expensive than unasyn    Recommendations:   - change to ceftrixone/flagyl  - needs updated colonoscopy; please place referral   - outpatient dental exam  - ongoing investigation to r/o abscess and primary site of infection    Outpatient Antibiotic Therapy Plan:    Please send referral to [facility].    1) Infection: strep bacteremia    2) Discharge Antibiotics:    Intravenous antibiotics:  Ceftriaxone 2gm iv daily    Flagyl 500mg po BID    3) Therapy Duration:  2 weeks (and continue until ID f/u appt)    Estimated end date of IV antibiotics: 2/5    4) Outpatient Weekly Labs:    Order the following labs to be drawn on Mondays:   CBC  CMP   CRP      5) Fax Lab Results to Infectious Diseases Provider: Dr Reis    [facility] Fax Number: [phone]    6) Outpatient Infectious Diseases Follow-up    Follow-up appointment will be arranged by the ID clinic and will be found in the patient's appointments tab.    Prior to discharge, please ensure the patient's follow-up has been scheduled.    If there is still no follow-up scheduled prior to discharge, please send an EPIC message to Dayanara Hughes in Infectious Diseases.          "

## 2024-01-27 NOTE — DISCHARGE INSTRUCTIONS
You will be discharged home with home health and continued IV infusion therapy  Estimated end date for completion of antibiotics is 2/5/24  Infectious disease will continue to follow to monitor for continued improvement    Please follow up with  Internal medicine 2/1  Hematology 2/21  Gastroenterology 2/26  Hepatology 4/8    If signs of new bleeding, please return to ED.

## 2024-01-27 NOTE — SUBJECTIVE & OBJECTIVE
Interval history: NAEO. Anticipating d/c today. Denies pain    Past Surgical History:   Procedure Laterality Date    COLONOSCOPY N/A 9/29/2017    Procedure: COLONOSCOPY;  Surgeon: Jamar Edwards MD;  Location: [facility] ([address] [place] [address]);  Service: Endoscopy;  Laterality: N/A;    JOINT REPLACEMENT Right     knee    KNEE ARTHROSCOPY W/ ACL RECONSTRUCTION  2014    right       Review of patient's allergies indicates:   Allergen Reactions    Morphine Palpitations       No current facility-administered medications on file prior to encounter.     Current Outpatient Medications on File Prior to Encounter   Medication Sig    apixaban (ELIQUIS) 5 mg Tab Take 2 tablets (10 mg total) by mouth 2 (two) times daily. for 7 days. THEN Take 1 tablet (5 mg total) by mouth 2 (two) times daily.    diazePAM (VALIUM) 10 MG Tab Take 10 mg by mouth 3 (three) times daily.    tadalafiL (CIALIS) 20 MG Tab Take 1 tablet (20 mg total) by mouth daily as needed (erectile dysfunction).    [DISCONTINUED] ARIPiprazole (ABILIFY) 5 MG Tab Take 5 mg by mouth once daily.    [DISCONTINUED] DULoxetine (CYMBALTA) 30 MG capsule Take 30 mg by mouth once daily.    [DISCONTINUED] mirtazapine (REMERON) 30 MG tablet Take 45 mg by mouth every evening.    [DISCONTINUED] traMADoL (ULTRAM) 50 mg tablet Take 1 tablet (50 mg total) by mouth every 4 (four) hours as needed for Pain.     Family History       Problem Relation (Age of Onset)    Cancer Brother    Diabetes Father    Heart disease Father    Hypertension Mother    No Known Problems Daughter, Daughter, Daughter, Daughter, Son, Son          Tobacco Use    Smoking status: Never    Smokeless tobacco: Never   Substance and Sexual Activity    Alcohol use: Yes     Alcohol/week: 0.8 standard drinks of alcohol     Types: 1 Standard drinks or equivalent per week     Comment: once every few months    Drug use: No    Sexual activity: Not on file     Review of Systems   Constitutional:  Positive for chills, diaphoresis, fatigue  and fever.   Eyes:  Negative for visual disturbance.   Respiratory:  Negative for cough, chest tightness and shortness of breath.    Cardiovascular:  Negative for chest pain, palpitations and leg swelling.   Gastrointestinal:  Positive for diarrhea. Negative for abdominal pain, nausea and vomiting.   Genitourinary:  Negative for dysuria, frequency and urgency.   Neurological:  Positive for weakness. Negative for dizziness, light-headedness and headaches.     Objective:     Vital Signs (Most Recent):  Temp: 98.6 °F (37 °C) (01/27/24 1144)  Pulse: 94 (01/27/24 1144)  Resp: 18 (01/27/24 0826)  BP: (!) 98/58 (01/27/24 1144)  SpO2: 99 % (01/27/24 1144) Vital Signs (24h Range):  Temp:  [98 °F (36.7 °C)-99.3 °F (37.4 °C)] 98.6 °F (37 °C)  Pulse:  [] 94  Resp:  [17-19] 18  SpO2:  [94 %-100 %] 99 %  BP: ()/(51-64) 98/58     Weight: 75.3 kg (166 lb)  Body mass index is 24.51 kg/m².     Physical Exam  Constitutional:       General: He is not in acute distress.     Appearance: Normal appearance. He is not ill-appearing.   HENT:      Head: Normocephalic and atraumatic.      Nose: Nose normal.      Mouth/Throat:      Mouth: Mucous membranes are moist.   Eyes:      General: Scleral icterus present.      Extraocular Movements: Extraocular movements intact.      Conjunctiva/sclera: Conjunctivae normal.      Pupils: Pupils are equal, round, and reactive to light.   Cardiovascular:      Rate and Rhythm: Regular rhythm. Tachycardia present.      Pulses: Normal pulses.      Heart sounds: Normal heart sounds. No murmur heard.  Pulmonary:      Effort: Pulmonary effort is normal. No respiratory distress.      Breath sounds: Normal breath sounds. No wheezing, rhonchi or rales.   Abdominal:      General: Abdomen is flat. Bowel sounds are normal. There is no distension.      Palpations: Abdomen is soft.      Tenderness: There is abdominal tenderness.   Musculoskeletal:      Right lower leg: No edema.      Left lower leg: No edema.    Skin:     General: Skin is warm and dry.      Capillary Refill: Capillary refill takes less than 2 seconds.   Neurological:      Mental Status: He is alert and oriented to person, place, and time.   Psychiatric:         Mood and Affect: Mood is anxious.         Behavior: Behavior normal.              CRANIAL NERVES     CN III, IV, VI   Pupils are equal, round, and reactive to light.       Significant Labs: All pertinent labs within the past 24 hours have been reviewed.  CBC:   Recent Labs   Lab 01/26/24  0515 01/27/24  0620   WBC 16.28* 12.29   HGB 7.5* 7.4*   HCT 24.7* 24.8*    409       CMP:   Recent Labs   Lab 01/26/24  0515 01/27/24  0620   * 137   K 4.4 4.3    107   CO2 22* 26   GLU 96 102   BUN 9 8   CREATININE 1.0 1.0   CALCIUM 9.4 9.6   PROT 6.4 6.3   ALBUMIN 1.7* 1.7*   BILITOT 2.0* 1.5*   ALKPHOS 181* 186*   AST 21 21   ALT 26 23   ANIONGAP 6* 4*         Significant Imaging: I have reviewed all pertinent imaging results/findings within the past 24 hours.

## 2024-01-27 NOTE — PLAN OF CARE
[facility] ([place] [address])      HOME HEALTH ORDERS  FACE TO FACE ENCOUNTER    Patient Name: Jesus Chritsy  YOB: 1960    PCP: Tara Jolly MD   [facility] [address] [facility] [address]  PCP Phone Number: [phone]  PCP Fax: [phone]    Encounter Date: 1/22/24    Admit to Home Health    Diagnoses:  Active Hospital Problems    Diagnosis  POA    *Portal vein thrombosis [I81]  Yes    Streptococcal bacteremia [R78.81, B95.5]  Yes    Septic shock [A41.9, R65.21]  Yes    Diverticulitis [K57.92]  Yes    Anemia [D64.9]  Yes    Chronic prescription benzodiazepine use [Z79.899]  Not Applicable     Chronic    Depression [F32.A]  Yes    Complex regional pain syndrome type 1 of right lower extremity [G90.521]  Yes    Adenocarcinoma of prostate [C61]  Yes     Chronic     Dx 9/2016. Low grade, low volume. Active surveillance.       Essential hypertension [I10]  Yes     Chronic      Resolved Hospital Problems   No resolved problems to display.       Follow Up Appointments:  Future Appointments   Date Time Provider Department Center   2/1/2024  2:00 PM Tara Jolly MD [facility] IM [facility]   2/21/2024  3:00 PM Anahi Miranda MD [facility] GISELA Martin   2/26/2024  3:00 PM Aurelia Parra MD [facility] GASTRO [facility]   3/20/2024  8:00 AM Debi Lloyd MD [facility] AENDGAS [facility]   4/8/2024  9:00 AM Sukhi Dee MD [facility] HEPAT [facility]       Allergies:  Review of patient's allergies indicates:   Allergen Reactions    Morphine Palpitations       Medications: Review discharge medications with patient and family and provide education.    Current Facility-Administered Medications   Medication Dose Route Frequency Provider Last Rate Last Admin    acetaminophen tablet 650 mg  650 mg Oral Q4H PRN Sukhi Hernandez DO   650 mg at 01/26/24 2311    apixaban tablet 5 mg  5 mg Oral BID Tomas Zuleta MD   5 mg at 01/27/24 0918    artificial tears 0.5 % ophthalmic solution 1  drop  1 drop Both Eyes PRN Lissa Seals, SHENG        cefTRIAXone (ROCEPHIN) 2 g in dextrose 5 % in water (D5W) 100 mL IVPB (MB+)  2 g Intravenous Q24H Enedina Reis MD        dextrose 10% bolus 125 mL 125 mL  12.5 g Intravenous PRN Sukhi Hernandez, DO        dextrose 10% bolus 250 mL 250 mL  25 g Intravenous PRN Sukhi Hernandez, DO        diazePAM tablet 10 mg  10 mg Oral TID PRN Mary Sukhi, DO   10 mg at 01/26/24 2102    glucagon (human recombinant) injection 1 mg  1 mg Intramuscular PRN Sukhi Hernandez, DO        glucose chewable tablet 16 g  16 g Oral PRN Sukhi Hernandez, DO        glucose chewable tablet 24 g  24 g Oral PRN Sukhi Hernandez, DO        melatonin tablet 6 mg  6 mg Oral Nightly PRN Sukhi Hernandez, DO   6 mg at 01/26/24 2311    metroNIDAZOLE tablet 500 mg  500 mg Oral Q12H Enedina Reis MD        naloxone 0.4 mg/mL injection 0.02 mg  0.02 mg Intravenous PRN Sukhi Hernandez, DO        ondansetron disintegrating tablet 8 mg  8 mg Oral Q8H PRN Sukhi Hernandez, DO        pantoprazole EC tablet 40 mg  40 mg Oral BID AC Tomas Zuleta MD   40 mg at 01/27/24 0745    senna-docusate 8.6-50 mg per tablet 1 tablet  1 tablet Oral Daily Tomas Zuleta MD   1 tablet at 01/27/24 0918    sodium chloride 0.9% flush 10 mL  10 mL Intravenous Q12H PRN Sukhi Hernandez, DO        sodium chloride 0.9% flush 10 mL  10 mL Intravenous Q6H Ramos Swanson III, MD        And    sodium chloride 0.9% flush 10 mL  10 mL Intravenous PRN Ramos Swanson III, MD         Current Discharge Medication List        START taking these medications    Details   dextrose 5 % in water (D5W) PgBk 100 mL with cefTRIAXone 2 gram SolR 2 g Inject 2 g into the vein every 12 (twelve) hours. for 10 days      metroNIDAZOLE (FLAGYL) 500 MG tablet Take 1 tablet (500 mg total) by mouth every 12 (twelve) hours. for 10 days  Qty: 20 tablet, Refills: 0           CONTINUE these medications which have NOT CHANGED    Details   apixaban  (ELIQUIS) 5 mg Tab Take 2 tablets (10 mg total) by mouth 2 (two) times daily. for 7 days. THEN Take 1 tablet (5 mg total) by mouth 2 (two) times daily.  Qty: 60 tablet, Refills: 3    Comments: merged both eliquis rx's PV      diazePAM (VALIUM) 10 MG Tab Take 10 mg by mouth 3 (three) times daily.      tadalafiL (CIALIS) 20 MG Tab Take 1 tablet (20 mg total) by mouth daily as needed (erectile dysfunction).  Qty: 30 tablet, Refills: 5    Associated Diagnoses: ED (erectile dysfunction) of organic origin           STOP taking these medications       ARIPiprazole (ABILIFY) 5 MG Tab Comments:   Reason for Stopping:         DULoxetine (CYMBALTA) 30 MG capsule Comments:   Reason for Stopping:         mirtazapine (REMERON) 30 MG tablet Comments:   Reason for Stopping:         traMADoL (ULTRAM) 50 mg tablet Comments:   Reason for Stopping:                 I have seen and examined this patient within the last 30 days. My clinical findings that support the need for the home health skilled services and home bound status are the following:no   Weakness/numbness causing balance and gait disturbance due to Weakness/Debility making it taxing to leave home.     Diet:   regular diet    Labs:  CBC every Monday  CMP every Monday  CRP everymonday    Labs faxed to ID *see below*    Referrals/ Consults  Physical Therapy to evaluate and treat. Evaluate for home safety and equipment needs; Establish/upgrade home exercise program. Perform / instruct on therapeutic exercises, gait training, transfer training, and Range of Motion.  Occupational Therapy to evaluate and treat. Evaluate home environment for safety and equipment needs. Perform/Instruct on transfers, ADL training, ROM, and therapeutic exercises.  Aide to provide assistance with personal care, ADLs, and vital signs.    Activities:   activity as tolerated    Nursing:   Agency to admit patient within 24 hours of hospital discharge unless specified on physician order or at patient  request    SN to complete comprehensive assessment including routine vital signs. Instruct on disease process and s/s of complications to report to MD. Review/verify medication list sent home with the patient at time of discharge  and instruct patient/caregiver as needed. Frequency may be adjusted depending on start of care date.     Skilled nurse to perform up to 3 visits PRN for symptoms related to diagnosis    Notify MD if SBP > 160 or < 90; DBP > 90 or < 50; HR > 120 or < 50; Temp > 101; O2 < 88%; Other    Ok to schedule additional visits based on staff availability and patient request on consecutive days within the home health episode.    When multiple disciplines ordered:    Start of Care occurs on Sunday - Wednesday schedule remaining discipline evaluations as ordered on separate consecutive days following the start of care.    Thursday SOC -schedule subsequent evaluations Friday and Monday the following week.     Friday - Saturday SOC - schedule subsequent discipline evaluations on consecutive days starting Monday of the following week.    For all post-discharge communication and subsequent orders please contact patient's primary care physician. If unable to reach primary care physician or do not receive response within 30 minutes, please contact for clinical staff order clarification    Miscellaneous   Home Infusion Therapy:   SN to perform Infusion Therapy/Central Line Care.  Review Central Line Care & Central Line Flush with patient.    Outpatient Antibiotic Therapy Plan:     1) Infection: strep bacteremia     2) Discharge Antibiotics:     Intravenous antibiotics:  Unasyn 12gm daily continuous infusion     3) Therapy Duration:  2 weeks (and continue until ID f/u appt)     Estimated end date of IV antibiotics: 2/5     4) Outpatient Weekly Labs:     Order the following labs to be drawn on Mondays:   CBC  CMP   CRP    5) Fax Lab Results to Infectious Diseases Provider: Dr Reis     [facility] Fax  Number: [phone]     6) Outpatient Infectious Diseases Follow-up     Follow-up appointment will be arranged by the ID clinic and will be found in the patient's appointments tab.    Scrub the Hub: Prior to accessing the line, always perform a 30 second alcohol scrub  Each lumen of the central line is to be flushed at least daily with 10 mL Normal Saline and 3 mL Heparin flush (10 units/mL)  Skilled Nurse (SN) may draw blood from IV access  Blood Draw Procedure:   - Aspirate at least 5 mL of blood   - Discard   - Obtain specimen   - Change injection cap   - Flush with 20 mL Normal Saline followed by a                 3-5 mL Heparin flush (10 units/mL)  Central :   - Sterile dressing changes are done weekly and as needed.   - Use chlor-hexadine scrub to cleanse site, apply Biopatch to insertion site,       apply securement device dressing   - Injection caps are changed weekly and after EVERY lab draw.   - If sterile gauze is under dressing to control oozing,                 dressing change must be performed every 24 hours until gauze is not needed.    Home Health Aide:  Nursing Daily, Physical Therapy Three times weekly, Occupational Therapy Three times weekly, and Home Health Aide Three times weekly    Wound Care Orders  no    I certify that this patient is confined to his home and needs intermittent skilled nursing care, physical therapy, and occupational therapy.      Amor Gunter MD  Department of Internal Medicine  [facility]

## 2024-01-27 NOTE — SUBJECTIVE & OBJECTIVE
Interval History: NAEO, VSS, Hgb stable with addition of eliquis. Patient medically stable for discharge with close follow up    Review of Systems   Constitutional:  Positive for fatigue and unexpected weight change. Negative for chills.   Respiratory:  Negative for cough and shortness of breath.    Cardiovascular:  Negative for chest pain.   Gastrointestinal:  Negative for abdominal distention, abdominal pain, blood in stool, constipation, diarrhea, nausea and vomiting.   Genitourinary:  Negative for difficulty urinating.   Musculoskeletal:  Negative for arthralgias and myalgias.   Skin:  Negative for rash and wound.   Neurological:  Negative for light-headedness and headaches.     Objective:     Vital Signs (Most Recent):  Temp: 98.8 °F (37.1 °C) (01/27/24 0826)  Pulse: 94 (01/27/24 1144)  Resp: 18 (01/27/24 0826)  BP: (!) 93/51 (01/27/24 0826)  SpO2: 96 % (01/27/24 0826) Vital Signs (24h Range):  Temp:  [98 °F (36.7 °C)-99.3 °F (37.4 °C)] 98.8 °F (37.1 °C)  Pulse:  [] 94  Resp:  [17-19] 18  SpO2:  [94 %-100 %] 96 %  BP: ()/(51-64) 93/51     Weight: 75.3 kg (166 lb)  Body mass index is 24.51 kg/m².    Intake/Output Summary (Last 24 hours) at 1/27/2024 1242  Last data filed at 1/27/2024 0543  Gross per 24 hour   Intake 858.62 ml   Output 1200 ml   Net -341.38 ml         Physical Exam  Vitals and nursing note reviewed.   Constitutional:       General: He is not in acute distress.     Appearance: Normal appearance. He is not ill-appearing.   HENT:      Head: Normocephalic and atraumatic.      Mouth/Throat:      Mouth: Mucous membranes are moist.   Eyes:      Extraocular Movements: Extraocular movements intact.      Pupils: Pupils are equal, round, and reactive to light.      Comments: ptosis right eyelid   Pulmonary:      Effort: Pulmonary effort is normal. No respiratory distress.   Abdominal:      General: Bowel sounds are normal. There is no distension.      Palpations: Abdomen is soft.       Tenderness: There is no abdominal tenderness. There is no guarding.   Skin:     General: Skin is warm.      Capillary Refill: Capillary refill takes less than 2 seconds.   Neurological:      General: No focal deficit present.      Mental Status: He is alert and oriented to person, place, and time.   Psychiatric:         Mood and Affect: Mood normal.         Thought Content: Thought content normal.             Significant Labs: All pertinent labs within the past 24 hours have been reviewed.    Significant Imaging: I have reviewed all pertinent imaging results/findings within the past 24 hours.

## 2024-01-29 ENCOUNTER — TELEPHONE (OUTPATIENT)
Dept: ENDOSCOPY | Facility: HOSPITAL | Age: 64
End: 2024-01-29
Payer: MEDICARE

## 2024-01-29 DIAGNOSIS — B95.5 STREPTOCOCCAL BACTEREMIA: ICD-10-CM

## 2024-01-29 DIAGNOSIS — R53.81 DEBILITY: Primary | ICD-10-CM

## 2024-01-29 DIAGNOSIS — R78.81 BACTEREMIA: ICD-10-CM

## 2024-01-29 DIAGNOSIS — I81 PORTAL VEIN THROMBOSIS: Primary | ICD-10-CM

## 2024-01-29 DIAGNOSIS — A41.9 SEPTIC SHOCK: Primary | ICD-10-CM

## 2024-01-29 DIAGNOSIS — R65.21 SEPTIC SHOCK: Primary | ICD-10-CM

## 2024-01-29 DIAGNOSIS — R78.81 STREPTOCOCCAL BACTEREMIA: ICD-10-CM

## 2024-01-29 LAB
BACTERIA BLD CULT: NORMAL
BACTERIA BLD CULT: NORMAL

## 2024-01-29 NOTE — PLAN OF CARE
[facility] ([facility])  Discharge Final Note    Primary Care Provider: Tara Jolly MD    Expected Discharge Date: 1/27/2024    Final Discharge Note (most recent)       Final Note - 01/29/24 0811          Final Note    Assessment Type Final Discharge Note (P)      Anticipated Discharge Disposition Home-Health Care Svc (P)         Post-Acute Status    Post-Acute Authorization IV Infusion (P)      Coverage PHN (P)      IV Infusion Status Set-up Complete/Auth obtained (P)      Discharge Delays None known at this time (P)                      Important Message from Medicare    Pt d/c'd to [facility].    Leonarda Lopez, BSN, BS, RN, CCM

## 2024-01-29 NOTE — TELEPHONE ENCOUNTER
Telephoned pt to schedule urgent EGD/Colonoscopy with no answer.  Voicemail message left with direct contact number for pt to return call.

## 2024-01-30 ENCOUNTER — TELEPHONE (OUTPATIENT)
Dept: ENDOSCOPY | Facility: HOSPITAL | Age: 64
End: 2024-01-30
Payer: MEDICARE

## 2024-01-30 ENCOUNTER — LAB VISIT (OUTPATIENT)
Dept: LAB | Facility: HOSPITAL | Age: 64
End: 2024-01-30
Attending: INTERNAL MEDICINE
Payer: MEDICARE

## 2024-01-30 DIAGNOSIS — Z45.2 FITTING AND ADJUSTMENT OF VASCULAR CATHETER: ICD-10-CM

## 2024-01-30 DIAGNOSIS — Z12.11 SCREEN FOR COLON CANCER: ICD-10-CM

## 2024-01-30 DIAGNOSIS — A41.9 SYSTEMIC INFECTION: Primary | ICD-10-CM

## 2024-01-30 DIAGNOSIS — D50.9 IRON DEFICIENCY ANEMIA, UNSPECIFIED IRON DEFICIENCY ANEMIA TYPE: Primary | ICD-10-CM

## 2024-01-30 LAB
BASOPHILS # BLD AUTO: 0.04 K/UL (ref 0–0.2)
BASOPHILS NFR BLD: 0.6 % (ref 0–1.9)
DIFFERENTIAL METHOD BLD: ABNORMAL
EOSINOPHIL # BLD AUTO: 0.1 K/UL (ref 0–0.5)
EOSINOPHIL NFR BLD: 1 % (ref 0–8)
ERYTHROCYTE [DISTWIDTH] IN BLOOD BY AUTOMATED COUNT: 16.1 % (ref 11.5–14.5)
HCT VFR BLD AUTO: 26.1 % (ref 40–54)
HGB BLD-MCNC: 7.6 G/DL (ref 14–18)
IMM GRANULOCYTES # BLD AUTO: 0.05 K/UL (ref 0–0.04)
IMM GRANULOCYTES NFR BLD AUTO: 0.8 % (ref 0–0.5)
LYMPHOCYTES # BLD AUTO: 1.7 K/UL (ref 1–4.8)
LYMPHOCYTES NFR BLD: 27 % (ref 18–48)
MCH RBC QN AUTO: 28.9 PG (ref 27–31)
MCHC RBC AUTO-ENTMCNC: 29.1 G/DL (ref 32–36)
MCV RBC AUTO: 99 FL (ref 82–98)
MONOCYTES # BLD AUTO: 0.4 K/UL (ref 0.3–1)
MONOCYTES NFR BLD: 5.8 % (ref 4–15)
NEUTROPHILS # BLD AUTO: 4.1 K/UL (ref 1.8–7.7)
NEUTROPHILS NFR BLD: 64.8 % (ref 38–73)
NRBC BLD-RTO: 0 /100 WBC
PLATELET # BLD AUTO: 456 K/UL (ref 150–450)
PMV BLD AUTO: 9.8 FL (ref 9.2–12.9)
RBC # BLD AUTO: 2.63 M/UL (ref 4.6–6.2)
WBC # BLD AUTO: 6.26 K/UL (ref 3.9–12.7)

## 2024-01-30 PROCEDURE — 80053 COMPREHEN METABOLIC PANEL: CPT | Performed by: INTERNAL MEDICINE

## 2024-01-30 PROCEDURE — 85025 COMPLETE CBC W/AUTO DIFF WBC: CPT | Performed by: INTERNAL MEDICINE

## 2024-01-30 PROCEDURE — 86140 C-REACTIVE PROTEIN: CPT | Performed by: INTERNAL MEDICINE

## 2024-01-30 NOTE — TELEPHONE ENCOUNTER
He is on apixaban for portal vein thrombus (sustained on 1/1/24; on ac until at least April 2024.) There is a small risk of worsening thrombus with interruption of anticoagulation but need for egd/colonoscopy is felt to outweigh this risk. May hold anticoagulation for procedure; please resume as soon as possible.

## 2024-01-30 NOTE — TELEPHONE ENCOUNTER
Dear Dr. Jolly,    Pt has a hospital follow-up visit scheduled with you on 2/1/24.  Patient has a scheduled procedure Colonoscopy/EGD on 2/12/24 and is currently taking a blood thinner prescribed by your office. In order to ensure patient safety, we would like to confirm that the patient can place their blood thinner medication on hold for the procedure. Can he/she discontinue Eliquis (apixaban) for a minimum of 2 days prior to the procedure?    Also, is pt medically clear to proceed with procedure after recent hospitalization?  Please advise.     Thank you for your prompt reply.  [facility] Endoscopy Scheduling

## 2024-01-30 NOTE — TELEPHONE ENCOUNTER
"Spoke to pt's daughter, Terry, to schedule procedure(s) Colonoscopy/EGD       Physician to perform procedure(s) Dr. CRYSTAL Huffman  Date of Procedure (s) 24  Arrival Time 7:00 AM  Time of Procedure(s) 8:00 AM   Location of Procedure(s) [address]  Type of Rx Prep sent to patient: Miralax  Instructions provided to patient via [facility] and email [email or web address]    Patient was informed on the following information and verbalized understanding. Screening questionnaire reviewed with patient and complete. If procedure requires anesthesia, a responsible adult needs to be present to accompany the patient home, patient cannot drive after receiving anesthesia. Appointment details are tentative, especially check-in time. Patient will receive a prep-op call 7 days prior to confirm check-in time for procedure. If applicable the patient should contact their pharmacy to verify Rx for procedure prep is ready for pick-up. Patient was advised to call the scheduling department at [phone] if pharmacy states no Rx is available. Patient was advised to call the endoscopy scheduling department if any questions or concerns arise.       Endoscopy Scheduling Department    From: Elia Craig MD   Sent: 2024  12:01 PM CST   To: [facility]   Subject: EGD/Cscope                                       Procedure: EGD/Colonoscopy     Diagnosis: Screening colonoscopy and Iron deficiency anemia     Procedure Timin-4 weeks     *If within 4 weeks selected, please teo as high priority*     *If greater than 12 weeks, please select "5-12 weeks" and delay sending until 3 months prior to requested date*     Provider: Any GI provider     Location: [address]     Additional Scheduling Information: Blood thinners     Prep Specifications:Standard prep     Is the patient taking a GLP-1 Agonist:no     Have you attached a patient to this message: yes         "

## 2024-01-30 NOTE — TELEPHONE ENCOUNTER
Tara Jolly MD         1/30/24  2:11 PM  Note  He is on apixaban for portal vein thrombus (sustained on 1/1/24; on ac until at least April 2024.) There is a small risk of worsening thrombus with interruption of anticoagulation but need for egd/colonoscopy is felt to outweigh this risk. May hold anticoagulation for procedure; please resume as soon as possible.

## 2024-01-31 ENCOUNTER — TELEPHONE (OUTPATIENT)
Dept: INTERNAL MEDICINE | Facility: CLINIC | Age: 64
End: 2024-01-31
Payer: MEDICARE

## 2024-01-31 DIAGNOSIS — G90.521 COMPLEX REGIONAL PAIN SYNDROME TYPE 1 OF RIGHT LOWER EXTREMITY: ICD-10-CM

## 2024-01-31 DIAGNOSIS — G57.91 NEUROPATHY OF RIGHT FOOT: Primary | ICD-10-CM

## 2024-01-31 DIAGNOSIS — R53.81 PHYSICAL DECONDITIONING: ICD-10-CM

## 2024-01-31 LAB
ALBUMIN SERPL BCP-MCNC: 2 G/DL (ref 3.5–5.2)
ALP SERPL-CCNC: 175 U/L (ref 55–135)
ALT SERPL W/O P-5'-P-CCNC: 23 U/L (ref 10–44)
ANION GAP SERPL CALC-SCNC: 6 MMOL/L (ref 8–16)
AST SERPL-CCNC: 20 U/L (ref 10–40)
BACTERIA BLD CULT: ABNORMAL
BILIRUB SERPL-MCNC: 1.1 MG/DL (ref 0.1–1)
BUN SERPL-MCNC: 8 MG/DL (ref 8–23)
CALCIUM SERPL-MCNC: 9.3 MG/DL (ref 8.7–10.5)
CHLORIDE SERPL-SCNC: 106 MMOL/L (ref 95–110)
CO2 SERPL-SCNC: 23 MMOL/L (ref 23–29)
CREAT SERPL-MCNC: 1.1 MG/DL (ref 0.5–1.4)
CRP SERPL-MCNC: 42.8 MG/L (ref 0–8.2)
EST. GFR  (NO RACE VARIABLE): >60 ML/MIN/1.73 M^2
GLUCOSE SERPL-MCNC: 131 MG/DL (ref 70–110)
POTASSIUM SERPL-SCNC: 4.4 MMOL/L (ref 3.5–5.1)
PROT SERPL-MCNC: 6.1 G/DL (ref 6–8.4)
SODIUM SERPL-SCNC: 135 MMOL/L (ref 136–145)

## 2024-01-31 NOTE — TELEPHONE ENCOUNTER
----- Message from Daisy Mcgee sent at 1/31/2024 12:00 PM CST -----  Contact: [phone]  Kalina with [facility] called to get an order for a standard wheel chair sent to [facility] fax # [phone]. Please Advise

## 2024-02-01 ENCOUNTER — OFFICE VISIT (OUTPATIENT)
Dept: INTERNAL MEDICINE | Facility: CLINIC | Age: 64
End: 2024-02-01
Payer: MEDICARE

## 2024-02-01 VITALS
WEIGHT: 153.56 LBS | DIASTOLIC BLOOD PRESSURE: 64 MMHG | BODY MASS INDEX: 22.74 KG/M2 | OXYGEN SATURATION: 95 % | HEIGHT: 69 IN | SYSTOLIC BLOOD PRESSURE: 104 MMHG | HEART RATE: 96 BPM

## 2024-02-01 DIAGNOSIS — F33.1 MODERATE EPISODE OF RECURRENT MAJOR DEPRESSIVE DISORDER: ICD-10-CM

## 2024-02-01 DIAGNOSIS — I10 ESSENTIAL HYPERTENSION: Primary | Chronic | ICD-10-CM

## 2024-02-01 DIAGNOSIS — I81 PVT (PORTAL VEIN THROMBOSIS): ICD-10-CM

## 2024-02-01 PROCEDURE — 99999 PR PBB SHADOW E&M-EST. PATIENT-LVL III: CPT | Mod: PBBFAC,,, | Performed by: INTERNAL MEDICINE

## 2024-02-01 PROCEDURE — 99214 OFFICE O/P EST MOD 30 MIN: CPT | Mod: S$GLB,,, | Performed by: INTERNAL MEDICINE

## 2024-02-01 RX ORDER — MIRTAZAPINE 30 MG/1
30 TABLET, FILM COATED ORAL NIGHTLY
Qty: 90 TABLET | Refills: 3 | Status: SHIPPED | OUTPATIENT
Start: 2024-02-01

## 2024-02-01 NOTE — PROGRESS NOTES
"Subjective:       Patient ID: Jesus Christy is a 63 y.o. male.    Chief Complaint: Annual Exam    HPI  Jesus Christy is a 63 y.o. male here for a yearly preventative healthcare visit.     Patient was admitted 1/1/24 to 1/3/24 with diverticulitis with fever and elevated LFTs. Found to have R portal vein thrombus treated with heparin drip then apixaban (3 months.) MRCP done - no biliary obstruction but 9mm pancreatic tail cyst. Plans to follow up in pancreatic cyst clinic (scheduled 3/20/24.).     He returned to hospital 1/27-1/29 with weakness, dark urine, fever and chills. He was hypotensive with hgb 7.7. required MICU stay with pressors briefly.   Bcxs 1/22 positive for strep/bacteroides - unasyn with treatment end date of 2/5.     Outpatient colonoscopy and egd planned (will need to hold ac) scheduled 2/12.     Recent labs:  Hgb 7.6 - stable.  Retic 3.2%    He saw hepatology on 1/5: no clear cause for PVT except possible prothrombotic state due to prostate cancer. Fibroscan normal.     Poor appetite, losing weight. This started around the time with the PVT. He does not have nausea nor abdominal pain - just can't stand the taste of food. BM are normal - no dark or tarry stools.     Wt Readings from Last 4 Encounters:   02/06/24 72 kg (158 lb 9.9 oz)   02/06/24 73.1 kg (161 lb 2.5 oz)   02/01/24 69.7 kg (153 lb 8.8 oz)   01/26/24 75.3 kg (166 lb)     Anemia unknown etiology  PVT of unknown etiology    Review of Systems   Constitutional:  Negative for fever.   Respiratory:  Negative for shortness of breath.    Cardiovascular:  Negative for chest pain.   Musculoskeletal: Negative.    Skin: Negative.        Objective:   /64 (BP Location: Left arm, Patient Position: Sitting, BP Method: Medium (Manual))   Pulse 96   Ht 5' 9" (1.753 m)   Wt 69.7 kg (153 lb 8.8 oz)   SpO2 95%   BMI 22.68 kg/m²      Physical Exam  Constitutional:       General: He is not in acute distress.     Appearance: He is " well-developed. He is not diaphoretic.   HENT:      Head: Normocephalic and atraumatic.   Cardiovascular:      Rate and Rhythm: Normal rate and regular rhythm.      Heart sounds: No murmur heard.  Pulmonary:      Effort: Pulmonary effort is normal. No respiratory distress.      Breath sounds: No wheezing or rales.   Skin:     General: Skin is warm and dry.   Neurological:      Mental Status: He is alert and oriented to person, place, and time.   Psychiatric:         Behavior: Behavior normal.         Assessment:       1. Essential hypertension    2. Moderate episode of recurrent major depressive disorder    3. PVT (portal vein thrombosis)        Plan:       Essential hypertension  - stable and controlled  - continue current regimen    Moderate episode of recurrent major depressive disorder  -     mirtazapine (REMERON) 30 MG tablet; Take 1 tablet (30 mg total) by mouth every evening.  Dispense: 90 tablet; Refill: 3    PVT (portal vein thrombosis)  Continue anticoagulation  Etiology unclear, prothrombotic state due to prostate cancer?  keep upcoming follow up with hematology        You are up to date for your primary preventive health care, and there are no reminders at this time.

## 2024-02-01 NOTE — TELEPHONE ENCOUNTER
Hi, Dr. Jolly,    Pt had an appt scheduled with you today.  His EGD and Colonsocopy are scheduled on 2/12/24.  Is pt medically clear to proceed with procedures at this time?  Please advise.     Thank you for your prompt reply.  [facility] Endoscopy Scheduling

## 2024-02-05 ENCOUNTER — LAB VISIT (OUTPATIENT)
Dept: LAB | Facility: HOSPITAL | Age: 64
End: 2024-02-05
Attending: INTERNAL MEDICINE
Payer: MEDICARE

## 2024-02-05 DIAGNOSIS — R78.81 BACTEREMIA: Primary | ICD-10-CM

## 2024-02-05 DIAGNOSIS — A41.9 SYSTEMIC INFECTION: ICD-10-CM

## 2024-02-05 LAB
ALBUMIN SERPL BCP-MCNC: 2.5 G/DL (ref 3.5–5.2)
ALP SERPL-CCNC: 152 U/L (ref 55–135)
ALT SERPL W/O P-5'-P-CCNC: 15 U/L (ref 10–44)
ANION GAP SERPL CALC-SCNC: 7 MMOL/L (ref 8–16)
AST SERPL-CCNC: 13 U/L (ref 10–40)
BASOPHILS # BLD AUTO: 0.05 K/UL (ref 0–0.2)
BASOPHILS NFR BLD: 0.9 % (ref 0–1.9)
BILIRUB SERPL-MCNC: 1.1 MG/DL (ref 0.1–1)
BUN SERPL-MCNC: 6 MG/DL (ref 8–23)
CALCIUM SERPL-MCNC: 10.3 MG/DL (ref 8.7–10.5)
CHLORIDE SERPL-SCNC: 108 MMOL/L (ref 95–110)
CK SERPL-CCNC: 19 U/L (ref 20–200)
CO2 SERPL-SCNC: 25 MMOL/L (ref 23–29)
CREAT SERPL-MCNC: 1 MG/DL (ref 0.5–1.4)
DIFFERENTIAL METHOD BLD: ABNORMAL
EOSINOPHIL # BLD AUTO: 0.1 K/UL (ref 0–0.5)
EOSINOPHIL NFR BLD: 2.4 % (ref 0–8)
ERYTHROCYTE [DISTWIDTH] IN BLOOD BY AUTOMATED COUNT: 16.5 % (ref 11.5–14.5)
EST. GFR  (NO RACE VARIABLE): >60 ML/MIN/1.73 M^2
GLUCOSE SERPL-MCNC: 90 MG/DL (ref 70–110)
HCT VFR BLD AUTO: 32.3 % (ref 40–54)
HGB BLD-MCNC: 9.4 G/DL (ref 14–18)
IMM GRANULOCYTES # BLD AUTO: 0.03 K/UL (ref 0–0.04)
IMM GRANULOCYTES NFR BLD AUTO: 0.5 % (ref 0–0.5)
LYMPHOCYTES # BLD AUTO: 1.8 K/UL (ref 1–4.8)
LYMPHOCYTES NFR BLD: 33 % (ref 18–48)
MCH RBC QN AUTO: 29.8 PG (ref 27–31)
MCHC RBC AUTO-ENTMCNC: 29.1 G/DL (ref 32–36)
MCV RBC AUTO: 103 FL (ref 82–98)
MONOCYTES # BLD AUTO: 0.5 K/UL (ref 0.3–1)
MONOCYTES NFR BLD: 9.2 % (ref 4–15)
NEUTROPHILS # BLD AUTO: 3 K/UL (ref 1.8–7.7)
NEUTROPHILS NFR BLD: 54 % (ref 38–73)
NRBC BLD-RTO: 0 /100 WBC
PLATELET # BLD AUTO: 528 K/UL (ref 150–450)
PMV BLD AUTO: 9.3 FL (ref 9.2–12.9)
POTASSIUM SERPL-SCNC: 4.3 MMOL/L (ref 3.5–5.1)
PROT SERPL-MCNC: 6.9 G/DL (ref 6–8.4)
RBC # BLD AUTO: 3.15 M/UL (ref 4.6–6.2)
SODIUM SERPL-SCNC: 140 MMOL/L (ref 136–145)
WBC # BLD AUTO: 5.46 K/UL (ref 3.9–12.7)

## 2024-02-05 PROCEDURE — 80053 COMPREHEN METABOLIC PANEL: CPT | Performed by: INTERNAL MEDICINE

## 2024-02-05 PROCEDURE — 82550 ASSAY OF CK (CPK): CPT | Performed by: INTERNAL MEDICINE

## 2024-02-05 PROCEDURE — 85025 COMPLETE CBC W/AUTO DIFF WBC: CPT | Performed by: INTERNAL MEDICINE

## 2024-02-06 ENCOUNTER — OFFICE VISIT (OUTPATIENT)
Dept: INFECTIOUS DISEASES | Facility: CLINIC | Age: 64
End: 2024-02-06
Payer: MEDICARE

## 2024-02-06 ENCOUNTER — INFUSION (OUTPATIENT)
Dept: INFECTIOUS DISEASES | Facility: HOSPITAL | Age: 64
End: 2024-02-06
Payer: MEDICARE

## 2024-02-06 VITALS
DIASTOLIC BLOOD PRESSURE: 70 MMHG | BODY MASS INDEX: 23.49 KG/M2 | WEIGHT: 158.63 LBS | SYSTOLIC BLOOD PRESSURE: 116 MMHG | HEIGHT: 69 IN | OXYGEN SATURATION: 97 % | HEART RATE: 104 BPM | TEMPERATURE: 99 F | RESPIRATION RATE: 19 BRPM

## 2024-02-06 VITALS
HEIGHT: 69 IN | DIASTOLIC BLOOD PRESSURE: 70 MMHG | TEMPERATURE: 99 F | SYSTOLIC BLOOD PRESSURE: 108 MMHG | HEART RATE: 123 BPM | WEIGHT: 161.19 LBS | BODY MASS INDEX: 23.87 KG/M2

## 2024-02-06 DIAGNOSIS — B95.5 STREPTOCOCCAL BACTEREMIA: Primary | ICD-10-CM

## 2024-02-06 DIAGNOSIS — R78.81 STREPTOCOCCAL BACTEREMIA: Primary | ICD-10-CM

## 2024-02-06 PROBLEM — A41.9 SEPTIC SHOCK: Status: RESOLVED | Noted: 2024-01-23 | Resolved: 2024-02-06

## 2024-02-06 PROBLEM — R65.21 SEPTIC SHOCK: Status: RESOLVED | Noted: 2024-01-23 | Resolved: 2024-02-06

## 2024-02-06 PROBLEM — R65.10 SIRS (SYSTEMIC INFLAMMATORY RESPONSE SYNDROME): Status: RESOLVED | Noted: 2024-01-01 | Resolved: 2024-02-06

## 2024-02-06 PROCEDURE — 99999 PR PBB SHADOW E&M-EST. PATIENT-LVL III: CPT | Mod: PBBFAC,,, | Performed by: STUDENT IN AN ORGANIZED HEALTH CARE EDUCATION/TRAINING PROGRAM

## 2024-02-06 PROCEDURE — 99215 OFFICE O/P EST HI 40 MIN: CPT | Mod: S$GLB,,, | Performed by: STUDENT IN AN ORGANIZED HEALTH CARE EDUCATION/TRAINING PROGRAM

## 2024-02-06 NOTE — PROGRESS NOTES
Pt to clinic for Midline removal:    NADIA Midline removed per protocol and MD order; pt tolerated well, catheter tip intact;  Pressure dressing of vasaline gauze, 2x2 gauze and coban placed to site;  Pt educated on site care;      Left in nad with family member

## 2024-02-06 NOTE — PROGRESS NOTES
Infectious Diseases Progress Note  Subjective:     Chief Complaint: strep bacteremia     HPI: Jesus Christy is a 63 y.o. male  with history of portal vein thrombus, prostate adenocarcinoma on active surveillance and hypertension who was admitted 1/22/24 for septic shock and anemia. CT A/P with interval increased prominence of leisa hepatis lymph nodes may be reactive. Nonspecific patchy hypoenhancement of the liver on arterial phase, possibly related to vascular shunting and portal vein thrombus. Mild stranding about sigmoid diverticula similar to prior. Clinical correlation suggestive for mild diverticulitis. ID consulted for strep constellatus bloodstream infection. TTE without endocarditis. He was discharged with plan to complete 2 weeks of ceftriaxone and metronidazole (for empiric anaerobic coverage in the setting of possible GI translocation) with end date of 2/5.     He finished his ceftriaxone on 2/5, no missed doses, tolerated it well. He has 1 more day of metronidazole pills left, will finish tonight. He has questions about the thrombus and his bleeding. I counseled him to discuss with GI and hematology at his upcoming visits.       Immunization History   Administered Date(s) Administered    COVID-19, MRNA, LN-S, PF (MODERNA FULL 0.5 ML DOSE) 03/11/2021, 04/08/2021, 12/21/2021    COVID-19, mRNA, LNP-S, bivalent booster, PF (PFIZER OMICRON) 12/13/2022    Influenza - Quadrivalent 11/21/2016    Influenza - Quadrivalent - PF *Preferred* (6 months and older) 10/10/2017, 10/30/2018, 10/04/2019, 12/13/2022    Influenza - Trivalent (ADULT) 10/11/2010, 09/12/2011, 10/02/2013    Influenza Split 10/11/2010, 09/12/2011, 10/02/2013        Review of Systems   Constitutional: Positive for decreased appetite and malaise/fatigue.   Respiratory:  Positive for cough and shortness of breath.    Gastrointestinal:  Positive for abdominal pain.   Neurological:  Positive for paresthesias.   Psychiatric/Behavioral:  The patient  has insomnia.         Past Medical History:   Diagnosis Date    Complex regional pain syndrome i of right lower limb     GERD (gastroesophageal reflux disease)     HTN (hypertension)     Portal vein thrombosis      Past Surgical History:   Procedure Laterality Date    COLONOSCOPY N/A 9/29/2017    Procedure: COLONOSCOPY;  Surgeon: Jamar Edwards MD;  Location: [address];  Service: Endoscopy;  Laterality: N/A;    JOINT REPLACEMENT Right     knee    KNEE ARTHROSCOPY W/ ACL RECONSTRUCTION  2014    right     Family History   Problem Relation Age of Onset    Hypertension Mother     Heart disease Father     Diabetes Father     Cancer Brother         unknown type    No Known Problems Daughter     No Known Problems Daughter     No Known Problems Daughter     No Known Problems Daughter     No Known Problems Son     No Known Problems Son      Social History     Tobacco Use    Smoking status: Never    Smokeless tobacco: Never   Substance Use Topics    Alcohol use: Yes     Alcohol/week: 0.8 standard drinks of alcohol     Types: 1 Standard drinks or equivalent per week     Comment: once every few months    Drug use: No       Objective:     Physical Exam  Vitals and nursing note reviewed.   Constitutional:       Appearance: Normal appearance.   HENT:      Head: Normocephalic.   Cardiovascular:      Comments: RUE line, non-tender and non-erythematous  Skin:     General: Skin is warm and dry.      Findings: No rash.   Neurological:      General: No focal deficit present.      Mental Status: He is alert and oriented to person, place, and time.   Psychiatric:         Mood and Affect: Mood normal.         Behavior: Behavior normal.         Data:    All data, including recent labs, radiology, and pathology, has been independently reviewed.    Labs:    Recent Labs   Lab Result Units 01/22/24  1757 01/22/24 2008 01/23/24  0726 01/23/24  1237 01/27/24  0620 01/30/24  1416 02/05/24  1554   WBC K/uL 21.28*   < > 27.77*   < > 12.29  6.26 5.46   Hemoglobin g/dL 8.4*   < > 6.4*   < > 7.4* 7.6* 9.4*   Hematocrit % 27.8*   < > 20.3*   < > 24.8* 26.1* 32.3*   Sodium mmol/L 135*   < >  --    < > 137 135* 140   Potassium mmol/L 4.2   < >  --    < > 4.3 4.4 4.3   Chloride mmol/L 105   < >  --    < > 107 106 108   BUN mg/dL 23   < >  --    < > 8 8 6*   Creatinine mg/dL 1.2   < >  --    < > 1.0 1.1 1.0   AST U/L 33   < >  --    < > 21 20 13   ALT U/L 43   < >  --    < > 23 23 15   Alkaline Phosphatase U/L 211*   < >  --    < > 186* 175* 152*   Total Bilirubin mg/dL 3.1*   < >  --    < > 1.5* 1.1* 1.1*   CRP mg/L  --    < >  --   --   --  42.8*  --    INR   --    < > 1.2  --   --   --   --    HIV 1/2 Ag/Ab  Non-reactive  --   --   --   --   --   --     < > = values in this interval not displayed.        Radiology:    No results found in the last 24 hours.     Assessment:  Jesus Christy is a 63 y.o. male  with history of portal vein thrombus, prostate adenocarcinoma on active surveillance and hypertension who was admitted 1/22/24 for septic shock and anemia. ID consulted for strep constellatus bacteremia. TTE without vegetation.     Strep constellatus bacteremia: Has completed 14 day course of ceftriaxone + metronidazole.   - remove line, stop antibiotics     The total time for evaluation and management services performed on 2/6/24 was greater than 40 minutes.     Mavis Connor MD  Infectious Disease

## 2024-02-08 ENCOUNTER — TELEPHONE (OUTPATIENT)
Dept: ENDOSCOPY | Facility: HOSPITAL | Age: 64
End: 2024-02-08
Payer: MEDICARE

## 2024-02-08 NOTE — TELEPHONE ENCOUNTER
----- Message from Laine Bryant MA sent at 2/8/2024 10:08 AM CST -----  Regarding: FW: Call back  Contact: [phone]    ----- Message -----  From: Tabitha Israel  Sent: 2/8/2024  10:05 AM CST  To: [facility] Staff  Subject: Call back                                        Pt is calling to speak with someone in provider office is asking for a return call Pt has a procedure schedule on 2/12 and was advised to not take medications patient needs to discuss what medications he is not allowed to take  please call pt at  [phone] (home)

## 2024-02-08 NOTE — TELEPHONE ENCOUNTER
Telephoned pt regarding medication questions for upcoming EGD/Colonoscopy on 2/12/24.  Spoke with pt and his daughter, Terry, and all questions answered.  Prep instructions also reviewed.  Pt and his daughter verbalized understanding.

## 2024-02-12 ENCOUNTER — ANESTHESIA (OUTPATIENT)
Dept: ENDOSCOPY | Facility: HOSPITAL | Age: 64
End: 2024-02-12
Payer: MEDICARE

## 2024-02-12 ENCOUNTER — HOSPITAL ENCOUNTER (OUTPATIENT)
Facility: HOSPITAL | Age: 64
Discharge: HOME | End: 2024-02-12
Attending: INTERNAL MEDICINE | Admitting: INTERNAL MEDICINE
Payer: MEDICARE

## 2024-02-12 ENCOUNTER — ANESTHESIA EVENT (OUTPATIENT)
Dept: ENDOSCOPY | Facility: HOSPITAL | Age: 64
End: 2024-02-12
Payer: MEDICARE

## 2024-02-12 VITALS
WEIGHT: 155 LBS | DIASTOLIC BLOOD PRESSURE: 82 MMHG | HEART RATE: 74 BPM | RESPIRATION RATE: 18 BRPM | SYSTOLIC BLOOD PRESSURE: 133 MMHG | HEIGHT: 69 IN | BODY MASS INDEX: 22.96 KG/M2 | OXYGEN SATURATION: 96 % | TEMPERATURE: 98 F

## 2024-02-12 DIAGNOSIS — Z12.11 SCREEN FOR COLON CANCER: ICD-10-CM

## 2024-02-12 PROCEDURE — 88305 TISSUE EXAM BY PATHOLOGIST: CPT | Performed by: STUDENT IN AN ORGANIZED HEALTH CARE EDUCATION/TRAINING PROGRAM

## 2024-02-12 PROCEDURE — 27201089 HC SNARE, DISP (ANY): Performed by: INTERNAL MEDICINE

## 2024-02-12 PROCEDURE — 88305 TISSUE EXAM BY PATHOLOGIST: CPT | Mod: 26,,, | Performed by: STUDENT IN AN ORGANIZED HEALTH CARE EDUCATION/TRAINING PROGRAM

## 2024-02-12 PROCEDURE — 43235 EGD DIAGNOSTIC BRUSH WASH: CPT | Performed by: INTERNAL MEDICINE

## 2024-02-12 PROCEDURE — 45380 COLONOSCOPY AND BIOPSY: CPT | Mod: 59,PT | Performed by: INTERNAL MEDICINE

## 2024-02-12 PROCEDURE — 43235 EGD DIAGNOSTIC BRUSH WASH: CPT | Mod: 51,,, | Performed by: INTERNAL MEDICINE

## 2024-02-12 PROCEDURE — 37000009 HC ANESTHESIA EA ADD 15 MINS: Performed by: INTERNAL MEDICINE

## 2024-02-12 PROCEDURE — D9220A PRA ANESTHESIA: Mod: PT,ANES,, | Performed by: ANESTHESIOLOGY

## 2024-02-12 PROCEDURE — 45385 COLONOSCOPY W/LESION REMOVAL: CPT | Mod: PT,,, | Performed by: INTERNAL MEDICINE

## 2024-02-12 PROCEDURE — 27201012 HC FORCEPS, HOT/COLD, DISP: Performed by: INTERNAL MEDICINE

## 2024-02-12 PROCEDURE — 45385 COLONOSCOPY W/LESION REMOVAL: CPT | Mod: PT | Performed by: INTERNAL MEDICINE

## 2024-02-12 PROCEDURE — 37000008 HC ANESTHESIA 1ST 15 MINUTES: Performed by: INTERNAL MEDICINE

## 2024-02-12 PROCEDURE — 25000003 PHARM REV CODE 250: Performed by: NURSE ANESTHETIST, CERTIFIED REGISTERED

## 2024-02-12 PROCEDURE — 63600175 PHARM REV CODE 636 W HCPCS: Performed by: NURSE ANESTHETIST, CERTIFIED REGISTERED

## 2024-02-12 PROCEDURE — D9220A PRA ANESTHESIA: Mod: PT,CRNA,, | Performed by: NURSE ANESTHETIST, CERTIFIED REGISTERED

## 2024-02-12 PROCEDURE — 45380 COLONOSCOPY AND BIOPSY: CPT | Mod: 59,PT,, | Performed by: INTERNAL MEDICINE

## 2024-02-12 RX ORDER — SODIUM CHLORIDE 9 MG/ML
INJECTION, SOLUTION INTRAVENOUS CONTINUOUS
Status: DISCONTINUED | OUTPATIENT
Start: 2024-02-12 | End: 2024-02-12 | Stop reason: HOSPADM

## 2024-02-12 RX ORDER — SODIUM CHLORIDE 0.9 % (FLUSH) 0.9 %
3 SYRINGE (ML) INJECTION
Status: DISCONTINUED | OUTPATIENT
Start: 2024-02-12 | End: 2024-02-12 | Stop reason: HOSPADM

## 2024-02-12 RX ORDER — PROCHLORPERAZINE EDISYLATE 5 MG/ML
5 INJECTION INTRAMUSCULAR; INTRAVENOUS EVERY 30 MIN PRN
Status: DISCONTINUED | OUTPATIENT
Start: 2024-02-12 | End: 2024-02-12 | Stop reason: HOSPADM

## 2024-02-12 RX ORDER — TAMSULOSIN HYDROCHLORIDE 0.4 MG/1
0.4 CAPSULE ORAL DAILY
COMMUNITY
End: 2024-03-12 | Stop reason: SDUPTHER

## 2024-02-12 RX ORDER — FINASTERIDE 1 MG/1
1 TABLET, FILM COATED ORAL DAILY
COMMUNITY
End: 2024-03-12

## 2024-02-12 RX ORDER — PROPOFOL 10 MG/ML
VIAL (ML) INTRAVENOUS
Status: DISCONTINUED | OUTPATIENT
Start: 2024-02-12 | End: 2024-02-12

## 2024-02-12 RX ORDER — LIDOCAINE HYDROCHLORIDE 20 MG/ML
INJECTION INTRAVENOUS
Status: DISCONTINUED | OUTPATIENT
Start: 2024-02-12 | End: 2024-02-12

## 2024-02-12 RX ADMIN — PROPOFOL 80 MG: 10 INJECTION, EMULSION INTRAVENOUS at 11:02

## 2024-02-12 RX ADMIN — GLYCOPYRROLATE 0.2 MG: 0.2 INJECTION, SOLUTION INTRAMUSCULAR; INTRAVENOUS at 11:02

## 2024-02-12 RX ADMIN — LIDOCAINE HYDROCHLORIDE 100 MG: 20 INJECTION INTRAVENOUS at 11:02

## 2024-02-12 RX ADMIN — SODIUM CHLORIDE: 9 INJECTION, SOLUTION INTRAVENOUS at 11:02

## 2024-02-12 NOTE — TRANSFER OF CARE
"Anesthesia Transfer of Care Note    Patient: Jesus Christy    Procedure(s) Performed: Procedure(s) (LRB):  EGD (ESOPHAGOGASTRODUODENOSCOPY) (N/A)  COLONOSCOPY (N/A)    Patient location: [facility]    Anesthesia Type: MAC and general    Transport from OR: Transported from OR on room air with adequate spontaneous ventilation    Post pain: adequate analgesia    Post assessment: no apparent anesthetic complications and tolerated procedure well    Post vital signs: stable    Level of consciousness: awake and alert    Nausea/Vomiting: no nausea/vomiting    Complications: none    Transfer of care protocol was followed      Last vitals: Visit Vitals  /78 (BP Location: Left arm, Patient Position: Lying)   Pulse 83   Temp 36.8 °C (98.2 °F) (Temporal)   Resp 16   Ht 5' 9" (1.753 m)   Wt 70.3 kg (155 lb)   SpO2 95%   BMI 22.89 kg/m²     "

## 2024-02-12 NOTE — PROVATION PATIENT INSTRUCTIONS
Discharge Summary/Instructions after an Endoscopic Procedure  Patient Name: Jesus Christy  Patient MRN: [id number]  Patient YOB: 1960 Monday, February 12, 2024  Miguel Angel Huffman MD  Dear patient,  As a result of recent federal legislation (The Federal Cures Act), you may   receive lab or pathology results from your procedure in your [facility]   account before your physician is able to contact you. Your physician or   their representative will relay the results to you with their   recommendations at their soonest availability.  Thank you,  RESTRICTIONS:  During your procedure today, you received medications for sedation.  These   medications may affect your judgment, balance and coordination.  Therefore,   for 24 hours, you have the following restrictions:   - DO NOT drive a car, operate machinery, make legal/financial decisions,   sign important papers or drink alcohol.    ACTIVITY:  Today: no heavy lifting, straining or running due to procedural   sedation/anesthesia.  The following day: return to full activity including work.  DIET:  Eat and drink normally unless instructed otherwise.     TREATMENT FOR COMMON SIDE EFFECTS:  - Mild abdominal pain, nausea, belching, bloating or excessive gas:  rest,   eat lightly and use a heating pad.  - Sore Throat: treat with throat lozenges and/or gargle with warm salt   water.  - Because air was used during the procedure, expelling large amounts of air   from your rectum or belching is normal.  - If a bowel prep was taken, you may not have a bowel movement for 1-3 days.    This is normal.  SYMPTOMS TO WATCH FOR AND REPORT TO YOUR PHYSICIAN:  1. Abdominal pain or bloating, other than gas cramps.  2. Chest pain.  3. Back pain.  4. Signs of infection such as: chills or fever occurring within 24 hours   after the procedure.  5. Rectal bleeding, which would show as bright red, maroon, or black stools.   (A tablespoon of blood from the rectum is not serious, especially if    hemorrhoids are present.)  6. Vomiting.  7. Weakness or dizziness.  GO DIRECTLY TO THE NEAREST EMERGENCY ROOM IF YOU HAVE ANY OF THE FOLLOWING:      Difficulty breathing              Chills and/or fever over 101 F   Persistent vomiting and/or vomiting blood   Severe abdominal pain   Severe chest pain   Black, tarry stools   Bleeding- more than one tablespoon   Any other symptom or condition that you feel may need urgent attention  Your doctor recommends these additional instructions:  If any biopsies were taken, your doctors clinic will contact you in 1 to 2   weeks with any results.  - Patient has a contact number available for emergencies.  The signs and   symptoms of potential delayed complications were discussed with the   patient.  Return to normal activities tomorrow.  Written discharge   instructions were provided to the patient.   - Discharge patient to home (ambulatory).   - Resume previous diet.   - Continue present medications.   - Return to referring physician after studies are complete.  For questions, problems or results please call your physician - Miguel Angel Huffman MD at Work:  [phone].  [facility], EMERGENCY ROOM PHONE NUMBER: [phone]  IF A COMPLICATION OR EMERGENCY SITUATION ARISES AND YOU ARE UNABLE TO REACH   YOUR PHYSICIAN - GO DIRECTLY TO THE EMERGENCY ROOM.  Miguel Angel Huffman MD  2/12/2024 11:33:21 AM  This report has been verified and signed electronically.  Dear patient,  As a result of recent federal legislation (The Federal Cures Act), you may   receive lab or pathology results from your procedure in your [facility]   account before your physician is able to contact you. Your physician or   their representative will relay the results to you with their   recommendations at their soonest availability.  Thank you,  PROVATION

## 2024-02-12 NOTE — ANESTHESIA PREPROCEDURE EVALUATION
02/12/2024  Jesus Christy is a 63 y.o., male.      Pre-op Assessment    I have reviewed the Patient Summary Reports.     I have reviewed the Nursing Notes. I have reviewed the NPO Status.   I have reviewed the Medications.     Review of Systems  Anesthesia Hx:  No problems with previous Anesthesia                Social:  Non-Smoker, No Alcohol Use       Hematology/Oncology:  Hematology Normal                     Current/Recent Cancer.                EENT/Dental:  EENT/Dental Normal           Cardiovascular:  Exercise tolerance: good   Hypertension             NYHA Classification I                           Pulmonary:  Pulmonary Normal                       Renal/:  Renal/ Normal                 Hepatic/GI:  Hepatic/GI Normal                 Musculoskeletal:  Musculoskeletal Normal                Neurological:    Neuromuscular Disease,  Headaches                                 Endocrine:  Endocrine Normal            Psych:  Psychiatric History                  Physical Exam  General: Well nourished    Airway:  Mallampati: I / I  Mouth Opening: Normal  TM Distance: Normal  Tongue: Normal  Neck ROM: Normal ROM    Dental:  Intact        Anesthesia Plan  Type of Anesthesia, risks & benefits discussed:    Anesthesia Type: Gen Natural Airway  Intra-op Monitoring Plan: Standard ASA Monitors  Post Op Pain Control Plan: multimodal analgesia  Induction:  IV  Airway Plan: Direct, Post-Induction  Informed Consent: Informed consent signed with the Patient and all parties understand the risks and agree with anesthesia plan.  All questions answered. Patient consented to blood products? Yes  ASA Score: 3  Day of Surgery Review of History & Physical: H&P Update referred to the surgeon/provider.    Ready For Surgery From Anesthesia Perspective.     .    Results for orders placed during the hospital encounter of  01/22/24    Echo    Interpretation Summary    Left Ventricle: The left ventricle is normal in size. Normal wall thickness. Normal wall motion. There is normal systolic function. Ejection fraction by visual approximation is 65%. There is normal diastolic function.    Right Ventricle: Normal right ventricular cavity size. Wall thickness is normal. Right ventricle wall motion  is normal. Systolic function is normal.    Aortic Valve: There is mild to moderate aortic regurgitation.    Tricuspid Valve: There is mild regurgitation.    Pulmonary Artery: The estimated pulmonary artery systolic pressure is 37 mmHg.    IVC/SVC: Normal venous pressure at 3 mmHg.      Lab Results   Component Value Date    WBC 5.46 02/05/2024    HGB 9.4 (L) 02/05/2024    HCT 32.3 (L) 02/05/2024     (H) 02/05/2024     (H) 02/05/2024       BMP  Lab Results   Component Value Date     02/05/2024    K 4.3 02/05/2024     02/05/2024    CO2 25 02/05/2024    BUN 6 (L) 02/05/2024    CREATININE 1.0 02/05/2024    CALCIUM 10.3 02/05/2024    ANIONGAP 7 (L) 02/05/2024    EGFRNORACEVR >60.0 02/05/2024

## 2024-02-12 NOTE — H&P
Short Stay Endoscopy History and Physical    PCP - Tara Jolly MD  Referring Physician - Sukhi Dee MD  [address] [place] [address]    Procedure - Endoscopy  ASA - per anesthesia  Mallampati - per anesthesia  History of Anesthesia problems - no  Family history Anesthesia problems -  no   Plan of anesthesia - General    HPI  63 y.o. male  Anemia, history of polyps     ROS:  Constitutional: No fevers, chills, No weight loss  CV: No chest pain  Pulm: No cough, No shortness of breath  GI: see HPI    Medical History:  has a past medical history of Complex regional pain syndrome i of right lower limb, GERD (gastroesophageal reflux disease), HTN (hypertension), and Portal vein thrombosis.    Surgical History:  has a past surgical history that includes Knee arthroscopy w/ ACL reconstruction (2014); Joint replacement (Right); and Colonoscopy (N/A, 9/29/2017).    Family History: family history includes Cancer in his brother; Diabetes in his father; Heart disease in his father; Hypertension in his mother; No Known Problems in his daughter, daughter, daughter, daughter, son, and son..    Social History:  reports that he has never smoked. He has never used smokeless tobacco. He reports current alcohol use of about 0.8 standard drinks of alcohol per week. He reports that he does not use drugs.    Review of patient's allergies indicates:   Allergen Reactions    Morphine Palpitations       Medications:   Medications Prior to Admission   Medication Sig Dispense Refill Last Dose    finasteride (PROPECIA) 1 mg tablet Take 1 mg by mouth once daily.   2/11/2024    mirtazapine (REMERON) 30 MG tablet Take 1 tablet (30 mg total) by mouth every evening. 90 tablet 3 2/11/2024    tamsulosin (FLOMAX) 0.4 mg Cap Take 0.4 mg by mouth once daily.   2/11/2024    apixaban (ELIQUIS) 5 mg Tab Take 2 tablets (10 mg total) by mouth 2 (two) times daily. for 7 days. THEN Take 1 tablet (5 mg total) by mouth 2 (two) times  daily. 60 tablet 3 2/9/2024 at 2100    diazePAM (VALIUM) 10 MG Tab Take 10 mg by mouth 3 (three) times daily.   2/10/2024    tadalafiL (CIALIS) 20 MG Tab Take 1 tablet (20 mg total) by mouth daily as needed (erectile dysfunction). 30 tablet 5 More than a month       Physical Exam:    Vital Signs:   Vitals:    02/12/24 0723   BP: 124/72   Pulse: 90   Resp: 12   Temp: 98.1 °F (36.7 °C)       General Appearance: Well appearing in no acute distress  Abdomen: Soft, non tender, non distended with normal bowel sounds, no masses    Labs:  Lab Results   Component Value Date    WBC 5.46 02/05/2024    HGB 9.4 (L) 02/05/2024    HCT 32.3 (L) 02/05/2024     (H) 02/05/2024    CHOL 154 12/13/2022    TRIG 135 12/13/2022    HDL 36 (L) 12/13/2022    ALT 15 02/05/2024    AST 13 02/05/2024     02/05/2024    K 4.3 02/05/2024     02/05/2024    CREATININE 1.0 02/05/2024    BUN 6 (L) 02/05/2024    CO2 25 02/05/2024    TSH 1.628 06/03/2020    PSA 6.9 (H) 09/01/2016    INR 1.2 01/23/2024       I have explained the risks and benefits of this endoscopic procedure to the patient including but not limited to bleeding, inflammation, infection, perforation, and death.      Bradley Marinelli MD

## 2024-02-12 NOTE — ANESTHESIA POSTPROCEDURE EVALUATION
Anesthesia Post Evaluation    Patient: Jesus Christy    Procedure(s) Performed: Procedure(s) (LRB):  EGD (ESOPHAGOGASTRODUODENOSCOPY) (N/A)  COLONOSCOPY (N/A)    Final Anesthesia Type: general      Patient location during evaluation: PACU  Patient participation: Yes- Able to Participate  Level of consciousness: awake and alert and awake  Post-procedure vital signs: reviewed and stable  Pain management: adequate  Airway patency: patent    PONV status at discharge: No PONV  Anesthetic complications: no      Cardiovascular status: blood pressure returned to baseline  Respiratory status: unassisted and spontaneous ventilation  Hydration status: euvolemic  Follow-up not needed.              Vitals Value Taken Time   /82 02/12/24 1232   Temp 36.8 °C (98.2 °F) 02/12/24 1200   Pulse 73 02/12/24 1235   Resp 32 02/12/24 1227   SpO2 95 % 02/12/24 1235   Vitals shown include unvalidated device data.      No case tracking events are documented in the log.      Pain/Anna Score: Anna Score: 10 (2/12/2024 12:15 PM)

## 2024-02-12 NOTE — PROVATION PATIENT INSTRUCTIONS
Discharge Summary/Instructions after an Endoscopic Procedure  Patient Name: Jesus Christy  Patient MRN: [id number]  Patient YOB: 1960 Monday, February 12, 2024  Miguel Angel Huffman MD  Dear patient,  As a result of recent federal legislation (The Federal Cures Act), you may   receive lab or pathology results from your procedure in your [facility]   account before your physician is able to contact you. Your physician or   their representative will relay the results to you with their   recommendations at their soonest availability.  Thank you,  RESTRICTIONS:  During your procedure today, you received medications for sedation.  These   medications may affect your judgment, balance and coordination.  Therefore,   for 24 hours, you have the following restrictions:   - DO NOT drive a car, operate machinery, make legal/financial decisions,   sign important papers or drink alcohol.    ACTIVITY:  Today: no heavy lifting, straining or running due to procedural   sedation/anesthesia.  The following day: return to full activity including work.  DIET:  Eat and drink normally unless instructed otherwise.     TREATMENT FOR COMMON SIDE EFFECTS:  - Mild abdominal pain, nausea, belching, bloating or excessive gas:  rest,   eat lightly and use a heating pad.  - Sore Throat: treat with throat lozenges and/or gargle with warm salt   water.  - Because air was used during the procedure, expelling large amounts of air   from your rectum or belching is normal.  - If a bowel prep was taken, you may not have a bowel movement for 1-3 days.    This is normal.  SYMPTOMS TO WATCH FOR AND REPORT TO YOUR PHYSICIAN:  1. Abdominal pain or bloating, other than gas cramps.  2. Chest pain.  3. Back pain.  4. Signs of infection such as: chills or fever occurring within 24 hours   after the procedure.  5. Rectal bleeding, which would show as bright red, maroon, or black stools.   (A tablespoon of blood from the rectum is not serious, especially if    hemorrhoids are present.)  6. Vomiting.  7. Weakness or dizziness.  GO DIRECTLY TO THE NEAREST EMERGENCY ROOM IF YOU HAVE ANY OF THE FOLLOWING:      Difficulty breathing              Chills and/or fever over 101 F   Persistent vomiting and/or vomiting blood   Severe abdominal pain   Severe chest pain   Black, tarry stools   Bleeding- more than one tablespoon   Any other symptom or condition that you feel may need urgent attention  Your doctor recommends these additional instructions:  If any biopsies were taken, your doctors clinic will contact you in 1 to 2   weeks with any results.  - Discharge patient to home (ambulatory).   - Patient has a contact number available for emergencies.  The signs and   symptoms of potential delayed complications were discussed with the   patient.  Return to normal activities tomorrow.  Written discharge   instructions were provided to the patient.   - Resume previous diet.   - Continue present medications.   - Return to primary care physician as previously scheduled.   - Repeat colonoscopy in 5 years for surveillance.  For questions, problems or results please call your physician - Miguel Angel Huffman MD at Work:  [phone].  [facility], EMERGENCY ROOM PHONE NUMBER: [phone]  IF A COMPLICATION OR EMERGENCY SITUATION ARISES AND YOU ARE UNABLE TO REACH   YOUR PHYSICIAN - GO DIRECTLY TO THE EMERGENCY ROOM.  Miguel Angel Huffman MD  2/12/2024 11:55:47 AM  This report has been verified and signed electronically.  Dear patient,  As a result of recent federal legislation (The Federal Cures Act), you may   receive lab or pathology results from your procedure in your [facility]   account before your physician is able to contact you. Your physician or   their representative will relay the results to you with their   recommendations at their soonest availability.  Thank you,  PROVATION

## 2024-02-15 LAB
FINAL PATHOLOGIC DIAGNOSIS: NORMAL
GROSS: NORMAL
Lab: NORMAL

## 2024-02-16 ENCOUNTER — PATIENT MESSAGE (OUTPATIENT)
Dept: GASTROENTEROLOGY | Facility: HOSPITAL | Age: 64
End: 2024-02-16
Payer: MEDICARE

## 2024-02-21 ENCOUNTER — OFFICE VISIT (OUTPATIENT)
Dept: HEMATOLOGY/ONCOLOGY | Facility: CLINIC | Age: 64
End: 2024-02-21
Payer: MEDICARE

## 2024-02-21 ENCOUNTER — LAB VISIT (OUTPATIENT)
Dept: LAB | Facility: HOSPITAL | Age: 64
End: 2024-02-21
Attending: INTERNAL MEDICINE
Payer: MEDICARE

## 2024-02-21 VITALS
WEIGHT: 170.19 LBS | OXYGEN SATURATION: 95 % | BODY MASS INDEX: 25.21 KG/M2 | DIASTOLIC BLOOD PRESSURE: 69 MMHG | TEMPERATURE: 97 F | SYSTOLIC BLOOD PRESSURE: 145 MMHG | RESPIRATION RATE: 17 BRPM | HEIGHT: 69 IN | HEART RATE: 86 BPM

## 2024-02-21 DIAGNOSIS — I81 PORTAL VEIN THROMBOSIS: Primary | ICD-10-CM

## 2024-02-21 DIAGNOSIS — I81 PORTAL VEIN THROMBOSIS: ICD-10-CM

## 2024-02-21 DIAGNOSIS — D64.9 ANEMIA, UNSPECIFIED TYPE: ICD-10-CM

## 2024-02-21 LAB
ALBUMIN SERPL BCP-MCNC: 3.4 G/DL (ref 3.5–5.2)
ALP SERPL-CCNC: 118 U/L (ref 55–135)
ALT SERPL W/O P-5'-P-CCNC: 26 U/L (ref 10–44)
ANION GAP SERPL CALC-SCNC: 11 MMOL/L (ref 8–16)
AST SERPL-CCNC: 21 U/L (ref 10–40)
BASOPHILS # BLD AUTO: 0.07 K/UL (ref 0–0.2)
BASOPHILS NFR BLD: 1.1 % (ref 0–1.9)
BILIRUB SERPL-MCNC: 0.7 MG/DL (ref 0.1–1)
BUN SERPL-MCNC: 10 MG/DL (ref 8–23)
CALCIUM SERPL-MCNC: 10.6 MG/DL (ref 8.7–10.5)
CHLORIDE SERPL-SCNC: 107 MMOL/L (ref 95–110)
CO2 SERPL-SCNC: 23 MMOL/L (ref 23–29)
CREAT SERPL-MCNC: 0.9 MG/DL (ref 0.5–1.4)
DIFFERENTIAL METHOD BLD: ABNORMAL
EOSINOPHIL # BLD AUTO: 0.2 K/UL (ref 0–0.5)
EOSINOPHIL NFR BLD: 3.8 % (ref 0–8)
ERYTHROCYTE [DISTWIDTH] IN BLOOD BY AUTOMATED COUNT: 15.1 % (ref 11.5–14.5)
EST. GFR  (NO RACE VARIABLE): >60 ML/MIN/1.73 M^2
FERRITIN SERPL-MCNC: 362 NG/ML (ref 20–300)
GLUCOSE SERPL-MCNC: 74 MG/DL (ref 70–110)
HCT VFR BLD AUTO: 42.6 % (ref 40–54)
HGB BLD-MCNC: 12.4 G/DL (ref 14–18)
IMM GRANULOCYTES # BLD AUTO: 0.02 K/UL (ref 0–0.04)
IMM GRANULOCYTES NFR BLD AUTO: 0.3 % (ref 0–0.5)
IRON SERPL-MCNC: 62 UG/DL (ref 45–160)
LDH SERPL L TO P-CCNC: 152 U/L (ref 110–260)
LYMPHOCYTES # BLD AUTO: 2.5 K/UL (ref 1–4.8)
LYMPHOCYTES NFR BLD: 39.1 % (ref 18–48)
MCH RBC QN AUTO: 29.6 PG (ref 27–31)
MCHC RBC AUTO-ENTMCNC: 29.1 G/DL (ref 32–36)
MCV RBC AUTO: 102 FL (ref 82–98)
MONOCYTES # BLD AUTO: 0.5 K/UL (ref 0.3–1)
MONOCYTES NFR BLD: 8.6 % (ref 4–15)
NEUTROPHILS # BLD AUTO: 3 K/UL (ref 1.8–7.7)
NEUTROPHILS NFR BLD: 47.1 % (ref 38–73)
NRBC BLD-RTO: 0 /100 WBC
PATH REV BLD -IMP: NORMAL
PLATELET # BLD AUTO: 273 K/UL (ref 150–450)
PMV BLD AUTO: 9.1 FL (ref 9.2–12.9)
POTASSIUM SERPL-SCNC: 3.7 MMOL/L (ref 3.5–5.1)
PROT SERPL-MCNC: 7.8 G/DL (ref 6–8.4)
RBC # BLD AUTO: 4.19 M/UL (ref 4.6–6.2)
RETICS/RBC NFR AUTO: 3.9 % (ref 0.4–2)
SATURATED IRON: 17 % (ref 20–50)
SODIUM SERPL-SCNC: 141 MMOL/L (ref 136–145)
TOTAL IRON BINDING CAPACITY: 358 UG/DL (ref 250–450)
TRANSFERRIN SERPL-MCNC: 242 MG/DL (ref 200–375)
WBC # BLD AUTO: 6.27 K/UL (ref 3.9–12.7)

## 2024-02-21 PROCEDURE — 82728 ASSAY OF FERRITIN: CPT | Performed by: INTERNAL MEDICINE

## 2024-02-21 PROCEDURE — 83615 LACTATE (LD) (LDH) ENZYME: CPT | Performed by: INTERNAL MEDICINE

## 2024-02-21 PROCEDURE — 80053 COMPREHEN METABOLIC PANEL: CPT | Performed by: INTERNAL MEDICINE

## 2024-02-21 PROCEDURE — 85045 AUTOMATED RETICULOCYTE COUNT: CPT | Performed by: INTERNAL MEDICINE

## 2024-02-21 PROCEDURE — 85025 COMPLETE CBC W/AUTO DIFF WBC: CPT | Performed by: INTERNAL MEDICINE

## 2024-02-21 PROCEDURE — 36415 COLL VENOUS BLD VENIPUNCTURE: CPT | Performed by: INTERNAL MEDICINE

## 2024-02-21 PROCEDURE — 85060 BLOOD SMEAR INTERPRETATION: CPT | Mod: ,,, | Performed by: PATHOLOGY

## 2024-02-21 PROCEDURE — 99999 PR PBB SHADOW E&M-EST. PATIENT-LVL III: CPT | Mod: PBBFAC,,, | Performed by: INTERNAL MEDICINE

## 2024-02-21 PROCEDURE — 99214 OFFICE O/P EST MOD 30 MIN: CPT | Mod: S$GLB,,, | Performed by: INTERNAL MEDICINE

## 2024-02-21 PROCEDURE — 83540 ASSAY OF IRON: CPT | Performed by: INTERNAL MEDICINE

## 2024-02-21 NOTE — PROGRESS NOTES
CONSULT NOTE    Subjective:       Patient ID: Jesus Christy is a 63 y.o. male.  MRN: [id number]  : 1960    Chief Complaint: portal vein thrombosis      History of Present Illness:   Jesus Christy is a 63 y.o. male who is referred for PVT.     Was admitted from -/1/3/24 for dark urine and fatigue. Found to have portal vein thrombosis and hyperbiliribuinemia. and started on anticoagulation. He also has prostate adenocarcinoma on active surveillance.    Was readmitted from - for worsening anemia and sepsis. He was admitted to the MICU for septic shock . Hb dropped from 12 g/dl -6 g/dl during that admission.   Seen by inpatient Hematology.   MPN panel and PNH work up was normal. Hemolysis work up was normal.     Feels better, walking with a rolling walker. Still has dysgeusia. No dark stool or rectal bleeding.   Has back pain.     Oncology History:  Oncology History    No history exists.       History:  Past Medical History:   Diagnosis Date    Complex regional pain syndrome i of right lower limb     GERD (gastroesophageal reflux disease)     HTN (hypertension)     Portal vein thrombosis       Past Surgical History:   Procedure Laterality Date    COLONOSCOPY N/A 2017    Procedure: COLONOSCOPY;  Surgeon: Jamar Edwards MD;  Location: [facility] (4TH FLR);  Service: Endoscopy;  Laterality: N/A;    COLONOSCOPY N/A 2024    Procedure: COLONOSCOPY;  Surgeon: MiguelA ngel Huffman MD;  Location: [facility] (2ND FLR);  Service: Endoscopy;  Laterality: N/A;    ESOPHAGOGASTRODUODENOSCOPY N/A 2024    Procedure: EGD (ESOPHAGOGASTRODUODENOSCOPY);  Surgeon: Miguel Angel Huffman MD;  Location: [facility] (2ND FLR);  Service: Endoscopy;  Laterality: N/A;  24-Okay to add per Dr. Huffman, 2nd flr-recent hospitalization for sepsis and portal vein thrombosis, Miralax, instr portal and email, Approval to hold Eliquis and medical clearance rec'd from Dr. Jolly (see  telephone encounter 1/30/24)-DS  2/5-prec    JOINT REPLACEMENT Right     knee    KNEE ARTHROSCOPY W/ ACL RECONSTRUCTION  2014    right     Family History   Problem Relation Age of Onset    Hypertension Mother     Heart disease Father     Diabetes Father     Cancer Brother         unknown type    No Known Problems Daughter     No Known Problems Daughter     No Known Problems Daughter     No Known Problems Daughter     No Known Problems Son     No Known Problems Son       Social History     Tobacco Use    Smoking status: Never    Smokeless tobacco: Never   Substance and Sexual Activity    Alcohol use: Yes     Alcohol/week: 0.8 standard drinks of alcohol     Types: 1 Standard drinks or equivalent per week     Comment: once every few months    Drug use: No    Sexual activity: Not on file        ROS:   Review of Systems   Constitutional:  Positive for malaise/fatigue and weight loss. Negative for fever.   HENT:  Negative for congestion, hearing loss, nosebleeds and sore throat.    Eyes:  Negative for double vision and photophobia.   Respiratory:  Negative for cough, hemoptysis, sputum production, shortness of breath and wheezing.    Cardiovascular:  Negative for chest pain, palpitations, orthopnea and leg swelling.   Gastrointestinal:  Negative for abdominal pain, blood in stool, constipation, diarrhea, heartburn, nausea and vomiting.   Genitourinary:  Negative for dysuria, hematuria and urgency.   Musculoskeletal:  Positive for back pain. Negative for joint pain and myalgias.   Skin:  Negative for itching and rash.   Neurological:  Negative for dizziness, tingling, seizures, weakness and headaches.   Endo/Heme/Allergies:  Negative for polydipsia. Does not bruise/bleed easily.   Psychiatric/Behavioral:  Negative for depression and memory loss. The patient is nervous/anxious. The patient does not have insomnia.         Objective:     Vitals:    02/21/24 1446   BP: (!) 145/69   Pulse: 86   Resp: 17   Temp: 97 °F (36.1 °C)  "  TempSrc: Oral   SpO2: 95%   Weight: 77.2 kg (170 lb 3.1 oz)   Height: 5' 9" (1.753 m)   PainSc:   5   PainLoc: Back       Physical Examination:   Physical Exam  Constitutional:       Appearance: He is ill-appearing.   HENT:      Head: Normocephalic and atraumatic.      Nose: Nose normal.   Eyes:      Extraocular Movements: Extraocular movements intact.      Conjunctiva/sclera: Conjunctivae normal.   Cardiovascular:      Rate and Rhythm: Normal rate.   Pulmonary:      Effort: Pulmonary effort is normal.   Musculoskeletal:      Cervical back: Normal range of motion.   Skin:     General: Skin is warm and dry.   Neurological:      Mental Status: He is alert.      Motor: Weakness present.   Psychiatric:         Mood and Affect: Mood normal.          Diagnostic Tests:  Significant Imaging: I have reviewed and interpreted all pertinent imaging results/findings.  CT Chest Without Contrast Results for orders placed during the hospital encounter of 09/10/19    CT Chest Without Contrast    Narrative  EXAMINATION:  CT CHEST WITHOUT CONTRAST    CLINICAL HISTORY:  Lung nodule, >=1cm; Solitary pulmonary nodule    TECHNIQUE:  Low dose axial images, sagittal and coronal reformations were obtained from the thoracic inlet to the lung bases. Contrast was not administered.    COMPARISON:  CT chest 09/04/2018, 03/15/2018, CTA chest 12/25/2017.    FINDINGS:  Visualized structures in the lower neck and both axillary areas are unremarkable.    Thoracic aorta is normal size with left-sided arch.  No significant vascular calcifications are seen.  Heart size is normal without pericardial fluid.  Mediastinal and hilar areas show no abnormal mass or significant lymph node enlargement.  Slight amount of thymic parenchyma remains.    Visualized upper abdominal structures are unremarkable.    The trachea and bronchi are patent good expansion of both lungs.  We are following a smooth, soft tissue nodule in the right upper lobe; it is stable in " size at 2.0 cm (series 4, image 185).  It does not show any calcification or fat density.  Noncalcified nodule could represent granuloma, hamartoma, scar, tumor, etc. Stability since 12/25/2017 suggests a benign etiology.  Elsewhere, the middle and lower lung zones show a few scattered small lines consistent with minor fibrotic change.  Lungs are otherwise clear without acute consolidation or pleural fluid.  The high-resolution lung images do not show emphysema or diffuse interstitial lung disease.    Skeletal structures are intact without acute fracture.    Impression  1. No acute cardiopulmonary disease.  2. Stable 2.0 cm soft tissue nodule in right upper lobe, likely benign.  Suggest noncontrast chest CT follow-up in December, 2020 to demonstrate more than 2 years stability.  3. Other findings as above.      Electronically signed by: Eder Bourne MD  Date:    09/10/2019  Time:    09:53    CT Chest With ContrastNo results found for this or any previous visit.    CT Abdomen/Pelvis Without Contrast No results found for this or any previous visit.     CT Abdomen/Pelvis With Contrast Results for orders placed during the hospital encounter of 01/01/24    CT Abdomen Pelvis With IV Contrast NO Oral Contrast    Narrative  EXAMINATION:  CT ABDOMEN PELVIS WITH IV CONTRAST    CLINICAL HISTORY:  Flank pain, kidney stone suspected;hematuria with lower back pain;    TECHNIQUE:  Low dose axial images, sagittal and coronal reformations were obtained from the lung bases to the pubic symphysis following the IV administration of 75 mL of Omnipaque 350 .  Oral contrast was not given.    COMPARISON:  CT 02/08/2013    FINDINGS:  Images of the lower thorax are remarkable for a 3 mm pulmonary nodule within the anterior aspect of the right middle lobe, stable since 2019.  There is bilateral basilar dependent atelectasis/scarring.  There is a 2 mm pulmonary nodule along the periphery of the lingula laterally, also stable.    The liver  is hypoattenuating suggesting steatosis, correlation with LFTs recommended.  The liver is enlarged.  There is a punctate focus of low attenuation within the left hepatic lobe, too small for characterization.  There is somewhat heterogeneous appearance of the inferior aspect of the right hepatic lobe.  Possibly related to contrast phase.  The spleen, pancreas and adrenal glands are grossly unremarkable.  The gallbladder is decompressed, no secondary findings to suggest acute cholecystitis.  The portal vein, splenic vein, SMV, celiac axis and SMA all are patent.  There are a few scattered prominent leisa hepatic lymph nodes and a few scattered upper limit of normal caliber abdominal lymph nodes.    There is excretion of contrast by the kidneys, limiting evaluation for nephrolithiasis.  No hydronephrosis.  There is a subcentimeter low attenuating lesion within the interpolar region of the right kidney, too small for characterization.  The bilateral ureters are unremarkable noting contrast within the ureteral lumen is limits evaluation for calculi.  The urinary bladder is unremarkable.  There is contrast within the urinary bladder.  The prostate is enlarged.    There are multiple scattered colonic diverticula noting questionable slight indistinctness about a few diverticula involving the distal descending colon versus artifact.  The terminal ileum and appendix are unremarkable.  The small bowel is grossly unremarkable.  There are a few scattered shotty periaortic, pericaval, and mesenteric lymph nodes.  No focal organized pelvic fluid collection.  There is a fat containing left inguinal hernia.    There are degenerative changes of the spine.  No significant inguinal lymphadenopathy.    Impression  This report was flagged in Epic as abnormal.    1. Findings suggesting hepatic steatosis, correlation with LFTs recommended.  There is a somewhat heterogeneous appearance to the posterior aspect of the right hepatic lobe  inferiorly.  This may be on the basis of contrast phase however this could be further evaluated with nonemergent MRI as warranted.  2. Multiple scattered colonic diverticula.  There is questionable slight indistinctness about a few diverticula at the level of the distal descending colon versus motion artifact.  Correlation is advised, early sequela of diverticulitis not excluded.  3. Prostatomegaly.  4. Pulmonary nodules as described.  5. Please see above for several additional findings.      Electronically signed by: Jerson Lau MD  Date:    01/01/2024  Time:    19:36    CT Abdomen/Pelvis With Without Contrast No results found for this or any previous visit.     PET Scan No results found for this or any previous visit.      Laboratory Data:  All pertinent labs have been reviewed.    Labs:   Lab Results   Component Value Date    WBC 5.46 02/05/2024    HGB 9.4 (L) 02/05/2024    HCT 32.3 (L) 02/05/2024     (H) 02/05/2024     (H) 02/05/2024       Assessment/Plan:   Portal vein thrombosis    Continue apixaban.   Given active malignancy, would recommend long term anticoagulation as tolerated.   -     Ambulatory referral/consult to Hematology / Oncology  -     CBC Auto Differential; Standing  -     CMP; Future; Expected date: 02/21/2024  -     Pathologist Interpretation Differential; Future; Expected date: 02/21/2024  -     IRON AND TIBC; Future; Expected date: 02/21/2024  -     FERRITIN; Future; Expected date: 02/21/2024  -     RETICULOCYTES; Future; Expected date: 02/21/2024  -     Lactate Dehydrogenase; Standing    Anemia, unspecified type  Hb is now in the 9 g/dl range. Repeat iron studies and PS and continue to monitor.        ECOG SCORE               Discussion:   No follow-ups on file.    Plan was discussed with the patient at length, and he verbalized understanding. Jesus was given an opportunity to ask questions that were answered to his satisfaction, and he was advised to call in the interval  if any problems or questions arise.    Electronically signed by Anahi Miranda MD      Route Chart for Scheduling    Med Onc Chart Routing      Follow up with physician 2 months. benign hematology end of April or early May   Follow up with [facility]    Infusion scheduling note    Injection scheduling note    Labs CBC, ferritin, iron and TIBC, LDH, pathologist interp. differential and reticulocytes   Scheduling:  Preferred lab:  Lab interval:     Imaging    Pharmacy appointment    Other referrals

## 2024-02-22 LAB — PATH REV BLD -IMP: NORMAL

## 2024-02-23 ENCOUNTER — OFFICE VISIT (OUTPATIENT)
Dept: INTERNAL MEDICINE | Facility: CLINIC | Age: 64
End: 2024-02-23
Payer: MEDICARE

## 2024-02-23 ENCOUNTER — HOSPITAL ENCOUNTER (OUTPATIENT)
Dept: RADIOLOGY | Facility: HOSPITAL | Age: 64
Discharge: HOME | End: 2024-02-23
Attending: INTERNAL MEDICINE
Payer: MEDICARE

## 2024-02-23 VITALS
BODY MASS INDEX: 25.86 KG/M2 | DIASTOLIC BLOOD PRESSURE: 76 MMHG | HEIGHT: 68 IN | HEART RATE: 84 BPM | OXYGEN SATURATION: 95 % | SYSTOLIC BLOOD PRESSURE: 110 MMHG | WEIGHT: 170.63 LBS

## 2024-02-23 DIAGNOSIS — M54.16 LUMBAR NEURITIS: ICD-10-CM

## 2024-02-23 DIAGNOSIS — M54.42 CHRONIC BILATERAL LOW BACK PAIN WITH BILATERAL SCIATICA: ICD-10-CM

## 2024-02-23 DIAGNOSIS — M54.41 CHRONIC BILATERAL LOW BACK PAIN WITH BILATERAL SCIATICA: ICD-10-CM

## 2024-02-23 DIAGNOSIS — G89.29 CHRONIC BILATERAL LOW BACK PAIN WITH BILATERAL SCIATICA: ICD-10-CM

## 2024-02-23 DIAGNOSIS — M54.16 LUMBAR NEURITIS: Primary | ICD-10-CM

## 2024-02-23 DIAGNOSIS — Z85.46 HISTORY OF PROSTATE CANCER: ICD-10-CM

## 2024-02-23 PROCEDURE — 99214 OFFICE O/P EST MOD 30 MIN: CPT | Mod: S$GLB,,, | Performed by: INTERNAL MEDICINE

## 2024-02-23 PROCEDURE — 72110 X-RAY EXAM L-2 SPINE 4/>VWS: CPT | Mod: TC

## 2024-02-23 PROCEDURE — 72110 X-RAY EXAM L-2 SPINE 4/>VWS: CPT | Mod: 26,,, | Performed by: RADIOLOGY

## 2024-02-23 PROCEDURE — 99999 PR PBB SHADOW E&M-EST. PATIENT-LVL IV: CPT | Mod: PBBFAC,,, | Performed by: INTERNAL MEDICINE

## 2024-02-23 RX ORDER — GABAPENTIN 300 MG/1
300 CAPSULE ORAL 3 TIMES DAILY PRN
Qty: 90 CAPSULE | Refills: 0 | Status: SHIPPED | OUTPATIENT
Start: 2024-02-23 | End: 2025-02-22

## 2024-02-23 NOTE — PROGRESS NOTES
"Subjective:       Patient ID: Jesus Christy is a 63 y.o. male.    Chief Complaint: Low-back Pain and Leg Pain    HPI  63 y.o. male here for evaluation of low back and bilateral  leg pain.     Hx of R portal vein thrombus, pancreatic tail cyst, and urosepsis.     He saw hepatology on 1/5: no clear cause for PVT except possible prothrombotic state due to prostate cancer. Fibroscan normal.      Degenerative changes of lumbar spine noted on previous imaging.   Left leg pain  Long hx of complex regional pain syndrome type 1 of RLE - previously managed by Dr. Esteban.     Having low back pain with radiation down his  legs (equal bilaterally) for last week.  Possible PT triggered? No falls. No fever, no urinary sx. Taking diazepam with some relief. Needing wealker. Feels similar to CRPS.     Denies loss of control of bowel and/or bladder and denies groin numbness.     Review of Systems   Constitutional:  Negative for fever.   Respiratory:  Negative for shortness of breath.    Cardiovascular:  Negative for chest pain.   Musculoskeletal:  Positive for back pain.   Skin: Negative.        Objective:   /76 (BP Location: Left arm, Patient Position: Sitting, BP Method: Medium (Manual))   Pulse 84   Ht 5' 8" (1.727 m)   Wt 77.4 kg (170 lb 10.2 oz)   SpO2 95%   BMI 25.95 kg/m²      Physical Exam  Constitutional:       General: He is not in acute distress.     Appearance: He is well-developed. He is not diaphoretic.   HENT:      Head: Normocephalic and atraumatic.   Cardiovascular:      Rate and Rhythm: Normal rate.   Pulmonary:      Effort: Pulmonary effort is normal. No respiratory distress.   Musculoskeletal:      Comments: Using walker, antalgic gait, pain with straight leg raise but not radiation, ttp over lumbar spine el ~L5   Skin:     General: Skin is warm and dry.   Neurological:      Mental Status: He is alert and oriented to person, place, and time.   Psychiatric:         Behavior: Behavior normal.       "   Assessment:       1. Lumbar neuritis    2. History of prostate cancer    3. Chronic bilateral low back pain with bilateral sciatica        Plan:       Lumbar neuritis  History of prostate cancer  Chronic bilateral low back pain with bilateral sciatica  -     X-Ray Lumbar Spine 5 View; Future; Expected date: 02/23/2024  -     gabapentin (NEURONTIN) 300 MG capsule; Take 1 capsule (300 mg total) by mouth 3 (three) times daily as needed (leg pain).  Dispense: 90 capsule; Refill: 0  Continue PT        You are up to date for your primary preventive health care, and there are no reminders at this time.

## 2024-02-26 ENCOUNTER — OFFICE VISIT (OUTPATIENT)
Dept: GASTROENTEROLOGY | Facility: CLINIC | Age: 64
End: 2024-02-26
Payer: MEDICARE

## 2024-02-26 ENCOUNTER — PATIENT MESSAGE (OUTPATIENT)
Dept: GASTROENTEROLOGY | Facility: CLINIC | Age: 64
End: 2024-02-26

## 2024-02-26 VITALS
SYSTOLIC BLOOD PRESSURE: 110 MMHG | HEART RATE: 84 BPM | BODY MASS INDEX: 25.76 KG/M2 | DIASTOLIC BLOOD PRESSURE: 76 MMHG | WEIGHT: 170 LBS | HEIGHT: 68 IN

## 2024-02-26 DIAGNOSIS — D64.9 ANEMIA, UNSPECIFIED TYPE: Primary | ICD-10-CM

## 2024-02-26 PROCEDURE — 99214 OFFICE O/P EST MOD 30 MIN: CPT | Mod: GC,S$GLB,, | Performed by: STUDENT IN AN ORGANIZED HEALTH CARE EDUCATION/TRAINING PROGRAM

## 2024-02-26 PROCEDURE — 99999 PR PBB SHADOW E&M-EST. PATIENT-LVL III: CPT | Mod: PBBFAC,GC,, | Performed by: STUDENT IN AN ORGANIZED HEALTH CARE EDUCATION/TRAINING PROGRAM

## 2024-02-26 NOTE — PROGRESS NOTES
[facility]    Reason for visit: There were no encounter diagnoses.  Referring Provider/PCP: Tara Jolly MD    History of Present Illness:  Jesus Christy is a 63 y.o. male with recently diagnosed portal vein thrombosis on eliquis, prostate adenocarcinoma (low-grade under active surveillance), complex regional pain syndrome, anxiety on chronic benzodiazepines, and HTN presenting to GI clinic for a hospital follow-up. He was recently admitted to [facility] from 1/22 to 1/27 for acute anemia Hgb 7.7 (previously 12 just a few weeks prior) and septic shock in the setting of strep/bacteroides bacteremia. GI consulted, recommended outpatient EGD/Colonoscopy once infection resolves. He was weaned off pressors, switched to PO abx and discharged home. EGD was normal. Colonoscopy showed two colon polyps, tubular adenoma on pathology. No overt bleeding. Recent CBC from 2/21 show improvement in Hgb, now 12.4 (baseline).    Review of Systems   Constitutional:  Negative for malaise/fatigue and weight loss.   Gastrointestinal:  Negative for blood in stool and melena.     Medical History:  Past Medical History:   Diagnosis Date    Complex regional pain syndrome i of right lower limb     GERD (gastroesophageal reflux disease)     HTN (hypertension)     Portal vein thrombosis        Past Surgical History:   Procedure Laterality Date    COLONOSCOPY N/A 9/29/2017    Procedure: COLONOSCOPY;  Surgeon: Jamar dEwards MD;  Location: [facility] (4TH FLR);  Service: Endoscopy;  Laterality: N/A;    COLONOSCOPY N/A 2/12/2024    Procedure: COLONOSCOPY;  Surgeon: Miguel Angel Huffman MD;  Location: [facility] (2ND FLR);  Service: Endoscopy;  Laterality: N/A;    ESOPHAGOGASTRODUODENOSCOPY N/A 2/12/2024    Procedure: EGD (ESOPHAGOGASTRODUODENOSCOPY);  Surgeon: Miguel Angel Huffman MD;  Location: [facility] (2ND FLR);  Service: Endoscopy;  Laterality: N/A;  1/30/24-Okay to add per Dr. Huffman, 2nd flr-recent hospitalization for sepsis  and portal vein thrombosis, Miralax, instr portal and email, Approval to hold Eliquis and medical clearance rec'd from Dr. Jolly (see telephone encounter 1/30/24)-DS  2/5-prec    JOINT REPLACEMENT Right     knee    KNEE ARTHROSCOPY W/ ACL RECONSTRUCTION  2014    right       Family History   Problem Relation Age of Onset    Hypertension Mother     Heart disease Father     Diabetes Father     Cancer Brother         unknown type    No Known Problems Daughter     No Known Problems Daughter     No Known Problems Daughter     No Known Problems Daughter     No Known Problems Son     No Known Problems Son        Social History     Socioeconomic History    Marital status: Single   Tobacco Use    Smoking status: Never    Smokeless tobacco: Never   Substance and Sexual Activity    Alcohol use: Yes     Alcohol/week: 0.8 standard drinks of alcohol     Types: 1 Standard drinks or equivalent per week     Comment: once every few months    Drug use: No   Social History Narrative    Prior maintenance work.    Disable due to leg pain and depression.        Lives alone.    No exercise, due to leg pain.     Social Determinants of Health     Financial Resource Strain: Low Risk  (1/24/2024)    Overall Financial Resource Strain (CARDIA)     Difficulty of Paying Living Expenses: Not very hard   Recent Concern: Financial Resource Strain - Medium Risk (1/24/2024)    Overall Financial Resource Strain (CARDIA)     Difficulty of Paying Living Expenses: Somewhat hard   Food Insecurity: Patient Declined (1/24/2024)    Hunger Vital Sign     Worried About Running Out of Food in the Last Year: Patient declined     Ran Out of Food in the Last Year: Patient declined   Recent Concern: Food Insecurity - Food Insecurity Present (1/24/2024)    Hunger Vital Sign     Worried About Running Out of Food in the Last Year: Sometimes true     Ran Out of Food in the Last Year: Never true   Transportation Needs: No Transportation Needs (1/24/2024)    PRAPARE -  Transportation     Lack of Transportation (Medical): No     Lack of Transportation (Non-Medical): No   Physical Activity: Patient Declined (1/24/2024)    Exercise Vital Sign     Days of Exercise per Week: Patient declined     Minutes of Exercise per Session: Patient declined   Stress: No Stress Concern Present (1/24/2024)    [place] [employer] - Occupational Stress Questionnaire     Feeling of Stress : Only a little   Social Connections: Moderately Isolated (1/24/2024)    Social Connection and Isolation Panel [NHANES]     Frequency of Communication with Friends and Family: More than three times a week     Frequency of Social Gatherings with Friends and Family: More than three times a week     Attends Mosque Services: More than 4 times per year     Active Member of Clubs or Organizations: No     Attends Club or Organization Meetings: Never     Marital Status:    Housing Stability: Low Risk  (1/24/2024)    Housing Stability Vital Sign     Unable to Pay for Housing in the Last Year: No     Number of Places Lived in the Last Year: 1     Unstable Housing in the Last Year: No       Current Outpatient Medications on File Prior to Visit   Medication Sig Dispense Refill    apixaban (ELIQUIS) 5 mg Tab Take 2 tablets (10 mg total) by mouth 2 (two) times daily. for 7 days. THEN Take 1 tablet (5 mg total) by mouth 2 (two) times daily. 60 tablet 3    diazePAM (VALIUM) 10 MG Tab Take 10 mg by mouth 3 (three) times daily.      finasteride (PROPECIA) 1 mg tablet Take 1 mg by mouth once daily.      gabapentin (NEURONTIN) 300 MG capsule Take 1 capsule (300 mg total) by mouth 3 (three) times daily as needed (leg pain). 90 capsule 0    mirtazapine (REMERON) 30 MG tablet Take 1 tablet (30 mg total) by mouth every evening. 90 tablet 3    tadalafiL (CIALIS) 20 MG Tab Take 1 tablet (20 mg total) by mouth daily as needed (erectile dysfunction). 30 tablet 5    tamsulosin (FLOMAX) 0.4 mg Cap Take 0.4 mg by  mouth once daily.       No current facility-administered medications on file prior to visit.       Review of patient's allergies indicates:   Allergen Reactions    Morphine Palpitations       Physical Exam:  Constitutional:  not in acute distress and well developed  HENT: Head: Normal, normocephalic, atraumatic.  Neurological: alert, oriented x3  Psychiatric: mood and affect are within normal limits, pt is a good historian; no memory problems were noted    Laboratory:  Lab Results   Component Value Date     02/21/2024    K 3.7 02/21/2024     02/21/2024    CO2 23 02/21/2024    BUN 10 02/21/2024    CREATININE 0.9 02/21/2024    CALCIUM 10.6 (H) 02/21/2024    ANIONGAP 11 02/21/2024    ESTGFRAFRICA >60.0 06/03/2020    EGFRNONAA >60.0 06/03/2020       Lab Results   Component Value Date    ALT 26 02/21/2024    AST 21 02/21/2024    ALKPHOS 118 02/21/2024    BILITOT 0.7 02/21/2024    ALBUMIN 3.4 (L) 02/21/2024    LIPASE 106 (H) 01/22/2024       Lab Results   Component Value Date    WBC 6.27 02/21/2024    HGB 12.4 (L) 02/21/2024    HCT 42.6 02/21/2024     (H) 02/21/2024     02/21/2024     Assessment:  Jesus Christy is a 63 y.o. male with recently diagnosed portal vein thrombosis on eliquis, prostate adenocarcinoma (low-grade under active surveillance), complex regional pain syndrome, anxiety on chronic benzodiazepines, recent admission to [facility] from 1/22 to 1/27 for acute anemia Hgb 7.7 and septic shock in the setting of strep/bacteroides bacteremia. Outpatient EGD/Colonoscopy without identifiable cause of anemia. Could have acutely worsened in the setting of infection. Discussed VCE as the next step but he would like to hold off for now.     Problems:  Anemia  PVT on Eliquis      Plan:  Discussed VCE but he would like to defer at this time.  Follow up in about 3 months (around 5/26/2024).    Aurelia Parra MD  Gastroenterology Fellow    This patient was discussed with Dr. Silver.

## 2024-02-26 NOTE — PROGRESS NOTES
"GENERAL GI PATIENT INTAKE:    COVID symptoms in the last 7 days (runny nose, sore throat, congestion, cough, fever): No  PCP: Tara Jolly  If not PCP-  number given to establish [phone]: N/A    ALLERGIES REVIEWED:  Yes    CHIEF COMPLAINT:    Chief Complaint   Patient presents with    Other     Blood clot in liver       VITAL SIGNS:  /76   Pulse 84   Ht 5' 8" (1.727 m)   Wt 77.1 kg (170 lb)   BMI 25.85 kg/m²      Change in medical, surgical, family or social history: No      REVIEWED MEDICATION LIST RECONCILED INCLUDING ABOVE MEDS:  Yes     "

## 2024-03-01 ENCOUNTER — OFFICE VISIT (OUTPATIENT)
Dept: INTERNAL MEDICINE | Facility: CLINIC | Age: 64
End: 2024-03-01
Payer: MEDICARE

## 2024-03-01 VITALS
OXYGEN SATURATION: 96 % | BODY MASS INDEX: 25.81 KG/M2 | HEIGHT: 68 IN | HEART RATE: 88 BPM | SYSTOLIC BLOOD PRESSURE: 124 MMHG | WEIGHT: 170.31 LBS | DIASTOLIC BLOOD PRESSURE: 75 MMHG

## 2024-03-01 DIAGNOSIS — M51.36 LUMBAR DEGENERATIVE DISC DISEASE: ICD-10-CM

## 2024-03-01 DIAGNOSIS — J30.9 ALLERGIC RHINITIS, UNSPECIFIED SEASONALITY, UNSPECIFIED TRIGGER: ICD-10-CM

## 2024-03-01 DIAGNOSIS — M47.816 FACET ARTHROPATHY, LUMBAR: ICD-10-CM

## 2024-03-01 DIAGNOSIS — M54.16 LUMBAR NEURITIS: Primary | ICD-10-CM

## 2024-03-01 PROCEDURE — 99999 PR PBB SHADOW E&M-EST. PATIENT-LVL III: CPT | Mod: PBBFAC,,, | Performed by: INTERNAL MEDICINE

## 2024-03-01 PROCEDURE — 99214 OFFICE O/P EST MOD 30 MIN: CPT | Mod: S$GLB,,, | Performed by: INTERNAL MEDICINE

## 2024-03-01 RX ORDER — FLUTICASONE PROPIONATE 50 MCG
1 SPRAY, SUSPENSION (ML) NASAL DAILY
Qty: 16 G | Refills: 11 | Status: SHIPPED | OUTPATIENT
Start: 2024-03-01

## 2024-03-01 NOTE — PROGRESS NOTES
"Subjective:       Patient ID: Jesus Christy is a 63 y.o. male.    Chief Complaint: Follow-up (1 mth follow up )    HPI  63 y.o. male here for follow up of low back and bilateral  leg pain.      Hx of R portal vein thrombus, pancreatic tail cyst, and urosepsis.      He saw hepatology on 1/5: no clear cause for PVT except possible prothrombotic state due to prostate cancer. Fibroscan normal.       Degenerative changes of lumbar spine noted on previous imaging.   Left leg pain  Long hx of complex regional pain syndrome type 1 of RLE - previously managed by Dr. Esteban.      low back pain with radiation down his  legs (equal bilaterally)  Possible PT triggered? No falls. No fever, no urinary sx. Taking diazepam with some relief. Needing walker. Feels similar to CRPS. Denies loss of control of bowel and/or bladder and denies groin numbness. He is in ongoing PT.    xray done on 2/23/24: new thoracic and lumbar endplate osteophytes and progressive of facet changes, el L5-S1, interval disc narrowing and L4-L5 vacuum phenomenon.   Gabapentin 300mg tid prn was restarted on 2/23. Taking qhs and it has helped a lot.            Review of Systems   Constitutional:  Negative for fever.   Respiratory:  Negative for shortness of breath.    Cardiovascular:  Negative for chest pain.   Musculoskeletal: Negative.    Skin: Negative.        Objective:   /75 (BP Location: Right arm, Patient Position: Sitting, BP Method: Medium (Manual))   Pulse 88   Ht 5' 8" (1.727 m)   Wt 77.3 kg (170 lb 4.9 oz)   SpO2 96%   BMI 25.89 kg/m²      Physical Exam  Constitutional:       General: He is not in acute distress.     Appearance: He is well-developed. He is not diaphoretic.   HENT:      Head: Normocephalic and atraumatic.   Cardiovascular:      Rate and Rhythm: Normal rate and regular rhythm.      Heart sounds: No murmur heard.  Pulmonary:      Effort: Pulmonary effort is normal. No respiratory distress.      Breath sounds: No wheezing " or rales.   Skin:     General: Skin is warm and dry.   Neurological:      Mental Status: He is alert and oriented to person, place, and time.   Psychiatric:         Behavior: Behavior normal.         Assessment:       1. Lumbar neuritis    2. Facet arthropathy, lumbar    3. Lumbar degenerative disc disease    4. Allergic rhinitis, unspecified seasonality, unspecified trigger        Plan:       Lumbar neuritis  Facet arthropathy, lumbar  Lumbar degenerative disc disease  Doing well on current regimen    Allergic rhinitis, unspecified seasonality, unspecified trigger  -     fluticasone propionate (FLONASE) 50 mcg/actuation nasal spray; Instill 1 spray (50 mcg total) in each nostril once daily.  Dispense: 16 g; Refill: 11          You are up to date for your primary preventive health care, and there are no reminders at this time.

## 2024-03-03 NOTE — PROGRESS NOTES
I have seen the patient, reviewed Aurelia Parra MD the GI fellow's history and physical, assessment, and plan. I have personally interviewed and examined the patient at bedside and I discussed the case with the GI fellow and I agree with the findings and plan as documented in the fellow's note.

## 2024-03-12 ENCOUNTER — OFFICE VISIT (OUTPATIENT)
Dept: UROLOGY | Facility: CLINIC | Age: 64
End: 2024-03-12
Payer: MEDICARE

## 2024-03-12 ENCOUNTER — LAB VISIT (OUTPATIENT)
Dept: LAB | Facility: HOSPITAL | Age: 64
End: 2024-03-12
Attending: UROLOGY
Payer: MEDICARE

## 2024-03-12 VITALS
DIASTOLIC BLOOD PRESSURE: 82 MMHG | SYSTOLIC BLOOD PRESSURE: 140 MMHG | BODY MASS INDEX: 25.76 KG/M2 | WEIGHT: 170 LBS | HEART RATE: 102 BPM | HEIGHT: 68 IN

## 2024-03-12 DIAGNOSIS — C61 ADENOCARCINOMA OF PROSTATE: Primary | Chronic | ICD-10-CM

## 2024-03-12 DIAGNOSIS — I81 PORTAL VEIN THROMBOSIS: ICD-10-CM

## 2024-03-12 DIAGNOSIS — R97.20 ELEVATED PSA: ICD-10-CM

## 2024-03-12 DIAGNOSIS — C61 ADENOCARCINOMA OF PROSTATE: Chronic | ICD-10-CM

## 2024-03-12 DIAGNOSIS — R39.12 WEAK URINE STREAM: ICD-10-CM

## 2024-03-12 LAB — COMPLEXED PSA SERPL-MCNC: 7.7 NG/ML (ref 0–4)

## 2024-03-12 PROCEDURE — 99999 PR PBB SHADOW E&M-EST. PATIENT-LVL III: CPT | Mod: PBBFAC,,, | Performed by: UROLOGY

## 2024-03-12 PROCEDURE — 99215 OFFICE O/P EST HI 40 MIN: CPT | Mod: S$GLB,,, | Performed by: UROLOGY

## 2024-03-12 PROCEDURE — 36415 COLL VENOUS BLD VENIPUNCTURE: CPT | Performed by: UROLOGY

## 2024-03-12 PROCEDURE — 84153 ASSAY OF PSA TOTAL: CPT | Performed by: UROLOGY

## 2024-03-12 RX ORDER — FINASTERIDE 5 MG/1
5 TABLET, FILM COATED ORAL DAILY
Qty: 90 TABLET | Refills: 3 | Status: SHIPPED | OUTPATIENT
Start: 2024-03-12 | End: 2025-03-12

## 2024-03-12 RX ORDER — TAMSULOSIN HYDROCHLORIDE 0.4 MG/1
0.4 CAPSULE ORAL DAILY
Qty: 90 CAPSULE | Refills: 3 | Status: SHIPPED | OUTPATIENT
Start: 2024-03-12 | End: 2025-03-12

## 2024-03-12 NOTE — PROGRESS NOTES
CHIEF COMPLAINT:    Mr. Christy is a 63 y.o. male presenting for   ED, LUTS, and active surveillance on his prostate cancer.    PRESENTING ILLNESS:    Jesus Christy is a 63 y.o. male with a PMH of htn, weak stream, elevated PSA who presents for follow up.    Established patient of urology department.  Presents today as follow up to discuss cialis and PSA results.  No family history of prostate cancer.  He had a negative UroNav bx of prostate on 11/21/19.  Of note patient with biopsy 10/25/16 with one care positive for shaun 3+3, and currently on active surveillance.  PSA remains elevated, but stable.  Will continue to monitor with every 6 month PSA.    He reports a good urinary stream and complete bladder emptying.  Has no urinary complaints today.  Taking both finasteride and tamsulosin as prescribed.    Last visit discussed erectile dysfunction.  Prescribed cialis which he reports working well for him.    REVIEW OF SYSTEMS:    Review of Systems   Constitutional:  Negative for chills and fever.   Respiratory:  Negative for shortness of breath.    Cardiovascular:  Negative for chest pain.   Gastrointestinal:  Negative for constipation and diarrhea.   Genitourinary:  Negative for dysuria, flank pain, frequency, hematuria and urgency.   Neurological:  Negative for dizziness and weakness.       PATIENT HISTORY:    Past Medical History:   Diagnosis Date    Complex regional pain syndrome i of right lower limb     GERD (gastroesophageal reflux disease)     HTN (hypertension)     Portal vein thrombosis        Family History   Problem Relation Age of Onset    Hypertension Mother     Heart disease Father     Diabetes Father     Cancer Brother         unknown type    No Known Problems Daughter     No Known Problems Daughter     No Known Problems Daughter     No Known Problems Daughter     No Known Problems Son     No Known Problems Son        Allergies:  Peach (prunus persica) and Morphine    Medications:    Current  Outpatient Medications:     apixaban (ELIQUIS) 5 mg Tab, Take 2 tablets (10 mg total) by mouth 2 (two) times daily. for 7 days. THEN Take 1 tablet (5 mg total) by mouth 2 (two) times daily., Disp: 60 tablet, Rfl: 3    diazePAM (VALIUM) 10 MG Tab, Take 10 mg by mouth 3 (three) times daily., Disp: , Rfl:     fluticasone propionate (FLONASE) 50 mcg/actuation nasal spray, Instill 1 spray (50 mcg total) in each nostril once daily., Disp: 16 g, Rfl: 11    gabapentin (NEURONTIN) 300 MG capsule, Take 1 capsule (300 mg total) by mouth 3 (three) times daily as needed (leg pain)., Disp: 90 capsule, Rfl: 0    mirtazapine (REMERON) 30 MG tablet, Take 1 tablet (30 mg total) by mouth every evening., Disp: 90 tablet, Rfl: 3    tadalafiL (CIALIS) 20 MG Tab, Take 1 tablet (20 mg total) by mouth daily as needed (erectile dysfunction)., Disp: 30 tablet, Rfl: 5    finasteride (PROSCAR) 5 mg tablet, Take 1 tablet (5 mg total) by mouth once daily., Disp: 90 tablet, Rfl: 3    tamsulosin (FLOMAX) 0.4 mg Cap, Take 1 capsule (0.4 mg total) by mouth once daily., Disp: 90 capsule, Rfl: 3    PHYSICAL EXAMINATION:    Physical Exam  Vitals and nursing note reviewed.   Constitutional:       Appearance: Normal appearance. He is well-developed.   HENT:      Head: Normocephalic and atraumatic.   Eyes:      Pupils: Pupils are equal, round, and reactive to light.   Pulmonary:      Effort: Pulmonary effort is normal.   Musculoskeletal:         General: Normal range of motion.      Cervical back: Normal range of motion.   Skin:     General: Skin is warm and dry.   Neurological:      Mental Status: He is alert and oriented to person, place, and time.   Psychiatric:         Behavior: Behavior normal.           LABS:    U/a performed in office today: did not void  Lab Results   Component Value Date    PSA 6.9 (H) 09/01/2016    PSA 6.2 (H) 08/22/2016    PSA 3.1 03/22/2012    PSADIAG 7.7 (H) 03/12/2024    PSADIAG 12.4 (H) 01/18/2024    PSADIAG 6.6 (H)  10/20/2023    PSADIAG 5.7 (H) 04/05/2023    PSADIAG 5.7 (H) 08/31/2022    PSADIAG 5.0 (H) 06/03/2020    PSADIAG 5.2 (H) 10/04/2019    PSADIAG 4.2 (H) 04/09/2019    PSADIAG 3.8 10/05/2018    PSADIAG 5.7 (H) 02/20/2018     MRI of prostate 11/6/19  Previous biopsy: [place] 6 adenocarcinoma of the right Base (09/15/2016).   PSA: 5.2 (10/04/2019), 4.2 (04/09/2019), 3.8 (10/05/2018).  Prior therapy: None.  Prostate: The prostate measures 4.8 x 4.0 x 4.5cm corresponding to a computed volume of 42.4cc.  Peripheral zone:  Lesion (SHIVA) #1.  Location: Side: left Region: apex Zone: posterior peripheral zone laterally  Greatest dimension: 0.7cm  T2-WI: Lenticular or non-circumscribed, homogeneous, moderately hypointense - score 4  DWI/ADC: Focal markedly hypointense on ADC and markedly hyperintense on high b-value DWI - score 4  DCE: Positive  Extraprostatic extension: Absent  PI-RADS assessment category: 4  This lesion is unchanged from prior.  Transition zone: No focal abnormalities with imaging features concerning for prostate cancer  Neurovascular bundle: Unremarkable.  Seminal vesicles:  SV invasion:Unremarkable  Adjacent Organ Involvement: There is no focal bladder wall thickening. There is no rectal involvement.  Lymphadenopathy: none  Other Findings: There is colonic diverticulosis.  There is a fat containing left inguinal hernia.  Visualized osseous structures demonstrate heterogeneous marrow signal intensity without definite focal lesions.  Mild degenerative changes of the lower lumbar spine noted.  Impression:  PIRADS 4 lesion within the left apex posterior peripheral zone laterally, unchanged in appearance from prior.  Overall Assessment:  PIRADS 4 (clinically significant cancer is likely to be present)  Number of targets created for potential MR/US fusion biopsy  Peripheral zone: 0.  Transition zone: 0.    IMPRESSION:  Encounter Diagnoses   Name Primary?    Adenocarcinoma of prostate Yes    Portal vein thrombosis      Weak urine stream     Elevated PSA          PLAN:  Problem List Items Addressed This Visit       Adenocarcinoma of prostate - Primary (Chronic)    Relevant Orders    Prostate Specific Antigen, Diagnostic    Prostate Specific Antigen, Diagnostic (Completed)    Weak urine stream    Relevant Medications    tamsulosin (FLOMAX) 0.4 mg Cap    finasteride (PROSCAR) 5 mg tablet    Elevated PSA    Relevant Orders    Prostate Specific Antigen, Diagnostic    Prostate Specific Antigen, Diagnostic (Completed)    Portal vein thrombosis       1. [place] 3 + 3 prostate cancer was diagnosed in 2016.  Has been on active surveillance.  Has been on flomax and finasteride due to LUTS.  His repeat PSA in 2019 did not show any cancer including the target bx.  Will continue to follow up with serial PSA.  PSA today. If it is stable, will see him in 6 months.  His PSA rise might have been due to his illness back in Feb.    Will have him follow up in 6 months with PSA and MRI of prostate.    2. Enlarged prostate with luts   Continue both tamsulosin and finasteride  3. Erectile dysfunction   Cialis working well, continue      I spent 40 minutes with the patient. Over 50% of the visit was spent in counseling.    Follow up in about 6 months (around 9/12/2024) for PSA.     Varun Resendiz MD

## 2024-03-12 NOTE — PATIENT INSTRUCTIONS
Lab Results   Component Value Date    PSA 6.9 (H) 09/01/2016    PSA 6.2 (H) 08/22/2016    PSA 3.1 03/22/2012    PSADIAG 12.4 (H) 01/18/2024    PSADIAG 6.6 (H) 10/20/2023    PSADIAG 5.7 (H) 04/05/2023

## 2024-03-20 ENCOUNTER — OFFICE VISIT (OUTPATIENT)
Dept: GASTROENTEROLOGY | Facility: CLINIC | Age: 64
End: 2024-03-20
Payer: MEDICARE

## 2024-03-20 VITALS
WEIGHT: 176.38 LBS | HEART RATE: 77 BPM | HEIGHT: 69 IN | SYSTOLIC BLOOD PRESSURE: 149 MMHG | DIASTOLIC BLOOD PRESSURE: 86 MMHG | BODY MASS INDEX: 26.12 KG/M2

## 2024-03-20 DIAGNOSIS — K86.2 PANCREAS CYST: Primary | ICD-10-CM

## 2024-03-20 PROCEDURE — 99214 OFFICE O/P EST MOD 30 MIN: CPT | Mod: S$GLB,,, | Performed by: INTERNAL MEDICINE

## 2024-03-20 PROCEDURE — 99999 PR PBB SHADOW E&M-EST. PATIENT-LVL III: CPT | Mod: PBBFAC,,, | Performed by: INTERNAL MEDICINE

## 2024-03-20 NOTE — PROGRESS NOTES
Advanced Endoscopy / Pancreaticobiliary Service    Reason for visit (Chief Complaint): Abnormal imaging with pancreas tail cyst 9mm seen on recent MRI and CT imaging    Referring provider/PCP: Self, Aaareferral  No address on file    History of Present Illness: Jesus Christy is a 64yo man with PVT on Eliquis, GERD, CPRS, HTN, recent acute anemia with septic shock requiring hospitalization and followed by GI who presents to review finding of a 9mm pancreas tail cyst on MRCP and CTA imaging in Jan 2024.     Discussed nature of pancreas cysts with the patient today as well as pursuing a surveillance plan with interval imaging.    Patient has not had an EUS, can offer EUS as next step in surveillance in the next couple of months versus an MRCP with contrast as next surveillance imaging test.  We discussed this in clinic today and he would prefer to pursue an EUS as the next step.    Denies any pancreas type symptoms, no pancreas type pain, no diabetes, no jaundice, no pruritus, no unintentional weight changes reported.    Reviewed the patient's prior MRI/MRCP that was done without contrast, on my measurement it looks like the cyst could be in the 9-12 mm range, still quite small and without significant worrisome features.    Social history:  No tobacco abuse history, no significant alcohol abuse history, previous alcohol consumption but denies heavy or abuse of pattern.    Family history:  No known family members with any pancreas disease or pancreas cancer history       Past Medical History:   Diagnosis Date    Complex regional pain syndrome i of right lower limb     GERD (gastroesophageal reflux disease)     HTN (hypertension)     Portal vein thrombosis        Past Surgical History:   Procedure Laterality Date    COLONOSCOPY N/A 9/29/2017    Procedure: COLONOSCOPY;  Surgeon: Jamar Edwards MD;  Location: [facility] ([address] [place] [address]);  Service: Endoscopy;  Laterality: N/A;    COLONOSCOPY N/A 2/12/2024    Procedure:  COLONOSCOPY;  Surgeon: Miguel Angel Huffman MD;  Location: [facility] (2ND FLR);  Service: Endoscopy;  Laterality: N/A;    ESOPHAGOGASTRODUODENOSCOPY N/A 2/12/2024    Procedure: EGD (ESOPHAGOGASTRODUODENOSCOPY);  Surgeon: Miguel Angel Huffman MD;  Location: [facility] (2ND FLR);  Service: Endoscopy;  Laterality: N/A;  1/30/24-Okay to add per Dr. Huffman, 2nd flr-recent hospitalization for sepsis and portal vein thrombosis, Miralax, instr portal and email, Approval to hold Eliquis and medical clearance rec'd from Dr. Jolly (see telephone encounter 1/30/24)-DS  2/5-prec    JOINT REPLACEMENT Right     knee    KNEE ARTHROSCOPY W/ ACL RECONSTRUCTION  2014    right       Family History   Problem Relation Age of Onset    Hypertension Mother     Heart disease Father     Diabetes Father     Cancer Brother         unknown type    No Known Problems Daughter     No Known Problems Daughter     No Known Problems Daughter     No Known Problems Daughter     No Known Problems Son     No Known Problems Son        Social History     Socioeconomic History    Marital status: Single   Tobacco Use    Smoking status: Never    Smokeless tobacco: Never   Substance and Sexual Activity    Alcohol use: Yes     Alcohol/week: 0.8 standard drinks of alcohol     Types: 1 Standard drinks or equivalent per week     Comment: once every few months    Drug use: No   Social History Narrative    Prior maintenance work.    Disable due to leg pain and depression.        Lives alone.    No exercise, due to leg pain.     Social Determinants of Health     Financial Resource Strain: Low Risk  (1/24/2024)    Overall Financial Resource Strain (CARDIA)     Difficulty of Paying Living Expenses: Not very hard   Recent Concern: Financial Resource Strain - Medium Risk (1/24/2024)    Overall Financial Resource Strain (CARDIA)     Difficulty of Paying Living Expenses: Somewhat hard   Food Insecurity: Patient Declined (1/24/2024)    Hunger Vital Sign     Worried About Running Out of  Food in the Last Year: Patient declined     Ran Out of Food in the Last Year: Patient declined   Recent Concern: Food Insecurity - Food Insecurity Present (1/24/2024)    Hunger Vital Sign     Worried About Running Out of Food in the Last Year: Sometimes true     Ran Out of Food in the Last Year: Never true   Transportation Needs: No Transportation Needs (1/24/2024)    PRAPARE - Transportation     Lack of Transportation (Medical): No     Lack of Transportation (Non-Medical): No   Physical Activity: Patient Declined (1/24/2024)    Exercise Vital Sign     Days of Exercise per Week: Patient declined     Minutes of Exercise per Session: Patient declined   Stress: No Stress Concern Present (1/24/2024)    [employer] - Occupational Stress Questionnaire     Feeling of Stress : Only a little   Social Connections: Moderately Isolated (1/24/2024)    Social Connection and Isolation Panel [NHANES]     Frequency of Communication with Friends and Family: More than three times a week     Frequency of Social Gatherings with Friends and Family: More than three times a week     Attends Congregation Services: More than 4 times per year     Active Member of Clubs or Organizations: No     Attends Club or Organization Meetings: Never     Marital Status:    Housing Stability: Low Risk  (1/24/2024)    Housing Stability Vital Sign     Unable to Pay for Housing in the Last Year: No     Number of Places Lived in the Last Year: 1     Unstable Housing in the Last Year: No       ROS otherwise unremarkable outside of the aforementioned symptoms in HPI.    Vitals:    03/20/24 0745   BP: (!) 149/86   Pulse: 77         Physical Exam:  General: Well-developed, well-appearing, no acute distress  Neuro: alert and oriented to person, place, time; normal appearing gait  Eyes: No scleral icterus  Abdomen: soft, non-distended, non-tender, no rebound tenderness or guarding      Laboratory:   Reviewed in chart/records and  relevant findings are summarized above in HPI.    Imaging:  Reviewed in chart/records and relevant findings are summarized above in HPI.      Assessment/Plan:      # Suspected 9mm pancreas tail cyst on MRI/MRCP and CTA Jan 2024  # PVT on Eliquis  # Previous prostate cancer    Plan:  - Discussed nature of pancreas cysts with the patient today as well as pursuing a surveillance plan with interval imaging.  - Patient has not had an EUS, can offer EUS as next step in surveillance in the next couple of months versus an MRCP with contrast as next surveillance imaging test.  We discussed this in clinic today and he would prefer to pursue an EUS as the next step.  - EUS with myself at [facility] site per patient's preference within the next couple of months for evaluation of pancreas cyst; likely can do MRCP with contrast 6 months after for further surveillance.  - Annual follow-up in pancreas cyst clinic      Debi Lloyd MD  [facility] [place]

## 2024-03-20 NOTE — PROGRESS NOTES
"GENERAL GI PATIENT INTAKE:    COVID symptoms in the last 7 days (runny nose, sore throat, congestion, cough, fever): No  PCP: Tara Jolly  If not PCP-  number given to establish [phone]: N/A    ALLERGIES REVIEWED:  Yes    CHIEF COMPLAINT:    Chief Complaint   Patient presents with    GI Problem       VITAL SIGNS:  BP (!) 149/86   Pulse 77   Ht 5' 9" (1.753 m)   Wt 80 kg (176 lb 5.9 oz)   BMI 26.05 kg/m²      Change in medical, surgical, family or social history: No      REVIEWED MEDICATION LIST RECONCILED INCLUDING ABOVE MEDS:  Yes     "

## 2024-03-21 ENCOUNTER — TELEPHONE (OUTPATIENT)
Dept: ENDOSCOPY | Facility: HOSPITAL | Age: 64
End: 2024-03-21
Payer: MEDICARE

## 2024-03-21 ENCOUNTER — PATIENT MESSAGE (OUTPATIENT)
Dept: ENDOSCOPY | Facility: HOSPITAL | Age: 64
End: 2024-03-21
Payer: MEDICARE

## 2024-03-21 ENCOUNTER — EXTERNAL HOME HEALTH (OUTPATIENT)
Dept: HOME HEALTH SERVICES | Facility: HOSPITAL | Age: 64
End: 2024-03-21
Payer: MEDICARE

## 2024-03-21 DIAGNOSIS — N52.9 ED (ERECTILE DYSFUNCTION) OF ORGANIC ORIGIN: ICD-10-CM

## 2024-03-21 NOTE — TELEPHONE ENCOUNTER
----- Message from Leonora Valentino sent at 3/21/2024  3:20 PM CDT -----  Contact: [phone]    Rx Refill/Request     Is this a Refill or New Rx:  refill  Rx Name and Strength:  tadalafiL (CIALIS) 20 MG Tab    Preferred Pharmacy with phone number:   [facility] [address] [place] [address]  Phone: [phone] Fax: [phone]     Communication Preference: call   Additional Information

## 2024-03-21 NOTE — TELEPHONE ENCOUNTER
Dear Provider,    Patient has a scheduled procedure Upper Endoscopy Ultrasound (EUS) on 05/06/2024 and is currently taking a blood thinner prescribed by your office. In order to ensure patient safety, we would like to confirm that the patient can place their blood thinner medication on hold for the procedure. Can he/she discontinue Eliquis (apixaban) for a minimum of 2 days prior to the procedure?     Thank you for your prompt reply.    [facility] Endoscopy Scheduling

## 2024-03-21 NOTE — TELEPHONE ENCOUNTER
Spoke to patient to schedule procedure(s) Upper Endoscopy Ultrasound (EUS)       Physician to perform procedure(s) Dr. JEANIE Lloyd  Date of Procedure (s) 05/06/2024  Arrival Time 8:30 AM  Time of Procedure(s) 9:30 AM   Location of Procedure(s) [address]  Type of Rx Prep sent to patient: Other  Instructions provided to patient via [facility]    Patient was informed on the following information and verbalized understanding. Screening questionnaire reviewed with patient and complete. If procedure requires anesthesia, a responsible adult needs to be present to accompany the patient home, patient cannot drive after receiving anesthesia. Appointment details are tentative, especially check-in time. Patient will receive a prep-op call 7 days prior to confirm check-in time for procedure. If applicable the patient should contact their pharmacy to verify Rx for procedure prep is ready for pick-up. Patient was advised to call the scheduling department at [phone] if pharmacy states no Rx is available. Patient was advised to call the endoscopy scheduling department if any questions or concerns arise.      [facility] Endoscopy Scheduling Department

## 2024-03-22 ENCOUNTER — PATIENT MESSAGE (OUTPATIENT)
Dept: GASTROENTEROLOGY | Facility: CLINIC | Age: 64
End: 2024-03-22
Payer: MEDICARE

## 2024-03-22 RX ORDER — TADALAFIL 20 MG/1
20 TABLET ORAL DAILY PRN
Qty: 30 TABLET | Refills: 5 | Status: SHIPPED | OUTPATIENT
Start: 2024-03-22 | End: 2025-03-22

## 2024-03-26 ENCOUNTER — TELEPHONE (OUTPATIENT)
Dept: GASTROENTEROLOGY | Facility: CLINIC | Age: 64
End: 2024-03-26
Payer: MEDICARE

## 2024-03-26 NOTE — TELEPHONE ENCOUNTER
----- Message from Laila Venegas sent at 3/26/2024 10:21 AM CDT -----  Regarding: procedure 5/6?? still on  Contact: PT  [phone]  The patient called having recieved a letter regarding a procedure scheduled 5/6 w/Dr. Lloyd. PT states he thought was cancelled. Please call to advise at your earliest opportunity      No further information provided      Patient can be contacted @# [phone]

## 2024-04-08 ENCOUNTER — LAB VISIT (OUTPATIENT)
Dept: LAB | Facility: HOSPITAL | Age: 64
End: 2024-04-08
Attending: INTERNAL MEDICINE
Payer: MEDICARE

## 2024-04-08 ENCOUNTER — OFFICE VISIT (OUTPATIENT)
Dept: HEPATOLOGY | Facility: CLINIC | Age: 64
End: 2024-04-08
Payer: MEDICARE

## 2024-04-08 VITALS
DIASTOLIC BLOOD PRESSURE: 79 MMHG | HEART RATE: 75 BPM | OXYGEN SATURATION: 97 % | HEIGHT: 69 IN | BODY MASS INDEX: 26.29 KG/M2 | WEIGHT: 177.5 LBS | SYSTOLIC BLOOD PRESSURE: 140 MMHG

## 2024-04-08 DIAGNOSIS — C61 ADENOCARCINOMA OF PROSTATE: Chronic | ICD-10-CM

## 2024-04-08 DIAGNOSIS — I81 PORTAL VEIN THROMBOSIS: Primary | ICD-10-CM

## 2024-04-08 DIAGNOSIS — I81 PORTAL VEIN THROMBOSIS: ICD-10-CM

## 2024-04-08 LAB
AFP SERPL-MCNC: 24 NG/ML (ref 0–8.4)
ALBUMIN SERPL BCP-MCNC: 4 G/DL (ref 3.5–5.2)
ALP SERPL-CCNC: 121 U/L (ref 55–135)
ALT SERPL W/O P-5'-P-CCNC: 37 U/L (ref 10–44)
ANION GAP SERPL CALC-SCNC: 5 MMOL/L (ref 8–16)
AST SERPL-CCNC: 27 U/L (ref 10–40)
BASOPHILS # BLD AUTO: 0.02 K/UL (ref 0–0.2)
BASOPHILS NFR BLD: 0.5 % (ref 0–1.9)
BILIRUB SERPL-MCNC: 0.6 MG/DL (ref 0.1–1)
BUN SERPL-MCNC: 8 MG/DL (ref 8–23)
CALCIUM SERPL-MCNC: 10.8 MG/DL (ref 8.7–10.5)
CHLORIDE SERPL-SCNC: 110 MMOL/L (ref 95–110)
CO2 SERPL-SCNC: 25 MMOL/L (ref 23–29)
CREAT SERPL-MCNC: 1 MG/DL (ref 0.5–1.4)
DIFFERENTIAL METHOD BLD: ABNORMAL
EOSINOPHIL # BLD AUTO: 0.2 K/UL (ref 0–0.5)
EOSINOPHIL NFR BLD: 5.5 % (ref 0–8)
ERYTHROCYTE [DISTWIDTH] IN BLOOD BY AUTOMATED COUNT: 13.5 % (ref 11.5–14.5)
EST. GFR  (NO RACE VARIABLE): >60 ML/MIN/1.73 M^2
GLUCOSE SERPL-MCNC: 102 MG/DL (ref 70–110)
HCT VFR BLD AUTO: 43.9 % (ref 40–54)
HGB BLD-MCNC: 14.5 G/DL (ref 14–18)
IMM GRANULOCYTES # BLD AUTO: 0.01 K/UL (ref 0–0.04)
IMM GRANULOCYTES NFR BLD AUTO: 0.3 % (ref 0–0.5)
LYMPHOCYTES # BLD AUTO: 1.6 K/UL (ref 1–4.8)
LYMPHOCYTES NFR BLD: 42.2 % (ref 18–48)
MCH RBC QN AUTO: 30.9 PG (ref 27–31)
MCHC RBC AUTO-ENTMCNC: 33 G/DL (ref 32–36)
MCV RBC AUTO: 94 FL (ref 82–98)
MONOCYTES # BLD AUTO: 0.4 K/UL (ref 0.3–1)
MONOCYTES NFR BLD: 9.4 % (ref 4–15)
NEUTROPHILS # BLD AUTO: 1.6 K/UL (ref 1.8–7.7)
NEUTROPHILS NFR BLD: 42.1 % (ref 38–73)
NRBC BLD-RTO: 0 /100 WBC
PLATELET # BLD AUTO: 227 K/UL (ref 150–450)
PMV BLD AUTO: 9.2 FL (ref 9.2–12.9)
POTASSIUM SERPL-SCNC: 4.8 MMOL/L (ref 3.5–5.1)
PROT SERPL-MCNC: 8 G/DL (ref 6–8.4)
RBC # BLD AUTO: 4.69 M/UL (ref 4.6–6.2)
SODIUM SERPL-SCNC: 140 MMOL/L (ref 136–145)
WBC # BLD AUTO: 3.84 K/UL (ref 3.9–12.7)

## 2024-04-08 PROCEDURE — 3078F DIAST BP <80 MM HG: CPT | Mod: CPTII,S$GLB,, | Performed by: INTERNAL MEDICINE

## 2024-04-08 PROCEDURE — 99999 PR PBB SHADOW E&M-EST. PATIENT-LVL III: CPT | Mod: PBBFAC,,, | Performed by: INTERNAL MEDICINE

## 2024-04-08 PROCEDURE — G2211 COMPLEX E/M VISIT ADD ON: HCPCS | Mod: S$GLB,,, | Performed by: INTERNAL MEDICINE

## 2024-04-08 PROCEDURE — 99215 OFFICE O/P EST HI 40 MIN: CPT | Mod: S$GLB,,, | Performed by: INTERNAL MEDICINE

## 2024-04-08 PROCEDURE — 36415 COLL VENOUS BLD VENIPUNCTURE: CPT | Performed by: INTERNAL MEDICINE

## 2024-04-08 PROCEDURE — 3008F BODY MASS INDEX DOCD: CPT | Mod: CPTII,S$GLB,, | Performed by: INTERNAL MEDICINE

## 2024-04-08 PROCEDURE — 80053 COMPREHEN METABOLIC PANEL: CPT | Performed by: INTERNAL MEDICINE

## 2024-04-08 PROCEDURE — 1159F MED LIST DOCD IN RCRD: CPT | Mod: CPTII,S$GLB,, | Performed by: INTERNAL MEDICINE

## 2024-04-08 PROCEDURE — 82105 ALPHA-FETOPROTEIN SERUM: CPT | Performed by: INTERNAL MEDICINE

## 2024-04-08 PROCEDURE — 3077F SYST BP >= 140 MM HG: CPT | Mod: CPTII,S$GLB,, | Performed by: INTERNAL MEDICINE

## 2024-04-08 PROCEDURE — 85025 COMPLETE CBC W/AUTO DIFF WBC: CPT | Performed by: INTERNAL MEDICINE

## 2024-04-08 RX ORDER — MIRTAZAPINE 45 MG/1
TABLET, FILM COATED ORAL
COMMUNITY
Start: 2024-02-27

## 2024-04-08 RX ORDER — CYPROHEPTADINE HYDROCHLORIDE 4 MG/1
TABLET ORAL
COMMUNITY
Start: 2024-02-22

## 2024-04-08 RX ORDER — ARIPIPRAZOLE 10 MG/1
TABLET ORAL
COMMUNITY
Start: 2024-02-27

## 2024-04-08 NOTE — PROGRESS NOTES
Subjective:       Patient ID: Jesus Christy is a 63 y.o. male.    Chief Complaint: No chief complaint on file.    HPI  I saw this 63 y.o.man in the liver clinic for follow up.    Admitted to hospital on 1/1/2024 with abdo pain, fever and elevated LFTs- found to have a right PVT  Started on apixaban    No weight changes    Bilirubin initially 5  High Alk Phos  Normal platelets  HCV ab -ve    CT abdo: 1/1/24  1. Findings suggesting hepatic steatosis, correlation with LFTs recommended.  There is a somewhat heterogeneous appearance to the posterior aspect of the right hepatic lobe inferiorly.  This may be on the basis of contrast phase however this could be further evaluated with nonemergent MRI as warranted.  2. Multiple scattered colonic diverticula.  There is questionable slight indistinctness about a few diverticula at the level of the distal descending colon versus motion artifact.  Correlation is advised, early sequela of diverticulitis not excluded.  3. Prostatomegaly.  4. Pulmonary nodules as described.    MRI abdo: 1/3/2024  Cholelithiasis with mild gallbladder wall thickening and trace pericholecystic fluid.  Findings are nonspecific, and can be seen in the setting of hepatic dysfunction.  Correlation is advised.  Hepatosplenomegaly with mild hepatic steatosis.  No focal hepatic lesions noting limitations from noncontrast examination.  Mild prominence of the pancreatic duct.  Additional T2 hyperintense focus about the pancreatic tail measuring 9 mm, with potential connection to the pancreatic duct.  Findings are nonspecific, and may relate to a pancreatic cyst versus side branch IPMN.  Repeat examination is recommended with MRI abdomen with MRCP in 1 year to ensure stability of this finding.  Few prominent peripancreatic and periportal lymph nodes as above.  Diffusion signal abnormality about the right branches of the portal vein and proximal main portal vein, likely representing sequela of portal vein  thrombosis.  Findings are better evaluated on ultrasound liver with Doppler 01/01/2024.    IR conference: 1/9/24  Non contract MRI showed left PV is patterned. Perfusion pattern. No malignancy.  Plan:  TP CT scan to evaluate further.    EGD 2/12/24: normal  Colonocopy 2/12/24: 2 polyps + diverticular disease    Also seen by Dr Lloyd for 9 mm pancreatic cyst    PMH:  Prostate Ca on biopsy in 2016  - on medical treatment    Right leg extensive surgery- RSD  No abdo surgery      SH:  Alcohol- rarely  Non smoker    Disabled after accident  [place]     FH:  Nil liver  Prostate hx      Review of Systems   Constitutional:  Negative for activity change, appetite change, chills, fatigue, fever and unexpected weight change.   HENT:  Negative for hearing loss.    Eyes:  Negative for discharge and visual disturbance.   Respiratory:  Negative for cough, chest tightness, shortness of breath and wheezing.    Cardiovascular:  Negative for chest pain, palpitations and leg swelling.   Gastrointestinal:  Negative for abdominal distention, abdominal pain, constipation, diarrhea and nausea.   Genitourinary:  Negative for dysuria and frequency.   Musculoskeletal:  Negative for arthralgias and back pain.   Skin:  Negative for pallor and rash.   Neurological:  Negative for dizziness, tremors, speech difficulty and headaches.   Hematological:  Negative for adenopathy.   Psychiatric/Behavioral:  Negative for agitation and confusion.          Lab Results   Component Value Date    ALT 26 02/21/2024    AST 21 02/21/2024    ALKPHOS 118 02/21/2024    BILITOT 0.7 02/21/2024     Past Medical History:   Diagnosis Date    Complex regional pain syndrome i of right lower limb     GERD (gastroesophageal reflux disease)     HTN (hypertension)     Portal vein thrombosis      Past Surgical History:   Procedure Laterality Date    COLONOSCOPY N/A 9/29/2017    Procedure: COLONOSCOPY;  Surgeon: Jamar Edwards MD;  Location: [facility] ([facility]  FLR);  Service: Endoscopy;  Laterality: N/A;    COLONOSCOPY N/A 2/12/2024    Procedure: COLONOSCOPY;  Surgeon: Miguel Angel Huffman MD;  Location: [facility] (2ND FLR);  Service: Endoscopy;  Laterality: N/A;    ESOPHAGOGASTRODUODENOSCOPY N/A 2/12/2024    Procedure: EGD (ESOPHAGOGASTRODUODENOSCOPY);  Surgeon: Miguel Angel Huffman MD;  Location: [facility] (2ND FLR);  Service: Endoscopy;  Laterality: N/A;  1/30/24-Okay to add per Dr. Huffman, 2nd flr-recent hospitalization for sepsis and portal vein thrombosis, Miralax, instr portal and email, Approval to hold Eliquis and medical clearance rec'd from Dr. Jolly (see telephone encounter 1/30/24)-DS  2/5-prec    JOINT REPLACEMENT Right     knee    KNEE ARTHROSCOPY W/ ACL RECONSTRUCTION  2014    right     Current Outpatient Medications   Medication Sig    apixaban (ELIQUIS) 5 mg Tab Take 2 tablets (10 mg total) by mouth 2 (two) times daily. for 7 days. THEN Take 1 tablet (5 mg total) by mouth 2 (two) times daily.    ARIPiprazole (ABILIFY) 10 MG Tab     cyproheptadine (PERIACTIN) 4 mg tablet     diazePAM (VALIUM) 10 MG Tab Take 10 mg by mouth 3 (three) times daily.    finasteride (PROSCAR) 5 mg tablet Take 1 tablet (5 mg total) by mouth once daily.    fluticasone propionate (FLONASE) 50 mcg/actuation nasal spray Instill 1 spray (50 mcg total) in each nostril once daily.    gabapentin (NEURONTIN) 300 MG capsule Take 1 capsule (300 mg total) by mouth 3 (three) times daily as needed (leg pain).    mirtazapine (REMERON) 45 MG tablet     tadalafiL (CIALIS) 20 MG Tab Take 1 tablet (20 mg total) by mouth daily as needed (erectile dysfunction).    tamsulosin (FLOMAX) 0.4 mg Cap Take 1 capsule (0.4 mg total) by mouth once daily.    mirtazapine (REMERON) 30 MG tablet Take 1 tablet (30 mg total) by mouth every evening. (Patient not taking: Reported on 4/8/2024)     No current facility-administered medications for this visit.       Objective:      Physical Exam  HENT:      Head: Normocephalic.    Eyes:      Pupils: Pupils are equal, round, and reactive to light.   Neck:      Thyroid: No thyromegaly.   Cardiovascular:      Rate and Rhythm: Normal rate and regular rhythm.      Heart sounds: Normal heart sounds.   Pulmonary:      Effort: Pulmonary effort is normal.      Breath sounds: Normal breath sounds. No wheezing.   Abdominal:      General: There is no distension.      Palpations: Abdomen is soft. There is no mass.      Tenderness: There is no abdominal tenderness.   Lymphadenopathy:      Cervical: No cervical adenopathy.   Skin:     General: Skin is warm.      Findings: No erythema or rash.   Neurological:      Mental Status: He is alert and oriented to person, place, and time.   Psychiatric:         Behavior: Behavior normal.           Assessment:       1. Portal vein thrombosis    2. Adenocarcinoma of prostate      Plan:   He does not appear to have an obvious cause for his right PVT other than his prostate cancer which may cause a prothrombotic state.  His fibroscan was normal showing no cirrhosis/fibrosis.    - labs today  - triphasic CT to check for patency of right PV and for ? malignancy    Clinic in 4 months.

## 2024-04-17 ENCOUNTER — TELEPHONE (OUTPATIENT)
Dept: HEPATOLOGY | Facility: CLINIC | Age: 64
End: 2024-04-17
Payer: MEDICARE

## 2024-04-17 NOTE — TELEPHONE ENCOUNTER
Spoke with patient he states he is having imaging done and need to be sedated before going into machine. Informed patient we are unable to sedate patient. Informed patient MRI is the closed machine and CT is open. Patient verbalized understanding.

## 2024-04-17 NOTE — TELEPHONE ENCOUNTER
----- Message from Janice Trinidad sent at 4/17/2024  3:14 PM CDT -----  Regarding: Patient advice  Contact: Pt  [phone]            Name of Caller:  Jesus Dejesus Preference:    [phone]    Nature of Call:  Requesting a sedative for CT scheduled 04/18/24 @  9:30  please call to confirm

## 2024-04-18 ENCOUNTER — HOSPITAL ENCOUNTER (OUTPATIENT)
Dept: RADIOLOGY | Facility: HOSPITAL | Age: 64
Discharge: HOME | End: 2024-04-18
Attending: INTERNAL MEDICINE
Payer: MEDICARE

## 2024-04-18 DIAGNOSIS — I81 PORTAL VEIN THROMBOSIS: ICD-10-CM

## 2024-04-18 PROCEDURE — 25500020 PHARM REV CODE 255: Performed by: INTERNAL MEDICINE

## 2024-04-18 PROCEDURE — 74160 CT ABDOMEN W/CONTRAST: CPT | Mod: TC

## 2024-04-18 PROCEDURE — 74160 CT ABDOMEN W/CONTRAST: CPT | Mod: 26,,, | Performed by: RADIOLOGY

## 2024-04-18 RX ADMIN — IOHEXOL 75 ML: 350 INJECTION, SOLUTION INTRAVENOUS at 10:04

## 2024-05-10 ENCOUNTER — HOSPITAL ENCOUNTER (EMERGENCY)
Facility: HOSPITAL | Age: 64
Discharge: HOME | End: 2024-05-11
Attending: EMERGENCY MEDICINE
Payer: MEDICARE

## 2024-05-10 ENCOUNTER — TELEPHONE (OUTPATIENT)
Dept: HEPATOLOGY | Facility: CLINIC | Age: 64
End: 2024-05-10
Payer: MEDICARE

## 2024-05-10 DIAGNOSIS — M71.22 BAKER'S CYST, LEFT: ICD-10-CM

## 2024-05-10 DIAGNOSIS — M79.662 TENDERNESS OF LEFT CALF: ICD-10-CM

## 2024-05-10 DIAGNOSIS — R60.0 EDEMA OF LEFT LOWER EXTREMITY: Primary | ICD-10-CM

## 2024-05-10 PROCEDURE — 99284 EMERGENCY DEPT VISIT MOD MDM: CPT | Mod: 25

## 2024-05-10 NOTE — ED TRIAGE NOTES
Jesus Christy, a 63 y.o. male presents to the ED w/ complaint of pain behind left knee along with left leg swelling started with the last day or so denies trauma    Triage note:  Chief Complaint   Patient presents with    Leg Pain     Leg pain L for 2 d, and today started swelling     Review of patient's allergies indicates:   Allergen Reactions    Peach (prunus persica)     Morphine Palpitations     Past Medical History:   Diagnosis Date    Complex regional pain syndrome i of right lower limb     GERD (gastroesophageal reflux disease)     HTN (hypertension)     Portal vein thrombosis          APPEARANCE: awake and alert in NAD. PAIN  8/10  SKIN: warm, dry and intact. No breakdown or bruising.  MUSCULOSKELETAL: Patient moving all extremities spontaneously,  swelling to left lower leg noted no deformities noted. Ambulates independently.  RESPIRATORY: Denies shortness of breath.Respirations unlabored.   CARDIAC: Denies CP, 2+ distal pulses; no peripheral edema  ABDOMEN: S/ND/NT, Denies nausea  : voids spontaneously, denies difficulty  Neurologic: AAO x 4; follows commands equal strength in all extremities; denies numbness/tingling. Denies dizziness

## 2024-05-10 NOTE — TELEPHONE ENCOUNTER
----- Message from Janice Trinidad sent at 5/10/2024  9:18 AM CDT -----  Regarding: Refill  Contact: Pt  [phone]            Rx Name:  apixaban (ELIQUIS) 5 mg Tab      Preferred Pharmacy with number:    [facility] [address] [facility] [address]  Phone: [phone] Fax: [phone]       Ordering Provider: Sukhi Dee MD    Contact preference:  [phone]    Comments/Notes:  Requesting refill states use if he should continue meds if so he only have 3 left

## 2024-05-10 NOTE — TELEPHONE ENCOUNTER
Spoke with patient informing him to reach out to Dr. Morrow regarding refill of blood thinner medication. Dr. Dee did not prescribe medication. Patient verbalized understanding.

## 2024-05-11 VITALS
DIASTOLIC BLOOD PRESSURE: 80 MMHG | HEIGHT: 69 IN | SYSTOLIC BLOOD PRESSURE: 140 MMHG | RESPIRATION RATE: 18 BRPM | WEIGHT: 175 LBS | TEMPERATURE: 98 F | BODY MASS INDEX: 25.92 KG/M2 | HEART RATE: 98 BPM | OXYGEN SATURATION: 100 %

## 2024-05-12 NOTE — ED PROVIDER NOTES
Encounter Date: 5/10/2024       History     Chief Complaint   Patient presents with    Leg Pain     Leg pain L for 2 d, and today started swelling     Pt is a 62 yo male Past Medical History:  No date: Complex regional pain syndrome i of right lower limb  No date: GERD (gastroesophageal reflux disease)  No date: HTN (hypertension)  No date: Portal vein thrombosis  On Eliquis, presenting with complaints of fullness and soreness behind the L knee, reports onset 1-2d PTA, now feels some swelling of the LLE below the knee. Denies CP, SOB, redness/warmth of joints or LLE, fever, trauma. Denies missing any Eliquis doses.      Review of patient's allergies indicates:   Allergen Reactions    Peach (prunus persica)     Morphine Palpitations     Past Medical History:   Diagnosis Date    Complex regional pain syndrome i of right lower limb     GERD (gastroesophageal reflux disease)     HTN (hypertension)     Portal vein thrombosis      Past Surgical History:   Procedure Laterality Date    COLONOSCOPY N/A 9/29/2017    Procedure: COLONOSCOPY;  Surgeon: Jamar Edwards MD;  Location: [facility] (4TH FLR);  Service: Endoscopy;  Laterality: N/A;    COLONOSCOPY N/A 2/12/2024    Procedure: COLONOSCOPY;  Surgeon: Miguel Angel Huffman MD;  Location: [facility] (2ND FLR);  Service: Endoscopy;  Laterality: N/A;    ESOPHAGOGASTRODUODENOSCOPY N/A 2/12/2024    Procedure: EGD (ESOPHAGOGASTRODUODENOSCOPY);  Surgeon: Miguel Angel Huffman MD;  Location: [facility] (2ND FLR);  Service: Endoscopy;  Laterality: N/A;  1/30/24-Okay to add per Dr. Huffman, 2nd flr-recent hospitalization for sepsis and portal vein thrombosis, Miralax, instr portal and email, Approval to hold Eliquis and medical clearance rec'd from Dr. Jolly (see telephone encounter 1/30/24)-DS  2/5-prec    JOINT REPLACEMENT Right     knee    KNEE ARTHROSCOPY W/ ACL RECONSTRUCTION  2014    right     Family History   Problem Relation Name Age of Onset    Hypertension Mother      Heart disease Father       Diabetes Father      Cancer Brother          unknown type    No Known Problems Daughter      No Known Problems Daughter      No Known Problems Daughter      No Known Problems Daughter      No Known Problems Son      No Known Problems Son       Social History     Tobacco Use    Smoking status: Never    Smokeless tobacco: Never   Substance Use Topics    Alcohol use: Yes     Alcohol/week: 0.8 standard drinks of alcohol     Types: 1 Standard drinks or equivalent per week     Comment: once every few months    Drug use: No     Review of Systems   Musculoskeletal:  Positive for myalgias.       Physical Exam     Initial Vitals [05/10/24 1808]   BP Pulse Resp Temp SpO2   121/72 99 16 98.2 °F (36.8 °C) 95 %      MAP       --         Physical Exam    Nursing note and vitals reviewed.  Constitutional: Vital signs are normal. He appears well-nourished.   HENT:   Head: Normocephalic and atraumatic.   Eyes: Conjunctivae and EOM are normal.   Neck: Neck supple. No thyroid mass present.   Normal range of motion.  Cardiovascular:  Normal rate, regular rhythm and normal heart sounds.     Exam reveals no gallop and no friction rub.       No murmur heard.  Pulmonary/Chest: Breath sounds normal. He has no wheezes. He has no rhonchi. He has no rales.   Abdominal: Abdomen is soft. Bowel sounds are normal. There is no abdominal tenderness.   Musculoskeletal:         General: Tenderness and edema present. Normal range of motion.      Cervical back: Normal range of motion and neck supple.      Comments: Mild edema LLE in comparison to R, fullness post aspect of L knee, no gross abnormality of the knee, 2+ DP and PT pulses, soft thigh and calf compartments     Neurological: He is alert and oriented to person, place, and time. He has normal strength. No cranial nerve deficit or sensory deficit. GCS score is 15. GCS eye subscore is 4. GCS verbal subscore is 5. GCS motor subscore is 6.   Skin: Skin is warm and dry. No rash noted. No  erythema.   Psychiatric: He has a normal mood and affect. His speech is normal. Cognition and memory are normal.         ED Course   Procedures  Labs Reviewed - No data to display       Imaging Results              US Lower Extremity Veins Left (Final result)  Result time 05/11/24 00:49:33      Final result by Kanu Styles MD (05/11/24 00:49:33)                   Impression:      No evidence of deep venous thrombosis in the left lower extremity.    Complicated left popliteal fossa Baker cyst.    Electronically signed by resident: Zoe Andujar  Date:    05/11/2024  Time:    00:39    Electronically signed by: Kanu Styles  Date:    05/11/2024  Time:    00:49               Narrative:    EXAMINATION:  US LOWER EXTREMITY VEINS LEFT    CLINICAL HISTORY:  Pain in left lower leg    TECHNIQUE:  Duplex and color flow Doppler evaluation and graded compression of the left lower extremity veins was performed.    COMPARISON:  None    FINDINGS:  Left thigh veins: The common femoral, femoral, popliteal, and upper greater saphenous veins are patent and free of thrombus. The veins are normally compressible and have normal phasic flow and augmentation response.    Left calf veins: The visualized calf veins are patent.    Contralateral CFV: The contralateral (right) common femoral vein is patent and free of thrombus.    Miscellaneous: Heterogeneous collection in the left popliteal fossa measuring 3.4 x 1.6 x 3.1 cm.                                       Medications - No data to display  Medical Decision Making  Pt rapidly assessed. VSS. LLE NV intact. DDx include, but not tucker to, DVT, arterial embo/insuff, NSTI, pop art aneurysms, septic joint, traumatic injury. US of complaint lower extremity shows complicated left popliteal fossa Baker cyst, do not think superimposed infection. Pt provided information regarding supp care and ortho referral placed. Asked to f/u asap or return to the ED for any new, worsening symptoms.  DHARA'd in stable condition.                                      Clinical Impression:  Final diagnoses:  [M79.662] Tenderness of left calf  [R60.0] Edema of left lower extremity (Primary)  [M71.22] Baker's cyst, left          ED Disposition Condition    Discharge Stable          ED Prescriptions    None       Follow-up Information       Follow up With Specialties Details Why Contact Info    Tara Jolly MD Internal Medicine Schedule an appointment as soon as possible for a visit   [address]  [place] [address]  [phone]               Galo Schofield MD  05/12/24 4170

## 2024-05-13 ENCOUNTER — TELEPHONE (OUTPATIENT)
Dept: INTERNAL MEDICINE | Facility: CLINIC | Age: 64
End: 2024-05-13
Payer: MEDICARE

## 2024-05-13 NOTE — TELEPHONE ENCOUNTER
----- Message from Monika Dobbins sent at 5/13/2024 11:18 AM CDT -----  Type:  Patient Returning Call    Who Called: pt   Who Left Message for Patient:ptn   Does the patient know what this is regarding?:pt need a call to see if DR Jolly can fill his   apixaban  apixaban (ELIQUIS) 5 mg Tab   It was given in the hospital  if he still need to be on this med   Would the patient rather a call back or a response via [facility]? Call   Best Call Back Number:[phone]  Additional Information: call back

## 2024-05-14 ENCOUNTER — TELEPHONE (OUTPATIENT)
Dept: HEPATOLOGY | Facility: CLINIC | Age: 64
End: 2024-05-14
Payer: MEDICARE

## 2024-05-14 DIAGNOSIS — I81 PORTAL VEIN THROMBOSIS: Primary | ICD-10-CM

## 2024-05-14 NOTE — TELEPHONE ENCOUNTER
----- Message from Kaylee Alston sent at 5/14/2024 10:35 AM CDT -----  Contact: [phone] pt  Requesting an RX refill or new RX.  Is this a refill or new RX: new  RX name and strength (copy/paste from chart):  apixaban (ELIQUIS) 5 mg Tab  Is this a 30 day or 90 day RX: 90  Pharmacy name and phone # (copy/paste from chart):   [facility] [address] [place] [address]  Phone: [phone] Fax: [phone]      T

## 2024-05-14 NOTE — TELEPHONE ENCOUNTER
Refill Routing Note   Medication(s) are not appropriate for processing by [facility] for the following reason(s):        Outside of protocol    [facility] action(s):  Route               Appointments  past 12m or future 3m with PCP    Date Provider   Last Visit   3/1/2024 Tara Jolly MD   Next Visit   Visit date not found Tara Jolly MD   ED visits in past 90 days: 1        Note composed:4:09 PM 05/14/2024

## 2024-05-15 ENCOUNTER — PATIENT MESSAGE (OUTPATIENT)
Dept: ENDOSCOPY | Facility: HOSPITAL | Age: 64
End: 2024-05-15
Payer: MEDICARE

## 2024-05-15 ENCOUNTER — TELEPHONE (OUTPATIENT)
Dept: ENDOSCOPY | Facility: HOSPITAL | Age: 64
End: 2024-05-15
Payer: MEDICARE

## 2024-05-15 ENCOUNTER — TELEPHONE (OUTPATIENT)
Dept: HEMATOLOGY/ONCOLOGY | Facility: CLINIC | Age: 64
End: 2024-05-15
Payer: MEDICARE

## 2024-05-15 ENCOUNTER — OFFICE VISIT (OUTPATIENT)
Dept: HEPATOLOGY | Facility: CLINIC | Age: 64
End: 2024-05-15
Payer: MEDICARE

## 2024-05-15 DIAGNOSIS — F40.240 CLAUSTROPHOBIA: ICD-10-CM

## 2024-05-15 DIAGNOSIS — C61 ADENOCARCINOMA OF PROSTATE: Chronic | ICD-10-CM

## 2024-05-15 DIAGNOSIS — I81 PORTAL VEIN THROMBOSIS: ICD-10-CM

## 2024-05-15 DIAGNOSIS — R77.2 ELEVATED AFP: Primary | ICD-10-CM

## 2024-05-15 DIAGNOSIS — R97.20 ELEVATED PSA: ICD-10-CM

## 2024-05-15 PROCEDURE — G2211 COMPLEX E/M VISIT ADD ON: HCPCS | Mod: 95,,, | Performed by: INTERNAL MEDICINE

## 2024-05-15 PROCEDURE — 99213 OFFICE O/P EST LOW 20 MIN: CPT | Mod: 95,,, | Performed by: INTERNAL MEDICINE

## 2024-05-15 RX ORDER — LORAZEPAM 1 MG/1
1 TABLET ORAL ONCE
Qty: 1 TABLET | Refills: 0 | Status: SHIPPED | OUTPATIENT
Start: 2024-05-15 | End: 2024-05-16

## 2024-05-15 NOTE — PROGRESS NOTES
Subjective:       Patient ID: Jesus Christy is a 63 y.o. male.    Chief Complaint: No chief complaint on file.  The patient location is: Home  The chief complaint leading to consultation is: Portal vein thrombosis    Visit type: audiovisual    Face to Face time with patient: 10 minutes of total time spent on the encounter, which includes face to face time and non-face to face time preparing to see the patient (eg, review of tests), Obtaining and/or reviewing separately obtained history, Documenting clinical information in the electronic or other health record, Independently interpreting results (not separately reported) and communicating results to the patient/family/caregiver, or Care coordination (not separately reported).         Each patient to whom he or she provides medical services by telemedicine is:  (1) informed of the relationship between the physician and patient and the respective role of any other health care provider with respect to management of the patient; and (2) notified that he or she may decline to receive medical services by telemedicine and may withdraw from such care at any time.    Notes:    HPI  I saw this 63 y.o.man in the liver clinic for follow up.    Admitted to hospital on 1/1/2024 with abdo pain, fever and elevated LFTs- found to have a right PVT  Started on apixaban    No weight changes    Bilirubin initially 5  High Alk Phos  Normal platelets  HCV ab -ve    CT abdo: 1/1/24  1. Findings suggesting hepatic steatosis, correlation with LFTs recommended.  There is a somewhat heterogeneous appearance to the posterior aspect of the right hepatic lobe inferiorly.  This may be on the basis of contrast phase however this could be further evaluated with nonemergent MRI as warranted.  2. Multiple scattered colonic diverticula.  There is questionable slight indistinctness about a few diverticula at the level of the distal descending colon versus motion artifact.  Correlation is advised,  early sequela of diverticulitis not excluded.  3. Prostatomegaly.  4. Pulmonary nodules as described.    MRI abdo: 1/3/2024  Cholelithiasis with mild gallbladder wall thickening and trace pericholecystic fluid.  Findings are nonspecific, and can be seen in the setting of hepatic dysfunction.  Correlation is advised.  Hepatosplenomegaly with mild hepatic steatosis.  No focal hepatic lesions noting limitations from noncontrast examination.  Mild prominence of the pancreatic duct.  Additional T2 hyperintense focus about the pancreatic tail measuring 9 mm, with potential connection to the pancreatic duct.  Findings are nonspecific, and may relate to a pancreatic cyst versus side branch IPMN.  Repeat examination is recommended with MRI abdomen with MRCP in 1 year to ensure stability of this finding.  Few prominent peripancreatic and periportal lymph nodes as above.  Diffusion signal abnormality about the right branches of the portal vein and proximal main portal vein, likely representing sequela of portal vein thrombosis.  Findings are better evaluated on ultrasound liver with Doppler 01/01/2024.    CT abdo: 4/18/24  1. Redemonstration of extensive portal venous thrombus which appears grossly stable in extent compared to prior.  Apparent cavernous transformation of the portal vein.  2. Evidence of portal hypertension including splenomegaly and gallbladder and gastroesophageal varices.  3. Previously seen probable hepatic vein thrombus is not definitively seen on this exam.    IR conference: 1/9/24  Non contract MRI showed left PV is patterned. Perfusion pattern. No malignancy.  Plan:  TP CT scan to evaluate further.    EGD 2/12/24: normal  Colonocopy 2/12/24: 2 polyps + diverticular disease    Also seen by Dr Lloyd for 9 mm pancreatic cyst    PMH:  Prostate Ca on biopsy in 2016  - on medical treatment    Right leg extensive surgery- RSD  No abdo surgery      SH:  Alcohol- rarely  Non smoker    Disabled after  accident  [place]     FH:  Nil liver  Prostate hx      Review of Systems   Constitutional:  Negative for activity change, appetite change, chills, fatigue, fever and unexpected weight change.   HENT:  Negative for hearing loss.    Eyes:  Negative for discharge and visual disturbance.   Respiratory:  Negative for cough, chest tightness, shortness of breath and wheezing.    Cardiovascular:  Negative for chest pain, palpitations and leg swelling.   Gastrointestinal:  Negative for abdominal distention, abdominal pain, constipation, diarrhea and nausea.   Genitourinary:  Negative for dysuria and frequency.   Musculoskeletal:  Negative for arthralgias and back pain.   Skin:  Negative for pallor and rash.   Neurological:  Negative for dizziness, tremors, speech difficulty and headaches.   Hematological:  Negative for adenopathy.   Psychiatric/Behavioral:  Negative for agitation and confusion.          Lab Results   Component Value Date    ALT 37 04/08/2024    AST 27 04/08/2024    ALKPHOS 121 04/08/2024    BILITOT 0.6 04/08/2024     Past Medical History:   Diagnosis Date    Complex regional pain syndrome i of right lower limb     GERD (gastroesophageal reflux disease)     HTN (hypertension)     Portal vein thrombosis      Past Surgical History:   Procedure Laterality Date    COLONOSCOPY N/A 9/29/2017    Procedure: COLONOSCOPY;  Surgeon: Jamar Edwards MD;  Location: [facility] (4TH FLR);  Service: Endoscopy;  Laterality: N/A;    COLONOSCOPY N/A 2/12/2024    Procedure: COLONOSCOPY;  Surgeon: Miguel Angel Huffman MD;  Location: [facility] (2ND FLR);  Service: Endoscopy;  Laterality: N/A;    ESOPHAGOGASTRODUODENOSCOPY N/A 2/12/2024    Procedure: EGD (ESOPHAGOGASTRODUODENOSCOPY);  Surgeon: Miguel Angel Huffman MD;  Location: [facility] (2ND FLR);  Service: Endoscopy;  Laterality: N/A;  1/30/24-Okay to add per Dr. Huffman, 2nd flr-recent hospitalization for sepsis and portal vein thrombosis, Miralax, instr portal and email,  Approval to hold Eliquis and medical clearance rec'd from Dr. Jolly (see telephone encounter 1/30/24)-DS  2/5-prec    JOINT REPLACEMENT Right     knee    KNEE ARTHROSCOPY W/ ACL RECONSTRUCTION  2014    right     Current Outpatient Medications   Medication Sig    apixaban (ELIQUIS) 5 mg Tab Take 1 tablet (5 mg total) by mouth 2 (two) times daily.    ARIPiprazole (ABILIFY) 10 MG Tab     cyproheptadine (PERIACTIN) 4 mg tablet     diazePAM (VALIUM) 10 MG Tab Take 10 mg by mouth 3 (three) times daily.    finasteride (PROSCAR) 5 mg tablet Take 1 tablet (5 mg total) by mouth once daily.    fluticasone propionate (FLONASE) 50 mcg/actuation nasal spray Instill 1 spray (50 mcg total) in each nostril once daily.    gabapentin (NEURONTIN) 300 MG capsule Take 1 capsule (300 mg total) by mouth 3 (three) times daily as needed (leg pain).    LORazepam (ATIVAN) 1 MG tablet Take 1 tablet (1 mg total) by mouth once. Take tablet 30 min before MRI for 1 dose    mirtazapine (REMERON) 30 MG tablet Take 1 tablet (30 mg total) by mouth every evening.    mirtazapine (REMERON) 45 MG tablet     tadalafiL (CIALIS) 20 MG Tab Take 1 tablet (20 mg total) by mouth daily as needed (erectile dysfunction).    tamsulosin (FLOMAX) 0.4 mg Cap Take 1 capsule (0.4 mg total) by mouth once daily.     No current facility-administered medications for this visit.       Objective:      VIDEO VISIT- EXAM NOT DONE      Assessment:       1. Elevated AFP    2. Claustrophobia    3. Adenocarcinoma of prostate    4. Elevated PSA    5. Portal vein thrombosis      Plan:   He does not appear to have an obvious cause for his right PVT other than his prostate cancer which may cause a prothrombotic state.  His fibroscan was normal showing no cirrhosis/fibrosis.    His imaging has not shown any obvious malignancy but his AFP has risen to 24.    - explained this concerning finding to him  - will repeat AFP and AFP L3  - will order a repeat MRI abdo (needs sedation)    Clinic  in 3 months- video if poss

## 2024-05-15 NOTE — TELEPHONE ENCOUNTER
Spoke to pt to schedule procedure(s) Upper Endoscopy Ultrasound (EUS)       Physician to perform procedure(s) Dr. JEANIE Lloyd  Date of Procedure (s) 7/5/24  Arrival Time 7:30 AM  Time of Procedure(s) 8:30 AM   Location of Procedure(s) [address]  Type of Rx Prep sent to patient: N/A  Instructions provided to patient via [facility]    Patient was informed on the following information and verbalized understanding. Screening questionnaire reviewed with patient and complete. If procedure requires anesthesia, a responsible adult needs to be present to accompany the patient home, patient cannot drive after receiving anesthesia. Appointment details are tentative, especially check-in time. Patient will receive a prep-op call 7 days prior to confirm check-in time for procedure. If applicable the patient should contact their pharmacy to verify Rx for procedure prep is ready for pick-up. Patient was advised to call the scheduling department at [phone] if pharmacy states no Rx is available. Patient was advised to call the endoscopy scheduling department if any questions or concerns arise.      [facility] Scheduling Department

## 2024-05-15 NOTE — TELEPHONE ENCOUNTER
Attempted to reach patient to get him scheduled with Benign Hem provider due to previous provider no longer working at [facility]

## 2024-05-16 ENCOUNTER — TELEPHONE (OUTPATIENT)
Dept: HEMATOLOGY/ONCOLOGY | Facility: CLINIC | Age: 64
End: 2024-05-16
Payer: MEDICARE

## 2024-05-16 DIAGNOSIS — D64.9 ANEMIA, UNSPECIFIED TYPE: ICD-10-CM

## 2024-05-16 DIAGNOSIS — I81 PORTAL VEIN THROMBOSIS: Primary | ICD-10-CM

## 2024-05-16 NOTE — TELEPHONE ENCOUNTER
----- Message from Zina Ruiz sent at 5/16/2024 10:41 AM CDT -----  Regarding: FW: returning missed call  Contact: Pt @ [phone]  Hey! This patient is returning your call  ----- Message -----  From: Marylu Delaney  Sent: 5/16/2024  10:13 AM CDT  To: [facility]  [facility]  Subject: returning missed call                            Pt is returning a missed call from someone in the office and is asking for a return call back soon. Thanks.         Reason for call: to get scheduled with another  Benign Hem provider due to previous provider no longer working at [facility].

## 2024-05-16 NOTE — TELEPHONE ENCOUNTER
----- Message from Zina Ruiz sent at 5/16/2024 10:41 AM CDT -----  Regarding: FW: returning missed call  Contact: Pt @ [phone]  Hey! This patient is returning your call  ----- Message -----  From: Marylu Delaney  Sent: 5/16/2024  10:13 AM CDT  To: [facility]  [facility]  Subject: returning missed call                            Pt is returning a missed call from someone in the office and is asking for a return call back soon. Thanks.         Reason for call: to get scheduled with another  Benign Hem provider due to previous provider no longer working at [facility].

## 2024-05-21 ENCOUNTER — PATIENT OUTREACH (OUTPATIENT)
Dept: ADMINISTRATIVE | Facility: HOSPITAL | Age: 64
End: 2024-05-21
Payer: MEDICARE

## 2024-05-21 NOTE — PROGRESS NOTES
Health Maintenance Due   Topic Date Due    Pneumococcal Vaccines (Age 0-64) (1 of 2 - PCV) Never done    TETANUS VACCINE  Never done    Shingles Vaccine (1 of 2) Never done    Hemoglobin A1c (Diabetic Prevention Screening)  Never done    RSV Vaccine (Age 60+ and Pregnant patients) (1 - 1-dose 60+ series) Never done    COVID-19 Vaccine (5 - 2023-24 season) 09/01/2023       Chart reviewed and updated. Uploaded outside record.    Kenya Weber LPN   Clinical Care Coordinator  Primary Care and Wellness

## 2024-05-23 ENCOUNTER — TELEPHONE (OUTPATIENT)
Dept: HEPATOLOGY | Facility: CLINIC | Age: 64
End: 2024-05-23
Payer: MEDICARE

## 2024-05-23 NOTE — TELEPHONE ENCOUNTER
----- Message from Sukhi Dee MD sent at 5/15/2024 10:55 AM CDT -----  Please schedule MRI and lab (AFP)  Clinic in 3 months

## 2024-05-23 NOTE — TELEPHONE ENCOUNTER
Patient MRI scheduled by Maisha Falk for 5/23/24, patient labs scheduled 6/18 by Joe Zepeda linked Dr. Dee lab order to order. Follow up visit scheduled. Patient received notification in the portal

## 2024-05-24 ENCOUNTER — HOSPITAL ENCOUNTER (OUTPATIENT)
Dept: RADIOLOGY | Facility: HOSPITAL | Age: 64
Discharge: HOME | End: 2024-05-24
Attending: PHYSICIAN ASSISTANT
Payer: MEDICARE

## 2024-05-24 ENCOUNTER — OFFICE VISIT (OUTPATIENT)
Dept: ORTHOPEDICS | Facility: CLINIC | Age: 64
End: 2024-05-24
Payer: MEDICARE

## 2024-05-24 VITALS
SYSTOLIC BLOOD PRESSURE: 123 MMHG | DIASTOLIC BLOOD PRESSURE: 80 MMHG | BODY MASS INDEX: 25.7 KG/M2 | WEIGHT: 173.5 LBS | HEART RATE: 71 BPM | HEIGHT: 69 IN

## 2024-05-24 DIAGNOSIS — R60.0 EDEMA OF LEFT LOWER EXTREMITY: ICD-10-CM

## 2024-05-24 DIAGNOSIS — M25.561 PAIN IN BOTH KNEES, UNSPECIFIED CHRONICITY: ICD-10-CM

## 2024-05-24 DIAGNOSIS — Z96.651 STATUS POST TOTAL RIGHT KNEE REPLACEMENT: ICD-10-CM

## 2024-05-24 DIAGNOSIS — M25.562 PAIN IN BOTH KNEES, UNSPECIFIED CHRONICITY: ICD-10-CM

## 2024-05-24 DIAGNOSIS — M71.22 BAKER'S CYST, LEFT: ICD-10-CM

## 2024-05-24 DIAGNOSIS — M17.12 PRIMARY OSTEOARTHRITIS OF LEFT KNEE: Primary | ICD-10-CM

## 2024-05-24 PROCEDURE — 73562 X-RAY EXAM OF KNEE 3: CPT | Mod: 26,50,, | Performed by: RADIOLOGY

## 2024-05-24 PROCEDURE — 99999 PR PBB SHADOW E&M-EST. PATIENT-LVL V: CPT | Mod: PBBFAC,,, | Performed by: PHYSICIAN ASSISTANT

## 2024-05-24 PROCEDURE — 1160F RVW MEDS BY RX/DR IN RCRD: CPT | Mod: CPTII,S$GLB,, | Performed by: PHYSICIAN ASSISTANT

## 2024-05-24 PROCEDURE — 73562 X-RAY EXAM OF KNEE 3: CPT | Mod: TC,50

## 2024-05-24 PROCEDURE — 3074F SYST BP LT 130 MM HG: CPT | Mod: CPTII,S$GLB,, | Performed by: PHYSICIAN ASSISTANT

## 2024-05-24 PROCEDURE — 3079F DIAST BP 80-89 MM HG: CPT | Mod: CPTII,S$GLB,, | Performed by: PHYSICIAN ASSISTANT

## 2024-05-24 PROCEDURE — 3008F BODY MASS INDEX DOCD: CPT | Mod: CPTII,S$GLB,, | Performed by: PHYSICIAN ASSISTANT

## 2024-05-24 PROCEDURE — 1159F MED LIST DOCD IN RCRD: CPT | Mod: CPTII,S$GLB,, | Performed by: PHYSICIAN ASSISTANT

## 2024-05-24 PROCEDURE — 20610 DRAIN/INJ JOINT/BURSA W/O US: CPT | Mod: LT,S$GLB,, | Performed by: PHYSICIAN ASSISTANT

## 2024-05-24 PROCEDURE — 99203 OFFICE O/P NEW LOW 30 MIN: CPT | Mod: 25,S$GLB,, | Performed by: PHYSICIAN ASSISTANT

## 2024-05-24 RX ORDER — BETAMETHASONE SODIUM PHOSPHATE AND BETAMETHASONE ACETATE 3; 3 MG/ML; MG/ML
6 INJECTION, SUSPENSION INTRA-ARTICULAR; INTRALESIONAL; INTRAMUSCULAR; SOFT TISSUE
Status: COMPLETED | OUTPATIENT
Start: 2024-05-24 | End: 2024-05-24

## 2024-05-24 RX ADMIN — BETAMETHASONE SODIUM PHOSPHATE AND BETAMETHASONE ACETATE 6 MG: 3; 3 INJECTION, SUSPENSION INTRA-ARTICULAR; INTRALESIONAL; INTRAMUSCULAR; SOFT TISSUE at 11:05

## 2024-05-24 NOTE — PROGRESS NOTES
SUBJECTIVE:     Chief Complaint & History of Present Illness:  Jesus Christy is a New patient 63 y.o. male who is seen here today with a complaint of    Chief Complaint   Patient presents with    Left Knee - Pain    .  Patient is here today is a follow-up visit from the emergency room 05/10/2024 for pain swelling in the knee and left lower extremity.  States he developed insidious onset of pain swelling in the knee and lower leg in the week leading up to his emergency room visit of 2 weeks ago.  He underwent ultrasound and MRI of the knee since that time was diagnosed with a large Baker cyst and associated lower extremity edema from this.  Since his visit the swelling has subsided to baseline he still continues to have some mild-to-moderate soreness in the knee but not nearly to the level as previously.  He is aware he has moderate arthritis in the left knee he is status post right TKA approximately 7 years ago.   On a scale of 1-10, with 10 being worst pain imaginable, he rates this pain as 4 on good days and 7 on bad days.  he describes the pain as sore and achy.    Review of patient's allergies indicates:   Allergen Reactions    Peach (prunus persica)     Morphine Palpitations         Current Outpatient Medications   Medication Sig Dispense Refill    apixaban (ELIQUIS) 5 mg Tab Take 1 tablet (5 mg total) by mouth 2 (two) times daily. 60 tablet 3    ARIPiprazole (ABILIFY) 10 MG Tab       cyproheptadine (PERIACTIN) 4 mg tablet       diazePAM (VALIUM) 10 MG Tab Take 10 mg by mouth 3 (three) times daily.      finasteride (PROSCAR) 5 mg tablet Take 1 tablet (5 mg total) by mouth once daily. 90 tablet 3    fluticasone propionate (FLONASE) 50 mcg/actuation nasal spray Instill 1 spray (50 mcg total) in each nostril once daily. 16 g 11    gabapentin (NEURONTIN) 300 MG capsule Take 1 capsule (300 mg total) by mouth 3 (three) times daily as needed (leg pain). 90 capsule 0    mirtazapine (REMERON) 30 MG tablet Take 1  tablet (30 mg total) by mouth every evening. 90 tablet 3    mirtazapine (REMERON) 45 MG tablet       tadalafiL (CIALIS) 20 MG Tab Take 1 tablet (20 mg total) by mouth daily as needed (erectile dysfunction). 30 tablet 5    tamsulosin (FLOMAX) 0.4 mg Cap Take 1 capsule (0.4 mg total) by mouth once daily. 90 capsule 3    LORazepam (ATIVAN) 1 MG tablet Take 1 tablet (1 mg total) by mouth once. Take tablet 30 min before MRI for 1 dose 1 tablet 0     No current facility-administered medications for this visit.       Past Medical History:   Diagnosis Date    Complex regional pain syndrome i of right lower limb     GERD (gastroesophageal reflux disease)     HTN (hypertension)     Portal vein thrombosis        Past Surgical History:   Procedure Laterality Date    COLONOSCOPY N/A 9/29/2017    Procedure: COLONOSCOPY;  Surgeon: Jamar Edwards MD;  Location: [facility] (4TH FLR);  Service: Endoscopy;  Laterality: N/A;    COLONOSCOPY N/A 2/12/2024    Procedure: COLONOSCOPY;  Surgeon: Miguel Angel Huffman MD;  Location: [facility] (2ND FLR);  Service: Endoscopy;  Laterality: N/A;    ESOPHAGOGASTRODUODENOSCOPY N/A 2/12/2024    Procedure: EGD (ESOPHAGOGASTRODUODENOSCOPY);  Surgeon: Miguel Angel Huffman MD;  Location: [facility] (2ND FLR);  Service: Endoscopy;  Laterality: N/A;  1/30/24-Okay to add per Dr. Huffman, 2nd flr-recent hospitalization for sepsis and portal vein thrombosis, Miralax, instr portal and email, Approval to hold Eliquis and medical clearance rec'd from Dr. Jolly (see telephone encounter 1/30/24)-DS  2/5-prec    JOINT REPLACEMENT Right     knee    KNEE ARTHROSCOPY W/ ACL RECONSTRUCTION  2014    right       Vital Signs (Most Recent)  Vitals:    05/24/24 1006   BP: 123/80   Pulse: 71           Review of Systems:  ROS:  Constitutional: no fever or chills, chronic prescription benzodiazepine use  Eyes: no visual changes  ENT: no nasal congestion or sore throat  Respiratory: no cough or shortness of breath  Cardiovascular: no chest  "pain or palpitations, positive essential hypertension  Gastrointestinal: no nausea or vomiting, tolerating diet, direct hyperbilirubinemia, portal vein thrombosis, diverticulitis  Genitourinary: no hematuria or dysuria, positive for prostate cancer  Integument/Breast: no rash or pruritis  Hematologic/Lymphatic: no easy bruising or lymphadenopathy  Musculoskeletal: no arthralgias or myalgias  Neurological: no seizures or tremors, positive neuropathy of the foot right, chronic migraines, complex regional pain syndrome type 1 of the right lower extremity, cervical radiculopathy at C6  Behavioral/Psych: no auditory or visual hallucinations, anxiety and depression  Endocrine: no heat or cold intolerance                OBJECTIVE:     PHYSICAL EXAM:  Height: 5' 9" (175.3 cm) Weight: 78.7 kg (173 lb 8 oz), General Appearance: Well nourished, well developed, in no acute distress.  Neurological: Mood & affect are normal.    left  Knee Exam:  Knee Range of Motion:0-120 degrees flexion   Effusion:none  Condition of skin:intact  Location of tenderness:  Popliteal fossa with light radiation down the calf   Strength:5 of 5  Stability:  Lachman: stable, LCL: stable, MCL: stable, PCL: stable, and posteromedial (dial): stable  Varus /Valgus stress:  normal  Martir:   negative/negative    right  Knee Exam:  Knee Range of Motion:0-85 degrees flexion   Effusion:none  Condition of skin:intact  Location of tenderness:None   Strength:5 of 5  Stability:  stable to testing  Varus /Valgus stress:  normal      Hip Examination:  normal    RADIOGRAPHS:  X-rays the knees taken today films reviewed by me demonstrate moderate arthritic changes in the left knee with significant medial joint space narrowing early sclerotic changes and osteophytic spurring primarily in the medial compartment no evidence of fracture dislocation  Right knee demonstrates well-fixed well-aligned prosthesis and supportive hardware no evidence of loosening wear damage or " mechanical abnormalities    ASSESSMENT/PLAN:       ICD-10-CM ICD-9-CM   1. Pain in both knees, unspecified chronicity  M25.561 719.46    M25.562    2. Edema of left lower extremity  R60.0 782.3   3. Baker's cyst, left  M71.22 727.51       Plan: We discussed with the patient at length all the different treatment options available for  the knee including anti-inflammatories, acetaminophen, rest, ice, knee strengthening exercise, occasional cortisone injections for temporary relief, Viscosupplimentation injections, arthroscopic debridement osteotomy, and finally knee arthroplasty.   Will proceed with therapeutic diagnostic cortisone injection of the left knee     The risks, benefits, pros, cons, and potential side effects of the procedure were discussed with the patient in detail all questions were answered.  The patient is comfortable and willing to proceed with the procedure. Verbal consent was obtained and the proper joint was identified by the patient and provider        The injection site was identified and the skin was prepared with a betadine solution. The   left  knee was injected with 1 ml of Celestone and 5 ml Lidocaine under sterile technique. Jesus Christy tolerated the procedure well, he was advised to rest the knee today, ice and elevation. he did receive immediate relief of the pain in and about his knee he was told this would be short lived and is secondary to the lidocaine. he may have an increase in his discomfort tonight followed by steady improvement over the next several days. I may take 1-3 weeks following the injection to get the full benefit of the medication.  I will see him back in 4-6 months. Sooner if he has any problems or concerns.

## 2024-06-03 ENCOUNTER — TELEPHONE (OUTPATIENT)
Dept: HEPATOLOGY | Facility: CLINIC | Age: 64
End: 2024-06-03
Payer: MEDICARE

## 2024-06-03 NOTE — TELEPHONE ENCOUNTER
Patient requested rescheduling MRI. Patient Mri rescheduled to 7/21/24. Patient verbalized understanding. Appointment letter mailed to patient address on file.

## 2024-06-03 NOTE — TELEPHONE ENCOUNTER
"----- Message from Andreas Arriaga sent at 6/3/2024 11:11 AM CDT -----  Regarding: call back        Reschedule Existing Appointment        Appt Date:05/23     Type of appt: All Appts     Physician:Luiz     Reason for rescheduling?  Requesting to r/s for soonest available     Caller: Self     Contact Preference:[phone]        Additional Information:  "Thank you for all that you do for our patients"  "

## 2024-06-10 ENCOUNTER — PATIENT MESSAGE (OUTPATIENT)
Dept: INTERNAL MEDICINE | Facility: CLINIC | Age: 64
End: 2024-06-10
Payer: MEDICARE

## 2024-06-17 ENCOUNTER — TELEPHONE (OUTPATIENT)
Dept: HEMATOLOGY/ONCOLOGY | Facility: CLINIC | Age: 64
End: 2024-06-17
Payer: MEDICARE

## 2024-06-17 NOTE — TELEPHONE ENCOUNTER
Confirmed patient 6/18 appts.       ----- Message from Elena Becerra sent at 6/15/2024  9:38 AM CDT -----  Type:  Needs Medical Advice    Who Called: Pt   Would the patient rather a call back or a response via [facility]?  Callback   Best Call Back Number: [phone]  Additional Information: caller requesting a callback

## 2024-06-18 ENCOUNTER — LAB VISIT (OUTPATIENT)
Dept: LAB | Facility: HOSPITAL | Age: 64
End: 2024-06-18
Attending: INTERNAL MEDICINE
Payer: MEDICARE

## 2024-06-18 ENCOUNTER — OFFICE VISIT (OUTPATIENT)
Dept: HEMATOLOGY/ONCOLOGY | Facility: CLINIC | Age: 64
End: 2024-06-18
Payer: MEDICARE

## 2024-06-18 VITALS
OXYGEN SATURATION: 96 % | RESPIRATION RATE: 20 BRPM | BODY MASS INDEX: 26.48 KG/M2 | WEIGHT: 178.81 LBS | SYSTOLIC BLOOD PRESSURE: 131 MMHG | TEMPERATURE: 98 F | DIASTOLIC BLOOD PRESSURE: 78 MMHG | HEIGHT: 69 IN | HEART RATE: 80 BPM

## 2024-06-18 DIAGNOSIS — I81 PORTAL VEIN THROMBOSIS: ICD-10-CM

## 2024-06-18 DIAGNOSIS — D64.9 ANEMIA, UNSPECIFIED TYPE: ICD-10-CM

## 2024-06-18 DIAGNOSIS — D64.9 ANEMIA, UNSPECIFIED TYPE: Primary | ICD-10-CM

## 2024-06-18 DIAGNOSIS — R77.2 ELEVATED AFP: ICD-10-CM

## 2024-06-18 LAB
BASOPHILS # BLD AUTO: 0.03 K/UL (ref 0–0.2)
BASOPHILS NFR BLD: 0.8 % (ref 0–1.9)
DIFFERENTIAL METHOD BLD: ABNORMAL
EOSINOPHIL # BLD AUTO: 0.2 K/UL (ref 0–0.5)
EOSINOPHIL NFR BLD: 6.4 % (ref 0–8)
ERYTHROCYTE [DISTWIDTH] IN BLOOD BY AUTOMATED COUNT: 12.9 % (ref 11.5–14.5)
FERRITIN SERPL-MCNC: 205 NG/ML (ref 20–300)
HCT VFR BLD AUTO: 40.7 % (ref 40–54)
HGB BLD-MCNC: 13.4 G/DL (ref 14–18)
IMM GRANULOCYTES # BLD AUTO: 0.01 K/UL (ref 0–0.04)
IMM GRANULOCYTES NFR BLD AUTO: 0.3 % (ref 0–0.5)
IRON SERPL-MCNC: 69 UG/DL (ref 45–160)
LDH SERPL L TO P-CCNC: 160 U/L (ref 110–260)
LYMPHOCYTES # BLD AUTO: 1.5 K/UL (ref 1–4.8)
LYMPHOCYTES NFR BLD: 40.4 % (ref 18–48)
MCH RBC QN AUTO: 32.3 PG (ref 27–31)
MCHC RBC AUTO-ENTMCNC: 32.9 G/DL (ref 32–36)
MCV RBC AUTO: 98 FL (ref 82–98)
MONOCYTES # BLD AUTO: 0.4 K/UL (ref 0.3–1)
MONOCYTES NFR BLD: 9.7 % (ref 4–15)
NEUTROPHILS # BLD AUTO: 1.5 K/UL (ref 1.8–7.7)
NEUTROPHILS NFR BLD: 42.4 % (ref 38–73)
NRBC BLD-RTO: 0 /100 WBC
PATH REV BLD -IMP: NORMAL
PLATELET # BLD AUTO: 176 K/UL (ref 150–450)
PMV BLD AUTO: 9.3 FL (ref 9.2–12.9)
RBC # BLD AUTO: 4.15 M/UL (ref 4.6–6.2)
RETICS/RBC NFR AUTO: 2.3 % (ref 0.4–2)
SATURATED IRON: 20 % (ref 20–50)
TOTAL IRON BINDING CAPACITY: 342 UG/DL (ref 250–450)
TRANSFERRIN SERPL-MCNC: 231 MG/DL (ref 200–375)
WBC # BLD AUTO: 3.61 K/UL (ref 3.9–12.7)

## 2024-06-18 PROCEDURE — 99999 PR PBB SHADOW E&M-EST. PATIENT-LVL III: CPT | Mod: PBBFAC,,, | Performed by: INTERNAL MEDICINE

## 2024-06-18 PROCEDURE — 99215 OFFICE O/P EST HI 40 MIN: CPT | Mod: S$GLB,,, | Performed by: INTERNAL MEDICINE

## 2024-06-18 PROCEDURE — 1159F MED LIST DOCD IN RCRD: CPT | Mod: CPTII,S$GLB,, | Performed by: INTERNAL MEDICINE

## 2024-06-18 PROCEDURE — 82728 ASSAY OF FERRITIN: CPT | Performed by: INTERNAL MEDICINE

## 2024-06-18 PROCEDURE — 3008F BODY MASS INDEX DOCD: CPT | Mod: CPTII,S$GLB,, | Performed by: INTERNAL MEDICINE

## 2024-06-18 PROCEDURE — 82107 ALPHA-FETOPROTEIN L3: CPT | Performed by: INTERNAL MEDICINE

## 2024-06-18 PROCEDURE — 85045 AUTOMATED RETICULOCYTE COUNT: CPT | Performed by: INTERNAL MEDICINE

## 2024-06-18 PROCEDURE — 85060 BLOOD SMEAR INTERPRETATION: CPT | Mod: ,,, | Performed by: PATHOLOGY

## 2024-06-18 PROCEDURE — 83540 ASSAY OF IRON: CPT | Performed by: INTERNAL MEDICINE

## 2024-06-18 PROCEDURE — 85025 COMPLETE CBC W/AUTO DIFF WBC: CPT | Performed by: INTERNAL MEDICINE

## 2024-06-18 PROCEDURE — 36415 COLL VENOUS BLD VENIPUNCTURE: CPT | Performed by: INTERNAL MEDICINE

## 2024-06-18 PROCEDURE — 3078F DIAST BP <80 MM HG: CPT | Mod: CPTII,S$GLB,, | Performed by: INTERNAL MEDICINE

## 2024-06-18 PROCEDURE — 3075F SYST BP GE 130 - 139MM HG: CPT | Mod: CPTII,S$GLB,, | Performed by: INTERNAL MEDICINE

## 2024-06-18 PROCEDURE — 83615 LACTATE (LD) (LDH) ENZYME: CPT | Performed by: INTERNAL MEDICINE

## 2024-06-18 PROCEDURE — G2211 COMPLEX E/M VISIT ADD ON: HCPCS | Mod: S$GLB,,, | Performed by: INTERNAL MEDICINE

## 2024-06-18 NOTE — PROGRESS NOTES
[facility]     Chief Complaint:   Encounter Diagnoses   Name Primary?    Anemia, unspecified type Yes    Portal vein thrombosis            HPI:  Jesus Christy is a 63 y.o. male who presents today for evaluation of portal vein thrombus.  Patient previously seen by Dr Miranda    Hematology History  Was admitted from 1/1-/1/3/24 for dark urine and fatigue. Found to have portal vein thrombosis and hyperbiliribuinemia. and started on anticoagulation. He also has prostate adenocarcinoma on active surveillance.     Was readmitted from 1/22/24-1/27/24 for worsening anemia and sepsis. He was admitted to the MICU for septic shock . Hb dropped from 12 g/dl -6 g/dl during that admission.   Seen by inpatient Hematology.   MPN panel and PNH work up was normal. Hemolysis work up was normal.      Continues on Eliquis without any bleeding  Prostate ca followed by Urology (Dr Resendiz)         Patient Active Problem List   Diagnosis    Essential hypertension    Weak urine stream    Frequency-urgency syndrome    Elevated PSA    Neuropathy of right foot    Adenocarcinoma of prostate    Chronic migraine without aura without status migrainosus, not intractable    Anxiety    Depression    Complex regional pain syndrome type 1 of right lower extremity    Cervical radiculopathy at C6    Incidental lung nodule    Direct hyperbilirubinemia    Chronic prescription benzodiazepine use    Portal vein thrombosis    Diverticulitis    Anemia    Elevated AFP       Current Outpatient Medications   Medication Instructions    ARIPiprazole (ABILIFY) 10 MG Tab     cyproheptadine (PERIACTIN) 4 mg tablet     diazePAM (VALIUM) 10 mg, Oral, 3 times daily    ELIQUIS 5 mg, Oral, 2 times daily    finasteride (PROSCAR) 5 mg, Oral, Daily    fluticasone propionate (FLONASE) 50 mcg/actuation nasal spray Instill 1 spray (50 mcg total) in each nostril once daily.    gabapentin (NEURONTIN) 300 mg, Oral, 3 times  "daily PRN    LORazepam (ATIVAN) 1 mg, Oral, Once, Take tablet 30 min before MRI    mirtazapine (REMERON) 45 MG tablet     mirtazapine (REMERON) 30 mg, Oral, Nightly    tadalafiL (CIALIS) 20 mg, Oral, Daily PRN    tamsulosin (FLOMAX) 0.4 mg, Oral, Daily       Review of Systems:   Review of Systems   Constitutional: Negative.    HENT: Negative.     Respiratory: Negative.     Cardiovascular: Negative.    Gastrointestinal: Negative.    Musculoskeletal: Negative.    All other systems reviewed and are negative.      PHYSICAL EXAM:  /78 (BP Location: Left arm, Patient Position: Sitting, BP Method: Medium (Automatic))   Pulse 80   Temp 98.1 °F (36.7 °C) (Oral)   Resp 20   Ht 5' 9" (1.753 m)   Wt 81.1 kg (178 lb 12.7 oz)   SpO2 96%   BMI 26.40 kg/m²     General Appearance:    Alert, cooperative, no distress, appears stated age   Head:    Normocephalic, without obvious abnormality, atraumatic   Lungs:     Clear to auscultation bilaterally, respirations unlabored    Heart:    Regular rate and rhythm, S1 and S2 normal   Abdomen:     Soft, non-tender, bowel sounds active, no masses, no organomegaly   Extremities:   Extremities normal, atraumatic, no cyanosis or edema   Pulses:   2+ and symmetric all extremities   Skin:   Skin color, texture, turgor normal, no rashes or lesions   Lymph nodes:   Cervical, supraclavicular, and axillary nodes normal   Neurologic:   CNII-XII intact, normal gait           Pertinent Labs & Imaging:  Recent Labs   Lab Result Units 04/08/24  1001 06/18/24  1152   WBC K/uL 3.84* 3.61*   Hemoglobin g/dL 14.5 13.4*   Hematocrit % 43.9 40.7   Platelets K/uL 227 176     Recent Labs   Lab Result Units 04/08/24  1001   Creatinine mg/dL 1.0   AST U/L 27   ALT U/L 37     Recent Labs   Lab Result Units 06/18/24  1152   Iron ug/dL 69   Ferritin ng/mL 205       Assessment & Plan:    1. Anemia, unspecified type  - CBC W/ AUTO DIFFERENTIAL; Future    2. Portal vein thrombosis    Full chart review of past " labs and imaging, referring providers' notes, and consultants' reports.   Patient with portal vein thrombus on eliquis. Tolerating treatment well. Discussed implication of prostate ca and need for lifelong anticoagulation. No refills needed     In regards to anemia, reviewed labs. No JERE noted  Borderline anemia  Likely due to Eliquis    Plan to follow up q6mos       Med Onc Chart Routing      Follow up with physician 6 months.   Follow up with [facility]    Infusion scheduling note    Injection scheduling note    Labs CBC   Scheduling:  Preferred lab:  Lab interval:     Imaging    Pharmacy appointment    Other referrals                  MDM includes  :    - Acute or chronic illness or injury that poses a threat to life or bodily function  - Review of prior external notes from unique source  - Independent review and explanation of 3+ results from unique tests  - Discussion of management and ordering 1 unique tests  - Extensive discussion of treatment and management  - Prescription drug management  - Drug therapy requiring intensive monitoring for toxicity        Tabitha Shelton MD  06/18/2024

## 2024-06-19 LAB — PATH REV BLD -IMP: NORMAL

## 2024-06-20 LAB
AFP L3 MFR SERPL: 4.4 %
AFP SERPL-MCNC: 16 NG/ML
IMMUNOLOGIST REVIEW: ABNORMAL

## 2024-06-21 ENCOUNTER — HOSPITAL ENCOUNTER (OUTPATIENT)
Dept: RADIOLOGY | Facility: HOSPITAL | Age: 64
Discharge: HOME | End: 2024-06-21
Attending: INTERNAL MEDICINE
Payer: MEDICARE

## 2024-06-21 DIAGNOSIS — R77.2 ELEVATED AFP: ICD-10-CM

## 2024-06-21 PROCEDURE — 25500020 PHARM REV CODE 255: Performed by: INTERNAL MEDICINE

## 2024-06-21 PROCEDURE — 74183 MRI ABD W/O CNTR FLWD CNTR: CPT | Mod: 26,,, | Performed by: RADIOLOGY

## 2024-06-21 PROCEDURE — 74183 MRI ABD W/O CNTR FLWD CNTR: CPT | Mod: TC

## 2024-06-21 PROCEDURE — A9585 GADOBUTROL INJECTION: HCPCS | Performed by: INTERNAL MEDICINE

## 2024-06-21 RX ORDER — GADOBUTROL 604.72 MG/ML
10 INJECTION INTRAVENOUS
Status: COMPLETED | OUTPATIENT
Start: 2024-06-21 | End: 2024-06-21

## 2024-06-21 RX ADMIN — GADOBUTROL 10 ML: 604.72 INJECTION INTRAVENOUS at 07:06

## 2024-06-26 ENCOUNTER — PATIENT MESSAGE (OUTPATIENT)
Dept: ENDOSCOPY | Facility: HOSPITAL | Age: 64
End: 2024-06-26
Payer: MEDICARE

## 2024-06-26 ENCOUNTER — TELEPHONE (OUTPATIENT)
Dept: ENDOSCOPY | Facility: HOSPITAL | Age: 64
End: 2024-06-26
Payer: MEDICARE

## 2024-06-26 NOTE — TELEPHONE ENCOUNTER
Confirmed  eus appt for 7/5/24 with pt.  prep  instructions placed in portal. Reviewed instructions pt denies taking  glp1 medications. Confirmed last dose of eliquis 7/2/24. Confirmed ride home after procedure.Pt verbalized understanding

## 2024-07-03 ENCOUNTER — ANESTHESIA EVENT (OUTPATIENT)
Dept: ENDOSCOPY | Facility: HOSPITAL | Age: 64
End: 2024-07-03
Payer: MEDICARE

## 2024-07-05 ENCOUNTER — HOSPITAL ENCOUNTER (OUTPATIENT)
Facility: HOSPITAL | Age: 64
Discharge: HOME | End: 2024-07-05
Attending: INTERNAL MEDICINE | Admitting: INTERNAL MEDICINE
Payer: MEDICARE

## 2024-07-05 ENCOUNTER — ANESTHESIA (OUTPATIENT)
Dept: ENDOSCOPY | Facility: HOSPITAL | Age: 64
End: 2024-07-05
Payer: MEDICARE

## 2024-07-05 VITALS
TEMPERATURE: 98 F | OXYGEN SATURATION: 97 % | BODY MASS INDEX: 26.07 KG/M2 | HEIGHT: 69 IN | HEART RATE: 74 BPM | WEIGHT: 176 LBS | DIASTOLIC BLOOD PRESSURE: 80 MMHG | SYSTOLIC BLOOD PRESSURE: 121 MMHG | RESPIRATION RATE: 21 BRPM

## 2024-07-05 DIAGNOSIS — K86.2 PANCREAS CYST: ICD-10-CM

## 2024-07-05 PROCEDURE — 63600175 PHARM REV CODE 636 W HCPCS: Performed by: NURSE ANESTHETIST, CERTIFIED REGISTERED

## 2024-07-05 PROCEDURE — 37000009 HC ANESTHESIA EA ADD 15 MINS: Performed by: INTERNAL MEDICINE

## 2024-07-05 PROCEDURE — 25000003 PHARM REV CODE 250: Performed by: NURSE ANESTHETIST, CERTIFIED REGISTERED

## 2024-07-05 PROCEDURE — 43259 EGD US EXAM DUODENUM/JEJUNUM: CPT | Mod: ,,, | Performed by: INTERNAL MEDICINE

## 2024-07-05 PROCEDURE — 43259 EGD US EXAM DUODENUM/JEJUNUM: CPT | Performed by: INTERNAL MEDICINE

## 2024-07-05 PROCEDURE — 37000008 HC ANESTHESIA 1ST 15 MINUTES: Performed by: INTERNAL MEDICINE

## 2024-07-05 RX ORDER — LIDOCAINE HYDROCHLORIDE 20 MG/ML
INJECTION INTRAVENOUS
Status: DISCONTINUED | OUTPATIENT
Start: 2024-07-05 | End: 2024-07-05

## 2024-07-05 RX ORDER — PHENYLEPHRINE HYDROCHLORIDE 10 MG/ML
INJECTION INTRAVENOUS
Status: DISCONTINUED | OUTPATIENT
Start: 2024-07-05 | End: 2024-07-05

## 2024-07-05 RX ORDER — SODIUM CHLORIDE 9 MG/ML
INJECTION, SOLUTION INTRAVENOUS CONTINUOUS
Status: DISCONTINUED | OUTPATIENT
Start: 2024-07-05 | End: 2024-07-05 | Stop reason: HOSPADM

## 2024-07-05 RX ORDER — FENTANYL CITRATE 50 UG/ML
25 INJECTION, SOLUTION INTRAMUSCULAR; INTRAVENOUS EVERY 5 MIN PRN
Status: DISCONTINUED | OUTPATIENT
Start: 2024-07-05 | End: 2024-07-05 | Stop reason: HOSPADM

## 2024-07-05 RX ORDER — PROPOFOL 10 MG/ML
VIAL (ML) INTRAVENOUS
Status: DISCONTINUED | OUTPATIENT
Start: 2024-07-05 | End: 2024-07-05

## 2024-07-05 RX ORDER — PROPOFOL 10 MG/ML
VIAL (ML) INTRAVENOUS CONTINUOUS PRN
Status: DISCONTINUED | OUTPATIENT
Start: 2024-07-05 | End: 2024-07-05

## 2024-07-05 RX ORDER — MEPERIDINE HYDROCHLORIDE 50 MG/ML
12.5 INJECTION INTRAMUSCULAR; INTRAVENOUS; SUBCUTANEOUS ONCE AS NEEDED
Status: DISCONTINUED | OUTPATIENT
Start: 2024-07-05 | End: 2024-07-05 | Stop reason: HOSPADM

## 2024-07-05 RX ORDER — SODIUM CHLORIDE 0.9 % (FLUSH) 0.9 %
10 SYRINGE (ML) INJECTION
Status: DISCONTINUED | OUTPATIENT
Start: 2024-07-05 | End: 2024-07-05 | Stop reason: HOSPADM

## 2024-07-05 RX ADMIN — SODIUM CHLORIDE: 0.9 INJECTION, SOLUTION INTRAVENOUS at 08:07

## 2024-07-05 RX ADMIN — PROPOFOL 20 MG: 10 INJECTION, EMULSION INTRAVENOUS at 08:07

## 2024-07-05 RX ADMIN — LIDOCAINE HYDROCHLORIDE 80 MG: 20 INJECTION INTRAVENOUS at 08:07

## 2024-07-05 RX ADMIN — PROPOFOL 175 MCG/KG/MIN: 10 INJECTION, EMULSION INTRAVENOUS at 08:07

## 2024-07-05 RX ADMIN — PROPOFOL 80 MG: 10 INJECTION, EMULSION INTRAVENOUS at 08:07

## 2024-07-05 RX ADMIN — PHENYLEPHRINE HYDROCHLORIDE 100 MCG: 10 INJECTION INTRAVENOUS at 09:07

## 2024-07-05 RX ADMIN — LIDOCAINE HYDROCHLORIDE 100 MG: 20 INJECTION INTRAVENOUS at 08:07

## 2024-07-05 RX ADMIN — PROPOFOL 40 MG: 10 INJECTION, EMULSION INTRAVENOUS at 08:07

## 2024-07-05 NOTE — PROVATION PATIENT INSTRUCTIONS
Discharge Summary/Instructions after an Endoscopic Procedure  Patient Name: Jesus Christy  Patient MRN: [id number]  Patient YOB: 1960 Friday, July 5, 2024  Debi Lloyd MD  Dear patient,  As a result of recent federal legislation (The Federal Cures Act), you may   receive lab or pathology results from your procedure in your [facility]   account before your physician is able to contact you. Your physician or   their representative will relay the results to you with their   recommendations at their soonest availability.  Thank you,  RESTRICTIONS:  During your procedure today, you received medications for sedation.  These   medications may affect your judgment, balance and coordination.  Therefore,   for 24 hours, you have the following restrictions:   - DO NOT drive a car, operate machinery, make legal/financial decisions,   sign important papers or drink alcohol.    ACTIVITY:  Today: no heavy lifting, straining or running due to procedural   sedation/anesthesia.  The following day: return to full activity including work.  DIET:  Eat and drink normally unless instructed otherwise.     TREATMENT FOR COMMON SIDE EFFECTS:  - Mild abdominal pain, nausea, belching, bloating or excessive gas:  rest,   eat lightly and use a heating pad.  - Sore Throat: treat with throat lozenges and/or gargle with warm salt   water.  - Because air was used during the procedure, expelling large amounts of air   from your rectum or belching is normal.  - If a bowel prep was taken, you may not have a bowel movement for 1-3 days.    This is normal.  SYMPTOMS TO WATCH FOR AND REPORT TO YOUR PHYSICIAN:  1. Abdominal pain or bloating, other than gas cramps.  2. Chest pain.  3. Back pain.  4. Signs of infection such as: chills or fever occurring within 24 hours   after the procedure.  5. Rectal bleeding, which would show as bright red, maroon, or black stools.   (A tablespoon of blood from the rectum is not serious, especially if    hemorrhoids are present.)  6. Vomiting.  7. Weakness or dizziness.  GO DIRECTLY TO THE NEAREST EMERGENCY ROOM IF YOU HAVE ANY OF THE FOLLOWING:      Difficulty breathing              Chills and/or fever over 101 F   Persistent vomiting and/or vomiting blood   Severe abdominal pain   Severe chest pain   Black, tarry stools   Bleeding- more than one tablespoon   Any other symptom or condition that you feel may need urgent attention  Your doctor recommends these additional instructions:  If any biopsies were taken, your doctors clinic will contact you in 1 to 2   weeks with any results.  - Discharge patient to home (ambulatory).   - Patient has a contact number available for emergencies.  The signs and   symptoms of potential delayed complications were discussed with the   patient.  Return to normal activities tomorrow.  Written discharge   instructions were provided to the patient.   - Resume previous diet.   - Perform MRCP with contrast in 6 months for another surveillance image to   better highlight if this has the appearance suggestive of a serous   cystadenoma given small size seen on today's image.   - Return to my office in 7 months to review next MRCP and discuss whether   further imaging surveillance or an EUS with sampling is recommended.  For questions, problems or results please call your physician - Debi Lloyd MD at Work:  [phone].  [facility], EMERGENCY ROOM PHONE NUMBER: [phone]  IF A COMPLICATION OR EMERGENCY SITUATION ARISES AND YOU ARE UNABLE TO REACH   YOUR PHYSICIAN - GO DIRECTLY TO THE EMERGENCY ROOM.  Debi Lloyd MD  7/5/2024 9:45:20 AM  This report has been verified and signed electronically.  Dear patient,  As a result of recent federal legislation (The Federal Cures Act), you may   receive lab or pathology results from your procedure in your [facility]   account before your physician is able to contact you. Your physician or   their representative will relay the  results to you with their   recommendations at their soonest availability.  Thank you,  PROVATION

## 2024-07-05 NOTE — ANESTHESIA PREPROCEDURE EVALUATION
07/05/2024  Jesus Christy is a 63 y.o., male.      Pre-op Assessment    I have reviewed the Patient Summary Reports.     I have reviewed the Nursing Notes.       Review of Systems  Anesthesia Hx:  No problems with previous Anesthesia                Hematology/Oncology:  Hematology Normal   Oncology Normal                                   EENT/Dental:  EENT/Dental Normal           Cardiovascular:     Hypertension                                        Pulmonary:  Pulmonary Normal                       Renal/:  Renal/ Normal                 Hepatic/GI:     GERD             Musculoskeletal:  Musculoskeletal Normal                Neurological:    Neuromuscular Disease,  Headaches                                 Endocrine:  Endocrine Normal            Dermatological:  Skin Normal    Psych:  Psychiatric History                  Physical Exam  General: Well nourished    Airway:  Mallampati: III / II  Mouth Opening: Normal  TM Distance: Normal  Tongue: Normal  Neck ROM: Normal ROM    Dental:  Intact        Anesthesia Plan  Type of Anesthesia, risks & benefits discussed:    Anesthesia Type: Gen Natural Airway  Intra-op Monitoring Plan: Standard ASA Monitors  Post Op Pain Control Plan: multimodal analgesia  Induction:  IV  Airway Plan: Direct  Informed Consent: Informed consent signed with the Patient and all parties understand the risks and agree with anesthesia plan.  All questions answered. Patient consented to blood products? No  ASA Score: 2  Day of Surgery Review of History & Physical: H&P Update referred to the surgeon/provider.    Ready For Surgery From Anesthesia Perspective.     .

## 2024-07-05 NOTE — TRANSFER OF CARE
"Anesthesia Transfer of Care Note    Patient: Jesus Christy    Procedure(s) Performed: Procedure(s) (LRB):  ULTRASOUND, UPPER GI TRACT, ENDOSCOPIC (N/A)    Patient location: PACU    Anesthesia Type: general    Transport from OR: Transported from OR on room air with adequate spontaneous ventilation    Post pain: adequate analgesia    Post assessment: no apparent anesthetic complications and tolerated procedure well    Post vital signs: stable    Level of consciousness: awake, alert and oriented    Nausea/Vomiting: no nausea/vomiting    Complications: none    Transfer of care protocol was followed      Last vitals: Visit Vitals  /67 (BP Location: Left arm, Patient Position: Lying)   Pulse 81   Temp 36.9 °C (98.4 °F) (Temporal)   Resp 16   Ht 5' 9" (1.753 m)   Wt 79.8 kg (176 lb)   SpO2 96%   BMI 25.99 kg/m²     "

## 2024-07-05 NOTE — NURSING TRANSFER
Nursing Transfer Note      7/5/2024   10:21 AM    Reason patient is being transferred: discharge home criteria met    Transfer To: home    Transfer via wheelchair    Transfer with n/a    Transported by PCT    Transfer Vital Signs:  Blood Pressure:121/80  Heart Rate:72  O2:99  Temperature:98.2  Respirations:20    Telemetry: no  Order for Tele Monitor? No    Additional Lines: IV removed    4eyes on Skin: yes    Medicines sent: no    Any special needs or follow-up needed: post-anesthesia instructions given    Patient belongings transferred with patient: Yes    Chart send with patient: No    Notified: family at bedside    Patient reassessed at: 7/5

## 2024-07-05 NOTE — ANESTHESIA POSTPROCEDURE EVALUATION
Anesthesia Post Evaluation    Patient: Jesus Christy    Procedure(s) Performed: Procedure(s) (LRB):  ULTRASOUND, UPPER GI TRACT, ENDOSCOPIC (N/A)    Final Anesthesia Type: general      Patient location during evaluation: PACU  Patient participation: Yes- Able to Participate  Level of consciousness: awake and alert  Post-procedure vital signs: reviewed and stable  Pain management: adequate  Airway patency: patent    PONV status at discharge: No PONV  Anesthetic complications: no      Cardiovascular status: blood pressure returned to baseline  Respiratory status: spontaneous ventilation and room air  Hydration status: euvolemic  Follow-up not needed.              Vitals Value Taken Time   /75 07/05/24 1003   Temp 36.5 °C (97.7 °F) 07/05/24 0930   Pulse 82 07/05/24 1005   Resp 33 07/05/24 1005   SpO2 96 % 07/05/24 1005   Vitals shown include unfiled device data.      No case tracking events are documented in the log.      Pain/Anna Score: Anna Score: 10 (7/5/2024 10:00 AM)

## 2024-07-05 NOTE — PLAN OF CARE
Pt IV removed. No bleeding or redness noted. Persistent cough noted upon transfer to PACU, pt reports cough relief and tolerance ~20 minutes after admission to PACU. Pt denies pain or nausea. Pt and family educated regarding follow up appointments. PT and family verbalized understanding of education. Pt to restart eliquis tomorrow per Dr Lloyd. Pt discharged from unit with all belongings and family, no acute distress noted

## 2024-07-05 NOTE — H&P
Short Stay Endoscopy History and Physical    PCP - Tara Jolly MD  Referring Physician - Debi Lloyd MD  [address] [place] [address]    Procedure - EUS  ASA - per anesthesia  Mallampati - per anesthesia  History of Anesthesia problems - per anesthesia  Family history Anesthesia problems -  per anesthesia   Plan of anesthesia - per anesthesia    HPI:  This is a 63 y.o. male here for evaluation of: EUS for eval of probable pancreas cyst seen on MRI in Jan 2024 incidentally    Reflux - no  Dysphagia - no  Abdominal pain - no  Diarrhea - no    ROS:  Constitutional: No fevers, chills, No weight loss  CV: No chest pain  Pulm: No cough, No shortness of breath  Ophtho: No vision changes  GI: see HPI  Derm: No rash    Medical History:  has a past medical history of Complex regional pain syndrome i of right lower limb, GERD (gastroesophageal reflux disease), HTN (hypertension), and Portal vein thrombosis.    Surgical History:  has a past surgical history that includes Knee arthroscopy w/ ACL reconstruction (2014); Joint replacement (Right); Colonoscopy (N/A, 9/29/2017); Esophagogastroduodenoscopy (N/A, 2/12/2024); and Colonoscopy (N/A, 2/12/2024).    Family History: family history includes Cancer in his brother; Diabetes in his father; Heart disease in his father; Hypertension in his mother; No Known Problems in his daughter, daughter, daughter, daughter, son, and son..    Social History:  reports that he has never smoked. He has never used smokeless tobacco. He reports current alcohol use of about 0.8 standard drinks of alcohol per week. He reports that he does not use drugs.    Review of patient's allergies indicates:   Allergen Reactions    Peach (prunus persica)     Morphine Palpitations       Medications:   Medications Prior to Admission   Medication Sig Dispense Refill Last Dose    ARIPiprazole (ABILIFY) 10 MG Tab    7/4/2024    cyproheptadine (PERIACTIN) 4 mg tablet    7/4/2024     diazePAM (VALIUM) 10 MG Tab Take 10 mg by mouth 3 (three) times daily.   7/4/2024    finasteride (PROSCAR) 5 mg tablet Take 1 tablet (5 mg total) by mouth once daily. 90 tablet 3 7/4/2024    fluticasone propionate (FLONASE) 50 mcg/actuation nasal spray Instill 1 spray (50 mcg total) in each nostril once daily. 16 g 11 7/4/2024    gabapentin (NEURONTIN) 300 MG capsule Take 1 capsule (300 mg total) by mouth 3 (three) times daily as needed (leg pain). 90 capsule 0 7/4/2024    mirtazapine (REMERON) 30 MG tablet Take 1 tablet (30 mg total) by mouth every evening. 90 tablet 3 7/4/2024    tamsulosin (FLOMAX) 0.4 mg Cap Take 1 capsule (0.4 mg total) by mouth once daily. 90 capsule 3 7/4/2024    apixaban (ELIQUIS) 5 mg Tab Take 1 tablet (5 mg total) by mouth 2 (two) times daily. 60 tablet 3 7/2/2024 at 1800    LORazepam (ATIVAN) 1 MG tablet Take 1 tablet (1 mg total) by mouth once. Take tablet 30 min before MRI for 1 dose 1 tablet 0     mirtazapine (REMERON) 45 MG tablet  (Patient not taking: Reported on 6/18/2024)       tadalafiL (CIALIS) 20 MG Tab Take 1 tablet (20 mg total) by mouth daily as needed (erectile dysfunction). 30 tablet 5        Physical Exam:    Vital Signs:   Vitals:    07/05/24 0740   BP: 127/75   Pulse: 72   Resp: 16   Temp: 98.4 °F (36.9 °C)       General Appearance: Well appearing in no acute distress    Labs:  Lab Results   Component Value Date    WBC 3.61 (L) 06/18/2024    HGB 13.4 (L) 06/18/2024    HCT 40.7 06/18/2024     06/18/2024    CHOL 154 12/13/2022    TRIG 135 12/13/2022    HDL 36 (L) 12/13/2022    ALT 37 04/08/2024    AST 27 04/08/2024     04/08/2024    K 4.8 04/08/2024     04/08/2024    CREATININE 1.0 04/08/2024    BUN 8 04/08/2024    CO2 25 04/08/2024    TSH 1.628 06/03/2020    PSA 6.9 (H) 09/01/2016    INR 1.2 01/23/2024       I have explained the risks and benefits of this endoscopic procedure to the patient including but not limited to bleeding, inflammation, infection,  perforation, pancreatitis, missing a lesion and death.      Debi Lloyd MD

## 2024-07-07 DIAGNOSIS — M54.41 CHRONIC BILATERAL LOW BACK PAIN WITH BILATERAL SCIATICA: ICD-10-CM

## 2024-07-07 DIAGNOSIS — Z85.46 HISTORY OF PROSTATE CANCER: ICD-10-CM

## 2024-07-07 DIAGNOSIS — G89.29 CHRONIC BILATERAL LOW BACK PAIN WITH BILATERAL SCIATICA: ICD-10-CM

## 2024-07-07 DIAGNOSIS — M54.42 CHRONIC BILATERAL LOW BACK PAIN WITH BILATERAL SCIATICA: ICD-10-CM

## 2024-07-07 DIAGNOSIS — M54.16 LUMBAR NEURITIS: ICD-10-CM

## 2024-07-07 NOTE — TELEPHONE ENCOUNTER
No care due was identified.  [facility] Embedded Care Due Messages. Reference number: [id number].   7/07/2024 5:56:45 PM CDT

## 2024-07-08 RX ORDER — GABAPENTIN 300 MG/1
300 CAPSULE ORAL 3 TIMES DAILY PRN
Qty: 90 CAPSULE | Refills: 5 | Status: SHIPPED | OUTPATIENT
Start: 2024-07-08 | End: 2025-07-08

## 2024-07-08 NOTE — TELEPHONE ENCOUNTER
Refill Routing Note   Medication(s) are not appropriate for processing by [facility] for the following reason(s):        Outside of protocol    [facility] action(s):  Route               Appointments  past 12m or future 3m with PCP    Date Provider   Last Visit   3/1/2024 Tara Jolly MD   Next Visit   Visit date not found Tara Jolly MD   ED visits in past 90 days: 1        Note composed:8:41 AM 07/08/2024

## 2024-07-24 ENCOUNTER — TELEPHONE (OUTPATIENT)
Dept: HEMATOLOGY/ONCOLOGY | Facility: CLINIC | Age: 64
End: 2024-07-24
Payer: MEDICARE

## 2024-07-24 NOTE — TELEPHONE ENCOUNTER
----- Message from Emmanuel Garvey RN sent at 7/24/2024 10:46 AM CDT -----  Regarding: FW: Consult/Advisory  Contact: Arlette @ [phone]    ----- Message -----  From: Janie Alston  Sent: 7/24/2024  10:35 AM CDT  To: [facility] Staff  Subject: Consult/Advisory                                 Consult/Advisory     Name Of Caller: Arlette with Dr Brooke ([facility])      Contact [phone] and fax [phone]    Nature of call: pt have appt today and yesterday. medical clearance for dental. Pt's appt is today for 1pm. Fax was sent today to office.

## 2024-08-23 ENCOUNTER — OFFICE VISIT (OUTPATIENT)
Dept: HEPATOLOGY | Facility: CLINIC | Age: 64
End: 2024-08-23
Payer: MEDICARE

## 2024-08-23 DIAGNOSIS — R77.2 HIGH ALPHA FETOPROTEIN (AFP) TUMOR MARKER: Primary | ICD-10-CM

## 2024-08-23 NOTE — PROGRESS NOTES
Subjective:       Patient ID: Jesus Christy is a 64 y.o. male.    Chief Complaint: No chief complaint on file.  The patient location is: Home  The chief complaint leading to consultation is: Portal vein thrombosis    Visit type: audiovisual    Face to Face time with patient: 10 minutes of total time spent on the encounter, which includes face to face time and non-face to face time preparing to see the patient (eg, review of tests), Obtaining and/or reviewing separately obtained history, Documenting clinical information in the electronic or other health record, Independently interpreting results (not separately reported) and communicating results to the patient/family/caregiver, or Care coordination (not separately reported).         Each patient to whom he or she provides medical services by telemedicine is:  (1) informed of the relationship between the physician and patient and the respective role of any other health care provider with respect to management of the patient; and (2) notified that he or she may decline to receive medical services by telemedicine and may withdraw from such care at any time.    Notes:    HPI  I saw this 64 y.o.man in the liver clinic for follow up.    Admitted to hospital on 1/1/2024 with abdo pain, fever and elevated LFTs- found to have a right PVT  Started on apixaban    No weight changes    Bilirubin initially 5  High Alk Phos  Normal platelets  HCV ab -ve    CT abdo: 1/1/24  1. Findings suggesting hepatic steatosis, correlation with LFTs recommended.  There is a somewhat heterogeneous appearance to the posterior aspect of the right hepatic lobe inferiorly.  This may be on the basis of contrast phase however this could be further evaluated with nonemergent MRI as warranted.  2. Multiple scattered colonic diverticula.  There is questionable slight indistinctness about a few diverticula at the level of the distal descending colon versus motion artifact.  Correlation is advised,  early sequela of diverticulitis not excluded.  3. Prostatomegaly.  4. Pulmonary nodules as described.    MRI abdo: 1/3/2024  Cholelithiasis with mild gallbladder wall thickening and trace pericholecystic fluid.  Findings are nonspecific, and can be seen in the setting of hepatic dysfunction.  Correlation is advised.  Hepatosplenomegaly with mild hepatic steatosis.  No focal hepatic lesions noting limitations from noncontrast examination.  Mild prominence of the pancreatic duct.  Additional T2 hyperintense focus about the pancreatic tail measuring 9 mm, with potential connection to the pancreatic duct.  Findings are nonspecific, and may relate to a pancreatic cyst versus side branch IPMN.  Repeat examination is recommended with MRI abdomen with MRCP in 1 year to ensure stability of this finding.  Few prominent peripancreatic and periportal lymph nodes as above.  Diffusion signal abnormality about the right branches of the portal vein and proximal main portal vein, likely representing sequela of portal vein thrombosis.  Findings are better evaluated on ultrasound liver with Doppler 01/01/2024.    CT abdo: 4/18/24  1. Redemonstration of extensive portal venous thrombus which appears grossly stable in extent compared to prior.  Apparent cavernous transformation of the portal vein.  2. Evidence of portal hypertension including splenomegaly and gallbladder and gastroesophageal varices.  3. Previously seen probable hepatic vein thrombus is not definitively seen on this exam.    IR conference: 1/9/24  Non contract MRI showed left PV is patterned. Perfusion pattern. No malignancy.  Plan:  TP CT scan to evaluate further.    MRI abdo: 6/21/2024  Chronic nonocclusive thrombosis of the right and left portal veins  No suspicious hepatic lesions.  Sequelae of portal venous hypertension, include cavernous transformation, gastric varices, and splenomegaly.    AFP in April 2024 was 24 but AFP L3 was normal    EGD 2/12/24:  normal  Colonocopy 2/12/24: 2 polyps + diverticular disease    Also seen by Dr Lloyd for 9 mm pancreatic cyst  EUS on 7/5/24- small pancreatic cyst- will follow with MRCP in 6 months.      PMH:  Prostate Ca on biopsy in 2016  - on medical treatment    Right leg extensive surgery- RSD  No abdo surgery      SH:  Alcohol- rarely  Non smoker    Disabled after accident  [place]     FH:  Nil liver  Prostate hx      Review of Systems   Constitutional:  Negative for activity change, appetite change, chills, fatigue, fever and unexpected weight change.   HENT:  Negative for hearing loss.    Eyes:  Negative for discharge and visual disturbance.   Respiratory:  Negative for cough, chest tightness, shortness of breath and wheezing.    Cardiovascular:  Negative for chest pain, palpitations and leg swelling.   Gastrointestinal:  Negative for abdominal distention, abdominal pain, constipation, diarrhea and nausea.   Genitourinary:  Negative for dysuria and frequency.   Musculoskeletal:  Negative for arthralgias and back pain.   Skin:  Negative for pallor and rash.   Neurological:  Negative for dizziness, tremors, speech difficulty and headaches.   Hematological:  Negative for adenopathy.   Psychiatric/Behavioral:  Negative for agitation and confusion.          Lab Results   Component Value Date    ALT 37 04/08/2024    AST 27 04/08/2024    ALKPHOS 121 04/08/2024    BILITOT 0.6 04/08/2024     Past Medical History:   Diagnosis Date    Complex regional pain syndrome i of right lower limb     GERD (gastroesophageal reflux disease)     HTN (hypertension)     Portal vein thrombosis      Past Surgical History:   Procedure Laterality Date    COLONOSCOPY N/A 9/29/2017    Procedure: COLONOSCOPY;  Surgeon: Jamar Edwards MD;  Location: [facility] END[facility] (4TH FLR);  Service: Endoscopy;  Laterality: N/A;    COLONOSCOPY N/A 2/12/2024    Procedure: COLONOSCOPY;  Surgeon: Miguel Angel Huffman MD;  Location: [facility] END[facility] (2ND FLR);  Service:  Endoscopy;  Laterality: N/A;    ENDOSCOPIC ULTRASOUND OF UPPER GASTROINTESTINAL TRACT N/A 7/5/2024    Procedure: ULTRASOUND, UPPER GI TRACT, ENDOSCOPIC;  Surgeon: Debi Lloyd MD;  Location: [facility] (2ND FLR);  Service: Endoscopy;  Laterality: N/A;  3/21 portal instr-EUS with myself at [facility] next couple of months pancreas tail cyst.joseluis-Blayne pending Dr. Miranda TE 3/21-tt  5/15/24: instructions sent via portal. eliquis hold in TE 3/21-GD  6/26-pre call complete-tb-will hold eliquis starting     ESOPHAGOGASTRODUODENOSCOPY N/A 2/12/2024    Procedure: EGD (ESOPHAGOGASTRODUODENOSCOPY);  Surgeon: Miguel Angel Huffman MD;  Location: [facility] (2ND FLR);  Service: Endoscopy;  Laterality: N/A;  1/30/24-Okay to add per Dr. Huffman, 2nd flr-recent hospitalization for sepsis and portal vein thrombosis, Miralax, instr portal and email, Approval to hold Eliquis and medical clearance rec'd from Dr. Jolly (see telephone encounter 1/30/24)-DS  2/5-prec    JOINT REPLACEMENT Right     knee    KNEE ARTHROSCOPY W/ ACL RECONSTRUCTION  2014    right     Current Outpatient Medications   Medication Sig    apixaban (ELIQUIS) 5 mg Tab Take 1 tablet (5 mg total) by mouth 2 (two) times daily.    ARIPiprazole (ABILIFY) 10 MG Tab     cyproheptadine (PERIACTIN) 4 mg tablet     diazePAM (VALIUM) 10 MG Tab Take 10 mg by mouth 3 (three) times daily.    finasteride (PROSCAR) 5 mg tablet Take 1 tablet (5 mg total) by mouth once daily.    fluticasone propionate (FLONASE) 50 mcg/actuation nasal spray Instill 1 spray (50 mcg total) in each nostril once daily.    gabapentin (NEURONTIN) 300 MG capsule Take 1 capsule (300 mg total) by mouth 3 (three) times daily as needed (leg pain).    LORazepam (ATIVAN) 1 MG tablet Take 1 tablet (1 mg total) by mouth once. Take tablet 30 min before MRI for 1 dose    mirtazapine (REMERON) 30 MG tablet Take 1 tablet (30 mg total) by mouth every evening.    mirtazapine (REMERON) 45 MG tablet  (Patient not taking: Reported  on 6/18/2024)    tadalafiL (CIALIS) 20 MG Tab Take 1 tablet (20 mg total) by mouth daily as needed (erectile dysfunction).    tamsulosin (FLOMAX) 0.4 mg Cap Take 1 capsule (0.4 mg total) by mouth once daily.     No current facility-administered medications for this visit.       Objective:      VIDEO VISIT- EXAM NOT DONE      Assessment:       No diagnosis found.    Plan:   He does not appear to have an obvious cause for his right PVT other than his prostate cancer which may cause a prothrombotic state.  His fibroscan was normal showing no cirrhosis/fibrosis.    His imaging has not shown any obvious malignancy but his AFP has risen to 24.    - explained this concerning finding to him  - will repeat AFP and AFP L3  - will order a repeat MRI abdo (needs sedation)    Clinic in 3 months- video if poss

## 2024-08-27 ENCOUNTER — TELEPHONE (OUTPATIENT)
Dept: HEPATOLOGY | Facility: CLINIC | Age: 64
End: 2024-08-27
Payer: MEDICARE

## 2024-08-27 NOTE — TELEPHONE ENCOUNTER
----- Message from Sukhi Dee MD sent at 8/23/2024  8:10 AM CDT -----  Please schedule labs now and MRI in Dec. Clinic in Dec or Jan

## 2024-08-28 ENCOUNTER — LAB VISIT (OUTPATIENT)
Dept: LAB | Facility: HOSPITAL | Age: 64
End: 2024-08-28
Attending: INTERNAL MEDICINE
Payer: MEDICARE

## 2024-08-28 DIAGNOSIS — R77.2 HIGH ALPHA FETOPROTEIN (AFP) TUMOR MARKER: ICD-10-CM

## 2024-08-28 LAB
ALBUMIN SERPL BCP-MCNC: 4 G/DL (ref 3.5–5.2)
ALP SERPL-CCNC: 121 U/L (ref 55–135)
ALT SERPL W/O P-5'-P-CCNC: 47 U/L (ref 10–44)
ANION GAP SERPL CALC-SCNC: 7 MMOL/L (ref 8–16)
AST SERPL-CCNC: 32 U/L (ref 10–40)
BASOPHILS # BLD AUTO: 0.03 K/UL (ref 0–0.2)
BASOPHILS NFR BLD: 0.6 % (ref 0–1.9)
BILIRUB SERPL-MCNC: 1 MG/DL (ref 0.1–1)
BUN SERPL-MCNC: 13 MG/DL (ref 8–23)
CALCIUM SERPL-MCNC: 10.2 MG/DL (ref 8.7–10.5)
CHLORIDE SERPL-SCNC: 107 MMOL/L (ref 95–110)
CO2 SERPL-SCNC: 24 MMOL/L (ref 23–29)
CREAT SERPL-MCNC: 1.1 MG/DL (ref 0.5–1.4)
DIFFERENTIAL METHOD BLD: ABNORMAL
EOSINOPHIL # BLD AUTO: 0.3 K/UL (ref 0–0.5)
EOSINOPHIL NFR BLD: 7.4 % (ref 0–8)
ERYTHROCYTE [DISTWIDTH] IN BLOOD BY AUTOMATED COUNT: 12.6 % (ref 11.5–14.5)
EST. GFR  (NO RACE VARIABLE): >60 ML/MIN/1.73 M^2
GLUCOSE SERPL-MCNC: 90 MG/DL (ref 70–110)
HCT VFR BLD AUTO: 43.4 % (ref 40–54)
HGB BLD-MCNC: 13.9 G/DL (ref 14–18)
IMM GRANULOCYTES # BLD AUTO: 0.01 K/UL (ref 0–0.04)
IMM GRANULOCYTES NFR BLD AUTO: 0.2 % (ref 0–0.5)
LYMPHOCYTES # BLD AUTO: 2 K/UL (ref 1–4.8)
LYMPHOCYTES NFR BLD: 43.1 % (ref 18–48)
MCH RBC QN AUTO: 30.8 PG (ref 27–31)
MCHC RBC AUTO-ENTMCNC: 32 G/DL (ref 32–36)
MCV RBC AUTO: 96 FL (ref 82–98)
MONOCYTES # BLD AUTO: 0.4 K/UL (ref 0.3–1)
MONOCYTES NFR BLD: 9.3 % (ref 4–15)
NEUTROPHILS # BLD AUTO: 1.8 K/UL (ref 1.8–7.7)
NEUTROPHILS NFR BLD: 39.4 % (ref 38–73)
NRBC BLD-RTO: 0 /100 WBC
PLATELET # BLD AUTO: 204 K/UL (ref 150–450)
PMV BLD AUTO: 9.5 FL (ref 9.2–12.9)
POTASSIUM SERPL-SCNC: 4 MMOL/L (ref 3.5–5.1)
PROT SERPL-MCNC: 7.6 G/DL (ref 6–8.4)
RBC # BLD AUTO: 4.52 M/UL (ref 4.6–6.2)
SODIUM SERPL-SCNC: 138 MMOL/L (ref 136–145)
WBC # BLD AUTO: 4.62 K/UL (ref 3.9–12.7)

## 2024-08-28 PROCEDURE — 36415 COLL VENOUS BLD VENIPUNCTURE: CPT | Mod: PN | Performed by: INTERNAL MEDICINE

## 2024-08-28 PROCEDURE — 85025 COMPLETE CBC W/AUTO DIFF WBC: CPT | Performed by: INTERNAL MEDICINE

## 2024-08-28 PROCEDURE — 82107 ALPHA-FETOPROTEIN L3: CPT | Performed by: INTERNAL MEDICINE

## 2024-08-28 PROCEDURE — 80053 COMPREHEN METABOLIC PANEL: CPT | Performed by: INTERNAL MEDICINE

## 2024-08-30 LAB
AFP L3 MFR SERPL: 4.4 %
AFP SERPL-MCNC: 18 NG/ML
IMMUNOLOGIST REVIEW: ABNORMAL

## 2024-09-13 DIAGNOSIS — I81 PORTAL VEIN THROMBOSIS: ICD-10-CM

## 2024-09-13 NOTE — TELEPHONE ENCOUNTER
Pt is requesting rx   Telephone Encounter by Farheen Sacnhes RN at 07/21/18 11:28 AM     Author:  Farheen Sanches RN Service:  (none) Author Type:  Registered Nurse     Filed:  07/21/18 11:29 AM Encounter Date:  7/21/2018 Status:  Signed     :  Farheen Sanches RN (Registered Nurse)            New script sent per Dara Dominga's, PAC note below.[LF1.1M]       Revision History        User Key Date/Time User Provider Type Action    > LF1.1 07/21/18 11:29 AM Farheen Sanches, RN Registered Nurse Sign    M - Manual

## 2024-10-07 ENCOUNTER — OFFICE VISIT (OUTPATIENT)
Dept: INTERNAL MEDICINE | Facility: CLINIC | Age: 64
End: 2024-10-07
Payer: MEDICARE

## 2024-10-07 ENCOUNTER — HOSPITAL ENCOUNTER (EMERGENCY)
Facility: HOSPITAL | Age: 64
Discharge: HOME | End: 2024-10-07
Attending: EMERGENCY MEDICINE
Payer: MEDICARE

## 2024-10-07 VITALS
SYSTOLIC BLOOD PRESSURE: 149 MMHG | RESPIRATION RATE: 20 BRPM | HEIGHT: 69 IN | HEART RATE: 87 BPM | WEIGHT: 175 LBS | BODY MASS INDEX: 25.92 KG/M2 | DIASTOLIC BLOOD PRESSURE: 86 MMHG | TEMPERATURE: 98 F | OXYGEN SATURATION: 97 %

## 2024-10-07 VITALS
HEART RATE: 79 BPM | OXYGEN SATURATION: 97 % | HEIGHT: 69 IN | WEIGHT: 177 LBS | BODY MASS INDEX: 26.22 KG/M2 | SYSTOLIC BLOOD PRESSURE: 120 MMHG | DIASTOLIC BLOOD PRESSURE: 72 MMHG

## 2024-10-07 DIAGNOSIS — K62.5 RECTAL BLEEDING: ICD-10-CM

## 2024-10-07 DIAGNOSIS — K57.92 DIVERTICULITIS: Primary | ICD-10-CM

## 2024-10-07 DIAGNOSIS — R19.5 HEME POSITIVE STOOL: Primary | ICD-10-CM

## 2024-10-07 LAB
ABO + RH BLD: NORMAL
ALBUMIN SERPL BCP-MCNC: 4.4 G/DL (ref 3.5–5.2)
ALP SERPL-CCNC: 147 U/L (ref 55–135)
ALT SERPL W/O P-5'-P-CCNC: 50 U/L (ref 10–44)
ANION GAP SERPL CALC-SCNC: 9 MMOL/L (ref 8–16)
AST SERPL-CCNC: 27 U/L (ref 10–40)
BASOPHILS # BLD AUTO: 0.03 K/UL (ref 0–0.2)
BASOPHILS NFR BLD: 0.5 % (ref 0–1.9)
BILIRUB SERPL-MCNC: 1.2 MG/DL (ref 0.1–1)
BLD GP AB SCN CELLS X3 SERPL QL: NORMAL
BUN SERPL-MCNC: 9 MG/DL (ref 6–30)
BUN SERPL-MCNC: 9 MG/DL (ref 8–23)
CALCIUM SERPL-MCNC: 10.7 MG/DL (ref 8.7–10.5)
CHLORIDE SERPL-SCNC: 105 MMOL/L (ref 95–110)
CHLORIDE SERPL-SCNC: 108 MMOL/L (ref 95–110)
CO2 SERPL-SCNC: 24 MMOL/L (ref 23–29)
CREAT SERPL-MCNC: 1 MG/DL (ref 0.5–1.4)
CREAT SERPL-MCNC: 1.1 MG/DL (ref 0.5–1.4)
DIFFERENTIAL METHOD BLD: NORMAL
EOSINOPHIL # BLD AUTO: 0.2 K/UL (ref 0–0.5)
EOSINOPHIL NFR BLD: 3.4 % (ref 0–8)
ERYTHROCYTE [DISTWIDTH] IN BLOOD BY AUTOMATED COUNT: 12.5 % (ref 11.5–14.5)
EST. GFR  (NO RACE VARIABLE): >60 ML/MIN/1.73 M^2
GLUCOSE SERPL-MCNC: 80 MG/DL (ref 70–110)
GLUCOSE SERPL-MCNC: 80 MG/DL (ref 70–110)
HCT VFR BLD AUTO: 45 % (ref 40–54)
HCT VFR BLD CALC: 45 %PCV (ref 36–54)
HGB BLD-MCNC: 14.6 G/DL (ref 14–18)
IMM GRANULOCYTES # BLD AUTO: 0.01 K/UL (ref 0–0.04)
IMM GRANULOCYTES NFR BLD AUTO: 0.2 % (ref 0–0.5)
LACTATE SERPL-SCNC: 1 MMOL/L (ref 0.5–2.2)
LYMPHOCYTES # BLD AUTO: 1.9 K/UL (ref 1–4.8)
LYMPHOCYTES NFR BLD: 32.9 % (ref 18–48)
MCH RBC QN AUTO: 30.9 PG (ref 27–31)
MCHC RBC AUTO-ENTMCNC: 32.4 G/DL (ref 32–36)
MCV RBC AUTO: 95 FL (ref 82–98)
MONOCYTES # BLD AUTO: 0.5 K/UL (ref 0.3–1)
MONOCYTES NFR BLD: 7.8 % (ref 4–15)
NEUTROPHILS # BLD AUTO: 3.2 K/UL (ref 1.8–7.7)
NEUTROPHILS NFR BLD: 55.2 % (ref 38–73)
NRBC BLD-RTO: 0 /100 WBC
PLATELET # BLD AUTO: 210 K/UL (ref 150–450)
PMV BLD AUTO: 9.4 FL (ref 9.2–12.9)
POC IONIZED CALCIUM: 1.31 MMOL/L (ref 1.06–1.42)
POC TCO2 (MEASURED): 25 MMOL/L (ref 23–29)
POTASSIUM BLD-SCNC: 4.1 MMOL/L (ref 3.5–5.1)
POTASSIUM SERPL-SCNC: 4 MMOL/L (ref 3.5–5.1)
PROT SERPL-MCNC: 8.3 G/DL (ref 6–8.4)
RBC # BLD AUTO: 4.72 M/UL (ref 4.6–6.2)
SAMPLE: NORMAL
SODIUM BLD-SCNC: 141 MMOL/L (ref 136–145)
SODIUM SERPL-SCNC: 141 MMOL/L (ref 136–145)
SPECIMEN OUTDATE: NORMAL
WBC # BLD AUTO: 5.86 K/UL (ref 3.9–12.7)

## 2024-10-07 PROCEDURE — 3078F DIAST BP <80 MM HG: CPT | Mod: CPTII,S$GLB,, | Performed by: PHYSICIAN ASSISTANT

## 2024-10-07 PROCEDURE — 99285 EMERGENCY DEPT VISIT HI MDM: CPT | Mod: 25

## 2024-10-07 PROCEDURE — 86900 BLOOD TYPING SEROLOGIC ABO: CPT | Performed by: NURSE PRACTITIONER

## 2024-10-07 PROCEDURE — 86901 BLOOD TYPING SEROLOGIC RH(D): CPT | Performed by: NURSE PRACTITIONER

## 2024-10-07 PROCEDURE — 86850 RBC ANTIBODY SCREEN: CPT | Performed by: NURSE PRACTITIONER

## 2024-10-07 PROCEDURE — 80053 COMPREHEN METABOLIC PANEL: CPT | Performed by: NURSE PRACTITIONER

## 2024-10-07 PROCEDURE — 83605 ASSAY OF LACTIC ACID: CPT | Performed by: PHYSICIAN ASSISTANT

## 2024-10-07 PROCEDURE — 85025 COMPLETE CBC W/AUTO DIFF WBC: CPT | Performed by: NURSE PRACTITIONER

## 2024-10-07 PROCEDURE — 80047 BASIC METABLC PNL IONIZED CA: CPT

## 2024-10-07 PROCEDURE — 99214 OFFICE O/P EST MOD 30 MIN: CPT | Mod: S$GLB,,, | Performed by: PHYSICIAN ASSISTANT

## 2024-10-07 PROCEDURE — 25500020 PHARM REV CODE 255: Performed by: EMERGENCY MEDICINE

## 2024-10-07 PROCEDURE — 99999 PR PBB SHADOW E&M-EST. PATIENT-LVL IV: CPT | Mod: PBBFAC,,, | Performed by: PHYSICIAN ASSISTANT

## 2024-10-07 PROCEDURE — 3008F BODY MASS INDEX DOCD: CPT | Mod: CPTII,S$GLB,, | Performed by: PHYSICIAN ASSISTANT

## 2024-10-07 PROCEDURE — 3074F SYST BP LT 130 MM HG: CPT | Mod: CPTII,S$GLB,, | Performed by: PHYSICIAN ASSISTANT

## 2024-10-07 PROCEDURE — 25000003 PHARM REV CODE 250: Performed by: PHYSICIAN ASSISTANT

## 2024-10-07 RX ORDER — AMOXICILLIN AND CLAVULANATE POTASSIUM 875; 125 MG/1; MG/1
1 TABLET, FILM COATED ORAL
Status: COMPLETED | OUTPATIENT
Start: 2024-10-07 | End: 2024-10-07

## 2024-10-07 RX ORDER — AMOXICILLIN AND CLAVULANATE POTASSIUM 875; 125 MG/1; MG/1
1 TABLET, FILM COATED ORAL 2 TIMES DAILY
Qty: 20 TABLET | Refills: 0 | Status: SHIPPED | OUTPATIENT
Start: 2024-10-07 | End: 2024-10-18

## 2024-10-07 RX ADMIN — IOHEXOL 100 ML: 350 INJECTION, SOLUTION INTRAVENOUS at 06:10

## 2024-10-07 RX ADMIN — AMOXICILLIN AND CLAVULANATE POTASSIUM 1 TABLET: 875; 125 TABLET, FILM COATED ORAL at 08:10

## 2024-10-07 NOTE — FIRST PROVIDER EVALUATION
Emergency Department TeleTriage Encounter Note      CHIEF COMPLAINT    Chief Complaint   Patient presents with    Melena     Sent from clinic hem ++, on eliquis, states dark stool with  blood       VITAL SIGNS   Initial Vitals [10/07/24 1208]   BP Pulse Resp Temp SpO2   121/82 85 18 98.1 °F (36.7 °C) 96 %      MAP       --            ALLERGIES    Review of patient's allergies indicates:   Allergen Reactions    Peach (prunus persica)     Morphine Palpitations       PROVIDER TRIAGE NOTE  Verbal consent for the teletriage evaluation was given by the patient at the start of the evaluation.  All efforts will be made to maintain patient's privacy during the evaluation.      This is a teletriage evaluation of a 64 y.o. male presenting to the ED with c/o BRB per rectum for several days. Limited physical exam via telehealth: The patient is awake, alert, answering questions appropriately and is not in respiratory distress.  As the Teletriage provider, I performed an initial assessment and ordered appropriate labs and imaging studies, if any, to facilitate the patient's care once placed in the ED. Once a room is available, care and a full evaluation will be completed by an alternate ED provider.  Any additional orders and the final disposition will be determined by that provider.  All imaging and labs will not be followed-up by the Teletriage Team, including myself.          ORDERS  Labs Reviewed - No data to display    ED Orders (720h ago, onward)      Start Ordered     Status Ordering Provider    10/07/24 1338 10/07/24 1338  Cardiac Monitoring - Adult  Continuous        Comments: Notify Physician If:    Ordered SEVERINO SHANNON    10/07/24 1338 10/07/24 1338  Pulse Oximetry Continuous  Continuous         Ordered SEVERINO SHANNON    Unscheduled 10/07/24 1338  Saline lock IV (18 ga. or larger)  Once         Ordered SEVERINO SHANNON    Unscheduled 10/07/24 1338  2nd Saline lock IV (18 ga. or larger)  Once         Ordered SEVERINO SHANNON     Unscheduled 10/07/24 1338  NPO (Ice Chips)  Once         Ordered SEVERINO SHANNON    Unscheduled 10/07/24 1338  CBC auto differential  STAT         Ordered SEVERINO SHANNON    Unscheduled 10/07/24 1338  Comprehensive metabolic panel  STAT         Ordered SEVERINO SHANNON    Unscheduled 10/07/24 1338  Type & Screen  STAT         Ordered SEVERINO SHANNON    Unscheduled 10/07/24 1338  Vital signs  Once         Ordered SEVERINO SHANNON    Unscheduled 10/07/24 1338  POCT occult blood stool  Once         Ordered SEVERINO SHANNON              Virtual Visit Note: The provider triage portion of this emergency department evaluation and documentation was performed via [employer], a HIPAA-compliant telemedicine application, in concert with a tele-presenter in the room. A face to face patient evaluation with one of my colleagues will occur once the patient is placed in an emergency department room.      DISCLAIMER: This note was prepared with [employer] voice recognition transcription software. Garbled syntax, mangled pronouns, and other bizarre constructions may be attributed to that software system.

## 2024-10-07 NOTE — PROGRESS NOTES
Subjective:       Patient ID: Jesus Christy is a 64 y.o. male.        Chief Complaint: Rectal Bleeding    Jesus Christy is an established patient of Tara Jolly MD here today for urgent care visit.    1 week ago he had diarrhea with dark red blood mixed it in.  It occurred x 2 days with 1-2 bowel movements like this.  He took imodium with resolution of the diarrhea.  Then he had a few normal bowel movements that were normal and brown in color.  Yesterday he had a soft bowel movement that had brown but also black mixed in.  No abdominal pain.  No nausea or vomiting.  He has not had a bowel movement yet today.    He takes eliquis for portal vein thrombosis.  He had EGD and colonoscopy earlier this year as he had hemoglobin drop to 7.7.           Review of Systems   Constitutional:  Negative for appetite change, chills, fatigue and fever.   HENT:  Negative for congestion and sore throat.    Eyes:  Negative for visual disturbance.   Respiratory:  Negative for cough, chest tightness and shortness of breath.    Cardiovascular:  Negative for chest pain, palpitations and leg swelling.   Gastrointestinal:  Positive for anal bleeding, blood in stool and diarrhea. Negative for abdominal pain, constipation, nausea and vomiting.   Genitourinary:  Negative for dysuria, frequency, hematuria and urgency.   Musculoskeletal:  Negative for arthralgias and back pain.   Skin:  Negative for rash.   Neurological:  Negative for dizziness, syncope, weakness and headaches.   Psychiatric/Behavioral:  Negative for dysphoric mood and sleep disturbance. The patient is not nervous/anxious.        Objective:      Physical Exam    Assessment:       1. Heme positive stool    2. Rectal bleeding        Plan:       Jesus was seen today for rectal bleeding.    Diagnoses and all orders for this visit:    Heme positive stool  -     Refer to Emergency Dept.    Rectal bleeding    Rectal exam with heme positive stool though only able to  "obtain small amount  Given bleeding and on eliquis, will go to ED for further evaluation  >30 minutes spent on patient encounter    Pt has been given instructions populated from patient instructions database and has verbalized understanding of the after visit summary and information contained wherein.    Follow up with a primary care provider. May go to ER for acute shortness of breath, lightheadedness, fever, or any other emergent complaints or changes in condition.    "This note will be shared with the patient"    Future Appointments   Date Time Provider Department Center   10/31/2024  9:00 AM Adriana Carvalho NP [facility] COLON [facility]   12/16/2024  1:00 PM [facility] [id number] [facility]   12/19/2024  9:40 AM LAB, HEMONC CANCER BLDG [facility] LAB HO [facility]   12/19/2024 10:40 AM Tabitha Shelton MD [facility] HEMONC3 [facility]   12/27/2024  4:30 PM Sukhi Dee MD [facility] HEPAT Sha Ortiz                 "

## 2024-10-07 NOTE — ED PROVIDER NOTES
Encounter Date: 10/7/2024       History     Chief Complaint   Patient presents with    Melena     Sent from clinic hem ++, on eliquis, states dark stool with  blood     64-year-old male with history of portal vein thrombosis on Eliquis, GERD, hypertension, prostate cancer who presents to the ED with chief complaint of rectal bleeding. States that he began to have diarrhea with red blood that started about 1 week ago.  He had these symptoms for a few days.  He states that the diarrhea than improved.  He is now having solid bowel movements with brown/dark brown stool.  He no longer sees any blood in the stool.  He has some lower abdominal discomfort.  Denies any nausea or vomiting.  No fever.  Denies history of GI bleeding in the past.  States that he had an endoscopy and colonoscopy done a few months ago, without any bleeding identified.  Denies similar symptoms in the past.  Denies other worsening or alleviating factors.      Review of patient's allergies indicates:   Allergen Reactions    Peach (prunus persica)     Morphine Palpitations     Past Medical History:   Diagnosis Date    Complex regional pain syndrome i of right lower limb     GERD (gastroesophageal reflux disease)     HTN (hypertension)     Portal vein thrombosis      Past Surgical History:   Procedure Laterality Date    COLONOSCOPY N/A 9/29/2017    Procedure: COLONOSCOPY;  Surgeon: Jamar Edwards MD;  Location: [facility] ENDO (4TH FLR);  Service: Endoscopy;  Laterality: N/A;    COLONOSCOPY N/A 2/12/2024    Procedure: COLONOSCOPY;  Surgeon: Miguel Angel Huffman MD;  Location: [facility] ENDO (2ND FLR);  Service: Endoscopy;  Laterality: N/A;    ENDOSCOPIC ULTRASOUND OF UPPER GASTROINTESTINAL TRACT N/A 7/5/2024    Procedure: ULTRASOUND, UPPER GI TRACT, ENDOSCOPIC;  Surgeon: Debi Huggins MD;  Location: [facility] ENDO (2ND FLR);  Service: Endoscopy;  Laterality: N/A;  3/21 portal instr-E[facility] with [facility] next couple of months pancreas tail cyst.huggins-Eliquis pending   Ruben TE 3/21-tt  5/15/24: instructions sent via portal. eliquis hold in TE 3/21-GD  6/26-pre call complete-tb-will hold eliquis starting     ESOPHAGOGASTRODUODENOSCOPY N/A 2/12/2024    Procedure: EGD (ESOPHAGOGASTRODUODENOSCOPY);  Surgeon: Miguel Angel Huffman MD;  Location: [facility] (2ND FLR);  Service: Endoscopy;  Laterality: N/A;  1/30/24-Okay to add per Dr. Huffman, 2nd flr-recent hospitalization for sepsis and portal vein thrombosis, Miralax, instr portal and email, Approval to hold Eliquis and medical clearance rec'd from Dr. Jolly (see telephone encounter 1/30/24)-DS  2/5-prec    JOINT REPLACEMENT Right     knee    KNEE ARTHROSCOPY W/ ACL RECONSTRUCTION  2014    right     Family History   Problem Relation Name Age of Onset    Hypertension Mother      Heart disease Father      Diabetes Father      Cancer Brother          unknown type    No Known Problems Daughter      No Known Problems Daughter      No Known Problems Daughter      No Known Problems Daughter      No Known Problems Son      No Known Problems Son       Social History     Tobacco Use    Smoking status: Never    Smokeless tobacco: Never   Substance Use Topics    Alcohol use: Yes     Alcohol/week: 0.8 standard drinks of alcohol     Types: 1 Standard drinks or equivalent per week     Comment: once every few months    Drug use: No     Review of Systems   Gastrointestinal:  Positive for abdominal pain and blood in stool.       Physical Exam     Initial Vitals [10/07/24 1208]   BP Pulse Resp Temp SpO2   121/82 85 18 98.1 °F (36.7 °C) 96 %      MAP       --         Physical Exam    Vitals reviewed.  Constitutional: He appears well-developed and well-nourished. He is not diaphoretic. No distress.   HENT:   Head: Normocephalic and atraumatic. Mouth/Throat: Oropharynx is clear and moist.   Eyes: EOM are normal. Pupils are equal, round, and reactive to light.   Neck: Neck supple.   Normal range of motion.  Cardiovascular:  Normal rate, regular rhythm, normal  heart sounds and intact distal pulses.     Exam reveals no gallop and no friction rub.       No murmur heard.  Pulmonary/Chest: Breath sounds normal. He has no wheezes. He has no rhonchi. He has no rales.   Abdominal: Abdomen is soft. Bowel sounds are normal. There is abdominal tenderness (minimal lower abdominal tenderness to palpation).   Genitourinary:    Genitourinary Comments: Rectal exam performed with nursing chaperone present. No external lesions. No stool in rectal vault.      Musculoskeletal:         General: Normal range of motion.      Cervical back: Normal range of motion and neck supple.     Neurological: He is alert and oriented to person, place, and time. He has normal strength. GCS score is 15. GCS eye subscore is 4. GCS verbal subscore is 5. GCS motor subscore is 6.   Skin: Skin is warm and dry. Capillary refill takes less than 2 seconds.   Psychiatric: He has a normal mood and affect. His behavior is normal. Judgment and thought content normal.         ED Course   Procedures  Labs Reviewed   COMPREHENSIVE METABOLIC PANEL - Abnormal       Result Value    Sodium 141      Potassium 4.0      Chloride 108      CO2 24      Glucose 80      BUN 9      Creatinine 1.1      Calcium 10.7 (*)     Total Protein 8.3      Albumin 4.4      Total Bilirubin 1.2 (*)     Alkaline Phosphatase 147 (*)     AST 27      ALT 50 (*)     eGFR >60.0      Anion Gap 9     CBC W/ AUTO DIFFERENTIAL    WBC 5.86      RBC 4.72      Hemoglobin 14.6      Hematocrit 45.0      MCV 95      MCH 30.9      MCHC 32.4      RDW 12.5      Platelets 210      MPV 9.4      Immature Granulocytes 0.2      Gran # (ANC) 3.2      Immature Grans (Abs) 0.01      Lymph # 1.9      Mono # 0.5      Eos # 0.2      Baso # 0.03      nRBC 0      Gran % 55.2      Lymph % 32.9      Mono % 7.8      Eosinophil % 3.4      Basophil % 0.5      Differential Method Automated     LACTIC ACID, PLASMA    Lactate (Lactic Acid) 1.0     TYPE & SCREEN    Group & Rh B POS       Indirect Margarita NEG      Specimen Outdate 10/10/2024 23:59     ISTAT PROCEDURE    POC Glucose 80      POC BUN 9      POC Creatinine 1.0      POC Sodium 141      POC Potassium 4.1      POC Chloride 105      POC TCO2 (MEASURED) 25      POC Ionized Calcium 1.31      POC Hematocrit 45      Sample QI     ISTAT CHEM8          Imaging Results               CT Abdomen Pelvis With IV Contrast NO Oral Contrast (Final result)  Result time 10/07/24 19:33:05      Final result by Jamar Mohan MD (10/07/24 19:33:05)                   Impression:      Findings concerning for left lower quadrant uncomplicated acute diverticulitis.  Consider follow-up with imaging or endoscopy after therapy according to colorectal screening guidelines, as warranted.    Trace volume likely reactive free fluid tracking along the left pericolic gutter to the mesenteric root.    Heterogeneous parenchymal attenuation of the liver presumably related to transient hepatic attenuation differences, noting no discrete mass seen.    Chronic nonocclusive thrombus of the right and left portal veins with heterogeneous appearance of the main portal vein at may relate to mixing artifact versus nonocclusive thrombus.    Sequela of portal venous hypertension including cavernous transformation, gastric varices and splenomegaly.    Other incidental/nonemergent findings in the body of the report.    This report was flagged in Epic as abnormal.      Electronically signed by: Jamar Mohan MD  Date:    10/07/2024  Time:    19:33               Narrative:    EXAMINATION:  CT ABDOMEN PELVIS WITH IV CONTRAST    CLINICAL HISTORY:  LLQ abdominal pain;    TECHNIQUE:  Low dose axial images, sagittal and coronal reformations were obtained from the lung bases to the pubic symphysis following the IV administration of 100 mL of Omnipaque 350 .  Oral contrast was not given.    COMPARISON:  MRI abdomen 06/21/2024, CT abdomen and pelvis 04/18/2024    FINDINGS:  Imaged lung bases show  mild dependent atelectasis and minimal scattered platelike scarring versus atelectasis.  Unchanged 2-3 mm solid nodule within the right middle lobe.  Base of the heart is within normal limits.    Liver is normal in size noting heterogeneous parenchymal attenuation and no discrete mass seen.    Heterogeneous opacification of main portal vein may relate to mixing of contrast versus nonocclusive thrombus.  Delayed enhancement of the right and left portal veins slightly contain chronic thrombus areas collateral vessels about the gallbladder and leisa hepatis.  Hepatic veins are patent.  Few gastric varices.  Circumaortic left renal vein.    Spleen mildly enlarged without focal process.  Gallbladder, stomach, duodenum and bilateral adrenal glands are within normal limits.  No significant biliary ductal dilatation.  Pancreas is mildly atrophic.  No pancreatic mass or adjacent inflammatory change.    Bilateral kidneys are normal in size, shape and location with symmetric normal enhancement.  No hydronephrosis or significant perinephric stranding.  5 mm calculus at the right renal lower pole.  Few scattered tiny hypoattenuating parenchymal foci at each kidney which are too small to characterize.  Ureters are normal in course and caliber.  Urinary bladder is well distended without wall thickening.  Prostate enlarged, heterogeneous with small dystrophic calcifications in the central gland.    Appendix and terminal ileum are within normal limits.  Numerous scattered colonic diverticula.  Short segment mild wall thickening of the distal descending colon within the left lower quadrant noting mild thickening of the adjacent peritoneum and new trace volume likely reactive free fluid tracking along the left pericolic gutter to the mesenteric root.  No bowel obstruction.  No bowel pneumatosis or portal venous gas.    No free air or lymphadenopathy by CT criteria.  No significant atherosclerosis.  Aorta is mildly tortuous without  aneurysm or dissection.    Small fat containing umbilical hernia, unchanged.  Osseous structures appear stable without acute findings.                                       Medications   iohexoL (OMNIPAQUE 350) injection 100 mL (100 mLs Intravenous Given 10/7/24 1832)   amoxicillin-clavulanate 875-125mg per tablet 1 tablet (1 tablet Oral Given 10/7/24 2022)     Medical Decision Making  Emergent evaluation of a 64 y.o. male presenting to the emergency department complaining of abdominal discomfort, blood in the stool. Patient is afebrile, hemodynamically stable, and non toxic appearing.   Will order labs, imaging. Declines analgesia.     Differential diagnosis includes but isn't limited to upper GI bleed, lower GI bleed, diverticulitis, anemia.    No severe metabolic or hematologic derangement.  Total bilirubin minimally elevated at 1.2.  Lactate within normal limits.  CT abdomen pelvis with findings of uncomplicated diverticulitis.    Patient made aware of these findings.  Will start on oral Augmentin.  States that he does not have significant abdominal pain and does not need analgesia for home.  Recommend outpatient follow-up with Colorectal surgery return given.  All questions answered.  The patient was instructed to follow up with CRS or to return to the emergency department for worsening symptoms. The treatment plan was discussed with the patient who demonstrated understanding and comfort with plan. The patient's history, physical exam, and plan of care was discussed with and agreed upon with my supervising physician.       Amount and/or Complexity of Data Reviewed  Labs:  Decision-making details documented in ED Course.  Radiology: ordered.    Risk  Prescription drug management.               ED Course as of 10/07/24 2053   Mon Oct 07, 2024   1830 WBC: 5.86 [JM]   1830 Hemoglobin: 14.6 [JM]   1830 Hematocrit: 45.0 [JM]   1830 Platelet Count: 210 [JM]   1830 Lactic Acid Level: 1.0 [JM]   1842 BILIRUBIN TOTAL(!):  1.2 []   1842 ALP(!): 147 []      ED Course User Index  [] Grecia Metz PA-C                           Clinical Impression:  Final diagnoses:  [K57.92] Diverticulitis (Primary)          ED Disposition Condition    Discharge Stable          ED Prescriptions       Medication Sig Dispense Start Date End Date Auth. Provider    amoxicillin-clavulanate 875-125mg (AUGMENTIN) 875-125 mg per tablet Take 1 tablet by mouth 2 (two) times daily. for 10 days 20 tablet 10/7/2024 10/17/2024 Grecia Metz PA-C          Follow-up Information       Follow up With Specialties Details Why Contact Info Additional Information    [facility] Emergency Dept Emergency Medicine Go to  If symptoms worsen [address] [facility] [id number] [phone]     [facility] Colon and Rectal Surgery Schedule an appointment as soon as possible for a visit   [address] [facility] [id number] [phone] [facility] Please park in [facility] and use [facility] elevator             Grecia Metz PA-C  10/07/24 2053

## 2024-10-08 NOTE — DISCHARGE INSTRUCTIONS
Take Augmentin as prescribed and to completion.  Follow up with Colorectal surgery or return to the emergency department sooner for any new or worsening symptoms.

## 2024-10-08 NOTE — ED NOTES
"C/C: 64 y.o. male arrived to the ED via POV for c/o melena. Patient with hx of portal vein thrombosis (on Eliquis) and HTN [facility] following rectal bleeding. Patient states symptoms began approximately one week ago, accompanied by diarrhea that stopped shortly thereafter. Patient a reports the passage of "black/dark-colored" stool for the past week. Denies abdominal pain, N/V, syncope, lightheadedness, chronic NSAID use, otc pepto bismol, or iron supplements.     APPEARANCE: awake, alert, and appears to be in NAD. Pain score 0/10. Placed on cont cardiac monitoring, auto BP cuff, cont pulse ox, and changed into hospital gown. Bed in lowest and locked position w/ side rails up x2.   SKIN: warm, dry and intact. No visible breakdown or bruising.  MUSCULOSKELETAL: Patient moving all extremities spontaneously, no obvious swelling or deformities noted. Ambulates independently.  RESPIRATORY: Airway open and patent. Denies shortness of breath. Respirations even, unlabored, equal bilaterally on inspiration and expiration.   CARDIAC: Regular HR. Denies CP, 2+ distal pulses; no peripheral edema  ABDOMEN: S/ND/NT, Denies N/V/D. +melena   : voids spontaneously, denies difficulty  Neurologic: AAO x 4; follows commands equal strength in all extremities; denies numbness/tingling. Denies dizziness   "

## 2024-10-14 DIAGNOSIS — I81 PORTAL VEIN THROMBOSIS: ICD-10-CM

## 2024-10-30 ENCOUNTER — OFFICE VISIT (OUTPATIENT)
Dept: SURGERY | Facility: CLINIC | Age: 64
End: 2024-10-30
Payer: MEDICARE

## 2024-10-30 VITALS
DIASTOLIC BLOOD PRESSURE: 83 MMHG | HEIGHT: 69 IN | SYSTOLIC BLOOD PRESSURE: 138 MMHG | WEIGHT: 177.25 LBS | HEART RATE: 70 BPM | BODY MASS INDEX: 26.25 KG/M2

## 2024-10-30 DIAGNOSIS — K62.5 BRBPR (BRIGHT RED BLOOD PER RECTUM): Primary | ICD-10-CM

## 2024-10-30 DIAGNOSIS — K57.92 DIVERTICULITIS: ICD-10-CM

## 2024-10-30 PROCEDURE — 3075F SYST BP GE 130 - 139MM HG: CPT | Mod: CPTII,S$GLB,, | Performed by: NURSE PRACTITIONER

## 2024-10-30 PROCEDURE — 3008F BODY MASS INDEX DOCD: CPT | Mod: CPTII,S$GLB,, | Performed by: NURSE PRACTITIONER

## 2024-10-30 PROCEDURE — 1159F MED LIST DOCD IN RCRD: CPT | Mod: CPTII,S$GLB,, | Performed by: NURSE PRACTITIONER

## 2024-10-30 PROCEDURE — 99203 OFFICE O/P NEW LOW 30 MIN: CPT | Mod: S$GLB,,, | Performed by: NURSE PRACTITIONER

## 2024-10-30 PROCEDURE — 99999 PR PBB SHADOW E&M-EST. PATIENT-LVL IV: CPT | Mod: PBBFAC,,, | Performed by: NURSE PRACTITIONER

## 2024-10-30 PROCEDURE — 3079F DIAST BP 80-89 MM HG: CPT | Mod: CPTII,S$GLB,, | Performed by: NURSE PRACTITIONER

## 2024-11-11 ENCOUNTER — PATIENT MESSAGE (OUTPATIENT)
Dept: GASTROENTEROLOGY | Facility: CLINIC | Age: 64
End: 2024-11-11
Payer: MEDICARE

## 2024-11-11 DIAGNOSIS — K86.2 PANCREAS CYST: Primary | ICD-10-CM

## 2024-11-19 ENCOUNTER — TELEPHONE (OUTPATIENT)
Dept: SURGERY | Facility: CLINIC | Age: 64
End: 2024-11-19
Payer: MEDICARE

## 2024-11-19 ENCOUNTER — LAB VISIT (OUTPATIENT)
Dept: LAB | Facility: HOSPITAL | Age: 64
End: 2024-11-19
Payer: MEDICARE

## 2024-11-19 ENCOUNTER — OFFICE VISIT (OUTPATIENT)
Dept: SURGERY | Facility: CLINIC | Age: 64
End: 2024-11-19
Payer: MEDICARE

## 2024-11-19 VITALS
HEART RATE: 75 BPM | HEIGHT: 69 IN | SYSTOLIC BLOOD PRESSURE: 135 MMHG | DIASTOLIC BLOOD PRESSURE: 79 MMHG | BODY MASS INDEX: 26.19 KG/M2 | WEIGHT: 176.81 LBS

## 2024-11-19 DIAGNOSIS — K62.5 BRBPR (BRIGHT RED BLOOD PER RECTUM): ICD-10-CM

## 2024-11-19 DIAGNOSIS — K57.92 DIVERTICULITIS: Primary | ICD-10-CM

## 2024-11-19 DIAGNOSIS — K57.92 DIVERTICULITIS: ICD-10-CM

## 2024-11-19 LAB
ANION GAP SERPL CALC-SCNC: 6 MMOL/L (ref 8–16)
BASOPHILS # BLD AUTO: 0.02 K/UL (ref 0–0.2)
BASOPHILS NFR BLD: 0.4 % (ref 0–1.9)
BUN SERPL-MCNC: 7 MG/DL (ref 8–23)
CALCIUM SERPL-MCNC: 10 MG/DL (ref 8.7–10.5)
CHLORIDE SERPL-SCNC: 108 MMOL/L (ref 95–110)
CO2 SERPL-SCNC: 27 MMOL/L (ref 23–29)
CREAT SERPL-MCNC: 1.1 MG/DL (ref 0.5–1.4)
CRP SERPL-MCNC: 3.3 MG/L (ref 0–8.2)
DIFFERENTIAL METHOD BLD: ABNORMAL
EOSINOPHIL # BLD AUTO: 0.3 K/UL (ref 0–0.5)
EOSINOPHIL NFR BLD: 7 % (ref 0–8)
ERYTHROCYTE [DISTWIDTH] IN BLOOD BY AUTOMATED COUNT: 12.9 % (ref 11.5–14.5)
EST. GFR  (NO RACE VARIABLE): >60 ML/MIN/1.73 M^2
GLUCOSE SERPL-MCNC: 99 MG/DL (ref 70–110)
HCT VFR BLD AUTO: 40.4 % (ref 40–54)
HGB BLD-MCNC: 13 G/DL (ref 14–18)
IMM GRANULOCYTES # BLD AUTO: 0 K/UL (ref 0–0.04)
IMM GRANULOCYTES NFR BLD AUTO: 0 % (ref 0–0.5)
LYMPHOCYTES # BLD AUTO: 1.7 K/UL (ref 1–4.8)
LYMPHOCYTES NFR BLD: 36.6 % (ref 18–48)
MCH RBC QN AUTO: 31 PG (ref 27–31)
MCHC RBC AUTO-ENTMCNC: 32.2 G/DL (ref 32–36)
MCV RBC AUTO: 96 FL (ref 82–98)
MONOCYTES # BLD AUTO: 0.4 K/UL (ref 0.3–1)
MONOCYTES NFR BLD: 7.9 % (ref 4–15)
NEUTROPHILS # BLD AUTO: 2.2 K/UL (ref 1.8–7.7)
NEUTROPHILS NFR BLD: 48.1 % (ref 38–73)
NRBC BLD-RTO: 0 /100 WBC
PLATELET # BLD AUTO: 179 K/UL (ref 150–450)
PMV BLD AUTO: 9.3 FL (ref 9.2–12.9)
POTASSIUM SERPL-SCNC: 3.9 MMOL/L (ref 3.5–5.1)
RBC # BLD AUTO: 4.2 M/UL (ref 4.6–6.2)
SODIUM SERPL-SCNC: 141 MMOL/L (ref 136–145)
WBC # BLD AUTO: 4.54 K/UL (ref 3.9–12.7)

## 2024-11-19 PROCEDURE — 99999 PR PBB SHADOW E&M-EST. PATIENT-LVL IV: CPT | Mod: PBBFAC,,, | Performed by: NURSE PRACTITIONER

## 2024-11-19 PROCEDURE — 3075F SYST BP GE 130 - 139MM HG: CPT | Mod: CPTII,S$GLB,, | Performed by: NURSE PRACTITIONER

## 2024-11-19 PROCEDURE — 86140 C-REACTIVE PROTEIN: CPT | Performed by: NURSE PRACTITIONER

## 2024-11-19 PROCEDURE — 99214 OFFICE O/P EST MOD 30 MIN: CPT | Mod: S$GLB,,, | Performed by: NURSE PRACTITIONER

## 2024-11-19 PROCEDURE — 36415 COLL VENOUS BLD VENIPUNCTURE: CPT | Performed by: NURSE PRACTITIONER

## 2024-11-19 PROCEDURE — 3008F BODY MASS INDEX DOCD: CPT | Mod: CPTII,S$GLB,, | Performed by: NURSE PRACTITIONER

## 2024-11-19 PROCEDURE — 80048 BASIC METABOLIC PNL TOTAL CA: CPT | Performed by: NURSE PRACTITIONER

## 2024-11-19 PROCEDURE — 1159F MED LIST DOCD IN RCRD: CPT | Mod: CPTII,S$GLB,, | Performed by: NURSE PRACTITIONER

## 2024-11-19 PROCEDURE — 3078F DIAST BP <80 MM HG: CPT | Mod: CPTII,S$GLB,, | Performed by: NURSE PRACTITIONER

## 2024-11-19 PROCEDURE — 85025 COMPLETE CBC W/AUTO DIFF WBC: CPT | Performed by: NURSE PRACTITIONER

## 2024-11-19 PROCEDURE — 1160F RVW MEDS BY RX/DR IN RCRD: CPT | Mod: CPTII,S$GLB,, | Performed by: NURSE PRACTITIONER

## 2024-11-19 RX ORDER — HYDROCORTISONE 25 MG/G
CREAM TOPICAL 2 TIMES DAILY
Qty: 28 G | Refills: 1 | Status: SHIPPED | OUTPATIENT
Start: 2024-11-19

## 2024-11-19 NOTE — PROGRESS NOTES
CRS Office Visit History and Physical    Referring Md:   No referring provider defined for this encounter.    SUBJECTIVE:     History of Present Illness 10/30/2024:  The patient is a new patient to this practice.   Course is as follows:  Patient is a 64 y.o. male presents with diverticulitis. Went to ED 10/7 for BRBPR. Hgb was normal as well was WBC. He states he had zero abd pain at that time. Had some diarrhea but he states that's not necessarily abnormal for him once a month.  He did have some mild BRBPR here and there since ER d/c but mild. Denies fevers, chills, abd pain, nausea  10/7/2024 CT in ER  Findings concerning for left lower quadrant uncomplicated acute diverticulitis.  Consider follow-up with imaging or endoscopy after therapy according to colorectal screening guidelines, as warranted.  Trace volume likely reactive free fluid tracking along the left pericolic gutter to the mesenteric root.     2/2024 c scope reviewed  Multiple small and large-mouthed diverticula were found from sigmoid        to descending colon.        A 2 mm polyp was found in the transverse colon. The polyp was        sessile. The polyp was removed with a jumbo cold forceps. Resection        and retrieval were complete.        A 4 mm polyp was found in the transverse colon. The polyp was        sessile. The polyp was removed with a cold snare. Resection and        retrieval were complete.        The exam was otherwise without abnormality.     Interval hx 11/19/2024  Reports abd pain a few days ago across abd and then had rectal bleeding  last night, in toilet bowl and some this am  No rectal pain.  BMs described as normal        Review of patient's allergies indicates:   Allergen Reactions    Peach (prunus persica)     Morphine Palpitations       Past Medical History:   Diagnosis Date    Complex regional pain syndrome i of right lower limb     GERD (gastroesophageal reflux disease)     HTN (hypertension)     Portal vein thrombosis       Past Surgical History:   Procedure Laterality Date    COLONOSCOPY N/A 9/29/2017    Procedure: COLONOSCOPY;  Surgeon: Jamar Edwards MD;  Location: [facility] ENDO (4TH FLR);  Service: Endoscopy;  Laterality: N/A;    COLONOSCOPY N/A 2/12/2024    Procedure: COLONOSCOPY;  Surgeon: Miguel Angel Huffman MD;  Location: [facility] ENDO (2ND FLR);  Service: Endoscopy;  Laterality: N/A;    ENDOSCOPIC ULTRASOUND OF UPPER GASTROINTESTINAL TRACT N/A 7/5/2024    Procedure: ULTRASOUND, UPPER GI TRACT, ENDOSCOPIC;  Surgeon: Debi Lloyd MD;  Location: [facility] ENDO (2ND FLR);  Service: Endoscopy;  Laterality: N/A;  3/21 portal instr-E[facility] with [facility] next couple of months pancreas tail cyst.joseluis-Eliquis pending Dr. Miranda TE 3/21-tt  5/15/24: instructions sent via portal. eliquis hold in TE 3/21-GD  6/26-pre call complete-tb-will hold eliquis starting     ESOPHAGOGASTRODUODENOSCOPY N/A 2/12/2024    Procedure: EGD (ESOPHAGOGASTRODUODENOSCOPY);  Surgeon: Miguel Angel Huffman MD;  Location: [facility] ENDO (2ND FLR);  Service: Endoscopy;  Laterality: N/A;  1/30/24-Okay to add per Dr. Huffman, 2nd flr-recent hospitalization for sepsis and portal vein thrombosis, Miralax, instr portal and email, Approval to hold Eliquis and medical clearance rec'd from Dr. Jolly (see telephone encounter 1/30/24)-DS  2/5-prec    JOINT REPLACEMENT Right     knee    KNEE ARTHROSCOPY W/ ACL RECONSTRUCTION  2014    right     Family History   Problem Relation Name Age of Onset    Hypertension Mother      Heart disease Father      Diabetes Father      Cancer Brother          unknown type    No Known Problems Daughter      No Known Problems Daughter      No Known Problems Daughter      No Known Problems Daughter      No Known Problems Son      No Known Problems Son       Social History     Tobacco Use    Smoking status: Never    Smokeless tobacco: Never   Substance Use Topics    Alcohol use: Yes     Alcohol/week: 0.8 standard drinks of alcohol     Types: 1 Standard drinks or  "equivalent per week     Comment: once every few months    Drug use: No        Review of Systems:  Review of Systems   Constitutional:  Negative for chills, fever and weight loss.   Gastrointestinal:  Positive for abdominal pain. Negative for nausea and vomiting.       OBJECTIVE:     Vital Signs (Most Recent)  /79   Pulse 75   Ht 5' 9" (1.753 m)   Wt 80.2 kg (176 lb 12.9 oz)   BMI 26.11 kg/m²     Physical Exam:  General: Black or  male in no distress   Neuro: Alert and oriented to person, place, and time.  Moves all extremities.     HEENT: No icterus.  Trachea midline  Respiratory: Respirations are even and unlabored, no cough or audible wheezing  Skin: Warm dry and intact, No visible rashes, no jaundice  Abd: Soft. Non TTP, no guarding    Labs reviewed today:  Lab Results   Component Value Date    WBC 4.54 11/19/2024    HGB 13.0 (L) 11/19/2024    HCT 40.4 11/19/2024     11/19/2024    CHOL 154 12/13/2022    TRIG 135 12/13/2022    HDL 36 (L) 12/13/2022    ALT 50 (H) 10/07/2024    AST 27 10/07/2024     11/19/2024    K 3.9 11/19/2024     11/19/2024    CREATININE 1.1 11/19/2024    BUN 7 (L) 11/19/2024    CO2 27 11/19/2024    TSH 1.628 06/03/2020    PSA 6.9 (H) 09/01/2016    INR 1.2 01/23/2024           Anorectal Exam:    Anal Skin: Normal    Digital Rectal Exam:  Resting Tone normal  Mass none  Tenderness  absent    Anoscopy:  Verbal consent was obtained.   Clear plastic anoscope inserted.    Hemorrhoids  present. No active bleeding, there is some inflammation  Stigmata of prolapsed  absent  Distal rectal mucosa normal    ASSESSMENT/PLAN:     Diagnoses and all orders for this visit:    Diverticulitis  -     C-REACTIVE PROTEIN; Future  -     CBC Auto Differential; Future  -     Basic Metabolic Panel; Future    BRBPR (bright red blood per rectum)  -     C-REACTIVE PROTEIN; Future  -     CBC Auto Differential; Future  -     Basic Metabolic Panel; Future  -     hydrocortisone " (ANUSOL-HC) 2.5 % rectal cream; Place rectally 2 (two) times daily.    63 yo M here with rectal bleeding. Hemorrhoids on exam. C scope 2/2024.  I am not sure if this is diverticulitis. Will start with labs. If normal hold off on CT.  Trial anusol  LRD  Pt to update me in a few days on symptoms via portal        Adriana Carvalho, VITALY-MONA  Colon and Rectal Surgery

## 2024-11-19 NOTE — PATIENT INSTRUCTIONS
Water intake of 64 oz per day  May use prescribed cortisone cream to hemorrhoids twice a day not to exceed more than 2 weeks at a time. Take 2 week medication vacation holiday then resume as needed.  Warm sitz baths as needed  Do not sit on the toilet for more than 5 mins at a time     Start a liquid diet, AVOID high fiber foods.   If still having stomach pains in 48 hours or worsening abdominal pain call Adriana

## 2024-11-19 NOTE — TELEPHONE ENCOUNTER
----- Message from Adriana Carvalho NP sent at 11/19/2024  8:23 AM CST -----  Regarding: FW: Advise  Contact: [phone]  Double book him for sometime today. Maybe have him come for 11 or 1230?  ----- Message -----  From: Tia Israel  Sent: 11/19/2024   8:14 AM CST  To: Deven Wright Staff  Subject: Advise                                           Type:  Needs Medical Advice    Who Called: Patient    Would the patient rather a call back or a response via [facility]? Call Back     Best Call Back Number: [phone]    Additional Information: Patient is calling asking for a return call. Pt is stating that he is updating provider on how he is doing. He states he noticed blood again in his stool.

## 2024-11-19 NOTE — TELEPHONE ENCOUNTER
Spoke with patient, offered appt times as indicated by SHENG Carvalho. Patient states that he can come for 11a. Appt scheduled.

## 2024-12-16 ENCOUNTER — HOSPITAL ENCOUNTER (OUTPATIENT)
Dept: RADIOLOGY | Facility: HOSPITAL | Age: 64
Discharge: HOME | End: 2024-12-16
Attending: INTERNAL MEDICINE
Payer: MEDICARE

## 2024-12-16 DIAGNOSIS — R77.2 HIGH ALPHA FETOPROTEIN (AFP) TUMOR MARKER: ICD-10-CM

## 2024-12-19 ENCOUNTER — OFFICE VISIT (OUTPATIENT)
Dept: HEMATOLOGY/ONCOLOGY | Facility: CLINIC | Age: 64
End: 2024-12-19
Payer: MEDICARE

## 2024-12-19 ENCOUNTER — LAB VISIT (OUTPATIENT)
Dept: LAB | Facility: HOSPITAL | Age: 64
End: 2024-12-19
Attending: INTERNAL MEDICINE
Payer: MEDICARE

## 2024-12-19 VITALS
BODY MASS INDEX: 26.36 KG/M2 | HEIGHT: 69 IN | RESPIRATION RATE: 18 BRPM | TEMPERATURE: 98 F | OXYGEN SATURATION: 100 % | WEIGHT: 178 LBS | SYSTOLIC BLOOD PRESSURE: 137 MMHG | DIASTOLIC BLOOD PRESSURE: 88 MMHG | HEART RATE: 78 BPM

## 2024-12-19 DIAGNOSIS — I81 PORTAL VEIN THROMBOSIS: Primary | ICD-10-CM

## 2024-12-19 DIAGNOSIS — D64.9 ANEMIA, UNSPECIFIED TYPE: ICD-10-CM

## 2024-12-19 LAB
BASOPHILS # BLD AUTO: 0.03 K/UL (ref 0–0.2)
BASOPHILS NFR BLD: 0.8 % (ref 0–1.9)
DIFFERENTIAL METHOD BLD: ABNORMAL
EOSINOPHIL # BLD AUTO: 0.3 K/UL (ref 0–0.5)
EOSINOPHIL NFR BLD: 7.6 % (ref 0–8)
ERYTHROCYTE [DISTWIDTH] IN BLOOD BY AUTOMATED COUNT: 12.6 % (ref 11.5–14.5)
HCT VFR BLD AUTO: 40.9 % (ref 40–54)
HGB BLD-MCNC: 13.2 G/DL (ref 14–18)
IMM GRANULOCYTES # BLD AUTO: 0.01 K/UL (ref 0–0.04)
IMM GRANULOCYTES NFR BLD AUTO: 0.3 % (ref 0–0.5)
LYMPHOCYTES # BLD AUTO: 1.6 K/UL (ref 1–4.8)
LYMPHOCYTES NFR BLD: 39.8 % (ref 18–48)
MCH RBC QN AUTO: 31.4 PG (ref 27–31)
MCHC RBC AUTO-ENTMCNC: 32.3 G/DL (ref 32–36)
MCV RBC AUTO: 97 FL (ref 82–98)
MONOCYTES # BLD AUTO: 0.4 K/UL (ref 0.3–1)
MONOCYTES NFR BLD: 9.9 % (ref 4–15)
NEUTROPHILS # BLD AUTO: 1.6 K/UL (ref 1.8–7.7)
NEUTROPHILS NFR BLD: 41.6 % (ref 38–73)
NRBC BLD-RTO: 0 /100 WBC
PLATELET # BLD AUTO: 187 K/UL (ref 150–450)
PMV BLD AUTO: 9.6 FL (ref 9.2–12.9)
RBC # BLD AUTO: 4.2 M/UL (ref 4.6–6.2)
WBC # BLD AUTO: 3.94 K/UL (ref 3.9–12.7)

## 2024-12-19 PROCEDURE — 1159F MED LIST DOCD IN RCRD: CPT | Mod: CPTII,S$GLB,, | Performed by: INTERNAL MEDICINE

## 2024-12-19 PROCEDURE — 99214 OFFICE O/P EST MOD 30 MIN: CPT | Mod: S$GLB,,, | Performed by: INTERNAL MEDICINE

## 2024-12-19 PROCEDURE — 85025 COMPLETE CBC W/AUTO DIFF WBC: CPT | Performed by: INTERNAL MEDICINE

## 2024-12-19 PROCEDURE — 36415 COLL VENOUS BLD VENIPUNCTURE: CPT | Performed by: INTERNAL MEDICINE

## 2024-12-19 PROCEDURE — 3008F BODY MASS INDEX DOCD: CPT | Mod: CPTII,S$GLB,, | Performed by: INTERNAL MEDICINE

## 2024-12-19 PROCEDURE — 3075F SYST BP GE 130 - 139MM HG: CPT | Mod: CPTII,S$GLB,, | Performed by: INTERNAL MEDICINE

## 2024-12-19 PROCEDURE — 99999 PR PBB SHADOW E&M-EST. PATIENT-LVL III: CPT | Mod: PBBFAC,,, | Performed by: INTERNAL MEDICINE

## 2024-12-19 PROCEDURE — 3079F DIAST BP 80-89 MM HG: CPT | Mod: CPTII,S$GLB,, | Performed by: INTERNAL MEDICINE

## 2024-12-19 PROCEDURE — G2211 COMPLEX E/M VISIT ADD ON: HCPCS | Mod: S$GLB,,, | Performed by: INTERNAL MEDICINE

## 2024-12-19 NOTE — PROGRESS NOTES
[facility] Hematology Clinic at [facility]     Chief Complaint:   Encounter Diagnoses   Name Primary?    Portal vein thrombosis Yes    Anemia, unspecified type              HPI:  Jesus Christy is a 64 y.o. male who presents today for evaluation of portal vein thrombus.  Patient previously seen by Dr Miranda    Hematology History  Was admitted from 1/1-/1/3/24 for dark urine and fatigue. Found to have portal vein thrombosis and hyperbiliribuinemia. and started on anticoagulation. He also has prostate adenocarcinoma on active surveillance.     Was readmitted from 1/22/24-1/27/24 for worsening anemia and sepsis. He was admitted to the MICU for septic shock . Hb dropped from 12 g/dl -6 g/dl during that admission.   Seen by inpatient Hematology.   MPN panel and PNH work up was normal. Hemolysis work up was normal.      Continues on Eliquis without any bleeding  Prostate ca followed by Urology (Dr Resendiz)         Patient Active Problem List   Diagnosis    Essential hypertension    Weak urine stream    Frequency-urgency syndrome    Elevated PSA    Neuropathy of right foot    Adenocarcinoma of prostate    Chronic migraine without aura without status migrainosus, not intractable    Anxiety    Depression    Complex regional pain syndrome type 1 of right lower extremity    Cervical radiculopathy at C6    Incidental lung nodule    Direct hyperbilirubinemia    Chronic prescription benzodiazepine use    Portal vein thrombosis    Diverticulitis    Anemia    Elevated AFP       Current Outpatient Medications   Medication Instructions    ARIPiprazole (ABILIFY) 10 MG Tab     cyproheptadine (PERIACTIN) 4 mg tablet     diazePAM (VALIUM) 10 mg, 3 times daily    ELIQUIS 5 mg, Oral, 2 times daily    finasteride (PROSCAR) 5 mg, Oral, Daily    fluticasone propionate (FLONASE) 50 mcg/actuation nasal spray Instill 1 spray (50 mcg total) in each nostril once daily.    gabapentin (NEURONTIN) 300 mg, Oral, 3 times daily  "PRN    hydrocortisone (ANUSOL-HC) 2.5 % rectal cream Rectal, 2 times daily    LORazepam (ATIVAN) 1 mg, Oral, Once, Take tablet 30 min before MRI    mirtazapine (REMERON) 45 MG tablet     mirtazapine (REMERON) 30 mg, Oral, Nightly    tadalafiL (CIALIS) 20 mg, Oral, Daily PRN    tamsulosin (FLOMAX) 0.4 mg, Oral, Daily       Review of Systems:   Review of Systems   Constitutional: Negative.    HENT: Negative.     Respiratory: Negative.     Cardiovascular: Negative.    Gastrointestinal: Negative.    Musculoskeletal: Negative.    All other systems reviewed and are negative.      PHYSICAL EXAM:  /88   Pulse 78   Temp 97.9 °F (36.6 °C) (Oral)   Resp 18   Ht 5' 9" (1.753 m)   Wt 80.7 kg (178 lb)   SpO2 100%   BMI 26.29 kg/m²     General Appearance:    Alert, cooperative, no distress, appears stated age   Head:    Normocephalic, without obvious abnormality, atraumatic   Lungs:     Clear to auscultation bilaterally, respirations unlabored    Heart:    Regular rate and rhythm, S1 and S2 normal   Abdomen:     Soft, non-tender, bowel sounds active, no masses, no organomegaly   Extremities:   Extremities normal, atraumatic, no cyanosis or edema   Pulses:   2+ and symmetric all extremities   Skin:   Skin color, texture, turgor normal, no rashes or lesions   Lymph nodes:   Cervical, supraclavicular, and axillary nodes normal   Neurologic:   CNII-XII intact, normal gait           Pertinent Labs & Imaging:  Recent Labs   Lab Result Units 10/07/24  1747 10/07/24  1805 11/19/24  1051 12/19/24  0940   WBC K/uL 5.86  --  4.54 3.94   Hemoglobin g/dL 14.6  --  13.0* 13.2*   POC Hematocrit %PCV  --  45  --   --    Hematocrit % 45.0  --  40.4 40.9   Platelets K/uL 210  --  179 187     Recent Labs   Lab Result Units 10/07/24  1747 11/19/24  1051   Creatinine mg/dL 1.1 1.1   AST U/L 27  --    ALT U/L 50*  --      No results for input(s): "IRON", "FERRITIN" in the last 2160 hours.      Assessment & Plan:    1. Portal vein " thrombosis    2. Anemia, unspecified type      Full chart review of past labs and imaging, referring providers' notes, and consultants' reports.   Patient with portal vein thrombus on eliquis. Tolerating treatment well. Discussed implication of prostate ca and need for lifelong anticoagulation. No refills needed     In regards to anemia, reviewed labs. No JERE noted  Borderline anemia  Likely due to Eliquis    Plan to follow up q6mos       Med Onc Chart Routing      Follow up with physician    Follow up with [facility] 6 months. benign heme [facility]   Infusion scheduling note    Injection scheduling note    Labs    Imaging    Pharmacy appointment    Other referrals                  MDM includes  :    - Acute or chronic illness or injury that poses a threat to life or bodily function  - Independent review and explanation of 1 results from unique tests  - Discussion of management and ordering 1 unique tests  - Extensive discussion of treatment and management  - Prescription drug management  - Drug therapy requiring intensive monitoring for toxicity        Tabitha Shelton MD  12/19/2024

## 2024-12-27 ENCOUNTER — TELEPHONE (OUTPATIENT)
Dept: GASTROENTEROLOGY | Facility: CLINIC | Age: 64
End: 2024-12-27
Payer: MEDICARE

## 2024-12-27 NOTE — TELEPHONE ENCOUNTER
----- Message from Blanka sent at 12/27/2024  2:21 PM CST -----  Regarding: Consult/Advisory  Contact: [phone]  Consult/Advisory    Name Of Caller:Pt      Contact Preference: [phone]    Nature of call: Pt requesting sedative for 1/14/2025 MRI appt.     Please Call to Advise,Thank you.

## 2024-12-30 ENCOUNTER — TELEPHONE (OUTPATIENT)
Dept: INTERNAL MEDICINE | Facility: CLINIC | Age: 64
End: 2024-12-30
Payer: MEDICARE

## 2024-12-30 NOTE — TELEPHONE ENCOUNTER
He is currently on diazepam 10mg TID from Dr. Hercules. Recommend he take this as scheduled for MRI. I cannot prescribe any additional benzo medication - if needed recommend he ask Dr. Hercules.

## 2024-12-30 NOTE — TELEPHONE ENCOUNTER
----- Message from Debi Lloyd MD sent at 12/27/2024  6:29 PM CST -----  Can he please ask his PCP, I do not have ability to send Rx for controlled substances electronically. Something like 2 doses of low dose benzo is ok with me  ----- Message -----  From: Sophia Peters MA  Sent: 12/27/2024   3:29 PM CST  To: Debi Lloyd MD    Patient wants to know if you can send in medication to calm him for the MRI  Vani

## 2025-01-14 ENCOUNTER — HOSPITAL ENCOUNTER (OUTPATIENT)
Dept: RADIOLOGY | Facility: HOSPITAL | Age: 65
Discharge: HOME | End: 2025-01-14
Attending: INTERNAL MEDICINE
Payer: MEDICARE

## 2025-01-14 DIAGNOSIS — F33.1 MODERATE EPISODE OF RECURRENT MAJOR DEPRESSIVE DISORDER: ICD-10-CM

## 2025-01-14 DIAGNOSIS — K86.2 PANCREAS CYST: ICD-10-CM

## 2025-01-14 PROCEDURE — 74183 MRI ABD W/O CNTR FLWD CNTR: CPT | Mod: TC

## 2025-01-14 PROCEDURE — A9585 GADOBUTROL INJECTION: HCPCS | Performed by: INTERNAL MEDICINE

## 2025-01-14 PROCEDURE — 25500020 PHARM REV CODE 255: Performed by: INTERNAL MEDICINE

## 2025-01-14 PROCEDURE — 76376 3D RENDER W/INTRP POSTPROCES: CPT | Mod: 26,,, | Performed by: RADIOLOGY

## 2025-01-14 PROCEDURE — 74183 MRI ABD W/O CNTR FLWD CNTR: CPT | Mod: 26,,, | Performed by: RADIOLOGY

## 2025-01-14 RX ORDER — GADOBUTROL 604.72 MG/ML
10 INJECTION INTRAVENOUS
Status: COMPLETED | OUTPATIENT
Start: 2025-01-14 | End: 2025-01-14

## 2025-01-14 RX ORDER — MIRTAZAPINE 30 MG/1
30 TABLET, FILM COATED ORAL NIGHTLY
Qty: 90 TABLET | Refills: 3 | Status: SHIPPED | OUTPATIENT
Start: 2025-01-14

## 2025-01-14 RX ADMIN — GADOBUTROL 10 ML: 604.72 INJECTION INTRAVENOUS at 01:01

## 2025-01-14 NOTE — TELEPHONE ENCOUNTER
No care due was identified.  [facility] Embedded Care Due Messages. Reference number: [id number].   1/14/2025 11:26:12 AM CST

## 2025-03-03 ENCOUNTER — HOSPITAL ENCOUNTER (OUTPATIENT)
Dept: RADIOLOGY | Facility: HOSPITAL | Age: 65
Discharge: HOME | End: 2025-03-03
Attending: INTERNAL MEDICINE
Payer: MEDICARE

## 2025-03-03 PROCEDURE — 74183 MRI ABD W/O CNTR FLWD CNTR: CPT | Mod: TC

## 2025-03-03 PROCEDURE — 74183 MRI ABD W/O CNTR FLWD CNTR: CPT | Mod: 26,,, | Performed by: INTERNAL MEDICINE

## 2025-03-03 PROCEDURE — A9585 GADOBUTROL INJECTION: HCPCS | Performed by: INTERNAL MEDICINE

## 2025-03-03 PROCEDURE — 25500020 PHARM REV CODE 255: Performed by: INTERNAL MEDICINE

## 2025-03-03 RX ORDER — GADOBUTROL 604.72 MG/ML
10 INJECTION INTRAVENOUS
Status: COMPLETED | OUTPATIENT
Start: 2025-03-03 | End: 2025-03-03

## 2025-03-03 RX ADMIN — GADOBUTROL 10 ML: 604.72 INJECTION INTRAVENOUS at 10:03

## 2025-03-12 ENCOUNTER — PATIENT MESSAGE (OUTPATIENT)
Dept: INTERNAL MEDICINE | Facility: CLINIC | Age: 65
End: 2025-03-12

## 2025-03-12 ENCOUNTER — HOSPITAL ENCOUNTER (OUTPATIENT)
Dept: RADIOLOGY | Facility: HOSPITAL | Age: 65
Discharge: HOME | End: 2025-03-12
Attending: NURSE PRACTITIONER
Payer: MEDICARE

## 2025-03-12 ENCOUNTER — OFFICE VISIT (OUTPATIENT)
Dept: INTERNAL MEDICINE | Facility: CLINIC | Age: 65
End: 2025-03-12
Payer: MEDICARE

## 2025-03-12 VITALS
WEIGHT: 176.81 LBS | HEART RATE: 64 BPM | SYSTOLIC BLOOD PRESSURE: 122 MMHG | DIASTOLIC BLOOD PRESSURE: 78 MMHG | BODY MASS INDEX: 26.19 KG/M2 | HEIGHT: 69 IN

## 2025-03-12 DIAGNOSIS — R05.8 OTHER COUGH: ICD-10-CM

## 2025-03-12 DIAGNOSIS — R05.9 COUGH, UNSPECIFIED TYPE: ICD-10-CM

## 2025-03-12 DIAGNOSIS — M54.12 RADICULAR PAIN OF SHOULDER: ICD-10-CM

## 2025-03-12 DIAGNOSIS — M54.12 CERVICAL RADICULOPATHY AT C6: ICD-10-CM

## 2025-03-12 DIAGNOSIS — R91.1 SOLITARY PULMONARY NODULE: ICD-10-CM

## 2025-03-12 DIAGNOSIS — R09.89 BIBASILAR CRACKLES: ICD-10-CM

## 2025-03-12 DIAGNOSIS — M54.12 CERVICAL RADICULOPATHY AT C6: Primary | ICD-10-CM

## 2025-03-12 PROCEDURE — 73030 X-RAY EXAM OF SHOULDER: CPT | Mod: 26,LT,, | Performed by: RADIOLOGY

## 2025-03-12 PROCEDURE — 72050 X-RAY EXAM NECK SPINE 4/5VWS: CPT | Mod: TC

## 2025-03-12 PROCEDURE — 99999 PR PBB SHADOW E&M-EST. PATIENT-LVL IV: CPT | Mod: PBBFAC,,, | Performed by: NURSE PRACTITIONER

## 2025-03-12 PROCEDURE — 99214 OFFICE O/P EST MOD 30 MIN: CPT | Mod: S$GLB,,, | Performed by: NURSE PRACTITIONER

## 2025-03-12 PROCEDURE — 3078F DIAST BP <80 MM HG: CPT | Mod: CPTII,S$GLB,, | Performed by: NURSE PRACTITIONER

## 2025-03-12 PROCEDURE — 1159F MED LIST DOCD IN RCRD: CPT | Mod: CPTII,S$GLB,, | Performed by: NURSE PRACTITIONER

## 2025-03-12 PROCEDURE — 72050 X-RAY EXAM NECK SPINE 4/5VWS: CPT | Mod: 26,,, | Performed by: RADIOLOGY

## 2025-03-12 PROCEDURE — 3008F BODY MASS INDEX DOCD: CPT | Mod: CPTII,S$GLB,, | Performed by: NURSE PRACTITIONER

## 2025-03-12 PROCEDURE — 3074F SYST BP LT 130 MM HG: CPT | Mod: CPTII,S$GLB,, | Performed by: NURSE PRACTITIONER

## 2025-03-12 PROCEDURE — 73030 X-RAY EXAM OF SHOULDER: CPT | Mod: TC,LT

## 2025-03-12 RX ORDER — BENZONATATE 100 MG/1
100 CAPSULE ORAL 3 TIMES DAILY PRN
Qty: 30 CAPSULE | Refills: 0 | Status: SHIPPED | OUTPATIENT
Start: 2025-03-12 | End: 2025-03-22

## 2025-03-12 RX ORDER — AZITHROMYCIN 250 MG/1
TABLET, FILM COATED ORAL
Qty: 6 TABLET | Refills: 0 | Status: SHIPPED | OUTPATIENT
Start: 2025-03-12 | End: 2025-03-17

## 2025-03-12 NOTE — PROGRESS NOTES
INTERNAL MEDICINE CLINIC - SAME DAY APPOINTMENT  Progress Note    PRESENTING HISTORY     PCP: Tara Jolly MD    Chief Complaint/Reason for Visit:   No chief complaint on file.     History of Present Illness & ROS : Mr. Jesus Christy is a 64 y.o. male.    Same day apt.   New to me.   Est'd with Dr. Jolly. Due annual in 3/2025.   Very pleasant gentleman  Reports that he has been having pain to neck and shoulder, with numbness into left hand and limited ROM to shoulder for the past month. No injury or trauma. No endorsed MVAs. Taking Neurontin on routine basis.   In addition and during PE today, he has some crackles to bases and has been coughing for the past 1-2 weeks, no fever, SOB, chest wall pain or other discomforts. Endorsed. Non smoker. No other known sick contacts. Coughing up 'yellow' secretions. No GI symptoms.       Review of Systems:  Eyes: denies visual changes at this time denies floaters   ENT: no nasal congestion or sore throat  Respiratory: no shortness of breath  Cardiovascular: no chest pain or palpitations  Gastrointestinal: no nausea or vomiting, no abdominal pain or change in bowel habits  Genitourinary: no hematuria or dysuria; denies frequency  Hematologic/Lymphatic: no easy bruising or lymphadenopathy  Neurological: no seizures or tremors  Endocrine: no heat or cold intolerance      PAST HISTORY:     Past Medical History:   Diagnosis Date    Complex regional pain syndrome i of right lower limb     GERD (gastroesophageal reflux disease)     HTN (hypertension)     Portal vein thrombosis        Past Surgical History:   Procedure Laterality Date    COLONOSCOPY N/A 9/29/2017    Procedure: COLONOSCOPY;  Surgeon: Jamar Edwards MD;  Location: [facility] (4TH FLR);  Service: Endoscopy;  Laterality: N/A;    COLONOSCOPY N/A 2/12/2024    Procedure: COLONOSCOPY;  Surgeon: Miguel Angel Huffman MD;  Location: [facility] (2ND FLR);  Service: Endoscopy;  Laterality: N/A;    ENDOSCOPIC ULTRASOUND OF  UPPER GASTROINTESTINAL TRACT N/A 7/5/2024    Procedure: ULTRASOUND, UPPER GI TRACT, ENDOSCOPIC;  Surgeon: Debi Lloyd MD;  Location: [facility] (2ND FLR);  Service: Endoscopy;  Laterality: N/A;  3/21 portal instr-EUS with myself at [facility] next couple of months pancreas tail cyst.joseluis-Sudhaquis pending Dr. Miranda TE 3/21-tt  5/15/24: instructions sent via portal. eliquis hold in TE 3/21-GD  6/26-pre call complete-tb-will hold eliquis starting     ESOPHAGOGASTRODUODENOSCOPY N/A 2/12/2024    Procedure: EGD (ESOPHAGOGASTRODUODENOSCOPY);  Surgeon: Miguel Angel Huffman MD;  Location: [facility] (2ND FLR);  Service: Endoscopy;  Laterality: N/A;  1/30/24-Okay to add per Dr. Huffman, 2nd flr-recent hospitalization for sepsis and portal vein thrombosis, Miralax, instr portal and email, Approval to hold Eliquis and medical clearance rec'd from Dr. Jolly (see telephone encounter 1/30/24)-DS  2/5-prec    JOINT REPLACEMENT Right     knee    KNEE ARTHROSCOPY W/ ACL RECONSTRUCTION  2014    right       Family History   Problem Relation Name Age of Onset    Hypertension Mother      Heart disease Father      Diabetes Father      Cancer Brother          unknown type    No Known Problems Daughter      No Known Problems Daughter      No Known Problems Daughter      No Known Problems Daughter      No Known Problems Son      No Known Problems Son         Social History     Socioeconomic History    Marital status: Single   Tobacco Use    Smoking status: Never    Smokeless tobacco: Never   Substance and Sexual Activity    Alcohol use: Yes     Alcohol/week: 0.8 standard drinks of alcohol     Types: 1 Standard drinks or equivalent per week     Comment: once every few months    Drug use: No    Sexual activity: Not Currently   Social History Narrative    Prior maintenance work.    Disable due to leg pain and depression.        Lives alone.    No exercise, due to leg pain.     Social Drivers of Health     Financial Resource Strain: Low Risk   (1/24/2024)    Overall Financial Resource Strain (CARDIA)     Difficulty of Paying Living Expenses: Not very hard   Recent Concern: Financial Resource Strain - Medium Risk (1/24/2024)    Overall Financial Resource Strain (CARDIA)     Difficulty of Paying Living Expenses: Somewhat hard   Food Insecurity: Patient Declined (1/24/2024)    Hunger Vital Sign     Worried About Running Out of Food in the Last Year: Patient declined     Ran Out of Food in the Last Year: Patient declined   Recent Concern: Food Insecurity - Food Insecurity Present (1/24/2024)    Hunger Vital Sign     Worried About Running Out of Food in the Last Year: Sometimes true     Ran Out of Food in the Last Year: Never true   Transportation Needs: No Transportation Needs (1/24/2024)    PRAPARE - Transportation     Lack of Transportation (Medical): No     Lack of Transportation (Non-Medical): No   Physical Activity: Patient Declined (1/24/2024)    Exercise Vital Sign     Days of Exercise per Week: Patient declined     Minutes of Exercise per Session: Patient declined   Stress: No Stress Concern Present (1/24/2024)    [employer] - Occupational Stress Questionnaire     Feeling of Stress : Only a little   Housing Stability: Low Risk  (1/24/2024)    Housing Stability Vital Sign     Unable to Pay for Housing in the Last Year: No     Number of Places Lived in the Last Year: 1     Unstable Housing in the Last Year: No       MEDICATIONS & ALLERGIES:     Medications Ordered Prior to Encounter[1]     Review of patient's allergies indicates:   Allergen Reactions    Peach (prunus persica)     Morphine Palpitations       Medications Reconciliation:   I have reconciled the patient's home medications with the patient/family. I have updated all changes.  Refer to After-Visit Medication List.    OBJECTIVE:     Vital Signs:  There were no vitals filed for this visit.  Wt Readings from Last 3 Encounters:   12/19/24 1043 80.7 kg (178 lb)    11/19/24 1043 80.2 kg (176 lb 12.9 oz)   10/30/24 1032 80.4 kg (177 lb 4 oz)     There is no height or weight on file to calculate BMI.   Wt Readings from Last 3 Encounters:   03/12/25 80.2 kg (176 lb 12.9 oz)   12/19/24 80.7 kg (178 lb)   11/19/24 80.2 kg (176 lb 12.9 oz)     Temp Readings from Last 3 Encounters:   12/19/24 97.9 °F (36.6 °C) (Oral)   10/07/24 98.4 °F (36.9 °C) (Oral)   07/05/24 98.2 °F (36.8 °C) (Temporal)     BP Readings from Last 3 Encounters:   03/12/25 122/78   12/19/24 137/88   11/19/24 135/79     Pulse Readings from Last 3 Encounters:   03/12/25 64   12/19/24 78   11/19/24 75       Physical Exam:  General: Well developed, well nourished. No distress.  HEENT: Head is normocephalic, atraumatic; ears are normal.   Eyes: Clear conjunctiva.  Neck: Supple, symmetrical neck; trachea midline.  Lungs: crackles to bases, otherwise, Clear to auscultation bilaterally and normal respiratory effort.  Cardiovascular: Heart with regular rate and rhythm. No murmurs, gallops or rubs  Skin: Skin color, texture, turgor normal. No rashes.  Musculoskeletal: Normal gait.   Limited AROM to LUE  Neurologic: Normal strength and tone. No focal numbness or weakness.   Psychiatric: Not depressed.      Laboratory  Lab Results   Component Value Date    WBC 3.94 12/19/2024    HGB 13.2 (L) 12/19/2024    HCT 40.9 12/19/2024     12/19/2024    CHOL 154 12/13/2022    TRIG 135 12/13/2022    HDL 36 (L) 12/13/2022    ALT 50 (H) 10/07/2024    AST 27 10/07/2024     11/19/2024    K 3.9 11/19/2024     11/19/2024    CREATININE 1.1 11/19/2024    BUN 7 (L) 11/19/2024    CO2 27 11/19/2024    TSH 1.628 06/03/2020    PSA 6.9 (H) 09/01/2016    INR 1.2 01/23/2024     CT Chest w/o Contrast  Impression:    1. No acute cardiopulmonary disease.  2. Stable 2.0 cm soft tissue nodule in right upper lobe, likely benign. Suggest noncontrast chest CT follow-up in December, 2020 to demonstrate more than 2 years stability.  3. Other  findings as above.    Electronically signed by: Eder Bourne MD  Date: 09/10/2019  Time: 09:53       Xray of C spine   Multiple views of the cervical spine were obtained.  The lateral view demonstrates satisfactory alignment of the cervical vertebral bodies.  There is  mild disk space narrowing at C4-5 and C6-7 with mild anterior osteophytic spurring at these levels as  well.  The odontoid is intact.  Note that oblique views are suboptimally positioned to adequately evaluate foraminal narrowing.  Impression   Mild degenerative changes as above  ______________________________________    Electronically signed by: Belkis Bustos MD  Date: 09/19/12    ASSESSMENT & PLAN:     Same day apt    Cervical radiculopathy at C6  Noted on film from 20212; may need an MRI and referral to Back and Spine  -     X-Ray Cervical Spine Complete 5 view; Future; Expected date: 03/12/2025  -     X-Ray Shoulder 2 or More Views Left; Future; Expected date: 03/12/2025    Radicular pain of shoulder  Checking Xrays and consider referral to Orthopedics  -     X-Ray Cervical Spine Complete 5 view; Future; Expected date: 03/12/2025  -     X-Ray Shoulder 2 or More Views Left; Future; Expected date: 03/12/2025    Cough, unspecified type  Bibasilar crackles  Solitary pulmonary nodule  2019; missed to repeat in 2020  -     benzonatate (TESSALON) 100 MG capsule; Take 1 capsule (100 mg total) by mouth 3 (three) times daily as needed for Cough.  Dispense: 30 capsule; Refill: 0  -     CT Chest Without Contrast; Future; Expected date: 03/12/2025  -     azithromycin (Z-SEVERIANO) 250 MG tablet; Take 2 tablets by mouth on day 1; Take 1 tablet by mouth on days 2-5  Dispense: 6 tablet; Refill: 0      Future Appointments   Date Time Provider Department Center   3/12/2025 12:15 PM [facility] [id number] 485 LB LIMIT [facility] XRAY IM Sha Ortiz PCW   3/13/2025 10:15 AM [facility] [id number] 500 LB LIMIT [facility]   3/21/2025  1:00 PM Tara Jolly MD [facility] IM Sha Ortiz  [facility]   3/31/2025  9:30 AM Sukhi Dee MD [facility] HEPAT Sha Diana        Medication List            Accurate as of March 12, 2025 11:58 AM. If you have any questions, ask your nurse or doctor.                START taking these medications      azithromycin 250 MG tablet  Commonly known as: Z-SEVERIANO  Take 2 tablets by mouth on day 1; Take 1 tablet by mouth on days 2-5  Started by: VITALY Johnson     benzonatate 100 MG capsule  Commonly known as: TESSALON  Take 1 capsule (100 mg total) by mouth 3 (three) times daily as needed for Cough.  Started by: VITALY Johnson            CONTINUE taking these medications      ARIPiprazole 10 MG Tab  Commonly known as: ABILIFY     cyproheptadine 4 mg tablet  Commonly known as: PERIACTIN     diazePAM 10 MG Tab  Commonly known as: VALIUM     ELIQUIS 5 mg Tab  Generic drug: apixaban  Take 1 tablet (5 mg total) by mouth 2 (two) times daily.     finasteride 5 mg tablet  Commonly known as: PROSCAR  Take 1 tablet (5 mg total) by mouth once daily.     fluticasone propionate 50 mcg/actuation nasal spray  Commonly known as: FLONASE  Instill 1 spray (50 mcg total) in each nostril once daily.     gabapentin 300 MG capsule  Commonly known as: NEURONTIN  Take 1 capsule (300 mg total) by mouth 3 (three) times daily as needed (leg pain).     * mirtazapine 45 MG tablet  Commonly known as: REMERON     * mirtazapine 30 MG tablet  Commonly known as: REMERON  Take 1 tablet (30 mg total) by mouth every evening.     PROCTO-MED HC 2.5 % rectal cream  Generic drug: hydrocortisone  Place rectally 2 (two) times daily.     tadalafiL 20 MG Tab  Commonly known as: CIALIS  Take 1 tablet (20 mg total) by mouth daily as needed (erectile dysfunction).     tamsulosin 0.4 mg Cap  Commonly known as: FLOMAX  Take 1 capsule (0.4 mg total) by mouth once daily.           * This list has 2 medication(s) that are the same as other medications prescribed for you. Read the directions carefully, and  ask your doctor or other care provider to review them with you.                   Where to Get Your Medications        These medications [facility] [id number]      Hours: Mon-Fri, 8a-5:30p Phone: [phone]   azithromycin 250 MG tablet  benzonatate 100 MG capsule           Signing Physician:  VITALY Johnson         [1]   Current Outpatient Medications on File Prior to Visit   Medication Sig Dispense Refill    apixaban (ELIQUIS) 5 mg Tab Take 1 tablet (5 mg total) by mouth 2 (two) times daily. 60 tablet 11    ARIPiprazole (ABILIFY) 10 MG Tab       cyproheptadine (PERIACTIN) 4 mg tablet       diazePAM (VALIUM) 10 MG Tab Take 10 mg by mouth 3 (three) times daily.      finasteride (PROSCAR) 5 mg tablet Take 1 tablet (5 mg total) by mouth once daily. 90 tablet 3    fluticasone propionate (FLONASE) 50 mcg/actuation nasal spray Instill 1 spray (50 mcg total) in each nostril once daily. 16 g 11    gabapentin (NEURONTIN) 300 MG capsule Take 1 capsule (300 mg total) by mouth 3 (three) times daily as needed (leg pain). 90 capsule 5    hydrocortisone (ANUSOL-HC) 2.5 % rectal cream Place rectally 2 (two) times daily. 28 g 1    mirtazapine (REMERON) 30 MG tablet Take 1 tablet (30 mg total) by mouth every evening. 90 tablet 3    mirtazapine (REMERON) 45 MG tablet       tadalafiL (CIALIS) 20 MG Tab Take 1 tablet (20 mg total) by mouth daily as needed (erectile dysfunction). 30 tablet 5    tamsulosin (FLOMAX) 0.4 mg Cap Take 1 capsule (0.4 mg total) by mouth once daily. 90 capsule 3     No current facility-administered medications on file prior to visit.

## 2025-03-13 ENCOUNTER — RESULTS FOLLOW-UP (OUTPATIENT)
Dept: INTERNAL MEDICINE | Facility: CLINIC | Age: 65
End: 2025-03-13
Payer: MEDICARE

## 2025-03-13 ENCOUNTER — HOSPITAL ENCOUNTER (OUTPATIENT)
Dept: RADIOLOGY | Facility: HOSPITAL | Age: 65
Discharge: HOME | End: 2025-03-13
Attending: NURSE PRACTITIONER
Payer: MEDICARE

## 2025-03-13 DIAGNOSIS — R93.7 ABNORMAL THREE VIEW X-RAY OF CERVICAL SPINE: Primary | ICD-10-CM

## 2025-03-13 DIAGNOSIS — R05.8 OTHER COUGH: ICD-10-CM

## 2025-03-13 DIAGNOSIS — M54.12 CERVICAL RADICULOPATHY AT C6: ICD-10-CM

## 2025-03-13 DIAGNOSIS — M54.2 NECK PAIN: ICD-10-CM

## 2025-03-13 DIAGNOSIS — M25.512 LEFT SHOULDER PAIN, UNSPECIFIED CHRONICITY: ICD-10-CM

## 2025-03-13 DIAGNOSIS — R91.1 SOLITARY PULMONARY NODULE: ICD-10-CM

## 2025-03-13 PROCEDURE — 99999 PR PBB SHADOW E&M-EST. PATIENT-LVL I: CPT | Mod: PBBFAC,,, | Performed by: NURSE PRACTITIONER

## 2025-03-13 PROCEDURE — 71250 CT THORAX DX C-: CPT | Mod: TC

## 2025-03-13 PROCEDURE — 71250 CT THORAX DX C-: CPT | Mod: 26,,, | Performed by: RADIOLOGY

## 2025-03-14 DIAGNOSIS — R39.12 WEAK URINE STREAM: ICD-10-CM

## 2025-03-14 RX ORDER — FINASTERIDE 5 MG/1
5 TABLET, FILM COATED ORAL DAILY
Qty: 90 TABLET | Refills: 3 | OUTPATIENT
Start: 2025-03-14 | End: 2026-03-14

## 2025-03-14 RX ORDER — TAMSULOSIN HYDROCHLORIDE 0.4 MG/1
0.4 CAPSULE ORAL DAILY
Qty: 90 CAPSULE | Refills: 3 | OUTPATIENT
Start: 2025-03-14 | End: 2026-03-14

## 2025-03-18 DIAGNOSIS — R39.12 WEAK URINE STREAM: ICD-10-CM

## 2025-03-20 DIAGNOSIS — C61 ADENOCARCINOMA OF PROSTATE: Primary | ICD-10-CM

## 2025-03-20 RX ORDER — FINASTERIDE 5 MG/1
5 TABLET, FILM COATED ORAL DAILY
Qty: 90 TABLET | Refills: 0 | Status: SHIPPED | OUTPATIENT
Start: 2025-03-20 | End: 2026-03-20

## 2025-03-20 RX ORDER — TAMSULOSIN HYDROCHLORIDE 0.4 MG/1
0.4 CAPSULE ORAL DAILY
Qty: 90 CAPSULE | Refills: 0 | Status: SHIPPED | OUTPATIENT
Start: 2025-03-20 | End: 2026-03-20

## 2025-03-21 ENCOUNTER — OFFICE VISIT (OUTPATIENT)
Dept: INTERNAL MEDICINE | Facility: CLINIC | Age: 65
End: 2025-03-21
Payer: MEDICARE

## 2025-03-21 ENCOUNTER — IMMUNIZATION (OUTPATIENT)
Dept: INTERNAL MEDICINE | Facility: CLINIC | Age: 65
End: 2025-03-21
Payer: MEDICARE

## 2025-03-21 VITALS
WEIGHT: 174.38 LBS | SYSTOLIC BLOOD PRESSURE: 116 MMHG | HEART RATE: 87 BPM | OXYGEN SATURATION: 97 % | DIASTOLIC BLOOD PRESSURE: 70 MMHG | BODY MASS INDEX: 25.83 KG/M2 | HEIGHT: 69 IN

## 2025-03-21 DIAGNOSIS — F33.1 MODERATE EPISODE OF RECURRENT MAJOR DEPRESSIVE DISORDER: ICD-10-CM

## 2025-03-21 DIAGNOSIS — L84 FOOT CALLUS: ICD-10-CM

## 2025-03-21 DIAGNOSIS — C61 ADENOCARCINOMA OF PROSTATE: ICD-10-CM

## 2025-03-21 DIAGNOSIS — Z23 NEED FOR VACCINATION: Primary | ICD-10-CM

## 2025-03-21 DIAGNOSIS — R91.1 PULMONARY NODULE 1 CM OR GREATER IN DIAMETER: ICD-10-CM

## 2025-03-21 DIAGNOSIS — H61.23 BILATERAL IMPACTED CERUMEN: Primary | ICD-10-CM

## 2025-03-21 PROCEDURE — 91320 SARSCV2 VAC 30MCG TRS-SUC IM: CPT | Mod: S$GLB,,, | Performed by: INTERNAL MEDICINE

## 2025-03-21 PROCEDURE — G2211 COMPLEX E/M VISIT ADD ON: HCPCS | Mod: S$GLB,,, | Performed by: INTERNAL MEDICINE

## 2025-03-21 PROCEDURE — 99214 OFFICE O/P EST MOD 30 MIN: CPT | Mod: S$GLB,,, | Performed by: INTERNAL MEDICINE

## 2025-03-21 PROCEDURE — 1160F RVW MEDS BY RX/DR IN RCRD: CPT | Mod: CPTII,S$GLB,, | Performed by: INTERNAL MEDICINE

## 2025-03-21 PROCEDURE — 3074F SYST BP LT 130 MM HG: CPT | Mod: CPTII,S$GLB,, | Performed by: INTERNAL MEDICINE

## 2025-03-21 PROCEDURE — 3008F BODY MASS INDEX DOCD: CPT | Mod: CPTII,S$GLB,, | Performed by: INTERNAL MEDICINE

## 2025-03-21 PROCEDURE — 90480 ADMN SARSCOV2 VAC 1/ONLY CMP: CPT | Mod: S$GLB,,, | Performed by: INTERNAL MEDICINE

## 2025-03-21 PROCEDURE — 99999 PR PBB SHADOW E&M-EST. PATIENT-LVL V: CPT | Mod: PBBFAC,,, | Performed by: INTERNAL MEDICINE

## 2025-03-21 PROCEDURE — 1159F MED LIST DOCD IN RCRD: CPT | Mod: CPTII,S$GLB,, | Performed by: INTERNAL MEDICINE

## 2025-03-21 PROCEDURE — 3078F DIAST BP <80 MM HG: CPT | Mod: CPTII,S$GLB,, | Performed by: INTERNAL MEDICINE

## 2025-03-21 NOTE — PROGRESS NOTES
"Subjective:       Patient ID: Jesus Christy is a 64 y.o. male.    Chief Complaint: Annual Exam (Foot concerns )    HPI  Seen in  for cervical radiculopathy sx on 3/12    Also with cough, bibasilar crackles and overdue for nodule follow up  CT chest ordered - showed slow interval growth of RUL nodule @ 2.3 cm ; unchanged RML nodule 3mm  Taniya and tessalon   Still has cough but has improved with treatment.     Portal vein thrombosis 1/2024  Eliquis    Prostate adenocarcinoma on active surveillance.     Labs dec:  Hgb 13.2    Review of Systems   Constitutional:  Negative for fever.   Respiratory:  Positive for cough. Negative for shortness of breath.    Cardiovascular:  Negative for chest pain.   Musculoskeletal: Negative.    Skin: Negative.        Objective:   /70 (BP Location: Left arm, Patient Position: Sitting)   Pulse 87   Ht 5' 9" (1.753 m)   Wt 79.1 kg (174 lb 6.1 oz)   SpO2 97%   BMI 25.75 kg/m²      Physical Exam  Constitutional:       General: He is not in acute distress.     Appearance: He is well-developed. He is not diaphoretic.   HENT:      Head: Normocephalic and atraumatic.      Right Ear: There is impacted cerumen.      Left Ear: There is impacted cerumen.   Cardiovascular:      Rate and Rhythm: Normal rate and regular rhythm.      Heart sounds: No murmur heard.  Pulmonary:      Effort: Pulmonary effort is normal. No respiratory distress.      Breath sounds: No wheezing or rales.   Musculoskeletal:        Feet:    Feet:      Comments: Location of callus  Skin:     General: Skin is warm and dry.   Neurological:      Mental Status: He is alert and oriented to person, place, and time.   Psychiatric:         Behavior: Behavior normal.       2x1 cm callus ttp   Assessment:       1. Bilateral impacted cerumen    2. Foot callus    3. Pulmonary nodule 1 cm or greater in diameter    4. Moderate episode of recurrent major depressive disorder    5. Adenocarcinoma of prostate        Plan:     "   Bilateral impacted cerumen  -     in office ear wash attempted but unsuccessful  Ambulatory referral/consult to ENT; Future; Expected date: 03/28/2025    Foot callus  -     Ambulatory referral/consult to Podiatry; Future; Expected date: 03/28/2025    Pulmonary nodule 1 cm or greater in diameter  -     E-Consult to Pulmonary    Moderate episode of recurrent major depressive disorder  Doing well on current regimen    Adenocarcinoma of prostate  Active surveillance w Dr. Resendiz        You are up to date for your primary preventive health care, and there are no reminders at this time.     Covid, tdap

## 2025-03-21 NOTE — PROGRESS NOTES
During visit patient noted to have some earwax build up. After 2 person identifier established patient identity, procedure was explained too patient. Drops (4) were dropped into patients ears for 10  minutes. Warm water was flushed into both ears. Right ear with 0 earwax after flushing. Still with ear wax that could not be removed with flushing of warm water. Left ear

## 2025-03-22 ENCOUNTER — E-CONSULT (OUTPATIENT)
Dept: PULMONOLOGY | Facility: CLINIC | Age: 65
End: 2025-03-22
Payer: MEDICARE

## 2025-03-22 DIAGNOSIS — D38.1 NEOPLASM OF UNCERTAIN BEHAVIOR OF LUNG: Primary | ICD-10-CM

## 2025-03-22 NOTE — CONSULTS
[facility]  Response for E-Consult     Patient Name: Jesus Christy  MRN: [id number]  Primary Care Provider: Tara Jolly MD   Requesting Provider: Tara Jolly MD  Consults    After evaluation of your eConsult clinical questions, I believe the patient should be scheduled for an office visit in our specialty due to lung mass, possible need for biopsy.    Total time of Consultation: 10 minute    *This eConsult is based on the clinical data available to me and is furnished without benefit of a physician examination.  The eConsult will need to be interpreted in light of any clinical issues of changes in patient status not available to me at the time rime of filing this eConsults.  Significant changes in patient condition of level of acuity should result in a referral for in person consultation and reevaluation.  Please alert me if you have any furth questions.     Thank you for this eConsult referral.     Enrique Verdugo MD  [facility]

## 2025-03-24 ENCOUNTER — TELEPHONE (OUTPATIENT)
Dept: INTERNAL MEDICINE | Facility: CLINIC | Age: 65
End: 2025-03-24
Payer: MEDICARE

## 2025-03-24 DIAGNOSIS — R91.1 PULMONARY NODULE 1 CM OR GREATER IN DIAMETER: Primary | ICD-10-CM

## 2025-03-24 NOTE — TELEPHONE ENCOUNTER
Please call pt. Pulmonary e consult recommended he be seen in person. Referral placed; please help schedule

## 2025-03-26 ENCOUNTER — TELEPHONE (OUTPATIENT)
Dept: INTERNAL MEDICINE | Facility: CLINIC | Age: 65
End: 2025-03-26
Payer: MEDICARE

## 2025-03-26 DIAGNOSIS — G90.521 COMPLEX REGIONAL PAIN SYNDROME TYPE 1 OF RIGHT LOWER EXTREMITY: Primary | ICD-10-CM

## 2025-03-26 RX ORDER — TRAMADOL HYDROCHLORIDE 50 MG/1
50 TABLET ORAL EVERY 6 HOURS
Qty: 30 TABLET | Refills: 0 | Status: SHIPPED | OUTPATIENT
Start: 2025-03-26

## 2025-03-26 NOTE — TELEPHONE ENCOUNTER
----- Message from Med Assistant Angulo sent at 3/26/2025  8:54 AM CDT -----  Contact: [phone]  Patient is requesting to talk to pcp.  ----- Message -----  From: Nathalia Hernández  Sent: 3/25/2025   4:06 PM CDT  To: Shanae Pacheco Staff    Patient called, stated that he have some questions for Dr Jolly - patient aware that referral for pulmonary is on file, but want to talk with PCP. Thank you.

## 2025-03-26 NOTE — TELEPHONE ENCOUNTER
Discussed lung nodule. Has appt scheduled w Dr. Hamilton next month for further eval. Pt expressed understanding.

## 2025-03-26 NOTE — TELEPHONE ENCOUNTER
----- Message from Kaylee Alston sent at 5/14/2024 10:40 AM CDT -----  Contact: [phone]  2TESTRESULTS    Type: Test Results    What test was performed?ct  Who ordered the test? Dr mylene Goldsmith    When and where were the test performed? 4/18/224    Would you like response via Mychart: callback    Comments:  
Spoke with patient appointment changed from August to VV on 5/15/24. Patient verbalized understanding.   
[facility]

## 2025-03-31 ENCOUNTER — LAB VISIT (OUTPATIENT)
Dept: LAB | Facility: HOSPITAL | Age: 65
End: 2025-03-31
Attending: INTERNAL MEDICINE
Payer: MEDICARE

## 2025-03-31 ENCOUNTER — OFFICE VISIT (OUTPATIENT)
Dept: HEPATOLOGY | Facility: CLINIC | Age: 65
End: 2025-03-31
Payer: MEDICARE

## 2025-03-31 ENCOUNTER — TELEPHONE (OUTPATIENT)
Dept: HEPATOLOGY | Facility: CLINIC | Age: 65
End: 2025-03-31

## 2025-03-31 DIAGNOSIS — I81 PORTAL VEIN THROMBOSIS: Primary | ICD-10-CM

## 2025-03-31 DIAGNOSIS — R77.2 ELEVATED AFP: ICD-10-CM

## 2025-03-31 DIAGNOSIS — C61 ADENOCARCINOMA OF PROSTATE: Chronic | ICD-10-CM

## 2025-03-31 PROCEDURE — 83951 ONCOPROTEIN DCP: CPT

## 2025-03-31 PROCEDURE — 98006 SYNCH AUDIO-VIDEO EST MOD 30: CPT | Mod: 95,,, | Performed by: INTERNAL MEDICINE

## 2025-03-31 PROCEDURE — 36415 COLL VENOUS BLD VENIPUNCTURE: CPT | Mod: PN

## 2025-03-31 PROCEDURE — G2211 COMPLEX E/M VISIT ADD ON: HCPCS | Mod: 95,,, | Performed by: INTERNAL MEDICINE

## 2025-03-31 PROCEDURE — 82107 ALPHA-FETOPROTEIN L3: CPT

## 2025-03-31 NOTE — PROGRESS NOTES
Subjective:       Patient ID: Jesus Christy is a 64 y.o. male.    Chief Complaint: No chief complaint on file.  The patient location is: Home  The chief complaint leading to consultation is: Portal vein thrombosis    Visit type: audiovisual    Face to Face time with patient: 10 minutes of total time spent on the encounter, which includes face to face time and non-face to face time preparing to see the patient (eg, review of tests), Obtaining and/or reviewing separately obtained history, Documenting clinical information in the electronic or other health record, Independently interpreting results (not separately reported) and communicating results to the patient/family/caregiver, or Care coordination (not separately reported).         Each patient to whom he or she provides medical services by telemedicine is:  (1) informed of the relationship between the physician and patient and the respective role of any other health care provider with respect to management of the patient; and (2) notified that he or she may decline to receive medical services by telemedicine and may withdraw from such care at any time.    Notes:    HPI  I saw this 64 y.o.man in the liver clinic for follow up. Last seen in August 2024.    Admitted to hospital on 1/1/2024 with abdo pain, fever and elevated LFTs- found to have a right PVT  Started on apixaban    No weight changes    Bilirubin initially 5  High Alk Phos  Normal platelets  HCV ab -ve    CT abdo: 1/1/24  1. Findings suggesting hepatic steatosis, correlation with LFTs recommended.  There is a somewhat heterogeneous appearance to the posterior aspect of the right hepatic lobe inferiorly.  This may be on the basis of contrast phase however this could be further evaluated with nonemergent MRI as warranted.  2. Multiple scattered colonic diverticula.  There is questionable slight indistinctness about a few diverticula at the level of the distal descending colon versus motion artifact.   Correlation is advised, early sequela of diverticulitis not excluded.  3. Prostatomegaly.  4. Pulmonary nodules as described.    MRI abdo: 1/3/2024  Cholelithiasis with mild gallbladder wall thickening and trace pericholecystic fluid.  Findings are nonspecific, and can be seen in the setting of hepatic dysfunction.  Correlation is advised.  Hepatosplenomegaly with mild hepatic steatosis.  No focal hepatic lesions noting limitations from noncontrast examination.  Mild prominence of the pancreatic duct.  Additional T2 hyperintense focus about the pancreatic tail measuring 9 mm, with potential connection to the pancreatic duct.  Findings are nonspecific, and may relate to a pancreatic cyst versus side branch IPMN.  Repeat examination is recommended with MRI abdomen with MRCP in 1 year to ensure stability of this finding.  Few prominent peripancreatic and periportal lymph nodes as above.  Diffusion signal abnormality about the right branches of the portal vein and proximal main portal vein, likely representing sequela of portal vein thrombosis.  Findings are better evaluated on ultrasound liver with Doppler 01/01/2024.    CT abdo: 4/18/24  1. Redemonstration of extensive portal venous thrombus which appears grossly stable in extent compared to prior.  Apparent cavernous transformation of the portal vein.  2. Evidence of portal hypertension including splenomegaly and gallbladder and gastroesophageal varices.  3. Previously seen probable hepatic vein thrombus is not definitively seen on this exam.    IR conference: 1/9/24  Non contract MRI showed left PV is patterned. Perfusion pattern. No malignancy.  Plan:  TP CT scan to evaluate further.    MRI abdo: 6/21/2024  Chronic nonocclusive thrombosis of the right and left portal veins  No suspicious hepatic lesions.  Sequelae of portal venous hypertension, include cavernous transformation, gastric varices, and splenomegaly.    MRI/MRCP: 1/14/2025  Inferior Thorax:  Normal.  Liver: Normal size.  Normal background parenchymal signal.  No focal lesions.  Geographic areas of altered enhancement likely transient intensity differences.  Gallbladder: Few layering gallstones.  Bile Ducts: No dilatation.  Pancreas: 1.3 cm cystic lesion in the tail the pancreas.  No suspicious enhancement.  No ductal dilation.  Spleen: Splenomegaly.  Adrenals: Normal.  Kidneys/Ureters: Normal enhancement.  Bilateral simple cysts.  No hydronephrosis.  GI Tract/Mesentery (imaged portion): No evidence of bowel obstruction or inflammation.  Colonic diverticulosis.  Peritoneal Space: No ascites or free air.  Retroperitoneum: No pathologically enlarged nodes.  Abdominal wall: Small fat containing umbilical hernia.  Vasculature: Main portal vein is patent.  Again seen is thrombosis of the right and left portal veins.  Patent veins are patent.  Collateral vessels are seen around the gallbladder and leisa hepatis.  Few gastric varices.  No aortic aneurysm.  Retroaortic left renal vein.  Bones: Normal marrow signal.  Impression:  Chronic nonocclusive thrombosis of the right left portal veins with cavernous transformation of the portal vein.  Portal venous hypertension with splenomegaly and upper abdominal collateral vessels.  Stable 1.3 cm cystic lesion in the tail the pancreas.  Nonspecific, possibly side branch IPMN.  Recommend follow-up in 1 yea    AFP in April 2024 was 24 but AFP L3 was normal    EGD 2/12/24: normal  Colonocopy 2/12/24: 2 polyps + diverticular disease    Also seen by Dr Lloyd for 9 mm pancreatic cyst  EUS on 7/5/24- small pancreatic cyst- will follow with MRCP in 6 months.      PMH:  Prostate Ca on biopsy in 2016  - on medical treatment    Right leg extensive surgery- RSD  No abdo surgery      SH:  Alcohol- rarely  Non smoker    Disabled after accident  [place]     FH:  Nil liver  Prostate hx      Review of Systems   Constitutional:  Negative for activity change, appetite  change, chills, fatigue, fever and unexpected weight change.   HENT:  Negative for hearing loss.    Eyes:  Negative for discharge and visual disturbance.   Respiratory:  Negative for cough, chest tightness, shortness of breath and wheezing.    Cardiovascular:  Negative for chest pain, palpitations and leg swelling.   Gastrointestinal:  Negative for abdominal distention, abdominal pain, constipation, diarrhea and nausea.   Genitourinary:  Negative for dysuria and frequency.   Musculoskeletal:  Negative for arthralgias and back pain.   Skin:  Negative for pallor and rash.   Neurological:  Negative for dizziness, tremors, speech difficulty and headaches.   Hematological:  Negative for adenopathy.   Psychiatric/Behavioral:  Negative for agitation and confusion.          Lab Results   Component Value Date    ALT 50 (H) 10/07/2024    AST 27 10/07/2024    ALKPHOS 147 (H) 10/07/2024    BILITOT 1.2 (H) 10/07/2024     Past Medical History:   Diagnosis Date    Complex regional pain syndrome i of right lower limb     GERD (gastroesophageal reflux disease)     HTN (hypertension)     Portal vein thrombosis      Past Surgical History:   Procedure Laterality Date    COLONOSCOPY N/A 9/29/2017    Procedure: COLONOSCOPY;  Surgeon: Jamar Edwards MD;  Location: [facility] (4TH FLR);  Service: Endoscopy;  Laterality: N/A;    COLONOSCOPY N/A 2/12/2024    Procedure: COLONOSCOPY;  Surgeon: Miguel Angel Huffman MD;  Location: [facility] (2ND FLR);  Service: Endoscopy;  Laterality: N/A;    ENDOSCOPIC ULTRASOUND OF UPPER GASTROINTESTINAL TRACT N/A 7/5/2024    Procedure: ULTRASOUND, UPPER GI TRACT, ENDOSCOPIC;  Surgeon: Debi Lloyd MD;  Location: [facility] (2ND FLR);  Service: Endoscopy;  Laterality: N/A;  3/21 portal instr-[facility] next couple of months pancreas tail cyst.[place] pending Dr. Miranda TE 3/21-tt  5/15/24: instructions sent via portal. eliquis hold in TE 3/21-GD  6/26-pre call complete-tb-will hold eliquis  starting     ESOPHAGOGASTRODUODENOSCOPY N/A 2/12/2024    Procedure: EGD (ESOPHAGOGASTRODUODENOSCOPY);  Surgeon: Miguel Angel Huffman MD;  Location: [facility] (2ND FLR);  Service: Endoscopy;  Laterality: N/A;  1/30/24-Okay to add per Dr. Huffman, 2nd flr-recent hospitalization for sepsis and portal vein thrombosis, Miralax, instr portal and email, Approval to hold Eliquis and medical clearance rec'd from Dr. Jolly (see telephone encounter 1/30/24)-DS  2/5-prec    JOINT REPLACEMENT Right     knee    KNEE ARTHROSCOPY W/ ACL RECONSTRUCTION  2014    right     Current Outpatient Medications   Medication Sig    apixaban (ELIQUIS) 5 mg Tab Take 1 tablet (5 mg total) by mouth 2 (two) times daily.    ARIPiprazole (ABILIFY) 10 MG Tab     cyproheptadine (PERIACTIN) 4 mg tablet     diazePAM (VALIUM) 10 MG Tab Take 10 mg by mouth 3 (three) times daily.    finasteride (PROSCAR) 5 mg tablet Take 1 tablet (5 mg total) by mouth once daily.    fluticasone propionate (FLONASE) 50 mcg/actuation nasal spray Instill 1 spray (50 mcg total) in each nostril once daily.    gabapentin (NEURONTIN) 300 MG capsule Take 1 capsule (300 mg total) by mouth 3 (three) times daily as needed (leg pain).    hydrocortisone (ANUSOL-HC) 2.5 % rectal cream Place rectally 2 (two) times daily.    mirtazapine (REMERON) 30 MG tablet Take 1 tablet (30 mg total) by mouth every evening.    tadalafiL (CIALIS) 20 MG Tab Take 1 tablet (20 mg total) by mouth daily as needed (erectile dysfunction).    tamsulosin (FLOMAX) 0.4 mg Cap Take 1 capsule (0.4 mg total) by mouth once daily.    traMADoL (ULTRAM) 50 mg tablet Take 1 tablet (50 mg total) by mouth every 6 (six) hours.     No current facility-administered medications for this visit.       Objective:      VIDEO VISIT- EXAM NOT DONE      Assessment:       1. Portal vein thrombosis    2. Elevated AFP    3. Adenocarcinoma of prostate        Plan:   He does not appear to have an obvious cause for his right PVT other than his  prostate cancer which may cause a prothrombotic state.  His fibroscan was normal showing no cirrhosis/fibrosis.    His imaging has not shown any obvious malignancy but his AFP has risen to 24 - Aug 2024 but his AFP L3 was normal.    I note that he has a lung nodule that is growing and is under investigation.      - will repeat AFP and AFP L3 but at this point there is no indication that he has an HCC.      Clinic in 6 months- video if poss

## 2025-03-31 NOTE — TELEPHONE ENCOUNTER
----- Message from Sukhi Dee MD sent at 3/31/2025  9:51 AM CDT -----  I ordered an AFP and another blood test today. Can you please arrange and clinic in 6 months

## 2025-04-03 ENCOUNTER — PATIENT MESSAGE (OUTPATIENT)
Dept: HEPATOLOGY | Facility: CLINIC | Age: 65
End: 2025-04-03
Payer: MEDICARE

## 2025-04-03 ENCOUNTER — TELEPHONE (OUTPATIENT)
Dept: PAIN MEDICINE | Facility: CLINIC | Age: 65
End: 2025-04-03
Payer: MEDICARE

## 2025-04-03 DIAGNOSIS — C61 ADENOCARCINOMA OF PROSTATE: ICD-10-CM

## 2025-04-03 DIAGNOSIS — I81 PORTAL VEIN THROMBOSIS: Primary | ICD-10-CM

## 2025-04-03 DIAGNOSIS — K74.69 OTHER CIRRHOSIS OF LIVER: ICD-10-CM

## 2025-04-03 DIAGNOSIS — R77.2 HIGH ALPHA FETOPROTEIN (AFP) TUMOR MARKER: ICD-10-CM

## 2025-04-03 DIAGNOSIS — R97.20 ELEVATED PSA: ICD-10-CM

## 2025-04-03 LAB
ACARBOXYPROTHROMBIN SERPL-MCNC: 0.3 NG/ML
AFP L3 MFR SERPL: 3.8 %
AFP SERPL-MCNC: 17 NG/ML

## 2025-04-07 ENCOUNTER — OFFICE VISIT (OUTPATIENT)
Dept: PAIN MEDICINE | Facility: CLINIC | Age: 65
End: 2025-04-07
Attending: ANESTHESIOLOGY
Payer: MEDICARE

## 2025-04-07 ENCOUNTER — PATIENT MESSAGE (OUTPATIENT)
Dept: PAIN MEDICINE | Facility: OTHER | Age: 65
End: 2025-04-07
Payer: MEDICARE

## 2025-04-07 ENCOUNTER — TELEPHONE (OUTPATIENT)
Dept: PAIN MEDICINE | Facility: OTHER | Age: 65
End: 2025-04-07
Payer: MEDICARE

## 2025-04-07 VITALS
WEIGHT: 175.25 LBS | HEART RATE: 71 BPM | DIASTOLIC BLOOD PRESSURE: 79 MMHG | BODY MASS INDEX: 25.88 KG/M2 | TEMPERATURE: 98 F | SYSTOLIC BLOOD PRESSURE: 125 MMHG

## 2025-04-07 DIAGNOSIS — M54.12 CERVICAL RADICULOPATHY: Primary | ICD-10-CM

## 2025-04-07 DIAGNOSIS — M54.2 CERVICALGIA: ICD-10-CM

## 2025-04-07 DIAGNOSIS — M50.30 DDD (DEGENERATIVE DISC DISEASE), CERVICAL: ICD-10-CM

## 2025-04-07 PROCEDURE — 3008F BODY MASS INDEX DOCD: CPT | Mod: CPTII,S$GLB,, | Performed by: ANESTHESIOLOGY

## 2025-04-07 PROCEDURE — 1160F RVW MEDS BY RX/DR IN RCRD: CPT | Mod: CPTII,S$GLB,, | Performed by: ANESTHESIOLOGY

## 2025-04-07 PROCEDURE — 1159F MED LIST DOCD IN RCRD: CPT | Mod: CPTII,S$GLB,, | Performed by: ANESTHESIOLOGY

## 2025-04-07 PROCEDURE — 3078F DIAST BP <80 MM HG: CPT | Mod: CPTII,S$GLB,, | Performed by: ANESTHESIOLOGY

## 2025-04-07 PROCEDURE — 99204 OFFICE O/P NEW MOD 45 MIN: CPT | Mod: GC,S$GLB,, | Performed by: ANESTHESIOLOGY

## 2025-04-07 PROCEDURE — 99999 PR PBB SHADOW E&M-EST. PATIENT-LVL IV: CPT | Mod: PBBFAC,,, | Performed by: ANESTHESIOLOGY

## 2025-04-07 PROCEDURE — 3074F SYST BP LT 130 MM HG: CPT | Mod: CPTII,S$GLB,, | Performed by: ANESTHESIOLOGY

## 2025-04-07 RX ORDER — MELOXICAM 15 MG/1
15 TABLET ORAL DAILY
Qty: 30 TABLET | Refills: 1 | Status: SHIPPED | OUTPATIENT
Start: 2025-04-07 | End: 2026-04-07

## 2025-04-07 RX ORDER — GABAPENTIN 600 MG/1
600 TABLET ORAL 3 TIMES DAILY
Qty: 90 TABLET | Refills: 11 | Status: SHIPPED | OUTPATIENT
Start: 2025-04-07 | End: 2026-04-07

## 2025-04-07 NOTE — PROGRESS NOTES
Chronic Pain - New Consult    PCP: Tara Jolly MD    REFERRING PHYSICIAN: Jessica Ray    CHIEF COMPLAINT: Neck Pain     Original HISTORY OF PRESENT ILLNESS: Jesus Christy presents to the clinic for the evaluation of the above pain. The pain started 5-6 weeks, not in the setting of any injuries. The pain is radicular in nature, with radiation down the left arm, all the way to his finger. Patient also reports numbness in his fingers. No objects falling out of his hands. The pain is described as sharp and shooting. Exacerbating factors: Standing, Lifting, and Getting out of bed/chair. Patient said the pain is worse with laying on his left side. Mitigating factors nothing. Symptoms interfere with daily activity, sleeping, and work. The patient feels like symptoms have been unchanged. Patient denies night fever/night sweats, urinary incontinence, bowel incontinence, significant weight loss, significant motor weakness, and loss of sensations.    Original PAIN SCORES:  Best: Pain is 8  Worst: Pain is 10  Current: Pain is 8        4/7/2025     8:54 AM   Last 3 PDI Scores   Pain Disability Index (PDI) 56     INTERVAL HISTORY (Date):     Conservative therapy:  PT: None   Chiro: None   HEP in the past 6 months: patient preforming daily HEP with limited benefit     Treatments / Medications: (Ice/Heat/NSAIDS/APAP/etc):  300 mg Gabapentin TID   50 mg Tramadol (Short course Rx by outside provider)    Interventional Pain Procedures: None     IMAGING:    XR CERVICAL SPINE COMPLETE 5 VIEW     FINDINGS:  Straightening of the cervical lordosis.     The vertebral body heights are well maintained.  Moderate disc space narrowing C3-4 and C5-6.     Small anterior osteophyte noted at C3 through C6.     No fracture, no osseous lesions.     The prevertebral soft tissues appear normal.     The oblique views demonstrate a moderate to severe left foraminal narrowing at C4-5.  Moderate right foraminal narrowing at C3-4  and C5-6.     The lung apices are normally aerated.     Impression:     Spondylosis of the cervical spine, no acute process seen.      Past Medical History:   Diagnosis Date    Complex regional pain syndrome i of right lower limb     GERD (gastroesophageal reflux disease)     HTN (hypertension)     Portal vein thrombosis      Past Surgical History:   Procedure Laterality Date    COLONOSCOPY N/A 9/29/2017    Procedure: COLONOSCOPY;  Surgeon: Jamar Edwards MD;  Location: [facility] (4TH FLR);  Service: Endoscopy;  Laterality: N/A;    COLONOSCOPY N/A 2/12/2024    Procedure: COLONOSCOPY;  Surgeon: Miguel Angel Huffman MD;  Location: [facility] (2ND FLR);  Service: Endoscopy;  Laterality: N/A;    ENDOSCOPIC ULTRASOUND OF UPPER GASTROINTESTINAL TRACT N/A 7/5/2024    Procedure: ULTRASOUND, UPPER GI TRACT, ENDOSCOPIC;  Surgeon: Debi Lloyd MD;  Location: [facility] (2ND FLR);  Service: Endoscopy;  Laterality: N/A;  3/21 portal instr[facility] next couple of months pancreas tail cyst.joseluis-Eliquis pending Dr. Miranda TE 3/21-tt  5/15/24: instructions sent via portal. eliquis hold in TE 3/21-GD  6/26-pre call complete-tb-will hold eliquis starting     ESOPHAGOGASTRODUODENOSCOPY N/A 2/12/2024    Procedure: EGD (ESOPHAGOGASTRODUODENOSCOPY);  Surgeon: Miguel Angel Huffman MD;  Location: [facility] (2ND FLR);  Service: Endoscopy;  Laterality: N/A;  1/30/24-Okay to add per Dr. Huffman, 2nd flr-recent hospitalization for sepsis and portal vein thrombosis, Miralax, instr portal and email, Approval to hold Eliquis and medical clearance rec'd from Dr. Jolly (see telephone encounter 1/30/24)-DS  2/5-prec    JOINT REPLACEMENT Right     knee    KNEE ARTHROSCOPY W/ ACL RECONSTRUCTION  2014    right     Social History[1]  Family History   Problem Relation Name Age of Onset    Hypertension Mother      Heart disease Father      Diabetes Father      Cancer Brother          unknown type    No Known Problems Daughter      No Known Problems  Daughter      No Known Problems Daughter      No Known Problems Daughter      No Known Problems Son      No Known Problems Son       Review of patient's allergies indicates:   Allergen Reactions    Peach (prunus persica)     Morphine Palpitations     Current Outpatient Medications   Medication Sig    apixaban (ELIQUIS) 5 mg Tab Take 1 tablet (5 mg total) by mouth 2 (two) times daily.    ARIPiprazole (ABILIFY) 10 MG Tab     cyproheptadine (PERIACTIN) 4 mg tablet     diazePAM (VALIUM) 10 MG Tab Take 10 mg by mouth 3 (three) times daily.    finasteride (PROSCAR) 5 mg tablet Take 1 tablet (5 mg total) by mouth once daily.    fluticasone propionate (FLONASE) 50 mcg/actuation nasal spray Instill 1 spray (50 mcg total) in each nostril once daily.    gabapentin (NEURONTIN) 300 MG capsule Take 1 capsule (300 mg total) by mouth 3 (three) times daily as needed (leg pain).    hydrocortisone (ANUSOL-HC) 2.5 % rectal cream Place rectally 2 (two) times daily.    mirtazapine (REMERON) 30 MG tablet Take 1 tablet (30 mg total) by mouth every evening.    tadalafiL (CIALIS) 20 MG Tab Take 1 tablet (20 mg total) by mouth daily as needed (erectile dysfunction).    tamsulosin (FLOMAX) 0.4 mg Cap Take 1 capsule (0.4 mg total) by mouth once daily.    traMADoL (ULTRAM) 50 mg tablet Take 1 tablet (50 mg total) by mouth every 6 (six) hours.     No current facility-administered medications for this visit.     ROS:  GENERAL: No fever. No chills. No fatigue. Denies weight loss. Denies weight gain.  HEENT: Denies headaches. Denies vision change. Denies eye pain. Denies double vision. Denies ear pain.   CV: Denies chest pain.   PULM: Denies of shortness of breath.  GI: Denies constipation. No diarrhea. No abdominal pain. Denies nausea. Denies vomiting. No blood in stool.  HEME: Denies bleeding problems.  : Denies urgency. No painful urination. No blood in urine.  MS: Denies joint stiffness. Denies joint swelling.   SKIN: Denies rash.   NEURO:  Denies seizures. No weakness.  PSYCH:  Denies difficulty sleeping. No anxiety. Denies depression. No suicidal thoughts.     VITALS:   Vitals:    04/07/25 0853   BP: 125/79   Pulse: 71   Temp: 98.2 °F (36.8 °C)   Weight: 79.5 kg (175 lb 4.3 oz)   PainSc:   8   PainLoc: Back       PHYSICAL EXAM:   GENERAL: Well appearing, in no acute distress, alert and oriented x3.  PSYCH:  Mood and affect appropriate.  SKIN: Skin color, texture, turgor normal, no rashes or lesions.  HEENT:  Normocephalic, atraumatic. Cranial nerves grossly intact.  NECK:   POSITIVE for pain to palpation over the cervical paraspinous muscles. Limited ROM.  positive for pain to palpation over facets.  POSITIVE for pain with neck flexion, extension, or lateral flexion.   Spurling test was POSITIVE on LEFT  PULM: No evidence of respiratory difficulty, symmetric chest rise.  GI:  Non-distended  BACK:   Normal range of motion.   Negative for pain to palpation over the spinous processes.  Negative for pain to palpation over facet joints.   Negative axial loading test bilateral.   Negative for tenderness over BILATERAL SIJ.   Negative Bilaterally thigh and sacral thrust test  Negative Bilaterally FABERE, Ganselin and Yeoman's test.   EXTREMITIES:   No deformities, edema, or skin discoloration.   Shoulder, hip, and knee provocative maneuvers are normal. No atrophy is noted.   FADIR was Negative Bilaterally.   Negative for BILATERAL knee limited ROM with tenderness on palpation and crepitus on movement, negative ant and post drawer sign.  NEURO: Sensation is equal and appropriate bilaterally. Bilateral upper and lower extremity strength is normal and symmetric. Bilateral upper and lower extremity coordination and muscle stretch reflexes are physiologic and symmetric. Plantar response are downgoing. Straight leg raising in the supine position is Negative Bilaterally to radicular pain.   GAIT: Normal    ASSESSMENT: 64 y.o. year old with neck pain with radicular  symptoms in the LUE, consistent with:    1. Cervical radiculopathy  Ambulatory referral/consult to Spine Care    Procedure Order to Pain Management    MRI Cervical Spine Without Contrast    PT evaluate and treat    gabapentin (NEURONTIN) 600 MG tablet    meloxicam (MOBIC) 15 MG tablet      2. Cervicalgia  Ambulatory referral/consult to Spine Care    MRI Cervical Spine Without Contrast    PT evaluate and treat    gabapentin (NEURONTIN) 600 MG tablet    meloxicam (MOBIC) 15 MG tablet      3. DDD (degenerative disc disease), cervical  Ambulatory referral/consult to Spine Care    Procedure Order to Pain Management    MRI Cervical Spine Without Contrast    PT evaluate and treat    gabapentin (NEURONTIN) 600 MG tablet    meloxicam (MOBIC) 15 MG tablet        PLAN:    Patient Education:  Discussed the importance of physical therapy, activity modification, and a home exercise plan to help with pain and improve health.  Therapy referral:  Physical Therapy.   Schedule for cervical MRI.   Medications:   Patient can continue with pain medications for now per their provider since they are providing benefits, using them appropriately, and without side effects.  Prescribed 600 mg Gabapentin TID. Increased from 300 mg TID.   Prescribe 15 mg Mobic QD.   Schedule pain intervention for: Interlaminar CHAZ C7/T1    Future consideration:   Counseled patient: regarding the importance of activity modification, smoking cessation, alcohol cessation, constant sleeping habits, and physical therapy.  Return to clinic: 2 weeks after procedure.     I would like to thank Jessica Ray for the opportunity to assist in the care of this patient. We had a very nice visit and I look forward to continuing their care. Please let me know if I can be of further assistance.       Herve Ramachandran MD PGY-5  Interventional Pain Medicine Fellow   [facility]      I have personally reviewed the history and exam of this patient and agree  with the resident/fellow/NPs note as stated above.    Stuart Viveros MD  4/7/25         [1]   Social History  Socioeconomic History    Marital status: Single   Tobacco Use    Smoking status: Never    Smokeless tobacco: Never   Substance and Sexual Activity    Alcohol use: Yes     Alcohol/week: 0.8 standard drinks of alcohol     Types: 1 Standard drinks or equivalent per week     Comment: once every few months    Drug use: No    Sexual activity: Not Currently   Social History Narrative    Prior maintenance work.    Disable due to leg pain and depression.        Lives alone.    No exercise, due to leg pain.     Social Drivers of Health     Financial Resource Strain: Medium Risk (3/24/2025)    Overall Financial Resource Strain (CARDIA)     Difficulty of Paying Living Expenses: Somewhat hard   Food Insecurity: Food Insecurity Present (3/24/2025)    Hunger Vital Sign     Worried About Running Out of Food in the Last Year: Sometimes true     Ran Out of Food in the Last Year: Sometimes true   Transportation Needs: No Transportation Needs (3/24/2025)    PRAPARE - Transportation     Lack of Transportation (Medical): No     Lack of Transportation (Non-Medical): No   Physical Activity: Inactive (3/24/2025)    Exercise Vital Sign     Days of Exercise per Week: 0 days     Minutes of Exercise per Session: 0 min   Stress: Patient Declined (3/24/2025)    [employer] [place] [employer] - Occupational Stress Questionnaire     Feeling of Stress : Patient declined   Housing Stability: Unknown (3/24/2025)    Housing Stability Vital Sign     Unable to Pay for Housing in the Last Year: Patient declined     Homeless in the Last Year: No

## 2025-04-07 NOTE — TELEPHONE ENCOUNTER
----- Message from Tara Jolly MD sent at 4/7/2025  1:15 PM CDT -----  Regarding: RE: Request to hold Eliquis  Yes, he may hold eliquis for 3 days.  ----- Message -----  From: Kathia Romero LPN  Sent: 4/7/2025   9:59 AM CDT  To: Tara Jolly MD  Subject: Request to hold Eliquis                          Good morning,Patient will be scheduled to have a procedure with Dr. Viveros, C7/T1 Interlaminar Epidural Steroid Injection . Staff is requesting to hold Eliquis for 3 days prior to procedure. Please advise.Thank you,Kathia

## 2025-04-07 NOTE — H&P (VIEW-ONLY)
Chronic Pain - New Consult    PCP: Tara Jolly MD    REFERRING PHYSICIAN: Jessica Ray    CHIEF COMPLAINT: Neck Pain     Original HISTORY OF PRESENT ILLNESS: Jesus Christy presents to the clinic for the evaluation of the above pain. The pain started 5-6 weeks, not in the setting of any injuries. The pain is radicular in nature, with radiation down the left arm, all the way to his finger. Patient also reports numbness in his fingers. No objects falling out of his hands. The pain is described as sharp and shooting. Exacerbating factors: Standing, Lifting, and Getting out of bed/chair. Patient said the pain is worse with laying on his left side. Mitigating factors nothing. Symptoms interfere with daily activity, sleeping, and work. The patient feels like symptoms have been unchanged. Patient denies night fever/night sweats, urinary incontinence, bowel incontinence, significant weight loss, significant motor weakness, and loss of sensations.    Original PAIN SCORES:  Best: Pain is 8  Worst: Pain is 10  Current: Pain is 8        4/7/2025     8:54 AM   Last 3 PDI Scores   Pain Disability Index (PDI) 56     INTERVAL HISTORY (Date):     Conservative therapy:  PT: None   Chiro: None   HEP in the past 6 months: patient preforming daily HEP with limited benefit     Treatments / Medications: (Ice/Heat/NSAIDS/APAP/etc):  300 mg Gabapentin TID   50 mg Tramadol (Short course Rx by outside provider)    Interventional Pain Procedures: None     IMAGING:    XR CERVICAL SPINE COMPLETE 5 VIEW     FINDINGS:  Straightening of the cervical lordosis.     The vertebral body heights are well maintained.  Moderate disc space narrowing C3-4 and C5-6.     Small anterior osteophyte noted at C3 through C6.     No fracture, no osseous lesions.     The prevertebral soft tissues appear normal.     The oblique views demonstrate a moderate to severe left foraminal narrowing at C4-5.  Moderate right foraminal narrowing at C3-4  and C5-6.     The lung apices are normally aerated.     Impression:     Spondylosis of the cervical spine, no acute process seen.      Past Medical History:   Diagnosis Date    Complex regional pain syndrome i of right lower limb     GERD (gastroesophageal reflux disease)     HTN (hypertension)     Portal vein thrombosis      Past Surgical History:   Procedure Laterality Date    COLONOSCOPY N/A 9/29/2017    Procedure: COLONOSCOPY;  Surgeon: Jamar Edwards MD;  Location: [facility] (4TH FLR);  Service: Endoscopy;  Laterality: N/A;    COLONOSCOPY N/A 2/12/2024    Procedure: COLONOSCOPY;  Surgeon: Miguel Angel Huffman MD;  Location: [facility] (2ND FLR);  Service: Endoscopy;  Laterality: N/A;    ENDOSCOPIC ULTRASOUND OF UPPER GASTROINTESTINAL TRACT N/A 7/5/2024    Procedure: ULTRASOUND, UPPER GI TRACT, ENDOSCOPIC;  Surgeon: Debi Lloyd MD;  Location: [facility] (2ND FLR);  Service: Endoscopy;  Laterality: N/A;  3/21 portal instr-EUS with myself at [facility] next couple of months pancreas tail cyst.joseluis-Eliquis pending Dr. Miranda TE 3/21-tt  5/15/24: instructions sent via portal. eliquis hold in TE 3/21-GD  6/26-pre call complete-tb-will hold eliquis starting     ESOPHAGOGASTRODUODENOSCOPY N/A 2/12/2024    Procedure: EGD (ESOPHAGOGASTRODUODENOSCOPY);  Surgeon: Miguel Angel Huffman MD;  Location: [facility] (2ND FLR);  Service: Endoscopy;  Laterality: N/A;  1/30/24-Okay to add per Dr. Huffamn, 2nd flr-recent hospitalization for sepsis and portal vein thrombosis, Miralax, instr portal and email, Approval to hold Eliquis and medical clearance rec'd from Dr. Jolly (see telephone encounter 1/30/24)-DS  2/5-prec    JOINT REPLACEMENT Right     knee    KNEE ARTHROSCOPY W/ ACL RECONSTRUCTION  2014    right     Social History[1]  Family History   Problem Relation Name Age of Onset    Hypertension Mother      Heart disease Father      Diabetes Father      Cancer Brother          unknown type    No Known Problems Daughter      No Known Problems  Daughter      No Known Problems Daughter      No Known Problems Daughter      No Known Problems Son      No Known Problems Son       Review of patient's allergies indicates:   Allergen Reactions    Peach (prunus persica)     Morphine Palpitations     Current Outpatient Medications   Medication Sig    apixaban (ELIQUIS) 5 mg Tab Take 1 tablet (5 mg total) by mouth 2 (two) times daily.    ARIPiprazole (ABILIFY) 10 MG Tab     cyproheptadine (PERIACTIN) 4 mg tablet     diazePAM (VALIUM) 10 MG Tab Take 10 mg by mouth 3 (three) times daily.    finasteride (PROSCAR) 5 mg tablet Take 1 tablet (5 mg total) by mouth once daily.    fluticasone propionate (FLONASE) 50 mcg/actuation nasal spray Instill 1 spray (50 mcg total) in each nostril once daily.    gabapentin (NEURONTIN) 300 MG capsule Take 1 capsule (300 mg total) by mouth 3 (three) times daily as needed (leg pain).    hydrocortisone (ANUSOL-HC) 2.5 % rectal cream Place rectally 2 (two) times daily.    mirtazapine (REMERON) 30 MG tablet Take 1 tablet (30 mg total) by mouth every evening.    tadalafiL (CIALIS) 20 MG Tab Take 1 tablet (20 mg total) by mouth daily as needed (erectile dysfunction).    tamsulosin (FLOMAX) 0.4 mg Cap Take 1 capsule (0.4 mg total) by mouth once daily.    traMADoL (ULTRAM) 50 mg tablet Take 1 tablet (50 mg total) by mouth every 6 (six) hours.     No current facility-administered medications for this visit.     ROS:  GENERAL: No fever. No chills. No fatigue. Denies weight loss. Denies weight gain.  HEENT: Denies headaches. Denies vision change. Denies eye pain. Denies double vision. Denies ear pain.   CV: Denies chest pain.   PULM: Denies of shortness of breath.  GI: Denies constipation. No diarrhea. No abdominal pain. Denies nausea. Denies vomiting. No blood in stool.  HEME: Denies bleeding problems.  : Denies urgency. No painful urination. No blood in urine.  MS: Denies joint stiffness. Denies joint swelling.   SKIN: Denies rash.   NEURO:  Denies seizures. No weakness.  PSYCH:  Denies difficulty sleeping. No anxiety. Denies depression. No suicidal thoughts.     VITALS:   Vitals:    04/07/25 0853   BP: 125/79   Pulse: 71   Temp: 98.2 °F (36.8 °C)   Weight: 79.5 kg (175 lb 4.3 oz)   PainSc:   8   PainLoc: Back       PHYSICAL EXAM:   GENERAL: Well appearing, in no acute distress, alert and oriented x3.  PSYCH:  Mood and affect appropriate.  SKIN: Skin color, texture, turgor normal, no rashes or lesions.  HEENT:  Normocephalic, atraumatic. Cranial nerves grossly intact.  NECK:   POSITIVE for pain to palpation over the cervical paraspinous muscles. Limited ROM.  positive for pain to palpation over facets.  POSITIVE for pain with neck flexion, extension, or lateral flexion.   Spurling test was POSITIVE on LEFT  PULM: No evidence of respiratory difficulty, symmetric chest rise.  GI:  Non-distended  BACK:   Normal range of motion.   Negative for pain to palpation over the spinous processes.  Negative for pain to palpation over facet joints.   Negative axial loading test bilateral.   Negative for tenderness over BILATERAL SIJ.   Negative Bilaterally thigh and sacral thrust test  Negative Bilaterally FABERE, Ganselin and Yeoman's test.   EXTREMITIES:   No deformities, edema, or skin discoloration.   Shoulder, hip, and knee provocative maneuvers are normal. No atrophy is noted.   FADIR was Negative Bilaterally.   Negative for BILATERAL knee limited ROM with tenderness on palpation and crepitus on movement, negative ant and post drawer sign.  NEURO: Sensation is equal and appropriate bilaterally. Bilateral upper and lower extremity strength is normal and symmetric. Bilateral upper and lower extremity coordination and muscle stretch reflexes are physiologic and symmetric. Plantar response are downgoing. Straight leg raising in the supine position is Negative Bilaterally to radicular pain.   GAIT: Normal    ASSESSMENT: 64 y.o. year old with neck pain with radicular  symptoms in the LUE, consistent with:    1. Cervical radiculopathy  Ambulatory referral/consult to Spine Care    Procedure Order to Pain Management    MRI Cervical Spine Without Contrast    PT evaluate and treat    gabapentin (NEURONTIN) 600 MG tablet    meloxicam (MOBIC) 15 MG tablet      2. Cervicalgia  Ambulatory referral/consult to Spine Care    MRI Cervical Spine Without Contrast    PT evaluate and treat    gabapentin (NEURONTIN) 600 MG tablet    meloxicam (MOBIC) 15 MG tablet      3. DDD (degenerative disc disease), cervical  Ambulatory referral/consult to Spine Care    Procedure Order to Pain Management    MRI Cervical Spine Without Contrast    PT evaluate and treat    gabapentin (NEURONTIN) 600 MG tablet    meloxicam (MOBIC) 15 MG tablet        PLAN:    Patient Education:  Discussed the importance of physical therapy, activity modification, and a home exercise plan to help with pain and improve health.  Therapy referral:  Physical Therapy.   Schedule for cervical MRI.   Medications:   Patient can continue with pain medications for now per their provider since they are providing benefits, using them appropriately, and without side effects.  Prescribed 600 mg Gabapentin TID. Increased from 300 mg TID.   Prescribe 15 mg Mobic QD.   Schedule pain intervention for: Interlaminar CHAZ C7/T1    Future consideration:   Counseled patient: regarding the importance of activity modification, smoking cessation, alcohol cessation, constant sleeping habits, and physical therapy.  Return to clinic: 2 weeks after procedure.     I would like to thank Jessica Ray for the opportunity to assist in the care of this patient. We had a very nice visit and I look forward to continuing their care. Please let me know if I can be of further assistance.       Herve Ramachandran MD PGY-5  Interventional Pain Medicine Fellow   [facility]      I have personally reviewed the history and exam of this patient and agree  with the resident/fellow/NPs note as stated above.    Stuart Viveros MD  4/7/25         [1]   Social History  Socioeconomic History    Marital status: Single   Tobacco Use    Smoking status: Never    Smokeless tobacco: Never   Substance and Sexual Activity    Alcohol use: Yes     Alcohol/week: 0.8 standard drinks of alcohol     Types: 1 Standard drinks or equivalent per week     Comment: once every few months    Drug use: No    Sexual activity: Not Currently   Social History Narrative    Prior maintenance work.    Disable due to leg pain and depression.        Lives alone.    No exercise, due to leg pain.     Social Drivers of Health     Financial Resource Strain: Medium Risk (3/24/2025)    Overall Financial Resource Strain (CARDIA)     Difficulty of Paying Living Expenses: Somewhat hard   Food Insecurity: Food Insecurity Present (3/24/2025)    Hunger Vital Sign     Worried About Running Out of Food in the Last Year: Sometimes true     Ran Out of Food in the Last Year: Sometimes true   Transportation Needs: No Transportation Needs (3/24/2025)    PRAPARE - Transportation     Lack of Transportation (Medical): No     Lack of Transportation (Non-Medical): No   Physical Activity: Inactive (3/24/2025)    Exercise Vital Sign     Days of Exercise per Week: 0 days     Minutes of Exercise per Session: 0 min   Stress: Patient Declined (3/24/2025)    [employer] [place] [employer] - Occupational Stress Questionnaire     Feeling of Stress : Patient declined   Housing Stability: Unknown (3/24/2025)    Housing Stability Vital Sign     Unable to Pay for Housing in the Last Year: Patient declined     Homeless in the Last Year: No

## 2025-04-09 ENCOUNTER — OFFICE VISIT (OUTPATIENT)
Dept: PODIATRY | Facility: CLINIC | Age: 65
End: 2025-04-09
Payer: MEDICARE

## 2025-04-09 ENCOUNTER — HOSPITAL ENCOUNTER (OUTPATIENT)
Dept: RADIOLOGY | Facility: HOSPITAL | Age: 65
Discharge: HOME | End: 2025-04-09
Attending: PODIATRIST
Payer: MEDICARE

## 2025-04-09 VITALS
WEIGHT: 176.69 LBS | SYSTOLIC BLOOD PRESSURE: 118 MMHG | DIASTOLIC BLOOD PRESSURE: 69 MMHG | BODY MASS INDEX: 26.17 KG/M2 | HEART RATE: 81 BPM | HEIGHT: 69 IN

## 2025-04-09 DIAGNOSIS — M79.671 PAIN IN BOTH FEET: ICD-10-CM

## 2025-04-09 DIAGNOSIS — Q82.8 POROKERATOSIS: ICD-10-CM

## 2025-04-09 DIAGNOSIS — M79.672 PAIN IN BOTH FEET: ICD-10-CM

## 2025-04-09 DIAGNOSIS — M21.619 BUNION OF GREAT TOE: ICD-10-CM

## 2025-04-09 DIAGNOSIS — L84 FOOT CALLUS: ICD-10-CM

## 2025-04-09 DIAGNOSIS — M21.619 BUNION OF GREAT TOE: Primary | ICD-10-CM

## 2025-04-09 PROCEDURE — 3078F DIAST BP <80 MM HG: CPT | Mod: CPTII,S$GLB,, | Performed by: PODIATRIST

## 2025-04-09 PROCEDURE — 1160F RVW MEDS BY RX/DR IN RCRD: CPT | Mod: CPTII,S$GLB,, | Performed by: PODIATRIST

## 2025-04-09 PROCEDURE — 99203 OFFICE O/P NEW LOW 30 MIN: CPT | Mod: 25,S$GLB,, | Performed by: PODIATRIST

## 2025-04-09 PROCEDURE — 99999 PR PBB SHADOW E&M-EST. PATIENT-LVL IV: CPT | Mod: PBBFAC,,, | Performed by: PODIATRIST

## 2025-04-09 PROCEDURE — 3008F BODY MASS INDEX DOCD: CPT | Mod: CPTII,S$GLB,, | Performed by: PODIATRIST

## 2025-04-09 PROCEDURE — 1159F MED LIST DOCD IN RCRD: CPT | Mod: CPTII,S$GLB,, | Performed by: PODIATRIST

## 2025-04-09 PROCEDURE — 73630 X-RAY EXAM OF FOOT: CPT | Mod: 26,50,, | Performed by: RADIOLOGY

## 2025-04-09 PROCEDURE — 73630 X-RAY EXAM OF FOOT: CPT | Mod: TC,50

## 2025-04-09 PROCEDURE — 3074F SYST BP LT 130 MM HG: CPT | Mod: CPTII,S$GLB,, | Performed by: PODIATRIST

## 2025-04-09 RX ORDER — AMMONIUM LACTATE 12 G/100G
1 CREAM TOPICAL 2 TIMES DAILY
Qty: 140 G | Refills: 11 | Status: SHIPPED | OUTPATIENT
Start: 2025-04-09

## 2025-04-10 ENCOUNTER — TELEPHONE (OUTPATIENT)
Dept: SPORTS MEDICINE | Facility: CLINIC | Age: 65
End: 2025-04-10
Payer: MEDICARE

## 2025-04-10 NOTE — TELEPHONE ENCOUNTER
Spoke to the patient in regard to their appt with Moira Cade PA-C . We discussed cancelling his appointment until he can determine if the cervical injections are working.

## 2025-04-10 NOTE — TELEPHONE ENCOUNTER
Lvm for the patient in regard to their appt on 4/17 with Moira Cade PA-C. The patient can call the office at [phone] to discuss.

## 2025-04-11 ENCOUNTER — HOSPITAL ENCOUNTER (OUTPATIENT)
Dept: RADIOLOGY | Facility: OTHER | Age: 65
Discharge: HOME | End: 2025-04-11
Attending: STUDENT IN AN ORGANIZED HEALTH CARE EDUCATION/TRAINING PROGRAM
Payer: MEDICARE

## 2025-04-11 DIAGNOSIS — M54.2 CERVICALGIA: ICD-10-CM

## 2025-04-11 PROCEDURE — 72141 MRI NECK SPINE W/O DYE: CPT | Mod: 26,,, | Performed by: RADIOLOGY

## 2025-04-11 PROCEDURE — 72141 MRI NECK SPINE W/O DYE: CPT | Mod: TC

## 2025-04-13 NOTE — PROGRESS NOTES
Subjective:      Patient ID: Jesus Christy is a 64 y.o. male.    Chief Complaint:   Callouses (Right bottom of foot) and Foot Pain (Right foot bottom pain throbbing on and off)    Jesus is a 64 y.o. male who presents to the podiatry clinic  with complaint of  right foot pain. Onset of the symptoms was several months ago. Precipitating event:  bunions . Current symptoms include: ability to bear weight, but with some pain. Aggravating factors: any weight bearing. Symptoms have stabilized. Patient has had no prior foot problems. Evaluation to date: none. Treatment to date:  shoegear changes and padding . Patients rates pain 5/10 on pain scale.    Review of Systems   Constitutional: Negative for chills, decreased appetite, fever and malaise/fatigue.   HENT:  Negative for congestion, hearing loss, nosebleeds and tinnitus.    Eyes:  Negative for double vision, pain, photophobia and visual disturbance.   Cardiovascular:  Negative for chest pain, claudication, cyanosis and leg swelling.   Respiratory:  Negative for cough, hemoptysis, shortness of breath and wheezing.    Endocrine: Negative for cold intolerance and heat intolerance.   Hematologic/Lymphatic: Negative for adenopathy and bleeding problem.   Skin:  Positive for suspicious lesions. Negative for color change, itching and nail changes.   Musculoskeletal:  Positive for arthritis and joint pain. Negative for myalgias and stiffness.   Gastrointestinal:  Negative for abdominal pain, jaundice, nausea and vomiting.   Genitourinary:  Negative for dysuria, frequency and hematuria.   Neurological:  Negative for difficulty with concentration, loss of balance, numbness, paresthesias and sensory change.   Psychiatric/Behavioral:  Negative for altered mental status, hallucinations and suicidal ideas. The patient is not nervous/anxious.    Allergic/Immunologic: Negative for environmental allergies and persistent infections.           Objective:      Physical  Exam  Constitutional:       Appearance: He is well-developed.   HENT:      Head: Normocephalic and atraumatic.   Cardiovascular:      Pulses:           Dorsalis pedis pulses are 2+ on the right side and 2+ on the left side.        Posterior tibial pulses are 2+ on the right side and 2+ on the left side.   Pulmonary:      Effort: Pulmonary effort is normal.   Musculoskeletal:      Right foot: Normal range of motion. No deformity.      Left foot: Normal range of motion. No deformity.      Comments: Inspection and palpation of the muscles joints and bones of both lower extremities reveal that muscle strength for the anterior, lateral, and posterior muscle groups and intrinsic muscle groups of the foot are all 5 over 5 symmetrical.     Painful medial bilateral 1st MTPJ exostosis. Lateral deviation of hallux, non trackbound. Severe bunion bilateral with porokeratomas.    Feet:      Right foot:      Skin integrity: No skin breakdown or erythema.      Left foot:      Skin integrity: No skin breakdown or erythema.   Skin:     General: Skin is warm and dry.      Nails: There is no clubbing.      Comments: Skin turgor is normal bilaterally. Skin texture is well hydrated to both lower extremities.     Porokeratotic lesion noted to the plantar aspect of the bilateral sub 2nd mpj foot with hyperkeratotic tissue, central nucleated core, and moderate to severe discomfort/tenderness with direct palpation.     Neurological:      Mental Status: He is alert and oriented to person, place, and time.      Deep Tendon Reflexes:      Reflex Scores:       Patellar reflexes are 2+ on the right side and 2+ on the left side.       Achilles reflexes are 2+ on the right side and 2+ on the left side.     Comments: Sharp, dull, light touch, vibratory, and proprioceptive sensation are intact bilaterally. Deep tendon reflexes to patellar and Achilles tendon are symmetrical, 2/4 bilaterally. No ankle clonus or Babinski reflexes noted bilaterally.  Coordination is normal to both feet and lower extremities.   Psychiatric:         Behavior: Behavior normal.               Assessment:       Encounter Diagnoses   Name Primary?    Foot callus     Bunion of great toe Yes     Independent visualization of imaging was performed.  Results were reviewed in detail with patient.       Plan:       Jesus was seen today for callouses and foot pain.    Diagnoses and all orders for this visit:    Bunion of great toe  -     X-Ray Foot Complete Bilateral; Future  -     ammonium lactate 12 % Crea; Apply 1 application  topically 2 (two) times daily.    Foot callus  -     Ambulatory referral/consult to Podiatry  -     ammonium lactate 12 % Crea; Apply 1 application  topically 2 (two) times daily.      I counseled the patient on his conditions, their implications and medical management.    Ongoing monitoring, evaluating, assessing, and treatment options are recognized and reviewed in detail with the patient.    The nature of the condition, options for management, as well as potential risks and complications were discussed in detail with patient. Patient was amenable to my recommendations and left my office fully informed and will follow up as instructed or sooner if necessary.      I recommended patient be fitted for orthoses.  I explained that orthoses may improve function of the foot, reduce pain, decrease pronation, increase efficiency of muscle function of the foot and ankle and prevent surgery.  Alternative forms of biomechanical control of the foot and ankle were discussed with the patient.        Lesion Excision     Performed by: Benton Ventura DPM  Authorized by: Patient     Consent Done?:  Yes (Verbal)     Care Type:  Debride/Excise  Location(s): bilateral sub 2nd mpj  Patient tolerance:  Patient tolerated the procedure well with no immediate complications      With patient's permission, the lesion(s) mentioned above were aggressively reduced and debrided using a 15  blade, tissue nipper, and curette to remove all lesion and associated debris. Topical trichloroacetic acid applied with a sterile cover. The patient will continue to monitor the areas daily, inspect the feet, wear protective shoe gear when ambulatory, and moisturizer to maintain skin integrity.        Follow to discuss xrays.

## 2025-04-14 ENCOUNTER — OFFICE VISIT (OUTPATIENT)
Dept: PULMONOLOGY | Facility: CLINIC | Age: 65
End: 2025-04-14
Payer: MEDICARE

## 2025-04-14 VITALS
OXYGEN SATURATION: 96 % | SYSTOLIC BLOOD PRESSURE: 120 MMHG | BODY MASS INDEX: 26.48 KG/M2 | DIASTOLIC BLOOD PRESSURE: 78 MMHG | HEART RATE: 67 BPM | HEIGHT: 69 IN | WEIGHT: 178.81 LBS

## 2025-04-14 DIAGNOSIS — R91.1 NODULE OF RIGHT LUNG: ICD-10-CM

## 2025-04-14 DIAGNOSIS — R91.1 PULMONARY NODULE 1 CM OR GREATER IN DIAMETER: ICD-10-CM

## 2025-04-14 DIAGNOSIS — I81 PORTAL VEIN THROMBOSIS: ICD-10-CM

## 2025-04-14 DIAGNOSIS — R91.1 INCIDENTAL LUNG NODULE: ICD-10-CM

## 2025-04-14 DIAGNOSIS — R91.1 SOLITARY PULMONARY NODULE: Primary | ICD-10-CM

## 2025-04-14 PROCEDURE — 99999 PR PBB SHADOW E&M-EST. PATIENT-LVL V: CPT | Mod: PBBFAC,,, | Performed by: INTERNAL MEDICINE

## 2025-04-14 NOTE — ASSESSMENT & PLAN NOTE
Diagnostic and staging robotic bronchoscopy with EBUS   I have explained the risks, benefits and alternatives of the procedure in detail.  The patient voices understanding and all questions have been answered.  The patient agrees to proceed as planned.

## 2025-04-14 NOTE — H&P (VIEW-ONLY)
"Subjective:       Patient ID: Jesus Christy is a 64 y.o. male.    Chief Complaint: Pulmonary nodule    63 yo lifelong nonsmoker  with a history of prostate cancer Last PSA normal.  Idiopathic portal vein thrombosis with elevated AFP who presented for a slowly enlarging pulmonary nodule that has been present since at least 2017.    Patient states it has been monitored by Dr Jolly.  Had a CT of chest to follow up.    Mild cough related to sinus/allergy/PND.    Denies hemoptysis.    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  No biopsy.    He is here today for evaluation of pulmonary nodules.       Review of Systems   Constitutional:  Negative for fever, weight loss and night sweats.   HENT:  Negative for trouble swallowing.    Respiratory:  Negative for chest tightness, wheezing and dyspnea on extertion.    Cardiovascular:  Negative for chest pain and leg swelling.   Genitourinary:  Negative for difficulty urinating.   Endocrine:  Negative for cold intolerance and heat intolerance.    Musculoskeletal:  Positive for back pain (scheduled for epidural this week).   Skin:  Negative for rash.   Gastrointestinal:  Negative for acid reflux.   Neurological:  Negative for headaches.   Hematological:  Negative for adenopathy.   Psychiatric/Behavioral:  Negative for confusion.        Objective:      Vitals:    04/14/25 0915   BP: 120/78   BP Location: Left arm   Patient Position: Sitting   Pulse: 67   SpO2: 96%   Weight: 81.1 kg (178 lb 12.7 oz)   Height: 5' 9" (1.753 m)      Physical Exam   Constitutional: He is oriented to person, place, and time. He appears well-developed and well-nourished.   HENT:   Head: Normocephalic.   Cardiovascular: Normal rate and regular rhythm.   Pulmonary/Chest: Normal expansion, symmetric chest wall expansion, effort normal and breath sounds normal.   Abdominal: Soft.   Musculoskeletal:         General: No edema. Normal range " "of motion.      Cervical back: Normal range of motion and neck supple.   Lymphadenopathy: No supraclavicular adenopathy is present.     He has no cervical adenopathy.   Post auricular mass approximately 2 x 1 cm, non tender.    Neurological: He is alert and oriented to person, place, and time. No cranial nerve deficit.   Psychiatric: He has a normal mood and affect.     Personal Diagnostic Review                4/14/2025     9:15 AM 4/9/2025    10:11 AM 4/7/2025     8:53 AM 3/21/2025     1:01 PM 3/12/2025    10:48 AM 12/19/2024    10:43 AM 11/19/2024    10:43 AM   Pulmonary Function Tests   SpO2 96 %   97 %  100 %    Height 5' 9" (1.753 m) 5' 9" (1.753 m)  5' 9" (1.753 m) 5' 9" (1.753 m) 5' 9" (1.753 m) 5' 9" (1.753 m)   Weight 81.1 kg (178 lb 12.7 oz) 80.2 kg (176 lb 11.2 oz) 79.5 kg (175 lb 4.3 oz) 79.1 kg (174 lb 6.1 oz) 80.2 kg (176 lb 12.9 oz) 80.7 kg (178 lb) 80.2 kg (176 lb 12.9 oz)   BMI (Calculated) 26.4 26.1  25.7 26.1 26.3 26.1         Assessment:       1. Solitary pulmonary nodule    2. Pulmonary nodule 1 cm or greater in diameter    3. Portal vein thrombosis    4. Incidental lung nodule    5. Nodule of right lung        Encounter Medications[1]  Orders Placed This Encounter   Procedures    CT Chest Without Contrast     Standing Status:   Future     Expected Date:   4/14/2025     Expiration Date:   4/14/2026     May the Radiologist modify the order per protocol to meet the clinical needs of the patient?:   Yes    Complete PFT without bronchodilator     Standing Status:   Future     Expiration Date:   4/14/2026     Release to patient:   Immediate    Case Request Operating Room: ROBOTIC BRONCHOSCOPY     Standing Status:   Standing     Number of Occurrences:   1     Medical Necessity::   Medically Urgent [101]     Case classification:   E - Elective [90]     Post-Procedure Disposition::   Amb Surgery/DOSC [2]     Is an on-site pathologist required for this procedure?:   N/A     Is the patient currently on " anticoagulants and/or antiplatelets?:   Yes (please see anticoagulant/antiplatelet management document on left side of order)     Plan:     Problem List Items Addressed This Visit       Incidental lung nodule    Overview   Present since 2017, slowly enlarging.           Current Assessment & Plan   Diagnostic and staging robotic bronchoscopy with EBUS   I have explained the risks, benefits and alternatives of the procedure in detail.  The patient voices understanding and all questions have been answered.  The patient agrees to proceed as planned.          Portal vein thrombosis    Overview   Patient with history of prostate adenocarcinoma an portal vein thrombosis confirmed on imaging.  Patient has had recurrent fevers and tachycardia since his diagnosis. WBC >29. Bilirubin 4.1. Thrombosis worsening on imaging despite being on DOAC.     No known cause of thrombosis. Differential includes infection versus coagulopathy versus malignancy. Possible component of phlebitis and diverticulitis seen on imaging    On Eliquis daily         Current Assessment & Plan                 Other Visit Diagnoses         Solitary pulmonary nodule    -  Primary    Relevant Orders    CT Chest Without Contrast    Complete PFT without bronchodilator      Pulmonary nodule 1 cm or greater in diameter          Nodule of right lung        Relevant Orders    Case Request Operating Room: ROBOTIC BRONCHOSCOPY (Completed)        Diagnostic and staging robotic bronchoscopy with EBUS 5/13  Will need CT for navigation planning.    I have explained the risks, benefits and alternatives of the procedure in detail.  The patient voices understanding and all questions have been answered.  The patient agrees to proceed as planned.            [1]   Outpatient Encounter Medications as of 4/14/2025   Medication Sig Dispense Refill    ammonium lactate 12 % Crea Apply 1 application  topically 2 (two) times daily. 140 g 11    apixaban (ELIQUIS) 5 mg Tab Take 1  tablet (5 mg total) by mouth 2 (two) times daily. 60 tablet 11    ARIPiprazole (ABILIFY) 10 MG Tab       cyproheptadine (PERIACTIN) 4 mg tablet       diazePAM (VALIUM) 10 MG Tab Take 10 mg by mouth 3 (three) times daily.      finasteride (PROSCAR) 5 mg tablet Take 1 tablet (5 mg total) by mouth once daily. 90 tablet 0    fluticasone propionate (FLONASE) 50 mcg/actuation nasal spray Instill 1 spray (50 mcg total) in each nostril once daily. 16 g 11    gabapentin (NEURONTIN) 300 MG capsule Take 1 capsule (300 mg total) by mouth 3 (three) times daily as needed (leg pain). 90 capsule 5    gabapentin (NEURONTIN) 600 MG tablet Take 1 tablet (600 mg total) by mouth 3 (three) times daily. 90 tablet 11    hydrocortisone (ANUSOL-HC) 2.5 % rectal cream Place rectally 2 (two) times daily. 28 g 1    meloxicam (MOBIC) 15 MG tablet Take 1 tablet (15 mg total) by mouth once daily. 30 tablet 1    mirtazapine (REMERON) 30 MG tablet Take 1 tablet (30 mg total) by mouth every evening. 90 tablet 3    tamsulosin (FLOMAX) 0.4 mg Cap Take 1 capsule (0.4 mg total) by mouth once daily. 90 capsule 0    tadalafiL (CIALIS) 20 MG Tab Take 1 tablet (20 mg total) by mouth daily as needed (erectile dysfunction). 30 tablet 5    [DISCONTINUED] traMADoL (ULTRAM) 50 mg tablet Take 1 tablet (50 mg total) by mouth every 6 (six) hours. 30 tablet 0     No facility-administered encounter medications on file as of 4/14/2025.

## 2025-04-14 NOTE — PROGRESS NOTES
"Subjective:       Patient ID: Jesus Christy is a 64 y.o. male.    Chief Complaint: Pulmonary nodule    65 yo lifelong nonsmoker  with a history of prostate cancer Last PSA normal.  Idiopathic portal vein thrombosis with elevated AFP who presented for a slowly enlarging pulmonary nodule that has been present since at least 2017.    Patient states it has been monitored by Dr Jolly.  Had a CT of chest to follow up.    Mild cough related to sinus/allergy/PND.    Denies hemoptysis.    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  No biopsy.    He is here today for evaluation of pulmonary nodules.       Review of Systems   Constitutional:  Negative for fever, weight loss and night sweats.   HENT:  Negative for trouble swallowing.    Respiratory:  Negative for chest tightness, wheezing and dyspnea on extertion.    Cardiovascular:  Negative for chest pain and leg swelling.   Genitourinary:  Negative for difficulty urinating.   Endocrine:  Negative for cold intolerance and heat intolerance.    Musculoskeletal:  Positive for back pain (scheduled for epidural this week).   Skin:  Negative for rash.   Gastrointestinal:  Negative for acid reflux.   Neurological:  Negative for headaches.   Hematological:  Negative for adenopathy.   Psychiatric/Behavioral:  Negative for confusion.        Objective:      Vitals:    04/14/25 0915   BP: 120/78   BP Location: Left arm   Patient Position: Sitting   Pulse: 67   SpO2: 96%   Weight: 81.1 kg (178 lb 12.7 oz)   Height: 5' 9" (1.753 m)      Physical Exam   Constitutional: He is oriented to person, place, and time. He appears well-developed and well-nourished.   HENT:   Head: Normocephalic.   Cardiovascular: Normal rate and regular rhythm.   Pulmonary/Chest: Normal expansion, symmetric chest wall expansion, effort normal and breath sounds normal.   Abdominal: Soft.   Musculoskeletal:         General: No edema. Normal range " "of motion.      Cervical back: Normal range of motion and neck supple.   Lymphadenopathy: No supraclavicular adenopathy is present.     He has no cervical adenopathy.   Post auricular mass approximately 2 x 1 cm, non tender.    Neurological: He is alert and oriented to person, place, and time. No cranial nerve deficit.   Psychiatric: He has a normal mood and affect.     Personal Diagnostic Review                4/14/2025     9:15 AM 4/9/2025    10:11 AM 4/7/2025     8:53 AM 3/21/2025     1:01 PM 3/12/2025    10:48 AM 12/19/2024    10:43 AM 11/19/2024    10:43 AM   Pulmonary Function Tests   SpO2 96 %   97 %  100 %    Height 5' 9" (1.753 m) 5' 9" (1.753 m)  5' 9" (1.753 m) 5' 9" (1.753 m) 5' 9" (1.753 m) 5' 9" (1.753 m)   Weight 81.1 kg (178 lb 12.7 oz) 80.2 kg (176 lb 11.2 oz) 79.5 kg (175 lb 4.3 oz) 79.1 kg (174 lb 6.1 oz) 80.2 kg (176 lb 12.9 oz) 80.7 kg (178 lb) 80.2 kg (176 lb 12.9 oz)   BMI (Calculated) 26.4 26.1  25.7 26.1 26.3 26.1         Assessment:       1. Solitary pulmonary nodule    2. Pulmonary nodule 1 cm or greater in diameter    3. Portal vein thrombosis    4. Incidental lung nodule    5. Nodule of right lung        Encounter Medications[1]  Orders Placed This Encounter   Procedures    CT Chest Without Contrast     Standing Status:   Future     Expected Date:   4/14/2025     Expiration Date:   4/14/2026     May the Radiologist modify the order per protocol to meet the clinical needs of the patient?:   Yes    Complete PFT without bronchodilator     Standing Status:   Future     Expiration Date:   4/14/2026     Release to patient:   Immediate    Case Request Operating Room: ROBOTIC BRONCHOSCOPY     Standing Status:   Standing     Number of Occurrences:   1     Medical Necessity::   Medically Urgent [101]     Case classification:   E - Elective [90]     Post-Procedure Disposition::   Amb Surgery/DOSC [2]     Is an on-site pathologist required for this procedure?:   N/A     Is the patient currently on " anticoagulants and/or antiplatelets?:   Yes (please see anticoagulant/antiplatelet management document on left side of order)     Plan:     Problem List Items Addressed This Visit       Incidental lung nodule    Overview   Present since 2017, slowly enlarging.           Current Assessment & Plan   Diagnostic and staging robotic bronchoscopy with EBUS   I have explained the risks, benefits and alternatives of the procedure in detail.  The patient voices understanding and all questions have been answered.  The patient agrees to proceed as planned.          Portal vein thrombosis    Overview   Patient with history of prostate adenocarcinoma an portal vein thrombosis confirmed on imaging.  Patient has had recurrent fevers and tachycardia since his diagnosis. WBC >29. Bilirubin 4.1. Thrombosis worsening on imaging despite being on DOAC.     No known cause of thrombosis. Differential includes infection versus coagulopathy versus malignancy. Possible component of phlebitis and diverticulitis seen on imaging    On Eliquis daily         Current Assessment & Plan                 Other Visit Diagnoses         Solitary pulmonary nodule    -  Primary    Relevant Orders    CT Chest Without Contrast    Complete PFT without bronchodilator      Pulmonary nodule 1 cm or greater in diameter          Nodule of right lung        Relevant Orders    Case Request Operating Room: ROBOTIC BRONCHOSCOPY (Completed)        Diagnostic and staging robotic bronchoscopy with EBUS 5/13  Will need CT for navigation planning.    I have explained the risks, benefits and alternatives of the procedure in detail.  The patient voices understanding and all questions have been answered.  The patient agrees to proceed as planned.            [1]   Outpatient Encounter Medications as of 4/14/2025   Medication Sig Dispense Refill    ammonium lactate 12 % Crea Apply 1 application  topically 2 (two) times daily. 140 g 11    apixaban (ELIQUIS) 5 mg Tab Take 1  tablet (5 mg total) by mouth 2 (two) times daily. 60 tablet 11    ARIPiprazole (ABILIFY) 10 MG Tab       cyproheptadine (PERIACTIN) 4 mg tablet       diazePAM (VALIUM) 10 MG Tab Take 10 mg by mouth 3 (three) times daily.      finasteride (PROSCAR) 5 mg tablet Take 1 tablet (5 mg total) by mouth once daily. 90 tablet 0    fluticasone propionate (FLONASE) 50 mcg/actuation nasal spray Instill 1 spray (50 mcg total) in each nostril once daily. 16 g 11    gabapentin (NEURONTIN) 300 MG capsule Take 1 capsule (300 mg total) by mouth 3 (three) times daily as needed (leg pain). 90 capsule 5    gabapentin (NEURONTIN) 600 MG tablet Take 1 tablet (600 mg total) by mouth 3 (three) times daily. 90 tablet 11    hydrocortisone (ANUSOL-HC) 2.5 % rectal cream Place rectally 2 (two) times daily. 28 g 1    meloxicam (MOBIC) 15 MG tablet Take 1 tablet (15 mg total) by mouth once daily. 30 tablet 1    mirtazapine (REMERON) 30 MG tablet Take 1 tablet (30 mg total) by mouth every evening. 90 tablet 3    tamsulosin (FLOMAX) 0.4 mg Cap Take 1 capsule (0.4 mg total) by mouth once daily. 90 capsule 0    tadalafiL (CIALIS) 20 MG Tab Take 1 tablet (20 mg total) by mouth daily as needed (erectile dysfunction). 30 tablet 5    [DISCONTINUED] traMADoL (ULTRAM) 50 mg tablet Take 1 tablet (50 mg total) by mouth every 6 (six) hours. 30 tablet 0     No facility-administered encounter medications on file as of 4/14/2025.

## 2025-04-15 DIAGNOSIS — J30.9 ALLERGIC RHINITIS, UNSPECIFIED SEASONALITY, UNSPECIFIED TRIGGER: ICD-10-CM

## 2025-04-15 RX ORDER — FLUTICASONE PROPIONATE 50 MCG
1 SPRAY, SUSPENSION (ML) NASAL DAILY
Qty: 48 G | Refills: 3 | Status: SHIPPED | OUTPATIENT
Start: 2025-04-15

## 2025-04-16 ENCOUNTER — HOSPITAL ENCOUNTER (OUTPATIENT)
Facility: OTHER | Age: 65
Discharge: HOME | End: 2025-04-16
Attending: ANESTHESIOLOGY | Admitting: ANESTHESIOLOGY
Payer: MEDICARE

## 2025-04-16 VITALS
OXYGEN SATURATION: 96 % | RESPIRATION RATE: 15 BRPM | HEART RATE: 74 BPM | SYSTOLIC BLOOD PRESSURE: 127 MMHG | WEIGHT: 175 LBS | TEMPERATURE: 99 F | BODY MASS INDEX: 25.92 KG/M2 | HEIGHT: 69 IN | DIASTOLIC BLOOD PRESSURE: 81 MMHG

## 2025-04-16 DIAGNOSIS — M54.12 CERVICAL RADICULOPATHY: Primary | ICD-10-CM

## 2025-04-16 DIAGNOSIS — G89.29 CHRONIC PAIN: ICD-10-CM

## 2025-04-16 PROBLEM — M50.30 DDD (DEGENERATIVE DISC DISEASE), CERVICAL: Status: ACTIVE | Noted: 2025-04-16

## 2025-04-16 PROBLEM — M54.2 CERVICALGIA: Status: ACTIVE | Noted: 2025-04-16

## 2025-04-16 PROCEDURE — 62321 NJX INTERLAMINAR CRV/THRC: CPT | Performed by: ANESTHESIOLOGY

## 2025-04-16 PROCEDURE — 63600175 PHARM REV CODE 636 W HCPCS: Performed by: ANESTHESIOLOGY

## 2025-04-16 PROCEDURE — 25000003 PHARM REV CODE 250: Performed by: ANESTHESIOLOGY

## 2025-04-16 PROCEDURE — 25500020 PHARM REV CODE 255: Performed by: ANESTHESIOLOGY

## 2025-04-16 PROCEDURE — 62321 NJX INTERLAMINAR CRV/THRC: CPT | Mod: ,,, | Performed by: ANESTHESIOLOGY

## 2025-04-16 PROCEDURE — A4216 STERILE WATER/SALINE, 10 ML: HCPCS | Performed by: ANESTHESIOLOGY

## 2025-04-16 RX ORDER — SODIUM CHLORIDE 9 MG/ML
INJECTION, SOLUTION INTRAMUSCULAR; INTRAVENOUS; SUBCUTANEOUS
Status: DISCONTINUED | OUTPATIENT
Start: 2025-04-16 | End: 2025-04-16 | Stop reason: HOSPADM

## 2025-04-16 RX ORDER — SODIUM CHLORIDE 9 MG/ML
INJECTION, SOLUTION INTRAVENOUS CONTINUOUS
Status: DISCONTINUED | OUTPATIENT
Start: 2025-04-16 | End: 2025-04-16 | Stop reason: HOSPADM

## 2025-04-16 RX ORDER — ALPRAZOLAM 0.5 MG/1
1 TABLET, ORALLY DISINTEGRATING ORAL ONCE
Status: DISCONTINUED | OUTPATIENT
Start: 2025-04-16 | End: 2025-04-16 | Stop reason: HOSPADM

## 2025-04-16 RX ORDER — DEXAMETHASONE SODIUM PHOSPHATE 10 MG/ML
INJECTION, SOLUTION INTRA-ARTICULAR; INTRALESIONAL; INTRAMUSCULAR; INTRAVENOUS; SOFT TISSUE
Status: DISCONTINUED | OUTPATIENT
Start: 2025-04-16 | End: 2025-04-16 | Stop reason: HOSPADM

## 2025-04-16 RX ORDER — LIDOCAINE HYDROCHLORIDE 10 MG/ML
INJECTION, SOLUTION EPIDURAL; INFILTRATION; INTRACAUDAL; PERINEURAL
Status: DISCONTINUED | OUTPATIENT
Start: 2025-04-16 | End: 2025-04-16 | Stop reason: HOSPADM

## 2025-04-16 RX ORDER — LIDOCAINE HYDROCHLORIDE 20 MG/ML
INJECTION, SOLUTION INFILTRATION; PERINEURAL
Status: DISCONTINUED | OUTPATIENT
Start: 2025-04-16 | End: 2025-04-16 | Stop reason: HOSPADM

## 2025-04-16 NOTE — TELEPHONE ENCOUNTER
No care due was identified.  [facility] Embedded Care Due Messages. Reference number: [id number].   4/15/2025 10:54:59 PM CDT

## 2025-04-16 NOTE — DISCHARGE SUMMARY
Discharge Note  Short Stay      SUMMARY     Admit Date: 4/16/2025    Attending Physician: Stuart Viveros MD    Discharge Physician: Stuart Viveros MD      Discharge Date: 4/16/2025 11:20 AM      Procedure(s) (LRB):  CERVICAL C7/T1 IL CHAZ *ELIQUIS CLEARANCE IN CHART* (N/A)    Final Diagnosis:  1. Cervical radiculopathy    2. Chronic pain           Disposition: Home or self care    Patient Instructions:   Current Discharge Medication List        CONTINUE these medications which have NOT CHANGED    Details   ammonium lactate 12 % Crea Apply 1 application  topically 2 (two) times daily.  Qty: 140 g, Refills: 11    Associated Diagnoses: Foot callus; Bunion of great toe      apixaban (ELIQUIS) 5 mg Tab Take 1 tablet (5 mg total) by mouth 2 (two) times daily.  Qty: 60 tablet, Refills: 11    Comments: merged both eliquis rx's PV  Associated Diagnoses: Portal vein thrombosis      ARIPiprazole (ABILIFY) 10 MG Tab       cyproheptadine (PERIACTIN) 4 mg tablet       diazePAM (VALIUM) 10 MG Tab Take 10 mg by mouth 3 (three) times daily.      finasteride (PROSCAR) 5 mg tablet Take 1 tablet (5 mg total) by mouth once daily.  Qty: 90 tablet, Refills: 0    Associated Diagnoses: Weak urine stream      fluticasone propionate (FLONASE) 50 mcg/actuation nasal spray Instill 1 spray (50 mcg total) in each nostril once daily.  Qty: 48 g, Refills: 3    Associated Diagnoses: Allergic rhinitis, unspecified seasonality, unspecified trigger      gabapentin (NEURONTIN) 300 MG capsule Take 1 capsule (300 mg total) by mouth 3 (three) times daily as needed (leg pain).  Qty: 90 capsule, Refills: 5    Associated Diagnoses: Lumbar neuritis; History of prostate cancer; Chronic bilateral low back pain with bilateral sciatica      gabapentin (NEURONTIN) 600 MG tablet Take 1 tablet (600 mg total) by mouth 3 (three) times daily.  Qty: 90 tablet, Refills: 11    Associated Diagnoses: Cervical radiculopathy; Cervicalgia; DDD (degenerative disc  disease), cervical      hydrocortisone (ANUSOL-HC) 2.5 % rectal cream Place rectally 2 (two) times daily.  Qty: 28 g, Refills: 1    Associated Diagnoses: BRBPR (bright red blood per rectum)      meloxicam (MOBIC) 15 MG tablet Take 1 tablet (15 mg total) by mouth once daily.  Qty: 30 tablet, Refills: 1    Associated Diagnoses: Cervical radiculopathy; Cervicalgia; DDD (degenerative disc disease), cervical      mirtazapine (REMERON) 30 MG tablet Take 1 tablet (30 mg total) by mouth every evening.  Qty: 90 tablet, Refills: 3    Associated Diagnoses: Moderate episode of recurrent major depressive disorder      tadalafiL (CIALIS) 20 MG Tab Take 1 tablet (20 mg total) by mouth daily as needed (erectile dysfunction).  Qty: 30 tablet, Refills: 5    Associated Diagnoses: ED (erectile dysfunction) of organic origin      tamsulosin (FLOMAX) 0.4 mg Cap Take 1 capsule (0.4 mg total) by mouth once daily.  Qty: 90 capsule, Refills: 0    Associated Diagnoses: Weak urine stream                 Discharge Diagnosis: Same as above  Condition on Discharge: Stable with no complications to procedure   Diet on Discharge: Same as before.  Activity: as per instruction sheet.  Discharge to: Home with a responsible adult.  Follow up: 2-4 weeks       Please call my office or pager at [phone] if experienced any weakness or loss of sensation, fever > 101.5, pain uncontrolled with oral medications, persistent nausea/vomiting/or diarrhea, redness or drainage from the incisions, or any other worrisome concerns. If physician on call was not reached or could not communicate with our office for any reason please go to the nearest emergency department     Kiki Delgado MD

## 2025-04-16 NOTE — DISCHARGE INSTRUCTIONS

## 2025-04-16 NOTE — TELEPHONE ENCOUNTER
Refill Decision Note   Jesus Christy  is requesting a refill authorization.  Brief Assessment and Rationale for Refill:  Approve     Medication Therapy Plan:         Comments:     Note composed:11:17 PM 04/15/2025

## 2025-04-16 NOTE — OP NOTE
Cervical Interlaminar Epidural Steroid Injection under Fluoroscopic Guidance    The procedure, risks, benefits, and options were discussed with the patient. There are no contraindications to the procedure. The patent expressed understanding and agreed to the procedure. Informed written consent was obtained prior to the start of the procedure and can be found in the patient's chart.     PATIENT NAME: Jesus Christy   MRN: [id number]     DATE OF PROCEDURE: 04/16/2025    PROCEDURE: Cervical Interlaminar Epidural Steroid Injection C7/T1 under Fluoroscopic Guidance    PRE-OP DIAGNOSIS: Cervical radiculopathy [M54.12] Cervical radiculopathy [M54.12]    POST-OP DIAGNOSIS: Same    PHYSICIAN: Stuart Viveros MD     ASSISTANTS: Phuong Nguyen, MD [facility] Fellow      MEDICATIONS INJECTED: Preservative-free Decadron 10mg with 1cc of Lidocaine 1% MPF and preservative free normal saline    LOCAL ANESTHETIC INJECTED: Xylocaine 2%     SEDATION: None    ESTIMATED BLOOD LOSS: None    COMPLICATIONS: None    TECHNIQUE: Time-out was performed to identify the patient and procedure to be performed. With the patient laying in a prone position, the surgical area was prepped and draped in the usual sterile fashion using ChloraPrep and a fenestrated drape. The level was determined under fluoroscopy guidance. Skin anesthesia was achieved by injecting Lidocaine 2% over the injection site.  The interlaminar space was then approached with a 18 gauge, 3.5 inch Tuohy needle that was introduced under fluoroscopic guidance with AP, lateral and/or contralateral oblique imaging. Once the Ligamentum flavum was encountered loss of resistance to saline was used to enter the epidural space. With positive loss of resistance and negative aspiration for CSF or Blood, contrast dye  Omnipaque (300mg/mL) was injected to confirm placement and there was no vascular runoff. Then 4 mL of the medication mixture listed above was then injected slowly.  Displacement of the radio opaque contrast after injection of the medication confirmed that the medication went into the area of the epidural space. The needles were removed, and bleeding was nil. A sterile dressing was applied. No specimens collected. The patient tolerated the procedure well.       The patient was monitored after the procedure in the recovery area. They were given post-procedure and discharge instructions to follow at home. The patient was discharged in a stable condition.    Kiki Delgado MD     I reviewed and edited the fellow's note. I conducted my own interview and physical examination. I agree with the findings. I was present and supervising all critical portions of the procedure.  Stuart Viveros MD

## 2025-04-17 ENCOUNTER — TELEPHONE (OUTPATIENT)
Dept: PAIN MEDICINE | Facility: CLINIC | Age: 65
End: 2025-04-17
Payer: MEDICARE

## 2025-04-17 ENCOUNTER — NURSE TRIAGE (OUTPATIENT)
Dept: ADMINISTRATIVE | Facility: CLINIC | Age: 65
End: 2025-04-17
Payer: MEDICARE

## 2025-04-17 NOTE — TELEPHONE ENCOUNTER
States that he is on Eliquis and had a procedure on yesterday and the medication was held 3 days before. Was advised to call after the procedure to find out when he should restart the medication. Please call the patient when the office opens to advise him on when to restart the Eliquis. Please call the patient at [phone]. Pt advised to call back at 10 am if no call before that time.   Reason for Disposition   [1] Caller has NON-URGENT medicine question about med that PCP prescribed AND [2] triager unable to answer question    Additional Information   Negative: Sounds like a life-threatening emergency to the triager   Negative: Chest pain   Negative: Difficulty breathing   Medication question   Negative: [1] Intentional drug overdose AND [2] suicidal thoughts or ideas   Negative: MORE THAN A DOUBLE DOSE of a prescription or over-the-counter (OTC) drug   Negative: [1] DOUBLE DOSE (an extra dose or lesser amount) of prescription drug AND [2] any symptoms (e.g., dizziness, nausea, pain, sleepiness)   Negative: [1] DOUBLE DOSE (an extra dose or lesser amount) of over-the-counter (OTC) drug AND [2] any symptoms (e.g., dizziness, nausea, pain, sleepiness)   Negative: Took another person's prescription drug   Negative: [1] DOUBLE DOSE (an extra dose or lesser amount) of prescription drug AND [2] NO symptoms  (Exception: A double dose of antibiotics.)   Negative: Diabetes drug error or overdose (e.g., took wrong type of insulin or took extra dose)   Negative: [1] Prescription not at pharmacy AND [2] was prescribed by PCP recently (Exception: Triager has access to EMR and prescription is recorded there. Go to Home Care and confirm for pharmacy.)   Negative: [1] Pharmacy calling with prescription question AND [2] triager unable to answer question   Negative: [1] Caller has URGENT medicine question about med that PCP or specialist prescribed AND [2] triager unable to answer question   Negative: Medicine patch causing local  rash or itching   Negative: [1] Caller has medicine question about med NOT prescribed by PCP AND [2] triager unable to answer question (e.g., compatibility with other med, storage)   Negative: Prescription request for new medicine (not a refill)    Protocols used: Post-Op Symptoms and [id number], Medication Question Call-A-AH

## 2025-04-17 NOTE — TELEPHONE ENCOUNTER
"Pt following up from previous call. Pt asking when can he start Eliquis again. Advise to receive callback today per protocol. No provider on call for pain management in Epic. AVS reviewed. Noted "You may resume your normal medications today. To restart blood thinners, ask your doctor." No provider on call of [facility] in Epic at this time. Encounter routed to provider for f/u call. Pt advised to callback if haven't heard from staff by 3:00 pm. VU. Encounter routed to provider.      Reason for Disposition   Caller has NON-URGENT medicine question about med that PCP or specialist prescribed and triager unable to answer question    Additional Information   Negative: Intentional drug overdose and suicidal thoughts or ideas   Negative: MORE THAN A DOUBLE DOSE of a prescription or over-the-counter (OTC) drug   Negative: DOUBLE DOSE (an extra dose or lesser amount) of prescription drug and any symptoms (e.g., dizziness, nausea, pain, sleepiness)   Negative: DOUBLE DOSE (an extra dose or lesser amount) of over-the-counter (OTC) drug and any symptoms (e.g., dizziness, nausea, pain, sleepiness)   Negative: Took another person's prescription drug   Negative: DOUBLE DOSE (an extra dose or lesser amount) of prescription drug and NO symptoms  (Exception: A double dose of antibiotics.)   Negative: Diabetes drug error or overdose (e.g., took wrong type of insulin or took extra dose)   Negative: Caller has medication question about med NOT prescribed by PCP and triager unable to answer question (e.g., compatibility with other med, storage)   Negative: Prescription not at pharmacy and was prescribed by PCP recently  (Exception: triager has access to EMR and prescription is recorded there. Go to [facility] and confirm for pharmacy.)   Negative: Pharmacy calling with prescription question and triager unable to answer question   Negative: Caller has URGENT medicine question about med that PCP or specialist prescribed and " triager unable to answer question    Protocols used: Medication Question Call-A-[place]

## 2025-04-17 NOTE — TELEPHONE ENCOUNTER
----- Message from Bree sent at 4/17/2025  8:19 AM CDT -----  Consult/AdvisoryName Of Caller:Jesus Contact Preference: [phone] of call: Pt called stating he had to stop his blood thinners for three days he is asking who do he speak to about him starting back to take them please call to assist

## 2025-04-17 NOTE — TELEPHONE ENCOUNTER
Staff spoke with patient in regards to his questions/concerns regarding his blood thinners. Patient had his procedure yesterday 04/16/25. Staff spoke with patient and let him know he can resume his blood thinners as normal.

## 2025-04-22 ENCOUNTER — PATIENT MESSAGE (OUTPATIENT)
Dept: RESEARCH | Facility: HOSPITAL | Age: 65
End: 2025-04-22
Payer: MEDICARE

## 2025-04-22 ENCOUNTER — HOSPITAL ENCOUNTER (OUTPATIENT)
Dept: PULMONOLOGY | Facility: CLINIC | Age: 65
Discharge: HOME | End: 2025-04-22
Payer: MEDICARE

## 2025-04-22 ENCOUNTER — TELEPHONE (OUTPATIENT)
Dept: PAIN MEDICINE | Facility: CLINIC | Age: 65
End: 2025-04-22
Payer: MEDICARE

## 2025-04-22 DIAGNOSIS — R91.1 SOLITARY PULMONARY NODULE: ICD-10-CM

## 2025-04-22 LAB
DLCO SINGLE BREATH LLN: 20.6
DLCO SINGLE BREATH PRE REF: 85 %
DLCO SINGLE BREATH REF: 27.52
DLCOC SBVA LLN: 2.77
DLCOC SBVA REF: 3.98
DLCOC SINGLE BREATH LLN: 20.6
DLCOC SINGLE BREATH REF: 27.52
DLCOCSBVAULN: 5.18
DLCOCSINGLEBREATHULN: 34.45
DLCOSINGLEBREATHULN: 34.45
DLCOSINGLEBREATHZSCORE: -0.98
DLCOVA LLN: 2.77
DLCOVA PRE REF: 110.3 %
DLCOVA PRE: 4.39 ML/(MIN*MMHG*L) (ref 2.77–5.18)
DLCOVA REF: 3.98
DLCOVAULN: 5.18
ERV LLN: -16448.89
ERV PRE REF: 44 %
ERV REF: 1.11
ERVULN: ABNORMAL
FEF 25 75 LLN: 1.64
FEF 25 75 PRE REF: 44.1 %
FEF 25 75 REF: 3.35
FEV05 LLN: 1.52
FEV05 REF: 2.65
FEV1 FVC LLN: 65
FEV1 FVC PRE REF: 86.6 %
FEV1 FVC REF: 78
FEV1 LLN: 2
FEV1 PRE REF: 84.2 %
FEV1 REF: 2.82
FEV1FVCZSCORE: -1.41
FEV1ZSCORE: -0.91
FRCPLETH LLN: 2.6
FRCPLETH PREREF: 81.3 %
FRCPLETH REF: 3.59
FRCPLETHULN: 4.57
FVC LLN: 2.67
FVC PRE REF: 97.1 %
FVC REF: 3.64
FVCZSCORE: -0.18
IVC PRE: 3.66 L (ref 2.67–4.62)
IVC SINGLE BREATH LLN: 2.67
IVC SINGLE BREATH PRE REF: 100.5 %
IVC SINGLE BREATH REF: 3.64
IVCSINGLEBREATHULN: 4.62
LLN IC: -9999996.86
PEF LLN: 5.43
PEF PRE REF: 69.7 %
PEF REF: 8.01
PHYSICIAN COMMENT: ABNORMAL
PRE DLCO: 23.41 ML/(MIN*MMHG) (ref 20.6–34.45)
PRE ERV: 0.49 L (ref -16448.89–16451.11)
PRE FEF 25 75: 1.48 L/S (ref 1.64–5.06)
PRE FET 100: 6.66 SEC
PRE FEV05 REF: 64.8 %
PRE FEV1 FVC: 67.19 % (ref 65.29–88.35)
PRE FEV1: 2.37 L (ref 2–3.59)
PRE FEV5: 1.72 L (ref 1.52–3.79)
PRE FRC PL: 2.92 L (ref 2.6–4.57)
PRE FVC: 3.53 L (ref 2.67–4.62)
PRE IC: 3.34 L (ref -9999996.86–#######.####)
PRE PEF: 5.58 L/S (ref 5.43–10.59)
PRE REF IC: 106.4 %
PRE RV: 2.43 L (ref 1.8–3.15)
PRE TLC: 6.25 L (ref 5.77–8.07)
RAW PRE REF: 83.9 %
RAW PRE: 2.57 CMH2O*S/L (ref 3.06–3.06)
RAW REF: 3.06
REF IC: 3.14
RV LLN: 1.8
RV PRE REF: 98 %
RV REF: 2.47
RVTLC LLN: 30
RVTLC PRE REF: 99.7 %
RVTLC PRE: 38.79 % (ref 29.94–47.9)
RVTLC REF: 39
RVTLCULN: 48
RVULN: 3.15
SGAW PRE REF: 138.7 %
SGAW PRE: 0.12 1/(CMH2O*S) (ref 0.08–0.08)
SGAW REF: 0.08
TLC LLN: 5.77
TLC PRE REF: 90.3 %
TLC REF: 6.92
TLC ULN: 8.07
TLCZSCORE: -0.96
ULN IC: ABNORMAL
VA PRE: 5.34 L (ref 6.77–6.77)
VA SINGLE BREATH LLN: 6.77
VA SINGLE BREATH PRE REF: 78.8 %
VA SINGLE BREATH REF: 6.77
VASINGLEBREATHULN: 6.77
VC LLN: 2.67
VC PRE REF: 105.2 %
VC PRE: 3.83 L (ref 2.67–4.62)
VC REF: 3.64
VC ULN: 4.62

## 2025-04-22 NOTE — TELEPHONE ENCOUNTER
----- Message from Mattie sent at 4/22/2025  3:18 PM CDT -----   Name of Who is Calling: What is the request in detail:  patient request call back in reference to pain / patient  had injection 04/15/25 and is still in pain   and want to know what can he do  Please contact to further discuss and advise  Can the clinic reply by [facility]: What Number to Call Back if not in [facility]:   [phone]

## 2025-04-23 ENCOUNTER — TELEPHONE (OUTPATIENT)
Dept: PAIN MEDICINE | Facility: CLINIC | Age: 65
End: 2025-04-23
Payer: MEDICARE

## 2025-04-23 NOTE — TELEPHONE ENCOUNTER
Staff spoke with patient and assisted him with questions/concerns regarding pain after Cervical CHAZ procedure 04/16/25. Patient has a post op procedure visit scheduled with Maritza Dugan where he will discuss next options if he feel the CHAZ didn't help with pain relief.

## 2025-04-23 NOTE — TELEPHONE ENCOUNTER
----- Message from Tabitha sent at 4/23/2025 10:31 AM CDT -----  Regarding: pain concerns  Name of Who is Calling: Jesus What is the request in detail: Patient is requesting a call back because he is still in pain.  Can the clinic reply by [facility]: No [facility]  [phone]

## 2025-05-02 ENCOUNTER — HOSPITAL ENCOUNTER (OUTPATIENT)
Dept: RADIOLOGY | Facility: OTHER | Age: 65
Discharge: HOME | End: 2025-05-02
Attending: STUDENT IN AN ORGANIZED HEALTH CARE EDUCATION/TRAINING PROGRAM
Payer: MEDICARE

## 2025-05-02 ENCOUNTER — OFFICE VISIT (OUTPATIENT)
Dept: PRIMARY CARE CLINIC | Facility: CLINIC | Age: 65
End: 2025-05-02
Payer: MEDICARE

## 2025-05-02 VITALS
HEART RATE: 65 BPM | DIASTOLIC BLOOD PRESSURE: 76 MMHG | HEIGHT: 69 IN | WEIGHT: 179.69 LBS | BODY MASS INDEX: 26.62 KG/M2 | OXYGEN SATURATION: 96 % | SYSTOLIC BLOOD PRESSURE: 126 MMHG

## 2025-05-02 DIAGNOSIS — M25.552 LEFT HIP PAIN: ICD-10-CM

## 2025-05-02 DIAGNOSIS — M25.562 ACUTE PAIN OF LEFT KNEE: ICD-10-CM

## 2025-05-02 DIAGNOSIS — M79.672 LEFT FOOT PAIN: ICD-10-CM

## 2025-05-02 DIAGNOSIS — M79.672 LEFT FOOT PAIN: Primary | ICD-10-CM

## 2025-05-02 PROCEDURE — 99213 OFFICE O/P EST LOW 20 MIN: CPT | Mod: S$GLB,,, | Performed by: STUDENT IN AN ORGANIZED HEALTH CARE EDUCATION/TRAINING PROGRAM

## 2025-05-02 PROCEDURE — 73502 X-RAY EXAM HIP UNI 2-3 VIEWS: CPT | Mod: 26,LT,, | Performed by: RADIOLOGY

## 2025-05-02 PROCEDURE — 3074F SYST BP LT 130 MM HG: CPT | Mod: CPTII,S$GLB,, | Performed by: STUDENT IN AN ORGANIZED HEALTH CARE EDUCATION/TRAINING PROGRAM

## 2025-05-02 PROCEDURE — 73630 X-RAY EXAM OF FOOT: CPT | Mod: 26,LT,, | Performed by: RADIOLOGY

## 2025-05-02 PROCEDURE — 3008F BODY MASS INDEX DOCD: CPT | Mod: CPTII,S$GLB,, | Performed by: STUDENT IN AN ORGANIZED HEALTH CARE EDUCATION/TRAINING PROGRAM

## 2025-05-02 PROCEDURE — 73630 X-RAY EXAM OF FOOT: CPT | Mod: TC,FY,LT

## 2025-05-02 PROCEDURE — 73502 X-RAY EXAM HIP UNI 2-3 VIEWS: CPT | Mod: TC,FY,LT

## 2025-05-02 PROCEDURE — 99999 PR PBB SHADOW E&M-EST. PATIENT-LVL V: CPT | Mod: PBBFAC,,, | Performed by: STUDENT IN AN ORGANIZED HEALTH CARE EDUCATION/TRAINING PROGRAM

## 2025-05-02 PROCEDURE — 73562 X-RAY EXAM OF KNEE 3: CPT | Mod: TC,FY,LT

## 2025-05-02 PROCEDURE — 3078F DIAST BP <80 MM HG: CPT | Mod: CPTII,S$GLB,, | Performed by: STUDENT IN AN ORGANIZED HEALTH CARE EDUCATION/TRAINING PROGRAM

## 2025-05-02 PROCEDURE — 73562 X-RAY EXAM OF KNEE 3: CPT | Mod: 26,LT,, | Performed by: RADIOLOGY

## 2025-05-02 RX ORDER — LIDOCAINE 50 MG/G
1 PATCH TOPICAL DAILY
Qty: 5 PATCH | Refills: 1 | Status: SHIPPED | OUTPATIENT
Start: 2025-05-02

## 2025-05-02 RX ORDER — DICLOFENAC SODIUM 10 MG/G
2 GEL TOPICAL 4 TIMES DAILY
Qty: 200 G | Refills: 2 | Status: SHIPPED | OUTPATIENT
Start: 2025-05-02

## 2025-05-02 NOTE — PROGRESS NOTES
Office visit  Patient: Jesus Christy   5/2/2025       Assessment/Plan:       1. Left foot pain  -     X-Ray Foot Complete Left; Future; Expected date: 05/02/2025        -suspect muscle strain vs bone bruising        -supportive care - continue meloxicam and gabapentin; add diclofenac gel and lidocaine patch as needed        -discussed expected course of illness    2. Acute pain of left knee  -     Cancel: X-Ray Knee Complete 4 or More Views Left; Future; Expected date: 05/02/2025        -as above    3. Left hip pain  -     X-Ray Hip 2 or 3 views Left with Pelvis when performed; Future; Expected date: 05/02/2025  -     LIDOcaine (LIDODERM) 5 %; Place 1 patch onto the skin once daily. Remove & Discard patch within 12 hours or as directed by MD  Dispense: 5 patch; Refill: 1        -as above    Other orders  -     diclofenac sodium (VOLTAREN) 1 % Gel; Apply 2 g topically 4 (four) times daily.  Dispense: 200 g; Refill: 2        CHIEF COMPLAINT: pain in his left hip and leg    HPI: Jesus Christy is a 64 y.o. male who presents for pain in his left lower extremity.  He fell off the back of a truck that was stationary and landed on his left side.  He did not hit his head.  He initially had bruising, but it resolved.  He has the most pain in his left hip and lateral side of his left foot.  He takes meloxicam and gabapentin.  He hasn't been taking Tylenol because it upsets his stomach.  He used a heating pad, but it didn't help.  He's been resting his left side.  Immediately after the fall, he was still able to walk.  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    He denies any history of diabetes or hypertension.        Current Outpatient Medications   Medication Instructions    ammonium lactate 12 % Crea 1 application , Topical (Top), 2 times daily    ARIPiprazole (ABILIFY) 10 MG Tab     cyproheptadine (PERIACTIN) 4 mg tablet     diazePAM (VALIUM) 10 mg, 3 times daily  "   diclofenac sodium (VOLTAREN) 2 g, Topical (Top), 4 times daily    ELIQUIS 5 mg, Oral, 2 times daily    finasteride (PROSCAR) 5 mg, Oral, Daily    fluticasone propionate (FLONASE) 50 mcg/actuation nasal spray Instill 1 spray (50 mcg total) in each nostril once daily.    gabapentin (NEURONTIN) 300 mg, Oral, 3 times daily PRN    gabapentin (NEURONTIN) 600 mg, Oral, 3 times daily    hydrocortisone (ANUSOL-HC) 2.5 % rectal cream Rectal, 2 times daily    LIDOcaine (LIDODERM) 5 % 1 patch, Transdermal, Daily, Remove & Discard patch within 12 hours or as directed by MD    meloxicam (MOBIC) 15 mg, Oral, Daily    mirtazapine (REMERON) 30 mg, Oral, Nightly    tadalafiL (CIALIS) 20 mg, Oral, Daily PRN    tamsulosin (FLOMAX) 0.4 mg, Oral, Daily       No results found for: "HGBA1C"  No results found for: "MICALBCREAT"  Lab Results   Component Value Date    LDLCALC 91.0 12/13/2022    LDLCALC 60.4 (L) 06/03/2020    CHOL 154 12/13/2022    HDL 36 (L) 12/13/2022    TRIG 135 12/13/2022       Lab Results   Component Value Date     11/19/2024    K 3.9 11/19/2024     11/19/2024    CO2 27 11/19/2024    GLU 99 11/19/2024    BUN 7 (L) 11/19/2024    CREATININE 1.1 11/19/2024    CALCIUM 10.0 11/19/2024    PROT 8.3 10/07/2024    ALBUMIN 4.4 10/07/2024    BILITOT 1.2 (H) 10/07/2024    ALKPHOS 147 (H) 10/07/2024    AST 27 10/07/2024    ALT 50 (H) 10/07/2024    ANIONGAP 6 (L) 11/19/2024    ESTGFRAFRICA >60.0 06/03/2020    EGFRNONAA >60.0 06/03/2020    WBC 3.94 12/19/2024    HGB 13.2 (L) 12/19/2024    HGB 13.0 (L) 11/19/2024    HCT 40.9 12/19/2024    MCV 97 12/19/2024     12/19/2024    TSH 1.628 06/03/2020    PSA 6.9 (H) 09/01/2016    PSA 6.2 (H) 08/22/2016    PSADIAG 7.7 (H) 03/12/2024    PSADIAG 12.4 (H) 01/18/2024    HEPCAB Non-reactive 01/05/2024       Lab Results   Component Value Date    NROVYECN60 >2000 (H) 01/26/2024    FERRITIN 205 06/18/2024    IRON 69 06/18/2024    TRANSFERRIN 231 06/18/2024    TIBC 342 " 06/18/2024    FESATURATED 20 06/18/2024         Past Medical History:   Diagnosis Date    Complex regional pain syndrome i of right lower limb     GERD (gastroesophageal reflux disease)     HTN (hypertension)     Portal vein thrombosis      Past Surgical History:   Procedure Laterality Date    COLONOSCOPY N/A 9/29/2017    Procedure: COLONOSCOPY;  Surgeon: Jamar Edwards MD;  Location: [facility] (4TH FLR);  Service: Endoscopy;  Laterality: N/A;    COLONOSCOPY N/A 2/12/2024    Procedure: COLONOSCOPY;  Surgeon: Miguel Angel Huffman MD;  Location: [facility] (2ND FLR);  Service: Endoscopy;  Laterality: N/A;    ENDOSCOPIC ULTRASOUND OF UPPER GASTROINTESTINAL TRACT N/A 7/5/2024    Procedure: ULTRASOUND, UPPER GI TRACT, ENDOSCOPIC;  Surgeon: Debi Lloyd MD;  Location: [facility] (2ND FLR);  Service: Endoscopy;  Laterality: N/A;  3/21 portal instr-EUS with myself at [facility] next couple of months pancreas tail cyst.joseluis-Eliquis pending Dr. Miranda TE 3/21-tt  5/15/24: instructions sent via portal. eliquis hold in TE 3/21-GD  6/26-pre call complete-tb-will hold eliquis starting     ESOPHAGOGASTRODUODENOSCOPY N/A 2/12/2024    Procedure: EGD (ESOPHAGOGASTRODUODENOSCOPY);  Surgeon: Miguel Angel Huffman MD;  Location: [facility] (2ND FLR);  Service: Endoscopy;  Laterality: N/A;  1/30/24-Okay to add per Dr. Huffman, 2nd flr-recent hospitalization for sepsis and portal vein thrombosis, Miralax, instr portal and email, Approval to hold Eliquis and medical clearance rec'd from Dr. Jolly (see telephone encounter 1/30/24)-DS  2/5-prec    JOINT REPLACEMENT Right     knee    KNEE ARTHROSCOPY W/ ACL RECONSTRUCTION  2014    right    TRANSFORAMINAL EPIDURAL INJECTION OF STEROID N/A 4/16/2025    Procedure: CERVICAL C7/T1 IL CHAZ *ELIQUIS CLEARANCE IN CHART*;  Surgeon: Stuart Viveros MD;  Location: [facility];  Service: Pain Management;  Laterality: N/A;  2 WK F/U EDDI       Social History[1]    family history includes Cancer in his  "brother; Diabetes in his father; Heart disease in his father; Hypertension in his mother; No Known Problems in his daughter, daughter, daughter, daughter, son, and son.    Vitals:    05/02/25 1001   BP: 126/76   Pulse: 65   SpO2: 96%   Weight: 81.5 kg (179 lb 10.8 oz)   Height: 5' 9" (1.753 m)   PainSc:   8   PainLoc: Leg       Body mass index is 26.53 kg/m².      Objective:   Physical Exam  Vitals reviewed.   Constitutional:       General: He is not in acute distress.     Appearance: Normal appearance. He is not diaphoretic.   HENT:      Head: Normocephalic.      Right Ear: External ear normal.      Left Ear: External ear normal.   Eyes:      General: No scleral icterus.     Conjunctiva/sclera: Conjunctivae normal.   Cardiovascular:      Comments: Appears well-perfused  Pulmonary:      Effort: Pulmonary effort is normal. No respiratory distress.   Musculoskeletal:      Cervical back: Normal range of motion.      Left hip: No deformity or lacerations. Decreased range of motion (secondary to pain).      Left knee: Crepitus present. No swelling, deformity, effusion, erythema, ecchymosis or bony tenderness. Normal range of motion. Tenderness present over the lateral joint line and LCL. Normal alignment and normal meniscus.   Feet:      Comments: Tenderness to palpation on the lateral side of left foot; flexion and extension limited secondary to pain  Skin:     Findings: No lesion or rash.   Neurological:      General: No focal deficit present.      Mental Status: He is alert and oriented to person, place, and time.   Psychiatric:         Mood and Affect: Mood normal.         Behavior: Behavior normal.         Thought Content: Thought content normal.           Malissa Sweet MD  Internal Medicine and Pediatrics                                   [1]  Social History  Tobacco Use    Smoking status: Never    Smokeless tobacco: Never   Substance Use Topics    Alcohol use: Yes     Alcohol/week: 0.8 standard drinks of " alcohol     Types: 1 Standard drinks or equivalent per week     Comment: once every few months    Drug use: No

## 2025-05-04 ENCOUNTER — RESULTS FOLLOW-UP (OUTPATIENT)
Dept: PRIMARY CARE CLINIC | Facility: CLINIC | Age: 65
End: 2025-05-04

## 2025-05-06 ENCOUNTER — PATIENT MESSAGE (OUTPATIENT)
Dept: PRIMARY CARE CLINIC | Facility: CLINIC | Age: 65
End: 2025-05-06
Payer: MEDICARE

## 2025-05-06 ENCOUNTER — HOSPITAL ENCOUNTER (OUTPATIENT)
Dept: RADIOLOGY | Facility: HOSPITAL | Age: 65
Discharge: HOME | End: 2025-05-06
Attending: INTERNAL MEDICINE
Payer: MEDICARE

## 2025-05-06 ENCOUNTER — TELEPHONE (OUTPATIENT)
Dept: RESEARCH | Facility: HOSPITAL | Age: 65
End: 2025-05-06

## 2025-05-06 DIAGNOSIS — R91.1 SOLITARY PULMONARY NODULE: ICD-10-CM

## 2025-05-06 PROCEDURE — 71250 CT THORAX DX C-: CPT | Mod: 26,,, | Performed by: RADIOLOGY

## 2025-05-06 PROCEDURE — 71250 CT THORAX DX C-: CPT | Mod: TC

## 2025-05-08 ENCOUNTER — TELEPHONE (OUTPATIENT)
Dept: PULMONOLOGY | Facility: CLINIC | Age: 65
End: 2025-05-08
Payer: MEDICARE

## 2025-05-08 ENCOUNTER — OFFICE VISIT (OUTPATIENT)
Dept: PAIN MEDICINE | Facility: CLINIC | Age: 65
End: 2025-05-08
Payer: MEDICARE

## 2025-05-08 ENCOUNTER — PATIENT MESSAGE (OUTPATIENT)
Dept: PAIN MEDICINE | Facility: CLINIC | Age: 65
End: 2025-05-08

## 2025-05-08 VITALS
RESPIRATION RATE: 19 BRPM | BODY MASS INDEX: 26.42 KG/M2 | DIASTOLIC BLOOD PRESSURE: 77 MMHG | HEART RATE: 75 BPM | WEIGHT: 178.38 LBS | HEIGHT: 69 IN | OXYGEN SATURATION: 100 % | SYSTOLIC BLOOD PRESSURE: 127 MMHG

## 2025-05-08 DIAGNOSIS — M75.42 SHOULDER IMPINGEMENT SYNDROME, LEFT: ICD-10-CM

## 2025-05-08 DIAGNOSIS — M19.012 PRIMARY OSTEOARTHRITIS OF LEFT SHOULDER: Primary | ICD-10-CM

## 2025-05-08 DIAGNOSIS — M19.012 PRIMARY OSTEOARTHRITIS OF LEFT SHOULDER: ICD-10-CM

## 2025-05-08 DIAGNOSIS — M25.512 CHRONIC LEFT SHOULDER PAIN: ICD-10-CM

## 2025-05-08 DIAGNOSIS — M54.2 CERVICALGIA: ICD-10-CM

## 2025-05-08 DIAGNOSIS — M48.02 CERVICAL SPINAL STENOSIS: ICD-10-CM

## 2025-05-08 DIAGNOSIS — M47.812 FACET ARTHROPATHY, CERVICAL: ICD-10-CM

## 2025-05-08 DIAGNOSIS — M54.12 CERVICAL RADICULOPATHY: Primary | ICD-10-CM

## 2025-05-08 DIAGNOSIS — G89.29 CHRONIC LEFT SHOULDER PAIN: ICD-10-CM

## 2025-05-08 DIAGNOSIS — M50.30 DDD (DEGENERATIVE DISC DISEASE), CERVICAL: ICD-10-CM

## 2025-05-08 DIAGNOSIS — G89.29 OTHER CHRONIC PAIN: ICD-10-CM

## 2025-05-08 PROCEDURE — 99999 PR PBB SHADOW E&M-EST. PATIENT-LVL IV: CPT | Mod: PBBFAC,,,

## 2025-05-08 NOTE — PROGRESS NOTES
Interventional Pain Management - Established Visit  Follow-Up     PCP: Tara Jolly MD    REFERRING PHYSICIAN: No ref. provider found    CHIEF COMPLAINT: Neck Pain     Original HISTORY OF PRESENT ILLNESS: Jesus Christy presents to the clinic for the evaluation of the above pain. The pain started 5-6 weeks, not in the setting of any injuries. The pain is radicular in nature, with radiation down the left arm, all the way to his finger. Patient also reports numbness in his fingers. No objects falling out of his hands. The pain is described as sharp and shooting. Exacerbating factors: Standing, Lifting, and Getting out of bed/chair. Patient said the pain is worse with laying on his left side. Mitigating factors nothing. Symptoms interfere with daily activity, sleeping, and work. The patient feels like symptoms have been unchanged. Patient denies night fever/night sweats, urinary incontinence, bowel incontinence, significant weight loss, significant motor weakness, and loss of sensations.    Original PAIN SCORES:  Best: Pain is 8  Worst: Pain is 10  Current: Pain is 8        5/8/2025     8:50 AM   Last 3 PDI Scores   Pain Disability Index (PDI) 54     INTERVAL HISTORY (5/8/2025):   Jesus Christy returns for follow-up after C7/T1 ILESI from 4/16/2025. He reports no relief. He continues with left-sided neck pain with radiation into the left arm and hand in a C8 distribution. He states his shoulder is painful with overhead movements. He states the pain is present constantly, but is worse when he turns his head to the left and if he sleeps on the left side. He continues Gabapentin 600 mg TID and Mobic 15 mg daily with limited benefit. He denies any perceived side effects. He denies recent health changes. He denies recent falls or trauma. He denies new onset fever/night sweats, urinary incontinence, bowel incontinence, significant weight changes, significant motor weakness or changes, or loss of sensations. His  neck pain today is 8/10.        Conservative therapy:  PT: None   Chiro: None   HEP in the past 6 months: patient preforming daily HEP with limited benefit     Treatments / Medications: (Ice/Heat/NSAIDS/APAP/etc):  300 mg Gabapentin TID   50 mg Tramadol (Short course Rx by outside provider)    ON ELIQUIS    Interventional Pain Procedures:   4/16/2025 - C7/T1 ILESI - no relief    IMAGING:      XR SHOULDER COMPLETE 2 OR MORE VIEWS LEFT     CLINICAL HISTORY:  Radiculopathy, cervical region     TECHNIQUE:  Two or three views of the left shoulder were performed.     COMPARISON:  Non 09/19/2012 e     FINDINGS:  Mild DJD.  Slightly narrowed glenohumeral joint space.  No fracture or dislocation.  No bone destruction identified     Impression:     See above        Electronically signed by:Parker Meyer MD  Date:                                            03/12/2025  Time:                                           14:08    MRI CERVICAL SPINE WITHOUT CONTRAST     CLINICAL HISTORY:  Neck pain, chronic;Neck pain, chronic, degenerative changes on xray;.  Cervicalgia     TECHNIQUE:  Multiplanar, multisequence MR images of the cervical spine were acquired without the administration of contrast.     COMPARISON:  None.     FINDINGS:  The marrow demonstrates a homogeneous signal.  Vertebral body heights are maintained.     Disc spaces are maintained.     Reversal of the normal cervical lordosis.  Grade 1 retrolisthesis C3-4 and C5-6.     Multilevel degenerative changes detailed below:     C2-3: No significant canal or neural foraminal narrowing.     C3-4: Posterior disc osteophyte complex with mild canal narrowing.  Uncovertebral spurring and facet arthropathy result in severe right and moderate left neural foraminal narrowing.     C4-5: Posterior disc osteophyte complex, asymmetric to the left, without significant canal narrowing.  Uncovertebral spurring with resultant severe left lateral recess and foraminal stenosis.     C5-6:  Posterior disc osteophyte complex with effacement of the thecal sac and mild to moderate canal narrowing.  Uncovertebral spurring and facet arthropathy result in severe bilateral neural foraminal narrowing.     C6-7: Small posterior disc osteophyte complex without significant canal narrowing.  Uncovertebral spurring and facet arthropathy result in severe left and mild right neural foraminal narrowing.     C7-T1: No significant canal or neural foraminal narrowing.     No significant prevertebral soft tissue edema.     Cervical spinal cord is normal in caliber and signal.     Impression:     Multilevel degenerative change resulting in mild-to-moderate canal narrowing C3-4 and C5-6.  Multilevel moderate and severe neural foraminal narrowing.     Reversal the normal cervical lordosis with grade 1 retrolisthesis as above.        Electronically signed by:Ester Patrick  Date:                                            04/11/2025  Time:                                           10:41    XR CERVICAL SPINE COMPLETE 5 VIEW     CLINICAL HISTORY:  . Radiculopathy, cervical region     TECHNIQUE:  AP, Lateral, bilateral oblique and open mouth views of the cervical spine were performed.     COMPARISON:  02/01/2018     FINDINGS:  Straightening of the cervical lordosis.     The vertebral body heights are well maintained.  Moderate disc space narrowing C3-4 and C5-6.     Small anterior osteophyte noted at C3 through C6.     No fracture, no osseous lesions.     The prevertebral soft tissues appear normal.     The oblique views demonstrate a moderate to severe left foraminal narrowing at C4-5.  Moderate right foraminal narrowing at C3-4 and C5-6.     The lung apices are normally aerated.     Impression:     Spondylosis of the cervical spine, no acute process seen.        Electronically signed by:Clara Blake MD  Date:                                            03/13/2025  Time:                                            11:40      Past Medical History:   Diagnosis Date    Complex regional pain syndrome i of right lower limb     GERD (gastroesophageal reflux disease)     HTN (hypertension)     Portal vein thrombosis      Past Surgical History:   Procedure Laterality Date    COLONOSCOPY N/A 9/29/2017    Procedure: COLONOSCOPY;  Surgeon: Jamar Edwards MD;  Location: [facility] (4TH FLR);  Service: Endoscopy;  Laterality: N/A;    COLONOSCOPY N/A 2/12/2024    Procedure: COLONOSCOPY;  Surgeon: Miguel Angel Huffman MD;  Location: [facility] (2ND FLR);  Service: Endoscopy;  Laterality: N/A;    ENDOSCOPIC ULTRASOUND OF UPPER GASTROINTESTINAL TRACT N/A 7/5/2024    Procedure: ULTRASOUND, UPPER GI TRACT, ENDOSCOPIC;  Surgeon: Debi Lloyd MD;  Location: [facility] (2ND FLR);  Service: Endoscopy;  Laterality: N/A;  3/21 portal instr-EUS with myself at [facility] next couple of months pancreas tail cyst.joseluis-Sudhaquis pending Dr. Miranda TE 3/21-tt  5/15/24: instructions sent via portal. eliquis hold in TE 3/21-GD  6/26-pre call complete-tb-will hold eliquis starting     ESOPHAGOGASTRODUODENOSCOPY N/A 2/12/2024    Procedure: EGD (ESOPHAGOGASTRODUODENOSCOPY);  Surgeon: Miguel Angel Huffman MD;  Location: [facility] (2ND FLR);  Service: Endoscopy;  Laterality: N/A;  1/30/24-Okay to add per Dr. Huffman, 2nd flr-recent hospitalization for sepsis and portal vein thrombosis, Miralax, instr portal and email, Approval to hold Eliquis and medical clearance rec'd from Dr. Jolly (see telephone encounter 1/30/24)-DS  2/5-prec    JOINT REPLACEMENT Right     knee    KNEE ARTHROSCOPY W/ ACL RECONSTRUCTION  2014    right    TRANSFORAMINAL EPIDURAL INJECTION OF STEROID N/A 4/16/2025    Procedure: CERVICAL C7/T1 IL CHAZ *ELIQUIS CLEARANCE IN CHART*;  Surgeon: Stuart Viveros MD;  Location: [facility], [place], operated by [facility];  Service: Pain Management;  Laterality: N/A;  2 WK F/U EDDI     Social History[1]  Family History   Problem Relation Name Age of Onset    Hypertension Mother      Heart disease  Father      Diabetes Father      Cancer Brother          unknown type    No Known Problems Daughter      No Known Problems Daughter      No Known Problems Daughter      No Known Problems Daughter      No Known Problems Son      No Known Problems Son       Review of patient's allergies indicates:   Allergen Reactions    Peach (prunus persica)     Morphine Palpitations     Current Outpatient Medications   Medication Sig    ammonium lactate 12 % Crea Apply 1 application  topically 2 (two) times daily.    apixaban (ELIQUIS) 5 mg Tab Take 1 tablet (5 mg total) by mouth 2 (two) times daily.    ARIPiprazole (ABILIFY) 10 MG Tab     cyproheptadine (PERIACTIN) 4 mg tablet     diazePAM (VALIUM) 10 MG Tab Take 10 mg by mouth 3 (three) times daily.    diclofenac sodium (VOLTAREN) 1 % Gel Apply 2 g topically 4 (four) times daily.    finasteride (PROSCAR) 5 mg tablet Take 1 tablet (5 mg total) by mouth once daily.    fluticasone propionate (FLONASE) 50 mcg/actuation nasal spray Instill 1 spray (50 mcg total) in each nostril once daily.    gabapentin (NEURONTIN) 300 MG capsule Take 1 capsule (300 mg total) by mouth 3 (three) times daily as needed (leg pain).    gabapentin (NEURONTIN) 600 MG tablet Take 1 tablet (600 mg total) by mouth 3 (three) times daily.    hydrocortisone (ANUSOL-HC) 2.5 % rectal cream Place rectally 2 (two) times daily.    LIDOcaine (LIDODERM) 5 % Place 1 patch onto the skin once daily. Remove & Discard patch within 12 hours or as directed by MD    meloxicam (MOBIC) 15 MG tablet Take 1 tablet (15 mg total) by mouth once daily.    mirtazapine (REMERON) 30 MG tablet Take 1 tablet (30 mg total) by mouth every evening.    tamsulosin (FLOMAX) 0.4 mg Cap Take 1 capsule (0.4 mg total) by mouth once daily.    tadalafiL (CIALIS) 20 MG Tab Take 1 tablet (20 mg total) by mouth daily as needed (erectile dysfunction).     No current facility-administered medications for this visit.     ROS:  GENERAL: No fever. No chills. No  "fatigue. Denies weight loss. Denies weight gain.  HEENT: Denies headaches. Denies vision change. Denies eye pain. Denies double vision. Denies ear pain.   CV: Denies chest pain.   PULM: Denies of shortness of breath.  GI: Denies constipation. No diarrhea. No abdominal pain. Denies nausea. Denies vomiting. No blood in stool.  HEME: Denies bleeding problems.  : Denies urgency. No painful urination. No blood in urine.  MS: Denies joint stiffness. Denies joint swelling.   SKIN: Denies rash.   NEURO: Denies seizures. No weakness.  PSYCH:  Denies difficulty sleeping. No anxiety. Denies depression. No suicidal thoughts.     VITALS:   Vitals:    05/08/25 0849   BP: 127/77   Pulse: 75   Resp: 19   SpO2: 100%   Weight: 80.9 kg (178 lb 5.6 oz)   Height: 5' 9" (1.753 m)   PainSc:   8   PainLoc: Neck         PHYSICAL EXAM:   GENERAL: Well appearing, in no acute distress, alert and oriented x3.  PSYCH:  Mood and affect appropriate.  SKIN: Skin color, texture, turgor normal, no rashes or lesions.  HEENT:  Normocephalic, atraumatic. Cranial nerves grossly intact.  NECK:   POSITIVE for pain to palpation over the cervical paraspinous muscles on the left. Pain to palpation to left trapezius.   POSITIVE for pain to palpation over facets to the left  NEGATIVE for pain to palpation over cervical spine  POSITIVE for pain with neck extension and facet loading bilaterally.   Spurling test was POSITIVE on LEFT  PULM: No evidence of respiratory difficulty, symmetric chest rise.  GI:  Non-distended  BACK:   Normal range of motion.   Negative for pain to palpation over the spinous processes.  Negative for pain to palpation over facet joints.   Negative axial loading test bilateral.   Negative for tenderness over BILATERAL SIJ.   Negative Bilaterally thigh and sacral thrust test  Negative Bilaterally FABERE, Ganselin and Yeoman's test.   EXTREMITIES:   Left shoulder: Limited AROM in all planes 2/2 pain. +empty can, +full can, +Sanz, +Neers. " Pain to palpation about the AC joint, deltoid and shoulder joint.   No deformities, edema, or skin discoloration.   Hip, and knee provocative maneuvers are normal. No atrophy is noted.   FADIR was Negative Bilaterally.   Negative for BILATERAL knee limited ROM with tenderness on palpation and crepitus on movement, negative ant and post drawer sign.  NEURO: Sensation is equal and appropriate bilaterally. Bilateral upper and lower extremity strength is normal and symmetric. Bilateral upper and lower extremity coordination and muscle stretch reflexes are physiologic and symmetric. Plantar response are downgoing. Straight leg raising in the supine position is Negative Bilaterally to radicular pain.   GAIT: Normal    ASSESSMENT: 64 y.o. year old with neck pain with radicular symptoms in the LUE, consistent with:    1. Cervical radiculopathy        2. Other chronic pain        3. Cervicalgia        4. DDD (degenerative disc disease), cervical        5. Facet arthropathy, cervical        6. Cervical spinal stenosis        7. Shoulder impingement syndrome, left        8. Chronic left shoulder pain        9. Primary osteoarthritis of left shoulder            PLAN:    He is s/p C7/T1 ILESI with no relief  Patient Education:  Discussed the importance of physical therapy, activity modification, and a home exercise plan to help with pain and improve health.  Therapy referral:  Physical Therapy.   Medications:   Patient can continue with pain medications for now per their provider since they are providing benefits, using them appropriately, and without side effects.  Continue 600 mg Gabapentin TID.   Continue 15 mg Mobic QD.   Schedule pain intervention for: LEFT shoulder joint injection  Future consideration: Cervical RFA vs T1/2 CHAZ  Counseled patient: regarding the importance of activity modification, smoking cessation, alcohol cessation, constant sleeping habits, and physical therapy.  Return to clinic: 2-3 weeks after  procedure.     The above plan and management options were discussed at length with patient. Patient is in agreement with the above and verbalized understanding.     Jen Quintana NP  05/08/2025             [1]   Social History  Socioeconomic History    Marital status: Single   Tobacco Use    Smoking status: Never    Smokeless tobacco: Never   Substance and Sexual Activity    Alcohol use: Yes     Alcohol/week: 0.8 standard drinks of alcohol     Types: 1 Standard drinks or equivalent per week     Comment: once every few months    Drug use: No    Sexual activity: Not Currently   Social History Narrative    Prior maintenance work.    Disable due to leg pain and depression.        Lives alone.    No exercise, due to leg pain.     Social Drivers of Health     Financial Resource Strain: Medium Risk (3/24/2025)    Overall Financial Resource Strain (CARDIA)     Difficulty of Paying Living Expenses: Somewhat hard   Food Insecurity: Food Insecurity Present (3/24/2025)    Hunger Vital Sign     Worried About Running Out of Food in the Last Year: Sometimes true     Ran Out of Food in the Last Year: Sometimes true   Transportation Needs: No Transportation Needs (3/24/2025)    PRAPARE - Transportation     Lack of Transportation (Medical): No     Lack of Transportation (Non-Medical): No   Physical Activity: Inactive (3/24/2025)    Exercise Vital Sign     Days of Exercise per Week: 0 days     Minutes of Exercise per Session: 0 min   Stress: Patient Declined (3/24/2025)    [employer] - Occupational Stress Questionnaire     Feeling of Stress : Patient declined   Housing Stability: Unknown (3/24/2025)    Housing Stability Vital Sign     Unable to Pay for Housing in the Last Year: Patient declined     Homeless in the Last Year: No

## 2025-05-08 NOTE — TELEPHONE ENCOUNTER
I spoke with patient to let him know arrival time to [facility] for 11am for robotic bronchoscopy with Dr Hamilton on 5/13/25. NPO after midnight. Patient to stop apixaban (ELIQUIS) 5 mg Tab  today. I answered all questions. Patient confirmed and verbalized understanding.

## 2025-05-08 NOTE — TELEPHONE ENCOUNTER
----- Message from Lashonda sent at 5/8/2025 12:46 PM CDT -----  Contact: pt @ [phone]  Jesus Christy calling regarding Patient Advice (message) for #pt is calling to speak with nurse to see when he supposed to stop his blood thinner medication, asking for call back

## 2025-05-13 ENCOUNTER — HOSPITAL ENCOUNTER (OUTPATIENT)
Facility: HOSPITAL | Age: 65
Discharge: HOME | End: 2025-05-13
Attending: INTERNAL MEDICINE | Admitting: INTERNAL MEDICINE
Payer: MEDICARE

## 2025-05-13 ENCOUNTER — ANESTHESIA (OUTPATIENT)
Dept: SURGERY | Facility: HOSPITAL | Age: 65
End: 2025-05-13
Payer: MEDICARE

## 2025-05-13 ENCOUNTER — ANESTHESIA EVENT (OUTPATIENT)
Dept: SURGERY | Facility: HOSPITAL | Age: 65
End: 2025-05-13
Payer: MEDICARE

## 2025-05-13 VITALS
HEIGHT: 69 IN | OXYGEN SATURATION: 95 % | RESPIRATION RATE: 17 BRPM | DIASTOLIC BLOOD PRESSURE: 65 MMHG | HEART RATE: 75 BPM | BODY MASS INDEX: 26.42 KG/M2 | TEMPERATURE: 98 F | SYSTOLIC BLOOD PRESSURE: 114 MMHG | WEIGHT: 178.38 LBS

## 2025-05-13 DIAGNOSIS — I81 PORTAL VEIN THROMBOSIS: ICD-10-CM

## 2025-05-13 DIAGNOSIS — R91.1 NODULE OF RIGHT LUNG: ICD-10-CM

## 2025-05-13 LAB — KOH PREP SPEC: NORMAL

## 2025-05-13 PROCEDURE — 87102 FUNGUS ISOLATION CULTURE: CPT | Performed by: INTERNAL MEDICINE

## 2025-05-13 PROCEDURE — 87075 CULTR BACTERIA EXCEPT BLOOD: CPT | Performed by: INTERNAL MEDICINE

## 2025-05-13 PROCEDURE — 31624 DX BRONCHOSCOPE/LAVAGE: CPT | Mod: 51,,, | Performed by: INTERNAL MEDICINE

## 2025-05-13 PROCEDURE — 27201423 OPTIME MED/SURG SUP & DEVICES STERILE SUPPLY: Performed by: INTERNAL MEDICINE

## 2025-05-13 PROCEDURE — 31628 BRONCHOSCOPY/LUNG BX EACH: CPT | Mod: 51,,, | Performed by: INTERNAL MEDICINE

## 2025-05-13 PROCEDURE — 31654 BRONCH EBUS IVNTJ PERPH LES: CPT | Mod: ,,, | Performed by: INTERNAL MEDICINE

## 2025-05-13 PROCEDURE — 36000708 HC OR TIME LEV III 1ST 15 MIN: Performed by: INTERNAL MEDICINE

## 2025-05-13 PROCEDURE — 36000709 HC OR TIME LEV III EA ADD 15 MIN: Performed by: INTERNAL MEDICINE

## 2025-05-13 PROCEDURE — 88341 IMHCHEM/IMCYTCHM EA ADD ANTB: CPT | Mod: TC | Performed by: INTERNAL MEDICINE

## 2025-05-13 PROCEDURE — 87210 SMEAR WET MOUNT SALINE/INK: CPT | Performed by: INTERNAL MEDICINE

## 2025-05-13 PROCEDURE — 71000044 HC DOSC ROUTINE RECOVERY FIRST HOUR: Performed by: INTERNAL MEDICINE

## 2025-05-13 PROCEDURE — 87116 MYCOBACTERIA CULTURE: CPT | Performed by: INTERNAL MEDICINE

## 2025-05-13 PROCEDURE — 37000009 HC ANESTHESIA EA ADD 15 MINS: Performed by: INTERNAL MEDICINE

## 2025-05-13 PROCEDURE — 63600175 PHARM REV CODE 636 W HCPCS: Performed by: NURSE ANESTHETIST, CERTIFIED REGISTERED

## 2025-05-13 PROCEDURE — 71000015 HC POSTOP RECOV 1ST HR: Performed by: INTERNAL MEDICINE

## 2025-05-13 PROCEDURE — 31627 NAVIGATIONAL BRONCHOSCOPY: CPT | Mod: ,,, | Performed by: INTERNAL MEDICINE

## 2025-05-13 PROCEDURE — 87206 SMEAR FLUORESCENT/ACID STAI: CPT | Performed by: INTERNAL MEDICINE

## 2025-05-13 PROCEDURE — 31629 BRONCHOSCOPY/NEEDLE BX EACH: CPT | Mod: ,,, | Performed by: INTERNAL MEDICINE

## 2025-05-13 PROCEDURE — 87070 CULTURE OTHR SPECIMN AEROBIC: CPT | Performed by: INTERNAL MEDICINE

## 2025-05-13 PROCEDURE — 25000003 PHARM REV CODE 250: Performed by: NURSE ANESTHETIST, CERTIFIED REGISTERED

## 2025-05-13 PROCEDURE — 37000008 HC ANESTHESIA 1ST 15 MINUTES: Performed by: INTERNAL MEDICINE

## 2025-05-13 RX ORDER — LIDOCAINE HYDROCHLORIDE 20 MG/ML
INJECTION INTRAVENOUS
Status: DISCONTINUED | OUTPATIENT
Start: 2025-05-13 | End: 2025-05-13

## 2025-05-13 RX ORDER — ONDANSETRON HYDROCHLORIDE 2 MG/ML
INJECTION, SOLUTION INTRAVENOUS
Status: DISCONTINUED | OUTPATIENT
Start: 2025-05-13 | End: 2025-05-13

## 2025-05-13 RX ORDER — ROCURONIUM BROMIDE 10 MG/ML
INJECTION, SOLUTION INTRAVENOUS
Status: DISCONTINUED | OUTPATIENT
Start: 2025-05-13 | End: 2025-05-13

## 2025-05-13 RX ORDER — DEXMEDETOMIDINE HYDROCHLORIDE 100 UG/ML
INJECTION, SOLUTION INTRAVENOUS
Status: DISCONTINUED | OUTPATIENT
Start: 2025-05-13 | End: 2025-05-13

## 2025-05-13 RX ORDER — PROPOFOL 10 MG/ML
VIAL (ML) INTRAVENOUS
Status: DISCONTINUED | OUTPATIENT
Start: 2025-05-13 | End: 2025-05-13

## 2025-05-13 RX ORDER — MIDAZOLAM HYDROCHLORIDE 1 MG/ML
INJECTION, SOLUTION INTRAMUSCULAR; INTRAVENOUS
Status: DISCONTINUED | OUTPATIENT
Start: 2025-05-13 | End: 2025-05-13

## 2025-05-13 RX ORDER — PHENYLEPHRINE HYDROCHLORIDE 10 MG/ML
INJECTION INTRAVENOUS
Status: DISCONTINUED | OUTPATIENT
Start: 2025-05-13 | End: 2025-05-13

## 2025-05-13 RX ORDER — DEXAMETHASONE SODIUM PHOSPHATE 4 MG/ML
INJECTION, SOLUTION INTRA-ARTICULAR; INTRALESIONAL; INTRAMUSCULAR; INTRAVENOUS; SOFT TISSUE
Status: DISCONTINUED | OUTPATIENT
Start: 2025-05-13 | End: 2025-05-13

## 2025-05-13 RX ORDER — FENTANYL CITRATE 50 UG/ML
INJECTION, SOLUTION INTRAMUSCULAR; INTRAVENOUS
Status: DISCONTINUED | OUTPATIENT
Start: 2025-05-13 | End: 2025-05-13

## 2025-05-13 RX ADMIN — PROPOFOL 50 MG: 10 INJECTION, EMULSION INTRAVENOUS at 12:05

## 2025-05-13 RX ADMIN — SUGAMMADEX 200 MG: 100 INJECTION, SOLUTION INTRAVENOUS at 01:05

## 2025-05-13 RX ADMIN — DEXMEDETOMIDINE 12 MCG: 100 INJECTION, SOLUTION, CONCENTRATE INTRAVENOUS at 12:05

## 2025-05-13 RX ADMIN — FENTANYL CITRATE 100 MCG: 50 INJECTION, SOLUTION INTRAMUSCULAR; INTRAVENOUS at 12:05

## 2025-05-13 RX ADMIN — DEXAMETHASONE SODIUM PHOSPHATE 4 MG: 4 INJECTION, SOLUTION INTRAMUSCULAR; INTRAVENOUS at 12:05

## 2025-05-13 RX ADMIN — MIDAZOLAM HYDROCHLORIDE 2 MG: 2 INJECTION, SOLUTION INTRAMUSCULAR; INTRAVENOUS at 12:05

## 2025-05-13 RX ADMIN — ONDANSETRON 4 MG: 2 INJECTION INTRAMUSCULAR; INTRAVENOUS at 01:05

## 2025-05-13 RX ADMIN — ROCURONIUM BROMIDE 10 MG: 10 INJECTION, SOLUTION INTRAVENOUS at 01:05

## 2025-05-13 RX ADMIN — ROCURONIUM BROMIDE 50 MG: 10 INJECTION, SOLUTION INTRAVENOUS at 12:05

## 2025-05-13 RX ADMIN — PROPOFOL 150 MG: 10 INJECTION, EMULSION INTRAVENOUS at 12:05

## 2025-05-13 RX ADMIN — SODIUM CHLORIDE: 9 INJECTION, SOLUTION INTRAVENOUS at 12:05

## 2025-05-13 RX ADMIN — LIDOCAINE HYDROCHLORIDE 100 MG: 20 INJECTION INTRAVENOUS at 12:05

## 2025-05-13 RX ADMIN — PHENYLEPHRINE HYDROCHLORIDE 100 MCG: 10 INJECTION INTRAVENOUS at 01:05

## 2025-05-13 RX ADMIN — PROPOFOL 150 MCG/KG/MIN: 10 INJECTION, EMULSION INTRAVENOUS at 12:05

## 2025-05-13 NOTE — DISCHARGE SUMMARY
[facility] - [facility] (2nd Fl)  Discharge Note  Short Stay    Procedure(s) (LRB):  ROBOTIC BRONCHOSCOPY (N/A)      OUTCOME: Patient tolerated treatment/procedure well without complication and is now ready for discharge.    DISPOSITION: Home or Self Care    FINAL DIAGNOSIS:  RUL Pulmonary Nodule    FOLLOWUP: Will call patient with results.    DISCHARGE INSTRUCTIONS:  No discharge procedures on file.      Clinical Reference Documents Added to Patient Instructions         Document    BRONCHOSCOPY, DIAGNOSTIC (ENGLISH)            TIME SPENT ON DISCHARGE: 15 minutes    Rose Sanchez MD  Pulmonary/Critical Care Fellow  05/13/2025  1:45 PM

## 2025-05-13 NOTE — ANESTHESIA POSTPROCEDURE EVALUATION
Anesthesia Post Evaluation    Patient: Jesus Christy    Procedure(s) Performed: Procedure(s) (LRB):  ROBOTIC BRONCHOSCOPY (N/A)    Final Anesthesia Type: general      Patient location during evaluation: PACU  Patient participation: Yes- Able to Participate  Level of consciousness: awake and alert and oriented  Post-procedure vital signs: reviewed and stable  Pain management: adequate  Airway patency: patent    PONV status at discharge: No PONV  Anesthetic complications: no      Cardiovascular status: hemodynamically stable  Respiratory status: unassisted and spontaneous ventilation  Hydration status: euvolemic  Follow-up not needed.              Vitals Value Taken Time   /73 05/13/25 14:17   Pulse 103 05/13/25 14:17   Resp 26 05/13/25 14:17   SpO2 92 % 05/13/25 14:17   Vitals shown include unfiled device data.      No case tracking events are documented in the log.      Pain/Anna Score: No data recorded

## 2025-05-13 NOTE — INTERVAL H&P NOTE
The patient has been examined and the H&P has been reviewed:    I concur with the findings and no changes have occurred since H&P was written.    Anesthesia/Surgery risks, benefits and alternative options discussed and understood by patient/family.          Patient oriented to procedure and patient verbalized an understanding.  All questions were answered to patient's satisfaction.  Written consent was signed and will be placed on chart.

## 2025-05-13 NOTE — ANESTHESIA PREPROCEDURE EVALUATION
05/13/2025  Jesus Christy is a 64 y.o., male.  Pre-operative evaluation for Procedure(s) (LRB):  ROBOTIC BRONCHOSCOPY (N/A)    Jesus Christy is a 64 y.o. male     Problem List[1]    Review of patient's allergies indicates:   Allergen Reactions    Peach (prunus persica)     Morphine Palpitations       Medications Ordered Prior to Encounter[2]    Past Surgical History:   Procedure Laterality Date    COLONOSCOPY N/A 9/29/2017    Procedure: COLONOSCOPY;  Surgeon: Jamar Edwards MD;  Location: [facility] ENDO (4TH FLR);  Service: Endoscopy;  Laterality: N/A;    COLONOSCOPY N/A 2/12/2024    Procedure: COLONOSCOPY;  Surgeon: Miguel Angel Huffman MD;  Location: [facility] ENDO (2ND FLR);  Service: Endoscopy;  Laterality: N/A;    ENDOSCOPIC ULTRASOUND OF UPPER GASTROINTESTINAL TRACT N/A 7/5/2024    Procedure: ULTRASOUND, UPPER GI TRACT, ENDOSCOPIC;  Surgeon: Debi Lloyd MD;  Location: [facility] (2ND FLR);  Service: Endoscopy;  Laterality: N/A;  3/21 portal instr-EUS with myself at [facility] next couple of months pancreas tail cyst.[email or web address] pending Dr. Miranda TE 3/21-tt  5/15/24: instructions sent via portal. eliquis hold in TE 3/21-GD  6/26-pre call complete-tb-will hold eliquis starting     ESOPHAGOGASTRODUODENOSCOPY N/A 2/12/2024    Procedure: EGD (ESOPHAGOGASTRODUODENOSCOPY);  Surgeon: Miguel Angel Huffman MD;  Location: [facility] (2ND FLR);  Service: Endoscopy;  Laterality: N/A;  1/30/24-Okay to add per Dr. Huffman, 2nd flr-recent hospitalization for sepsis and portal vein thrombosis, Miralax, instr portal and email, Approval to hold Eliquis and medical clearance rec'd from Dr. Jolly (see telephone encounter 1/30/24)-DS  2/5-prec    JOINT REPLACEMENT Right     knee    KNEE ARTHROSCOPY W/ ACL RECONSTRUCTION  2014    right    TRANSFORAMINAL EPIDURAL INJECTION OF STEROID N/A 4/16/2025    Procedure: CERVICAL C7/T1 IL CHAZ  *ELIQUIS CLEARANCE IN CHART*;  Surgeon: Stuart Viveros MD;  Location: [facility] PAIN MGT;  Service: Pain Management;  Laterality: N/A;  2 WK F/U EDDI         Pre-op Assessment    I have reviewed the Patient Summary Reports.     I have reviewed the Nursing Notes. I have reviewed the NPO Status.   I have reviewed the Medications.     Review of Systems  Cardiovascular:     Hypertension                                          Pulmonary:     Denies Asthma.                    Hepatic/GI:     GERD                Endocrine:  Denies Diabetes.               Physical Exam  General: Cooperative    Airway:  Mallampati: II   Mouth Opening: Normal  TM Distance: Normal  Tongue: Normal  Neck ROM: Normal ROM    Dental:  Intact    Anesthesia Plan  Type of Anesthesia, risks & benefits discussed:    Anesthesia Type: Gen ETT  Intra-op Monitoring Plan: Standard ASA Monitors  Post Op Pain Control Plan: multimodal analgesia  Induction:  IV  Airway Plan: Direct  Informed Consent: Informed consent signed with the Patient and all parties understand the risks and agree with anesthesia plan.  All questions answered.   ASA Score: 2  Day of Surgery Review of History & Physical: H&P Update referred to the surgeon/provider.  Anesthesia Plan Notes: Patient with left upper extremity radiculopathy with extension of neck. Will keep neck in neutral position during induction    Ready For Surgery From Anesthesia Perspective.   .  2024 TTE Summary    Left Ventricle: The left ventricle is normal in size. Normal wall thickness. Normal wall motion. There is normal systolic function. Ejection fraction by visual approximation is 65%. There is normal diastolic function.    Right Ventricle: Normal right ventricular cavity size. Wall thickness is normal. Right ventricle wall motion  is normal. Systolic function is normal.    Aortic Valve: There is mild to moderate aortic regurgitation.    Tricuspid Valve: There is mild regurgitation.    Pulmonary Artery: The  estimated pulmonary artery systolic pressure is 37 mmHg.    IVC/SVC: Normal venous pressure at 3 mmHg.           [1]  Patient Active Problem List  Diagnosis    Essential hypertension    Weak urine stream    Frequency-urgency syndrome    Elevated PSA    Neuropathy of right foot    Adenocarcinoma of prostate    Chronic migraine without aura without status migrainosus, not intractable    Anxiety    Depression    Complex regional pain syndrome type 1 of right lower extremity    Cervical radiculopathy    Solitary pulmonary nodule    Direct hyperbilirubinemia    Chronic prescription benzodiazepine use    Portal vein thrombosis    Diverticulitis    Anemia    Elevated AFP    Moderate episode of recurrent major depressive disorder    Cervicalgia    DDD (degenerative disc disease), cervical   [2]  No current facility-administered medications on file prior to encounter.     Current Outpatient Medications on File Prior to Encounter   Medication Sig Dispense Refill    ammonium lactate 12 % Crea Apply 1 application  topically 2 (two) times daily. 140 g 11    apixaban (ELIQUIS) 5 mg Tab Take 1 tablet (5 mg total) by mouth 2 (two) times daily. 60 tablet 11    ARIPiprazole (ABILIFY) 10 MG Tab       cyproheptadine (PERIACTIN) 4 mg tablet       diazePAM (VALIUM) 10 MG Tab Take 10 mg by mouth 3 (three) times daily.      finasteride (PROSCAR) 5 mg tablet Take 1 tablet (5 mg total) by mouth once daily. 90 tablet 0    gabapentin (NEURONTIN) 300 MG capsule Take 1 capsule (300 mg total) by mouth 3 (three) times daily as needed (leg pain). 90 capsule 5    gabapentin (NEURONTIN) 600 MG tablet Take 1 tablet (600 mg total) by mouth 3 (three) times daily. 90 tablet 11    hydrocortisone (ANUSOL-HC) 2.5 % rectal cream Place rectally 2 (two) times daily. 28 g 1    meloxicam (MOBIC) 15 MG tablet Take 1 tablet (15 mg total) by mouth once daily. 30 tablet 1    mirtazapine (REMERON) 30 MG tablet Take 1 tablet (30 mg  total) by mouth every evening. 90 tablet 3    tadalafiL (CIALIS) 20 MG Tab Take 1 tablet (20 mg total) by mouth daily as needed (erectile dysfunction). 30 tablet 5    tamsulosin (FLOMAX) 0.4 mg Cap Take 1 capsule (0.4 mg total) by mouth once daily. 90 capsule 0

## 2025-05-13 NOTE — ANESTHESIA PROCEDURE NOTES
Intubation    Date/Time: 5/13/2025 12:51 PM    Performed by: Isabel Morton CRNA  Authorized by: Bella Michael MD    Intubation:     Induction:  Intravenous    Intubated:  Postinduction    Mask Ventilation:  Easy mask    Attempts:  1    Attempted By:  CRNA    Method of Intubation:  Video laryngoscopy    Blade:  Serrano 3    Laryngeal View Grade: Grade I - full view of cords      Difficult Airway Encountered?: No      Complications:  None    Airway Device:  Oral endotracheal tube    Airway Device Size:  9.0    Style/Cuff Inflation:  Cuffed (inflated to minimal occlusive pressure)    Tube secured:  21    Secured at:  The lips    Placement Verified By:  Capnometry and Revisualization with laryngoscopy    Complicating Factors:  None    Findings Post-Intubation:  Atraumatic/condition of teeth unchanged and BS equal bilateral  Notes:      Head and neck maintained in neutral position throughout.

## 2025-05-13 NOTE — TRANSFER OF CARE
"Anesthesia Transfer of Care Note    Patient: Jesus Christy    Procedure(s) Performed: Procedure(s) (LRB):  ROBOTIC BRONCHOSCOPY (N/A)    Patient location: [facility]    Anesthesia Type: general    Transport from OR: Transported from OR on 6-10 L/min O2 by face mask with adequate spontaneous ventilation    Post pain: adequate analgesia    Post assessment: no apparent anesthetic complications and tolerated procedure well    Post vital signs: stable    Level of consciousness: awake and alert    Nausea/Vomiting: no nausea/vomiting    Complications: none    Transfer of care protocol was followedComments: Report given to recovery RN. All questions answered.       Last vitals: Visit Vitals  BP (!) 142/83 (BP Location: Right arm, Patient Position: Lying)   Pulse 68   Temp 36.3 °C (97.3 °F) (Temporal)   Resp 18   Ht 5' 9" (1.753 m)   Wt 80.9 kg (178 lb 5.6 oz)   SpO2 96%   BMI 26.34 kg/m²     "

## 2025-05-14 LAB — ACID FAST MOD KINY STN SPEC: NORMAL

## 2025-05-15 ENCOUNTER — OFFICE VISIT (OUTPATIENT)
Dept: OTOLARYNGOLOGY | Facility: CLINIC | Age: 65
End: 2025-05-15
Payer: MEDICARE

## 2025-05-15 DIAGNOSIS — H61.23 BILATERAL IMPACTED CERUMEN: Primary | ICD-10-CM

## 2025-05-15 PROCEDURE — 69210 REMOVE IMPACTED EAR WAX UNI: CPT | Mod: S$GLB,,,

## 2025-05-15 PROCEDURE — 99999 PR PBB SHADOW E&M-EST. PATIENT-LVL III: CPT | Mod: PBBFAC,,,

## 2025-05-15 PROCEDURE — 99499 UNLISTED E&M SERVICE: CPT | Mod: S$GLB,,,

## 2025-05-15 NOTE — PROCEDURES
Ear Cerumen Removal    Date/Time: 5/15/2025 11:20 AM    Performed by: Alena Hammond PA-C  Authorized by: Alena Hammond PA-C      Local anesthetic:  None  Ceruminolytics applied: Ceruminolytics applied prior to the procedure    Location details:  Both ears  Procedure type: curette    Cerumen  Removal Results:  Cerumen completely removed  Patient tolerance:  Patient tolerated the procedure well with no immediate complications     Procedure Note:  The patient was brought to the minor procedure room and placed under the operating microscope of the right ear canal which was cleaned of ceruminous debris. Using a combination of suction, curettes and cup forceps the patient's cerumen was removed. The patient tolerated the procedure well. There were no complications.    Procedure Note:  The patient was brought to the minor procedure room and placed under the operating microscope of the left ear canal which was cleaned of ceruminous debris. Using a combination of suction, curettes and cup forceps the patient's cerumen was removed.  The patient tolerated the procedure well. There were no complications.      SIGNIFICANT cerumen impactions bilaterally. Last ear cleaning was in 2022.     Follow up in 6 months for routine ear cleaning or sooner if needed.

## 2025-05-16 ENCOUNTER — TELEPHONE (OUTPATIENT)
Dept: PULMONOLOGY | Facility: CLINIC | Age: 65
End: 2025-05-16
Payer: MEDICARE

## 2025-05-16 ENCOUNTER — RESULTS FOLLOW-UP (OUTPATIENT)
Dept: PULMONOLOGY | Facility: CLINIC | Age: 65
End: 2025-05-16

## 2025-05-16 DIAGNOSIS — C7A.090: Primary | ICD-10-CM

## 2025-05-16 LAB
ESTROGEN SERPL-MCNC: NORMAL PG/ML
INSULIN SERPL-ACNC: NORMAL U[IU]/ML
LAB AP CLINICAL INFORMATION: NORMAL
LAB AP COMMENTS: NORMAL
LAB AP GROSS DESCRIPTION: NORMAL
LAB AP PERFORMING LOCATION(S): NORMAL
LAB AP REPORT FOOTNOTES: NORMAL
T3RU NFR SERPL: NORMAL %

## 2025-05-16 NOTE — TELEPHONE ENCOUNTER
Slowly growin pulmonary nodule now biopsy proven carcionoid.  Normal PFTs, will need dotatate pet and thoracic surgery consult.

## 2025-05-16 NOTE — TELEPHONE ENCOUNTER
----- Message from Bree sent at 5/16/2025  4:05 PM CDT -----  Consult/AdvisoryName Of Caller:Jesus Contact Preference:[phone] of call: Pt is requesting a call asking for the dr to explain to his daughter what is going on

## 2025-05-19 LAB — BACTERIA SPEC ANAEROBE CULT: NORMAL

## 2025-05-20 LAB — MYCOBACTERIUM SPEC QL CULT: NORMAL

## 2025-05-22 ENCOUNTER — HOSPITAL ENCOUNTER (OUTPATIENT)
Dept: RADIOLOGY | Facility: HOSPITAL | Age: 65
Discharge: HOME | End: 2025-05-22
Attending: INTERNAL MEDICINE
Payer: MEDICARE

## 2025-05-22 DIAGNOSIS — C7A.090: ICD-10-CM

## 2025-05-22 LAB
BACTERIA SPEC CULT: NORMAL
FUNGUS SPEC CULT: NORMAL
GRAM STN SPEC: NORMAL
GRAM STN SPEC: NORMAL

## 2025-05-22 PROCEDURE — A9592 HC COPPER CU-64, DOTATE, DX, PER 1 MCI: HCPCS | Mod: JZ,TB | Performed by: INTERNAL MEDICINE

## 2025-05-22 PROCEDURE — 78815 PET IMAGE W/CT SKULL-THIGH: CPT | Mod: TC

## 2025-05-22 PROCEDURE — 78815 PET IMAGE W/CT SKULL-THIGH: CPT | Mod: 26,PI,, | Performed by: STUDENT IN AN ORGANIZED HEALTH CARE EDUCATION/TRAINING PROGRAM

## 2025-05-22 RX ADMIN — COPPER CU 64 DOTATATE 4.78 MILLICURIE: 1 INJECTION, SOLUTION INTRAVENOUS at 01:05

## 2025-05-26 ENCOUNTER — OFFICE VISIT (OUTPATIENT)
Dept: CARDIOTHORACIC SURGERY | Facility: CLINIC | Age: 65
End: 2025-05-26
Payer: MEDICARE

## 2025-05-26 ENCOUNTER — LAB VISIT (OUTPATIENT)
Dept: LAB | Facility: HOSPITAL | Age: 65
End: 2025-05-26
Attending: STUDENT IN AN ORGANIZED HEALTH CARE EDUCATION/TRAINING PROGRAM
Payer: MEDICARE

## 2025-05-26 VITALS
BODY MASS INDEX: 26.62 KG/M2 | HEIGHT: 69 IN | OXYGEN SATURATION: 96 % | SYSTOLIC BLOOD PRESSURE: 137 MMHG | DIASTOLIC BLOOD PRESSURE: 74 MMHG | HEART RATE: 80 BPM | WEIGHT: 179.69 LBS

## 2025-05-26 DIAGNOSIS — D68.9 COAGULOPATHY: ICD-10-CM

## 2025-05-26 DIAGNOSIS — D68.9 COAGULOPATHY: Primary | ICD-10-CM

## 2025-05-26 DIAGNOSIS — Z01.810 PRE-PROCEDURAL CARDIOVASCULAR EXAMINATION: ICD-10-CM

## 2025-05-26 DIAGNOSIS — C7A.090: ICD-10-CM

## 2025-05-26 LAB
ABSOLUTE EOSINOPHIL (OHS): 0.25 K/UL
ABSOLUTE MONOCYTE (OHS): 0.43 K/UL (ref 0.3–1)
ABSOLUTE NEUTROPHIL COUNT (OHS): 1.8 K/UL (ref 1.8–7.7)
ALBUMIN SERPL BCP-MCNC: 3.6 G/DL (ref 3.5–5.2)
ALP SERPL-CCNC: 144 UNIT/L (ref 40–150)
ALT SERPL W/O P-5'-P-CCNC: 44 UNIT/L (ref 10–44)
ANION GAP (OHS): 8 MMOL/L (ref 8–16)
APTT PPP: 29.7 SECONDS (ref 21–32)
AST SERPL-CCNC: 29 UNIT/L (ref 11–45)
BASOPHILS # BLD AUTO: 0.03 K/UL
BASOPHILS NFR BLD AUTO: 0.8 %
BILIRUB SERPL-MCNC: 0.8 MG/DL (ref 0.1–1)
BUN SERPL-MCNC: 9 MG/DL (ref 8–23)
CALCIUM SERPL-MCNC: 9.6 MG/DL (ref 8.7–10.5)
CHLORIDE SERPL-SCNC: 107 MMOL/L (ref 95–110)
CO2 SERPL-SCNC: 23 MMOL/L (ref 23–29)
CREAT SERPL-MCNC: 1.1 MG/DL (ref 0.5–1.4)
ERYTHROCYTE [DISTWIDTH] IN BLOOD BY AUTOMATED COUNT: 12.3 % (ref 11.5–14.5)
GFR SERPLBLD CREATININE-BSD FMLA CKD-EPI: >60 ML/MIN/1.73/M2
GLUCOSE SERPL-MCNC: 82 MG/DL (ref 70–110)
HCT VFR BLD AUTO: 41.7 % (ref 40–54)
HGB BLD-MCNC: 13.5 GM/DL (ref 14–18)
IMM GRANULOCYTES # BLD AUTO: 0.01 K/UL (ref 0–0.04)
IMM GRANULOCYTES NFR BLD AUTO: 0.3 % (ref 0–0.5)
INR PPP: 1 (ref 0.8–1.2)
LYMPHOCYTES # BLD AUTO: 1.41 K/UL (ref 1–4.8)
MCH RBC QN AUTO: 31.2 PG (ref 27–31)
MCHC RBC AUTO-ENTMCNC: 32.4 G/DL (ref 32–36)
MCV RBC AUTO: 96 FL (ref 82–98)
NUCLEATED RBC (/100WBC) (OHS): 0 /100 WBC
PLATELET # BLD AUTO: 184 K/UL (ref 150–450)
PMV BLD AUTO: 9.4 FL (ref 9.2–12.9)
POTASSIUM SERPL-SCNC: 4.2 MMOL/L (ref 3.5–5.1)
PREALB SERPL-MCNC: 26 MG/DL (ref 20–43)
PROT SERPL-MCNC: 7.3 GM/DL (ref 6–8.4)
PROTHROMBIN TIME: 11.4 SECONDS (ref 9–12.5)
RBC # BLD AUTO: 4.33 M/UL (ref 4.6–6.2)
RELATIVE EOSINOPHIL (OHS): 6.4 %
RELATIVE LYMPHOCYTE (OHS): 35.9 % (ref 18–48)
RELATIVE MONOCYTE (OHS): 10.9 % (ref 4–15)
RELATIVE NEUTROPHIL (OHS): 45.7 % (ref 38–73)
SODIUM SERPL-SCNC: 138 MMOL/L (ref 136–145)
WBC # BLD AUTO: 3.93 K/UL (ref 3.9–12.7)

## 2025-05-26 PROCEDURE — 85730 THROMBOPLASTIN TIME PARTIAL: CPT

## 2025-05-26 PROCEDURE — 3008F BODY MASS INDEX DOCD: CPT | Mod: CPTII,S$GLB,, | Performed by: STUDENT IN AN ORGANIZED HEALTH CARE EDUCATION/TRAINING PROGRAM

## 2025-05-26 PROCEDURE — 85025 COMPLETE CBC W/AUTO DIFF WBC: CPT

## 2025-05-26 PROCEDURE — 99999 PR PBB SHADOW E&M-EST. PATIENT-LVL IV: CPT | Mod: PBBFAC,,, | Performed by: STUDENT IN AN ORGANIZED HEALTH CARE EDUCATION/TRAINING PROGRAM

## 2025-05-26 PROCEDURE — 80053 COMPREHEN METABOLIC PANEL: CPT

## 2025-05-26 PROCEDURE — 3075F SYST BP GE 130 - 139MM HG: CPT | Mod: CPTII,S$GLB,, | Performed by: STUDENT IN AN ORGANIZED HEALTH CARE EDUCATION/TRAINING PROGRAM

## 2025-05-26 PROCEDURE — 84134 ASSAY OF PREALBUMIN: CPT

## 2025-05-26 PROCEDURE — 3078F DIAST BP <80 MM HG: CPT | Mod: CPTII,S$GLB,, | Performed by: STUDENT IN AN ORGANIZED HEALTH CARE EDUCATION/TRAINING PROGRAM

## 2025-05-26 PROCEDURE — 36415 COLL VENOUS BLD VENIPUNCTURE: CPT

## 2025-05-26 PROCEDURE — 85610 PROTHROMBIN TIME: CPT

## 2025-05-26 PROCEDURE — 99205 OFFICE O/P NEW HI 60 MIN: CPT | Mod: S$GLB,,, | Performed by: STUDENT IN AN ORGANIZED HEALTH CARE EDUCATION/TRAINING PROGRAM

## 2025-05-27 DIAGNOSIS — Z01.810 PRE-PROCEDURAL CARDIOVASCULAR EXAMINATION: Primary | ICD-10-CM

## 2025-05-29 DIAGNOSIS — C7A.090: Primary | ICD-10-CM

## 2025-05-29 DIAGNOSIS — R91.1 LUNG NODULE: ICD-10-CM

## 2025-06-02 ENCOUNTER — HOSPITAL ENCOUNTER (OUTPATIENT)
Facility: OTHER | Age: 65
Discharge: HOME | End: 2025-06-02
Attending: ANESTHESIOLOGY | Admitting: ANESTHESIOLOGY
Payer: MEDICARE

## 2025-06-02 VITALS
SYSTOLIC BLOOD PRESSURE: 115 MMHG | TEMPERATURE: 98 F | BODY MASS INDEX: 26.36 KG/M2 | RESPIRATION RATE: 14 BRPM | HEIGHT: 69 IN | OXYGEN SATURATION: 95 % | HEART RATE: 70 BPM | DIASTOLIC BLOOD PRESSURE: 69 MMHG | WEIGHT: 178 LBS

## 2025-06-02 DIAGNOSIS — G89.29 CHRONIC PAIN: ICD-10-CM

## 2025-06-02 DIAGNOSIS — M19.012 OSTEOARTHRITIS OF LEFT SHOULDER, UNSPECIFIED OSTEOARTHRITIS TYPE: Primary | ICD-10-CM

## 2025-06-02 PROCEDURE — 25500020 PHARM REV CODE 255: Performed by: ANESTHESIOLOGY

## 2025-06-02 PROCEDURE — 77002 NEEDLE LOCALIZATION BY XRAY: CPT | Mod: 26,,, | Performed by: ANESTHESIOLOGY

## 2025-06-02 PROCEDURE — 63600175 PHARM REV CODE 636 W HCPCS: Performed by: ANESTHESIOLOGY

## 2025-06-02 PROCEDURE — 20610 DRAIN/INJ JOINT/BURSA W/O US: CPT | Mod: LT | Performed by: ANESTHESIOLOGY

## 2025-06-02 PROCEDURE — 20610 DRAIN/INJ JOINT/BURSA W/O US: CPT | Mod: LT,,, | Performed by: ANESTHESIOLOGY

## 2025-06-02 RX ORDER — SODIUM CHLORIDE 9 MG/ML
INJECTION, SOLUTION INTRAVENOUS CONTINUOUS
Status: DISCONTINUED | OUTPATIENT
Start: 2025-06-02 | End: 2025-06-02 | Stop reason: HOSPADM

## 2025-06-02 RX ORDER — MIDAZOLAM HYDROCHLORIDE 1 MG/ML
INJECTION INTRAMUSCULAR; INTRAVENOUS
Status: DISCONTINUED | OUTPATIENT
Start: 2025-06-02 | End: 2025-06-02 | Stop reason: HOSPADM

## 2025-06-02 RX ORDER — TRIAMCINOLONE ACETONIDE 40 MG/ML
INJECTION, SUSPENSION INTRA-ARTICULAR; INTRAMUSCULAR
Status: DISCONTINUED | OUTPATIENT
Start: 2025-06-02 | End: 2025-06-02 | Stop reason: HOSPADM

## 2025-06-02 RX ORDER — LIDOCAINE HYDROCHLORIDE 20 MG/ML
INJECTION, SOLUTION INFILTRATION; PERINEURAL
Status: DISCONTINUED | OUTPATIENT
Start: 2025-06-02 | End: 2025-06-02 | Stop reason: HOSPADM

## 2025-06-02 RX ORDER — BUPIVACAINE HYDROCHLORIDE 2.5 MG/ML
INJECTION, SOLUTION EPIDURAL; INFILTRATION; INTRACAUDAL; PERINEURAL
Status: DISCONTINUED | OUTPATIENT
Start: 2025-06-02 | End: 2025-06-02 | Stop reason: HOSPADM

## 2025-06-10 ENCOUNTER — LAB VISIT (OUTPATIENT)
Dept: LAB | Facility: HOSPITAL | Age: 65
End: 2025-06-10
Attending: UROLOGY
Payer: MEDICARE

## 2025-06-10 DIAGNOSIS — R39.12 WEAK URINE STREAM: ICD-10-CM

## 2025-06-10 DIAGNOSIS — C61 ADENOCARCINOMA OF PROSTATE: ICD-10-CM

## 2025-06-10 LAB — PSA SERPL-MCNC: 10.47 NG/ML

## 2025-06-10 PROCEDURE — 36415 COLL VENOUS BLD VENIPUNCTURE: CPT

## 2025-06-10 PROCEDURE — 84153 ASSAY OF PSA TOTAL: CPT

## 2025-06-10 RX ORDER — TAMSULOSIN HYDROCHLORIDE 0.4 MG/1
0.4 CAPSULE ORAL DAILY
Qty: 90 CAPSULE | Refills: 0 | OUTPATIENT
Start: 2025-06-10 | End: 2026-06-10

## 2025-06-10 RX ORDER — FINASTERIDE 5 MG/1
5 TABLET, FILM COATED ORAL DAILY
Qty: 90 TABLET | Refills: 0 | OUTPATIENT
Start: 2025-06-10 | End: 2026-06-10

## 2025-06-12 ENCOUNTER — HOSPITAL ENCOUNTER (OUTPATIENT)
Dept: CARDIOLOGY | Facility: HOSPITAL | Age: 65
Discharge: HOME | End: 2025-06-12
Attending: STUDENT IN AN ORGANIZED HEALTH CARE EDUCATION/TRAINING PROGRAM
Payer: MEDICARE

## 2025-06-12 VITALS
WEIGHT: 178 LBS | BODY MASS INDEX: 26.36 KG/M2 | SYSTOLIC BLOOD PRESSURE: 143 MMHG | HEIGHT: 69 IN | DIASTOLIC BLOOD PRESSURE: 89 MMHG | HEART RATE: 57 BPM

## 2025-06-12 DIAGNOSIS — Z01.810 PRE-PROCEDURAL CARDIOVASCULAR EXAMINATION: ICD-10-CM

## 2025-06-12 LAB
CV PHARM DOSE: 0.4 MG
CV STRESS BASE HR: 57 BPM
DIASTOLIC BLOOD PRESSURE: 89 MMHG
EJECTION FRACTION- HIGH: 65 %
END DIASTOLIC INDEX-HIGH: 153 ML/M2
END DIASTOLIC INDEX-LOW: 93 ML/M2
END SYSTOLIC INDEX-HIGH: 71 ML/M2
END SYSTOLIC INDEX-LOW: 31 ML/M2
NUC REST DIASTOLIC VOLUME INDEX: 115
NUC REST EJECTION FRACTION: 57
NUC REST SYSTOLIC VOLUME INDEX: 49
NUC STRESS DIASTOLIC VOLUME INDEX: 109
NUC STRESS EJECTION FRACTION: 69 %
NUC STRESS SYSTOLIC VOLUME INDEX: 34
OHS CV CPX 1 MINUTE RECOVERY HEART RATE: 100 BPM
OHS CV CPX 85 PERCENT MAX PREDICTED HEART RATE MALE: 133
OHS CV CPX MAX PREDICTED HEART RATE: 156
OHS CV CPX PATIENT IS FEMALE: 0
OHS CV CPX PATIENT IS MALE: 1
OHS CV CPX PEAK DIASTOLIC BLOOD PRESSURE: 97 MMHG
OHS CV CPX PEAK HEAR RATE: 62 BPM
OHS CV CPX PEAK RATE PRESSURE PRODUCT: 9362
OHS CV CPX PEAK SYSTOLIC BLOOD PRESSURE: 151 MMHG
OHS CV CPX PERCENT MAX PREDICTED HEART RATE ACHIEVED: 40
OHS CV CPX RATE PRESSURE PRODUCT PRESENTING: 8151
OHS CV INITIAL DOSE: 10.8 MCG/KG/MIN
OHS CV PEAK DOSE: 30.8 MCG/KG/MIN
RETIRED EF AND QEF - SEE NOTES: 53 %
SYSTOLIC BLOOD PRESSURE: 143 MMHG

## 2025-06-12 PROCEDURE — 63600175 PHARM REV CODE 636 W HCPCS: Performed by: STUDENT IN AN ORGANIZED HEALTH CARE EDUCATION/TRAINING PROGRAM

## 2025-06-12 PROCEDURE — 93017 CV STRESS TEST TRACING ONLY: CPT

## 2025-06-12 PROCEDURE — 93016 CV STRESS TEST SUPVJ ONLY: CPT | Mod: ,,, | Performed by: INTERNAL MEDICINE

## 2025-06-12 PROCEDURE — 78452 HT MUSCLE IMAGE SPECT MULT: CPT | Mod: 26,,, | Performed by: INTERNAL MEDICINE

## 2025-06-12 PROCEDURE — A9502 TC99M TETROFOSMIN: HCPCS | Performed by: STUDENT IN AN ORGANIZED HEALTH CARE EDUCATION/TRAINING PROGRAM

## 2025-06-12 PROCEDURE — 93018 CV STRESS TEST I&R ONLY: CPT | Mod: ,,, | Performed by: INTERNAL MEDICINE

## 2025-06-12 RX ORDER — REGADENOSON 0.08 MG/ML
0.4 INJECTION, SOLUTION INTRAVENOUS
Status: COMPLETED | OUTPATIENT
Start: 2025-06-12 | End: 2025-06-12

## 2025-06-12 RX ADMIN — REGADENOSON 0.4 MG: 0.08 INJECTION, SOLUTION INTRAVENOUS at 08:06

## 2025-06-12 RX ADMIN — TETROFOSMIN 10.8 MILLICURIE: 1.38 INJECTION, POWDER, LYOPHILIZED, FOR SOLUTION INTRAVENOUS at 07:06

## 2025-06-12 RX ADMIN — TETROFOSMIN 30.8 MILLICURIE: 1.38 INJECTION, POWDER, LYOPHILIZED, FOR SOLUTION INTRAVENOUS at 08:06

## 2025-06-16 DIAGNOSIS — R39.12 WEAK URINE STREAM: ICD-10-CM

## 2025-06-16 RX ORDER — FINASTERIDE 5 MG/1
5 TABLET, FILM COATED ORAL DAILY
Qty: 90 TABLET | Refills: 0 | OUTPATIENT
Start: 2025-06-16 | End: 2026-06-16

## 2025-06-16 RX ORDER — TAMSULOSIN HYDROCHLORIDE 0.4 MG/1
0.4 CAPSULE ORAL DAILY
Qty: 90 CAPSULE | Refills: 0 | OUTPATIENT
Start: 2025-06-16 | End: 2026-06-16

## 2025-06-17 ENCOUNTER — OFFICE VISIT (OUTPATIENT)
Dept: PODIATRY | Facility: CLINIC | Age: 65
End: 2025-06-17
Payer: MEDICARE

## 2025-06-17 ENCOUNTER — TELEPHONE (OUTPATIENT)
Dept: INTERNAL MEDICINE | Facility: CLINIC | Age: 65
End: 2025-06-17
Payer: MEDICARE

## 2025-06-17 ENCOUNTER — OFFICE VISIT (OUTPATIENT)
Dept: UROLOGY | Facility: CLINIC | Age: 65
End: 2025-06-17
Payer: MEDICARE

## 2025-06-17 VITALS
HEART RATE: 71 BPM | SYSTOLIC BLOOD PRESSURE: 130 MMHG | HEART RATE: 72 BPM | WEIGHT: 179.13 LBS | HEIGHT: 69 IN | DIASTOLIC BLOOD PRESSURE: 71 MMHG | SYSTOLIC BLOOD PRESSURE: 117 MMHG | WEIGHT: 178.81 LBS | HEIGHT: 69 IN | BODY MASS INDEX: 26.48 KG/M2 | BODY MASS INDEX: 26.53 KG/M2 | DIASTOLIC BLOOD PRESSURE: 79 MMHG

## 2025-06-17 DIAGNOSIS — L84 FOOT CALLUS: ICD-10-CM

## 2025-06-17 DIAGNOSIS — M21.619 BUNION OF GREAT TOE: ICD-10-CM

## 2025-06-17 DIAGNOSIS — R39.12 WEAK URINE STREAM: ICD-10-CM

## 2025-06-17 DIAGNOSIS — R39.12 WEAK URINE STREAM: Primary | ICD-10-CM

## 2025-06-17 DIAGNOSIS — N52.9 ED (ERECTILE DYSFUNCTION) OF ORGANIC ORIGIN: ICD-10-CM

## 2025-06-17 DIAGNOSIS — C61 PROSTATE CANCER: ICD-10-CM

## 2025-06-17 PROCEDURE — 99999 PR PBB SHADOW E&M-EST. PATIENT-LVL IV: CPT | Mod: PBBFAC,,, | Performed by: UROLOGY

## 2025-06-17 PROCEDURE — 3008F BODY MASS INDEX DOCD: CPT | Mod: CPTII,S$GLB,, | Performed by: UROLOGY

## 2025-06-17 PROCEDURE — 3078F DIAST BP <80 MM HG: CPT | Mod: CPTII,S$GLB,, | Performed by: UROLOGY

## 2025-06-17 PROCEDURE — 99213 OFFICE O/P EST LOW 20 MIN: CPT | Mod: S$GLB,,, | Performed by: PODIATRIST

## 2025-06-17 PROCEDURE — 3078F DIAST BP <80 MM HG: CPT | Mod: CPTII,S$GLB,, | Performed by: PODIATRIST

## 2025-06-17 PROCEDURE — 3075F SYST BP GE 130 - 139MM HG: CPT | Mod: CPTII,S$GLB,, | Performed by: PODIATRIST

## 2025-06-17 PROCEDURE — 99999 PR PBB SHADOW E&M-EST. PATIENT-LVL III: CPT | Mod: PBBFAC,,, | Performed by: PODIATRIST

## 2025-06-17 PROCEDURE — 1159F MED LIST DOCD IN RCRD: CPT | Mod: CPTII,S$GLB,, | Performed by: UROLOGY

## 2025-06-17 PROCEDURE — 3074F SYST BP LT 130 MM HG: CPT | Mod: CPTII,S$GLB,, | Performed by: UROLOGY

## 2025-06-17 PROCEDURE — 3008F BODY MASS INDEX DOCD: CPT | Mod: CPTII,S$GLB,, | Performed by: PODIATRIST

## 2025-06-17 PROCEDURE — 99215 OFFICE O/P EST HI 40 MIN: CPT | Mod: S$GLB,,, | Performed by: UROLOGY

## 2025-06-17 RX ORDER — AMMONIUM LACTATE 12 G/100G
1 CREAM TOPICAL 2 TIMES DAILY
Qty: 140 G | Refills: 11 | Status: SHIPPED | OUTPATIENT
Start: 2025-06-17

## 2025-06-17 RX ORDER — FINASTERIDE 5 MG/1
5 TABLET, FILM COATED ORAL DAILY
Qty: 90 TABLET | Refills: 3 | Status: SHIPPED | OUTPATIENT
Start: 2025-06-17 | End: 2026-06-17

## 2025-06-17 RX ORDER — TAMSULOSIN HYDROCHLORIDE 0.4 MG/1
0.4 CAPSULE ORAL DAILY
Qty: 90 CAPSULE | Refills: 0 | OUTPATIENT
Start: 2025-06-17 | End: 2026-06-17

## 2025-06-17 RX ORDER — TADALAFIL 20 MG/1
20 TABLET ORAL DAILY PRN
Qty: 30 TABLET | Refills: 5 | Status: SHIPPED | OUTPATIENT
Start: 2025-06-17 | End: 2026-06-17

## 2025-06-17 RX ORDER — FINASTERIDE 5 MG/1
5 TABLET, FILM COATED ORAL DAILY
Qty: 90 TABLET | Refills: 0 | OUTPATIENT
Start: 2025-06-17 | End: 2026-06-17

## 2025-06-17 RX ORDER — TAMSULOSIN HYDROCHLORIDE 0.4 MG/1
0.4 CAPSULE ORAL DAILY
Qty: 90 CAPSULE | Refills: 3 | Status: SHIPPED | OUTPATIENT
Start: 2025-06-17 | End: 2026-06-17

## 2025-06-17 NOTE — TELEPHONE ENCOUNTER
Please let him know to skip PM of eliquis and resume like normal tomorrow. He can take gabapentin at night if feels like he needs it (ok to skip or take.)  Thanks.

## 2025-06-17 NOTE — TELEPHONE ENCOUNTER
Copied from CRM #[id number]. Topic: General Inquiry - Patient Advice  >> Jun 17, 2025 10:14 AM Suzan wrote:  .1MEDICALADVICE     Patient is calling for Medical Advice regarding:apixaban (ELIQUIS) 5 mg Tab and gabapentin (NEURONTIN) 600 MG tablet    How long has patient had these symptoms:    Pharmacy name and phone#:    Patient wants a call back or thru [facility], provide patient's call back phone number:Call back     Comments:Pt says he made a mistake and took 2 of med twice this morning. Please call pt to advise [phone]    Please advise patient replies from provider may take up to 48 hours.      Pt states that he took both the eliquis and gabepentin twice this morning is there anything he should be worried about

## 2025-06-17 NOTE — PROGRESS NOTES
CHIEF COMPLAINT:    Mr. Christy is a 64 y.o. male presenting for   ED, LUTS, and active surveillance on his prostate cancer.    PRESENTING ILLNESS:    Jesus Christy is a 64 y.o. male with a PMH of htn, weak stream, elevated PSA who presents for follow up.    Established patient of urology department.  Presents today as follow up to discuss cialis and PSA results.  No family history of prostate cancer.  He had a negative UroNav bx of prostate on 11/21/19.  Of note patient with biopsy 10/25/16 with one care positive for shaun 3+3, and currently on active surveillance.  PSA remains elevated, but stable.  Will continue to monitor with every 6 month PSA.    He reports a good urinary stream and complete bladder emptying.  Has no urinary complaints today.  Taking both finasteride and tamsulosin as prescribed.    Last visit discussed erectile dysfunction.  Prescribed cialis which he reports working well for him.    REVIEW OF SYSTEMS:    Review of Systems   Constitutional:  Negative for chills and fever.   Respiratory:  Negative for shortness of breath.    Cardiovascular:  Negative for chest pain.   Gastrointestinal:  Negative for constipation and diarrhea.   Genitourinary:  Negative for dysuria, flank pain, frequency, hematuria and urgency.   Neurological:  Negative for dizziness and weakness.       PATIENT HISTORY:    Past Medical History:   Diagnosis Date    Complex regional pain syndrome i of right lower limb     GERD (gastroesophageal reflux disease)     HTN (hypertension)     Portal vein thrombosis        Family History   Problem Relation Name Age of Onset    Hypertension Mother      Heart disease Father      Diabetes Father      Cancer Brother          unknown type    No Known Problems Daughter      No Known Problems Daughter      No Known Problems Daughter      No Known Problems Daughter      No Known Problems Son      No Known Problems Son         Allergies:  Peach (prunus persica) and  Morphine    Medications:    Current Outpatient Medications:     ammonium lactate 12 % Crea, Apply 1 application  topically 2 (two) times daily., Disp: 140 g, Rfl: 11    apixaban (ELIQUIS) 5 mg Tab, Take 1 tablet (5 mg total) by mouth 2 (two) times daily., Disp: 60 tablet, Rfl: 11    ARIPiprazole (ABILIFY) 10 MG Tab, , Disp: , Rfl:     cyproheptadine (PERIACTIN) 4 mg tablet, , Disp: , Rfl:     diazePAM (VALIUM) 10 MG Tab, Take 10 mg by mouth 3 (three) times daily., Disp: , Rfl:     diclofenac sodium (VOLTAREN) 1 % Gel, Apply 2 g topically 4 (four) times daily., Disp: 200 g, Rfl: 2    finasteride (PROSCAR) 5 mg tablet, Take 1 tablet (5 mg total) by mouth once daily., Disp: 90 tablet, Rfl: 3    fluticasone propionate (FLONASE) 50 mcg/actuation nasal spray, Instill 1 spray (50 mcg total) in each nostril once daily., Disp: 48 g, Rfl: 3    gabapentin (NEURONTIN) 300 MG capsule, Take 1 capsule (300 mg total) by mouth 3 (three) times daily as needed (leg pain)., Disp: 90 capsule, Rfl: 5    gabapentin (NEURONTIN) 600 MG tablet, Take 1 tablet (600 mg total) by mouth 3 (three) times daily., Disp: 90 tablet, Rfl: 11    hydrocortisone (ANUSOL-HC) 2.5 % rectal cream, Place rectally 2 (two) times daily., Disp: 28 g, Rfl: 1    LIDOcaine (LIDODERM) 5 %, Place 1 patch onto the skin once daily. Remove & Discard patch within 12 hours or as directed by MD, Disp: 5 patch, Rfl: 1    meloxicam (MOBIC) 15 MG tablet, Take 1 tablet (15 mg total) by mouth once daily., Disp: 30 tablet, Rfl: 1    mirtazapine (REMERON) 30 MG tablet, Take 1 tablet (30 mg total) by mouth every evening., Disp: 90 tablet, Rfl: 3    tadalafiL (CIALIS) 20 MG Tab, Take 1 tablet (20 mg total) by mouth daily as needed (erectile dysfunction)., Disp: 30 tablet, Rfl: 5    tamsulosin (FLOMAX) 0.4 mg Cap, Take 1 capsule (0.4 mg total) by mouth once daily., Disp: 90 capsule, Rfl: 3    PHYSICAL EXAMINATION:    Physical Exam  Vitals and nursing note reviewed.   Constitutional:        Appearance: Normal appearance. He is well-developed.   HENT:      Head: Normocephalic and atraumatic.   Eyes:      Pupils: Pupils are equal, round, and reactive to light.   Pulmonary:      Effort: Pulmonary effort is normal.   Musculoskeletal:         General: Normal range of motion.      Cervical back: Normal range of motion.   Skin:     General: Skin is warm and dry.   Neurological:      Mental Status: He is alert and oriented to person, place, and time.   Psychiatric:         Behavior: Behavior normal.           LABS:    U/a performed in office today: did not void  Lab Results   Component Value Date    PSA 10.47 (H) 06/10/2025    PSA 6.9 (H) 09/01/2016    PSA 6.2 (H) 08/22/2016    PSA 3.1 03/22/2012    PSADIAG 7.7 (H) 03/12/2024    PSADIAG 12.4 (H) 01/18/2024    PSADIAG 6.6 (H) 10/20/2023    PSADIAG 5.7 (H) 04/05/2023    PSADIAG 5.7 (H) 08/31/2022    PSADIAG 5.0 (H) 06/03/2020    PSADIAG 5.2 (H) 10/04/2019    PSADIAG 4.2 (H) 04/09/2019    PSADIAG 3.8 10/05/2018    PSADIAG 5.7 (H) 02/20/2018     MRI of prostate 11/6/19  Previous biopsy: Nelson 6 adenocarcinoma of the right Base (09/15/2016).   PSA: 5.2 (10/04/2019), 4.2 (04/09/2019), 3.8 (10/05/2018).  Prior therapy: None.  Prostate: The prostate measures 4.8 x 4.0 x 4.5cm corresponding to a computed volume of 42.4cc.  Peripheral zone:  Lesion (SHIVA) #1.  Location: Side: left Region: apex Zone: posterior peripheral zone laterally  Greatest dimension: 0.7cm  T2-WI: Lenticular or non-circumscribed, homogeneous, moderately hypointense - score 4  DWI/ADC: Focal markedly hypointense on ADC and markedly hyperintense on high b-value DWI - score 4  DCE: Positive  Extraprostatic extension: Absent  PI-RADS assessment category: 4  This lesion is unchanged from prior.  Transition zone: No focal abnormalities with imaging features concerning for prostate cancer  Neurovascular bundle: Unremarkable.  Seminal vesicles:  SV invasion:Unremarkable  Adjacent Organ Involvement:  There is no focal bladder wall thickening. There is no rectal involvement.  Lymphadenopathy: none  Other Findings: There is colonic diverticulosis.  There is a fat containing left inguinal hernia.  Visualized osseous structures demonstrate heterogeneous marrow signal intensity without definite focal lesions.  Mild degenerative changes of the lower lumbar spine noted.  Impression:  PIRADS 4 lesion within the left apex posterior peripheral zone laterally, unchanged in appearance from prior.  Overall Assessment:  PIRADS 4 (clinically significant cancer is likely to be present)  Number of targets created for potential MR/US fusion biopsy  Peripheral zone: 0.  Transition zone: 0.    IMPRESSION:  Encounter Diagnoses   Name Primary?    Weak urine stream Yes    Prostate cancer     ED (erectile dysfunction) of organic origin            PLAN:  Problem List Items Addressed This Visit       Weak urine stream - Primary    Relevant Medications    tamsulosin (FLOMAX) 0.4 mg Cap    finasteride (PROSCAR) 5 mg tablet    Prostate cancer    Relevant Orders    MRI Prostate W W/O Contrast     Other Visit Diagnoses         ED (erectile dysfunction) of organic origin        Relevant Medications    tadalafiL (CIALIS) 20 MG Tab              1. Nelson 3 + 3 prostate cancer was diagnosed in 2016.  Has been on active surveillance.  Has been on flomax and finasteride due to LUTS.  His repeat PSA in 2019 did not show any cancer including the target bx.  Will continue to follow up with serial PSA.  PSA today. If it is stable, will see him in 6 months.  His PSA rise might have been due to his illness back in Feb.    Will have him follow up with MRI of prostate.  He is scheduled for a surgery on 6/30/25.    2. Enlarged prostate with luts   Continue both tamsulosin and finasteride  90 days refills given.    3. Erectile dysfunction   Cialis working well, continue  Refills given.      I spent 40 minutes with the patient. Over 50% of the visit was  spent in counseling.    Follow up MRI prostate.     Varun Resendiz MD

## 2025-06-18 ENCOUNTER — OFFICE VISIT (OUTPATIENT)
Dept: PAIN MEDICINE | Facility: CLINIC | Age: 65
End: 2025-06-18
Payer: MEDICARE

## 2025-06-18 VITALS
SYSTOLIC BLOOD PRESSURE: 137 MMHG | BODY MASS INDEX: 26.51 KG/M2 | HEART RATE: 68 BPM | WEIGHT: 179 LBS | HEIGHT: 69 IN | DIASTOLIC BLOOD PRESSURE: 87 MMHG

## 2025-06-18 DIAGNOSIS — M48.02 CERVICAL SPINAL STENOSIS: Primary | ICD-10-CM

## 2025-06-18 DIAGNOSIS — M47.812 FACET ARTHROPATHY, CERVICAL: ICD-10-CM

## 2025-06-18 DIAGNOSIS — M54.2 CERVICALGIA: ICD-10-CM

## 2025-06-18 DIAGNOSIS — M47.812 CERVICAL SPONDYLOSIS: ICD-10-CM

## 2025-06-18 DIAGNOSIS — M54.12 CERVICAL RADICULOPATHY: ICD-10-CM

## 2025-06-18 DIAGNOSIS — M50.30 DDD (DEGENERATIVE DISC DISEASE), CERVICAL: ICD-10-CM

## 2025-06-18 PROCEDURE — 3079F DIAST BP 80-89 MM HG: CPT | Mod: CPTII,S$GLB,,

## 2025-06-18 PROCEDURE — 3008F BODY MASS INDEX DOCD: CPT | Mod: CPTII,S$GLB,,

## 2025-06-18 PROCEDURE — 1159F MED LIST DOCD IN RCRD: CPT | Mod: CPTII,S$GLB,,

## 2025-06-18 PROCEDURE — 99999 PR PBB SHADOW E&M-EST. PATIENT-LVL IV: CPT | Mod: PBBFAC,,,

## 2025-06-18 PROCEDURE — 99214 OFFICE O/P EST MOD 30 MIN: CPT | Mod: S$GLB,,,

## 2025-06-18 PROCEDURE — 1160F RVW MEDS BY RX/DR IN RCRD: CPT | Mod: CPTII,S$GLB,,

## 2025-06-18 PROCEDURE — 3075F SYST BP GE 130 - 139MM HG: CPT | Mod: CPTII,S$GLB,,

## 2025-06-18 RX ORDER — MELOXICAM 15 MG/1
15 TABLET ORAL DAILY
Qty: 30 TABLET | Refills: 1 | Status: SHIPPED | OUTPATIENT
Start: 2025-06-18 | End: 2026-06-18

## 2025-06-18 NOTE — PROGRESS NOTES
Interventional Pain Management - Established Visit  Follow-Up     PCP: Tara Jolly MD    REFERRING PHYSICIAN: Johnathon Leung    CHIEF COMPLAINT: Neck Pain     Original HISTORY OF PRESENT ILLNESS: Jesus Christy presents to the clinic for the evaluation of the above pain. The pain started 5-6 weeks, not in the setting of any injuries. The pain is radicular in nature, with radiation down the left arm, all the way to his finger. Patient also reports numbness in his fingers. No objects falling out of his hands. The pain is described as sharp and shooting. Exacerbating factors: Standing, Lifting, and Getting out of bed/chair. Patient said the pain is worse with laying on his left side. Mitigating factors nothing. Symptoms interfere with daily activity, sleeping, and work. The patient feels like symptoms have been unchanged. Patient denies night fever/night sweats, urinary incontinence, bowel incontinence, significant weight loss, significant motor weakness, and loss of sensations.    Original PAIN SCORES:  Best: Pain is 8  Worst: Pain is 10  Current: Pain is 8        6/18/2025    10:22 AM   Last 3 PDI Scores   Pain Disability Index (PDI) 51     INTERVAL HISTORY (6/18/2025):  Jesus Christy returns for follow-up after left glenohumeral joint injection on 6/2/2025. He reports limited relief. He states that neither the cervical CHAZ or the shoulder injection have helped his neck pain. He states the 4th and 5th digits on his left hand are always tingling and numb. He denies handwriting changes, difficulty with buttons, dropping objects or any other myelopathic symptoms. He states his worst pain is the left-sided neck pain. His hand pain is not his main concern. He continues Gabapentin 600 mg TID and Mobic 15 mg daily with some benefit.  He denies recent health changes. He denies recent falls or trauma. He denies new onset fever/night sweats, urinary incontinence, bowel incontinence, significant weight  changes, significant motor weakness or changes, or loss of sensations. His neck pain today is 9/10.      INTERVAL HISTORY (5/8/2025):   Jesus Christy returns for follow-up after C7/T1 ILESI from 4/16/2025. He reports no relief. He continues with left-sided neck pain with radiation into the left arm and hand in a C8 distribution. He states his shoulder is painful with overhead movements. He states the pain is present constantly, but is worse when he turns his head to the left and if he sleeps on the left side. He continues Gabapentin 600 mg TID and Mobic 15 mg daily with limited benefit. He denies any perceived side effects. He denies recent health changes. He denies recent falls or trauma. He denies new onset fever/night sweats, urinary incontinence, bowel incontinence, significant weight changes, significant motor weakness or changes, or loss of sensations. His neck pain today is 8/10.        Conservative therapy:  PT: None, unable to tolerate due to pain.   Chiro: None   HEP in the past 6 months: patient preforming daily HEP with limited benefit     Treatments / Medications: (Ice/Heat/NSAIDS/APAP/etc):  300 mg Gabapentin TID   Mobic 15 mg daily - helps  50 mg Tramadol (Short course Rx by outside provider)    ON ELIQUIS    Interventional Pain Procedures:   4/16/2025 - C7/T1 ILESI - no relief  6/2/2025 - Left Glenohumeral joint injection - no relief    IMAGING:      XR SHOULDER COMPLETE 2 OR MORE VIEWS LEFT     CLINICAL HISTORY:  Radiculopathy, cervical region     TECHNIQUE:  Two or three views of the left shoulder were performed.     COMPARISON:  Non 09/19/2012 e     FINDINGS:  Mild DJD.  Slightly narrowed glenohumeral joint space.  No fracture or dislocation.  No bone destruction identified     Impression:     See above        Electronically signed by:Parker Meyer MD  Date:                                            03/12/2025  Time:                                           14:08    MRI CERVICAL SPINE WITHOUT  CONTRAST     CLINICAL HISTORY:  Neck pain, chronic;Neck pain, chronic, degenerative changes on xray;.  Cervicalgia     TECHNIQUE:  Multiplanar, multisequence MR images of the cervical spine were acquired without the administration of contrast.     COMPARISON:  None.     FINDINGS:  The marrow demonstrates a homogeneous signal.  Vertebral body heights are maintained.     Disc spaces are maintained.     Reversal of the normal cervical lordosis.  Grade 1 retrolisthesis C3-4 and C5-6.     Multilevel degenerative changes detailed below:     C2-3: No significant canal or neural foraminal narrowing.     C3-4: Posterior disc osteophyte complex with mild canal narrowing.  Uncovertebral spurring and facet arthropathy result in severe right and moderate left neural foraminal narrowing.     C4-5: Posterior disc osteophyte complex, asymmetric to the left, without significant canal narrowing.  Uncovertebral spurring with resultant severe left lateral recess and foraminal stenosis.     C5-6: Posterior disc osteophyte complex with effacement of the thecal sac and mild to moderate canal narrowing.  Uncovertebral spurring and facet arthropathy result in severe bilateral neural foraminal narrowing.     C6-7: Small posterior disc osteophyte complex without significant canal narrowing.  Uncovertebral spurring and facet arthropathy result in severe left and mild right neural foraminal narrowing.     C7-T1: No significant canal or neural foraminal narrowing.     No significant prevertebral soft tissue edema.     Cervical spinal cord is normal in caliber and signal.     Impression:     Multilevel degenerative change resulting in mild-to-moderate canal narrowing C3-4 and C5-6.  Multilevel moderate and severe neural foraminal narrowing.     Reversal the normal cervical lordosis with grade 1 retrolisthesis as above.        Electronically signed by:Ester Patrick  Date:                                            04/11/2025  Time:                                            10:41    XR CERVICAL SPINE COMPLETE 5 VIEW     CLINICAL HISTORY:  . Radiculopathy, cervical region     TECHNIQUE:  AP, Lateral, bilateral oblique and open mouth views of the cervical spine were performed.     COMPARISON:  02/01/2018     FINDINGS:  Straightening of the cervical lordosis.     The vertebral body heights are well maintained.  Moderate disc space narrowing C3-4 and C5-6.     Small anterior osteophyte noted at C3 through C6.     No fracture, no osseous lesions.     The prevertebral soft tissues appear normal.     The oblique views demonstrate a moderate to severe left foraminal narrowing at C4-5.  Moderate right foraminal narrowing at C3-4 and C5-6.     The lung apices are normally aerated.     Impression:     Spondylosis of the cervical spine, no acute process seen.        Electronically signed by:Clara Blake MD  Date:                                            03/13/2025  Time:                                           11:40      Past Medical History:   Diagnosis Date    Complex regional pain syndrome i of right lower limb     GERD (gastroesophageal reflux disease)     HTN (hypertension)     Portal vein thrombosis      Past Surgical History:   Procedure Laterality Date    COLONOSCOPY N/A 9/29/2017    Procedure: COLONOSCOPY;  Surgeon: Jamar Edwards MD;  Location: [facility] ENDO (4TH FLR);  Service: Endoscopy;  Laterality: N/A;    COLONOSCOPY N/A 2/12/2024    Procedure: COLONOSCOPY;  Surgeon: Miguel Angel Huffman MD;  Location: [facility] ENDO (2ND FLR);  Service: Endoscopy;  Laterality: N/A;    ENDOSCOPIC ULTRASOUND OF UPPER GASTROINTESTINAL TRACT N/A 7/5/2024    Procedure: ULTRASOUND, UPPER GI TRACT, ENDOSCOPIC;  Surgeon: Debi Lloyd MD;  Location: [facility] (2ND FLR);  Service: Endoscopy;  Laterality: N/A;  3/21 portal instr-EUS with myself at [facility] next couple of months pancreas tail cyst.[place] pending Dr. Miranda TE 3/21-tt  5/15/24: instructions sent via portal.  eliquis hold in TE 3/21-GD  6/26-pre call complete-tb-will hold eliquis starting     ESOPHAGOGASTRODUODENOSCOPY N/A 2/12/2024    Procedure: EGD (ESOPHAGOGASTRODUODENOSCOPY);  Surgeon: Miguel Angel Huffman MD;  Location: [facility] (2ND FLR);  Service: Endoscopy;  Laterality: N/A;  1/30/24-Okay to add per Dr. Huffman, 2nd flr-recent hospitalization for sepsis and portal vein thrombosis, Miralax, instr portal and email, Approval to hold Eliquis and medical clearance rec'd from Dr. Jolly (see telephone encounter 1/30/24)-DS  2/5-prec    INJECTION OF ANESTHETIC AGENT AROUND NERVE Left 6/2/2025    Procedure: INJECTION, LEFT GLENOHUMERAL;  Surgeon: Stuart Viveros MD;  Location: [facility] PAIN MGT;  Service: Pain Management;  Laterality: Left;  2 WK F/U SHERIDAN    JOINT REPLACEMENT Right     knee    KNEE ARTHROSCOPY W/ ACL RECONSTRUCTION  2014    right    ROBOTIC BRONCHOSCOPY N/A 5/13/2025    Procedure: ROBOTIC BRONCHOSCOPY;  Surgeon: Jessica Hamilton MD;  Location: [facility] OR 2ND FLR;  Service: Pulmonary;  Laterality: N/A;    TRANSFORAMINAL EPIDURAL INJECTION OF STEROID N/A 4/16/2025    Procedure: CERVICAL C7/T1 IL CHAZ *ELIQUIS CLEARANCE IN CHART*;  Surgeon: Stuart Viveros MD;  Location: [facility] PAIN MGT;  Service: Pain Management;  Laterality: N/A;  2 WK F/U EDDI     Social History[1]  Family History   Problem Relation Name Age of Onset    Hypertension Mother      Heart disease Father      Diabetes Father      Cancer Brother          unknown type    No Known Problems Daughter      No Known Problems Daughter      No Known Problems Daughter      No Known Problems Daughter      No Known Problems Son      No Known Problems Son       Review of patient's allergies indicates:   Allergen Reactions    Peach (prunus persica)     Morphine Palpitations     Current Outpatient Medications   Medication Sig    ammonium lactate 12 % Crea Apply 1 application  topically 2 (two) times daily.    apixaban (ELIQUIS) 5 mg Tab Take 1 tablet (5 mg total) by  mouth 2 (two) times daily.    ARIPiprazole (ABILIFY) 10 MG Tab     cyproheptadine (PERIACTIN) 4 mg tablet     diazePAM (VALIUM) 10 MG Tab Take 10 mg by mouth 3 (three) times daily.    diclofenac sodium (VOLTAREN) 1 % Gel Apply 2 g topically 4 (four) times daily.    finasteride (PROSCAR) 5 mg tablet Take 1 tablet (5 mg total) by mouth once daily.    fluticasone propionate (FLONASE) 50 mcg/actuation nasal spray Instill 1 spray (50 mcg total) in each nostril once daily.    gabapentin (NEURONTIN) 300 MG capsule Take 1 capsule (300 mg total) by mouth 3 (three) times daily as needed (leg pain).    gabapentin (NEURONTIN) 600 MG tablet Take 1 tablet (600 mg total) by mouth 3 (three) times daily.    hydrocortisone (ANUSOL-HC) 2.5 % rectal cream Place rectally 2 (two) times daily.    LIDOcaine (LIDODERM) 5 % Place 1 patch onto the skin once daily. Remove & Discard patch within 12 hours or as directed by MD    mirtazapine (REMERON) 30 MG tablet Take 1 tablet (30 mg total) by mouth every evening.    tadalafiL (CIALIS) 20 MG Tab Take 1 tablet (20 mg total) by mouth daily as needed (erectile dysfunction).    tamsulosin (FLOMAX) 0.4 mg Cap Take 1 capsule (0.4 mg total) by mouth once daily.    meloxicam (MOBIC) 15 MG tablet Take 1 tablet (15 mg total) by mouth once daily.     No current facility-administered medications for this visit.     ROS:  GENERAL: No fever. No chills. No fatigue. Denies weight loss. Denies weight gain.  HEENT: Denies headaches. Denies vision change. Denies eye pain. Denies double vision. Denies ear pain.   CV: Denies chest pain.   PULM: Denies of shortness of breath.  GI: Denies constipation. No diarrhea. No abdominal pain. Denies nausea. Denies vomiting. No blood in stool.  HEME: Denies bleeding problems.  : Denies urgency. No painful urination. No blood in urine.  MS: Denies joint stiffness. Denies joint swelling.   SKIN: Denies rash.   NEURO: Denies seizures. No weakness.  PSYCH:  Denies difficulty  "sleeping. No anxiety. Denies depression. No suicidal thoughts.     VITALS:   Vitals:    06/18/25 1023   BP: 137/87   Pulse: 68   Weight: 81.2 kg (179 lb 0.2 oz)   Height: 5' 9" (1.753 m)   PainSc:   9   PainLoc: Neck         PHYSICAL EXAM:   GENERAL: Well appearing, in no acute distress, alert and oriented x3.  PSYCH:  Mood and affect appropriate.  SKIN: Skin color, texture, turgor normal, no rashes or lesions.  HEENT:  Normocephalic, atraumatic. Cranial nerves grossly intact.  NECK:   POSITIVE for pain to palpation over the cervical paraspinous muscles on the left. Pain to palpation to left trapezius.   POSITIVE for pain to palpation over facets to the left  NEGATIVE for pain to palpation over cervical spine  POSITIVE for pain with neck extension and facet loading bilaterally.   Spurling test was negative on LEFT  PULM: No evidence of respiratory difficulty, symmetric chest rise.  GI:  Non-distended  BACK:   Normal range of motion.   Negative for pain to palpation over the spinous processes.  Negative for pain to palpation over facet joints.   Negative axial loading test bilateral.   Negative for tenderness over BILATERAL SIJ.   Negative Bilaterally thigh and sacral thrust test  Negative Bilaterally FABERE, Ganselin and Yeoman's test.   EXTREMITIES:   Left shoulder: Limited AROM in all planes 2/2 pain. +empty can, +full can, +Sanz, +Neers. Pain to palpation about the AC joint, deltoid and shoulder joint.   No deformities, edema, or skin discoloration.   Hip, and knee provocative maneuvers are normal. No atrophy is noted.   FADIR was Negative Bilaterally.   Negative for BILATERAL knee limited ROM with tenderness on palpation and crepitus on movement, negative ant and post drawer sign.  NEURO: Sensation is equal and appropriate bilaterally. Bilateral upper and lower extremity strength is normal and symmetric. Bilateral upper and lower extremity coordination and muscle stretch reflexes are physiologic and " symmetric. Plantar response are downgoing. Straight leg raising in the supine position is Negative Bilaterally to radicular pain.   GAIT: Normal    ASSESSMENT: 64 y.o. year old with neck pain with radicular symptoms in the LUE, consistent with:    1. Cervical spinal stenosis  Ambulatory referral/consult to Neurosurgery    CANCELED: Ambulatory referral/consult to Orthopedics      2. Cervical radiculopathy  meloxicam (MOBIC) 15 MG tablet    Ambulatory referral/consult to Neurosurgery    CANCELED: Ambulatory referral/consult to Orthopedics      3. Cervicalgia  meloxicam (MOBIC) 15 MG tablet      4. DDD (degenerative disc disease), cervical  meloxicam (MOBIC) 15 MG tablet    Ambulatory referral/consult to Neurosurgery      5. Facet arthropathy, cervical  Procedure Order to Pain Management    Procedure Order to Pain Management      6. Cervical spondylosis  Procedure Order to Pain Management    Procedure Order to Pain Management            PLAN:    He is s/p left glenohumeral joint injection with limited relief  Patient Education:  Discussed the importance of physical therapy, activity modification, and a home exercise plan to help with pain and improve health.  Referral: NSGY for severe foraminal stenosis at multiple levels.   Therapy referral:  unable to tolerate formal physical therapy, will re-evaluate after MBB and RFA.   Medications:   Patient can continue with pain medications for now per their provider since they are providing benefits, using them appropriately, and without side effects.  Continue 600 mg Gabapentin TID.   Continue 15 mg Mobic QD. Refilled today.   Schedule pain intervention for: Procedure order placed for Left C3, C4, C5 and C6 MBB x2 for axial neck pain. He participates in HEP as tolerated, but is limited by his pain. If significant short-term relief from MBB, will proceed to RFA at these levels.   Future consideration: n/a  Counseled patient: regarding the importance of activity modification,  smoking cessation, alcohol cessation, constant sleeping habits, and physical therapy.  Return to clinic: 2-3 weeks after procedure.     The above plan and management options were discussed at length with patient. Patient is in agreement with the above and verbalized understanding.     Jen Quintana NP  06/18/2025           [1]   Social History  Socioeconomic History    Marital status: Single   Tobacco Use    Smoking status: Never    Smokeless tobacco: Never   Substance and Sexual Activity    Alcohol use: Yes     Alcohol/week: 0.8 standard drinks of alcohol     Types: 1 Standard drinks or equivalent per week     Comment: once every few months    Drug use: No    Sexual activity: Not Currently   Social History Narrative    Prior maintenance work.    Disable due to leg pain and depression.        Lives alone.    No exercise, due to leg pain.     Social Drivers of Health     Financial Resource Strain: Medium Risk (3/24/2025)    Overall Financial Resource Strain (CARDIA)     Difficulty of Paying Living Expenses: Somewhat hard   Food Insecurity: Food Insecurity Present (3/24/2025)    Hunger Vital Sign     Worried About Running Out of Food in the Last Year: Sometimes true     Ran Out of Food in the Last Year: Sometimes true   Transportation Needs: No Transportation Needs (3/24/2025)    PRAPARE - Transportation     Lack of Transportation (Medical): No     Lack of Transportation (Non-Medical): No   Physical Activity: Inactive (3/24/2025)    Exercise Vital Sign     Days of Exercise per Week: 0 days     Minutes of Exercise per Session: 0 min   Stress: Patient Declined (3/24/2025)    [employer] - Occupational Stress Questionnaire     Feeling of Stress : Patient declined   Housing Stability: Unknown (3/24/2025)    Housing Stability Vital Sign     Unable to Pay for Housing in the Last Year: Patient declined     Homeless in the Last Year: No

## 2025-06-19 ENCOUNTER — PATIENT MESSAGE (OUTPATIENT)
Dept: PAIN MEDICINE | Facility: CLINIC | Age: 65
End: 2025-06-19
Payer: MEDICARE

## 2025-06-19 DIAGNOSIS — M47.812 CERVICAL SPONDYLOSIS: Primary | ICD-10-CM

## 2025-06-25 ENCOUNTER — TELEPHONE (OUTPATIENT)
Dept: CARDIOTHORACIC SURGERY | Facility: CLINIC | Age: 65
End: 2025-06-25
Payer: MEDICARE

## 2025-06-25 NOTE — PROGRESS NOTES
"  Subjective:     Patient ID: Jesus Christy is a 64 y.o. male.    Chief Complaint: Callouses (Right foot arch of foot)    Jesus is a 64 y.o. male who presents to the podiatry clinic  with complaint of  bilateral foot pain.due ton callused and dry skin    Review of Systems   Constitutional: Negative for chills, decreased appetite and fever.   Cardiovascular:  Negative for leg swelling.   Skin:  Positive for dry skin.   Musculoskeletal:  Negative for arthritis, joint pain, joint swelling and myalgias.   Gastrointestinal:  Negative for nausea and vomiting.   Neurological:  Negative for loss of balance, numbness and paresthesias.        Objective:     Vitals:    06/17/25 1310   BP: 130/79   Pulse: 71   Weight: 81.1 kg (178 lb 12.7 oz)   Height: 5' 9" (1.753 m)   PainSc: 0-No pain   PainLoc: Foot        Physical Exam  Vitals reviewed.   Constitutional:       Appearance: He is well-developed.   Cardiovascular:      Comments: dorsalis pedis and posterior tibial pulses are palpable bilaterally. Capillary refill time is within normal limits. + pedal hair growth         Musculoskeletal:         General: No tenderness. Normal range of motion.      Comments: 1st MPJ exostosis w/ lateral deviation of hallux, non trackbound. No pain w/ ROM to b.l  hallux   Adequate joint range of motion without pain, limitation, nor crepitation Bilateral feet and ankle joints. Muscle strength is 5/5 in all groups bilaterally.         Skin:     General: Skin is warm and dry.      Findings: No erythema, lesion or rash.      Comments: Xerosis b/l calcaneal rim      Neurological:      Mental Status: He is alert and oriented to person, place, and time.      Sensory: No sensory deficit.      Comments: [place] 5.07 monofilament is intact bilateral feet.      Psychiatric:         Behavior: Behavior normal.           Assessment:      Encounter Diagnoses   Name Primary?    Foot callus     Bunion of great toe      Plan:     Jesus was seen " today for callouses.    Diagnoses and all orders for this visit:    Foot callus  -     ammonium lactate 12 % Crea; Apply 1 application  topically 2 (two) times daily.    Bunion of great toe  -     ammonium lactate 12 % Crea; Apply 1 application  topically 2 (two) times daily.      I counseled the patient on his conditions, their implications and medical management.    Reviewed current medication(s), and lab(s) specific to foot ailment(s) with patient in detail. All questions answered     Independent review of patients previous clinical notes    Rx amlactin    Bunion asymptomatic, no treatment indicated     Wide shoes recommended     Use pumice stone to region after bathing 2-3x/week to decrease callus build up       .

## 2025-06-25 NOTE — TELEPHONE ENCOUNTER
Spoke to patient confirmed 5a arrival time for upcoming procedure. Reviewed pre op instructions including stopping eliquis on 6/28 48hrs prior to procedure.

## 2025-06-26 NOTE — PRE-PROCEDURE INSTRUCTIONS
PreOp Instructions given:   - Verbal medication information (what to hold and what to take)   - NPO guidelines   NO SOLID FOOD, MILK OR MILK PRODUCTS 8 HOURS PRIOR TO SX START TIME. 2300  YOU MAY ONLY HAVE SIPS OF WATER WITH MORNING MEDICATIONS (1) HOUR PRIOR TO ARRIVAL TO HOSPITAL.   - Arrival place directions given; time to be given the day before procedure by the   Surgeon's Office [facility]  - Bathing with antibacterial soap   - Don't wear any jewelry or bring any valuables AM of surgery   - No makeup or moisturizer to face   - No perfume/cologne, powder, lotions or aftershave   Pt. verbalized understanding.   Pt denies any h/o Anesthesia/Sedation complications or side effects.  Patient does not know arrival time.  Explained that this information comes from the surgeon's office and if they haven't heard from them by 2 or 3 pm to call the office.  Patient stated an understanding.     Eliquis - last dose Friday 6/27/2025 PM

## 2025-06-27 ENCOUNTER — ANESTHESIA EVENT (OUTPATIENT)
Dept: SURGERY | Facility: HOSPITAL | Age: 65
End: 2025-06-27
Payer: MEDICARE

## 2025-06-27 ENCOUNTER — TELEPHONE (OUTPATIENT)
Dept: CARDIOTHORACIC SURGERY | Facility: CLINIC | Age: 65
End: 2025-06-27
Payer: MEDICARE

## 2025-06-27 NOTE — ANESTHESIA PREPROCEDURE EVALUATION
[facility]  Anesthesia Pre-Operative Evaluation         Patient Name: Jesus Christy  YOB: 1960  MRN: [id number]    SUBJECTIVE:     Pre-operative evaluation for Procedure(s) (LRB):  DV5 ROBOTIC RATS,WITH LOBECTOMY,LUNG (Right)  XI ROBOTIC RATS, WITH LYMPHADENECTOMY (Right)     06/27/2025    Jesus Christy is a 64 y.o. male w/ a significant PMHx of chronic migraines, cervical radiculopathy s/p multiple interventional pain procedures, HTN, GERD, hx of prostate cancer, chronic portal vein thrombosis on Eliquis. 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 Carcinoid tumor does not appear to be vasoactive - no flushing, diarrhea, asthma.    H/H 13.5/41    Stress Test 06/2025    Normal myocardial perfusion scan. There is no evidence of myocardial ischemia or infarction.    The gated perfusion images showed an ejection fraction of 57% at rest. The gated perfusion images showed an ejection fraction of 69% post stress. Normal ejection fraction is greater than 53%.    There is normal wall motion at rest and post-stress.    LV cavity size is normal at rest and normal at post-stress.    The ECG portion of the study is negative for ischemia.    The patient reported no chest pain during the stress test.    There were no arrhythmias during stress.       Patient now presents for the above procedure(s).    Prev airway: Intubation:     Induction:  Intravenous    Intubated:  Postinduction    Mask Ventilation:  Easy mask    Attempts:  1    Attempted By:  CRNA    Method of Intubation:  Video laryngoscopy    Blade:  Serrano 3    Laryngeal View Grade: Grade I - full view of cords      Difficult Airway Encountered?: No      Complications:  None    Airway Device:  Oral endotracheal tube    Airway Device Size:  9.0    Style/Cuff Inflation:  Cuffed (inflated to minimal occlusive pressure)    Tube secured:  21    Secured at:  The lips    Placement Verified By:  Capnometry and Revisualization with laryngoscopy     Complicating Factors:  None    Findings Post-Intubation:  Atraumatic/condition of teeth unchanged and BS equal bilateral  Notes:      Head and neck maintained in neutral position throughout.     Drips: None documented.      Problem List[1]    Review of patient's allergies indicates:   Allergen Reactions    Peach (prunus persica)     Morphine Palpitations       Current Inpatient Medications:      Medications Ordered Prior to Encounter[2]    Past Surgical History:   Procedure Laterality Date    COLONOSCOPY N/A 9/29/2017    Procedure: COLONOSCOPY;  Surgeon: Jamar Edwards MD;  Location: [facility] ENDO (4TH FLR);  Service: Endoscopy;  Laterality: N/A;    COLONOSCOPY N/A 2/12/2024    Procedure: COLONOSCOPY;  Surgeon: Miguel Angel Huffman MD;  Location: [facility] ENDO (2ND FLR);  Service: Endoscopy;  Laterality: N/A;    ENDOSCOPIC ULTRASOUND OF UPPER GASTROINTESTINAL TRACT N/A 7/5/2024    Procedure: ULTRASOUND, UPPER GI TRACT, ENDOSCOPIC;  Surgeon: Debi Lloyd MD;  Location: [facility] (2ND FLR);  Service: Endoscopy;  Laterality: N/A;  3/21 portal instr-EUS with myself at [facility] next couple of months pancreas tail cyst.joseluis-Sudhaquis pending Dr. Miranda TE 3/21-tt  5/15/24: instructions sent via portal. eliquis hold in TE 3/21-GD  6/26-pre call complete-tb-will hold eliquis starting     ESOPHAGOGASTRODUODENOSCOPY N/A 2/12/2024    Procedure: EGD (ESOPHAGOGASTRODUODENOSCOPY);  Surgeon: Miguel Angel Huffman MD;  Location: [facility] (2ND FLR);  Service: Endoscopy;  Laterality: N/A;  1/30/24-Okay to add per Dr. Huffman, 2nd flr-recent hospitalization for sepsis and portal vein thrombosis, Miralax, instr portal and email, Approval to hold Eliquis and medical clearance rec'd from Dr. Jolly (see telephone encounter 1/30/24)-DS  2/5-prec    INJECTION OF ANESTHETIC AGENT AROUND NERVE Left 6/2/2025    Procedure: INJECTION, LEFT GLENOHUMERAL;  Surgeon: Stuart Viveros MD;  Location: [facility] PAIN MGT;  Service: Pain Management;  Laterality: Left;  2 WK  F/U SHERIDAN    JOINT REPLACEMENT Right     knee    KNEE ARTHROSCOPY W/ ACL RECONSTRUCTION  2014    right    ROBOTIC BRONCHOSCOPY N/A 5/13/2025    Procedure: ROBOTIC BRONCHOSCOPY;  Surgeon: Jessica Hamilton MD;  [facility];  Service: Pulmonary;  Laterality: N/A;    TRANSFORAMINAL EPIDURAL INJECTION OF STEROID N/A 4/16/2025    Procedure: CERVICAL C7/T1 IL CHAZ *ELIQUIS CLEARANCE IN CHART*;  Surgeon: Stuart Viveros MD;  Location: [facility] PAIN MGT;  Service: Pain Management;  Laterality: N/A;  2 WK F/U EDDI       Social History[3]    OBJECTIVE:     Vital Signs Range (Last 24H):         Significant Labs:  Lab Results   Component Value Date    WBC 3.93 05/26/2025    HGB 13.5 (L) 05/26/2025    HCT 41.7 05/26/2025     05/26/2025    CHOL 154 12/13/2022    TRIG 135 12/13/2022    HDL 36 (L) 12/13/2022    ALT 44 05/26/2025    AST 29 05/26/2025     05/26/2025    K 4.2 05/26/2025     05/26/2025    CREATININE 1.1 05/26/2025    BUN 9 05/26/2025    CO2 23 05/26/2025    TSH 1.628 06/03/2020    PSA 10.47 (H) 06/10/2025    INR 1.0 05/26/2025       Diagnostic Studies: No relevant studies.    EKG:   Results for orders placed or performed during the hospital encounter of 01/22/24   EKG 12-lead    Collection Time: 01/22/24  8:23 PM    Narrative    Test Reason : D72.829,    Vent. Rate : 125 BPM     Atrial Rate : 125 BPM     P-R Int : 154 ms          QRS Dur : 082 ms      QT Int : 292 ms       P-R-T Axes : 076 055 049 degrees     QTc Int : 421 ms    Sinus tachycardia  Otherwise normal ECG  When compared with ECG of 09-JAN-2018 09:09,  Vent. rate has increased BY  48 BPM  Confirmed by Jet GORDON MD ([id number]) on 1/23/2024 8:33:47 AM    Referred By: [email or web address]   SELF           Confirmed By:Jet GORDON MD       2D ECHO:  TTE:  Results for orders placed or performed during the hospital encounter of 01/22/24   Echo   Result Value Ref Range    BSA 1.91 m2    LVOT stroke volume 102.63 cm3    LVIDd 4.95 3.5 - 6.0 cm    LV  Systolic Volume 45.27 mL    LV Systolic Volume Index 23.7 mL/m2    LVIDs 3.33 2.1 - 4.0 cm    LV Diastolic Volume 115.64 mL    LV Diastolic Volume Index 60.54 mL/m2    IVS 0.83 0.6 - 1.1 cm    LVOT diameter 2.44 cm    LVOT area 4.7 cm2    FS 33 28 - 44 %    Left Ventricle Relative Wall Thickness 0.33 cm    PW 0.82 0.6 - 1.1 cm    LV mass 138.89 g    LV Mass Index 73 g/m2    MV Peak E Steve 0.60 m/s    TDI LATERAL 0.15 m/s    TDI SEPTAL 0.12 m/s    E/E' ratio 4.44 m/s    MV Peak A Steve 0.54 m/s    TR Max Steve 2.9 m/s    E/A ratio 1.11     E wave deceleration time 201.74 msec    LV SEPTAL E/E' RATIO 5.00 m/s    ALEM 33.3 mL/m2    LV LATERAL E/E' RATIO 4.00 m/s    LA Vol 63.52 cm3    LVOT peak steve 1.39 m/s    RVDD 3.93 cm    TAPSE 1.54 cm    LA size 3.29 cm    Left Atrium Minor Axis 5.01 cm    Left Atrium Major Axis 5.04 cm    LA Vol (MOD) 65.00 cm3    LA WIDTH 4.52 cm    ALEM (MOD) 34.0 mL/m2    RA Major Axis 5.3 cm    RA Width 3.54 cm    AV mean gradient 7 mmHg    AV peak gradient 16 mmHg    Ao peak steve 1.97 m/s    Ao VTI 28.85 cm    LVOT peak VTI 21.96 cm    AV valve area 3.56 cm²    AV Velocity Ratio 0.71     AV index (prosthetic) 0.76     KEITH by Velocity Ratio 3.30 cm²    MV stenosis pressure 1/2 time 58.50 ms    MV valve area p 1/2 method 3.76 cm2    Triscuspid Valve Regurgitation Peak Gradient 34 mmHg    Sinus 3.82 cm    STJ 3.09 cm    Ascending aorta 2.87 cm    Mean e' 0.14 m/s    ZLVIDS 0.05     ZLVIDD -0.82     TV resting pulmonary artery pressure 37 mmHg    RV TB RVSP 6 mmHg    Est. RA pres 3 mmHg    EF 65 %    RA Area 16.0 cm2    Narrative      Left Ventricle: The left ventricle is normal in size. Normal wall   thickness. Normal wall motion. There is normal systolic function. Ejection   fraction by visual approximation is 65%. There is normal diastolic   function.    Right Ventricle: Normal right ventricular cavity size. Wall thickness   is normal. Right ventricle wall motion  is normal. Systolic function is    normal.    Aortic Valve: There is mild to moderate aortic regurgitation.    Tricuspid Valve: There is mild regurgitation.    Pulmonary Artery: The estimated pulmonary artery systolic pressure is   37 mmHg.    IVC/SVC: Normal venous pressure at 3 mmHg.         YOLIE:  No results found for this or any previous visit.    ASSESSMENT/PLAN:         Pre-op Assessment    I have reviewed the Patient Summary Reports.     I have reviewed the Nursing Notes. I have reviewed the NPO Status.   I have reviewed the Medications.     Review of Systems  Anesthesia Hx:   History of prior surgery of interest to airway management or planning:          Denies Family Hx of Anesthesia complications.    Denies Personal Hx of Anesthesia complications.                    Hematology/Oncology:                        --  Cancer in past history:                     EENT/Dental:  EENT/Dental Normal           Cardiovascular:     Hypertension                                    Hypertension         Pulmonary:         RUL carcinoid               Hepatic/GI:     GERD         Gerd          Neurological:      Headaches      Dx of Headaches     Chronic Pain Syndrome                       Neuromuscular Disease   Endocrine:  Endocrine Normal            Psych:    depression                Physical Exam  General: Well nourished, Cooperative, Alert and Oriented    Airway:  Mallampati: III / II  Mouth Opening: Normal  TM Distance: Normal  Tongue: Normal  Neck ROM: Normal ROM    Dental:  Intact    Chest/Lungs:  Normal Respiratory Rate    Heart:  Rate: Normal        Anesthesia Plan  Type of Anesthesia, risks & benefits discussed:    Anesthesia Type: Gen ETT  Intra-op Monitoring Plan: Standard ASA Monitors and Art Line  Post Op Pain Control Plan: multimodal analgesia  Induction:  IV  Airway Plan: Video and Fiberoptic, Post-Induction  Informed Consent: Informed consent signed with the Patient and all parties understand the risks and agree with anesthesia plan.  All  questions answered.   ASA Score: 3  Day of Surgery Review of History & Physical: H&P Update referred to the surgeon/provider.  Anesthesia Plan Notes: NPO confirmed  Discussed case with Dr. Gay and we are in agreement, no need for octreotide prophylaxis based upon lack of symptoms      Ready For Surgery From Anesthesia Perspective.     .           [1]   Patient Active Problem List  Diagnosis    Essential hypertension    Weak urine stream    Frequency-urgency syndrome    Elevated PSA    Neuropathy of right foot    Prostate cancer    Chronic migraine without aura without status migrainosus, not intractable    Anxiety    Depression    Complex regional pain syndrome type 1 of right lower extremity    Cervical radiculopathy    Solitary pulmonary nodule    Direct hyperbilirubinemia    Chronic prescription benzodiazepine use    Portal vein thrombosis    Diverticulitis    Anemia    Elevated AFP    Moderate episode of recurrent major depressive disorder    Cervicalgia    DDD (degenerative disc disease), cervical   [2]   No current facility-administered medications on file prior to encounter.     Current Outpatient Medications on File Prior to Encounter   Medication Sig Dispense Refill    apixaban (ELIQUIS) 5 mg Tab Take 1 tablet (5 mg total) by mouth 2 (two) times daily. 60 tablet 11    ARIPiprazole (ABILIFY) 10 MG Tab  (Patient not taking: Reported on 6/26/2025)      cyproheptadine (PERIACTIN) 4 mg tablet  (Patient not taking: Reported on 6/26/2025)      diazePAM (VALIUM) 10 MG Tab Take 10 mg by mouth 3 (three) times daily as needed.      diclofenac sodium (VOLTAREN) 1 % Gel Apply 2 g topically 4 (four) times daily. 200 g 2    fluticasone propionate (FLONASE) 50 mcg/actuation nasal spray Instill 1 spray (50 mcg total) in each nostril once daily. 48 g 3    gabapentin (NEURONTIN) 300 MG capsule Take 1 capsule (300 mg total) by mouth 3 (three) times daily as needed (leg pain). 90 capsule 5    gabapentin (NEURONTIN) 600 MG  tablet Take 1 tablet (600 mg total) by mouth 3 (three) times daily. 90 tablet 11    hydrocortisone (ANUSOL-HC) 2.5 % rectal cream Place rectally 2 (two) times daily. 28 g 1    LIDOcaine (LIDODERM) 5 % Place 1 patch onto the skin once daily. Remove & Discard patch within 12 hours or as directed by MD 5 patch 1    mirtazapine (REMERON) 30 MG tablet Take 1 tablet (30 mg total) by mouth every evening. 90 tablet 3   [3]   Social History  Socioeconomic History    Marital status: Single   Tobacco Use    Smoking status: Never    Smokeless tobacco: Never   Substance and Sexual Activity    Alcohol use: Yes     Alcohol/week: 0.8 standard drinks of alcohol     Types: 1 Standard drinks or equivalent per week     Comment: once every few months    Drug use: No    Sexual activity: Not Currently   Social History Narrative    Prior maintenance work.    Disable due to leg pain and depression.        Lives alone.    No exercise, due to leg pain.     Social Drivers of Health     Financial Resource Strain: Medium Risk (3/24/2025)    Overall Financial Resource Strain (CARDIA)     Difficulty of Paying Living Expenses: Somewhat hard   Food Insecurity: Food Insecurity Present (3/24/2025)    Hunger Vital Sign     Worried About Running Out of Food in the Last Year: Sometimes true     Ran Out of Food in the Last Year: Sometimes true   Transportation Needs: No Transportation Needs (3/24/2025)    PRAPARE - Transportation     Lack of Transportation (Medical): No     Lack of Transportation (Non-Medical): No   Physical Activity: Inactive (3/24/2025)    Exercise Vital Sign     Days of Exercise per Week: 0 days     Minutes of Exercise per Session: 0 min   Stress: Patient Declined (3/24/2025)    [employer] - Occupational Stress Questionnaire     Feeling of Stress : Patient declined   Housing Stability: Unknown (3/24/2025)    Housing Stability Vital Sign     Unable to Pay for Housing in the Last Year: Patient declined      Homeless in the Last Year: No

## 2025-06-27 NOTE — TELEPHONE ENCOUNTER
Spoke to patient confirmed 5a arrival time on 6/30, reviewed post op instructions including blood thinner eliquid hold 48hrs starting on Saturday.

## 2025-06-30 ENCOUNTER — HOSPITAL ENCOUNTER (INPATIENT)
Facility: HOSPITAL | Age: 65
LOS: 3 days | Discharge: HOME | DRG: 164 | End: 2025-07-03
Attending: STUDENT IN AN ORGANIZED HEALTH CARE EDUCATION/TRAINING PROGRAM | Admitting: STUDENT IN AN ORGANIZED HEALTH CARE EDUCATION/TRAINING PROGRAM
Payer: MEDICARE

## 2025-06-30 ENCOUNTER — ANESTHESIA (OUTPATIENT)
Dept: SURGERY | Facility: HOSPITAL | Age: 65
End: 2025-06-30
Payer: MEDICARE

## 2025-06-30 DIAGNOSIS — C34.91 MALIGNANT NEOPLASM OF RIGHT LUNG: ICD-10-CM

## 2025-06-30 DIAGNOSIS — D3A.090 CARCINOID TUMOR OF RIGHT LUNG: Primary | ICD-10-CM

## 2025-06-30 DIAGNOSIS — R91.1 LUNG NODULE: ICD-10-CM

## 2025-06-30 LAB
INDIRECT COOMBS: NORMAL
RH BLD: NORMAL
SPECIMEN OUTDATE: NORMAL

## 2025-06-30 PROCEDURE — 25000003 PHARM REV CODE 250: Performed by: STUDENT IN AN ORGANIZED HEALTH CARE EDUCATION/TRAINING PROGRAM

## 2025-06-30 PROCEDURE — 25000003 PHARM REV CODE 250

## 2025-06-30 PROCEDURE — 71000033 HC RECOVERY, INTIAL HOUR: Performed by: STUDENT IN AN ORGANIZED HEALTH CARE EDUCATION/TRAINING PROGRAM

## 2025-06-30 PROCEDURE — 8E0W4CZ ROBOTIC ASSISTED PROCEDURE OF TRUNK REGION, PERCUTANEOUS ENDOSCOPIC APPROACH: ICD-10-PCS | Performed by: STUDENT IN AN ORGANIZED HEALTH CARE EDUCATION/TRAINING PROGRAM

## 2025-06-30 PROCEDURE — 63600175 PHARM REV CODE 636 W HCPCS: Performed by: STUDENT IN AN ORGANIZED HEALTH CARE EDUCATION/TRAINING PROGRAM

## 2025-06-30 PROCEDURE — 63600175 PHARM REV CODE 636 W HCPCS

## 2025-06-30 PROCEDURE — 27201423 OPTIME MED/SURG SUP & DEVICES STERILE SUPPLY: Performed by: STUDENT IN AN ORGANIZED HEALTH CARE EDUCATION/TRAINING PROGRAM

## 2025-06-30 PROCEDURE — 0BJ08ZZ INSPECTION OF TRACHEOBRONCHIAL TREE, VIA NATURAL OR ARTIFICIAL OPENING ENDOSCOPIC: ICD-10-PCS | Performed by: STUDENT IN AN ORGANIZED HEALTH CARE EDUCATION/TRAINING PROGRAM

## 2025-06-30 PROCEDURE — 94761 N-INVAS EAR/PLS OXIMETRY MLT: CPT

## 2025-06-30 PROCEDURE — 27201037 HC PRESSURE MONITORING SET UP

## 2025-06-30 PROCEDURE — 25000242 PHARM REV CODE 250 ALT 637 W/ HCPCS: Performed by: STUDENT IN AN ORGANIZED HEALTH CARE EDUCATION/TRAINING PROGRAM

## 2025-06-30 PROCEDURE — 07T74ZZ RESECTION OF THORAX LYMPHATIC, PERCUTANEOUS ENDOSCOPIC APPROACH: ICD-10-PCS | Performed by: STUDENT IN AN ORGANIZED HEALTH CARE EDUCATION/TRAINING PROGRAM

## 2025-06-30 PROCEDURE — 71000015 HC POSTOP RECOV 1ST HR: Performed by: STUDENT IN AN ORGANIZED HEALTH CARE EDUCATION/TRAINING PROGRAM

## 2025-06-30 PROCEDURE — 37000008 HC ANESTHESIA 1ST 15 MINUTES: Performed by: STUDENT IN AN ORGANIZED HEALTH CARE EDUCATION/TRAINING PROGRAM

## 2025-06-30 PROCEDURE — 88307 TISSUE EXAM BY PATHOLOGIST: CPT | Mod: TC | Performed by: STUDENT IN AN ORGANIZED HEALTH CARE EDUCATION/TRAINING PROGRAM

## 2025-06-30 PROCEDURE — 27000221 HC OXYGEN, UP TO 24 HOURS

## 2025-06-30 PROCEDURE — 0BBC4ZZ EXCISION OF RIGHT UPPER LUNG LOBE, PERCUTANEOUS ENDOSCOPIC APPROACH: ICD-10-PCS | Performed by: STUDENT IN AN ORGANIZED HEALTH CARE EDUCATION/TRAINING PROGRAM

## 2025-06-30 PROCEDURE — 99900035 HC TECH TIME PER 15 MIN (STAT)

## 2025-06-30 PROCEDURE — 94640 AIRWAY INHALATION TREATMENT: CPT

## 2025-06-30 PROCEDURE — 71000016 HC POSTOP RECOV ADDL HR: Performed by: STUDENT IN AN ORGANIZED HEALTH CARE EDUCATION/TRAINING PROGRAM

## 2025-06-30 PROCEDURE — 94799 UNLISTED PULMONARY SVC/PX: CPT

## 2025-06-30 PROCEDURE — 86901 BLOOD TYPING SEROLOGIC RH(D): CPT

## 2025-06-30 PROCEDURE — 99024 POSTOP FOLLOW-UP VISIT: CPT | Mod: ,,, | Performed by: STUDENT IN AN ORGANIZED HEALTH CARE EDUCATION/TRAINING PROGRAM

## 2025-06-30 PROCEDURE — 36000712 HC OR TIME LEV V 1ST 15 MIN: Performed by: STUDENT IN AN ORGANIZED HEALTH CARE EDUCATION/TRAINING PROGRAM

## 2025-06-30 PROCEDURE — 37000009 HC ANESTHESIA EA ADD 15 MINS: Performed by: STUDENT IN AN ORGANIZED HEALTH CARE EDUCATION/TRAINING PROGRAM

## 2025-06-30 PROCEDURE — 36000713 HC OR TIME LEV V EA ADD 15 MIN: Performed by: STUDENT IN AN ORGANIZED HEALTH CARE EDUCATION/TRAINING PROGRAM

## 2025-06-30 PROCEDURE — 20600001 HC STEP DOWN PRIVATE ROOM

## 2025-06-30 RX ORDER — ACETAMINOPHEN 500 MG
1000 TABLET ORAL
Status: DISCONTINUED | OUTPATIENT
Start: 2025-06-30 | End: 2025-06-30

## 2025-06-30 RX ORDER — KETAMINE HCL IN 0.9 % NACL 50 MG/5 ML
SYRINGE (ML) INTRAVENOUS
Status: DISCONTINUED | OUTPATIENT
Start: 2025-06-30 | End: 2025-06-30

## 2025-06-30 RX ORDER — OXYCODONE HYDROCHLORIDE 10 MG/1
10 TABLET ORAL EVERY 4 HOURS PRN
Status: DISCONTINUED | OUTPATIENT
Start: 2025-06-30 | End: 2025-07-03

## 2025-06-30 RX ORDER — PHENYLEPHRINE HYDROCHLORIDE 10 MG/ML
INJECTION INTRAVENOUS
Status: DISCONTINUED | OUTPATIENT
Start: 2025-06-30 | End: 2025-06-30

## 2025-06-30 RX ORDER — ENOXAPARIN SODIUM 100 MG/ML
40 INJECTION SUBCUTANEOUS EVERY 24 HOURS
Status: DISCONTINUED | OUTPATIENT
Start: 2025-07-01 | End: 2025-07-03 | Stop reason: HOSPADM

## 2025-06-30 RX ORDER — BUPIVACAINE 13.3 MG/ML
INJECTION, SUSPENSION, LIPOSOMAL INFILTRATION
Status: DISCONTINUED | OUTPATIENT
Start: 2025-06-30 | End: 2025-06-30 | Stop reason: HOSPADM

## 2025-06-30 RX ORDER — MIRTAZAPINE 15 MG/1
30 TABLET, FILM COATED ORAL NIGHTLY
Status: DISCONTINUED | OUTPATIENT
Start: 2025-06-30 | End: 2025-07-03 | Stop reason: HOSPADM

## 2025-06-30 RX ORDER — CEFAZOLIN 2 G/1
2 INJECTION, POWDER, FOR SOLUTION INTRAMUSCULAR; INTRAVENOUS
Status: DISCONTINUED | OUTPATIENT
Start: 2025-06-30 | End: 2025-06-30

## 2025-06-30 RX ORDER — ROCURONIUM BROMIDE 10 MG/ML
INJECTION, SOLUTION INTRAVENOUS
Status: DISCONTINUED | OUTPATIENT
Start: 2025-06-30 | End: 2025-06-30

## 2025-06-30 RX ORDER — LIDOCAINE HYDROCHLORIDE 10 MG/ML
1 INJECTION, SOLUTION EPIDURAL; INFILTRATION; INTRACAUDAL; PERINEURAL ONCE
Status: DISCONTINUED | OUTPATIENT
Start: 2025-06-30 | End: 2025-06-30

## 2025-06-30 RX ORDER — PROPOFOL 10 MG/ML
VIAL (ML) INTRAVENOUS
Status: DISCONTINUED | OUTPATIENT
Start: 2025-06-30 | End: 2025-06-30

## 2025-06-30 RX ORDER — GLUCAGON 1 MG
1 KIT INJECTION
Status: DISCONTINUED | OUTPATIENT
Start: 2025-06-30 | End: 2025-06-30 | Stop reason: HOSPADM

## 2025-06-30 RX ORDER — MIDAZOLAM HYDROCHLORIDE 1 MG/ML
INJECTION INTRAMUSCULAR; INTRAVENOUS
Status: DISCONTINUED | OUTPATIENT
Start: 2025-06-30 | End: 2025-06-30

## 2025-06-30 RX ORDER — IPRATROPIUM BROMIDE AND ALBUTEROL SULFATE 2.5; .5 MG/3ML; MG/3ML
3 SOLUTION RESPIRATORY (INHALATION)
Status: DISCONTINUED | OUTPATIENT
Start: 2025-06-30 | End: 2025-07-02

## 2025-06-30 RX ORDER — OXYCODONE HYDROCHLORIDE 5 MG/1
5 TABLET ORAL EVERY 4 HOURS PRN
Status: DISCONTINUED | OUTPATIENT
Start: 2025-06-30 | End: 2025-07-03

## 2025-06-30 RX ORDER — TAMSULOSIN HYDROCHLORIDE 0.4 MG/1
0.4 CAPSULE ORAL NIGHTLY
Status: DISCONTINUED | OUTPATIENT
Start: 2025-06-30 | End: 2025-07-03 | Stop reason: HOSPADM

## 2025-06-30 RX ORDER — POLYETHYLENE GLYCOL 3350 17 G/17G
17 POWDER, FOR SOLUTION ORAL DAILY
Status: DISCONTINUED | OUTPATIENT
Start: 2025-06-30 | End: 2025-07-03 | Stop reason: HOSPADM

## 2025-06-30 RX ORDER — BUPIVACAINE HYDROCHLORIDE 2.5 MG/ML
INJECTION, SOLUTION EPIDURAL; INFILTRATION; INTRACAUDAL; PERINEURAL
Status: DISCONTINUED | OUTPATIENT
Start: 2025-06-30 | End: 2025-06-30 | Stop reason: HOSPADM

## 2025-06-30 RX ORDER — SODIUM CHLORIDE 0.9 % (FLUSH) 0.9 %
10 SYRINGE (ML) INJECTION
Status: DISCONTINUED | OUTPATIENT
Start: 2025-06-30 | End: 2025-06-30 | Stop reason: HOSPADM

## 2025-06-30 RX ORDER — FINASTERIDE 5 MG/1
5 TABLET, FILM COATED ORAL NIGHTLY
Status: DISCONTINUED | OUTPATIENT
Start: 2025-06-30 | End: 2025-07-03 | Stop reason: HOSPADM

## 2025-06-30 RX ORDER — DEXAMETHASONE SODIUM PHOSPHATE 4 MG/ML
INJECTION, SOLUTION INTRA-ARTICULAR; INTRALESIONAL; INTRAMUSCULAR; INTRAVENOUS; SOFT TISSUE
Status: DISCONTINUED | OUTPATIENT
Start: 2025-06-30 | End: 2025-06-30

## 2025-06-30 RX ORDER — ONDANSETRON HYDROCHLORIDE 2 MG/ML
INJECTION, SOLUTION INTRAVENOUS
Status: DISCONTINUED | OUTPATIENT
Start: 2025-06-30 | End: 2025-06-30

## 2025-06-30 RX ORDER — INDOCYANINE GREEN AND WATER 25 MG
KIT INJECTION
Status: DISCONTINUED | OUTPATIENT
Start: 2025-06-30 | End: 2025-06-30

## 2025-06-30 RX ORDER — FENTANYL CITRATE 50 UG/ML
INJECTION, SOLUTION INTRAMUSCULAR; INTRAVENOUS
Status: DISCONTINUED | OUTPATIENT
Start: 2025-06-30 | End: 2025-06-30

## 2025-06-30 RX ORDER — AMOXICILLIN 250 MG
1 CAPSULE ORAL 2 TIMES DAILY
Status: DISCONTINUED | OUTPATIENT
Start: 2025-06-30 | End: 2025-07-03

## 2025-06-30 RX ORDER — DEXMEDETOMIDINE HYDROCHLORIDE 100 UG/ML
INJECTION, SOLUTION INTRAVENOUS
Status: DISCONTINUED | OUTPATIENT
Start: 2025-06-30 | End: 2025-06-30

## 2025-06-30 RX ORDER — HALOPERIDOL LACTATE 5 MG/ML
0.5 INJECTION, SOLUTION INTRAMUSCULAR EVERY 10 MIN PRN
Status: DISCONTINUED | OUTPATIENT
Start: 2025-06-30 | End: 2025-06-30 | Stop reason: HOSPADM

## 2025-06-30 RX ORDER — ONDANSETRON 8 MG/1
8 TABLET, ORALLY DISINTEGRATING ORAL EVERY 6 HOURS PRN
Status: DISCONTINUED | OUTPATIENT
Start: 2025-06-30 | End: 2025-07-03 | Stop reason: HOSPADM

## 2025-06-30 RX ORDER — GABAPENTIN 300 MG/1
600 CAPSULE ORAL 3 TIMES DAILY
Status: DISCONTINUED | OUTPATIENT
Start: 2025-06-30 | End: 2025-07-01

## 2025-06-30 RX ORDER — HYDROMORPHONE HYDROCHLORIDE 1 MG/ML
0.2 INJECTION, SOLUTION INTRAMUSCULAR; INTRAVENOUS; SUBCUTANEOUS EVERY 5 MIN PRN
Status: COMPLETED | OUTPATIENT
Start: 2025-06-30 | End: 2025-06-30

## 2025-06-30 RX ORDER — CEFAZOLIN 2 G/1
2 INJECTION, POWDER, FOR SOLUTION INTRAMUSCULAR; INTRAVENOUS
Status: COMPLETED | OUTPATIENT
Start: 2025-06-30 | End: 2025-07-01

## 2025-06-30 RX ORDER — BISACODYL 10 MG/1
10 SUPPOSITORY RECTAL DAILY PRN
Status: DISCONTINUED | OUTPATIENT
Start: 2025-06-30 | End: 2025-07-03 | Stop reason: HOSPADM

## 2025-06-30 RX ORDER — ACETAMINOPHEN 500 MG
1000 TABLET ORAL
Status: COMPLETED | OUTPATIENT
Start: 2025-06-30 | End: 2025-06-30

## 2025-06-30 RX ORDER — OCTREOTIDE ACETATE 500 UG/ML
500 INJECTION, SOLUTION INTRAVENOUS; SUBCUTANEOUS
Status: DISCONTINUED | OUTPATIENT
Start: 2025-06-30 | End: 2025-06-30

## 2025-06-30 RX ORDER — CEFAZOLIN SODIUM 1 G/3ML
INJECTION, POWDER, FOR SOLUTION INTRAMUSCULAR; INTRAVENOUS
Status: DISCONTINUED | OUTPATIENT
Start: 2025-06-30 | End: 2025-06-30

## 2025-06-30 RX ORDER — METHOCARBAMOL 750 MG/1
750 TABLET, FILM COATED ORAL 4 TIMES DAILY
Status: DISCONTINUED | OUTPATIENT
Start: 2025-06-30 | End: 2025-07-01

## 2025-06-30 RX ORDER — ACETAMINOPHEN 325 MG/1
650 TABLET ORAL EVERY 8 HOURS
Status: DISCONTINUED | OUTPATIENT
Start: 2025-06-30 | End: 2025-07-01

## 2025-06-30 RX ORDER — LIDOCAINE HYDROCHLORIDE 10 MG/ML
INJECTION, SOLUTION INTRAVENOUS
Status: DISCONTINUED | OUTPATIENT
Start: 2025-06-30 | End: 2025-06-30

## 2025-06-30 RX ORDER — SODIUM CHLORIDE 9 MG/ML
INJECTION, SOLUTION INTRAVENOUS CONTINUOUS
Status: DISCONTINUED | OUTPATIENT
Start: 2025-06-30 | End: 2025-06-30

## 2025-06-30 RX ADMIN — ROCURONIUM BROMIDE 20 MG: 10 INJECTION, SOLUTION INTRAVENOUS at 07:06

## 2025-06-30 RX ADMIN — OXYCODONE HYDROCHLORIDE 10 MG: 10 TABLET ORAL at 10:06

## 2025-06-30 RX ADMIN — FINASTERIDE 5 MG: 5 TABLET, FILM COATED ORAL at 08:06

## 2025-06-30 RX ADMIN — DEXAMETHASONE SODIUM PHOSPHATE 8 MG: 4 INJECTION, SOLUTION INTRAMUSCULAR; INTRAVENOUS at 07:06

## 2025-06-30 RX ADMIN — OXYCODONE HYDROCHLORIDE 10 MG: 10 TABLET ORAL at 01:06

## 2025-06-30 RX ADMIN — METHOCARBAMOL 750 MG: 750 TABLET ORAL at 08:06

## 2025-06-30 RX ADMIN — HYDROMORPHONE HYDROCHLORIDE 0.2 MG: 1 INJECTION, SOLUTION INTRAMUSCULAR; INTRAVENOUS; SUBCUTANEOUS at 02:06

## 2025-06-30 RX ADMIN — SENNOSIDES AND DOCUSATE SODIUM 1 TABLET: 50; 8.6 TABLET ORAL at 12:06

## 2025-06-30 RX ADMIN — SENNOSIDES AND DOCUSATE SODIUM 1 TABLET: 50; 8.6 TABLET ORAL at 08:06

## 2025-06-30 RX ADMIN — IPRATROPIUM BROMIDE AND ALBUTEROL SULFATE 3 ML: 2.5; .5 SOLUTION RESPIRATORY (INHALATION) at 07:06

## 2025-06-30 RX ADMIN — GABAPENTIN 600 MG: 300 CAPSULE ORAL at 08:06

## 2025-06-30 RX ADMIN — ROCURONIUM BROMIDE 80 MG: 10 INJECTION, SOLUTION INTRAVENOUS at 07:06

## 2025-06-30 RX ADMIN — OXYCODONE HYDROCHLORIDE 10 MG: 10 TABLET ORAL at 05:06

## 2025-06-30 RX ADMIN — SODIUM CHLORIDE, SODIUM GLUCONATE, SODIUM ACETATE, POTASSIUM CHLORIDE, MAGNESIUM CHLORIDE, SODIUM PHOSPHATE, DIBASIC, AND POTASSIUM PHOSPHATE: .53; .5; .37; .037; .03; .012; .00082 INJECTION, SOLUTION INTRAVENOUS at 07:06

## 2025-06-30 RX ADMIN — GABAPENTIN 400 MG: 300 CAPSULE ORAL at 05:06

## 2025-06-30 RX ADMIN — FENTANYL CITRATE 50 MCG: 50 INJECTION, SOLUTION INTRAMUSCULAR; INTRAVENOUS at 08:06

## 2025-06-30 RX ADMIN — CEFAZOLIN 2 G: 330 INJECTION, POWDER, FOR SOLUTION INTRAMUSCULAR; INTRAVENOUS at 07:06

## 2025-06-30 RX ADMIN — Medication 20 MG: at 08:06

## 2025-06-30 RX ADMIN — PROPOFOL 200 MG: 10 INJECTION, EMULSION INTRAVENOUS at 07:06

## 2025-06-30 RX ADMIN — ACETAMINOPHEN 1000 MG: 500 TABLET ORAL at 05:06

## 2025-06-30 RX ADMIN — PROPOFOL 50 MG: 10 INJECTION, EMULSION INTRAVENOUS at 07:06

## 2025-06-30 RX ADMIN — SODIUM CHLORIDE: 0.9 INJECTION, SOLUTION INTRAVENOUS at 07:06

## 2025-06-30 RX ADMIN — MIRTAZAPINE 30 MG: 15 TABLET, FILM COATED ORAL at 08:06

## 2025-06-30 RX ADMIN — HYDROMORPHONE HYDROCHLORIDE 0.2 MG: 1 INJECTION, SOLUTION INTRAMUSCULAR; INTRAVENOUS; SUBCUTANEOUS at 12:06

## 2025-06-30 RX ADMIN — LIDOCAINE HYDROCHLORIDE 90 MG: 10 INJECTION, SOLUTION INTRAVENOUS at 07:06

## 2025-06-30 RX ADMIN — DEXMEDETOMIDINE 8 MCG: 100 INJECTION, SOLUTION, CONCENTRATE INTRAVENOUS at 10:06

## 2025-06-30 RX ADMIN — ONDANSETRON 4 MG: 2 INJECTION INTRAMUSCULAR; INTRAVENOUS at 10:06

## 2025-06-30 RX ADMIN — ROCURONIUM BROMIDE 20 MG: 10 INJECTION, SOLUTION INTRAVENOUS at 08:06

## 2025-06-30 RX ADMIN — PHENYLEPHRINE HYDROCHLORIDE 50 MCG: 10 INJECTION INTRAVENOUS at 08:06

## 2025-06-30 RX ADMIN — DEXMEDETOMIDINE 4 MCG: 100 INJECTION, SOLUTION, CONCENTRATE INTRAVENOUS at 09:06

## 2025-06-30 RX ADMIN — CEFAZOLIN 2 G: 2 INJECTION, POWDER, FOR SOLUTION INTRAMUSCULAR; INTRAVENOUS at 04:06

## 2025-06-30 RX ADMIN — FENTANYL CITRATE 100 MCG: 50 INJECTION, SOLUTION INTRAMUSCULAR; INTRAVENOUS at 07:06

## 2025-06-30 RX ADMIN — TAMSULOSIN HYDROCHLORIDE 0.4 MG: 0.4 CAPSULE ORAL at 08:06

## 2025-06-30 RX ADMIN — DEXMEDETOMIDINE 8 MCG: 100 INJECTION, SOLUTION, CONCENTRATE INTRAVENOUS at 11:06

## 2025-06-30 RX ADMIN — METHOCARBAMOL 750 MG: 750 TABLET ORAL at 12:06

## 2025-06-30 RX ADMIN — ROCURONIUM BROMIDE 10 MG: 10 INJECTION, SOLUTION INTRAVENOUS at 10:06

## 2025-06-30 RX ADMIN — ROCURONIUM BROMIDE 20 MG: 10 INJECTION, SOLUTION INTRAVENOUS at 09:06

## 2025-06-30 RX ADMIN — MIDAZOLAM HYDROCHLORIDE 2 MG: 2 INJECTION, SOLUTION INTRAMUSCULAR; INTRAVENOUS at 06:06

## 2025-06-30 RX ADMIN — IPRATROPIUM BROMIDE AND ALBUTEROL SULFATE 3 ML: 2.5; .5 SOLUTION RESPIRATORY (INHALATION) at 02:06

## 2025-06-30 RX ADMIN — POLYETHYLENE GLYCOL 3350 17 G: 17 POWDER, FOR SOLUTION ORAL at 12:06

## 2025-06-30 RX ADMIN — INDOCYANINE GREEN AND WATER 25 MG: KIT at 10:06

## 2025-06-30 RX ADMIN — FENTANYL CITRATE 50 MCG: 50 INJECTION, SOLUTION INTRAMUSCULAR; INTRAVENOUS at 07:06

## 2025-06-30 RX ADMIN — GABAPENTIN 600 MG: 300 CAPSULE ORAL at 02:06

## 2025-06-30 RX ADMIN — ACETAMINOPHEN 650 MG: 325 TABLET ORAL at 01:06

## 2025-06-30 RX ADMIN — METHOCARBAMOL 750 MG: 750 TABLET ORAL at 04:06

## 2025-06-30 RX ADMIN — DEXMEDETOMIDINE 12 MCG: 100 INJECTION, SOLUTION, CONCENTRATE INTRAVENOUS at 07:06

## 2025-06-30 RX ADMIN — Medication 10 MG: at 09:06

## 2025-06-30 RX ADMIN — ACETAMINOPHEN 650 MG: 325 TABLET ORAL at 10:06

## 2025-06-30 RX ADMIN — GLYCOPYRROLATE 0.1 MG: 0.2 INJECTION INTRAMUSCULAR; INTRAVENOUS at 08:06

## 2025-06-30 RX ADMIN — SODIUM CHLORIDE: 0.9 INJECTION, SOLUTION INTRAVENOUS at 06:06

## 2025-06-30 NOTE — NURSING TRANSFER
Nursing Transfer Note      6/30/2025   2:50 PM    Reason patient is being transferred: Recovery criteria met    PACU nurse giving handoff: KATHE Coates    Nurse receiving handoff: KATHE Lam    Transfer to 1061    Transfer via bed    Transfer with cardiac monitoring    Transported by RN & PCT    Telemetry: Box # [id number] HR [id number] NSR  Verified on & working by PACU RN: Yes    Transfer Vital Signs @ 1430  Temperature: 97.7  Blood Pressure: 129/65  Heart Rate: 68  O2 Sat: 98% on 2LPM  Respirations: 14    Medicines/Equipment sent with patient: Incentive Spirometer    Any special needs or follow-up needed: Chest tube to water seal    Patient belongings transferred with patient: No confirmed belongings are with family     Chart send with patient: Yes    Notified: Spouse and Daughter    Patient reassessed prior to transfer at: 6/30/25 @ 1430

## 2025-06-30 NOTE — BRIEF OP NOTE
[facility] - [facility] (2nd Fl)  Brief Operative Note    SUMMARY     Surgery Date: 6/30/2025     Surgeons and Role:     * Parker Gay MD - Primary     * Regino Villalba PA-C - Assisting     * Melissa Torres MD - Fellow    Pre-op Diagnosis:  Lung nodule [R91.1]    Post-op Diagnosis:  Post-Op Diagnosis Codes:     * Lung nodule [R91.1]    Procedure(s) (LRB):  DV5 ROBOTIC RATS, right upper lobe posterior segmentectomy (Right)  XI ROBOTIC RATS, WITH LYMPHADENECTOMY (Right)  BLOCK, NERVE, INTERCOSTAL, 2 OR MORE (Right)    Anesthesia: General    Implants: Thoracostomy tube    Operative Findings: Palpable mass present in right posterior segment. Right posterior segmentectomy performed. Good margins ensured during parenchymal division. MLND with removal of gauri stations 9, 8, 7, 11 sump, 12, and 13.    Estimated Blood Loss: 20cc         Specimens:   Specimen (24h ago, onward)       Start     Ordered    06/30/25 0928  Specimen to Pathology Pulmonary and Thoracic  RELEASE UPON ORDERING        References:    Click here for ordering Quick Tip   Question:  Release to patient  Answer:  Immediate    06/30/25 0928                  ID Type Source Tests Collected by Time Destination   1 : RIGHT LEVEL 9 Tissue Lung, Right SPECIMEN TO PATHOLOGY Parker Gay MD 6/30/2025 0924    2 : RIGHT LEVEL 8 Tissue Lung, Right SPECIMEN TO PATHOLOGY Parker Gay MD 6/30/2025 0925    3 : LEVEL 7 Tissue Mediastinal Lymph Node Station 7 SPECIMEN TO PATHOLOGY Parker Gay MD 6/30/2025 0926    4 : LEVEL 11R INTERLOBAR Tissue Mediastinal Lymph Node Station 11R SPECIMEN TO PATHOLOGY Parker Gay MD 6/30/2025 0926    5 : RIGHT LEVEL 12 Tissue Lung, Right SPECIMEN TO PATHOLOGY Parker Gay MD 6/30/2025 0944    6 : LEVEL 13 Tissue Lung, Right SPECIMEN TO PATHOLOGY Parker Gay MD 6/30/2025 0951    7 : RIGHT UPPER LOBE Posterior SEGMENECTOMY Tissue Lung, Right Upper Lobe SPECIMEN TO PATHOLOGY Parker Gay MD  6/30/2025 [id number]        Melissa Torres MD  PGY-6 CTS Fellow

## 2025-06-30 NOTE — TRANSFER OF CARE
"Anesthesia Transfer of Care Note    Patient: Jesus Christy    Procedure(s) Performed: Procedure(s) (LRB):  DV5 ROBOTIC RATS, right upper lobe posterior segmentectomy (Right)  XI ROBOTIC RATS, WITH LYMPHADENECTOMY (Right)  BLOCK, NERVE, INTERCOSTAL, 2 OR MORE (Right)    Patient location: PACU    Anesthesia Type: general    Post pain: adequate analgesia    Post assessment: no apparent anesthetic complications    Post vital signs: stable    Level of consciousness: lethargic    Nausea/Vomiting: no nausea/vomiting    Complications: none    Transfer of care protocol was followed    Last vitals: Visit Vitals  /81 (BP Location: Right arm, Patient Position: Lying)   Pulse 68   Temp 36.7 °C (98.1 °F) (Temporal)   Resp 20   Ht 5' 9" (1.753 m)   Wt 79.6 kg (175 lb 7.8 oz)   SpO2 99%   BMI 25.91 kg/m²     "

## 2025-06-30 NOTE — ANESTHESIA POSTPROCEDURE EVALUATION
Anesthesia Post Evaluation    Patient: Jesus Christy    Procedure(s) Performed: Procedure(s) (LRB):  DV5 ROBOTIC RATS, right upper lobe posterior segmentectomy (Right)  XI ROBOTIC RATS, WITH LYMPHADENECTOMY (Right)  BLOCK, NERVE, INTERCOSTAL, 2 OR MORE (Right)    Final Anesthesia Type: general      Patient location during evaluation: PACU  Patient participation: Yes- Able to Participate  Level of consciousness: awake and alert  Post-procedure vital signs: reviewed and stable  Pain management: adequate  Airway patency: patent    PONV status at discharge: No PONV  Anesthetic complications: no      Cardiovascular status: blood pressure returned to baseline  Respiratory status: unassisted  Hydration status: euvolemic  Follow-up not needed.              Vitals Value Taken Time   /76 06/30/25 12:02   Temp 36.7 °C (98.1 °F) 06/30/25 11:45   Pulse 72 06/30/25 12:04   Resp 16 06/30/25 12:04   SpO2 94 % 06/30/25 12:04   Vitals shown include unfiled device data.      No case tracking events are documented in the log.      Pain/Anna Score: Pain Rating Prior to Med Admin: 8 (6/30/2025 12:00 PM)  Anna Score: 7 (6/30/2025 11:45 AM)

## 2025-06-30 NOTE — OP NOTE
DATE OF PROCEDURE: 6/30/2025     PREOPERATIVE DIAGNOSES:   Lung nodule [R91.1]  Carcinoid of the right upper lobe  Chronic migraine  Cervical radiculopathy   Prostate cancer  Hypertension  Anemia  Portal vein thrombosis   Anticoagulation use   Chronic benzodiazepine use  Anxiety  Depression     POSTOPERATIVE DIAGNOSES:   Same as above    PROCEDURES PERFORMED:   Procedure(s) (LRB):  DV5 ROBOTIC RATS, right upper lobe posterior segmentectomy (Right)  XI ROBOTIC RATS, WITH LYMPHADENECTOMY (Right)  BLOCK, NERVE, INTERCOSTAL, 2 OR MORE (Right)    Surgeons and Role:     * Parker Gay MD - Primary     * Regino Villalba PA-C - Assisting     * Melissa Torres MD - Fellow        ANESTHESIA: General    INDICATIONS FOR PROCEDURE:   Patient is a 64 y.o. male remote smoker with chronic migraine, DDD, cervical radiculopathy HTN, h/o prostate cancer, anemia, and portal vein thrombosis presents to clinic for evaluation of RUL carcinoid. CT chest 3/13/25 with RUL nodule measuring 2.3 cm. Interval scan with stability. Robotic bronch returned low grade neuroendocrine tumor/ carcinoid. Cu PET with SUV max 2.2. today, he denies SOB. Activity is limited more due to leg pain than SOB. Performs ADLs independently. No previous chest surgeries. On Eliquis BID.    Plan to go to OR for right robotic assisted upper lobe segmentectomy with MLND, possible thoracotomy.                - this is in the posterior segment, but he probably needs an AP segment for margin    DESCRIPTION OF PROCEDURE:   Thoracic (Pulmonary) Cancer - Synoptic Operative Report Summary    Side of surgery Right   Surgical approach Robotic   Tumor type Carcinoid tumor   Which lymph nodes were submitted as separate specimens? 7 - Subcarinal, 8R - Paraesophageal (right), 9R - Pulmonary ligament (right), 11R - Interlobar (right), 12R - Lobar (right), and 13R - Segmental (right)   What pre-operative therapies did the patient receive? None       The patient was identified in  the preoperative area. Procedure was confirmed. Patient was transferred to the OR and general anesthesia was induced. The patient was intubated with a double lumen endotracheal tube. Lines for monitoring and access were placed.     We started with the bronchoscopy. The ETT was in good position. No invasion was seen. Minimal secretions were seen. No anatomic abnormalities were found.     We placed the patient in the lateral decubitus position and prepped and draped in the usual fashion. Timeout was performed. Access was gained in the 8th interspace in line with the anterior axilla with an 8 mm port. The chest was surveyed. No injuries or gross abnormalities were found other than the mass. No other masses were found. Adhesions were minimal. There was no unexpected invasion. There was good lung isolation. Two more 8 mm ports were placed posteriorly at least a hand breadth apart in line within the same interspace. One 12 mm port was placed anteriorly in the same interspace, a hand breadth away from the original port. A 12 mm Air Seal trocar was placed anteriorly, triangulated inferiorly with the two anterior ports. All of these were placed under direct visualization. The robot was docked. Two rolled pieces of radiopaque gauze were placed in the chest for retraction.     As there was no other qualified resident or assistant, the PA performed the first assist and robotic bedside procedures.    The lower lobe was retracted superiorly. The inferior pulmonary ligament was dissected. Level 9 nodes were dissected and passed off for pathology. We dissected the posterior hilum. Level 7 and 8 nodes were dissected and passed off for pathology. Level 11 nodes were also encountered, dissected and passed off as we dissected distally into the hilum along the RUL bronchus and ongoing PA. The superior hilum was dissected.      We turned our attention to the fissure. The pulmonary artery was dissected here posteriorly. The posterior  ascending segmental branch was identified. The fissure was dissected further to join with the posterior hilar dissection, then the fissure was completed. More level 11 nodes and level 12 nodes were dissected and passed off during this process. The posterior ascending artery was identified, dissected, and divided with a stapler. The posterior branch of the superior pulmonary vein was divided with the stapler. The bronchus to the posterior segment of the upper lobe was identified and dissected. Apical and anterior branches were also identified. The posterior right upper lobe bronchus was divided with a stapler. The parenchymal division was defined by ICG. Multiple staple loads were used to divide the parenchyma. The specimen was placed in a bag and passed off for pathology. The mass was palpated in the specimen.     We did a six level intercostal block under direct visualization. We confirmed hemostasis at the hilum and the robot port sites. We used vistaseal on the parenchymal line to prevent air leaks. The robot was undocked. A single 28 Fr chest tube was placed. The ports were removed under direct visualization. The skin incisions were closed in layers, and the chest tube was secured and connected to a Pleur-evac. This completed the procedure. All counts were correct x2. The patient was extubated and transferred to PACU in stable condition.      ESTIMATED BLOOD LOSS: 50 ml    SPECIMENS:   Specimen (24h ago, onward)       Start     Ordered    06/30/25 0928  Specimen to Pathology  RELEASE UPON ORDERING        References:    Click here for ordering Quick Tip   Question:  Release to patient  Answer:  Immediate    06/30/25 0928                    COMPLICATIONS: No

## 2025-06-30 NOTE — ANESTHESIA PROCEDURE NOTES
Arterial    Diagnosis: Lung nodule    Patient location during procedure: done in OR    Staffing  Authorizing Provider: Kanu Curry MD  Performing Provider: Lidia Tony MD    Staffing  Performed by: Lidia Tony MD  Authorized by: Kanu Curry MD    Anesthesiologist was present at the time of the procedure.    Preanesthetic Checklist  Completed: patient identified, IV checked, site marked, risks and benefits discussed, surgical consent, monitors and equipment checked, pre-op evaluation, timeout performed and anesthesia consent givenArterial  Skin Prep: chlorhexidine gluconate and isopropyl alcohol  Local Infiltration: none  Location: radial    Catheter Size: 20 G  Catheter placement by Ultrasound guidance. Heme positive aspiration all ports.   Vessel Caliber: large, patent, compressibility normal  Vascular Doppler:  not done  Needle advanced into vessel with real time Ultrasound guidance.Insertion Attempts: 1  Assessment  Dressing: secured with tape and tegaderm  Patient: Tolerated well

## 2025-06-30 NOTE — OPERATIVE NOTE ADDENDUM
- increase guanfacine to 1mg every morning plus 2 mg at night (was only 2mg at night)   - other medications remain the same   - follow-up Sept 26th via video         **For crisis resources, please see the information at the end of this document**   Patient Education    Thank you for coming to the [facility].     Lab Testing:  If you had lab testing today and your results are reassuring or normal they will be mailed to you or sent through [employer] within 7 days. If the lab tests need quick action we will call you with the results. The phone number we will call with results is # [phone]. If this is not the best number please call our clinic and change the number.     Medication Refills:  If you need any refills please call your pharmacy and they will contact us. Our fax number for refills is [phone].   Three business days of notice are needed for general medication refill requests.   Five business days of notice are needed for controlled substance refill requests.   If you need to change to a different pharmacy, please contact the new pharmacy directly. The new pharmacy will help you get your medications transferred.     Contact Us:  Please call [phone] during business hours (8-5:00 M-F).   If you have medication related questions after clinic hours, or on the weekend, please call [phone].     Financial Assistance [phone]   Medical Records [phone]       MENTAL HEALTH CRISIS RESOURCES:  For a emergency help, please call 911 or go to the nearest Emergency Department.     Emergency Walk-In Options:   Em[facility] Unit @ [facility] ([place]): [phone] - Specialized mental health emergency area designed to be calming  [facility] ([place]): [phone]  [facility] ([place]): [phone]  [facility]): [phone]    [facility] Crisis Information:   [place]: [phone]  [place]:  Certification of Assistant at Surgery       Surgery Date: 6/30/2025     Participating Surgeons:  Surgeons and Role:     * Parker Gay MD - Primary     * Regino Villalba PA-C - Assisting     * Melissa Torres MD - Fellow    Procedures:  Procedure(s) (LRB):  DV5 ROBOTIC RATS, right upper lobe posterior segmentectomy (Right)  XI ROBOTIC RATS, WITH LYMPHADENECTOMY (Right)  BLOCK, NERVE, INTERCOSTAL, 2 OR MORE (Right)    Assistant Surgeon's Certification of Necessity:  I understand that section 1842 (b) (6) (d) of the Social Security Act generally prohibits Medicare Part B reasonable charge payment for the services of assistants at surgery in teaching hospitals when qualified residents are available to furnish such services. I certify that the services for which payment is claimed were medically necessary, and that no qualified resident was available to perform the services. I further understand that these services are subject to post-payment review by the Medicare carrier.      Regino Villalba PA-C    06/30/2025  12:29 PM     [phone]  [place] ([place]) - Adult: [phone]     Child: [phone]  [place] - Adult: [phone]     Child: [phone]  [place]: [phone]  List of all [facility] resources:   https://[place].gov/dhs/people-we-serve/adults/health-care/mental-health/resources/crisis-contacts.jsp    National Crisis Information:   Crisis Text Line: Text  [place]  to 475463  Suicide & Crisis Lifeline: 988  National Suicide Prevention Lifeline: 2-150-803-TALK (1-464.209.9688)       For online chat options, visit https://suicidepreventionlifeline.org/chat/  Poison Control Center: 1-620.894.3446  Trans Lifeline: 1-831.224.4962 - Hotline for transgender people of all ages  [employer]: 7-114-151-1075 - Hotline for LGBT youth     For Non-Emergency Support:   Fast Tracker: Mental Health & Substance Use Disorder Resources -   https://www.[employer].org/

## 2025-06-30 NOTE — ANESTHESIA PROCEDURE NOTES
Intubation    Date/Time: 6/30/2025 7:15 AM    Performed by: Judith Juarez MD  Authorized by: Kanu Curry MD    Intubation:     Induction:  Intravenous    Intubated:  Postinduction    Mask Ventilation:  Easy with oral airway    Attempts:  1    Attempted By:  Resident anesthesiologist    Method of Intubation:  Video laryngoscopy and fiberoptic    Blade:  Kennedy 3    Laryngeal View Grade: Grade I - full view of cords      Difficult Airway Encountered?: No      Complications:  None    Airway Device:  Double lumen tube left    Airway Device Size:  39F    Style/Cuff Inflation:  Cuffed (inflated to minimal occlusive pressure)    Tube secured:  29    Secured at:  The teeth    Placement Verified By:  Capnometry and Fiber optic visualization    Complicating Factors:  None    Findings Post-Intubation:  BS equal bilateral and atraumatic/condition of teeth unchanged  Notes:      Maintained neck in neutral position

## 2025-06-30 NOTE — H&P
History & Physical     Subjective  History of Present Illness:  Patient is a 64 y.o. male remote smoker with chronic migraine, DDD, cervical radiculopathy HTN, h/o prostate cancer, anemia, and portal vein thrombosis presents to clinic for evaluation of RUL carcinoid. CT chest 3/13/25 with RUL nodule measuring 2.3 cm. Interval scan with stability. Robotic bronch returned low grade neuroendocrine tumor/ carcinoid. Cu PET with SUV max 2.2. today, he denies SOB. Activity is limited more due to leg pain than SOB. Performs ADLs independently. No previous chest surgeries. Eliquis BID      Meds: Eliquis BID, Gabapentin 600mg TID  PSH: Knee replacement  Never smoker       Interval History 6/30/25:   Patient presents for planned robotic assisted right upper lobe segmentectomy. No changes to his health since clinic.         Chief Complaint   Patient presents with    Consult              Review of patient's allergies indicates:   Allergen Reactions    Peach (prunus persica)      Morphine Palpitations         [Current Medications]    [Current Medications]         Current Outpatient Medications   Medication Sig Dispense Refill    ammonium lactate 12 % Crea Apply 1 application  topically 2 (two) times daily. 140 g 11    apixaban (ELIQUIS) 5 mg Tab Take 1 tablet (5 mg total) by mouth 2 (two) times daily. 60 tablet 11    ARIPiprazole (ABILIFY) 10 MG Tab          cyproheptadine (PERIACTIN) 4 mg tablet          diazePAM (VALIUM) 10 MG Tab Take 10 mg by mouth 3 (three) times daily.        diclofenac sodium (VOLTAREN) 1 % Gel Apply 2 g topically 4 (four) times daily. 200 g 2    finasteride (PROSCAR) 5 mg tablet Take 1 tablet (5 mg total) by mouth once daily. 90 tablet 0    fluticasone propionate (FLONASE) 50 mcg/actuation nasal spray Instill 1 spray (50 mcg total) in each nostril once daily. 48 g 3    gabapentin (NEURONTIN) 300 MG capsule Take 1 capsule (300 mg total) by mouth 3 (three) times daily as needed (leg pain). 90 capsule 5     gabapentin (NEURONTIN) 600 MG tablet Take 1 tablet (600 mg total) by mouth 3 (three) times daily. 90 tablet 11    hydrocortisone (ANUSOL-HC) 2.5 % rectal cream Place rectally 2 (two) times daily. 28 g 1    LIDOcaine (LIDODERM) 5 % Place 1 patch onto the skin once daily. Remove & Discard patch within 12 hours or as directed by MD 5 patch 1    meloxicam (MOBIC) 15 MG tablet Take 1 tablet (15 mg total) by mouth once daily. 30 tablet 1    mirtazapine (REMERON) 30 MG tablet Take 1 tablet (30 mg total) by mouth every evening. 90 tablet 3    tamsulosin (FLOMAX) 0.4 mg Cap Take 1 capsule (0.4 mg total) by mouth once daily. 90 capsule 0    tadalafiL (CIALIS) 20 MG Tab Take 1 tablet (20 mg total) by mouth daily as needed (erectile dysfunction). 30 tablet 5      No current facility-administered medications for this visit.             Past Medical History:   Diagnosis Date    Complex regional pain syndrome i of right lower limb      GERD (gastroesophageal reflux disease)      HTN (hypertension)      Portal vein thrombosis              Past Surgical History:   Procedure Laterality Date    COLONOSCOPY N/A 9/29/2017     Procedure: COLONOSCOPY;  Surgeon: Jamar Edwards MD;  Location: [facility] (4TH FLR);  Service: Endoscopy;  Laterality: N/A;    COLONOSCOPY N/A 2/12/2024     Procedure: COLONOSCOPY;  Surgeon: Miguel Angel Huffman MD;  Location: [facility] (2ND FLR);  Service: Endoscopy;  Laterality: N/A;    ENDOSCOPIC ULTRASOUND OF UPPER GASTROINTESTINAL TRACT N/A 7/5/2024     Procedure: ULTRASOUND, UPPER GI TRACT, ENDOSCOPIC;  Surgeon: Debi Lloyd MD;  Location: [facility] ENDO (2ND FLR);  Service: Endoscopy;  Laterality: N/A;  3/21 portal instr-EUS with myself at [facility] next couple of months pancreas tail cyst.[place] pending Dr. Miranda TE 3/21-tt  5/15/24: instructions sent via portal. eliquis hold in TE 3/21-GD  6/26-pre call complete-tb-will hold eliquis starting     ESOPHAGOGASTRODUODENOSCOPY N/A 2/12/2024     Procedure: EGD  (ESOPHAGOGASTRODUODENOSCOPY);  Surgeon: Miguel Angel Huffman MD;  Location: [facility] ENDO (2ND FLR);  Service: Endoscopy;  Laterality: N/A;  1/30/24-Okay to add per Dr. Huffman, 2nd flr-recent hospitalization for sepsis and portal vein thrombosis, Miralax, instr portal and email, Approval to hold Eliquis and medical clearance rec'd from Dr. Jolly (see telephone encounter 1/30/24)-DS  2/5-prec    JOINT REPLACEMENT Right       knee    KNEE ARTHROSCOPY W/ ACL RECONSTRUCTION   2014     right    ROBOTIC BRONCHOSCOPY N/A 5/13/2025     Procedure: ROBOTIC BRONCHOSCOPY;  Surgeon: Jessica Hamilton MD;  Location: [facility] OR 2ND FLR;  Service: Pulmonary;  Laterality: N/A;    TRANSFORAMINAL EPIDURAL INJECTION OF STEROID N/A 4/16/2025     Procedure: CERVICAL C7/T1 IL CHAZ *ELIQUIS CLEARANCE IN CHART*;  Surgeon: Stuart Viveros MD;  Location: [facility] PAIN MGT;  Service: Pain Management;  Laterality: N/A;  2 WK F/U EDDI             Family History   Problem Relation Name Age of Onset    Hypertension Mother        Heart disease Father        Diabetes Father        Cancer Brother             unknown type    No Known Problems Daughter        No Known Problems Daughter        No Known Problems Daughter        No Known Problems Daughter        No Known Problems Son        No Known Problems Son          [Social History]     [Social History]        Tobacco Use    Smoking status: Never    Smokeless tobacco: Never   Substance Use Topics    Alcohol use: Yes       Alcohol/week: 0.8 standard drinks of alcohol       Types: 1 Standard drinks or equivalent per week       Comment: once every few months    Drug use: No        Review of Systems:  Review of Systems   Constitutional:  Negative for chills and fever.   Respiratory:  Negative for cough and shortness of breath.    Cardiovascular:  Negative for chest pain.   Gastrointestinal:  Negative for constipation, diarrhea, nausea and vomiting.   Genitourinary:  Negative for difficulty urinating.   Neurological:   "Negative for dizziness, seizures and weakness.   Hematological:  Negative for adenopathy.   Psychiatric/Behavioral:  Negative for agitation.             Objective  Vital Signs (Most Recent)  Pulse: 80 (05/26/25 1422)  BP: 137/74 (05/26/25 1422)  SpO2: 96 % (05/26/25 1422)  5' 9" (1.753 m)  81.5 kg (179 lb 10.8 oz)      Physical Exam:  Physical Exam  Constitutional:       Appearance: Normal appearance.   HENT:      Head: Normocephalic and atraumatic.   Eyes:      Extraocular Movements: Extraocular movements intact.   Cardiovascular:      Rate and Rhythm: Normal rate and regular rhythm.      Pulses: Normal pulses.   Pulmonary:      Effort: Pulmonary effort is normal. No respiratory distress.      Breath sounds: Normal breath sounds.   Musculoskeletal:      Cervical back: Normal range of motion.      Right lower leg: No edema.      Left lower leg: No edema.   Skin:     General: Skin is warm and dry.   Neurological:      General: No focal deficit present.      Mental Status: He is alert.   Psychiatric:         Mood and Affect: Mood normal.            Diagnostic Results:  Echo 1/26/24:    Left Ventricle: The left ventricle is normal in size. Normal wall thickness. Normal wall motion. There is normal systolic function. Ejection fraction by visual approximation is 65%. There is normal diastolic function.    Right Ventricle: Normal right ventricular cavity size. Wall thickness is normal. Right ventricle wall motion  is normal. Systolic function is normal.    Aortic Valve: There is mild to moderate aortic regurgitation.    Tricuspid Valve: There is mild regurgitation.    Pulmonary Artery: The estimated pulmonary artery systolic pressure is 37 mmHg.    IVC/SVC: Normal venous pressure at 3 mmHg     CT chest 3/13/25:  Lungs: A 2.3 by 2.0 sharply defined nodule in the right upper lobe (series 4, image 168) has increased in size when compared to 09/04/2018 when it measured 2.0 by 1.6 cm, respectively.  This finding is present on " imaging going back as far as 02/25/2017, the oldest available comparison.  A 3 mm nodule in the right middle lobe (series 4, image 308) is unchanged when compared to 10/09/2019.  Additional tiny nodules are unchanged.     PFTs 4/22/25:  FEV1 - 2.37    84%  DLCO - 23.41   85%      PET 5/22/25:  Mildly tracer avid right upper lobe solid pulmonary nodule, compatible with reported carcinoid tumor.  Left intraparotid soft tissue lesion with uptake similar to background parotid gland.  May represent primary parotid neoplasm.  Metastasis thought less likely.  Further evaluation versus attention on follow-up as clinically warranted.     Assessment and Plan  Patient is a 64 y.o. male never smoker with chronic migraine, DDD, cervical radiculopathy HTN, h/o prostate cancer, anemia, and portal vein thrombosis presents to clinic for evaluation of RUL carcinoid.  Functional surgical candidate      PLAN:  - to the OR   - informed consent obtained     Maida Stuart MD  Pager: [phone]  General Surgery PGY-IV  [facility]

## 2025-06-30 NOTE — NURSING
"[facility] Plan of Care Note    Dx:   Lung nodule [R91.1]  Malignant neoplasm of right lung [C34.91]    Shift Events: surgery today, pain control, chest tube care    Goals of Care: pain control, safety, chest tube care    Neuro: aaox4    Vital Signs: /81 (BP Location: Left arm, Patient Position: Lying)   Pulse 75   Temp 98.1 °F (36.7 °C) (Oral)   Resp 18   Ht 5' 9" (1.753 m)   Wt 79.6 kg (175 lb 7.8 oz)   SpO2 (!) 93%   BMI 25.91 kg/m²     Respiratory: RA    Diet: Diet Adult Regular      Is patient tolerating current diet? yes    GTTS: n/a    Urine Output/Bowel Movement:   I/O this shift:  In: 1000 [IV Piggyback:1000]  Out: 615 [Urine:550; Chest Tube:65]  Last Bowel Movement: 06/29/25      Drains/Tubes/Tube Feeds (include total output/shift):   I/O this shift:  In: 1000 [IV Piggyback:1000]  Out: 615 [Urine:550; Chest Tube:65]      Lines: see chart      Accuchecks:none    Skin: see chart    Fall Risk Score: see chart    Activity level? see chart    Any scheduled procedures? None at this time    Any safety concerns? fall    Other: see chart   "

## 2025-06-30 NOTE — NURSING
Pt arrived on floor with pacu nurse. Chest tube CDI, no air leak noted. See flowsheet for VS. Pt denied SOB or obvious distress, and none noted. Bed at lowest, locked sr up; x2, call light within reach, board updated, family at bedside. Pt instructed to call for assistance.

## 2025-07-01 DIAGNOSIS — R91.1 LUNG NODULE: Primary | ICD-10-CM

## 2025-07-01 PROBLEM — C34.91 MALIGNANT NEOPLASM OF RIGHT LUNG: Status: ACTIVE | Noted: 2025-07-01

## 2025-07-01 PROBLEM — Z93.8 S/P CHEST TUBE PLACEMENT: Status: ACTIVE | Noted: 2025-07-01

## 2025-07-01 LAB
ABSOLUTE EOSINOPHIL (OHS): 0.01 K/UL
ABSOLUTE MONOCYTE (OHS): 0.94 K/UL (ref 0.3–1)
ABSOLUTE NEUTROPHIL COUNT (OHS): 6.09 K/UL (ref 1.8–7.7)
ANION GAP (OHS): 7 MMOL/L (ref 8–16)
BASOPHILS # BLD AUTO: 0 K/UL
BASOPHILS NFR BLD AUTO: 0 %
BUN SERPL-MCNC: 15 MG/DL (ref 8–23)
CALCIUM SERPL-MCNC: 9.3 MG/DL (ref 8.7–10.5)
CHLORIDE SERPL-SCNC: 105 MMOL/L (ref 95–110)
CO2 SERPL-SCNC: 24 MMOL/L (ref 23–29)
CREAT SERPL-MCNC: 1.2 MG/DL (ref 0.5–1.4)
ERYTHROCYTE [DISTWIDTH] IN BLOOD BY AUTOMATED COUNT: 12.4 % (ref 11.5–14.5)
GFR SERPLBLD CREATININE-BSD FMLA CKD-EPI: >60 ML/MIN/1.73/M2
GLUCOSE SERPL-MCNC: 98 MG/DL (ref 70–110)
HCT VFR BLD AUTO: 37.1 % (ref 40–54)
HGB BLD-MCNC: 12.1 GM/DL (ref 14–18)
IMM GRANULOCYTES # BLD AUTO: 0.03 K/UL (ref 0–0.04)
IMM GRANULOCYTES NFR BLD AUTO: 0.4 % (ref 0–0.5)
LYMPHOCYTES # BLD AUTO: 1.14 K/UL (ref 1–4.8)
MCH RBC QN AUTO: 31.3 PG (ref 27–31)
MCHC RBC AUTO-ENTMCNC: 32.6 G/DL (ref 32–36)
MCV RBC AUTO: 96 FL (ref 82–98)
NUCLEATED RBC (/100WBC) (OHS): 0 /100 WBC
PLATELET # BLD AUTO: 160 K/UL (ref 150–450)
PMV BLD AUTO: 9.3 FL (ref 9.2–12.9)
POTASSIUM SERPL-SCNC: 4 MMOL/L (ref 3.5–5.1)
RBC # BLD AUTO: 3.87 M/UL (ref 4.6–6.2)
RELATIVE EOSINOPHIL (OHS): 0.1 %
RELATIVE LYMPHOCYTE (OHS): 13.9 % (ref 18–48)
RELATIVE MONOCYTE (OHS): 11.4 % (ref 4–15)
RELATIVE NEUTROPHIL (OHS): 74.2 % (ref 38–73)
SODIUM SERPL-SCNC: 136 MMOL/L (ref 136–145)
WBC # BLD AUTO: 8.21 K/UL (ref 3.9–12.7)

## 2025-07-01 PROCEDURE — 63600175 PHARM REV CODE 636 W HCPCS: Performed by: STUDENT IN AN ORGANIZED HEALTH CARE EDUCATION/TRAINING PROGRAM

## 2025-07-01 PROCEDURE — 97161 PT EVAL LOW COMPLEX 20 MIN: CPT

## 2025-07-01 PROCEDURE — 97530 THERAPEUTIC ACTIVITIES: CPT

## 2025-07-01 PROCEDURE — 85025 COMPLETE CBC W/AUTO DIFF WBC: CPT | Performed by: STUDENT IN AN ORGANIZED HEALTH CARE EDUCATION/TRAINING PROGRAM

## 2025-07-01 PROCEDURE — 99900035 HC TECH TIME PER 15 MIN (STAT)

## 2025-07-01 PROCEDURE — 25000242 PHARM REV CODE 250 ALT 637 W/ HCPCS: Performed by: STUDENT IN AN ORGANIZED HEALTH CARE EDUCATION/TRAINING PROGRAM

## 2025-07-01 PROCEDURE — 82947 ASSAY GLUCOSE BLOOD QUANT: CPT | Performed by: STUDENT IN AN ORGANIZED HEALTH CARE EDUCATION/TRAINING PROGRAM

## 2025-07-01 PROCEDURE — 94761 N-INVAS EAR/PLS OXIMETRY MLT: CPT

## 2025-07-01 PROCEDURE — 20600001 HC STEP DOWN PRIVATE ROOM

## 2025-07-01 PROCEDURE — 97535 SELF CARE MNGMENT TRAINING: CPT

## 2025-07-01 PROCEDURE — 97165 OT EVAL LOW COMPLEX 30 MIN: CPT

## 2025-07-01 PROCEDURE — 99024 POSTOP FOLLOW-UP VISIT: CPT | Mod: ,,, | Performed by: STUDENT IN AN ORGANIZED HEALTH CARE EDUCATION/TRAINING PROGRAM

## 2025-07-01 PROCEDURE — 25000003 PHARM REV CODE 250: Performed by: STUDENT IN AN ORGANIZED HEALTH CARE EDUCATION/TRAINING PROGRAM

## 2025-07-01 PROCEDURE — 25000003 PHARM REV CODE 250

## 2025-07-01 PROCEDURE — 25000003 PHARM REV CODE 250: Performed by: NURSE PRACTITIONER

## 2025-07-01 PROCEDURE — 63600175 PHARM REV CODE 636 W HCPCS: Mod: JZ,TB | Performed by: STUDENT IN AN ORGANIZED HEALTH CARE EDUCATION/TRAINING PROGRAM

## 2025-07-01 PROCEDURE — 94640 AIRWAY INHALATION TREATMENT: CPT

## 2025-07-01 PROCEDURE — 36415 COLL VENOUS BLD VENIPUNCTURE: CPT | Performed by: STUDENT IN AN ORGANIZED HEALTH CARE EDUCATION/TRAINING PROGRAM

## 2025-07-01 RX ORDER — LIDOCAINE 50 MG/G
1 PATCH TOPICAL
Status: DISCONTINUED | OUTPATIENT
Start: 2025-07-01 | End: 2025-07-03 | Stop reason: HOSPADM

## 2025-07-01 RX ORDER — GABAPENTIN 300 MG/1
300 CAPSULE ORAL 3 TIMES DAILY
Qty: 90 CAPSULE | Refills: 11 | Status: SHIPPED | OUTPATIENT
Start: 2025-07-01 | End: 2025-07-01

## 2025-07-01 RX ORDER — GABAPENTIN 400 MG/1
800 CAPSULE ORAL 3 TIMES DAILY
Status: DISCONTINUED | OUTPATIENT
Start: 2025-07-01 | End: 2025-07-03 | Stop reason: HOSPADM

## 2025-07-01 RX ORDER — METHOCARBAMOL 500 MG/1
500 TABLET, FILM COATED ORAL 4 TIMES DAILY
Qty: 40 TABLET | Refills: 0 | Status: SHIPPED | OUTPATIENT
Start: 2025-07-01 | End: 2025-07-11

## 2025-07-01 RX ORDER — MUPIROCIN 20 MG/G
OINTMENT TOPICAL 2 TIMES DAILY
Status: DISCONTINUED | OUTPATIENT
Start: 2025-07-01 | End: 2025-07-03 | Stop reason: HOSPADM

## 2025-07-01 RX ORDER — ACETAMINOPHEN 500 MG
1000 TABLET ORAL EVERY 6 HOURS
Status: DISCONTINUED | OUTPATIENT
Start: 2025-07-01 | End: 2025-07-03 | Stop reason: HOSPADM

## 2025-07-01 RX ORDER — OXYCODONE HYDROCHLORIDE 5 MG/1
5 TABLET ORAL EVERY 4 HOURS PRN
Qty: 20 TABLET | Refills: 0 | Status: SHIPPED | OUTPATIENT
Start: 2025-07-01 | End: 2025-07-07

## 2025-07-01 RX ORDER — METHOCARBAMOL 500 MG/1
1000 TABLET, FILM COATED ORAL 3 TIMES DAILY
Status: DISCONTINUED | OUTPATIENT
Start: 2025-07-01 | End: 2025-07-02

## 2025-07-01 RX ORDER — ACETAMINOPHEN 325 MG/1
650 TABLET ORAL EVERY 8 HOURS
Qty: 84 TABLET | Refills: 0 | Status: SHIPPED | OUTPATIENT
Start: 2025-07-01 | End: 2025-07-03 | Stop reason: HOSPADM

## 2025-07-01 RX ORDER — KETOROLAC TROMETHAMINE 15 MG/ML
15 INJECTION, SOLUTION INTRAMUSCULAR; INTRAVENOUS EVERY 6 HOURS PRN
Status: DISCONTINUED | OUTPATIENT
Start: 2025-07-01 | End: 2025-07-01

## 2025-07-01 RX ORDER — KETOROLAC TROMETHAMINE 15 MG/ML
15 INJECTION, SOLUTION INTRAMUSCULAR; INTRAVENOUS EVERY 6 HOURS
Status: COMPLETED | OUTPATIENT
Start: 2025-07-02 | End: 2025-07-03

## 2025-07-01 RX ADMIN — ACETAMINOPHEN 650 MG: 325 TABLET ORAL at 01:07

## 2025-07-01 RX ADMIN — ENOXAPARIN SODIUM 40 MG: 40 INJECTION SUBCUTANEOUS at 05:07

## 2025-07-01 RX ADMIN — POLYETHYLENE GLYCOL 3350 17 G: 17 POWDER, FOR SOLUTION ORAL at 08:07

## 2025-07-01 RX ADMIN — SENNOSIDES AND DOCUSATE SODIUM 1 TABLET: 50; 8.6 TABLET ORAL at 08:07

## 2025-07-01 RX ADMIN — METHOCARBAMOL 1000 MG: 500 TABLET ORAL at 08:07

## 2025-07-01 RX ADMIN — TAMSULOSIN HYDROCHLORIDE 0.4 MG: 0.4 CAPSULE ORAL at 08:07

## 2025-07-01 RX ADMIN — ACETAMINOPHEN 1000 MG: 500 TABLET ORAL at 05:07

## 2025-07-01 RX ADMIN — KETOROLAC TROMETHAMINE 15 MG: 15 INJECTION INTRAMUSCULAR at 05:07

## 2025-07-01 RX ADMIN — FINASTERIDE 5 MG: 5 TABLET, FILM COATED ORAL at 08:07

## 2025-07-01 RX ADMIN — METHOCARBAMOL 750 MG: 750 TABLET ORAL at 05:07

## 2025-07-01 RX ADMIN — ACETAMINOPHEN 650 MG: 325 TABLET ORAL at 05:07

## 2025-07-01 RX ADMIN — OXYCODONE HYDROCHLORIDE 10 MG: 10 TABLET ORAL at 05:07

## 2025-07-01 RX ADMIN — MIRTAZAPINE 30 MG: 15 TABLET, FILM COATED ORAL at 08:07

## 2025-07-01 RX ADMIN — CEFAZOLIN 2 G: 2 INJECTION, POWDER, FOR SOLUTION INTRAMUSCULAR; INTRAVENOUS at 12:07

## 2025-07-01 RX ADMIN — OXYCODONE HYDROCHLORIDE 10 MG: 10 TABLET ORAL at 01:07

## 2025-07-01 RX ADMIN — IPRATROPIUM BROMIDE AND ALBUTEROL SULFATE 3 ML: 2.5; .5 SOLUTION RESPIRATORY (INHALATION) at 02:07

## 2025-07-01 RX ADMIN — MUPIROCIN: 20 OINTMENT TOPICAL at 08:07

## 2025-07-01 RX ADMIN — GABAPENTIN 600 MG: 300 CAPSULE ORAL at 01:07

## 2025-07-01 RX ADMIN — METHOCARBAMOL 750 MG: 750 TABLET ORAL at 08:07

## 2025-07-01 RX ADMIN — LIDOCAINE 1 PATCH: 50 PATCH CUTANEOUS at 12:07

## 2025-07-01 RX ADMIN — OXYCODONE HYDROCHLORIDE 10 MG: 10 TABLET ORAL at 10:07

## 2025-07-01 RX ADMIN — GABAPENTIN 600 MG: 300 CAPSULE ORAL at 08:07

## 2025-07-01 RX ADMIN — IPRATROPIUM BROMIDE AND ALBUTEROL SULFATE 3 ML: 2.5; .5 SOLUTION RESPIRATORY (INHALATION) at 07:07

## 2025-07-01 RX ADMIN — GABAPENTIN 800 MG: 400 CAPSULE ORAL at 08:07

## 2025-07-01 RX ADMIN — METHOCARBAMOL 750 MG: 750 TABLET ORAL at 12:07

## 2025-07-01 RX ADMIN — IPRATROPIUM BROMIDE AND ALBUTEROL SULFATE 3 ML: 2.5; .5 SOLUTION RESPIRATORY (INHALATION) at 08:07

## 2025-07-01 NOTE — PLAN OF CARE
Problem: Physical Therapy  Goal: Physical Therapy Goal  Description: Goals to be met by: 25     Patient will increase functional independence with mobility by performin. Sit to stand transfer with Stand-by Assistance  2. Bed to chair transfer with Stand-by Assistance using No Assistive Device  3. Gait  x 150 feet with Stand-by Assistance using LRAD.     Outcome: Progressing   2025

## 2025-07-01 NOTE — NURSING
RT contacted RN regarding pt's request to go home with breathing treatment or inhaler. MD Aruna and MAURIZIO Lacy notified via secure chat. Awaiting for response at this time.

## 2025-07-01 NOTE — PLAN OF CARE
Problem: Occupational Therapy  Goal: Occupational Therapy Goal  Description: Goals to be met by: 7/31/25     Patient will increase functional independence with ADLs by performing:    UE Dressing with Supervision.  LE Dressing with Stand-by Assistance.  Grooming while standing at sink with Supervision.  Toileting from toilet with Supervision for hygiene and clothing management.   All functional transfers performed with SBA    Outcome: Progressing

## 2025-07-01 NOTE — PLAN OF CARE
"[facility] Plan of Care Note    Dx:   Lung nodule [R91.1]  Malignant neoplasm of right lung [C34.91]    Shift Events: no acute events    Goals of Care: see careplan    Neuro: x4    Vital Signs: /70 (BP Location: Left arm, Patient Position: Lying)   Pulse 67   Temp 98.2 °F (36.8 °C) (Oral)   Resp 17   Ht 5' 9" (1.753 m)   Wt 79.6 kg (175 lb 7.8 oz)   SpO2 96%   BMI 25.91 kg/m²     Respiratory: room air, coarse breath sound RUL, chest tube to waterseal    Diet: Diet Adult Regular      Is patient tolerating current diet? yes    GTTS: none    Urine Output/Bowel Movement:   No intake/output data recorded.  Last Bowel Movement: 06/29/25      Drains/Tubes/Tube Feeds (include total output/shift):   No intake/output data recorded.      Lines: pivx2      Accuchecks:none    Skin: lap sites, chest tube insertion site    Fall Risk Score: 12    Activity level? Assist x1    Any scheduled procedures? none    Any safety concerns? Fall risk, respiratory distress    Other: none    Problem: Adult Inpatient Plan of Care  Goal: Plan of Care Review  Outcome: Progressing  Goal: Patient-Specific Goal (Individualized)  Outcome: Progressing  Goal: Absence of Hospital-Acquired Illness or Injury  Outcome: Progressing  Goal: Optimal Comfort and Wellbeing  Outcome: Progressing  Goal: Readiness for Transition of Care  Outcome: Progressing     Problem: Wound  Goal: Optimal Coping  Outcome: Progressing  Goal: Optimal Functional Ability  Outcome: Progressing  Goal: Absence of Infection Signs and Symptoms  Outcome: Progressing  Goal: Improved Oral Intake  Outcome: Progressing  Goal: Optimal Pain Control and Function  Outcome: Progressing  Goal: Skin Health and Integrity  Outcome: Progressing  Goal: Optimal Wound Healing  Outcome: Progressing     Problem: Fall Injury Risk  Goal: Absence of Fall and Fall-Related Injury  Outcome: Progressing       "

## 2025-07-01 NOTE — SUBJECTIVE & OBJECTIVE
Interval History: POD1 s/p RATS R upper lobe posterior segmentectomy. NAEON, pt without acute complaint/concern. Denies SOB, endorses pain well controlled    Medications:  Continuous Infusions:  Scheduled Meds:   acetaminophen  650 mg Oral Q8H    albuterol-ipratropium  3 mL Nebulization Q6H WAKE    enoxparin  40 mg Subcutaneous Daily    finasteride  5 mg Oral QHS    gabapentin  600 mg Oral TID    methocarbamoL  750 mg Oral QID    mirtazapine  30 mg Oral QHS    polyethylene glycol  17 g Oral Daily    senna-docusate  1 tablet Oral BID    tamsulosin  0.4 mg Oral QHS     PRN Meds:  Current Facility-Administered Medications:     bisacodyL, 10 mg, Rectal, Daily PRN    ondansetron, 8 mg, Oral, Q6H PRN    oxyCODONE, 5 mg, Oral, Q4H PRN    oxyCODONE, 10 mg, Oral, Q4H PRN     Review of patient's allergies indicates:   Allergen Reactions    Peach (prunus persica)     Morphine Palpitations     Objective:     Vital Signs (Most Recent):  Temp: 98.2 °F (36.8 °C) (07/01/25 0426)  Pulse: 67 (07/01/25 0426)  Resp: 16 (07/01/25 0546)  BP: 127/70 (07/01/25 0426)  SpO2: 96 % (07/01/25 0426) Vital Signs (24h Range):  Temp:  [97.7 °F (36.5 °C)-98.9 °F (37.2 °C)] 98.2 °F (36.8 °C)  Pulse:  [63-83] 67  Resp:  [12-20] 16  SpO2:  [93 %-100 %] 96 %  BP: (110-137)/(64-81) 127/70     Weight: 79.6 kg (175 lb 7.8 oz)  Body mass index is 25.91 kg/m².    Intake/Output - Last 3 Shifts         06/29 0700 06/30 0659 06/30 0700 07/01 0659 07/01 0700 07/02 0659    IV Piggyback  1000     Total Intake(mL/kg)  1000 (12.6)     Urine (mL/kg/hr)  1050 (0.5)     Stool  0     Chest Tube  90     Total Output  1140     Net  -140            Unmeasured Stool Occurrence  0 x              Physical Exam  Constitutional:       Appearance: Normal appearance.   HENT:      Head: Normocephalic and atraumatic.   Eyes:      Extraocular Movements: Extraocular movements intact.   Pulmonary:      Effort: Pulmonary effort is normal.      Comments: R CT to waterseal w/small  volume serosanguinous drainage. No air leak   Chest:      Comments: R CT dressing intact w/small volume serosanguinous shadowing. R Chest incisions c/d/i  Skin:     General: Skin is warm and dry.   Neurological:      General: No focal deficit present.      Mental Status: He is alert and oriented to person, place, and time. Mental status is at baseline.          Significant Labs:  I have reviewed all pertinent lab results within the past 24 hours.    Significant Diagnostics:  I have reviewed all pertinent imaging results/findings within the past 24 hours.

## 2025-07-01 NOTE — CARE UPDATE
I have reviewed the chart of Jesus Christy who is hospitalized for the following:    Active Hospital Problems    Diagnosis    BMI 25.0-25.9,adult    Lung nodule    Malignant neoplasm of right lung    S/P chest tube placement    Anemia    Chronic prescription benzodiazepine use    Essential hypertension        VITALY Evans-MONA  [facility]

## 2025-07-01 NOTE — PT/OT/SLP EVAL
Occupational Therapy   Evaluation    Name: Jesus Christy  MRN: [id number]  Admitting Diagnosis: Malignant neoplasm of right lung  Recent Surgery: Procedure(s) (LRB):  DV5 ROBOTIC RATS, right upper lobe posterior segmentectomy (Right)  XI ROBOTIC RATS, WITH LYMPHADENECTOMY (Right)  BLOCK, NERVE, INTERCOSTAL, 2 OR MORE (Right) 1 Day Post-Op    Recommendations:     Discharge Recommendations: Low Intensity Therapy  Discharge Equipment Recommendations:  none  Barriers to discharge:  None    Assessment:     Jesus Christy is a 64 y.o. male with a medical diagnosis of Malignant neoplasm of right lung.  He presents with R sided pain. Performance deficits affecting function: weakness, impaired endurance, impaired functional mobility, gait instability, impaired balance, decreased lower extremity function, impaired self care skills, pain.  PTA, pt reports being Indp with ADLs and mobility. Upon eval, pt limited by pain.      Rehab Prognosis: Good; patient would benefit from acute skilled OT services to address these deficits and reach maximum level of function.       Plan:     Patient to be seen 3 x/week to address the above listed problems via self-care/home management, therapeutic activities, therapeutic exercises, neuromuscular re-education  Plan of Care Expires: 07/31/25  Plan of Care Reviewed with: patient    Subjective     Chief Complaint: None  Patient/Family Comments/goals: return home    Occupational Profile:  Living Environment: Lives with dtr, in SSH, 0 JOS and no hand rail, Tub/shower in bathroom  Previous level of function: Indp  Roles and Routines: retired, father  Equipment Used at Home: walker, rolling, cane, straight  Assistance upon Discharge: family    Pain/Comfort:  Pain Rating 1: 10/10  Location - Side 1: Right  Location 1: flank  Pain Addressed 1: Pre-medicate for activity, Reposition, Cessation of Activity    Patients cultural, spiritual, Rastafarian conflicts given the current situation:  no    Objective:     Communicated with: Nsg prior to session.  Patient found HOB elevated with chest tube, SCD, telemetry upon OT entry to room.    General Precautions: Standard, fall  Orthopedic Precautions: N/A  Braces: N/A  Respiratory Status: Room air    Occupational Performance:    Bed Mobility:    Patient completed Scooting/Bridging with supervision    Functional Mobility/Transfers:  Functional Mobility: NT 2/2 pain and having returned to bed after sitting up after PT session prior.    Activities of Daily Living:  Grooming: supervision oral and facial care in bed    Therex:  Pt demonstrates understanding of UE therex by performing 5 reps of Fwd and lateral shoulder raise within pain free range c/ sup    Cognitive/Visual Perceptual:  A&O x4    Physical Exam:  BUE WNL  Balance: intact     AMPAC 6 Click ADL:  AMPAC Total Score: 23    Treatment & Education:  Pt educated on role and purpose of therapy  Pt educated on goal setting  Pt educated on benefits of OOB activity  Pt educated on self advocacy     Patient left HOB elevated with all lines intact, call button in reach, nsg notified, and family present    GOALS:   Multidisciplinary Problems       Occupational Therapy Goals          Problem: Occupational Therapy    Goal Priority Disciplines Outcome Interventions   Occupational Therapy Goal     OT, PT/OT Progressing    Description: Goals to be met by: 7/31/25     Patient will increase functional independence with ADLs by performing:    UE Dressing with Supervision.  LE Dressing with Stand-by Assistance.  Grooming while standing at sink with Supervision.  Toileting from toilet with Supervision for hygiene and clothing management.   All functional transfers performed with SBA                         DME Justifications:  No DME recommended requiring DME justifications    History:     Past Medical History:   Diagnosis Date    Complex regional pain syndrome i of right lower limb     GERD (gastroesophageal reflux  disease)     HTN (hypertension)     Portal vein thrombosis          Past Surgical History:   Procedure Laterality Date    COLONOSCOPY N/A 9/29/2017    Procedure: COLONOSCOPY;  Surgeon: Jamar Edwards MD;  Location: [facility] ENDO (4TH FLR);  Service: Endoscopy;  Laterality: N/A;    COLONOSCOPY N/A 2/12/2024    Procedure: COLONOSCOPY;  Surgeon: Miguel Angel Huffman MD;  Location: [facility] ENDO (2ND FLR);  Service: Endoscopy;  Laterality: N/A;    DV5 ROBOTIC RATS,WITH LOBECTOMY,LUNG Right 6/30/2025    Procedure: DV5 ROBOTIC RATS, right upper lobe posterior segmentectomy;  Surgeon: Parker Gay MD;  Location: [facility] OR 2ND FLR;  Service: Cardiothoracic;  Laterality: Right;    ENDOSCOPIC ULTRASOUND OF UPPER GASTROINTESTINAL TRACT N/A 7/5/2024    Procedure: ULTRASOUND, UPPER GI TRACT, ENDOSCOPIC;  Surgeon: Debi Lloyd MD;  Location: [facility] (2ND FLR);  Service: Endoscopy;  Laterality: N/A;  3/21 portal instr-EUS with myself at [facility] next couple of months pancreas tail cyst.joseluis-Eliquis pending Dr. Miranda TE 3/21-tt  5/15/24: instructions sent via portal. eliquis hold in TE 3/21-GD  6/26-pre call complete-tb-will hold eliquis starting     ESOPHAGOGASTRODUODENOSCOPY N/A 2/12/2024    Procedure: EGD (ESOPHAGOGASTRODUODENOSCOPY);  Surgeon: Miguel Angel Huffman MD;  Location: [facility] (2ND FLR);  Service: Endoscopy;  Laterality: N/A;  1/30/24-Okay to add per Dr. Huffman, 2nd flr-recent hospitalization for sepsis and portal vein thrombosis, Miralax, instr portal and email, Approval to hold Eliquis and medical clearance rec'd from Dr. Jolly (see telephone encounter 1/30/24)-DS  2/5-prec    INJECTION OF ANESTHETIC AGENT AROUND MULTIPLE INTERCOSTAL NERVES Right 6/30/2025    Procedure: BLOCK, NERVE, INTERCOSTAL, 2 OR MORE;  Surgeon: Parker Gay MD;  Location: [facility] OR 2ND FLR;  Service: Cardiothoracic;  Laterality: Right;    INJECTION OF ANESTHETIC AGENT AROUND NERVE Left 6/2/2025    Procedure: INJECTION, LEFT GLENOHUMERAL;  Surgeon:  Stuart Viveros MD;  Location: [facility] PAIN MGT;  Service: Pain Management;  Laterality: Left;  2 WK F/U SHERIDAN    JOINT REPLACEMENT Right     knee    KNEE ARTHROSCOPY W/ ACL RECONSTRUCTION  2014    right    ROBOTIC BRONCHOSCOPY N/A 5/13/2025    Procedure: ROBOTIC BRONCHOSCOPY;  Surgeon: Jessica Hamilton MD;  Location: [facility] OR [address] [place] [address];  Service: Pulmonary;  Laterality: N/A;    TRANSFORAMINAL EPIDURAL INJECTION OF STEROID N/A 4/16/2025    Procedure: CERVICAL C7/T1 IL CHAZ *ELIQUIS CLEARANCE IN CHART*;  Surgeon: Stuart Viveros MD;  Location: [facility] PAIN MGT;  Service: Pain Management;  Laterality: N/A;  2 WK F/U EDDI    XI ROBOTIC RATS, WITH LYMPHADENECTOMY Right 6/30/2025    Procedure: XI ROBOTIC RATS, WITH LYMPHADENECTOMY;  Surgeon: Parker Gay MD;  Location: [facility] OR [address] [place] [address];  Service: Cardiothoracic;  Laterality: Right;       Time Tracking:     OT Date of Treatment: 07/01/25  OT Start Time: 1132  OT Stop Time: 1148  OT Total Time (min): 16 min    Billable Minutes:Evaluation 8  Self Care/Home Management 8    7/1/2025

## 2025-07-01 NOTE — PROGRESS NOTES
Sha Ortiz - [facility]  Thoracic Surgery  Progress Note    Subjective:     History of Present Illness:  No notes on file    Post-Op Info:  Procedure(s) (LRB):  DV5 ROBOTIC RATS, right upper lobe posterior segmentectomy (Right)  XI ROBOTIC RATS, WITH LYMPHADENECTOMY (Right)  BLOCK, NERVE, INTERCOSTAL, 2 OR MORE (Right)   1 Day Post-Op     Interval History: POD1 s/p RATS R upper lobe posterior segmentectomy. NAEON, pt without acute complaint/concern. Denies SOB, endorses pain well controlled    Medications:  Continuous Infusions:  Scheduled Meds:   acetaminophen  650 mg Oral Q8H    albuterol-ipratropium  3 mL Nebulization Q6H WAKE    enoxparin  40 mg Subcutaneous Daily    finasteride  5 mg Oral QHS    gabapentin  600 mg Oral TID    methocarbamoL  750 mg Oral QID    mirtazapine  30 mg Oral QHS    polyethylene glycol  17 g Oral Daily    senna-docusate  1 tablet Oral BID    tamsulosin  0.4 mg Oral QHS     PRN Meds:  Current Facility-Administered Medications:     bisacodyL, 10 mg, Rectal, Daily PRN    ondansetron, 8 mg, Oral, Q6H PRN    oxyCODONE, 5 mg, Oral, Q4H PRN    oxyCODONE, 10 mg, Oral, Q4H PRN     Review of patient's allergies indicates:   Allergen Reactions    Peach (prunus persica)     Morphine Palpitations     Objective:     Vital Signs (Most Recent):  Temp: 98.2 °F (36.8 °C) (07/01/25 0426)  Pulse: 67 (07/01/25 0426)  Resp: 16 (07/01/25 0546)  BP: 127/70 (07/01/25 0426)  SpO2: 96 % (07/01/25 0426) Vital Signs (24h Range):  Temp:  [97.7 °F (36.5 °C)-98.9 °F (37.2 °C)] 98.2 °F (36.8 °C)  Pulse:  [63-83] 67  Resp:  [12-20] 16  SpO2:  [93 %-100 %] 96 %  BP: (110-137)/(64-81) 127/70     Weight: 79.6 kg (175 lb 7.8 oz)  Body mass index is 25.91 kg/m².    Intake/Output - Last 3 Shifts         06/29 0700 06/30 0659 06/30 0700 07/01 0659 07/01 0700 07/02 0659    IV Piggyback  1000     Total Intake(mL/kg)  1000 (12.6)     Urine (mL/kg/hr)  1050 (0.5)     Stool  0     Chest Tube  90     Total Output  1140     Net  -140             Unmeasured Stool Occurrence  0 x              Physical Exam  Constitutional:       Appearance: Normal appearance.   HENT:      Head: Normocephalic and atraumatic.   Eyes:      Extraocular Movements: Extraocular movements intact.   Pulmonary:      Effort: Pulmonary effort is normal.      Comments: R CT to waterseal w/small volume serosanguinous drainage. No air leak   Chest:      Comments: R CT dressing intact w/small volume serosanguinous shadowing. R Chest incisions c/d/i  Skin:     General: Skin is warm and dry.   Neurological:      General: No focal deficit present.      Mental Status: He is alert and oriented to person, place, and time. Mental status is at baseline.          Significant Labs:  I have reviewed all pertinent lab results within the past 24 hours.    Significant Diagnostics:  I have reviewed all pertinent imaging results/findings within the past 24 hours.  Assessment/Plan:     Malignant neoplasm of right lung  64M s/p RATS R upper lobe posterior segmentectomy 6/30.     - Continue CT to waterseal, daily CXR  - regular diet   - DVT prophylaxis  - OOB as tolerated        Raheem Ken MD PGY1  Thoracic Surgery  [facility]

## 2025-07-01 NOTE — ASSESSMENT & PLAN NOTE
64M s/p RATS R upper lobe posterior segmentectomy 6/30.     - Continue CT to waterseal, daily CXR  - regular diet   - DVT prophylaxis  - OOB as tolerated

## 2025-07-01 NOTE — PT/OT/SLP EVAL
Physical Therapy Evaluation and Treatment    Patient Name:  Jesus Christy   MRN:  [id number]  Admit Date: 6/30/2025  Admitting Diagnosis:  Malignant neoplasm of right lung   Length of Stay: 1 days  Recent Surgery: Procedure(s) (LRB):  DV5 ROBOTIC RATS, right upper lobe posterior segmentectomy (Right)  XI ROBOTIC RATS, WITH LYMPHADENECTOMY (Right)  BLOCK, NERVE, INTERCOSTAL, 2 OR MORE (Right) 1 Day Post-Op    Recommendations:     Discharge Recommendations: Low Intensity Therapy  Discharge Equipment Recommendations: none   Barriers to discharge: None    Appropriate transfer level with nursing staff: step transfer to bedside chair with 1 person assist    Plan:     During this hospitalization, patient to be seen 4 x/week to address the identified rehab impairments via gait training, therapeutic activities, therapeutic exercises, neuromuscular re-education and progress towards the established goals.  Plan of Care Expires:  07/31/25  Plan of Care Reviewed with: patient    Assessment     Jesus Christy is a 64 y.o. male admitted with a medical diagnosis of Malignant neoplasm of right lung. Pt found up in bed and agreeable to therapy. Pt performed bed mobility without assistance and stand and steps to chair with light assistance. Mobility was limited during this session by high levels of pain. Pt would benefit from acute physical therapy to progress mobility and return to PLOF.     Problem List: weakness, impaired endurance, impaired functional mobility, gait instability, impaired balance, decreased coordination, decreased lower extremity function, pain.  Rehab Prognosis: Good; patient would benefit from acute skilled PT services to address these deficits and reach maximum level of function.      Goals:   Multidisciplinary Problems       Physical Therapy Goals          Problem: Physical Therapy    Goal Priority Disciplines Outcome Interventions   Physical Therapy Goal     PT, PT/OT Progressing    Description: Goals to be  "met by: 25     Patient will increase functional independence with mobility by performin. Sit to stand transfer with Stand-by Assistance  2. Bed to chair transfer with Stand-by Assistance using No Assistive Device  3. Gait  x 150 feet with Stand-by Assistance using LRAD.                          Subjective     RN Carole notified prior to session. No one present upon PT entrance into room. Patient agreeable to PT evaluation.    Chief Complaint: Pain on R side  Pain/Comfort:  Pain Rating 1: 10/10  Location - Side 1: Right  Location - Orientation 1: generalized  Location 1: other (see comments) (entire R side)  Pain Addressed 1: Reposition, Distraction    Social History:  Residence: Patient lives with their daughter in a single story house with number of outside stair(s): 0. Pt's bathroom has a t/s.  Equipment Owned (not using): walker, rolling, cane, straight  Equipment Used: single point cane (prn)  Prior level of function:  Prior to admission, patient was independent  Work: Retired.   Drive: yes.   Assistance Upon Discharge: daughter  Falls: reports one fall on [place]    Objective:     Additional staff present: Supervising PT    Patient found HOB elevated with: chest tube, telemetry, SCD     General Precautions: Standard, Cardiac fall   Orthopedic Precautions:N/A   Braces: N/A   Body mass index is 25.91 kg/m².  Oxygen Device: Room Air  Vitals: /76 (BP Location: Left arm, Patient Position: Lying)   Pulse 87   Temp 98 °F (36.7 °C) (Oral)   Resp 16   Ht 5' 9" (1.753 m)   Wt 79.6 kg (175 lb 7.8 oz)   SpO2 (!) 93%   BMI 25.91 kg/m²     Exams:  Cognition:   Oriented X 4   Patient is oriented to Person, Place, Time, Situation  Command following: Follows multistep verbal commands  Fluency: clear/fluent  Hearing: Intact  Vision:  Intact  Skin Integrity: Visible skin intact  Postural Assessment: no deviations noted  Physical Exam:    Left UE Left LE Right UE Right LE   Edema absent absent absent absent "   ROM AROM WFL AROM WFL AROM WFL AROM WFL   Strength within normal limits within normal limits within normal limits within normal limits   Sensation intact to light touch intact to light touch intact to light touch intact to light touch   Coordination not tested not tested not tested not tested     Outcome Measures:  AM-PAC 6 CLICK MOBILITY  Turning over in bed (including adjusting bedclothes, sheets and blankets)?: 4  Sitting down on and standing up from a chair with arms (e.g., wheelchair, bedside commode, etc.): 3  Moving from lying on back to sitting on the side of the bed?: 3  Moving to and from a bed to a chair (including a wheelchair)?: 3  Need to walk in hospital room?: 3  Climbing 3-5 steps with a railing?: 2  Basic Mobility Total Score: 18     Functional Mobility:    Bed Mobility:   Supine to Sit: stand by assistance for safety; to L side of bed  Scooting anteriorly to EOB to have both feet planted on floor: stand by assistance     Sitting Balance at Edge of Bed:  Static Sitting Balance: Good : able to maintain balance against moderate resistance  Dynamic Sitting Balance: Good : able to sit unsupported and weight shift across midline moderately  Assistance Level Required: Stand-by Assistance    Transfers:   Sit <> Stand Transfer: minimum assistance with hand-held assist. [id number] from EOB    Standing Balance:  Static Standing Balance: Fair : able to stand unsupported without UE support and without LOB for 1 minute  Dynamic Standing Balance: Fair : stand independently unsupported, weight shift, and reach ipsilaterally. LOB noted when crossing midline.  Assistance Level Required: Minimal Assistance  Patient used: hand-held assist       Gait:   Patient ambulated: 4 steps to chair   Patient required: minimum assistance  Patient used:  hand-held assist   Gait Pattern observed: swing to  Gait Deviation(s): flexed posture  Impairments due to: pain  all lines remained intact throughout ambulation  trial    Education:  Time provided for education, counseling and discussion of health disposition in regards to patient's current status  All questions answered within PT scope of practice and to patient's satisfaction  PT role in POC to address current functional deficits  Call nursing/pct to transfer to chair/use bathroom. Pt stated understanding.      DME Justifications:  No DME recommended requiring DME justifications    Patient left up in chair with all lines intact and call button in reach.    History:     Past Medical History:   Diagnosis Date    Complex regional pain syndrome i of right lower limb     GERD (gastroesophageal reflux disease)     HTN (hypertension)     Portal vein thrombosis        Past Surgical History:   Procedure Laterality Date    COLONOSCOPY N/A 9/29/2017    Procedure: COLONOSCOPY;  Surgeon: Jamar Edwards MD;  Location: [facility] (4TH FLR);  Service: Endoscopy;  Laterality: N/A;    COLONOSCOPY N/A 2/12/2024    Procedure: COLONOSCOPY;  Surgeon: Miguel Angel Huffman MD;  Location: [facility] (2ND FLR);  Service: Endoscopy;  Laterality: N/A;    DV5 ROBOTIC RATS,WITH LOBECTOMY,LUNG Right 6/30/2025    Procedure: DV5 ROBOTIC RATS, right upper lobe posterior segmentectomy;  Surgeon: Parker Gay MD;  Location: [facility] OR 2ND FLR;  Service: Cardiothoracic;  Laterality: Right;    ENDOSCOPIC ULTRASOUND OF UPPER GASTROINTESTINAL TRACT N/A 7/5/2024    Procedure: ULTRASOUND, UPPER GI TRACT, ENDOSCOPIC;  Surgeon: Debi Lloyd MD;  Location: [facility] (2ND FLR);  Service: Endoscopy;  Laterality: N/A;  3/21 portal instr-EUS with myself at [facility] next couple of months pancreas tail cyst.[place] pending Dr. Miranda TE 3/21-tt  5/15/24: instructions sent via portal. eliquis hold in TE 3/21-GD  6/26-pre call complete-tb-will hold eliquis starting     ESOPHAGOGASTRODUODENOSCOPY N/A 2/12/2024    Procedure: EGD (ESOPHAGOGASTRODUODENOSCOPY);  Surgeon: Miguel Angel Huffman MD;  Location: [facility] (2ND FLR);   Service: Endoscopy;  Laterality: N/A;  1/30/24-Okay to add per Dr. Huffman, 2nd flr-recent hospitalization for sepsis and portal vein thrombosis, Miralax, instr portal and email, Approval to hold Eliquis and medical clearance rec'd from Dr. Jolly (see telephone encounter 1/30/24)-DS  2/5-prec    INJECTION OF ANESTHETIC AGENT AROUND MULTIPLE INTERCOSTAL NERVES Right 6/30/2025    Procedure: BLOCK, NERVE, INTERCOSTAL, 2 OR MORE;  Surgeon: Parker Gay MD;  [facility];  Service: Cardiothoracic;  Laterality: Right;    INJECTION OF ANESTHETIC AGENT AROUND NERVE Left 6/2/2025    Procedure: INJECTION, LEFT GLENOHUMERAL;  Surgeon: Stuart Viveros MD;  Location: [facility] PAIN MGT;  Service: Pain Management;  Laterality: Left;  2 WK F/U SHERIDAN    JOINT REPLACEMENT Right     knee    KNEE ARTHROSCOPY W/ ACL RECONSTRUCTION  2014    right    ROBOTIC BRONCHOSCOPY N/A 5/13/2025    Procedure: ROBOTIC BRONCHOSCOPY;  Surgeon: Jessica Hamilton MD;  [facility];  Service: Pulmonary;  Laterality: N/A;    TRANSFORAMINAL EPIDURAL INJECTION OF STEROID N/A 4/16/2025    Procedure: CERVICAL C7/T1 IL CHAZ *ELIQUIS CLEARANCE IN CHART*;  Surgeon: Stuart Viveros MD;  Location: [facility] PAIN MGT;  Service: Pain Management;  Laterality: N/A;  2 WK F/U EDDI    XI ROBOTIC RATS, WITH LYMPHADENECTOMY Right 6/30/2025    Procedure: XI ROBOTIC RATS, WITH LYMPHADENECTOMY;  Surgeon: Parker Gay MD;  [facility];  Service: Cardiothoracic;  Laterality: Right;       Family History   Problem Relation Name Age of Onset    Hypertension Mother      Heart disease Father      Diabetes Father      Cancer Brother          unknown type    No Known Problems Daughter      No Known Problems Daughter      No Known Problems Daughter      No Known Problems Daughter      No Known Problems Son      No Known Problems Son         Social History     Socioeconomic History    Marital status: Single   Tobacco Use    Smoking status:  Never    Smokeless tobacco: Never   Substance and Sexual Activity    Alcohol use: Yes     Alcohol/week: 0.8 standard drinks of alcohol     Types: 1 Standard drinks or equivalent per week     Comment: once every few months    Drug use: No    Sexual activity: Not Currently   Social History Narrative    Prior maintenance work.    Disable due to leg pain and depression.        Lives alone.    No exercise, due to leg pain.     Social Drivers of Health     Financial Resource Strain: Low Risk  (7/1/2025)    Overall Financial Resource Strain (CARDIA)     Difficulty of Paying Living Expenses: Not very hard   Food Insecurity: No Food Insecurity (7/1/2025)    Hunger Vital Sign     Worried About Running Out of Food in the Last Year: Never true     Ran Out of Food in the Last Year: Never true   Transportation Needs: No Transportation Needs (7/1/2025)    PRAPARE - Transportation     Lack of Transportation (Medical): No     Lack of Transportation (Non-Medical): No   Physical Activity: Inactive (7/1/2025)    Exercise Vital Sign     Days of Exercise per Week: 0 days     Minutes of Exercise per Session: 0 min   Stress: No Stress Concern Present (7/1/2025)    [employer] - Occupational Stress Questionnaire     Feeling of Stress : Only a little   Housing Stability: Low Risk  (7/1/2025)    Housing Stability Vital Sign     Unable to Pay for Housing in the Last Year: No     Homeless in the Last Year: No       Time Tracking:     PT Received On: 07/01/25  PT Start Time: 0940     PT Stop Time: 0956  PT Total Time (min): 16 min     Billable Minutes: Evaluation 8 and Therapeutic Activity 8    7/1/2025

## 2025-07-01 NOTE — NURSING
Pt complained of 10/10 pain on his chest towards the middle right next to his chest tube site. /77, HR 73, O2 sat 94% on RA, oxy and tylenol given at 0546. See flowsheet for VS. Regino, PAC, and Ahlecia, EDDI contacted to request lidocaine patch and possible breakthrough pain med. Awaiting for response. No new order at this time. POC ongoing.

## 2025-07-01 NOTE — PLAN OF CARE
[address] [facility]  Initial Discharge Assessment       Primary Care Provider: Tara Jolly MD    Admission Diagnosis: Lung nodule [R91.1]  Malignant neoplasm of right lung [C34.91]    Admission Date: 6/30/2025  Expected Discharge Date: 7/2/2025    Transition of Care Barriers: None    Payor: [employer] [facility] / Plan: [employer] / Product Type: Medicare Advantage /     Extended Emergency Contact Information  Primary Emergency Contact: Ramila Christy   [facility]  Work Phone: [phone]  Mobile Phone: [phone]  Relation: Daughter  Secondary Emergency Contact: Terry Christy  Mobile Phone: [phone]  Relation: Daughter    Discharge Plan A: Home with family  Discharge Plan B: Home Health      [facility] [address] [place] [address]  Phone: [phone] Fax: [phone]      Initial Assessment (most recent)       Adult Discharge Assessment - 07/01/25 1245          Discharge Assessment    Assessment Type Discharge Planning Assessment     Confirmed/corrected address, phone number and insurance Yes     Confirmed Demographics Correct on Facesheet     Source of Information patient;family     Communicated ANICETO with patient/caregiver Yes     People in Home child(howard), adult     Name(s) of People in Home Ramila Jaelyn daughter     Facility Arrived From: Home     Do you expect to return to your current living situation? Yes     Do you have help at home or someone to help you manage your care at home? Yes     Who are your caregiver(s) and their phone number(s)? Jonah Christy      Prior to hospitilization cognitive status: Alert/Oriented;No Deficits     Current cognitive status: Alert/Oriented;No Deficits     Walking or Climbing Stairs Difficulty yes     Walking or Climbing Stairs ambulation difficulty, requires equipment     Mobility Management Straight Cane     Dressing/Bathing Difficulty no     Home  Accessibility wheelchair accessible     Home Layout Able to live on 1st floor     Equipment Currently Used at Home cane, straight     Readmission within 30 days? No     Patient currently being followed by outpatient case management? No     Do you currently have service(s) that help you manage your care at home? No     Do you take prescription medications? Yes     Do you have prescription coverage? Yes     Do you have any problems affording any of your prescribed medications? No     Is the patient taking medications as prescribed? yes     Who is going to help you get home at discharge? Daughter Ramila     How do you get to doctors appointments? car, drives self     Are you on dialysis? No     Do you take coumadin? No     Discharge Plan A Home with family     Discharge Plan B Home Health     DME Needed Upon Discharge  none     Discharge Plan discussed with: Patient;Adult children     Transition of Care Barriers None        Physical Activity    On average, how many days per week do you engage in moderate to strenuous exercise (like a brisk walk)? 0 days     On average, how many minutes do you engage in exercise at this level? 0 min        Financial Resource Strain    How hard is it for you to pay for the very basics like food, housing, medical care, and heating? Not very hard        Housing Stability    In the last 12 months, was there a time when you were not able to pay the mortgage or rent on time? No     At any time in the past 12 months, were you homeless or living in a shelter (including now)? No        Transportation Needs    In the past 12 months, has lack of transportation kept you from medical appointments or from getting medications? No     In the past 12 months, has lack of transportation kept you from meetings, work, or from getting things needed for daily living? No        Food Insecurity    Within the past 12 months, you worried that your food would run out before you got the money to buy more. Never  true     Within the past 12 months, the food you bought just didn't last and you didn't have money to get more. Never true        Stress    Do you feel stress - tense, restless, nervous, or anxious, or unable to sleep at night because your mind is troubled all the time - these days? Only a little        Social Isolation    How often do you feel lonely or isolated from those around you?  Never        Alcohol Use    Q1: How often do you have a drink containing alcohol? Never     Q2: How many drinks containing alcohol do you have on a typical day when you are drinking? Patient does not drink     Q3: How often do you have six or more drinks on one occasion? Never        Utilities    In the past 12 months has the electric, gas, oil, or water company threatened to shut off services in your home? No        Health Literacy    How often do you need to have someone help you when you read instructions, pamphlets, or other written material from your doctor or pharmacy? Never                   Discharge Plan A and Plan B have been determined by review of patient's clinical status, future medical and therapeutic needs, and coverage/benefits for post-acute care in coordination with multidisciplinary team members.

## 2025-07-01 NOTE — CARE UPDATE
Unit EDDI Care Support Interaction      I have reviewed the chart of Jesus Christy who is hospitalized for <principal problem not specified>. The patient is currently located in the following unit: [facility]        I have assisted the primary physician in management of the following:      MRSA Decolonization - Mupirocin ordered         STEPHANIE Evans  Unit Based EDDI

## 2025-07-02 LAB
ANION GAP (OHS): 8 MMOL/L (ref 8–16)
BUN SERPL-MCNC: 15 MG/DL (ref 8–23)
CALCIUM SERPL-MCNC: 9.3 MG/DL (ref 8.7–10.5)
CHLORIDE SERPL-SCNC: 103 MMOL/L (ref 95–110)
CO2 SERPL-SCNC: 26 MMOL/L (ref 23–29)
CREAT SERPL-MCNC: 1.3 MG/DL (ref 0.5–1.4)
GFR SERPLBLD CREATININE-BSD FMLA CKD-EPI: >60 ML/MIN/1.73/M2
GLUCOSE SERPL-MCNC: 94 MG/DL (ref 70–110)
POTASSIUM SERPL-SCNC: 4 MMOL/L (ref 3.5–5.1)
SODIUM SERPL-SCNC: 137 MMOL/L (ref 136–145)

## 2025-07-02 PROCEDURE — 20600001 HC STEP DOWN PRIVATE ROOM

## 2025-07-02 PROCEDURE — 94640 AIRWAY INHALATION TREATMENT: CPT

## 2025-07-02 PROCEDURE — 99024 POSTOP FOLLOW-UP VISIT: CPT | Mod: ,,, | Performed by: STUDENT IN AN ORGANIZED HEALTH CARE EDUCATION/TRAINING PROGRAM

## 2025-07-02 PROCEDURE — 25000242 PHARM REV CODE 250 ALT 637 W/ HCPCS

## 2025-07-02 PROCEDURE — 80048 BASIC METABOLIC PNL TOTAL CA: CPT | Performed by: STUDENT IN AN ORGANIZED HEALTH CARE EDUCATION/TRAINING PROGRAM

## 2025-07-02 PROCEDURE — 63600175 PHARM REV CODE 636 W HCPCS: Mod: JZ,TB | Performed by: STUDENT IN AN ORGANIZED HEALTH CARE EDUCATION/TRAINING PROGRAM

## 2025-07-02 PROCEDURE — 99900035 HC TECH TIME PER 15 MIN (STAT)

## 2025-07-02 PROCEDURE — 97116 GAIT TRAINING THERAPY: CPT

## 2025-07-02 PROCEDURE — 25000003 PHARM REV CODE 250

## 2025-07-02 PROCEDURE — 36415 COLL VENOUS BLD VENIPUNCTURE: CPT | Performed by: STUDENT IN AN ORGANIZED HEALTH CARE EDUCATION/TRAINING PROGRAM

## 2025-07-02 PROCEDURE — 25000003 PHARM REV CODE 250: Performed by: STUDENT IN AN ORGANIZED HEALTH CARE EDUCATION/TRAINING PROGRAM

## 2025-07-02 PROCEDURE — 94761 N-INVAS EAR/PLS OXIMETRY MLT: CPT

## 2025-07-02 RX ORDER — METHOCARBAMOL 500 MG/1
1000 TABLET, FILM COATED ORAL 4 TIMES DAILY
Status: DISCONTINUED | OUTPATIENT
Start: 2025-07-02 | End: 2025-07-03 | Stop reason: HOSPADM

## 2025-07-02 RX ORDER — IPRATROPIUM BROMIDE AND ALBUTEROL SULFATE 2.5; .5 MG/3ML; MG/3ML
3 SOLUTION RESPIRATORY (INHALATION)
Status: DISCONTINUED | OUTPATIENT
Start: 2025-07-02 | End: 2025-07-03 | Stop reason: HOSPADM

## 2025-07-02 RX ADMIN — SENNOSIDES AND DOCUSATE SODIUM 1 TABLET: 50; 8.6 TABLET ORAL at 08:07

## 2025-07-02 RX ADMIN — FINASTERIDE 5 MG: 5 TABLET, FILM COATED ORAL at 09:07

## 2025-07-02 RX ADMIN — METHOCARBAMOL 1000 MG: 500 TABLET ORAL at 09:07

## 2025-07-02 RX ADMIN — METHOCARBAMOL 1000 MG: 500 TABLET ORAL at 08:07

## 2025-07-02 RX ADMIN — KETOROLAC TROMETHAMINE 15 MG: 15 INJECTION INTRAMUSCULAR at 05:07

## 2025-07-02 RX ADMIN — MUPIROCIN: 20 OINTMENT TOPICAL at 08:07

## 2025-07-02 RX ADMIN — METHOCARBAMOL 1000 MG: 500 TABLET ORAL at 05:07

## 2025-07-02 RX ADMIN — IPRATROPIUM BROMIDE AND ALBUTEROL SULFATE 3 ML: 2.5; .5 SOLUTION RESPIRATORY (INHALATION) at 07:07

## 2025-07-02 RX ADMIN — ACETAMINOPHEN 1000 MG: 500 TABLET ORAL at 05:07

## 2025-07-02 RX ADMIN — OXYCODONE HYDROCHLORIDE 10 MG: 10 TABLET ORAL at 09:07

## 2025-07-02 RX ADMIN — GABAPENTIN 800 MG: 400 CAPSULE ORAL at 03:07

## 2025-07-02 RX ADMIN — METHOCARBAMOL 1000 MG: 500 TABLET ORAL at 11:07

## 2025-07-02 RX ADMIN — OXYCODONE HYDROCHLORIDE 10 MG: 10 TABLET ORAL at 11:07

## 2025-07-02 RX ADMIN — MIRTAZAPINE 30 MG: 15 TABLET, FILM COATED ORAL at 09:07

## 2025-07-02 RX ADMIN — ACETAMINOPHEN 1000 MG: 500 TABLET ORAL at 12:07

## 2025-07-02 RX ADMIN — OXYCODONE HYDROCHLORIDE 10 MG: 10 TABLET ORAL at 03:07

## 2025-07-02 RX ADMIN — POLYETHYLENE GLYCOL 3350 17 G: 17 POWDER, FOR SOLUTION ORAL at 08:07

## 2025-07-02 RX ADMIN — KETOROLAC TROMETHAMINE 15 MG: 15 INJECTION INTRAMUSCULAR at 12:07

## 2025-07-02 RX ADMIN — GABAPENTIN 800 MG: 400 CAPSULE ORAL at 08:07

## 2025-07-02 RX ADMIN — ENOXAPARIN SODIUM 40 MG: 40 INJECTION SUBCUTANEOUS at 05:07

## 2025-07-02 RX ADMIN — MUPIROCIN: 20 OINTMENT TOPICAL at 09:07

## 2025-07-02 RX ADMIN — OXYCODONE HYDROCHLORIDE 10 MG: 10 TABLET ORAL at 05:07

## 2025-07-02 RX ADMIN — KETOROLAC TROMETHAMINE 15 MG: 15 INJECTION INTRAMUSCULAR at 11:07

## 2025-07-02 RX ADMIN — GABAPENTIN 800 MG: 400 CAPSULE ORAL at 09:07

## 2025-07-02 RX ADMIN — TAMSULOSIN HYDROCHLORIDE 0.4 MG: 0.4 CAPSULE ORAL at 09:07

## 2025-07-02 RX ADMIN — ACETAMINOPHEN 1000 MG: 500 TABLET ORAL at 11:07

## 2025-07-02 RX ADMIN — SENNOSIDES AND DOCUSATE SODIUM 1 TABLET: 50; 8.6 TABLET ORAL at 09:07

## 2025-07-02 RX ADMIN — LIDOCAINE 1 PATCH: 50 PATCH CUTANEOUS at 11:07

## 2025-07-02 RX ADMIN — IPRATROPIUM BROMIDE AND ALBUTEROL SULFATE 3 ML: 2.5; .5 SOLUTION RESPIRATORY (INHALATION) at 03:07

## 2025-07-02 RX ADMIN — IPRATROPIUM BROMIDE AND ALBUTEROL SULFATE 3 ML: 2.5; .5 SOLUTION RESPIRATORY (INHALATION) at 11:07

## 2025-07-02 NOTE — NURSING
Upon entering room, pt was sitting at the edge of bed with increased WOB observed and audible wheezing when exhale. Pt stated that he's in 10+/10 pain and request for pain meds. Scheduled tylenol, robaxin, and prn toradol given. Charge nurse informed and asked if RT can be contacted to give pt a prn breathing treatment if ordered. See flowsheet for VS. Charge nurse contacted MD Aruna and MAURIZIO Lacy to request if scheduled 2000 breathing tx can be given early due to bilat inspiratory wheezing . MD Aruna agreed. RT contacted. See new orders. Ongoing poc at this time.

## 2025-07-02 NOTE — SUBJECTIVE & OBJECTIVE
Interval History:   Naeon. Appropriate sats on RA. Chest tube removed yesterday. CXR this am stable. Patient still complaining of poor pain control and sob associated with it. Ambulating appropriately    Medications:  Continuous Infusions:  Scheduled Meds:   acetaminophen  1,000 mg Oral Q6H    albuterol-ipratropium  3 mL Nebulization Q4H WAKE    enoxparin  40 mg Subcutaneous Daily    finasteride  5 mg Oral QHS    gabapentin  800 mg Oral TID    ketorolac  15 mg Intravenous Q6H    LIDOcaine  1 patch Transdermal Q24H    methocarbamoL  1,000 mg Oral TID    mirtazapine  30 mg Oral QHS    mupirocin   Nasal BID    polyethylene glycol  17 g Oral Daily    senna-docusate  1 tablet Oral BID    tamsulosin  0.4 mg Oral QHS     PRN Meds:  Current Facility-Administered Medications:     bisacodyL, 10 mg, Rectal, Daily PRN    ondansetron, 8 mg, Oral, Q6H PRN    oxyCODONE, 5 mg, Oral, Q4H PRN    oxyCODONE, 10 mg, Oral, Q4H PRN     Review of patient's allergies indicates:   Allergen Reactions    Peach (prunus persica)     Morphine Palpitations     Objective:     Vital Signs (Most Recent):  Temp: 98.2 °F (36.8 °C) (07/02/25 0512)  Pulse: 72 (07/02/25 0712)  Resp: 17 (07/02/25 0712)  BP: (!) 142/95 (07/02/25 0512)  SpO2: (!) 94 % (maintain sats >88%) (07/02/25 0712) Vital Signs (24h Range):  Temp:  [97.9 °F (36.6 °C)-98.5 °F (36.9 °C)] 98.2 °F (36.8 °C)  Pulse:  [72-95] 72  Resp:  [16-28] 17  SpO2:  [92 %-94 %] 94 %  BP: (133-145)/(74-95) 142/95     Weight: 79.6 kg (175 lb 7.8 oz)  Body mass index is 25.91 kg/m².    Intake/Output - Last 3 Shifts         06/30 0700 07/01 0659 07/01 0700 07/02 0659 07/02 0700 07/03 0659    IV Piggyback 1000      Total Intake(mL/kg) 1000 (12.6)      Urine (mL/kg/hr) 1050 (0.5) 360 (0.2)     Stool 0      Chest Tube 90      Total Output 1140 360     Net -140 -360            Unmeasured Stool Occurrence 0 x               Physical Exam  Constitutional:       Appearance: Normal appearance.   HENT:      Head:  Normocephalic and atraumatic.   Eyes:      Extraocular Movements: Extraocular movements intact.   Pulmonary:      Effort: Pulmonary effort is normal.   Chest:      Comments: Incisions c/d/i  Skin:     General: Skin is warm and dry.   Neurological:      General: No focal deficit present.      Mental Status: He is alert and oriented to person, place, and time. Mental status is at baseline.          Significant Labs:  I have reviewed all pertinent lab results within the past 24 hours.    Significant Diagnostics:  I have reviewed all pertinent imaging results/findings within the past 24 hours.

## 2025-07-02 NOTE — NURSING
Pt called RN to ask if he was still being discharged today. Maurizio Lacy and MD Aruna contacted to clarify. MAURIZIO Lacy informed RN that team is heading up to see pt. Ongoing poc at this time.

## 2025-07-02 NOTE — PROGRESS NOTES
Sha Ortiz - [facility]  Thoracic Surgery  Progress Note    Subjective:     History of Present Illness:  No notes on file    Post-Op Info:  Procedure(s) (LRB):  DV5 ROBOTIC RATS, right upper lobe posterior segmentectomy (Right)  XI ROBOTIC RATS, WITH LYMPHADENECTOMY (Right)  BLOCK, NERVE, INTERCOSTAL, 2 OR MORE (Right)   2 Days Post-Op     Interval History:   Naeon. Appropriate sats on RA. Chest tube removed yesterday. CXR this am stable. Patient still complaining of poor pain control and sob associated with it. Ambulating appropriately    Medications:  Continuous Infusions:  Scheduled Meds:   acetaminophen  1,000 mg Oral Q6H    albuterol-ipratropium  3 mL Nebulization Q4H WAKE    enoxparin  40 mg Subcutaneous Daily    finasteride  5 mg Oral QHS    gabapentin  800 mg Oral TID    ketorolac  15 mg Intravenous Q6H    LIDOcaine  1 patch Transdermal Q24H    methocarbamoL  1,000 mg Oral TID    mirtazapine  30 mg Oral QHS    mupirocin   Nasal BID    polyethylene glycol  17 g Oral Daily    senna-docusate  1 tablet Oral BID    tamsulosin  0.4 mg Oral QHS     PRN Meds:  Current Facility-Administered Medications:     bisacodyL, 10 mg, Rectal, Daily PRN    ondansetron, 8 mg, Oral, Q6H PRN    oxyCODONE, 5 mg, Oral, Q4H PRN    oxyCODONE, 10 mg, Oral, Q4H PRN     Review of patient's allergies indicates:   Allergen Reactions    Peach (prunus persica)     Morphine Palpitations     Objective:     Vital Signs (Most Recent):  Temp: 98.2 °F (36.8 °C) (07/02/25 0512)  Pulse: 72 (07/02/25 0712)  Resp: 17 (07/02/25 0712)  BP: (!) 142/95 (07/02/25 0512)  SpO2: (!) 94 % (maintain sats >88%) (07/02/25 0712) Vital Signs (24h Range):  Temp:  [97.9 °F (36.6 °C)-98.5 °F (36.9 °C)] 98.2 °F (36.8 °C)  Pulse:  [72-95] 72  Resp:  [16-28] 17  SpO2:  [92 %-94 %] 94 %  BP: (133-145)/(74-95) 142/95     Weight: 79.6 kg (175 lb 7.8 oz)  Body mass index is 25.91 kg/m².    Intake/Output - Last 3 Shifts         06/30 0700 07/01 0659 07/01 0700 07/02 0659 07/02  0700  07/03 0659    IV Piggyback 1000      Total Intake(mL/kg) 1000 (12.6)      Urine (mL/kg/hr) 1050 (0.5) 360 (0.2)     Stool 0      Chest Tube 90      Total Output 1140 360     Net -140 -360            Unmeasured Stool Occurrence 0 x               Physical Exam  Constitutional:       Appearance: Normal appearance.   HENT:      Head: Normocephalic and atraumatic.   Eyes:      Extraocular Movements: Extraocular movements intact.   Pulmonary:      Effort: Pulmonary effort is normal.   Chest:      Comments: Incisions c/d/i  Skin:     General: Skin is warm and dry.   Neurological:      General: No focal deficit present.      Mental Status: He is alert and oriented to person, place, and time. Mental status is at baseline.          Significant Labs:  I have reviewed all pertinent lab results within the past 24 hours.    Significant Diagnostics:  I have reviewed all pertinent imaging results/findings within the past 24 hours.  Assessment/Plan:     * Malignant neoplasm of right lung  64M s/p RATS R upper lobe posterior segmentectomy 6/30.      - Chest tube removed 7/1. CXR this am stable  - MMPC. Will adjust d/t poor pain control  - Duonebs q4h.   - Regular diet  - Appropriate home meds restarted  - PT/OT. OOB and ambulating in dias  - Will plan for d/c today        Rose Valdovinos MD  Thoracic Surgery  [facility]

## 2025-07-02 NOTE — ASSESSMENT & PLAN NOTE
64M s/p RATS R upper lobe posterior segmentectomy 6/30.      - Chest tube removed 7/1. CXR this am stable  - MMPC. Will adjust d/t poor pain control  - Duonebs q4h.   - Regular diet  - Appropriate home meds restarted  - PT/OT. OOB and ambulating in dias  - Will plan for d/c today

## 2025-07-02 NOTE — NURSING
Upon entering the room, pt was ambulating from bathroom back to bed while guarding right side chest where chest tube was placed. Pt stated that his pain is uncontrollable right now even though he know it hurt when getting up and he understood he needed to move. RN requested team to increase dosage of scheduled robaxin and tylenol. RN was informed that Dr. Torres was about to round and would reassess patient on round. No new orders at this time. Ongoing poc.

## 2025-07-02 NOTE — PLAN OF CARE
[facility] Plan of Care Note  Dx  Malignant neoplasm of right lung    Shift Events C/o pain not well controlled on current pain regimen. Reports SOBOE. Chest tube removed 7/1.    Goals of Care: Monitor resp status, safety    Neuro: A&Ox4    Vital Signs: vss    Respiratory: RA, SOBOE    Diet: reg    Is patient tolerating current diet? yes    GTTS: n/a    Urine Output/Bowel Movement: AUO/constipation on bowel regimen    Drains/Tubes/Tube Feeds (include total output/shift): n/a    Lines: 20g LFA; 18g RH     Accuchecks:n/a    Skin: Lap sites x4; right site of chest tube removal (gauze and tegaderm)    Fall Risk Score: 10    Activity level? standby    Any scheduled procedures? N/a    Any safety concerns? N/a    Other: n/a     Problem: Adult Inpatient Plan of Care  Goal: Plan of Care Review  Outcome: Progressing  Goal: Patient-Specific Goal (Individualized)  Outcome: Progressing  Goal: Absence of Hospital-Acquired Illness or Injury  Outcome: Progressing  Goal: Optimal Comfort and Wellbeing  Outcome: Progressing  Goal: Readiness for Transition of Care  Outcome: Progressing     Problem: Wound  Goal: Optimal Coping  Outcome: Progressing  Goal: Optimal Functional Ability  Outcome: Progressing  Goal: Absence of Infection Signs and Symptoms  Outcome: Progressing  Goal: Improved Oral Intake  Outcome: Progressing  Goal: Optimal Pain Control and Function  Outcome: Progressing  Goal: Skin Health and Integrity  Outcome: Progressing  Goal: Optimal Wound Healing  Outcome: Progressing     Problem: Fall Injury Risk  Goal: Absence of Fall and Fall-Related Injury  Outcome: Progressing

## 2025-07-02 NOTE — PLAN OF CARE
[facility]  Discharge Final Note    Primary Care Provider: Tara Jolly MD    Expected Discharge Date: 7/2/2025    Final Discharge Note (most recent)       Final Note - 07/02/25 0908          Final Note    Assessment Type Final Discharge Note     Anticipated Discharge Disposition Home or Self Care     Hospital Resources/Appts/Education Provided Appointments scheduled and added to AVS        Post-Acute Status    Post-Acute Authorization Other     Other Status No Post-Acute Service Needs     Discharge Delays None known at this time                     Important Message from Medicare             Contact Info       Parker Gay MD   Specialty: Cardiothoracic Surgery, Thoracic Surgery    [address]  [facility] [address]   Phone: [phone]       Next Steps: Follow up in 2 week(s)    Instructions: post-op visit

## 2025-07-02 NOTE — PLAN OF CARE
"[facility] Plan of Care Note    Dx:   Lung nodule [R91.1]  Malignant neoplasm of right lung [C34.91]    Shift Events: no acute events    Goals of Care: see careplan    Neuro: x4    Vital Signs: BP (!) 144/83   Pulse 76   Temp 98.5 °F (36.9 °C) (Oral)   Resp 17   Ht 5' 9" (1.753 m)   Wt 79.6 kg (175 lb 7.8 oz)   SpO2 (!) 94%   BMI 25.91 kg/m²     Respiratory: room air, coarse breath sound RUL,    Diet: Diet Adult Regular,Diet Adult Regular      Is patient tolerating current diet? yes    GTTS: none    Urine Output/Bowel Movement:   No intake/output data recorded.  Last Bowel Movement: 06/29/25      Drains/Tubes/Tube Feeds (include total output/shift):   No intake/output data recorded.      Lines: pivx2      Accuchecks:none    Skin: lap sites, chest tube insertion site    Fall Risk Score: 10    Activity level? Assist x1    Any scheduled procedures? none    Any safety concerns? Fall risk, respiratory distress    Other: none    Problem: Adult Inpatient Plan of Care  Goal: Plan of Care Review  Outcome: Progressing  Goal: Patient-Specific Goal (Individualized)  Outcome: Progressing  Goal: Absence of Hospital-Acquired Illness or Injury  Outcome: Progressing  Goal: Optimal Comfort and Wellbeing  Outcome: Progressing  Goal: Readiness for Transition of Care  Outcome: Progressing     Problem: Wound  Goal: Optimal Coping  Outcome: Progressing  Goal: Optimal Functional Ability  Outcome: Progressing  Goal: Absence of Infection Signs and Symptoms  Outcome: Progressing  Goal: Improved Oral Intake  Outcome: Progressing  Goal: Optimal Pain Control and Function  Outcome: Progressing  Goal: Skin Health and Integrity  Outcome: Progressing  Goal: Optimal Wound Healing  Outcome: Progressing     Problem: Fall Injury Risk  Goal: Absence of Fall and Fall-Related Injury  Outcome: Progressing       "

## 2025-07-02 NOTE — PT/OT/SLP PROGRESS
Physical Therapy Treatment    Patient Name:  Jesus Christy   MRN:  [id number]    Recommendations:     Discharge Recommendations: Low Intensity Therapy  Discharge Equipment Recommendations: none  Barriers to discharge: None    Assessment:     Jesus Christy is a 64 y.o. male admitted with a medical diagnosis of Malignant neoplasm of right lung.  He presents with the following impairments/functional limitations: weakness, impaired endurance, impaired self care skills, impaired functional mobility, gait instability, impaired balance, pain Pt. cooperative with limited tolerance to treatment. Pt. progressing with mobility with rolling walker, but appeared limited by abd. pain.    Rehab Prognosis: Good; patient would benefit from acute skilled PT services to address these deficits and reach maximum level of function.    Recent Surgery: Procedure(s) (LRB):  DV5 ROBOTIC RATS, right upper lobe posterior segmentectomy (Right)  XI ROBOTIC RATS, WITH LYMPHADENECTOMY (Right)  BLOCK, NERVE, INTERCOSTAL, 2 OR MORE (Right) 2 Days Post-Op    Plan:     During this hospitalization, patient to be seen 4 x/week to address the identified rehab impairments via gait training, therapeutic activities, therapeutic exercises, neuromuscular re-education and progress toward the following goals:    Plan of Care Expires:  07/31/25    Subjective     Chief Complaint: abd. discomfort  Patient/Family Comments/goals: pt. Agreeable to PT  Pain/Comfort:  Pain Rating 1:  (pt. did not rate, but appeared to have severe abd. discomfort)  Pain Addressed 1: Reposition, Distraction, Nurse notified, Cessation of Activity      Objective:     Communicated with nursing prior to session.  Patient found sitting edge of bed with telemetry, peripheral IV upon PT entry to room.     General Precautions: Standard, fall  Orthopedic Precautions: N/A  Braces: N/A  Respiratory Status: Room air     Functional Mobility:  Transfers:     Sit to Stand:  supervision with no  AD  Gait: 10' with HHA-CGA and 30' with RW and CGA with decreased step length/marine and antalgic gait posture. Pt. with standing rest break x2  Balance: fair+      AM-PAC 6 CLICK MOBILITY  Turning over in bed (including adjusting bedclothes, sheets and blankets)?: 4  Sitting down on and standing up from a chair with arms (e.g., wheelchair, bedside commode, etc.): 4  Moving from lying on back to sitting on the side of the bed?: 4  Moving to and from a bed to a chair (including a wheelchair)?: 4  Need to walk in hospital room?: 3  Climbing 3-5 steps with a railing?: 3  Basic Mobility Total Score: 22       Treatment & Education:  Discussed pt.'s progress, goals, and POC.    Patient left sitting edge of bed with all lines intact and call button in reach..    GOALS:   Multidisciplinary Problems       Physical Therapy Goals          Problem: Physical Therapy    Goal Priority Disciplines Outcome Interventions   Physical Therapy Goal     PT, PT/OT Progressing    Description: Goals to be met by: 25     Patient will increase functional independence with mobility by performin. Sit to stand transfer with Stand-by Assistance  2. Bed to chair transfer with Stand-by Assistance using No Assistive Device  3. Gait  x 150 feet with Stand-by Assistance using LRAD.                          DME Justifications:  No DME recommended requiring DME justifications    Time Tracking:     PT Received On: 25  PT Start Time: 1439     PT Stop Time: 1447  PT Total Time (min): 8 min     Billable Minutes: Gait Training 8    Treatment Type: Treatment  PT/PTA: PT     Number of PTA visits since last PT visit: 0     2025

## 2025-07-03 VITALS
HEART RATE: 86 BPM | BODY MASS INDEX: 26 KG/M2 | RESPIRATION RATE: 13 BRPM | WEIGHT: 175.5 LBS | SYSTOLIC BLOOD PRESSURE: 133 MMHG | HEIGHT: 69 IN | DIASTOLIC BLOOD PRESSURE: 76 MMHG | OXYGEN SATURATION: 92 % | TEMPERATURE: 98 F

## 2025-07-03 LAB
ANION GAP (OHS): 7 MMOL/L (ref 8–16)
BUN SERPL-MCNC: 14 MG/DL (ref 8–23)
CALCIUM SERPL-MCNC: 9.6 MG/DL (ref 8.7–10.5)
CHLORIDE SERPL-SCNC: 103 MMOL/L (ref 95–110)
CO2 SERPL-SCNC: 26 MMOL/L (ref 23–29)
CREAT SERPL-MCNC: 1 MG/DL (ref 0.5–1.4)
GFR SERPLBLD CREATININE-BSD FMLA CKD-EPI: >60 ML/MIN/1.73/M2
GLUCOSE SERPL-MCNC: 108 MG/DL (ref 70–110)
POTASSIUM SERPL-SCNC: 4.1 MMOL/L (ref 3.5–5.1)
SODIUM SERPL-SCNC: 136 MMOL/L (ref 136–145)

## 2025-07-03 PROCEDURE — 25000003 PHARM REV CODE 250

## 2025-07-03 PROCEDURE — 25000003 PHARM REV CODE 250: Performed by: STUDENT IN AN ORGANIZED HEALTH CARE EDUCATION/TRAINING PROGRAM

## 2025-07-03 PROCEDURE — 36415 COLL VENOUS BLD VENIPUNCTURE: CPT | Performed by: STUDENT IN AN ORGANIZED HEALTH CARE EDUCATION/TRAINING PROGRAM

## 2025-07-03 PROCEDURE — 25000242 PHARM REV CODE 250 ALT 637 W/ HCPCS

## 2025-07-03 PROCEDURE — 63600175 PHARM REV CODE 636 W HCPCS: Performed by: STUDENT IN AN ORGANIZED HEALTH CARE EDUCATION/TRAINING PROGRAM

## 2025-07-03 PROCEDURE — 94640 AIRWAY INHALATION TREATMENT: CPT

## 2025-07-03 PROCEDURE — 82310 ASSAY OF CALCIUM: CPT | Performed by: STUDENT IN AN ORGANIZED HEALTH CARE EDUCATION/TRAINING PROGRAM

## 2025-07-03 PROCEDURE — 94761 N-INVAS EAR/PLS OXIMETRY MLT: CPT

## 2025-07-03 PROCEDURE — 99900035 HC TECH TIME PER 15 MIN (STAT)

## 2025-07-03 PROCEDURE — 99024 POSTOP FOLLOW-UP VISIT: CPT | Mod: ,,, | Performed by: STUDENT IN AN ORGANIZED HEALTH CARE EDUCATION/TRAINING PROGRAM

## 2025-07-03 RX ORDER — ACETAMINOPHEN 500 MG
1000 TABLET ORAL EVERY 6 HOURS
Qty: 90 TABLET | Refills: 0 | Status: SHIPPED | OUTPATIENT
Start: 2025-07-03 | End: 2025-07-14

## 2025-07-03 RX ORDER — IPRATROPIUM BROMIDE AND ALBUTEROL SULFATE 2.5; .5 MG/3ML; MG/3ML
3 SOLUTION RESPIRATORY (INHALATION) ONCE
Status: DISCONTINUED | OUTPATIENT
Start: 2025-07-03 | End: 2025-07-03 | Stop reason: HOSPADM

## 2025-07-03 RX ORDER — BISACODYL 10 MG/1
10 SUPPOSITORY RECTAL ONCE
Status: DISCONTINUED | OUTPATIENT
Start: 2025-07-03 | End: 2025-07-03

## 2025-07-03 RX ORDER — IPRATROPIUM BROMIDE AND ALBUTEROL SULFATE 2.5; .5 MG/3ML; MG/3ML
3 SOLUTION RESPIRATORY (INHALATION) ONCE
Status: COMPLETED | OUTPATIENT
Start: 2025-07-03 | End: 2025-07-03

## 2025-07-03 RX ORDER — AMOXICILLIN 250 MG
2 CAPSULE ORAL 2 TIMES DAILY
Status: DISCONTINUED | OUTPATIENT
Start: 2025-07-03 | End: 2025-07-03 | Stop reason: HOSPADM

## 2025-07-03 RX ORDER — OXYCODONE HYDROCHLORIDE 5 MG/1
5 TABLET ORAL EVERY 4 HOURS PRN
Status: DISCONTINUED | OUTPATIENT
Start: 2025-07-03 | End: 2025-07-03 | Stop reason: HOSPADM

## 2025-07-03 RX ORDER — SYRING-NEEDL,DISP,INSUL,0.3 ML 29 G X1/2"
296 SYRINGE, EMPTY DISPOSABLE MISCELLANEOUS ONCE
Status: COMPLETED | OUTPATIENT
Start: 2025-07-03 | End: 2025-07-03

## 2025-07-03 RX ADMIN — IPRATROPIUM BROMIDE AND ALBUTEROL SULFATE 3 ML: 2.5; .5 SOLUTION RESPIRATORY (INHALATION) at 07:07

## 2025-07-03 RX ADMIN — ACETAMINOPHEN 1000 MG: 500 TABLET ORAL at 05:07

## 2025-07-03 RX ADMIN — MAGNESIUM CITRATE 296 ML: 1.75 LIQUID ORAL at 09:07

## 2025-07-03 RX ADMIN — METHOCARBAMOL 1000 MG: 500 TABLET ORAL at 03:07

## 2025-07-03 RX ADMIN — OXYCODONE HYDROCHLORIDE 10 MG: 10 TABLET ORAL at 12:07

## 2025-07-03 RX ADMIN — ACETAMINOPHEN 1000 MG: 500 TABLET ORAL at 12:07

## 2025-07-03 RX ADMIN — IPRATROPIUM BROMIDE AND ALBUTEROL SULFATE 3 ML: 2.5; .5 SOLUTION RESPIRATORY (INHALATION) at 04:07

## 2025-07-03 RX ADMIN — KETOROLAC TROMETHAMINE 15 MG: 15 INJECTION INTRAMUSCULAR at 05:07

## 2025-07-03 RX ADMIN — METHOCARBAMOL 1000 MG: 500 TABLET ORAL at 09:07

## 2025-07-03 RX ADMIN — GABAPENTIN 800 MG: 400 CAPSULE ORAL at 03:07

## 2025-07-03 RX ADMIN — IPRATROPIUM BROMIDE AND ALBUTEROL SULFATE 3 ML: 2.5; .5 SOLUTION RESPIRATORY (INHALATION) at 11:07

## 2025-07-03 RX ADMIN — GABAPENTIN 800 MG: 400 CAPSULE ORAL at 09:07

## 2025-07-03 RX ADMIN — ENOXAPARIN SODIUM 40 MG: 40 INJECTION SUBCUTANEOUS at 03:07

## 2025-07-03 RX ADMIN — IPRATROPIUM BROMIDE AND ALBUTEROL SULFATE 3 ML: 2.5; .5 SOLUTION RESPIRATORY (INHALATION) at 05:07

## 2025-07-03 RX ADMIN — KETOROLAC TROMETHAMINE 15 MG: 15 INJECTION INTRAMUSCULAR at 12:07

## 2025-07-03 RX ADMIN — POLYETHYLENE GLYCOL 3350 17 G: 17 POWDER, FOR SOLUTION ORAL at 09:07

## 2025-07-03 RX ADMIN — MUPIROCIN: 20 OINTMENT TOPICAL at 10:07

## 2025-07-03 RX ADMIN — OXYCODONE HYDROCHLORIDE 10 MG: 10 TABLET ORAL at 10:07

## 2025-07-03 RX ADMIN — LIDOCAINE 1 PATCH: 50 PATCH CUTANEOUS at 12:07

## 2025-07-03 RX ADMIN — OXYCODONE HYDROCHLORIDE 10 MG: 10 TABLET ORAL at 05:07

## 2025-07-03 RX ADMIN — SENNOSIDES AND DOCUSATE SODIUM 2 TABLET: 50; 8.6 TABLET ORAL at 09:07

## 2025-07-03 RX ADMIN — METHOCARBAMOL 1000 MG: 500 TABLET ORAL at 12:07

## 2025-07-03 NOTE — PLAN OF CARE
[facility]  Discharge Final Note    Primary Care Provider: Tara Jolly MD    Expected Discharge Date: 7/3/2025    Final Discharge Note (most recent)       Final Note - 07/03/25 0948          Final Note    Assessment Type Final Discharge Note     Anticipated Discharge Disposition Home or Self Care     Hospital Resources/Appts/Education Provided Appointments scheduled and added to AVS        Post-Acute Status    Post-Acute Authorization Other     Other Status No Post-Acute Service Needs     Discharge Delays None known at this time                     Important Message from Medicare             Contact Info       Parker Gay MD   Specialty: Cardiothoracic Surgery, Thoracic Surgery    [address]  [facility] [address]   Phone: [phone]       Next Steps: Follow up in 2 week(s)    Instructions: post-op visit

## 2025-07-03 NOTE — DISCHARGE INSTRUCTIONS
No lifting anything heavier than 10lbs until follow-up  No strenuous exercise. Ok to walk  Ok to shower 24 hours after surgery. No tubs, lakes, pools or submerging the incisions for 2 weeks.   Ok to take scheduled tylenol and advil for pain.  Use stronger pain medications as needed.  When using narcotic pain medications, it is recommended to use daily miralax or stool softener to avoid constipation and straining.   Follow-up in clinic in 2 weeks.

## 2025-07-03 NOTE — DISCHARGE SUMMARY
[facility]  Thoracic Surgery  Discharge Summary    Patient Name: Jesus Christy  MRN: [id number]  Admission Date: 6/30/2025  Hospital Length of Stay: 3 days  Discharge Date and Time: 07/03/2025 4:42 PM  Attending Physician: Parker Gay MD   Discharging Provider: Rose Valdovinos MD  Primary Care Provider: Tara Jolly MD    HPI:   No notes on file    Procedure(s) (LRB):  DV5 ROBOTIC RATS, right upper lobe posterior segmentectomy (Right)  XI ROBOTIC RATS, WITH LYMPHADENECTOMY (Right)  BLOCK, NERVE, INTERCOSTAL, 2 OR MORE (Right)      Hospital Course: Please see the preoperative H&P and other available documentation for full details related to history prior to this admission.  Briefly, Jesus Christy is a 64 y.o. male who was admitted following scheduled elective surgery for Malignant neoplasm of right lung. They underwent the following procedures: Rats RUL segmentectomy    Following a complete preoperative discussion of the risks and benefits of surgery with signed informed consent, the patient was taken to the operating room on 6/30/2025 and underwent the above stated procedures. The patient tolerated surgery well and there were no complications. Please see the operative report for full intraoperative findings and details. Postoperatively, the patient did well and was transferred from the PACU to the floor in stable condition where they had a stable and uncomplicated hospital course. Chest tube was removed on POD1. Labs and vital signs remained stable and appropriate throughout course. Diet was advanced as tolerated and the patient's pain was controlled on oral pain medications without problem. Ambulating without issue. Voiding without issue with adequate urine output. Passing gas and stool. Incision sites are clean, dry, and intact.    Currently, the patient is doing well at 3 Days Post-Op and is stable and appropriate for discharge home at this time. Patient will follow up in clinic with   Deidra in 2 weeks.     Goals of Care Treatment Preferences:  Code Status: Full Code          Significant Diagnostic Studies: N/A    Pending Diagnostic Studies:       Procedure Component Value Units Date/Time    Fl Modified Barium Swallow Speech [[id number]]     Order Status: Sent Lab Status: No result           Final Active Diagnoses:    Diagnosis Date Noted POA    PRINCIPAL PROBLEM:  Malignant neoplasm of right lung [C34.91] 07/01/2025 Yes    BMI 25.0-25.9,adult [Z68.25] 07/01/2025 Not Applicable    Lung nodule [R91.1] 07/01/2025 Yes    S/P chest tube placement [Z93.8] 07/01/2025 Not Applicable    Anemia [D64.9] 01/23/2024 Yes    Chronic prescription benzodiazepine use [Z79.899] 01/01/2024 Not Applicable     Chronic    Essential hypertension [I10]  Yes     Chronic      Problems Resolved During this Admission:      Discharged Condition: good    Disposition: Home or Self Care    Follow Up:   Follow-up Information       Parker Gay MD Follow up in 2 week(s).    Specialties: Cardiothoracic Surgery, Thoracic Surgery  Why: post-op visit  Contact information:  [address] [place] [id number]  [phone]                           Patient Instructions:      Diet Adult Regular     Diet Adult Regular     Notify your health care provider if you experience any of the following:  temperature >100.4     Notify your health care provider if you experience any of the following:  persistent nausea and vomiting or diarrhea     Notify your health care provider if you experience any of the following:  severe uncontrolled pain     Notify your health care provider if you experience any of the following:  difficulty breathing or increased cough     Notify your health care provider if you experience any of the following:  temperature >100.4     Notify your health care provider if you experience any of the following:  persistent nausea and vomiting or diarrhea     Notify your health care provider if you experience any of  the following:  severe uncontrolled pain     Notify your health care provider if you experience any of the following:  redness, tenderness, or signs of infection (pain, swelling, redness, odor or green/yellow discharge around incision site)     Notify your health care provider if you experience any of the following:  difficulty breathing or increased cough     Activity as tolerated     Activity as tolerated     Medications:  Reconciled Home Medications:      Medication List        START taking these medications      acetaminophen 500 MG tablet  Commonly known as: TYLENOL  Take 2 tablets (1,000 mg total) by mouth every 6 (six) hours. for 11 days     methocarbamoL 500 MG Tab  Commonly known as: Robaxin  Take 1 tablet (500 mg total) by mouth 4 (four) times daily. for 10 days     oxyCODONE 5 MG immediate release tablet  Commonly known as: ROXICODONE  Take 1 tablet (5 mg total) by mouth every 4 (four) hours as needed for Pain.            CHANGE how you take these medications      finasteride 5 mg tablet  Commonly known as: PROSCAR  Take 1 tablet (5 mg total) by mouth once daily.  What changed: when to take this     tamsulosin 0.4 mg Cap  Commonly known as: FLOMAX  Take 1 capsule (0.4 mg total) by mouth once daily.  What changed: when to take this            CONTINUE taking these medications      ammonium lactate 12 % Crea  Apply 1 application  topically 2 (two) times daily.     diazePAM 10 MG Tab  Commonly known as: VALIUM  Take 10 mg by mouth 3 (three) times daily as needed.     diclofenac sodium 1 % Gel  Commonly known as: VOLTAREN  Apply 2 g topically 4 (four) times daily.     ELIQUIS 5 mg Tab  Generic drug: apixaban  Take 1 tablet (5 mg total) by mouth 2 (two) times daily.     fluticasone propionate 50 mcg/actuation nasal spray  Commonly known as: FLONASE  Instill 1 spray (50 mcg total) in each nostril once daily.     * gabapentin 300 MG capsule  Commonly known as: NEURONTIN  Take 1 capsule (300 mg total) by mouth 3  (three) times daily as needed (leg pain).     * gabapentin 600 MG tablet  Commonly known as: NEURONTIN  Take 1 tablet (600 mg total) by mouth 3 (three) times daily.     LIDOcaine 5 %  Commonly known as: LIDODERM  Place 1 patch onto the skin once daily. Remove & Discard patch within 12 hours or as directed by MD     meloxicam 15 MG tablet  Commonly known as: MOBIC  Take 1 tablet (15 mg total) by mouth once daily.     mirtazapine 30 MG tablet  Commonly known as: REMERON  Take 1 tablet (30 mg total) by mouth every evening.     PROCTO-MED HC 2.5 % rectal cream  Generic drug: hydrocortisone  Place rectally 2 (two) times daily.     tadalafiL 20 MG Tab  Commonly known as: CIALIS  Take 1 tablet (20 mg total) by mouth daily as needed (erectile dysfunction).           * This list has 2 medication(s) that are the same as other medications prescribed for you. Read the directions carefully, and ask your doctor or other care provider to review them with you.                ASK your doctor about these medications      ARIPiprazole 10 MG Tab  Commonly known as: ABILIFY     cyproheptadine 4 mg tablet  Commonly known as: PERIACTIN              Rose Valdovinos MD  Thoracic Surgery  [facility]  [facility]

## 2025-07-03 NOTE — PROGRESS NOTES
[facility]  Thoracic Surgery  Progress Note    Subjective:     History of Present Illness:  No notes on file    Post-Op Info:  Procedure(s) (LRB):  DV5 ROBOTIC RATS, right upper lobe posterior segmentectomy (Right)  XI ROBOTIC RATS, WITH LYMPHADENECTOMY (Right)  BLOCK, NERVE, INTERCOSTAL, 2 OR MORE (Right)   3 Days Post-Op     Interval History: POD3 s/p RATS R upper lobe posterior segmentectomy. NAEON, denies SOB, endorses pain well controlled. Complaining of abdominal pain, denies bowel movement postoperatively.    Medications:  Continuous Infusions:  Scheduled Meds:   acetaminophen  1,000 mg Oral Q6H    albuterol-ipratropium  3 mL Nebulization Q4H WAKE    albuterol-ipratropium  3 mL Nebulization Once    bisacodyL  10 mg Rectal Once    enoxparin  40 mg Subcutaneous Daily    finasteride  5 mg Oral QHS    gabapentin  800 mg Oral TID    ketorolac  15 mg Intravenous Q6H    LIDOcaine  1 patch Transdermal Q24H    methocarbamoL  1,000 mg Oral QID    mirtazapine  30 mg Oral QHS    mupirocin   Nasal BID    polyethylene glycol  17 g Oral Daily    senna-docusate  2 tablet Oral BID    tamsulosin  0.4 mg Oral QHS     PRN Meds:  Current Facility-Administered Medications:     bisacodyL, 10 mg, Rectal, Daily PRN    ondansetron, 8 mg, Oral, Q6H PRN    oxyCODONE, 5 mg, Oral, Q4H PRN    oxyCODONE, 10 mg, Oral, Q4H PRN     Review of patient's allergies indicates:   Allergen Reactions    Peach (prunus persica)     Morphine Palpitations     Objective:     Vital Signs (Most Recent):  Temp: 98.2 °F (36.8 °C) (07/03/25 0419)  Pulse: 88 (07/03/25 0536)  Resp: 20 (07/03/25 0552)  BP: (!) 148/82 (07/03/25 0419)  SpO2: 97 % (07/03/25 0536) Vital Signs (24h Range):  Temp:  [98.2 °F (36.8 °C)-98.8 °F (37.1 °C)] 98.2 °F (36.8 °C)  Pulse:  [75-91] 88  Resp:  [15-22] 20  SpO2:  [93 %-97 %] 97 %  BP: (125-152)/(70-82) 148/82     Intake/Output - Last 3 Shifts         07/01 0700  07/02 0659 07/02 0700 07/03 0659 07/03 0700  07/04 0659    IV  Upper Endoscopy and Colonoscopy   WHAT YOU NEED TO KNOW:   An upper endoscopy is also called an upper gastrointestinal (GI) endoscopy, or an esophagogastroduodenoscopy (EGD). It is a procedure to examine the inside of your esophagus, stomach, and duodenum (first part of the small intestine) with a scope. You may feel bloated, gassy, or have some abdominal discomfort after your procedure. Your throat may be sore for 24 to 36 hours. You may burp or pass gas from air that is still inside your body.                A colonoscopy is a procedure to examine the inside of your colon (intestine) with a scope. Polyps or tissue growths may have been removed during your colonoscopy. It is normal to feel bloated and to have some abdominal discomfort. You should be passing gas. If you have hemorrhoids or you had polyps removed, you may have a small amount of bleeding.          DISCHARGE INSTRUCTIONS:   Seek care immediately if:   You have sudden, severe abdominal pain.     You have problems swallowing.     You have a large amount of black, sticky bowel movements or blood in your bowel movements.     You have sudden trouble breathing.     You feel weak, lightheaded, or faint or your heart beats faster than normal for you.     Contact your healthcare provider if:   You have a fever and chills.      You have nausea or are vomiting.      Your abdomen is bloated or feels full and hard.     You have abdominal pain.    You have a large amount of black, sticky bowel movements or blood in your bowel movements.    You have not had a bowel movement for 3 days after your procedure.    You have rash or hives.    You have questions or concerns about your procedure.     Activity:   ·       Do not lift, strain, or run for 24 hours after your procedure.     ·       Rest after your procedure. You have been given medicine to relax you. Do not drive or make important decisions until the day after your procedure. Return to your normal activity as  Piggyback       Total Intake(mL/kg)       Urine (mL/kg/hr) 360 (0.2)      Stool       Chest Tube       Total Output 360      Net -360             Unmeasured Urine Occurrence   3 x    Unmeasured Stool Occurrence  0 x             SpO2: 97 %        Physical Exam  Constitutional:       Appearance: Normal appearance.   HENT:      Head: Normocephalic and atraumatic.   Eyes:      Extraocular Movements: Extraocular movements intact.   Chest:      Comments: R chest incisions c/d/i  Abdominal:      Tenderness: There is no abdominal tenderness.   Skin:     General: Skin is warm and dry.   Neurological:      General: No focal deficit present.      Mental Status: He is alert and oriented to person, place, and time. Mental status is at baseline.            Significant Labs:  All pertinent labs from the last 24 hours have been reviewed.    Significant Diagnostics:  CXR: I have reviewed all pertinent results/findings within the past 24 hours    VTE Risk Mitigation (From admission, onward)           Ordered     enoxaparin injection 40 mg  Daily         06/30/25 1142     IP VTE HIGH RISK PATIENT  Once         06/30/25 1142     Place SHERRI hose  Until discontinued         06/30/25 1142     Place sequential compression device  Until discontinued         06/30/25 1142                  Assessment/Plan:     * Malignant neoplasm of right lung  64M s/p RATS R upper lobe posterior segmentectomy 6/30.      - Chest tube removed 7/1. CXR stable this AM  - MMPC  - Duonebs q4h.   - F/u abdominal XR, MBSS  - Will adjust bowel reg as needed   - Appropriate home meds restarted  - PT/OT. OOB and ambulating in dias  - Will plan for d/c today        Raheem Ken MD PGY1  Thoracic Surgery  [facility]   directed.     ·       Relieve gas and discomfort from bloating by lying on your right side with a heating pad on your abdomen. You may need to take short walks to help the gas move out. Eat small meals until bloating is relieved.  Follow up with your healthcare provider as directed: Write down your questions so you remember to ask them during your visits.      If you take a “blood thinner”, please review the specific instructions from your endoscopist about when you should resume it. These can be found in the “Recommendation” and “Your Medication list” sections of this After Visit Summary.

## 2025-07-03 NOTE — PLAN OF CARE
"[facility] Plan of Care Note    Dx:   Lung nodule [R91.1]  Malignant neoplasm of right lung [C34.91]    Shift Events: acute coughing spell with audible wheezing. Multimodal pain management.    Goals of Care: see careplan    Neuro: x4    Vital Signs: BP (!) 148/78   Pulse 81   Temp 98.3 °F (36.8 °C) (Oral)   Resp 16   Ht 5' 9" (1.753 m)   Wt 79.6 kg (175 lb 7.8 oz)   SpO2 (!) 93%   BMI 25.91 kg/m²     Respiratory: expiratory wheezing, room air, infrequent, productive cough    Diet: Diet Adult Regular,Diet Adult Regular      Is patient tolerating current diet? yes    GTTS: none    Urine Output/Bowel Movement:   No intake/output data recorded.  Last Bowel Movement: 06/29/25      Drains/Tubes/Tube Feeds (include total output/shift):   No intake/output data recorded.      Lines: pivx2      Accuchecks:none    Skin: lap sites, chest tube removal site    Fall Risk Score: 10    Activity level? independent    Any scheduled procedures? none    Any safety concerns? Respiratory distress, fall risk    Other: none      Problem: Adult Inpatient Plan of Care  Goal: Plan of Care Review  Outcome: Progressing  Goal: Patient-Specific Goal (Individualized)  Outcome: Progressing  Goal: Absence of Hospital-Acquired Illness or Injury  Outcome: Progressing  Goal: Optimal Comfort and Wellbeing  Outcome: Progressing  Goal: Readiness for Transition of Care  Outcome: Progressing     Problem: Wound  Goal: Optimal Coping  Outcome: Progressing  Goal: Optimal Functional Ability  Outcome: Progressing  Goal: Absence of Infection Signs and Symptoms  Outcome: Progressing  Goal: Improved Oral Intake  Outcome: Progressing  Goal: Optimal Pain Control and Function  Outcome: Progressing  Goal: Skin Health and Integrity  Outcome: Progressing  Goal: Optimal Wound Healing  Outcome: Progressing     Problem: Fall Injury Risk  Goal: Absence of Fall and Fall-Related Injury  Outcome: Progressing     "

## 2025-07-03 NOTE — ASSESSMENT & PLAN NOTE
64M s/p RATS R upper lobe posterior segmentectomy 6/30.      - Chest tube removed 7/1. CXR stable this AM  - MMPC  - Duonebs q4h.   - F/u abdominal XR, MBSS  - Will adjust bowel reg as needed   - Appropriate home meds restarted  - PT/OT. OOB and ambulating in dias  - Will plan for d/c today

## 2025-07-03 NOTE — SUBJECTIVE & OBJECTIVE
Interval History: POD3 s/p RATS R upper lobe posterior segmentectomy. NAEON, denies SOB, endorses pain well controlled. Complaining of abdominal pain, denies bowel movement postoperatively.    Medications:  Continuous Infusions:  Scheduled Meds:   acetaminophen  1,000 mg Oral Q6H    albuterol-ipratropium  3 mL Nebulization Q4H WAKE    albuterol-ipratropium  3 mL Nebulization Once    bisacodyL  10 mg Rectal Once    enoxparin  40 mg Subcutaneous Daily    finasteride  5 mg Oral QHS    gabapentin  800 mg Oral TID    ketorolac  15 mg Intravenous Q6H    LIDOcaine  1 patch Transdermal Q24H    methocarbamoL  1,000 mg Oral QID    mirtazapine  30 mg Oral QHS    mupirocin   Nasal BID    polyethylene glycol  17 g Oral Daily    senna-docusate  2 tablet Oral BID    tamsulosin  0.4 mg Oral QHS     PRN Meds:  Current Facility-Administered Medications:     bisacodyL, 10 mg, Rectal, Daily PRN    ondansetron, 8 mg, Oral, Q6H PRN    oxyCODONE, 5 mg, Oral, Q4H PRN    oxyCODONE, 10 mg, Oral, Q4H PRN     Review of patient's allergies indicates:   Allergen Reactions    Peach (prunus persica)     Morphine Palpitations     Objective:     Vital Signs (Most Recent):  Temp: 98.2 °F (36.8 °C) (07/03/25 0419)  Pulse: 88 (07/03/25 0536)  Resp: 20 (07/03/25 0552)  BP: (!) 148/82 (07/03/25 0419)  SpO2: 97 % (07/03/25 0536) Vital Signs (24h Range):  Temp:  [98.2 °F (36.8 °C)-98.8 °F (37.1 °C)] 98.2 °F (36.8 °C)  Pulse:  [75-91] 88  Resp:  [15-22] 20  SpO2:  [93 %-97 %] 97 %  BP: (125-152)/(70-82) 148/82     Intake/Output - Last 3 Shifts         07/01 0700  07/02 0659 07/02 0700  07/03 0659 07/03 0700 07/04 0659    IV Piggyback       Total Intake(mL/kg)       Urine (mL/kg/hr) 360 (0.2)      Stool       Chest Tube       Total Output 360      Net -360             Unmeasured Urine Occurrence   3 x    Unmeasured Stool Occurrence  0 x             SpO2: 97 %        Physical Exam  Constitutional:       Appearance: Normal appearance.   HENT:      Head:  Normocephalic and atraumatic.   Eyes:      Extraocular Movements: Extraocular movements intact.   Chest:      Comments: R chest incisions c/d/i  Abdominal:      Tenderness: There is no abdominal tenderness.   Skin:     General: Skin is warm and dry.   Neurological:      General: No focal deficit present.      Mental Status: He is alert and oriented to person, place, and time. Mental status is at baseline.            Significant Labs:  All pertinent labs from the last 24 hours have been reviewed.    Significant Diagnostics:  CXR: I have reviewed all pertinent results/findings within the past 24 hours    VTE Risk Mitigation (From admission, onward)           Ordered     enoxaparin injection 40 mg  Daily         06/30/25 1142     IP VTE HIGH RISK PATIENT  Once         06/30/25 1142     Place SHERRI hose  Until discontinued         06/30/25 1142     Place sequential compression device  Until discontinued         06/30/25 1142

## 2025-07-03 NOTE — HOSPITAL COURSE
Please see the preoperative H&P and other available documentation for full details related to history prior to this admission.  Briefly, Jesus Christy is a 64 y.o. male who was admitted following scheduled elective surgery for Malignant neoplasm of right lung. They underwent the following procedures: Rats RUL segmentectomy    Following a complete preoperative discussion of the risks and benefits of surgery with signed informed consent, the patient was taken to the operating room on 6/30/2025 and underwent the above stated procedures. The patient tolerated surgery well and there were no complications. Please see the operative report for full intraoperative findings and details. Postoperatively, the patient did well and was transferred from the PACU to the floor in stable condition where they had a stable and uncomplicated hospital course. Chest tube was removed on POD1. Labs and vital signs remained stable and appropriate throughout course. Diet was advanced as tolerated and the patient's pain was controlled on oral pain medications without problem. Ambulating without issue. Voiding without issue with adequate urine output. Passing gas and stool. Incision sites are clean, dry, and intact.    Currently, the patient is doing well at 3 Days Post-Op and is stable and appropriate for discharge home at this time. Patient will follow up in clinic with Dr. Gay in 2 weeks.

## 2025-07-04 ENCOUNTER — NURSE TRIAGE (OUTPATIENT)
Dept: ADMINISTRATIVE | Facility: CLINIC | Age: 65
End: 2025-07-04
Payer: MEDICARE

## 2025-07-04 NOTE — TELEPHONE ENCOUNTER
Pt called to ask if he should be taking his eliquis. Pt advised that he should be taking his eliquis. Pt verbalized understanding.  Reason for Disposition   Caller has medicine question only, adult not sick, AND triager answers question    Protocols used: Medication Question Call-A-

## 2025-07-04 NOTE — TELEPHONE ENCOUNTER
Pt states rx for Oxycodone, Tylenol and Robaxin, discharged today. Pt states took Tylenol and Robaxin approx 1.5 hours ago. Pt asking if ok to take Oxycodone. Discussed taking medications exactly as prescribed, write down times and call back for any further questions or concerns. Pt verbalizes understanding.   Reason for Disposition   Caller has medicine question only, adult not sick, AND triager answers question    Protocols used: Medication Question Call-A-AH

## 2025-07-07 ENCOUNTER — TELEPHONE (OUTPATIENT)
Dept: CARDIOTHORACIC SURGERY | Facility: CLINIC | Age: 65
End: 2025-07-07
Payer: MEDICARE

## 2025-07-07 ENCOUNTER — PATIENT MESSAGE (OUTPATIENT)
Dept: CARDIOTHORACIC SURGERY | Facility: CLINIC | Age: 65
End: 2025-07-07
Payer: MEDICARE

## 2025-07-07 ENCOUNTER — PATIENT OUTREACH (OUTPATIENT)
Dept: ADMINISTRATIVE | Facility: CLINIC | Age: 65
End: 2025-07-07
Payer: MEDICARE

## 2025-07-07 DIAGNOSIS — D3A.090 CARCINOID TUMOR OF RIGHT LUNG: ICD-10-CM

## 2025-07-07 RX ORDER — OXYCODONE HYDROCHLORIDE 5 MG/1
5 TABLET ORAL EVERY 4 HOURS PRN
Qty: 20 TABLET | Refills: 0 | Status: SHIPPED | OUTPATIENT
Start: 2025-07-07

## 2025-07-07 NOTE — TELEPHONE ENCOUNTER
Attempted to call pt to f/u post op. No answer, left VM. Sent portal message with post op care instructions

## 2025-07-07 NOTE — TELEPHONE ENCOUNTER
RN Navigator spoke with patient for post-op follow-up call following surgery reviewed the following information with the patient         At Home Instructions:     Incision Care:     Dressings ok to remove  48 hours after your tube is removed. The only thing that should be left on the incisions are steri strips (look straw paper). Once you remove the bandages, you can shower. Soap and water can run over the incisions. Do not scrub wound when washing or drying it. Gently pat the wound dry. Do not soak in a tub for 3 weeks following surgery.     If you notice any redness, swelling, cloudy or foul-smelling drainage, tenderness or have a fever greater than 100.4, contact our office immediately. If it is not during office hours please send us a message and attach a picture for us to review. If you were discharged with a stitch in place, you can expect some redness around suture and incision. This is normal. If you are concerned, please send us a picture.      If you had a chest tube placed you may notice light pink, red or rust-colored drainage from the incision where the chest drain was removed.  If there is drainage, please allow the fluid to drain onto a heavy gauze, wash cloth, bath towel or paper towels.  It only indicates that undrained fluid is spontaneously draining.  If you hear a sucking sound or notice bubbling at the drainage site, please keep incision covered and call our office.    It is normal to cough up blood-tinged mucous following surgery. This typically occurs post-operative day 3-5. It will gradually thin out.     Walking around following lung surgery is the best thing for your lungs to heal. Slowly increase your activity each day. When you are not active, use your incentive spirometer (breathing device) a few times every hour while awake.    Please notify us if you experience persistent nausea, vomiting, diarrhea, lightheadedness, dizziness, palpitations, increased confusion, weakness, or severe  uncontrolled pain.     Please notify us if you experience any difficulty breathing or increased cough. A cough is often normal following surgery.        Pain Management:    You will be given a prescription for pain medication prior to discharge to be taken in combination with over-the-counter medications such as Tylenol and ibuprofen.     You will typically be instructed to take a few different types of medications to treat the multiple causes of pain without high doses of narcotics. Your instructions will be based on what you were requiring in the hospital. This will include a potential combination of Tylenol, Ibuprofen, Gabapentin, muscle relaxer, and narcotic.     It is common to have nerve pain following surgery between your ribs. The pain can manifest in many ways. The most common sensations are numbness, tingling, burning, hot poker, tight band like, and hypersensitivity. These can occur anywhere from the middle of your back wrapping around to your side to the breastbone on the side of the surgery. The discomfort is caused by irritation of the nerve endings near your incisions. The medicine we most commonly use to treat this type of pain is gabapentin.     Pain medication can make you nauseated and/or constipated. Make sure you eat prior to taking any pain medication and drink plenty of water. You may also use stool softeners over the counter, such as Colace (docusate calcium) 100 mg as directed to prevent constipation. If the stool softeners do not help, you can use a laxative of your choice. Discontinue if you have loose stools.       Post Operative Clinic Visit    In most cases, a post operative chest x-ray and clinic appointment will be scheduled prior to your hospital discharge. Please call our office or send a message if you need to reschedule your appointments or you feel you need to be seen sooner.        Pathology Results    Only the providers will discuss the results of your pathology report with  you. This discussion is usually done at your post operative visit. The nursing staff is not allowed to give any pathology results over the phone.  Pathology results are often released to the portal before your provider gets the results and before your clinic visit. We will discuss these with you in person.

## 2025-07-07 NOTE — PROGRESS NOTES
C3 nurse spoke with Jesus Christy for a TCC post hospital discharge follow up call. The patient has a scheduled HOSFU appointment with Tara Jolly on 07/15/25 @ 0989.

## 2025-07-08 ENCOUNTER — TELEPHONE (OUTPATIENT)
Dept: INTERNAL MEDICINE | Facility: CLINIC | Age: 65
End: 2025-07-08
Payer: MEDICARE

## 2025-07-08 ENCOUNTER — PATIENT MESSAGE (OUTPATIENT)
Dept: PAIN MEDICINE | Facility: CLINIC | Age: 65
End: 2025-07-08
Payer: MEDICARE

## 2025-07-08 LAB
DHEA SERPL-MCNC: ABNORMAL
ESTROGEN SERPL-MCNC: ABNORMAL PG/ML
INSULIN SERPL-ACNC: ABNORMAL U[IU]/ML
LAB AP CLINICAL INFORMATION: ABNORMAL
LAB AP DIAGNOSIS CATEGORY: ABNORMAL
LAB AP GROSS DESCRIPTION: ABNORMAL
LAB AP PERFORMING LOCATION(S): ABNORMAL
LAB AP REPORT FOOTNOTES: ABNORMAL
LAB AP SYNOPTIC CHECKLIST: ABNORMAL
T3RU NFR SERPL: ABNORMAL %

## 2025-07-08 NOTE — TELEPHONE ENCOUNTER
----- Message from Nurse Tran sent at 7/7/2025  3:43 PM CDT -----  Regarding: Requesting Home Health Services  The pt. Is requesting Home Health services; S/P Malignant neoplasm of right lung; S/P ROBOTIC RATS, right upper lobe posterior segmentectomy.  He was not offered this in the hospital.  Can someone please call him regarding this matter?     Thank you.    Pt states this is already address

## 2025-07-11 NOTE — PROGRESS NOTES
"Subjective     Patient ID: Jesus Christy is a 64 y.o. male.    Chief Complaint: Post-op Evaluation    Diagnosis:  NSCLC    Pre-operative therapy: none    Procedure(s) and date(s): 6/30/25: right upper lobe posterior segmentectomy with MLND    Pathology:  2.8 cm typical carcinoid tumor and a carcinoid tumorlet. LN 7,8,9,11,12,13 = negative. pT1cN0     Post-operative therapy: surveillance       HPI  Patient is a 64 y.o. male remote smoker with chronic migraine, DDD, cervical radiculopathy HTN, h/o prostate cancer, anemia, and portal vein thrombosis here today for 2 week follow-up from posterior segmentectomy with MLND. Uncomplicated post op course. Discharged on POD1. Since discharge, he has improved in terms of pain and breathing, but has not completely recovered. He is able to perform ADLs despite this, and it is improving daily.     Review of Systems  Neg 14 pt ROS except as above      Objective     Vitals:    07/14/25 1418   BP: 123/68   Pulse: 82   SpO2: 96%   Weight: 80.2 kg (176 lb 12.9 oz)   Height: 5' 9" (1.753 m)   PainSc: 0-No pain         Physical Exam    General Appearance:    Alert, cooperative, no distress, appears stated age   Head:    Normocephalic, without obvious abnormality, atraumatic   Eyes:    PERRL, conjunctiva/corneas clear, EOM's intact   Ears:    Normal external ear canals, both ears   Nose:   Nares normal, no drainage   Throat:   Lips normal, voice normal   Neck:   Supple, symmetrical, trachea midline, no JVD   Back:     Symmetric, ROM normal   Lungs:     Clear to auscultation bilaterally, respirations unlabored   Chest wall:    No tenderness or deformity    Incisions clean, dry, intact without signs of infection   Heart:    Regular rate and rhythm, warm and well perfused   Abdomen:     Soft, nondistended   Extremities:   Extremities normal, atraumatic, no cyanosis or edema   Pulses:   2+ and symmetric all extremities   Skin:   Skin color, texture, turgor normal, no rashes or lesions "   Neurologic:   CNII-XII intact. Normal gross strength, sensation throughout        CXR 7/14/25: stable w/o acute        Assessment and Plan     Patient is a 64 y.o. male remote smoker with chronic migraine, DDD, cervical radiculopathy HTN, h/o prostate cancer, anemia, and portal vein thrombosis here today for 2 week follow-up from posterior segmentectomy with MLND.  - refill pain meds  - f/u 1 year with CT scan for surveillance

## 2025-07-12 ENCOUNTER — TELEPHONE (OUTPATIENT)
Dept: ADMINISTRATIVE | Facility: CLINIC | Age: 65
End: 2025-07-12
Payer: MEDICARE

## 2025-07-12 ENCOUNTER — PATIENT OUTREACH (OUTPATIENT)
Dept: ADMINISTRATIVE | Facility: OTHER | Age: 65
End: 2025-07-12
Payer: MEDICARE

## 2025-07-12 NOTE — PROGRESS NOTES
CHW - Outreach Attempt    Community Health Worker left a voicemail message for 2nd attempt to contact patient regardinnd missed initial outreach visit attempt call.

## 2025-07-12 NOTE — PROGRESS NOTES
CHW - Initial Contact    This Community Health Worker updated and verified the Social Determinant of Health questionnaire with patient via telephone today.    Pt identified barriers of most importance are: has issues with utility bill payments.   Referrals to community agencies completed with patient/caregiver consent outside of [facility] include: yes: [email or web address]  Referrals were put through [facility] - no: none at this time.  Support and Services: has no support at this time.  Other information discussed the patient needs / wants help with: Updated and verified SDOH with patient via telephone today, pt has issues with utility bill payments. Will follow up in one week to check status of referral and pt's future needs.    Follow up required: yes.  Follow-up Outreach - Due: 7/18/2025

## 2025-07-12 NOTE — PROGRESS NOTES
CHW - Outreach Attempt    Community Health Worker left a voicemail message for 1st attempt to contact patient regardinst missed initial outreach visit attempt call.

## 2025-07-12 NOTE — PROGRESS NOTES
CHW - Initial Contact    This Community Health Worker updated and verified the Social Determinant of Health questionnaire with patient via telephone today.    Pt identified barriers of most importance are: has issues with utility bill payments.   Referrals to community agencies completed with patient/caregiver consent outside of [facility] include: yes: [email or web address]  Referrals were put through [facility] - no: none at this time.  Support and Services: No support & services have been documented.  Other information discussed the patient needs / wants help with: Updated and verified SDOH with patient via telephone today, pt has issues with utility bill payments. Will follow up in one week to check status of referral and pt's future needs.    Follow up required: yes.  No future outreach task assigned

## 2025-07-14 ENCOUNTER — OFFICE VISIT (OUTPATIENT)
Dept: CARDIOTHORACIC SURGERY | Facility: CLINIC | Age: 65
End: 2025-07-14
Attending: STUDENT IN AN ORGANIZED HEALTH CARE EDUCATION/TRAINING PROGRAM
Payer: MEDICARE

## 2025-07-14 ENCOUNTER — HOSPITAL ENCOUNTER (OUTPATIENT)
Dept: RADIOLOGY | Facility: HOSPITAL | Age: 65
Discharge: HOME | End: 2025-07-14
Attending: STUDENT IN AN ORGANIZED HEALTH CARE EDUCATION/TRAINING PROGRAM
Payer: MEDICARE

## 2025-07-14 VITALS
HEIGHT: 69 IN | BODY MASS INDEX: 26.19 KG/M2 | OXYGEN SATURATION: 96 % | WEIGHT: 176.81 LBS | HEART RATE: 82 BPM | SYSTOLIC BLOOD PRESSURE: 123 MMHG | DIASTOLIC BLOOD PRESSURE: 68 MMHG

## 2025-07-14 DIAGNOSIS — R91.1 LUNG NODULE: ICD-10-CM

## 2025-07-14 DIAGNOSIS — D3A.090 CARCINOID TUMOR OF RIGHT LUNG: ICD-10-CM

## 2025-07-14 PROCEDURE — 71046 X-RAY EXAM CHEST 2 VIEWS: CPT | Mod: TC

## 2025-07-14 PROCEDURE — 71046 X-RAY EXAM CHEST 2 VIEWS: CPT | Mod: 26,,, | Performed by: RADIOLOGY

## 2025-07-14 PROCEDURE — 3078F DIAST BP <80 MM HG: CPT | Mod: CPTII,S$GLB,, | Performed by: STUDENT IN AN ORGANIZED HEALTH CARE EDUCATION/TRAINING PROGRAM

## 2025-07-14 PROCEDURE — 3074F SYST BP LT 130 MM HG: CPT | Mod: CPTII,S$GLB,, | Performed by: STUDENT IN AN ORGANIZED HEALTH CARE EDUCATION/TRAINING PROGRAM

## 2025-07-14 PROCEDURE — 99024 POSTOP FOLLOW-UP VISIT: CPT | Mod: S$GLB,,, | Performed by: STUDENT IN AN ORGANIZED HEALTH CARE EDUCATION/TRAINING PROGRAM

## 2025-07-14 PROCEDURE — 99999 PR PBB SHADOW E&M-EST. PATIENT-LVL III: CPT | Mod: PBBFAC,,, | Performed by: STUDENT IN AN ORGANIZED HEALTH CARE EDUCATION/TRAINING PROGRAM

## 2025-07-14 RX ORDER — OXYCODONE HYDROCHLORIDE 5 MG/1
5 TABLET ORAL EVERY 4 HOURS PRN
Qty: 15 TABLET | Refills: 0 | Status: ON HOLD | OUTPATIENT
Start: 2025-07-14

## 2025-07-15 ENCOUNTER — OFFICE VISIT (OUTPATIENT)
Dept: INTERNAL MEDICINE | Facility: CLINIC | Age: 65
End: 2025-07-15
Payer: MEDICARE

## 2025-07-15 VITALS
WEIGHT: 177.38 LBS | DIASTOLIC BLOOD PRESSURE: 80 MMHG | RESPIRATION RATE: 18 BRPM | OXYGEN SATURATION: 97 % | BODY MASS INDEX: 26.19 KG/M2 | SYSTOLIC BLOOD PRESSURE: 124 MMHG | HEART RATE: 84 BPM

## 2025-07-15 DIAGNOSIS — Z85.46 HISTORY OF PROSTATE CANCER: ICD-10-CM

## 2025-07-15 DIAGNOSIS — M54.42 CHRONIC BILATERAL LOW BACK PAIN WITH BILATERAL SCIATICA: ICD-10-CM

## 2025-07-15 DIAGNOSIS — M54.41 CHRONIC BILATERAL LOW BACK PAIN WITH BILATERAL SCIATICA: ICD-10-CM

## 2025-07-15 DIAGNOSIS — G89.29 CHRONIC BILATERAL LOW BACK PAIN WITH BILATERAL SCIATICA: ICD-10-CM

## 2025-07-15 DIAGNOSIS — M54.16 LUMBAR NEURITIS: ICD-10-CM

## 2025-07-15 DIAGNOSIS — D3A.090 CARCINOID TUMOR OF LUNG, UNSPECIFIED WHETHER MALIGNANT: Primary | ICD-10-CM

## 2025-07-15 PROCEDURE — G2211 COMPLEX E/M VISIT ADD ON: HCPCS | Mod: S$GLB,,, | Performed by: INTERNAL MEDICINE

## 2025-07-15 PROCEDURE — 3008F BODY MASS INDEX DOCD: CPT | Mod: CPTII,S$GLB,, | Performed by: INTERNAL MEDICINE

## 2025-07-15 PROCEDURE — 1160F RVW MEDS BY RX/DR IN RCRD: CPT | Mod: CPTII,S$GLB,, | Performed by: INTERNAL MEDICINE

## 2025-07-15 PROCEDURE — 99214 OFFICE O/P EST MOD 30 MIN: CPT | Mod: S$GLB,,, | Performed by: INTERNAL MEDICINE

## 2025-07-15 PROCEDURE — 1111F DSCHRG MED/CURRENT MED MERGE: CPT | Mod: CPTII,S$GLB,, | Performed by: INTERNAL MEDICINE

## 2025-07-15 PROCEDURE — 99999 PR PBB SHADOW E&M-EST. PATIENT-LVL V: CPT | Mod: PBBFAC,,, | Performed by: INTERNAL MEDICINE

## 2025-07-15 PROCEDURE — 1159F MED LIST DOCD IN RCRD: CPT | Mod: CPTII,S$GLB,, | Performed by: INTERNAL MEDICINE

## 2025-07-15 PROCEDURE — 3079F DIAST BP 80-89 MM HG: CPT | Mod: CPTII,S$GLB,, | Performed by: INTERNAL MEDICINE

## 2025-07-15 PROCEDURE — 3074F SYST BP LT 130 MM HG: CPT | Mod: CPTII,S$GLB,, | Performed by: INTERNAL MEDICINE

## 2025-07-15 RX ORDER — GABAPENTIN 300 MG/1
300 CAPSULE ORAL 3 TIMES DAILY PRN
Qty: 90 CAPSULE | Refills: 5 | Status: ON HOLD | OUTPATIENT
Start: 2025-07-15 | End: 2026-07-15

## 2025-07-15 NOTE — PROGRESS NOTES
Subjective:       Patient ID: Jesus Christy is a 64 y.o. male.    Chief Complaint: Follow-up and Post-op Problem    HPI  64 y.o. male here for follow up of    2.8cm carcinoid tumor and a carcinoid tumorlet  Post right upper lobe posterior segmentectomy on 6/30/25  Having pain over right side of chest. Jumana bad if coughing.   Breathing okay. He is going to  oxycodone rx today.       Portal vein thrombosis 1/2024  Eliquis     Prostate adenocarcinoma on active surveillance.     Cervical spinal stenosis followed by pain management  Gabapentin 600 mg TID and Mobic 15 mg   Referred to neurosurgery  Review of Systems   Constitutional:  Negative for fever.   Respiratory:  Negative for shortness of breath.    Cardiovascular:  Negative for chest pain.   Musculoskeletal:         Pain at incisions sites right chest   Skin: Negative.        Objective:   /80 (BP Location: Right arm, Patient Position: Sitting)   Pulse 84   Resp 18   Wt 80.4 kg (177 lb 5.8 oz)   SpO2 97%   BMI 26.19 kg/m²      Physical Exam  Constitutional:       General: He is not in acute distress.     Appearance: He is well-developed. He is not diaphoretic.   HENT:      Head: Normocephalic and atraumatic.   Cardiovascular:      Rate and Rhythm: Normal rate and regular rhythm.      Heart sounds: No murmur heard.  Pulmonary:      Effort: Pulmonary effort is normal. No respiratory distress.      Breath sounds: No wheezing or rales.   Skin:     General: Skin is warm and dry.      Comments: Surgical wounds c/d/I without erythema nor warmth   Neurological:      Mental Status: He is alert and oriented to person, place, and time.   Psychiatric:         Behavior: Behavior normal.         Assessment:       1. Carcinoid tumor of lung, unspecified whether malignant    2. Lumbar neuritis    3. History of prostate cancer    4. Chronic bilateral low back pain with bilateral sciatica        Plan:       Carcinoid tumor of lung, unspecified whether  malignant  -     Ambulatory referral/consult to Pulmonology; Future; Expected date: 07/22/2025    Lumbar neuritis  -     gabapentin (NEURONTIN) 300 MG capsule; Take 1 capsule (300 mg total) by mouth 3 (three) times daily as needed (leg pain).  Dispense: 90 capsule; Refill: 5    History of prostate cancer  -     gabapentin (NEURONTIN) 300 MG capsule; Take 1 capsule (300 mg total) by mouth 3 (three) times daily as needed (leg pain).  Dispense: 90 capsule; Refill: 5    Chronic bilateral low back pain with bilateral sciatica  -     gabapentin (NEURONTIN) 300 MG capsule; Take 1 capsule (300 mg total) by mouth 3 (three) times daily as needed (leg pain).  Dispense: 90 capsule; Refill: 5          You are up to date for your primary preventive health care, and there are no reminders at this time.

## 2025-07-18 ENCOUNTER — HOSPITAL ENCOUNTER (INPATIENT)
Facility: HOSPITAL | Age: 65
LOS: 3 days | Discharge: HOME | DRG: 186 | End: 2025-07-22
Attending: STUDENT IN AN ORGANIZED HEALTH CARE EDUCATION/TRAINING PROGRAM | Admitting: THORACIC SURGERY (CARDIOTHORACIC VASCULAR SURGERY)
Payer: MEDICARE

## 2025-07-18 DIAGNOSIS — J94.2 HEMOPNEUMOTHORAX ON RIGHT: ICD-10-CM

## 2025-07-18 DIAGNOSIS — R04.2 HEMOPTYSIS: Primary | ICD-10-CM

## 2025-07-18 DIAGNOSIS — J93.9 PNEUMOTHORAX, UNSPECIFIED TYPE: ICD-10-CM

## 2025-07-18 LAB
ABSOLUTE EOSINOPHIL (OHS): 0.27 K/UL
ABSOLUTE MONOCYTE (OHS): 0.66 K/UL (ref 0.3–1)
ABSOLUTE NEUTROPHIL COUNT (OHS): 4.47 K/UL (ref 1.8–7.7)
ALBUMIN SERPL BCP-MCNC: 3.6 G/DL (ref 3.5–5.2)
ALP SERPL-CCNC: 253 UNIT/L (ref 40–150)
ALT SERPL W/O P-5'-P-CCNC: 57 UNIT/L (ref 10–44)
ANION GAP (OHS): 9 MMOL/L (ref 8–16)
APTT PPP: 21.9 SECONDS (ref 21–32)
AST SERPL-CCNC: 46 UNIT/L (ref 11–45)
BASOPHILS # BLD AUTO: 0.03 K/UL
BASOPHILS NFR BLD AUTO: 0.4 %
BILIRUB SERPL-MCNC: 0.5 MG/DL (ref 0.1–1)
BUN SERPL-MCNC: 13 MG/DL (ref 6–30)
BUN SERPL-MCNC: 14 MG/DL (ref 8–23)
CALCIUM SERPL-MCNC: 9.6 MG/DL (ref 8.7–10.5)
CHLORIDE SERPL-SCNC: 103 MMOL/L (ref 95–110)
CHLORIDE SERPL-SCNC: 107 MMOL/L (ref 95–110)
CO2 SERPL-SCNC: 22 MMOL/L (ref 23–29)
CREAT SERPL-MCNC: 1.1 MG/DL (ref 0.5–1.4)
CREAT SERPL-MCNC: 1.2 MG/DL (ref 0.5–1.4)
ERYTHROCYTE [DISTWIDTH] IN BLOOD BY AUTOMATED COUNT: 12 % (ref 11.5–14.5)
GFR SERPLBLD CREATININE-BSD FMLA CKD-EPI: >60 ML/MIN/1.73/M2
GLUCOSE SERPL-MCNC: 102 MG/DL (ref 70–110)
GLUCOSE SERPL-MCNC: 95 MG/DL (ref 70–110)
HCT VFR BLD AUTO: 37.3 % (ref 40–54)
HCT VFR BLD CALC: 37 %PCV (ref 36–54)
HCV AB SERPL QL IA: NORMAL
HGB BLD-MCNC: 11.9 GM/DL (ref 14–18)
HIV 1+2 AB+HIV1 P24 AG SERPL QL IA: NORMAL
IMM GRANULOCYTES # BLD AUTO: 0.02 K/UL (ref 0–0.04)
IMM GRANULOCYTES NFR BLD AUTO: 0.3 % (ref 0–0.5)
INDIRECT COOMBS: NORMAL
INR PPP: 1 (ref 0.8–1.2)
LYMPHOCYTES # BLD AUTO: 1.44 K/UL (ref 1–4.8)
MCH RBC QN AUTO: 30.7 PG (ref 27–31)
MCHC RBC AUTO-ENTMCNC: 31.9 G/DL (ref 32–36)
MCV RBC AUTO: 96 FL (ref 82–98)
NUCLEATED RBC (/100WBC) (OHS): 0 /100 WBC
PLATELET # BLD AUTO: 288 K/UL (ref 150–450)
PMV BLD AUTO: 10.3 FL (ref 9.2–12.9)
POC IONIZED CALCIUM: 1.37 MMOL/L (ref 1.06–1.42)
POC TCO2 (MEASURED): 26 MMOL/L (ref 23–29)
POTASSIUM BLD-SCNC: 4.1 MMOL/L (ref 3.5–5.1)
POTASSIUM SERPL-SCNC: 4.9 MMOL/L (ref 3.5–5.1)
PROT SERPL-MCNC: 7.6 GM/DL (ref 6–8.4)
PROTHROMBIN TIME: 11.3 SECONDS (ref 9–12.5)
RBC # BLD AUTO: 3.88 M/UL (ref 4.6–6.2)
RELATIVE EOSINOPHIL (OHS): 3.9 %
RELATIVE LYMPHOCYTE (OHS): 20.9 % (ref 18–48)
RELATIVE MONOCYTE (OHS): 9.6 % (ref 4–15)
RELATIVE NEUTROPHIL (OHS): 64.9 % (ref 38–73)
RH BLD: NORMAL
SAMPLE: NORMAL
SODIUM BLD-SCNC: 140 MMOL/L (ref 136–145)
SODIUM SERPL-SCNC: 138 MMOL/L (ref 136–145)
SPECIMEN OUTDATE: NORMAL
WBC # BLD AUTO: 6.89 K/UL (ref 3.9–12.7)

## 2025-07-18 PROCEDURE — 94761 N-INVAS EAR/PLS OXIMETRY MLT: CPT

## 2025-07-18 PROCEDURE — 27000221 HC OXYGEN, UP TO 24 HOURS

## 2025-07-18 PROCEDURE — 99285 EMERGENCY DEPT VISIT HI MDM: CPT | Mod: 25

## 2025-07-18 PROCEDURE — 85610 PROTHROMBIN TIME: CPT | Performed by: STUDENT IN AN ORGANIZED HEALTH CARE EDUCATION/TRAINING PROGRAM

## 2025-07-18 PROCEDURE — 87389 HIV-1 AG W/HIV-1&-2 AB AG IA: CPT | Performed by: PHYSICIAN ASSISTANT

## 2025-07-18 PROCEDURE — 86901 BLOOD TYPING SEROLOGIC RH(D): CPT | Performed by: STUDENT IN AN ORGANIZED HEALTH CARE EDUCATION/TRAINING PROGRAM

## 2025-07-18 PROCEDURE — 86803 HEPATITIS C AB TEST: CPT | Performed by: PHYSICIAN ASSISTANT

## 2025-07-18 PROCEDURE — 85025 COMPLETE CBC W/AUTO DIFF WBC: CPT | Performed by: STUDENT IN AN ORGANIZED HEALTH CARE EDUCATION/TRAINING PROGRAM

## 2025-07-18 PROCEDURE — 25000003 PHARM REV CODE 250: Performed by: STUDENT IN AN ORGANIZED HEALTH CARE EDUCATION/TRAINING PROGRAM

## 2025-07-18 PROCEDURE — 80053 COMPREHEN METABOLIC PANEL: CPT | Performed by: STUDENT IN AN ORGANIZED HEALTH CARE EDUCATION/TRAINING PROGRAM

## 2025-07-18 PROCEDURE — 25500020 PHARM REV CODE 255: Performed by: STUDENT IN AN ORGANIZED HEALTH CARE EDUCATION/TRAINING PROGRAM

## 2025-07-18 PROCEDURE — 85730 THROMBOPLASTIN TIME PARTIAL: CPT | Performed by: STUDENT IN AN ORGANIZED HEALTH CARE EDUCATION/TRAINING PROGRAM

## 2025-07-18 PROCEDURE — 80047 BASIC METABLC PNL IONIZED CA: CPT

## 2025-07-18 PROCEDURE — 94640 AIRWAY INHALATION TREATMENT: CPT

## 2025-07-18 RX ADMIN — TRANEXAMIC ACID 500 MG: 100 INJECTION, SOLUTION INTRAVENOUS at 09:07

## 2025-07-18 RX ADMIN — IOHEXOL 75 ML: 350 INJECTION, SOLUTION INTRAVENOUS at 11:07

## 2025-07-19 PROBLEM — J94.2 HEMOPNEUMOTHORAX ON RIGHT: Status: ACTIVE | Noted: 2025-07-19

## 2025-07-19 LAB
ABSOLUTE EOSINOPHIL (OHS): 0.04 K/UL
ABSOLUTE MONOCYTE (OHS): 0.8 K/UL (ref 0.3–1)
ABSOLUTE NEUTROPHIL COUNT (OHS): 6.43 K/UL (ref 1.8–7.7)
ALBUMIN SERPL BCP-MCNC: 3.3 G/DL (ref 3.5–5.2)
ANION GAP (OHS): 9 MMOL/L (ref 8–16)
BASOPHILS # BLD AUTO: 0.02 K/UL
BASOPHILS NFR BLD AUTO: 0.2 %
BUN SERPL-MCNC: 11 MG/DL (ref 8–23)
CALCIUM SERPL-MCNC: 9.5 MG/DL (ref 8.7–10.5)
CHLORIDE SERPL-SCNC: 107 MMOL/L (ref 95–110)
CO2 SERPL-SCNC: 23 MMOL/L (ref 23–29)
CREAT SERPL-MCNC: 1 MG/DL (ref 0.5–1.4)
ERYTHROCYTE [DISTWIDTH] IN BLOOD BY AUTOMATED COUNT: 12 % (ref 11.5–14.5)
GFR SERPLBLD CREATININE-BSD FMLA CKD-EPI: >60 ML/MIN/1.73/M2
GLUCOSE SERPL-MCNC: 102 MG/DL (ref 70–110)
HCT VFR BLD AUTO: 35.6 % (ref 40–54)
HGB BLD-MCNC: 11.4 GM/DL (ref 14–18)
IMM GRANULOCYTES # BLD AUTO: 0.03 K/UL (ref 0–0.04)
IMM GRANULOCYTES NFR BLD AUTO: 0.4 % (ref 0–0.5)
LYMPHOCYTES # BLD AUTO: 1.24 K/UL (ref 1–4.8)
MAGNESIUM SERPL-MCNC: 1.9 MG/DL (ref 1.6–2.6)
MCH RBC QN AUTO: 30.6 PG (ref 27–31)
MCHC RBC AUTO-ENTMCNC: 32 G/DL (ref 32–36)
MCV RBC AUTO: 95 FL (ref 82–98)
NUCLEATED RBC (/100WBC) (OHS): 0 /100 WBC
OHS QRS DURATION: 86 MS
OHS QTC CALCULATION: 430 MS
PHOSPHATE SERPL-MCNC: 2.5 MG/DL (ref 2.7–4.5)
PLATELET # BLD AUTO: 325 K/UL (ref 150–450)
PMV BLD AUTO: 8.9 FL (ref 9.2–12.9)
POTASSIUM SERPL-SCNC: 4.1 MMOL/L (ref 3.5–5.1)
RBC # BLD AUTO: 3.73 M/UL (ref 4.6–6.2)
RELATIVE EOSINOPHIL (OHS): 0.5 %
RELATIVE LYMPHOCYTE (OHS): 14.5 % (ref 18–48)
RELATIVE MONOCYTE (OHS): 9.3 % (ref 4–15)
RELATIVE NEUTROPHIL (OHS): 75.1 % (ref 38–73)
SODIUM SERPL-SCNC: 139 MMOL/L (ref 136–145)
WBC # BLD AUTO: 8.56 K/UL (ref 3.9–12.7)

## 2025-07-19 PROCEDURE — 25000242 PHARM REV CODE 250 ALT 637 W/ HCPCS

## 2025-07-19 PROCEDURE — 80069 RENAL FUNCTION PANEL: CPT

## 2025-07-19 PROCEDURE — 27000221 HC OXYGEN, UP TO 24 HOURS

## 2025-07-19 PROCEDURE — 99900035 HC TECH TIME PER 15 MIN (STAT)

## 2025-07-19 PROCEDURE — 83735 ASSAY OF MAGNESIUM: CPT

## 2025-07-19 PROCEDURE — 25000003 PHARM REV CODE 250

## 2025-07-19 PROCEDURE — 85025 COMPLETE CBC W/AUTO DIFF WBC: CPT

## 2025-07-19 PROCEDURE — 11000001 HC ACUTE MED/SURG PRIVATE ROOM

## 2025-07-19 PROCEDURE — 36415 COLL VENOUS BLD VENIPUNCTURE: CPT

## 2025-07-19 PROCEDURE — 94761 N-INVAS EAR/PLS OXIMETRY MLT: CPT

## 2025-07-19 RX ORDER — BENZONATATE 100 MG/1
100 CAPSULE ORAL 3 TIMES DAILY PRN
Status: DISCONTINUED | OUTPATIENT
Start: 2025-07-19 | End: 2025-07-22 | Stop reason: HOSPADM

## 2025-07-19 RX ORDER — ONDANSETRON 8 MG/1
8 TABLET, ORALLY DISINTEGRATING ORAL EVERY 8 HOURS PRN
Status: DISCONTINUED | OUTPATIENT
Start: 2025-07-19 | End: 2025-07-22 | Stop reason: HOSPADM

## 2025-07-19 RX ORDER — OXYCODONE HYDROCHLORIDE 5 MG/1
5 TABLET ORAL EVERY 4 HOURS PRN
Refills: 0 | Status: DISCONTINUED | OUTPATIENT
Start: 2025-07-19 | End: 2025-07-20

## 2025-07-19 RX ORDER — SODIUM CHLORIDE 0.9 % (FLUSH) 0.9 %
10 SYRINGE (ML) INJECTION
Status: DISCONTINUED | OUTPATIENT
Start: 2025-07-19 | End: 2025-07-22 | Stop reason: HOSPADM

## 2025-07-19 RX ORDER — MIRTAZAPINE 15 MG/1
30 TABLET, FILM COATED ORAL NIGHTLY
Status: DISCONTINUED | OUTPATIENT
Start: 2025-07-19 | End: 2025-07-22 | Stop reason: HOSPADM

## 2025-07-19 RX ORDER — METHOCARBAMOL 500 MG/1
500 TABLET, FILM COATED ORAL 4 TIMES DAILY
Status: DISCONTINUED | OUTPATIENT
Start: 2025-07-19 | End: 2025-07-22

## 2025-07-19 RX ORDER — FLUTICASONE PROPIONATE 50 MCG
1 SPRAY, SUSPENSION (ML) NASAL DAILY
Status: DISCONTINUED | OUTPATIENT
Start: 2025-07-19 | End: 2025-07-22 | Stop reason: HOSPADM

## 2025-07-19 RX ORDER — GABAPENTIN 300 MG/1
300 CAPSULE ORAL 3 TIMES DAILY
Status: DISCONTINUED | OUTPATIENT
Start: 2025-07-19 | End: 2025-07-22

## 2025-07-19 RX ORDER — IPRATROPIUM BROMIDE AND ALBUTEROL SULFATE 2.5; .5 MG/3ML; MG/3ML
3 SOLUTION RESPIRATORY (INHALATION) EVERY 4 HOURS PRN
Status: DISCONTINUED | OUTPATIENT
Start: 2025-07-19 | End: 2025-07-22 | Stop reason: HOSPADM

## 2025-07-19 RX ORDER — FINASTERIDE 5 MG/1
5 TABLET, FILM COATED ORAL DAILY
Status: DISCONTINUED | OUTPATIENT
Start: 2025-07-19 | End: 2025-07-22 | Stop reason: HOSPADM

## 2025-07-19 RX ORDER — TALC
6 POWDER (GRAM) TOPICAL NIGHTLY PRN
Status: DISCONTINUED | OUTPATIENT
Start: 2025-07-19 | End: 2025-07-22 | Stop reason: HOSPADM

## 2025-07-19 RX ORDER — ACETAMINOPHEN 325 MG/1
650 TABLET ORAL EVERY 8 HOURS PRN
Status: DISCONTINUED | OUTPATIENT
Start: 2025-07-19 | End: 2025-07-20

## 2025-07-19 RX ORDER — ACETAMINOPHEN 500 MG
1000 TABLET ORAL EVERY 8 HOURS
Status: DISCONTINUED | OUTPATIENT
Start: 2025-07-19 | End: 2025-07-22 | Stop reason: HOSPADM

## 2025-07-19 RX ORDER — TAMSULOSIN HYDROCHLORIDE 0.4 MG/1
0.4 CAPSULE ORAL DAILY
Status: DISCONTINUED | OUTPATIENT
Start: 2025-07-19 | End: 2025-07-22 | Stop reason: HOSPADM

## 2025-07-19 RX ORDER — LIDOCAINE HYDROCHLORIDE 10 MG/ML
1 INJECTION, SOLUTION EPIDURAL; INFILTRATION; INTRACAUDAL; PERINEURAL ONCE AS NEEDED
Status: DISCONTINUED | OUTPATIENT
Start: 2025-07-19 | End: 2025-07-22 | Stop reason: HOSPADM

## 2025-07-19 RX ADMIN — GABAPENTIN 300 MG: 300 CAPSULE ORAL at 09:07

## 2025-07-19 RX ADMIN — FLUTICASONE PROPIONATE 50 MCG: 50 SPRAY, METERED NASAL at 10:07

## 2025-07-19 RX ADMIN — ACETAMINOPHEN 1000 MG: 325 TABLET ORAL at 01:07

## 2025-07-19 RX ADMIN — METHOCARBAMOL 500 MG: 500 TABLET ORAL at 01:07

## 2025-07-19 RX ADMIN — Medication 6 MG: at 09:07

## 2025-07-19 RX ADMIN — FINASTERIDE 5 MG: 5 TABLET, FILM COATED ORAL at 10:07

## 2025-07-19 RX ADMIN — METHOCARBAMOL 500 MG: 500 TABLET ORAL at 02:07

## 2025-07-19 RX ADMIN — GABAPENTIN 300 MG: 300 CAPSULE ORAL at 03:07

## 2025-07-19 RX ADMIN — MIRTAZAPINE 30 MG: 15 TABLET, FILM COATED ORAL at 09:07

## 2025-07-19 RX ADMIN — METHOCARBAMOL 500 MG: 500 TABLET ORAL at 05:07

## 2025-07-19 RX ADMIN — OXYCODONE HYDROCHLORIDE 5 MG: 5 TABLET ORAL at 05:07

## 2025-07-19 RX ADMIN — ACETAMINOPHEN 1000 MG: 325 TABLET ORAL at 09:07

## 2025-07-19 RX ADMIN — ACETAMINOPHEN 1000 MG: 325 TABLET ORAL at 07:07

## 2025-07-19 RX ADMIN — METHOCARBAMOL 500 MG: 500 TABLET ORAL at 09:07

## 2025-07-19 RX ADMIN — METHOCARBAMOL 500 MG: 500 TABLET ORAL at 10:07

## 2025-07-19 RX ADMIN — TAMSULOSIN HYDROCHLORIDE 0.4 MG: 0.4 CAPSULE ORAL at 10:07

## 2025-07-19 RX ADMIN — GABAPENTIN 300 MG: 300 CAPSULE ORAL at 10:07

## 2025-07-19 NOTE — HPI
Jesus Christy is a 64 y.o. male who recently underwent RUL posterior segmentectomy with MLND on 6/30/25 for carcinoid tumor (final path pT1cN0) and history of of portal vein thrombus on Eliquis who presented to the ED after developing hemoptysis. Mr. Christy states he had been doing better each day since his operation in late June until yesterday afternoon when he began coughing. The coughing episode was accompanied by severe pain along his right upper back and small volume hemoptysis. This continued over the next 30 minutes or so until he stopped coughing up blood. He did get dizzy during the coughing episodes which prompted presentation to the ED. Since that time, he has not had any further episodes of hemoptysis and he is coughing less frequently than before. He continues to have pain along the upper back. He denies fevers, chills, syncope, abdominal pain, or any other symptoms. His last dose of Eliquis was in the AM of 7/18.

## 2025-07-19 NOTE — PLAN OF CARE
Inpatient Upgrade Note    Jesus Christy has warranted treatment spanning two or more midnights of hospital level care for the management of Hemopneumothorax. He continues to require daily labs, supplemental oxygen, and further testing/imaging. His condition is also complicated by the following comorbidities: Hypertension and prostate adenocarcinoma, carcinoid tumor status post right upper lobe segmentectomy .

## 2025-07-19 NOTE — PROVIDER PROGRESS NOTES - EMERGENCY DEPT.
Signout Note    I received signout from the previous provider.     Chief complaint:  Hemoptysis (From home. Coughing up blood X 20 min. On eliquis. Had mass removed from lung on 6/30)      Pertinent history &exam:  Jesus Christy is a 64 y.o. male with pertinent PMH of hypertension, GERD, portal vein thrombosis on Eliquis, prostate adenocarcinoma, status post right upper lobe posterior segmentectomy with thoracic surgery who presented to emergency department with complaint of hemoptysis.  Patient is hemodynamically stable.  He received TXA neb here.  Labs are reassuring.  Case was discussed with thoracic surgery by the prior team.  He was signed out to me pending CT PE for final disposition.    Vitals:    07/19/25 0300   BP: 128/76   Pulse: 90   Resp:    Temp:        Imaging Studies:    CTA Chest Non-Coronary (PE Studies)   Final Result   Abnormal      New moderate-sized right-sided pneumothorax with small right hemothorax and significant airspace disease in the right lower lobe.  Question of bronchopleural fistula in the right lower lobe.  Significant subcutaneous emphysema in the chest wall with probable layering blood products in the right lateral chest wall.  Thoracic surgery evaluation recommended.      No obvious active hemorrhage allowing for absence of delayed phase images.  No acute pulmonary embolus.      Recent operative changes in the right lung.      This report was flagged in Epic as abnormal.      COMMUNICATION   This critical result was discovered/received at 00:50.  The critical information above was relayed directly by Sukhi Vergara MD by [employer] secure chat (with acknowledgement) to Palma Rebollar MD on 7/19/2025 at 00:52.         Electronically signed by: Sukhi Vergara MD   Date:    07/19/2025   Time:    01:02      X-Ray Chest AP Portable    (Results Pending)       Medications Given:  Medications   finasteride tablet 5 mg (has no administration in time range)   fluticasone propionate 50  mcg/actuation nasal spray 50 mcg (has no administration in time range)   mirtazapine tablet 30 mg (has no administration in time range)   tamsulosin 24 hr capsule 0.4 mg (has no administration in time range)   LIDOcaine (PF) 10 mg/ml (1%) injection 10 mg (has no administration in time range)   sodium chloride 0.9% flush 10 mL (has no administration in time range)   ondansetron disintegrating tablet 8 mg (has no administration in time range)   melatonin tablet 6 mg (has no administration in time range)   acetaminophen tablet 650 mg (has no administration in time range)   acetaminophen tablet 1,000 mg (has no administration in time range)   methocarbamoL tablet 500 mg (500 mg Oral Given 7/19/25 0234)   gabapentin capsule 300 mg (has no administration in time range)   oxyCODONE immediate release tablet 5 mg (has no administration in time range)   albuterol-ipratropium 2.5 mg-0.5 mg/3 mL nebulizer solution 3 mL (has no administration in time range)   tranexamic acid nebulizer Soln 500 mg (500 mg Nebulization Given 7/18/25 2129)   iohexoL (OMNIPAQUE 350) injection 75 mL (75 mLs Intravenous Given 7/18/25 2340)       Pending Items/ MDM:  64 y.o. male with above complaints.  I was contacted by Radiology, CT demonstrating hemopneumothorax and potential bronchopleural fistula.  Case discussed with thoracic surgery and admitted to their service.    Diagnostic Impression:    1. Hemoptysis    2. Pneumothorax, unspecified type         ED Disposition Condition    Observation                Patient understands the plan and is in agreement, verbalized understanding, questions answered    Palma Rebollar MD  Emergency Medicine

## 2025-07-19 NOTE — SUBJECTIVE & OBJECTIVE
No current facility-administered medications on file prior to encounter.     Current Outpatient Medications on File Prior to Encounter   Medication Sig    acetaminophen (TYLENOL) 500 MG tablet Take 2 tablets (1,000 mg total) by mouth every 6 (six) hours. for 11 days    ammonium lactate 12 % Crea Apply 1 application  topically 2 (two) times daily.    apixaban (ELIQUIS) 5 mg Tab Take 1 tablet (5 mg total) by mouth 2 (two) times daily.    ARIPiprazole (ABILIFY) 10 MG Tab     cyproheptadine (PERIACTIN) 4 mg tablet     diazePAM (VALIUM) 10 MG Tab Take 10 mg by mouth 3 (three) times daily as needed.    diclofenac sodium (VOLTAREN) 1 % Gel Apply 2 g topically 4 (four) times daily.    finasteride (PROSCAR) 5 mg tablet Take 1 tablet (5 mg total) by mouth once daily.    fluticasone propionate (FLONASE) 50 mcg/actuation nasal spray Instill 1 spray (50 mcg total) in each nostril once daily.    gabapentin (NEURONTIN) 300 MG capsule Take 1 capsule (300 mg total) by mouth 3 (three) times daily as needed (leg pain).    gabapentin (NEURONTIN) 600 MG tablet Take 1 tablet (600 mg total) by mouth 3 (three) times daily.    hydrocortisone (ANUSOL-HC) 2.5 % rectal cream Place rectally 2 (two) times daily.    LIDOcaine (LIDODERM) 5 % Place 1 patch onto the skin once daily. Remove & Discard patch within 12 hours or as directed by MD    meloxicam (MOBIC) 15 MG tablet Take 1 tablet (15 mg total) by mouth once daily.    mirtazapine (REMERON) 30 MG tablet Take 1 tablet (30 mg total) by mouth every evening.    oxyCODONE (ROXICODONE) 5 MG immediate release tablet Take 1 tablet (5 mg total) by mouth every 4 (four) hours as needed for Pain.    tadalafiL (CIALIS) 20 MG Tab Take 1 tablet (20 mg total) by mouth daily as needed (erectile dysfunction).    tamsulosin (FLOMAX) 0.4 mg Cap Take 1 capsule (0.4 mg total) by mouth once daily.       Review of patient's allergies indicates:   Allergen Reactions    Peach (prunus persica)     Morphine Palpitations        Past Medical History:   Diagnosis Date    Complex regional pain syndrome i of right lower limb     GERD (gastroesophageal reflux disease)     HTN (hypertension)     Portal vein thrombosis      Past Surgical History:   Procedure Laterality Date    COLONOSCOPY N/A 9/29/2017    Procedure: COLONOSCOPY;  Surgeon: Jamar Edwards MD;  Location: [facility] ENDO (4TH FLR);  Service: Endoscopy;  Laterality: N/A;    COLONOSCOPY N/A 2/12/2024    Procedure: COLONOSCOPY;  Surgeon: Miguel Angel Huffman MD;  Location: [facility] ENDO (2ND FLR);  Service: Endoscopy;  Laterality: N/A;    DV5 ROBOTIC RATS,WITH LOBECTOMY,LUNG Right 6/30/2025    Procedure: DV5 ROBOTIC RATS, right upper lobe posterior segmentectomy;  Surgeon: Parker Gay MD;  Location: [facility] OR 2ND FLR;  Service: Cardiothoracic;  Laterality: Right;    ENDOSCOPIC ULTRASOUND OF UPPER GASTROINTESTINAL TRACT N/A 7/5/2024    Procedure: ULTRASOUND, UPPER GI TRACT, ENDOSCOPIC;  Surgeon: Debi Lloyd MD;  Location: [facility] (2ND FLR);  Service: Endoscopy;  Laterality: N/A;  3/21 portal instr-EUS with myself at [facility] next couple of months pancreas tail cyst.[email or web address] pending Dr. Miranda TE 3/21-tt  5/15/24: instructions sent via portal. eliquis hold in TE 3/21-GD  6/26-pre call complete-tb-will hold eliquis starting     ESOPHAGOGASTRODUODENOSCOPY N/A 2/12/2024    Procedure: EGD (ESOPHAGOGASTRODUODENOSCOPY);  Surgeon: Miguel Angel Huffman MD;  Location: [facility] (2ND FLR);  Service: Endoscopy;  Laterality: N/A;  1/30/24-Okay to add per Dr. Huffman, 2nd flr-recent hospitalization for sepsis and portal vein thrombosis, Miralax, instr portal and email, Approval to hold Eliquis and medical clearance rec'd from Dr. Jolly (see telephone encounter 1/30/24)-DS  2/5-prec    INJECTION OF ANESTHETIC AGENT AROUND MULTIPLE INTERCOSTAL NERVES Right 6/30/2025    Procedure: BLOCK, NERVE, INTERCOSTAL, 2 OR MORE;  Surgeon: Parker Gay MD;  [facility];  Service: Cardiothoracic;   Laterality: Right;    INJECTION OF ANESTHETIC AGENT AROUND NERVE Left 6/2/2025    Procedure: INJECTION, LEFT GLENOHUMERAL;  Surgeon: Stuart Viveros MD;  Location: [facility] PAIN MGT;  Service: Pain Management;  Laterality: Left;  2 WK F/U SHERIDAN    JOINT REPLACEMENT Right     knee    KNEE ARTHROSCOPY W/ ACL RECONSTRUCTION  2014    right    ROBOTIC BRONCHOSCOPY N/A 5/13/2025    Procedure: ROBOTIC BRONCHOSCOPY;  Surgeon: Jessica Hamilton MD;  [facility];  Service: Pulmonary;  Laterality: N/A;    TRANSFORAMINAL EPIDURAL INJECTION OF STEROID N/A 4/16/2025    Procedure: CERVICAL C7/T1 IL CHAZ *ELIQUIS CLEARANCE IN CHART*;  Surgeon: Stuart Viveros MD;  Location: [facility] PAIN MGT;  Service: Pain Management;  Laterality: N/A;  2 WK F/U EDDI    XI ROBOTIC RATS, WITH LYMPHADENECTOMY Right 6/30/2025    Procedure: XI ROBOTIC RATS, WITH LYMPHADENECTOMY;  Surgeon: Parker Gay MD;  [facility];  Service: Cardiothoracic;  Laterality: Right;     Family History       Problem Relation (Age of Onset)    Cancer Brother    Diabetes Father    Heart disease Father    Hypertension Mother    No Known Problems Daughter, Daughter, Daughter, Daughter, Son, Son          Tobacco Use    Smoking status: Former     Types: Cigarettes    Smokeless tobacco: Never   Substance and Sexual Activity    Alcohol use: Yes     Alcohol/week: 0.8 standard drinks of alcohol     Types: 1 Standard drinks or equivalent per week     Comment: once every few months    Drug use: No    Sexual activity: Not Currently     Review of Systems   Constitutional:  Negative for activity change, chills and fever.   Respiratory:  Positive for cough. Negative for shortness of breath.    Cardiovascular:  Negative for chest pain.   Gastrointestinal:  Negative for abdominal distention and abdominal pain.   Neurological:  Positive for dizziness (Resolved). Negative for seizures and syncope.     Objective:     Vital Signs (Most Recent):  Temp: 98 °F (36.7  °C) (07/18/25 2003)  Pulse: 101 (07/18/25 2300)  Resp: 20 (07/18/25 2129)  BP: (!) 151/79 (07/18/25 2300)  SpO2: (!) 93 % (07/18/25 2300) Vital Signs (24h Range):  Temp:  [98 °F (36.7 °C)] 98 °F (36.7 °C)  Pulse:  [] 101  Resp:  [20] 20  SpO2:  [93 %-100 %] 93 %  BP: (138-157)/(70-86) 151/79     Weight: 80.4 kg (177 lb 4 oz)  Body mass index is 26.18 kg/m².     Physical Exam  Vitals reviewed.   Constitutional:       General: He is not in acute distress.     Appearance: He is not toxic-appearing.   HENT:      Head: Normocephalic.   Eyes:      Extraocular Movements: Extraocular movements intact.   Cardiovascular:      Rate and Rhythm: Normal rate.   Pulmonary:      Effort: Pulmonary effort is normal. No respiratory distress.   Abdominal:      General: There is no distension.      Palpations: Abdomen is soft.   Skin:     General: Skin is warm and dry.      Comments:   Incisions along right lateral chest/back C/D/I and appear to be healing appropriately   Small amount of crepitus along the right upper back.    Neurological:      Mental Status: He is alert.            I have reviewed all pertinent lab results within the past 24 hours.    Significant Diagnostics:  I have reviewed all pertinent imaging results/findings within the past 24 hours.

## 2025-07-19 NOTE — SUBJECTIVE & OBJECTIVE
Interval History: Naeon. On 4L NC this am but sats >97% and denies significant sob. CXR this am with similar appearance of ptx.     Objective:     Vital Signs (Most Recent):  Temp: 98.2 °F (36.8 °C) (07/19/25 0849)  Pulse: 81 (07/19/25 0849)  Resp: 18 (07/19/25 0849)  BP: 113/66 (07/19/25 0849)  SpO2: 95 % (07/19/25 0849) Vital Signs (24h Range):  Temp:  [98 °F (36.7 °C)-99.3 °F (37.4 °C)] 98.2 °F (36.8 °C)  Pulse:  [] 81  Resp:  [18-20] 18  SpO2:  [93 %-100 %] 95 %  BP: (113-157)/(66-86) 113/66     Weight: 77.4 kg (170 lb 10.2 oz)  Body mass index is 25.2 kg/m².     Physical Exam  Vitals reviewed.   Constitutional:       General: He is not in acute distress.     Appearance: He is not toxic-appearing.   HENT:      Head: Normocephalic.   Eyes:      Extraocular Movements: Extraocular movements intact.   Cardiovascular:      Rate and Rhythm: Normal rate.   Pulmonary:      Effort: Pulmonary effort is normal. No respiratory distress.   Abdominal:      General: There is no distension.      Palpations: Abdomen is soft.   Skin:     General: Skin is warm and dry.      Comments:   Incisions along right lateral chest/back C/D/I and appear to be healing appropriately   Small amount of crepitus along the right upper back.    Neurological:      Mental Status: He is alert.            I have reviewed all pertinent lab results within the past 24 hours.    Significant Diagnostics:  I have reviewed all pertinent imaging results/findings within the past 24 hours.

## 2025-07-19 NOTE — PLAN OF CARE
[facility] - Cardiology Stepdown  Initial Discharge Assessment       Primary Care Provider: Tara Jolly MD    Admission Diagnosis: Hemoptysis [R04.2]  Pneumothorax, unspecified type [J93.9]    Admission Date: 7/18/2025  Expected Discharge Date:     Transition of Care Barriers: None    Payor: [employer] [facility] / Plan: [employer] / Product Type: Medicare Advantage /     Extended Emergency Contact Information  Primary Emergency Contact: Ramila Christy   [facility]  Work Phone: [phone]  Mobile Phone: [phone]  Relation: Daughter  Secondary Emergency Contact: Terry Christy  Mobile Phone: [phone]  Relation: Daughter    Discharge Plan A: Home with family  Discharge Plan B: Home with family, Home Health      [facility] [address] [place] [address]  Phone: [phone] Fax: [phone]    CM met with patient to obtain discharge planning assessment.   Initial Assessment (most recent)       Adult Discharge Assessment - 07/19/25 1149          Discharge Assessment    Assessment Type Discharge Planning Assessment     Confirmed/corrected address, phone number and insurance Yes     Confirmed Demographics Correct on Facesheet     Source of Information patient     Communicated ANICETO with patient/caregiver Date not available/Unable to determine     People in Home child(howard), adult     Name(s) of People in Home daughter     Do you expect to return to your current living situation? Yes     Do you have help at home or someone to help you manage your care at home? Yes     Who are your caregiver(s) and their phone number(s)? Ramila Christy - daughter [phone]     Prior to hospitilization cognitive status: Alert/Oriented     Current cognitive status: Alert/Oriented     Walking or Climbing Stairs Difficulty yes     Walking or Climbing Stairs ambulation difficulty, requires equipment     Mobility Management walker with wheels      Dressing/Bathing Difficulty no     Equipment Currently Used at Home walker, rolling     Readmission within 30 days? Yes     Patient currently being followed by outpatient case management? No     Do you currently have service(s) that help you manage your care at home? No     Do you take prescription medications? Yes     Do you have prescription coverage? Yes     Do you have any problems affording any of your prescribed medications? No     Is the patient taking medications as prescribed? yes     Who is going to help you get home at discharge? family     How do you get to doctors appointments? family or friend will provide;car, drives self     Are you on dialysis? No     Do you take coumadin? No     Discharge Plan A Home with family     Discharge Plan B Home with family;Home Health     DME Needed Upon Discharge  walker, rolling     Discharge Plan discussed with: Patient     Transition of Care Barriers None        Physical Activity    On average, how many days per week do you engage in moderate to strenuous exercise (like a brisk walk)? 4 days     On average, how many minutes do you engage in exercise at this level? 90 min        Financial Resource Strain    How hard is it for you to pay for the very basics like food, housing, medical care, and heating? Not hard at all        Housing Stability    In the last 12 months, was there a time when you were not able to pay the mortgage or rent on time? No     At any time in the past 12 months, were you homeless or living in a shelter (including now)? No        Transportation Needs    In the past 12 months, has lack of transportation kept you from medical appointments or from getting medications? No     In the past 12 months, has lack of transportation kept you from meetings, work, or from getting things needed for daily living? No        Food Insecurity    Within the past 12 months, you worried that your food would run out before you got the money to buy more. Never true      Within the past 12 months, the food you bought just didn't last and you didn't have money to get more. Never true        Stress    Do you feel stress - tense, restless, nervous, or anxious, or unable to sleep at night because your mind is troubled all the time - these days? Only a little        Social Isolation    How often do you feel lonely or isolated from those around you?  Never        Alcohol Use    Q1: How often do you have a drink containing alcohol? Never     Q2: How many drinks containing alcohol do you have on a typical day when you are drinking? Patient does not drink     Q3: How often do you have six or more drinks on one occasion? Never        Utilities    In the past 12 months has the electric, gas, oil, or water company threatened to shut off services in your home? No        Health Literacy    How often do you need to have someone help you when you read instructions, pamphlets, or other written material from your doctor or pharmacy? Rarely

## 2025-07-19 NOTE — ASSESSMENT & PLAN NOTE
Jesus hCristy is a 64 y.o. 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 Imaging obtained revealing a small hemopneumothorax. He is hemodynamically stable with SpO2 in the mid 90s on RA.      -CXR stable this am. Will repeat tomorrow morning  -Keep on O2 for now. Might need chest tube later but will ctm now  -Regular diet  -Hold DVT ppx for now  -MMPC  -Hold Eliquis, continue other home meds

## 2025-07-19 NOTE — PROGRESS NOTES
[facility]  Thoracic Surgery  Progress Note    Subjective:     History of Present Illness:  No notes on file    Post-Op Info:  * No surgery found *         Interval History: Naeon. On 4L NC this am but sats >97% and denies significant sob. CXR this am with similar appearance of ptx.     Objective:     Vital Signs (Most Recent):  Temp: 98.2 °F (36.8 °C) (07/19/25 0849)  Pulse: 81 (07/19/25 0849)  Resp: 18 (07/19/25 0849)  BP: 113/66 (07/19/25 0849)  SpO2: 95 % (07/19/25 0849) Vital Signs (24h Range):  Temp:  [98 °F (36.7 °C)-99.3 °F (37.4 °C)] 98.2 °F (36.8 °C)  Pulse:  [] 81  Resp:  [18-20] 18  SpO2:  [93 %-100 %] 95 %  BP: (113-157)/(66-86) 113/66     Weight: 77.4 kg (170 lb 10.2 oz)  Body mass index is 25.2 kg/m².     Physical Exam  Vitals reviewed.   Constitutional:       General: He is not in acute distress.     Appearance: He is not toxic-appearing.   HENT:      Head: Normocephalic.   Eyes:      Extraocular Movements: Extraocular movements intact.   Cardiovascular:      Rate and Rhythm: Normal rate.   Pulmonary:      Effort: Pulmonary effort is normal. No respiratory distress.   Abdominal:      General: There is no distension.      Palpations: Abdomen is soft.   Skin:     General: Skin is warm and dry.      Comments:   Incisions along right lateral chest/back C/D/I and appear to be healing appropriately   Small amount of crepitus along the right upper back.    Neurological:      Mental Status: He is alert.            I have reviewed all pertinent lab results within the past 24 hours.    Significant Diagnostics:  I have reviewed all pertinent imaging results/findings within the past 24 hours.    Assessment/Plan:     * Hemopneumothorax on right  Jesus Christy is a 64 y.o. 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 Imaging obtained  revealing a small hemopneumothorax. He is hemodynamically stable with SpO2 in the mid 90s on RA.      -CXR stable this am. Will repeat tomorrow morning  -Keep on O2 for now. Might need chest tube later but will ctm now  -Regular diet  -Hold DVT ppx for now  -MMPC  -Hold Eliquis, continue other home meds        Rose Valdovinos MD  Thoracic Surgery  [facility] - [facility]

## 2025-07-19 NOTE — ASSESSMENT & PLAN NOTE
Jesus Christy is a 64 y.o. 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 Imaging obtained revealing a small hemopneumothorax. He is hemodynamically stable with SpO2 in the mid 90s on RA.     -Will admit to observation under thoracic surgery service  -Will obtain CXR approximately 4 hours from CT scan to evaluate for progression of hemopneumothorax  -Pending results will consider chest tube placement  -MMPC  -Hold Eliquis, continue other home meds  -CLD

## 2025-07-19 NOTE — ED PROVIDER NOTES
Encounter Date: 7/18/2025       History     Chief Complaint   Patient presents with    Hemoptysis     From home. Coughing up blood X 20 min. On eliquis. Had mass removed from lung on 6/30     HPI  The patient is a 64-year-old male with history of hypertension, GERD, portal vein thrombosis on Eliquis, prostate adenocarcinoma on active surveillance, 2.8 cm carcinoid tumor status post right upper lobe posterior segmentectomy on 06/30/2025 with thoracic surgery here for hemoptysis.  Onset approximately 20-30 minutes prior to arrival.  Patient reports multiple tbsp of blood.  He states that it was less than a measuring cup.  He states that he felt the urge to cough and felt briefly short of breath when he was coughing up the blood.  He states that he does not feel short of breath currently.  No chest pain.  No leg pain or swelling.  No fevers or chills.  He is on Eliquis and restarted this shortly after discharge from the hospital on 07/03.  He denies any other bleeding like epistaxis, melena, hematochezia.    Review of patient's allergies indicates:   Allergen Reactions    Peach (prunus persica)     Morphine Palpitations     Past Medical History:   Diagnosis Date    Complex regional pain syndrome i of right lower limb     GERD (gastroesophageal reflux disease)     HTN (hypertension)     Portal vein thrombosis      Past Surgical History:   Procedure Laterality Date    COLONOSCOPY N/A 9/29/2017    Procedure: COLONOSCOPY;  Surgeon: Jamar Edwards MD;  Location: [facility] (4[facility]);  Service: Endoscopy;  Laterality: N/A;    COLONOSCOPY N/A 2/12/2024    Procedure: COLONOSCOPY;  Surgeon: Miguel Angel Huffman MD;  Location: [facility] (2ND FLR);  Service: Endoscopy;  Laterality: N/A;    DV5 ROBOTIC RATS,WITH LOBECTOMY,LUNG Right 6/30/2025    Procedure: DV5 ROBOTIC RATS, right upper lobe posterior segmentectomy;  Surgeon: Parker Gay MD;  Location: [facility] OR 2ND FLR;  Service: Cardiothoracic;  Laterality: Right;    ENDOSCOPIC  ULTRASOUND OF UPPER GASTROINTESTINAL TRACT N/A 7/5/2024    Procedure: ULTRASOUND, UPPER GI TRACT, ENDOSCOPIC;  Surgeon: Debi Lloyd MD;  Location: [facility] ENDO (2ND FLR);  Service: Endoscopy;  Laterality: N/A;  3/21 portal instr-E[facility] next couple of months pancreas tail cyst.joseluis-Eliquis pending Dr. Miranda TE 3/21-tt  5/15/24: instructions sent via portal. eliquis hold in TE 3/21-GD  6/26-pre call complete-tb-will hold eliquis starting     ESOPHAGOGASTRODUODENOSCOPY N/A 2/12/2024    Procedure: EGD (ESOPHAGOGASTRODUODENOSCOPY);  Surgeon: Miguel Angel Huffman MD;  Location: [facility] (2ND FLR);  Service: Endoscopy;  Laterality: N/A;  1/30/24-Okay to add per Dr. Huffman, 2nd flr-recent hospitalization for sepsis and portal vein thrombosis, Miralax, instr portal and email, Approval to hold Eliquis and medical clearance rec'd from Dr. Jolly (see telephone encounter 1/30/24)-DS  2/5-prec    INJECTION OF ANESTHETIC AGENT AROUND MULTIPLE INTERCOSTAL NERVES Right 6/30/2025    Procedure: BLOCK, NERVE, INTERCOSTAL, 2 OR MORE;  Surgeon: Parker Gay MD;  Location: [facility] OR [address] [place] [address];  Service: Cardiothoracic;  Laterality: Right;    INJECTION OF ANESTHETIC AGENT AROUND NERVE Left 6/2/2025    Procedure: INJECTION, LEFT GLENOHUMERAL;  Surgeon: Stuart Viveros MD;  Location: [facility] PAIN MGT;  Service: Pain Management;  Laterality: Left;  2 WK F/U SHERIDAN    JOINT REPLACEMENT Right     knee    KNEE ARTHROSCOPY W/ ACL RECONSTRUCTION  2014    right    ROBOTIC BRONCHOSCOPY N/A 5/13/2025    Procedure: ROBOTIC BRONCHOSCOPY;  Surgeon: Jessica Hamilton MD;  Location: [facility] OR [address] [place] [address];  Service: Pulmonary;  Laterality: N/A;    TRANSFORAMINAL EPIDURAL INJECTION OF STEROID N/A 4/16/2025    Procedure: CERVICAL C7/T1 IL CHAZ *ELIQUIS CLEARANCE IN CHART*;  Surgeon: Stuart Viveros MD;  Location: [facility] PAIN MGT;  Service: Pain Management;  Laterality: N/A;  2 WK F/U EDDI    XI ROBOTIC RATS, WITH LYMPHADENECTOMY Right 6/30/2025     Procedure: XI ROBOTIC RATS, WITH LYMPHADENECTOMY;  Surgeon: Parker Gay MD;  Location: [facility] OR [address] [place] [address];  Service: Cardiothoracic;  Laterality: Right;     Family History   Problem Relation Name Age of Onset    Hypertension Mother      Heart disease Father      Diabetes Father      Cancer Brother          unknown type    No Known Problems Daughter      No Known Problems Daughter      No Known Problems Daughter      No Known Problems Daughter      No Known Problems Son      No Known Problems Son       Social History[1]  Review of Systems  A full review of systems was obtained, see HPI for pertinent positives.    Physical Exam     Initial Vitals [07/18/25 2003]   BP Pulse Resp Temp SpO2   (!) 140/70 104 20 98 °F (36.7 °C) 100 %      MAP       --         Physical Exam  Constitutional: No acute distress, nontoxic  HENT: Normocephalic, atraumatic, membranes moist  Respiratory: Non-labored, lungs clear, no active coughing or hemoptysis  Cardiovascular: Well perfused, mildly tachycardic, regular rhythm  Gastrointestinal: Soft, non-tender, non-distended  Integumentary: Warm and dry  Musculoskeletal: No deformity, no unilateral leg swelling, warmth or erythema  Neurological: Awake and alert, normal motor  Psychiatric: Cooperative     ED Course   Procedures  Labs Reviewed   COMPREHENSIVE METABOLIC PANEL - Abnormal       Result Value    Sodium 138      Potassium 4.9      Chloride 107      CO2 22 (*)     Glucose 95      BUN 14      Creatinine 1.1      Calcium 9.6      Protein Total 7.6      Albumin 3.6      Bilirubin Total 0.5       (*)     AST 46 (*)     ALT 57 (*)     Anion Gap 9      eGFR >60     CBC WITH DIFFERENTIAL - Abnormal    WBC 6.89      RBC 3.88 (*)     HGB 11.9 (*)     HCT 37.3 (*)     MCV 96      MCH 30.7      MCHC 31.9 (*)     RDW 12.0      Platelet Count 288      MPV 10.3      Nucleated RBC 0      Neut % 64.9      Lymph % 20.9      Mono % 9.6      Eos % 3.9      Basophil % 0.4      Imm Grans % 0.3       Neut # 4.47      Lymph # 1.44      Mono # 0.66      Eos # 0.27      Baso # 0.03      Imm Grans # 0.02     HEPATITIS C ANTIBODY - Normal    Hep C Ab Interp Non-Reactive     HIV 1 / 2 ANTIBODY - Normal    HIV 1/2 Ag/Ab Non-Reactive     PROTIME-INR - Normal    PT 11.3      INR 1.0     APTT - Normal    PTT 21.9     CBC W/ AUTO DIFFERENTIAL    Narrative:     The following orders were created for panel order CBC auto differential.  Procedure                               Abnormality         Status                     ---------                               -----------         ------                     CBC with Differential[[id number]]       Abnormal            Final result                 Please view results for these tests on the individual orders.   HEP C VIRUS HOLD SPECIMEN   TYPE & SCREEN    Specimen Outdate 07/21/2025 23:59      Group & Rh B POS      Indirect Margarita NEG     ISTAT PROCEDURE    POC Glucose 102      POC BUN 13      POC Creatinine 1.2      POC Sodium 140      POC Potassium 4.1      POC Chloride 103      POC TCO2 (MEASURED) 26      POC Ionized Calcium 1.37      POC Hematocrit 37      Sample QI     ISTAT CHEM8          Imaging Results              CTA Chest Non-Coronary (PE Studies) (In process)                      Medications   tranexamic acid nebulizer Soln 500 mg (500 mg Nebulization Given 7/18/25 2129)   iohexoL (OMNIPAQUE 350) injection 75 mL (75 mLs Intravenous Given 7/18/25 2340)     Medical Decision Making  The patient is a 64-year-old male with history of hypertension, GERD, portal vein thrombosis on Eliquis, prostate adenocarcinoma on active surveillance, 2.8 cm carcinoid tumor status post right upper lobe posterior segmentectomy on 06/30/2025 with thoracic surgery here for hemoptysis.  He is currently protecting his airway.  No hypoxia or increased work of breathing.  He is not actively coughing up blood at this time.  Given the amount of blood he reportedly coughed up prior to arrival,  will give a Tx a nebulizer treatment.  Labs including type and screen and coags ordered.  Will also obtain CTA of the chest to evaluate for active bleeding.  Will discuss with thoracic surgery.    Labs reviewed and reassuring. Discussed with thoracic surgery on call. Plan to follow up imaging and continue to monitor. Disp pending CT, surgery recs. Care transitioned to oncoming staff.     Amount and/or Complexity of Data Reviewed  Labs: ordered. Decision-making details documented in ED Course.  Radiology: ordered.    Risk  Prescription drug management.               ED Course as of 07/19/25 0010 Fri Jul 18, 2025 2022 EKG with sinus tachycardia, rate 102, no STEMI [NN]   2122 Hemoglobin(!): 11.9  stable [NN]   2122 WBC: 6.89 [NN]   2219 AST(!): 46 [NN]   2219 ALT(!): 57 [NN]   2219 No abdominal pain.  No GI symptoms. [NN]      ED Course User Index  [NN] Rachele Rueda MD                               Clinical Impression:  Final diagnoses:  [R04.2] Hemoptysis (Primary)                       [1]   Social History  Tobacco Use    Smoking status: Former     Types: Cigarettes    Smokeless tobacco: Never   Substance Use Topics    Alcohol use: Yes     Alcohol/week: 0.8 standard drinks of alcohol     Types: 1 Standard drinks or equivalent per week     Comment: once every few months    Drug use: No        Rachele Rueda MD  07/19/25 0010

## 2025-07-19 NOTE — PLAN OF CARE
Patient admitted to CSU. Patient arrived to floor from ED no evidence of distress; patient AAO x4 at this time. Patient placed on tele. Vital signs obtained. Patient's pain managed with medication as prescribed. Plan of care initiated with patient. Bed in lowest position, locked, SR up x2, call bell in reach. Care continues.    Problem: Adult Inpatient Plan of Care  Goal: Plan of Care Review  Outcome: Progressing  Goal: Patient-Specific Goal (Individualized)  Outcome: Progressing  Goal: Absence of Hospital-Acquired Illness or Injury  Outcome: Progressing  Goal: Optimal Comfort and Wellbeing  Outcome: Progressing  Goal: Readiness for Transition of Care  Outcome: Progressing     Problem: Wound  Goal: Optimal Coping  Outcome: Progressing  Goal: Optimal Functional Ability  Outcome: Progressing  Goal: Absence of Infection Signs and Symptoms  Outcome: Progressing  Goal: Improved Oral Intake  Outcome: Progressing  Goal: Optimal Pain Control and Function  Outcome: Progressing  Goal: Skin Health and Integrity  Outcome: Progressing  Goal: Optimal Wound Healing  Outcome: Progressing     Problem: Fall Injury Risk  Goal: Absence of Fall and Fall-Related Injury  Outcome: Progressing

## 2025-07-19 NOTE — CONSULTS
[facility] - Emergency Dept  General Surgery  Consult Note    Patient Name: Jesus Christy  MRN: [id number]  Code Status: Prior  Admission Date: 7/18/2025  Hospital Length of Stay: 0 days  Attending Physician: Rachele Rueda MD  Primary Care Provider: Tara Jolly MD    Patient information was obtained from patient, past medical records, and ER records.     Inpatient consult to Cardiothoracic Surgery  Consult performed by: Fred Singh MD  Consult ordered by: Rachele Rueda MD        Subjective:     Principal Problem: Hemopneumothorax on right    History of Present Illness: Jesus Christy is a 64 y.o. male who recently underwent RUL posterior segmentectomy with MLND on 6/30/25 for carcinoid tumor (final path pT1cN0) and history of of portal vein thrombus on Eliquis who presented to the ED after developing hemoptysis. Mr. Christy states he had been doing better each day since his operation in late June until yesterday afternoon when he began coughing. The coughing episode was accompanied by severe pain along his right upper back and small volume hemoptysis. This continued over the next 30 minutes or so until he stopped coughing up blood. He did get dizzy during the coughing episodes which prompted presentation to the ED. Since that time, he has not had any further episodes of hemoptysis and he is coughing less frequently than before. He continues to have pain along the upper back. He denies fevers, chills, syncope, abdominal pain, or any other symptoms. His last dose of Eliquis was in the AM of 7/18.    No current facility-administered medications on file prior to encounter.     Current Outpatient Medications on File Prior to Encounter   Medication Sig    acetaminophen (TYLENOL) 500 MG tablet Take 2 tablets (1,000 mg total) by mouth every 6 (six) hours. for 11 days    ammonium lactate 12 % Crea Apply 1 application  topically 2 (two) times daily.    apixaban (ELIQUIS) 5 mg Tab Take 1 tablet (5 mg  total) by mouth 2 (two) times daily.    ARIPiprazole (ABILIFY) 10 MG Tab     cyproheptadine (PERIACTIN) 4 mg tablet     diazePAM (VALIUM) 10 MG Tab Take 10 mg by mouth 3 (three) times daily as needed.    diclofenac sodium (VOLTAREN) 1 % Gel Apply 2 g topically 4 (four) times daily.    finasteride (PROSCAR) 5 mg tablet Take 1 tablet (5 mg total) by mouth once daily.    fluticasone propionate (FLONASE) 50 mcg/actuation nasal spray Instill 1 spray (50 mcg total) in each nostril once daily.    gabapentin (NEURONTIN) 300 MG capsule Take 1 capsule (300 mg total) by mouth 3 (three) times daily as needed (leg pain).    gabapentin (NEURONTIN) 600 MG tablet Take 1 tablet (600 mg total) by mouth 3 (three) times daily.    hydrocortisone (ANUSOL-HC) 2.5 % rectal cream Place rectally 2 (two) times daily.    LIDOcaine (LIDODERM) 5 % Place 1 patch onto the skin once daily. Remove & Discard patch within 12 hours or as directed by MD    meloxicam (MOBIC) 15 MG tablet Take 1 tablet (15 mg total) by mouth once daily.    mirtazapine (REMERON) 30 MG tablet Take 1 tablet (30 mg total) by mouth every evening.    oxyCODONE (ROXICODONE) 5 MG immediate release tablet Take 1 tablet (5 mg total) by mouth every 4 (four) hours as needed for Pain.    tadalafiL (CIALIS) 20 MG Tab Take 1 tablet (20 mg total) by mouth daily as needed (erectile dysfunction).    tamsulosin (FLOMAX) 0.4 mg Cap Take 1 capsule (0.4 mg total) by mouth once daily.       Review of patient's allergies indicates:   Allergen Reactions    Peach (prunus persica)     Morphine Palpitations       Past Medical History:   Diagnosis Date    Complex regional pain syndrome i of right lower limb     GERD (gastroesophageal reflux disease)     HTN (hypertension)     Portal vein thrombosis      Past Surgical History:   Procedure Laterality Date    COLONOSCOPY N/A 9/29/2017    Procedure: COLONOSCOPY;  Surgeon: Jamar Edwards MD;  Location: [address]);  Service: Endoscopy;   Laterality: N/A;    COLONOSCOPY N/A 2/12/2024    Procedure: COLONOSCOPY;  Surgeon: Miguel Angel Huffman MD;  Location: [facility] (2ND FLR);  Service: Endoscopy;  Laterality: N/A;    DV5 ROBOTIC RATS,WITH LOBECTOMY,LUNG Right 6/30/2025    Procedure: DV5 ROBOTIC RATS, right upper lobe posterior segmentectomy;  Surgeon: Parker Gay MD;  [facility];  Service: Cardiothoracic;  Laterality: Right;    ENDOSCOPIC ULTRASOUND OF UPPER GASTROINTESTINAL TRACT N/A 7/5/2024    Procedure: ULTRASOUND, UPPER GI TRACT, ENDOSCOPIC;  Surgeon: Debi Lloyd MD;  Location: [facility] (2ND FLR);  Service: Endoscopy;  Laterality: N/A;  3/21 portal instr-EUS with [facility] next couple of months pancreas tail cyst.joseluis-Eliquis pending Dr. Miranda TE 3/21-tt  5/15/24: instructions sent via portal. eliquis hold in TE 3/21-GD  6/26-pre call complete-tb-will hold eliquis starting     ESOPHAGOGASTRODUODENOSCOPY N/A 2/12/2024    Procedure: EGD (ESOPHAGOGASTRODUODENOSCOPY);  Surgeon: Miguel Angel Huffman MD;  Location: [facility] (2ND FLR);  Service: Endoscopy;  Laterality: N/A;  1/30/24-Okay to add per Dr. Huffman, 2nd flr-recent hospitalization for sepsis and portal vein thrombosis, Miralax, instr portal and email, Approval to hold Eliquis and medical clearance rec'd from Dr. Jolly (see telephone encounter 1/30/24)-DS  2/5-prec    INJECTION OF ANESTHETIC AGENT AROUND MULTIPLE INTERCOSTAL NERVES Right 6/30/2025    Procedure: BLOCK, NERVE, INTERCOSTAL, 2 OR MORE;  Surgeon: Parker Gay MD;  Location: [facility] OR [facility];  Service: Cardiothoracic;  Laterality: Right;    INJECTION OF ANESTHETIC AGENT AROUND NERVE Left 6/2/2025    Procedure: INJECTION, LEFT GLENOHUMERAL;  Surgeon: Stuart Viveros MD;  Location: [facility] PAIN MGT;  Service: Pain Management;  Laterality: Left;  2 WK F/U SHERIDAN    JOINT REPLACEMENT Right     knee    KNEE ARTHROSCOPY W/ ACL RECONSTRUCTION  2014    right    ROBOTIC BRONCHOSCOPY N/A 5/13/2025    Procedure:  ROBOTIC BRONCHOSCOPY;  Surgeon: Jessica Hamilton MD;  [facility];  Service: Pulmonary;  Laterality: N/A;    TRANSFORAMINAL EPIDURAL INJECTION OF STEROID N/A 4/16/2025    Procedure: CERVICAL C7/T1 IL CHAZ *ELIQUIS CLEARANCE IN CHART*;  Surgeon: Stuart Viveros MD;  Location: [facility] PAIN MGT;  Service: Pain Management;  Laterality: N/A;  2 WK F/U EDDI    XI ROBOTIC RATS, WITH LYMPHADENECTOMY Right 6/30/2025    Procedure: XI ROBOTIC RATS, WITH LYMPHADENECTOMY;  Surgeon: Parker Gay MD;  [facility];  Service: Cardiothoracic;  Laterality: Right;     Family History       Problem Relation (Age of Onset)    Cancer Brother    Diabetes Father    Heart disease Father    Hypertension Mother    No Known Problems Daughter, Daughter, Daughter, Daughter, Son, Son          Tobacco Use    Smoking status: Former     Types: Cigarettes    Smokeless tobacco: Never   Substance and Sexual Activity    Alcohol use: Yes     Alcohol/week: 0.8 standard drinks of alcohol     Types: 1 Standard drinks or equivalent per week     Comment: once every few months    Drug use: No    Sexual activity: Not Currently     Review of Systems   Constitutional:  Negative for activity change, chills and fever.   Respiratory:  Positive for cough. Negative for shortness of breath.    Cardiovascular:  Negative for chest pain.   Gastrointestinal:  Negative for abdominal distention and abdominal pain.   Neurological:  Positive for dizziness (Resolved). Negative for seizures and syncope.     Objective:     Vital Signs (Most Recent):  Temp: 98 °F (36.7 °C) (07/18/25 2003)  Pulse: 101 (07/18/25 2300)  Resp: 20 (07/18/25 2129)  BP: (!) 151/79 (07/18/25 2300)  SpO2: (!) 93 % (07/18/25 2300) Vital Signs (24h Range):  Temp:  [98 °F (36.7 °C)] 98 °F (36.7 °C)  Pulse:  [] 101  Resp:  [20] 20  SpO2:  [93 %-100 %] 93 %  BP: (138-157)/(70-86) 151/79     Weight: 80.4 kg (177 lb 4 oz)  Body mass index is 26.18 kg/m².     Physical  Exam  Vitals reviewed.   Constitutional:       General: He is not in acute distress.     Appearance: He is not toxic-appearing.   HENT:      Head: Normocephalic.   Eyes:      Extraocular Movements: Extraocular movements intact.   Cardiovascular:      Rate and Rhythm: Normal rate.   Pulmonary:      Effort: Pulmonary effort is normal. No respiratory distress.   Abdominal:      General: There is no distension.      Palpations: Abdomen is soft.   Skin:     General: Skin is warm and dry.      Comments:   Incisions along right lateral chest/back C/D/I and appear to be healing appropriately   Small amount of crepitus along the right upper back.    Neurological:      Mental Status: He is alert.            I have reviewed all pertinent lab results within the past 24 hours.    Significant Diagnostics:  I have reviewed all pertinent imaging results/findings within the past 24 hours.    Assessment/Plan:     * Hemopneumothorax on right  Jesus Christy is a 64 y.o. 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 Imaging obtained revealing a small hemopneumothorax. He is hemodynamically stable with SpO2 in the mid 90s on RA.     -Will admit to observation under thoracic surgery service  -Will obtain CXR approximately 4 hours from CT scan to evaluate for progression of hemopneumothorax  -Pending results will consider chest tube placement  -MMPC  -Hold Eliquis, continue other home meds  -CLD       VTE Risk Mitigation (From admission, onward)      None            Thank you for your consult. I will follow-up with patient. Please contact us if you have any additional questions.    Fred Singh MD  General Surgery  [facility] - Emergency Dept

## 2025-07-19 NOTE — ED TRIAGE NOTES
"Chief Complaint   Patient presents with    Hemoptysis     From home. Coughing up blood X 20 min. On eliquis. Had mass removed from lung on 6/30     Pt states he was driving and began coughing up blood, endorses dizziness. Estimates half a cup of BRB. Recent biopsy and mass removal from right lung. Denies fever, chest pain, congestion. Only endorses SOB when experiencing "coughing fit"  "

## 2025-07-20 PROCEDURE — 25000003 PHARM REV CODE 250

## 2025-07-20 PROCEDURE — 0W9930Z DRAINAGE OF RIGHT PLEURAL CAVITY WITH DRAINAGE DEVICE, PERCUTANEOUS APPROACH: ICD-10-PCS | Performed by: STUDENT IN AN ORGANIZED HEALTH CARE EDUCATION/TRAINING PROGRAM

## 2025-07-20 PROCEDURE — 27000221 HC OXYGEN, UP TO 24 HOURS

## 2025-07-20 PROCEDURE — 11000001 HC ACUTE MED/SURG PRIVATE ROOM

## 2025-07-20 PROCEDURE — 94761 N-INVAS EAR/PLS OXIMETRY MLT: CPT

## 2025-07-20 PROCEDURE — 63600175 PHARM REV CODE 636 W HCPCS: Performed by: STUDENT IN AN ORGANIZED HEALTH CARE EDUCATION/TRAINING PROGRAM

## 2025-07-20 PROCEDURE — 99222 1ST HOSP IP/OBS MODERATE 55: CPT | Mod: 25,ICN,, | Performed by: STUDENT IN AN ORGANIZED HEALTH CARE EDUCATION/TRAINING PROGRAM

## 2025-07-20 RX ORDER — FENTANYL CITRATE 50 UG/ML
INJECTION, SOLUTION INTRAMUSCULAR; INTRAVENOUS
Status: COMPLETED | OUTPATIENT
Start: 2025-07-20 | End: 2025-07-20

## 2025-07-20 RX ORDER — SODIUM,POTASSIUM PHOSPHATES 280-250MG
2 POWDER IN PACKET (EA) ORAL ONCE
Status: COMPLETED | OUTPATIENT
Start: 2025-07-20 | End: 2025-07-20

## 2025-07-20 RX ORDER — DIPHENHYDRAMINE HYDROCHLORIDE 50 MG/ML
INJECTION, SOLUTION INTRAMUSCULAR; INTRAVENOUS
Status: COMPLETED | OUTPATIENT
Start: 2025-07-20 | End: 2025-07-20

## 2025-07-20 RX ORDER — ACETAMINOPHEN 325 MG/1
650 TABLET ORAL EVERY 8 HOURS PRN
Status: DISCONTINUED | OUTPATIENT
Start: 2025-07-20 | End: 2025-07-22

## 2025-07-20 RX ORDER — LIDOCAINE HYDROCHLORIDE 10 MG/ML
INJECTION, SOLUTION INFILTRATION; PERINEURAL
Status: COMPLETED | OUTPATIENT
Start: 2025-07-20 | End: 2025-07-20

## 2025-07-20 RX ADMIN — GABAPENTIN 300 MG: 300 CAPSULE ORAL at 08:07

## 2025-07-20 RX ADMIN — POTASSIUM & SODIUM PHOSPHATES POWDER PACK 280-160-250 MG 2 PACKET: 280-160-250 PACK at 09:07

## 2025-07-20 RX ADMIN — METHOCARBAMOL 500 MG: 500 TABLET ORAL at 08:07

## 2025-07-20 RX ADMIN — MIRTAZAPINE 30 MG: 15 TABLET, FILM COATED ORAL at 08:07

## 2025-07-20 RX ADMIN — FLUTICASONE PROPIONATE 50 MCG: 50 SPRAY, METERED NASAL at 09:07

## 2025-07-20 RX ADMIN — LIDOCAINE HYDROCHLORIDE 5 ML: 10 INJECTION, SOLUTION INFILTRATION; PERINEURAL at 02:07

## 2025-07-20 RX ADMIN — FENTANYL CITRATE 50 MCG: 50 INJECTION, SOLUTION INTRAMUSCULAR; INTRAVENOUS at 02:07

## 2025-07-20 RX ADMIN — METHOCARBAMOL 500 MG: 500 TABLET ORAL at 04:07

## 2025-07-20 RX ADMIN — GABAPENTIN 300 MG: 300 CAPSULE ORAL at 09:07

## 2025-07-20 RX ADMIN — GABAPENTIN 300 MG: 300 CAPSULE ORAL at 04:07

## 2025-07-20 RX ADMIN — DIPHENHYDRAMINE HYDROCHLORIDE 25 MG: 50 INJECTION INTRAMUSCULAR; INTRAVENOUS at 02:07

## 2025-07-20 RX ADMIN — TAMSULOSIN HYDROCHLORIDE 0.4 MG: 0.4 CAPSULE ORAL at 09:07

## 2025-07-20 RX ADMIN — FINASTERIDE 5 MG: 5 TABLET, FILM COATED ORAL at 09:07

## 2025-07-20 RX ADMIN — ACETAMINOPHEN 1000 MG: 325 TABLET ORAL at 08:07

## 2025-07-20 RX ADMIN — METHOCARBAMOL 500 MG: 500 TABLET ORAL at 09:07

## 2025-07-20 NOTE — PLAN OF CARE
Right chest tube procedure completed. Patient tolerated well. Patient AAOx3, no distress noted, respirations even and unlabored,  VSS. Right chest procedure site clean, dry, and intact; no bleeding or hematoma noted. Patient to be transferred to room 318. Report called to KATHE Griffin.  All questions and concerns addressed. Pt stable for transport. Transported back to 318 by Rubia and Dayanara.

## 2025-07-20 NOTE — SUBJECTIVE & OBJECTIVE
Interval History: Naeon. Remains on 4L NC this am but sats >97%. Denies sob. CXR stable. Subcutaneous emphysema seems the same.     Objective:     Vital Signs (Most Recent):  Temp: 98.6 °F (37 °C) (07/20/25 0716)  Pulse: 89 (07/20/25 0716)  Resp: 18 (07/20/25 0716)  BP: 118/72 (07/20/25 0716)  SpO2: 97 % (07/20/25 0716) Vital Signs (24h Range):  Temp:  [97.7 °F (36.5 °C)-98.6 °F (37 °C)] 98.6 °F (37 °C)  Pulse:  [76-91] 89  Resp:  [16-20] 18  SpO2:  [95 %-97 %] 97 %  BP: (108-120)/(58-72) 118/72     Weight: 77.4 kg (170 lb 10.2 oz)  Body mass index is 25.2 kg/m².     Physical Exam  Vitals reviewed.   Constitutional:       General: He is not in acute distress.     Appearance: He is not toxic-appearing.   HENT:      Head: Normocephalic.   Eyes:      Extraocular Movements: Extraocular movements intact.   Cardiovascular:      Rate and Rhythm: Normal rate.   Pulmonary:      Effort: Pulmonary effort is normal. No respiratory distress.   Abdominal:      General: There is no distension.      Palpations: Abdomen is soft.   Skin:     General: Skin is warm and dry.      Comments: Incisions along right lateral chest/back C/D/I and appear to be healing appropriately   Crepitus along the right side/upper back.    Neurological:      Mental Status: He is alert.            I have reviewed all pertinent lab results within the past 24 hours.    Significant Diagnostics:  I have reviewed all pertinent imaging results/findings within the past 24 hours.

## 2025-07-20 NOTE — PLAN OF CARE
Denies pain at this time, denies hemoptysis. Sats maintaining well overnight on O2.VSS  Problem: Adult Inpatient Plan of Care  Goal: Plan of Care Review  Outcome: Progressing  Goal: Patient-Specific Goal (Individualized)  Outcome: Progressing  Goal: Absence of Hospital-Acquired Illness or Injury  Outcome: Progressing  Goal: Optimal Comfort and Wellbeing  Outcome: Progressing  Goal: Readiness for Transition of Care  Outcome: Progressing     Problem: Wound  Goal: Optimal Coping  Outcome: Progressing  Goal: Optimal Functional Ability  Outcome: Progressing  Goal: Absence of Infection Signs and Symptoms  Outcome: Progressing  Goal: Improved Oral Intake  Outcome: Progressing  Goal: Optimal Pain Control and Function  Outcome: Progressing  Goal: Skin Health and Integrity  Outcome: Progressing  Goal: Optimal Wound Healing  Outcome: Progressing     Problem: Fall Injury Risk  Goal: Absence of Fall and Fall-Related Injury  Outcome: Progressing

## 2025-07-20 NOTE — CONSULTS
Inpatient Radiology Pre-procedure Note    History of Present Illness:  Jesus Christy is a 64 y.o. 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 Patient has had stable pneumothorax for past 2 days, with subq emphysema    Admission H&P reviewed.  Past Medical History:   Diagnosis Date    Complex regional pain syndrome i of right lower limb     GERD (gastroesophageal reflux disease)     HTN (hypertension)     Portal vein thrombosis      Past Surgical History:   Procedure Laterality Date    COLONOSCOPY N/A 9/29/2017    Procedure: COLONOSCOPY;  Surgeon: Jamar Edwards MD;  Location: [facility] ENDO (4TH FLR);  Service: Endoscopy;  Laterality: N/A;    COLONOSCOPY N/A 2/12/2024    Procedure: COLONOSCOPY;  Surgeon: Miguel Angel Huffman MD;  Location: [facility] ENDO (2ND FLR);  Service: Endoscopy;  Laterality: N/A;    DV5 ROBOTIC RATS,WITH LOBECTOMY,LUNG Right 6/30/2025    Procedure: DV5 ROBOTIC RATS, right upper lobe posterior segmentectomy;  Surgeon: Parker Gay MD;  Location: [facility] OR 2ND FLR;  Service: Cardiothoracic;  Laterality: Right;    ENDOSCOPIC ULTRASOUND OF UPPER GASTROINTESTINAL TRACT N/A 7/5/2024    Procedure: ULTRASOUND, UPPER GI TRACT, ENDOSCOPIC;  Surgeon: Debi Lloyd MD;  Location: [facility] (2ND FLR);  Service: Endoscopy;  Laterality: N/A;  3/21 portal instr-EUS with [facility] at [facility] next couple of months pancreas tail cyst.[place] pending Dr. Miranda TE 3/21-tt  5/15/24: instructions sent via portal. eliquis hold in TE 3/21-GD  6/26-pre call complete-tb-will hold eliquis starting     ESOPHAGOGASTRODUODENOSCOPY N/A 2/12/2024    Procedure: EGD (ESOPHAGOGASTRODUODENOSCOPY);  Surgeon: Miguel Angel Huffman MD;  Location: [facility] ENDO (2ND FLR);  Service: Endoscopy;  Laterality: N/A;  1/30/24-Okay to add per Dr. Huffman, 2nd flr-recent hospitalization for sepsis and portal vein thrombosis, Miralax, instr portal and email, Approval to hold Eliquis and medical  clearance rec'd from Dr. Jolly (see telephone encounter 1/30/24)-DS  2/5-prec    INJECTION OF ANESTHETIC AGENT AROUND MULTIPLE INTERCOSTAL NERVES Right 6/30/2025    Procedure: BLOCK, NERVE, INTERCOSTAL, 2 OR MORE;  Surgeon: Parker Gay MD;  [facility];  Service: Cardiothoracic;  Laterality: Right;    INJECTION OF ANESTHETIC AGENT AROUND NERVE Left 6/2/2025    Procedure: INJECTION, LEFT GLENOHUMERAL;  Surgeon: Stuart Viveros MD;  Location: [facility] PAIN MGT;  Service: Pain Management;  Laterality: Left;  2 WK F/U SHERIDAN    JOINT REPLACEMENT Right     knee    KNEE ARTHROSCOPY W/ ACL RECONSTRUCTION  2014    right    ROBOTIC BRONCHOSCOPY N/A 5/13/2025    Procedure: ROBOTIC BRONCHOSCOPY;  Surgeon: Jessica Hamilton MD;  [facility];  Service: Pulmonary;  Laterality: N/A;    TRANSFORAMINAL EPIDURAL INJECTION OF STEROID N/A 4/16/2025    Procedure: CERVICAL C7/T1 IL CHAZ *ELIQUIS CLEARANCE IN CHART*;  Surgeon: Stuart Viveros MD;  Location: [facility] PAIN MGT;  Service: Pain Management;  Laterality: N/A;  2 WK F/U EDDI    XI ROBOTIC RATS, WITH LYMPHADENECTOMY Right 6/30/2025    Procedure: XI ROBOTIC RATS, WITH LYMPHADENECTOMY;  Surgeon: Parker Gay MD;  [facility];  Service: Cardiothoracic;  Laterality: Right;       Review of Systems:   As documented in primary team H&P    Home Meds:   Prior to Admission medications    Medication Sig Start Date End Date Taking? Authorizing Provider   acetaminophen (TYLENOL) 500 MG tablet Take 2 tablets (1,000 mg total) by mouth every 6 (six) hours. for 11 days 7/3/25 7/30/25  Rose Valdovinos MD   ammonium lactate 12 % Crea Apply 1 application  topically 2 (two) times daily. 6/17/25   Jennifer Frankel DPM   apixaban (ELIQUIS) 5 mg Tab Take 1 tablet (5 mg total) by mouth 2 (two) times daily. 12/19/24   Tabitha Shelton MD   ARIPiprazole (ABILIFY) 10 MG Tab  2/27/24   Provider, Historical   cyproheptadine (PERIACTIN) 4 mg tablet   2/22/24   Provider, Historical   diazePAM (VALIUM) 10 MG Tab Take 10 mg by mouth 3 (three) times daily as needed.    Provider, Historical   diclofenac sodium (VOLTAREN) 1 % Gel Apply 2 g topically 4 (four) times daily. 5/2/25   Malissa Sweet MD   finasteride (PROSCAR) 5 mg tablet Take 1 tablet (5 mg total) by mouth once daily. 6/17/25 6/17/26  Varun Resendiz MD   fluticasone propionate (FLONASE) 50 mcg/actuation nasal spray Instill 1 spray (50 mcg total) in each nostril once daily. 4/15/25   Tara Jolly MD   gabapentin (NEURONTIN) 300 MG capsule Take 1 capsule (300 mg total) by mouth 3 (three) times daily as needed (leg pain). 7/15/25 7/15/26  Tara Jolly MD   gabapentin (NEURONTIN) 600 MG tablet Take 1 tablet (600 mg total) by mouth 3 (three) times daily. 4/7/25 4/7/26  Herve Ramachandran MD   hydrocortisone (ANUSOL-HC) 2.5 % rectal cream Place rectally 2 (two) times daily. 11/19/24   Adriana Burgos NP   LIDOcaine (LIDODERM) 5 % Place 1 patch onto the skin once daily. Remove & Discard patch within 12 hours or as directed by MD 5/2/25   Malissa Sweet MD   meloxicam (MOBIC) 15 MG tablet Take 1 tablet (15 mg total) by mouth once daily. 6/18/25 6/18/26  Jen Quintana, SHENG   mirtazapine (REMERON) 30 MG tablet Take 1 tablet (30 mg total) by mouth every evening. 1/14/25   Tara Jolly MD   oxyCODONE (ROXICODONE) 5 MG immediate release tablet Take 1 tablet (5 mg total) by mouth every 4 (four) hours as needed for Pain. 7/14/25   Regino Villalba, PA-C   tadalafiL (CIALIS) 20 MG Tab Take 1 tablet (20 mg total) by mouth daily as needed (erectile dysfunction). 6/17/25 6/17/26  Varun Resendiz MD   tamsulosin (FLOMAX) 0.4 mg Cap Take 1 capsule (0.4 mg total) by mouth once daily. 6/17/25 6/17/26  Varun Resendiz MD     Scheduled Meds:    acetaminophen  1,000 mg Oral Q8H    finasteride  5 mg Oral Daily    fluticasone propionate  1 spray Each Nostril Daily    gabapentin  300 mg  "Oral TID    methocarbamoL  500 mg Oral QID    mirtazapine  30 mg Oral QHS    tamsulosin  0.4 mg Oral Daily     Continuous Infusions:   PRN Meds:  Current Facility-Administered Medications:     acetaminophen, 650 mg, Oral, Q8H PRN    albuterol-ipratropium, 3 mL, Nebulization, Q4H PRN    benzonatate, 100 mg, Oral, TID PRN    LIDOcaine (PF) 10 mg/ml (1%), 1 mL, Intradermal, Once PRN    melatonin, 6 mg, Oral, Nightly PRN    ondansetron, 8 mg, Oral, Q8H PRN    sodium chloride 0.9%, 10 mL, Intravenous, PRN  Anticoagulants/Antiplatelets: no anticoagulation    Allergies:   Review of patient's allergies indicates:   Allergen Reactions    Peach (prunus persica)     Morphine Palpitations     Sedation Hx: have not been any systemic reactions    Labs:  Recent Labs   Lab 07/18/25 2031   INR 1.0       Recent Labs   Lab 07/19/25  0713   WBC 8.56   HGB 11.4*   HCT 35.6*   MCV 95         Recent Labs   Lab 07/18/25 2031 07/19/25  0713   GLU 95 102    139   K 4.9 4.1    107   CO2 22* 23   BUN 14 11   CREATININE 1.1 1.0   CALCIUM 9.6 9.5   MG  --  1.9   ALT 57*  --    AST 46*  --    ALBUMIN 3.6 3.3*   BILITOT 0.5  --          Vitals:  Temp: 97.5 °F (36.4 °C) (07/20/25 1141)  Pulse: 84 (07/20/25 1141)  Resp: 18 (07/20/25 1141)  BP: 111/70 (07/20/25 1141)  SpO2: 96 % (07/20/25 1141)     Physical Exam:  ASA: 2  Mallampati: 2    General: no acute distress  Mental Status: alert and oriented to person, place and time  HEENT: normocephalic, atraumatic  Chest: unlabored breathing  Heart: regular heart rate  Abdomen: nondistended  Extremity: moves all extremities    Plan: right lung chest tube  Sedation Plan: local    Mu Anguiano MD ([facility])  Interventional Radiology          "

## 2025-07-20 NOTE — ASSESSMENT & PLAN NOTE
Jesus Christy is a 64 y.o. 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 Imaging obtained revealing a small hemopneumothorax. He is hemodynamically stable with SpO2 in the mid 90s on RA.      -Continue daily CXR  -Keep on O2 for now. Might need chest tube later but will ctm now  -Regular diet  -Hold DVT ppx for now  -MMPC  -Hold Eliquis, continue other home meds  -Continue inpatient care

## 2025-07-20 NOTE — PLAN OF CARE
Pt arrived to CT for right chest tube. Pt oriented to unit and staff. Plan of care reviewed with patient, patient verbalizes understanding. Comfort measures utilized. Pt safely transferred from stretcher to procedural table. Fall risk reviewed with patient, fall risk interventions maintained. Safety strap applied, positioner pillows utilized to minimize pressure points. Blankets applied. Pt prepped and draped utilizing standard sterile technique. Patient placed on continuous monitoring, as required by sedation policy. Timeouts to be completed utilizing standard universal time-out, per department and facility policy. RN to remain at bedside, continuous monitoring maintained. Pt resting comfortably. Denies pain/discomfort. See flow sheets for monitoring, medication administration, and updates.

## 2025-07-20 NOTE — PLAN OF CARE
Problem: Adult Inpatient Plan of Care  Goal: Plan of Care Review  Outcome: Ongoing  Goal: Patient-Specific Goal (Individualized)  Outcome: Ongoing  Goal: Absence of Hospital-Acquired Illness or Injury  Outcome: Ongoing  Goal: Optimal Comfort and Wellbeing  Outcome: Ongoing  Goal: Readiness for Transition of Care  Outcome: Ongoing     Problem: Wound  Goal: Optimal Coping  Outcome: Ongoing  Goal: Optimal Functional Ability  Outcome: Ongoing  Goal: Absence of Infection Signs and Symptoms  Outcome: Ongoing  Goal: Improved Oral Intake  Outcome: Ongoing  Goal: Optimal Pain Control and Function  Outcome: Ongoing  Goal: Skin Health and Integrity  Outcome: Ongoing  Goal: Optimal Wound Healing  Outcome: Ongoing     Problem: Fall Injury Risk  Goal: Absence of Fall and Fall-Related Injury  Outcome: Ongoing   AAOx4,O2 sats > 95% on 2L NC. Plan of care discussed with patient. Patient has no complaints of chest pain/SOB/palpitations. Pt ambulating  with assist x 1, fall precautions in place,no falls/injuries through the shift.Discussed medications and care.Patient has no questions at this time.Pt resting comfortably with no acute distress.Call light within reach,bed at lowest position.

## 2025-07-20 NOTE — PLAN OF CARE
Problem: Adult Inpatient Plan of Care  Goal: Plan of Care Review  Outcome: Ongoing  Goal: Patient-Specific Goal (Individualized)  Outcome: Ongoing  Goal: Absence of Hospital-Acquired Illness or Injury  Outcome: Ongoing  Goal: Optimal Comfort and Wellbeing  Outcome: Ongoing  Goal: Readiness for Transition of Care  Outcome: Ongoing     Problem: Wound  Goal: Optimal Coping  Outcome: Ongoing  Goal: Optimal Functional Ability  Outcome: Ongoing  Goal: Absence of Infection Signs and Symptoms  Outcome: Ongoing  Goal: Improved Oral Intake  Outcome: Ongoing  Goal: Optimal Pain Control and Function  Outcome: Ongoing  Goal: Skin Health and Integrity  Outcome: Ongoing  Goal: Optimal Wound Healing  Outcome: Ongoing     Problem: Fall Injury Risk  Goal: Absence of Fall and Fall-Related Injury  Outcome: Ongoing   AAOx4,O2 sats > 95% on 4L NC. Plan of care discussed with patient. Patient has no complaints of chest pain/SOB/palpitations. Pt ambulating  with standby assist, fall precautions in place,no falls/injuries through the shift.Discussed medications and care.Patient has no questions at this time.Pt resting comfortably with no acute distress.Call light within reach,bed at lowest position.

## 2025-07-20 NOTE — PROGRESS NOTES
[facility] - Cardiology Stepdown  Thoracic Surgery  Progress Note    Subjective:     History of Present Illness:  No notes on file    Post-Op Info:  * No surgery found *         Interval History: Naeon. Remains on 4L NC this am but sats >97%. Denies sob. CXR stable. Subcutaneous emphysema seems the same.     Objective:     Vital Signs (Most Recent):  Temp: 98.6 °F (37 °C) (07/20/25 0716)  Pulse: 89 (07/20/25 0716)  Resp: 18 (07/20/25 0716)  BP: 118/72 (07/20/25 0716)  SpO2: 97 % (07/20/25 0716) Vital Signs (24h Range):  Temp:  [97.7 °F (36.5 °C)-98.6 °F (37 °C)] 98.6 °F (37 °C)  Pulse:  [76-91] 89  Resp:  [16-20] 18  SpO2:  [95 %-97 %] 97 %  BP: (108-120)/(58-72) 118/72     Weight: 77.4 kg (170 lb 10.2 oz)  Body mass index is 25.2 kg/m².     Physical Exam  Vitals reviewed.   Constitutional:       General: He is not in acute distress.     Appearance: He is not toxic-appearing.   HENT:      Head: Normocephalic.   Eyes:      Extraocular Movements: Extraocular movements intact.   Cardiovascular:      Rate and Rhythm: Normal rate.   Pulmonary:      Effort: Pulmonary effort is normal. No respiratory distress.   Abdominal:      General: There is no distension.      Palpations: Abdomen is soft.   Skin:     General: Skin is warm and dry.      Comments: Incisions along right lateral chest/back C/D/I and appear to be healing appropriately   Crepitus along the right side/upper back.    Neurological:      Mental Status: He is alert.            I have reviewed all pertinent lab results within the past 24 hours.    Significant Diagnostics:  I have reviewed all pertinent imaging results/findings within the past 24 hours.    Assessment/Plan:     * Hemopneumothorax on right  Jesus Christy is a 64 y.o. 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 Imaging obtained revealing a small  hemopneumothorax. He is hemodynamically stable with SpO2 in the mid 90s on RA.      -Continue daily CXR  -Keep on O2 for now. Might need chest tube later but will ctm now  -Regular diet  -Hold DVT ppx for now  -MMPC  -Hold Eliquis, continue other home meds  -Continue inpatient care        Rose Valdovinos MD  Thoracic Surgery  [facility] - Cardiology Stepdown

## 2025-07-20 NOTE — PROCEDURES
"  Pre Op Diagnosis: Pneumothorax  Post Op Diagnosis: Same    Procedure: chest tube placement.    Procedure performed by: Silvio    Written Informed Consent Obtained: Yes  Specimen Removed: NO  Estimated Blood Loss: Minimal    Findings:   Successful placement of 8 fr chest tube in the right pleural space. Most of air was evacuated at time of chest tube placement.    Patient tolerated procedure well.    Mu Anguiano MD ([facility])  Interventional Radiology  [phone]      "

## 2025-07-21 LAB
ABSOLUTE EOSINOPHIL (OHS): 0.41 K/UL
ABSOLUTE MONOCYTE (OHS): 0.82 K/UL (ref 0.3–1)
ABSOLUTE NEUTROPHIL COUNT (OHS): 4 K/UL (ref 1.8–7.7)
ANION GAP (OHS): 7 MMOL/L (ref 8–16)
BASOPHILS # BLD AUTO: 0.03 K/UL
BASOPHILS NFR BLD AUTO: 0.4 %
BUN SERPL-MCNC: 13 MG/DL (ref 8–23)
CALCIUM SERPL-MCNC: 9.9 MG/DL (ref 8.7–10.5)
CHLORIDE SERPL-SCNC: 107 MMOL/L (ref 95–110)
CO2 SERPL-SCNC: 25 MMOL/L (ref 23–29)
CREAT SERPL-MCNC: 1 MG/DL (ref 0.5–1.4)
ERYTHROCYTE [DISTWIDTH] IN BLOOD BY AUTOMATED COUNT: 12.1 % (ref 11.5–14.5)
GFR SERPLBLD CREATININE-BSD FMLA CKD-EPI: >60 ML/MIN/1.73/M2
GLUCOSE SERPL-MCNC: 95 MG/DL (ref 70–110)
HCT VFR BLD AUTO: 37.2 % (ref 40–54)
HGB BLD-MCNC: 11.7 GM/DL (ref 14–18)
HOLD SPECIMEN: NORMAL
IMM GRANULOCYTES # BLD AUTO: 0.02 K/UL (ref 0–0.04)
IMM GRANULOCYTES NFR BLD AUTO: 0.3 % (ref 0–0.5)
LYMPHOCYTES # BLD AUTO: 1.51 K/UL (ref 1–4.8)
MCH RBC QN AUTO: 30.6 PG (ref 27–31)
MCHC RBC AUTO-ENTMCNC: 31.5 G/DL (ref 32–36)
MCV RBC AUTO: 97 FL (ref 82–98)
NUCLEATED RBC (/100WBC) (OHS): 0 /100 WBC
PLATELET # BLD AUTO: 286 K/UL (ref 150–450)
PMV BLD AUTO: 8.9 FL (ref 9.2–12.9)
POTASSIUM SERPL-SCNC: 4.6 MMOL/L (ref 3.5–5.1)
RBC # BLD AUTO: 3.82 M/UL (ref 4.6–6.2)
RELATIVE EOSINOPHIL (OHS): 6 %
RELATIVE LYMPHOCYTE (OHS): 22.2 % (ref 18–48)
RELATIVE MONOCYTE (OHS): 12.1 % (ref 4–15)
RELATIVE NEUTROPHIL (OHS): 59 % (ref 38–73)
SODIUM SERPL-SCNC: 139 MMOL/L (ref 136–145)
WBC # BLD AUTO: 6.79 K/UL (ref 3.9–12.7)

## 2025-07-21 PROCEDURE — 63600175 PHARM REV CODE 636 W HCPCS

## 2025-07-21 PROCEDURE — 94761 N-INVAS EAR/PLS OXIMETRY MLT: CPT

## 2025-07-21 PROCEDURE — 11000001 HC ACUTE MED/SURG PRIVATE ROOM

## 2025-07-21 PROCEDURE — 85025 COMPLETE CBC W/AUTO DIFF WBC: CPT

## 2025-07-21 PROCEDURE — 25000003 PHARM REV CODE 250

## 2025-07-21 PROCEDURE — 80048 BASIC METABOLIC PNL TOTAL CA: CPT

## 2025-07-21 PROCEDURE — 99024 POSTOP FOLLOW-UP VISIT: CPT | Mod: ,,, | Performed by: STUDENT IN AN ORGANIZED HEALTH CARE EDUCATION/TRAINING PROGRAM

## 2025-07-21 PROCEDURE — 27000221 HC OXYGEN, UP TO 24 HOURS

## 2025-07-21 PROCEDURE — 36415 COLL VENOUS BLD VENIPUNCTURE: CPT

## 2025-07-21 PROCEDURE — 99900035 HC TECH TIME PER 15 MIN (STAT)

## 2025-07-21 RX ORDER — ENOXAPARIN SODIUM 100 MG/ML
40 INJECTION SUBCUTANEOUS EVERY 24 HOURS
Status: DISCONTINUED | OUTPATIENT
Start: 2025-07-21 | End: 2025-07-22 | Stop reason: HOSPADM

## 2025-07-21 RX ORDER — LEVOFLOXACIN 750 MG/1
750 TABLET, FILM COATED ORAL DAILY
Status: DISCONTINUED | OUTPATIENT
Start: 2025-07-21 | End: 2025-07-22 | Stop reason: HOSPADM

## 2025-07-21 RX ADMIN — ACETAMINOPHEN 1000 MG: 325 TABLET ORAL at 09:07

## 2025-07-21 RX ADMIN — GABAPENTIN 300 MG: 300 CAPSULE ORAL at 02:07

## 2025-07-21 RX ADMIN — FLUTICASONE PROPIONATE 50 MCG: 50 SPRAY, METERED NASAL at 08:07

## 2025-07-21 RX ADMIN — METHOCARBAMOL 500 MG: 500 TABLET ORAL at 08:07

## 2025-07-21 RX ADMIN — ACETAMINOPHEN 1000 MG: 325 TABLET ORAL at 06:07

## 2025-07-21 RX ADMIN — METHOCARBAMOL 500 MG: 500 TABLET ORAL at 04:07

## 2025-07-21 RX ADMIN — TAMSULOSIN HYDROCHLORIDE 0.4 MG: 0.4 CAPSULE ORAL at 08:07

## 2025-07-21 RX ADMIN — LEVOFLOXACIN 750 MG: 750 TABLET, FILM COATED ORAL at 08:07

## 2025-07-21 RX ADMIN — GABAPENTIN 300 MG: 300 CAPSULE ORAL at 08:07

## 2025-07-21 RX ADMIN — METHOCARBAMOL 500 MG: 500 TABLET ORAL at 01:07

## 2025-07-21 RX ADMIN — FINASTERIDE 5 MG: 5 TABLET, FILM COATED ORAL at 08:07

## 2025-07-21 RX ADMIN — ACETAMINOPHEN 1000 MG: 325 TABLET ORAL at 01:07

## 2025-07-21 RX ADMIN — MIRTAZAPINE 30 MG: 15 TABLET, FILM COATED ORAL at 08:07

## 2025-07-21 RX ADMIN — ENOXAPARIN SODIUM 40 MG: 40 INJECTION SUBCUTANEOUS at 04:07

## 2025-07-21 NOTE — NURSING
Chest tube insertion site re-assessed by this RN. CDI, crepitus noted on palpation on right clavicular area leading up to neck, decreased from yesterday but present. Patient denies trouble breathing or chest pain. Slight discomfort for which sched tylenol has been administered. Family at bedside. Ongoing cardiac monitoring in place. Call light within reach. No fluid output noted from chest tube.

## 2025-07-21 NOTE — PLAN OF CARE
07/21/25 1327   Rounds   Attendance ;Assigned nurse;Charge nurse  (unit-based EDDI)   Discharge Plan A Home with family   Why the patient remains in the hospital Requires continued medical care  (pneumothorax)   Transition of Care Barriers None     Pt has a pneumothorax.  ANICETO 7/24.      Tara Chaparro LMSW  [facility]  [phone]

## 2025-07-21 NOTE — PROGRESS NOTES
[facility] - Cardiology Stepdown  Thoracic Surgery  Progress Note    Subjective:     History of Present Illness:  No notes on file    Post-Op Info:  * No surgery found *         Interval History: Naeon. On 2L NC this am with sats >95%. Underwent pigtail chest tube placement with IR yesterday with resolution of pneumothorax. Subcutaneous emphysema remains stable. Patient denies sob or significant pain. Still coughing up mucus with minimal streaks of blood.     Objective:     Vital Signs (Most Recent):  Temp: 97.8 °F (36.6 °C) (07/21/25 0545)  Pulse: 90 (07/21/25 0659)  Resp: 18 (07/21/25 0545)  BP: 114/76 (07/21/25 0000)  SpO2: 95 % (07/21/25 0545) Vital Signs (24h Range):  Temp:  [97.5 °F (36.4 °C)-99.5 °F (37.5 °C)] 97.8 °F (36.6 °C)  Pulse:  [72-99] 90  Resp:  [16-18] 18  SpO2:  [94 %-98 %] 95 %  BP: (111-128)/(65-78) 114/76     Weight: 77.4 kg (170 lb 10.2 oz)  Body mass index is 25.2 kg/m².     Physical Exam  Vitals reviewed.   Constitutional:       General: He is not in acute distress.     Appearance: He is not toxic-appearing.   HENT:      Head: Normocephalic.   Eyes:      Extraocular Movements: Extraocular movements intact.   Cardiovascular:      Rate and Rhythm: Normal rate.   Pulmonary:      Effort: Pulmonary effort is normal. No respiratory distress.      Comments: R pigtail chest tube to water seal. Not tidaling. No air leak.  Abdominal:      General: There is no distension.      Palpations: Abdomen is soft.   Skin:     General: Skin is warm and dry.      Comments: Incisions along right lateral chest/back C/D/I and appear to be healing appropriately   Crepitus along the right side/upper back.    Neurological:      Mental Status: He is alert.            I have reviewed all pertinent lab results within the past 24 hours.    Significant Diagnostics:  I have reviewed all pertinent imaging results/findings within the past 24 hours.    Assessment/Plan:     * Hemopneumothorax on right  Jesus Christy is a 64  y.o. 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 Imaging obtained revealing a small hemopneumothorax. He is hemodynamically stable with SpO2 in the mid 90s on RA.      -Continue daily CXR  -S/p chest tube with IR 7/20. Will place to suction today  -Regular diet  -Will start DVT ppx. Continue to hold Eliquis  -7d course of abx for possible pneumonia  -[facility]  -Continue inpatient care        Rose Valdovinos MD  Thoracic Surgery  [facility] - Cardiology Stepdown

## 2025-07-21 NOTE — SUBJECTIVE & OBJECTIVE
Interval History: Naeon. On 2L NC this am with sats >95%. Underwent pigtail chest tube placement with IR yesterday with resolution of pneumothorax. Subcutaneous emphysema remains stable. Patient denies sob or significant pain. Still coughing up mucus with minimal streaks of blood.     Objective:     Vital Signs (Most Recent):  Temp: 97.8 °F (36.6 °C) (07/21/25 0545)  Pulse: 90 (07/21/25 0659)  Resp: 18 (07/21/25 0545)  BP: 114/76 (07/21/25 0000)  SpO2: 95 % (07/21/25 0545) Vital Signs (24h Range):  Temp:  [97.5 °F (36.4 °C)-99.5 °F (37.5 °C)] 97.8 °F (36.6 °C)  Pulse:  [72-99] 90  Resp:  [16-18] 18  SpO2:  [94 %-98 %] 95 %  BP: (111-128)/(65-78) 114/76     Weight: 77.4 kg (170 lb 10.2 oz)  Body mass index is 25.2 kg/m².     Physical Exam  Vitals reviewed.   Constitutional:       General: He is not in acute distress.     Appearance: He is not toxic-appearing.   HENT:      Head: Normocephalic.   Eyes:      Extraocular Movements: Extraocular movements intact.   Cardiovascular:      Rate and Rhythm: Normal rate.   Pulmonary:      Effort: Pulmonary effort is normal. No respiratory distress.      Comments: R pigtail chest tube to water seal. Not tidaling. No air leak.  Abdominal:      General: There is no distension.      Palpations: Abdomen is soft.   Skin:     General: Skin is warm and dry.      Comments: Incisions along right lateral chest/back C/D/I and appear to be healing appropriately   Crepitus along the right side/upper back.    Neurological:      Mental Status: He is alert.            I have reviewed all pertinent lab results within the past 24 hours.    Significant Diagnostics:  I have reviewed all pertinent imaging results/findings within the past 24 hours.

## 2025-07-21 NOTE — ASSESSMENT & PLAN NOTE
Jesus Christy is a 64 y.o. 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 Imaging obtained revealing a small hemopneumothorax. He is hemodynamically stable with SpO2 in the mid 90s on RA.      -Continue daily CXR  -S/p chest tube with IR 7/20. Will place to suction today  -Regular diet  -Will restart home Eliquis  -7d course of abx for possible pneumonia  -[facility]  -Continue inpatient care

## 2025-07-21 NOTE — PLAN OF CARE
AAOX4,VSS,O2 sats>92% on 2L NC.Plan of care discussed with patient.Patient has no complaints of pain/SOB. Discussed medications and care. Patient has no questions at this time.Pt visualized and stable.Call light within reach.Pt resting,bed at lowest position.    Problem: Adult Inpatient Plan of Care  Goal: Plan of Care Review  Outcome: Progressing  Goal: Patient-Specific Goal (Individualized)  Outcome: Progressing  Goal: Absence of Hospital-Acquired Illness or Injury  Outcome: Progressing  Goal: Optimal Comfort and Wellbeing  Outcome: Progressing  Goal: Readiness for Transition of Care  Outcome: Progressing     Problem: Wound  Goal: Optimal Coping  Outcome: Progressing  Goal: Optimal Functional Ability  Outcome: Progressing  Goal: Absence of Infection Signs and Symptoms  Outcome: Progressing  Goal: Improved Oral Intake  Outcome: Progressing  Goal: Optimal Pain Control and Function  Outcome: Progressing  Goal: Skin Health and Integrity  Outcome: Progressing  Goal: Optimal Wound Healing  Outcome: Progressing     Problem: Fall Injury Risk  Goal: Absence of Fall and Fall-Related Injury  Outcome: Progressing

## 2025-07-22 ENCOUNTER — TELEPHONE (OUTPATIENT)
Dept: UROLOGY | Facility: CLINIC | Age: 65
End: 2025-07-22
Payer: MEDICARE

## 2025-07-22 VITALS
OXYGEN SATURATION: 94 % | TEMPERATURE: 99 F | SYSTOLIC BLOOD PRESSURE: 136 MMHG | DIASTOLIC BLOOD PRESSURE: 60 MMHG | WEIGHT: 170.63 LBS | HEIGHT: 69 IN | HEART RATE: 83 BPM | BODY MASS INDEX: 25.27 KG/M2 | RESPIRATION RATE: 18 BRPM

## 2025-07-22 LAB
ABSOLUTE EOSINOPHIL (OHS): 0.43 K/UL
ABSOLUTE MONOCYTE (OHS): 0.67 K/UL (ref 0.3–1)
ABSOLUTE NEUTROPHIL COUNT (OHS): 3.68 K/UL (ref 1.8–7.7)
ANION GAP (OHS): 7 MMOL/L (ref 8–16)
BASOPHILS # BLD AUTO: 0.03 K/UL
BASOPHILS NFR BLD AUTO: 0.5 %
BUN SERPL-MCNC: 11 MG/DL (ref 8–23)
CALCIUM SERPL-MCNC: 10 MG/DL (ref 8.7–10.5)
CHLORIDE SERPL-SCNC: 106 MMOL/L (ref 95–110)
CO2 SERPL-SCNC: 25 MMOL/L (ref 23–29)
CREAT SERPL-MCNC: 1 MG/DL (ref 0.5–1.4)
ERYTHROCYTE [DISTWIDTH] IN BLOOD BY AUTOMATED COUNT: 12 % (ref 11.5–14.5)
GFR SERPLBLD CREATININE-BSD FMLA CKD-EPI: >60 ML/MIN/1.73/M2
GLUCOSE SERPL-MCNC: 98 MG/DL (ref 70–110)
HCT VFR BLD AUTO: 35.2 % (ref 40–54)
HGB BLD-MCNC: 11 GM/DL (ref 14–18)
IMM GRANULOCYTES # BLD AUTO: 0.02 K/UL (ref 0–0.04)
IMM GRANULOCYTES NFR BLD AUTO: 0.3 % (ref 0–0.5)
LYMPHOCYTES # BLD AUTO: 1.26 K/UL (ref 1–4.8)
MCH RBC QN AUTO: 30.1 PG (ref 27–31)
MCHC RBC AUTO-ENTMCNC: 31.3 G/DL (ref 32–36)
MCV RBC AUTO: 96 FL (ref 82–98)
NUCLEATED RBC (/100WBC) (OHS): 0 /100 WBC
PLATELET # BLD AUTO: 303 K/UL (ref 150–450)
PMV BLD AUTO: 8.8 FL (ref 9.2–12.9)
POTASSIUM SERPL-SCNC: 4 MMOL/L (ref 3.5–5.1)
RBC # BLD AUTO: 3.66 M/UL (ref 4.6–6.2)
RELATIVE EOSINOPHIL (OHS): 7.1 %
RELATIVE LYMPHOCYTE (OHS): 20.7 % (ref 18–48)
RELATIVE MONOCYTE (OHS): 11 % (ref 4–15)
RELATIVE NEUTROPHIL (OHS): 60.4 % (ref 38–73)
SODIUM SERPL-SCNC: 138 MMOL/L (ref 136–145)
WBC # BLD AUTO: 6.09 K/UL (ref 3.9–12.7)

## 2025-07-22 PROCEDURE — 99024 POSTOP FOLLOW-UP VISIT: CPT | Mod: ,,, | Performed by: STUDENT IN AN ORGANIZED HEALTH CARE EDUCATION/TRAINING PROGRAM

## 2025-07-22 PROCEDURE — 36415 COLL VENOUS BLD VENIPUNCTURE: CPT

## 2025-07-22 PROCEDURE — 85025 COMPLETE CBC W/AUTO DIFF WBC: CPT

## 2025-07-22 PROCEDURE — 99900035 HC TECH TIME PER 15 MIN (STAT)

## 2025-07-22 PROCEDURE — 94761 N-INVAS EAR/PLS OXIMETRY MLT: CPT

## 2025-07-22 PROCEDURE — 82435 ASSAY OF BLOOD CHLORIDE: CPT

## 2025-07-22 PROCEDURE — 25000003 PHARM REV CODE 250

## 2025-07-22 RX ORDER — LEVOFLOXACIN 750 MG/1
750 TABLET, FILM COATED ORAL DAILY
Qty: 5 TABLET | Refills: 0 | Status: SHIPPED | OUTPATIENT
Start: 2025-07-23 | End: 2025-07-28

## 2025-07-22 RX ADMIN — TAMSULOSIN HYDROCHLORIDE 0.4 MG: 0.4 CAPSULE ORAL at 08:07

## 2025-07-22 RX ADMIN — LEVOFLOXACIN 750 MG: 750 TABLET, FILM COATED ORAL at 08:07

## 2025-07-22 RX ADMIN — FINASTERIDE 5 MG: 5 TABLET, FILM COATED ORAL at 08:07

## 2025-07-22 RX ADMIN — ACETAMINOPHEN 1000 MG: 325 TABLET ORAL at 05:07

## 2025-07-22 RX ADMIN — FLUTICASONE PROPIONATE 50 MCG: 50 SPRAY, METERED NASAL at 08:07

## 2025-07-22 NOTE — SUBJECTIVE & OBJECTIVE
Interval History: Naeon. On 2L NC this am with sats >95%. Chest tube with 51cc output. No sob. Pain well controlled. CXR stable.     Objective:     Vital Signs (Most Recent):  Temp: 98.6 °F (37 °C) (07/22/25 0724)  Pulse: 78 (07/22/25 0724)  Resp: 19 (07/22/25 0724)  BP: 136/60 (07/22/25 0724)  SpO2: (!) 93 % (07/22/25 0724) Vital Signs (24h Range):  Temp:  [73.4 °F (23 °C)-98.9 °F (37.2 °C)] 98.6 °F (37 °C)  Pulse:  [72-97] 78  Resp:  [18-19] 19  SpO2:  [93 %-98 %] 93 %  BP: (111-136)/(60-77) 136/60     Weight: 77.4 kg (170 lb 10.2 oz)  Body mass index is 25.2 kg/m².     Physical Exam  Vitals reviewed.   Constitutional:       General: He is not in acute distress.     Appearance: He is not toxic-appearing.   HENT:      Head: Normocephalic.   Eyes:      Extraocular Movements: Extraocular movements intact.   Cardiovascular:      Rate and Rhythm: Normal rate.   Pulmonary:      Effort: Pulmonary effort is normal. No respiratory distress.      Comments: R pigtail chest tube to water seal. Not tidaling. No air leak.  Abdominal:      General: There is no distension.      Palpations: Abdomen is soft.   Skin:     General: Skin is warm and dry.      Comments: Incisions along right lateral chest/back C/D/I and appear to be healing appropriately   Crepitus along the right side/upper back.    Neurological:      Mental Status: He is alert.            I have reviewed all pertinent lab results within the past 24 hours.    Significant Diagnostics:  I have reviewed all pertinent imaging results/findings within the past 24 hours.

## 2025-07-22 NOTE — TELEPHONE ENCOUNTER
Copied from CRM #[id number]. Topic: Appointments - Appointment Scheduling  >> Jul 22, 2025  3:57 PM aJnie wrote:  Reschedule Existing Appointment     Current appt date: 7/22 and 7/25     Type of appt : MRI and Appt     Physician: Martín     Reason for rescheduling: pt need appt rescheduled. Just came out of the hospital     Caller:     Contact Preference:  >> Jul 22, 2025  4:09 PM Nurse Alyssa wrote:    ----- Message -----  From: Janie Alston  Sent: 7/22/2025   3:58 PM CDT  To: Janie Alston; Martín Mccall

## 2025-07-22 NOTE — PLAN OF CARE
Problem: Occupational Therapy Goal  Goal: Occupational Therapy Goal  Goals to be met by: 12/27 (10 days from initial eval)     Patient will increase functional independence with ADLs by performing:    UE Dressing with Set-up Assistance and Minimal Assistance.  Grooming while seated with Minimal Assistance.  Supine to sit with Moderate Assistance.  Squat pivot transfers to/from wheelchair with Moderate Assistance.  Pt verbalized understanding for wear schedule of R UE splint.   Pt/CG demo understanding for postioning and ROM exercises for R UE.    Outcome: Ongoing (interventions implemented as appropriate)  OT eval completed. Will follow up 3x/w in acute setting to address the following goals. CRISTIAN Kellogg 12/17/2018        [facility] - Cardiology Stepdown  Discharge Final Note    Primary Care Provider: Tara Jolly MD    Expected Discharge Date: 7/22/2025    Final Discharge Note (most recent)       Final Note - 07/22/25 1159          Final Note    Assessment Type Final Discharge Note     Anticipated Discharge Disposition Home or Self Care                 Tara Chaparro LMSW  [facility]  [id number]      Important Message from Medicare  Important Message from Medicare regarding Discharge Appeal Rights: Given to patient/caregiver, Explained to patient/caregiver, Signed/date by patient/caregiver     Date IMM was signed: 07/22/25  Time IMM was signed: 0939      Future Appointments   Date Time Provider Department Center   7/22/2025  4:00 PM [facility]-MRI1 [facility]   7/24/2025  1:40 PM Varun Resendiz MD [facility] UROLOGY [facility]   8/22/2025  7:20 AM LAB, APPOINTMENT [facility] INTMED [facility] LAB IM [facility] PCW   8/22/2025  8:00 AM [facility]US1 [facility]TR [facility]   8/29/2025  4:30 PM Sukhi Dee MD [facility] HEPAT [facility]   9/8/2025  9:30 AM Maida Amanda MD [facility]   9/25/2025 10:30 AM Jessica Hamilton MD [facility] PULMSVC [facility]   1/16/2026  9:30 AM Tara Jolly MD [facility] IM [facility] PC       Contact Info       [place] Provider   Specialty: Internal Medicine    [address]  [address]  [facility] [place] [address]       Next Steps: Follow up    Tara Jolly MD   Specialty: Internal Medicine   Relationship: PCP - General    [address]  [place] [address]   Phone: [phone]       Next Steps: Follow up

## 2025-07-22 NOTE — PLAN OF CARE
Patient is ready for discharge. Patient stable alert and oriented. IVs removed. No complaints of pain. Discussed discharge plan. Reviewed medications and side effects, appointments, and answered questions with patient. RX given to patient.       Problem: Adult Inpatient Plan of Care  Goal: Plan of Care Review  Outcome: Met  Goal: Patient-Specific Goal (Individualized)  Outcome: Met  Goal: Absence of Hospital-Acquired Illness or Injury  Outcome: Met  Goal: Optimal Comfort and Wellbeing  Outcome: Met  Goal: Readiness for Transition of Care  Outcome: Met     Problem: Wound  Goal: Optimal Coping  Outcome: Met  Goal: Optimal Functional Ability  Outcome: Met  Goal: Absence of Infection Signs and Symptoms  Outcome: Met  Goal: Improved Oral Intake  Outcome: Met  Goal: Optimal Pain Control and Function  Outcome: Met  Goal: Skin Health and Integrity  Outcome: Met  Goal: Optimal Wound Healing  Outcome: Met     Problem: Fall Injury Risk  Goal: Absence of Fall and Fall-Related Injury  Outcome: Met

## 2025-07-22 NOTE — PROGRESS NOTES
[facility] - Cardiology Stepdown  Thoracic Surgery  Progress Note    Subjective:     History of Present Illness:  No notes on file    Post-Op Info:  * No surgery found *         Interval History: Naeon. On 2L NC this am with sats >95%. Chest tube with 51cc output. No sob. Pain well controlled. CXR stable.     Objective:     Vital Signs (Most Recent):  Temp: 98.6 °F (37 °C) (07/22/25 0724)  Pulse: 78 (07/22/25 0724)  Resp: 19 (07/22/25 0724)  BP: 136/60 (07/22/25 0724)  SpO2: (!) 93 % (07/22/25 0724) Vital Signs (24h Range):  Temp:  [73.4 °F (23 °C)-98.9 °F (37.2 °C)] 98.6 °F (37 °C)  Pulse:  [72-97] 78  Resp:  [18-19] 19  SpO2:  [93 %-98 %] 93 %  BP: (111-136)/(60-77) 136/60     Weight: 77.4 kg (170 lb 10.2 oz)  Body mass index is 25.2 kg/m².     Physical Exam  Vitals reviewed.   Constitutional:       General: He is not in acute distress.     Appearance: He is not toxic-appearing.   HENT:      Head: Normocephalic.   Eyes:      Extraocular Movements: Extraocular movements intact.   Cardiovascular:      Rate and Rhythm: Normal rate.   Pulmonary:      Effort: Pulmonary effort is normal. No respiratory distress.      Comments: R pigtail chest tube to water seal. Not tidaling. No air leak.  Abdominal:      General: There is no distension.      Palpations: Abdomen is soft.   Skin:     General: Skin is warm and dry.      Comments: Incisions along right lateral chest/back C/D/I and appear to be healing appropriately   Crepitus along the right side/upper back.    Neurological:      Mental Status: He is alert.            I have reviewed all pertinent lab results within the past 24 hours.    Significant Diagnostics:  I have reviewed all pertinent imaging results/findings within the past 24 hours.    Assessment/Plan:     * Hemopneumothorax on right  Jesus Christy is a 64 y.o. 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 Imaging obtained revealing a small hemopneumothorax. He is hemodynamically stable with SpO2 in the mid 90s on RA.      -Will remove chest tube  -Regular diet  -Will restart home Eliquis on discharge  - DVT ppx  -7d course of abx for possible pneumonia  -MMPC  -Discharge        Rose Valdovinos MD  Thoracic Surgery  [facility] - Cardiology Stepdown

## 2025-07-22 NOTE — HOSPITAL COURSE
Patient admitted to Thoracic Surgery for obs. No respiratory distress or increased wob. CXR remained stable. Underwent imaging guided chest tube placement with IR on 7/20. Resolution of pneumothorax on his CXR. Continued to progress well. Given 7d course of abx for possible pneumonia. Currently medically stable for discharge home.

## 2025-07-22 NOTE — DISCHARGE INSTRUCTIONS
Ok to restart eliquis on discharge  Continue antibiotics  Follow-up with your primary care provider

## 2025-07-22 NOTE — DISCHARGE SUMMARY
[facility] - Cardiology Step[facility]  Thoracic Surgery  Discharge Summary    Patient Name: Jesus Christy  MRN: [id number]  Admission Date: 7/18/2025  Hospital Length of Stay: 3 days  Discharge Date and Time: 07/22/2025 8:51 AM  Attending Physician: Parker Gay MD   Discharging Provider: Rose Valdovinos MD  Primary Care Provider: Tara Jolly MD    HPI:   No notes on file    * No surgery found *      Hospital Course: Patient admitted to Thoracic Surgery for obs. No respiratory distress or increased wob. CXR remained stable. Underwent imaging guided chest tube placement with IR on 7/20. Resolution of pneumothorax on his CXR. Continued to progress well. Given 7d course of abx for possible pneumonia. Currently medically stable for discharge home.      Goals of Care Treatment Preferences:  Code Status: Full Code      Consults (From admission, onward)          Status Ordering Provider     Inpatient consult to Interventional Radiology  Once        Provider:  (Not yet assigned)    Completed ROSE VALDOVINOS     Inpatient consult to Cardiothoracic Surgery  Once        Provider:  (Not yet assigned)    Completed LILY ARROYO            Significant Diagnostic Studies: N/A    Pending Diagnostic Studies:       None          Final Active Diagnoses:    Diagnosis Date Noted POA    PRINCIPAL PROBLEM:  Hemopneumothorax on right [J94.2] 07/19/2025 Yes      Problems Resolved During this Admission:      Discharged Condition: good    Disposition: Home or Self Care    Follow Up:   Follow-up Information       ProviderBal .    Specialty: Internal Medicine  Contact information:  [address]  [address]  [place] [address]               Tara Jolly MD Follow up.    Specialty: Internal Medicine  Contact information:  [address]  [facility] [address]  [phone]                           Patient Instructions:      Ambulatory referral/consult to [facility]   Standing Status: Future    Referral Priority: Routine Referral Type: Consultation   Referred to Provider: [facility] PROVIDER Requested Specialty: Internal Medicine   Number of Visits Requested: 1     Diet Adult Regular     Notify your health care provider if you experience any of the following:  difficulty breathing or increased cough     Notify your health care provider if you experience any of the following:  temperature >100.4     Activity as tolerated     Medications:  Reconciled Home Medications:      Medication List        START taking these medications      levoFLOXacin 750 MG tablet  Commonly known as: LEVAQUIN  Take 1 tablet (750 mg total) by mouth once daily. for 5 days  Start taking on: July 23, 2025            CONTINUE taking these medications      acetaminophen 500 MG tablet  Commonly known as: TYLENOL  Take 2 tablets (1,000 mg total) by mouth every 6 (six) hours. for 11 days     ammonium lactate 12 % Crea  Apply 1 application  topically 2 (two) times daily.     ARIPiprazole 10 MG Tab  Commonly known as: ABILIFY     cyproheptadine 4 mg tablet  Commonly known as: PERIACTIN     diazePAM 10 MG Tab  Commonly known as: VALIUM  Take 10 mg by mouth 3 (three) times daily as needed.     diclofenac sodium 1 % Gel  Commonly known as: VOLTAREN  Apply 2 g topically 4 (four) times daily.     ELIQUIS 5 mg Tab  Generic drug: apixaban  Take 1 tablet (5 mg total) by mouth 2 (two) times daily.     finasteride 5 mg tablet  Commonly known as: PROSCAR  Take 1 tablet (5 mg total) by mouth once daily.     fluticasone propionate 50 mcg/actuation nasal spray  Commonly known as: FLONASE  Instill 1 spray (50 mcg total) in each nostril once daily.     * gabapentin 600 MG tablet  Commonly known as: NEURONTIN  Take 1 tablet (600 mg total) by mouth 3 (three) times daily.     * gabapentin 300 MG capsule  Commonly known as: NEURONTIN  Take 1 capsule (300 mg total) by mouth 3 (three) times daily as needed (leg pain).     LIDOcaine 5 %  Commonly known as:  LIDODERM  Place 1 patch onto the skin once daily. Remove & Discard patch within 12 hours or as directed by MD     meloxicam 15 MG tablet  Commonly known as: MOBIC  Take 1 tablet (15 mg total) by mouth once daily.     mirtazapine 30 MG tablet  Commonly known as: REMERON  Take 1 tablet (30 mg total) by mouth every evening.     oxyCODONE 5 MG immediate release tablet  Commonly known as: ROXICODONE  Take 1 tablet (5 mg total) by mouth every 4 (four) hours as needed for Pain.     PROCTO-MED HC 2.5 % rectal cream  Generic drug: hydrocortisone  Place rectally 2 (two) times daily.     tadalafiL 20 MG Tab  Commonly known as: CIALIS  Take 1 tablet (20 mg total) by mouth daily as needed (erectile dysfunction).     tamsulosin 0.4 mg Cap  Commonly known as: FLOMAX  Take 1 capsule (0.4 mg total) by mouth once daily.           * This list has 2 medication(s) that are the same as other medications prescribed for you. Read the directions carefully, and ask your doctor or other care provider to review them with you.                  Rose Valdovinos MD  Thoracic Surgery  [facility] - Cardiology Stepdown

## 2025-07-22 NOTE — ASSESSMENT & PLAN NOTE
Jesus Christy is a 64 y.o. 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 Imaging obtained revealing a small hemopneumothorax. He is hemodynamically stable with SpO2 in the mid 90s on RA.      -Will remove chest tube  -Regular diet  -Will restart home Eliquis on discharge  - DVT ppx  -7d course of abx for possible pneumonia  -MMPC  -Discharge

## 2025-07-27 ENCOUNTER — TELEPHONE (OUTPATIENT)
Dept: ADMINISTRATIVE | Facility: CLINIC | Age: 65
End: 2025-07-27
Payer: MEDICARE

## 2025-07-27 NOTE — TELEPHONE ENCOUNTER
"Day 5 Post-Discharge SMS Tracker     Phoned patient in response to reply of "5" to post-discharge texting tracker. Left message, "Hi. This is Josseline, a nurse with [facility]'s post-discharge texting tracker. We received your reply of "5" to our text indicating that there might be medication questions or other concerns needing to be addressed. If your reply was due to symptoms, we do have a nurse on call 24 hours a day, 7 days a week. Please call [phone]. Otherwise, I will make a second attempt to call you back this afternoon. Thank you and have a great day.    "
10:15

## 2025-07-28 ENCOUNTER — TELEPHONE (OUTPATIENT)
Dept: CARDIOTHORACIC SURGERY | Facility: CLINIC | Age: 65
End: 2025-07-28
Payer: MEDICARE

## 2025-07-28 NOTE — TELEPHONE ENCOUNTER
Called following request for more oxycodone which he was taking at night. Previously discussed narcotics are not a good long term solution for nerve pain. He was taking gabapentin 600 mg TID prior to surgery. Will trial nightly increase to 900mg. New regimen will be 600mg/600mg/900mg. Can add in NSAIDS as needed with food and hydration. OTC salonpas. Ice/heat. Message/call later in the week if pain not improving.

## 2025-08-13 ENCOUNTER — TELEPHONE (OUTPATIENT)
Dept: UROLOGY | Facility: CLINIC | Age: 65
End: 2025-08-13
Payer: MEDICARE

## 2025-08-15 ENCOUNTER — TELEPHONE (OUTPATIENT)
Dept: UROLOGY | Facility: CLINIC | Age: 65
End: 2025-08-15
Payer: MEDICARE

## 2025-08-22 ENCOUNTER — HOSPITAL ENCOUNTER (OUTPATIENT)
Dept: RADIOLOGY | Facility: HOSPITAL | Age: 65
Discharge: HOME | End: 2025-08-22
Attending: INTERNAL MEDICINE
Payer: MEDICARE

## 2025-08-22 DIAGNOSIS — C61 ADENOCARCINOMA OF PROSTATE: ICD-10-CM

## 2025-08-22 DIAGNOSIS — I81 PORTAL VEIN THROMBOSIS: ICD-10-CM

## 2025-08-22 DIAGNOSIS — R77.2 HIGH ALPHA FETOPROTEIN (AFP) TUMOR MARKER: ICD-10-CM

## 2025-08-22 PROCEDURE — 93975 VASCULAR STUDY: CPT | Mod: TC

## 2025-08-29 ENCOUNTER — TELEPHONE (OUTPATIENT)
Dept: HEPATOLOGY | Facility: CLINIC | Age: 65
End: 2025-08-29

## 2025-08-29 ENCOUNTER — PATIENT MESSAGE (OUTPATIENT)
Dept: PAIN MEDICINE | Facility: OTHER | Age: 65
End: 2025-08-29
Payer: MEDICARE

## 2025-08-29 ENCOUNTER — OFFICE VISIT (OUTPATIENT)
Dept: HEPATOLOGY | Facility: CLINIC | Age: 65
End: 2025-08-29
Payer: MEDICARE

## 2025-08-29 DIAGNOSIS — C34.11 MALIGNANT NEOPLASM OF UPPER LOBE OF RIGHT LUNG: ICD-10-CM

## 2025-08-29 DIAGNOSIS — R77.2 HIGH ALPHA FETOPROTEIN (AFP) TUMOR MARKER: Primary | ICD-10-CM

## 2025-08-29 DIAGNOSIS — I81 PORTAL VEIN THROMBOSIS: ICD-10-CM

## 2025-08-29 DIAGNOSIS — C61 PROSTATE CANCER: Primary | ICD-10-CM

## (undated) DEVICE — CONNECTOR SWIVEL

## (undated) DEVICE — KIT ANTIFOG W/SPONG & FLUID

## (undated) DEVICE — ELECTRODE REM PLYHSV RETURN 9

## (undated) DEVICE — DRESSING SURGICAL 1X3

## (undated) DEVICE — SYR 10CC LUER LOCK

## (undated) DEVICE — TUBING SUC UNIV W/CONN 12FT

## (undated) DEVICE — PENCIL GOLF STERILE

## (undated) DEVICE — GLOVE BIOGEL SKINSENSE PI 7.5

## (undated) DEVICE — SPONGE COTTON TRAY 4X4IN

## (undated) DEVICE — RELOAD SUREFORM 45 4.3 GRN 6R

## (undated) DEVICE — SOL WATER STRL IRR 1000ML

## (undated) DEVICE — CONTAINER SPECIMEN OR STER 4OZ

## (undated) DEVICE — DRESSING TRANS 2X2 TEGADERM

## (undated) DEVICE — DRAPE COLUMN DAVINCI XI

## (undated) DEVICE — RELOAD SUREFORM 45 4.6 BLK 6R

## (undated) DEVICE — TAPE SILK 3IN

## (undated) DEVICE — PENCIL ROCKER SWITCH 10FT CORD

## (undated) DEVICE — SUT SILK 0 BLK BR FSL 18 IN

## (undated) DEVICE — SET TRI-LUMEN FILTERED TUBE

## (undated) DEVICE — NDL FLTR 5MCRN BLNT TIP 18GX1

## (undated) DEVICE — CANNULA SEAL 12MM

## (undated) DEVICE — SUT 0 VICRYL / CT-1

## (undated) DEVICE — SEAL UNIVERSAL 5MM-8MM XI

## (undated) DEVICE — PORT AIRSEAL 12/120MM LPI

## (undated) DEVICE — BOWL STERILE LARGE 32OZ

## (undated) DEVICE — DRAPE ARM DAVINCI XI

## (undated) DEVICE — SUT MCRYL PLUS 4-0 PS2 27IN

## (undated) DEVICE — NDL HYPO REG 25G X 1 1/2

## (undated) DEVICE — ELECTRODE MEGADYNE RETURN DUAL

## (undated) DEVICE — PENCIL SMK EVAC CONNECTOR 10FT

## (undated) DEVICE — SYR SLIP TIP 20CC

## (undated) DEVICE — SUT SILK 0 STRANDS 30IN BLK

## (undated) DEVICE — NDL SPINAL 18GX3.5 SPINOCAN

## (undated) DEVICE — NDL ASPIRATING VIZISHOT 20-40M

## (undated) DEVICE — ADAPTER SWIVEL

## (undated) DEVICE — SYR ONLY LUER LOCK 20CC

## (undated) DEVICE — DRAPE ABDOMINAL TIBURON 14X11

## (undated) DEVICE — COVER LIGHT HANDLE

## (undated) DEVICE — SEALANT VISTASEAL FIBRIN 10ML

## (undated) DEVICE — GOWN POLY REINF BRTH SLV XL

## (undated) DEVICE — SUT VICRYL 3-0 27 SH

## (undated) DEVICE — ADAPTER VISION PROBE & SUCTION

## (undated) DEVICE — PACK ECLIPSE SET-UP W/O DRAPE

## (undated) DEVICE — SUT 2-0 VICRYL / CT-1

## (undated) DEVICE — DRAPE SCOPE PILLOW WARMER

## (undated) DEVICE — ELECTRODE BLADE INSULATED 1 IN

## (undated) DEVICE — RELOAD SUREFORM 45 3.5 BLU 6R

## (undated) DEVICE — GOWN SMART IMP BREATHABLE XXLG

## (undated) DEVICE — APPLICATOR VISTASEAL FLEX 45CM

## (undated) DEVICE — DRAPE INCISE IOBAN 2 23X17IN

## (undated) DEVICE — RELOAD SUREFORM 45 2.5 WHT 6R

## (undated) DEVICE — DRESSING TRANS 4X4 TEGADERM

## (undated) DEVICE — DRAPE THREE-QTR REINF 53X77IN

## (undated) DEVICE — SYS LABEL CORRECT MED

## (undated) DEVICE — CANNULA REDUCER 12-8MM

## (undated) DEVICE — TRAY MINOR GEN SURG OMC

## (undated) DEVICE — SET TUBE DAVINCI 5 HI FLOW INS

## (undated) DEVICE — BAG ION VISION PROBE